# Patient Record
Sex: MALE | Race: WHITE | NOT HISPANIC OR LATINO | Employment: OTHER | URBAN - METROPOLITAN AREA
[De-identification: names, ages, dates, MRNs, and addresses within clinical notes are randomized per-mention and may not be internally consistent; named-entity substitution may affect disease eponyms.]

---

## 2017-01-24 ENCOUNTER — COMMUNICATION - HEALTHEAST (OUTPATIENT)
Dept: FAMILY MEDICINE | Facility: CLINIC | Age: 77
End: 2017-01-24

## 2017-01-24 DIAGNOSIS — E03.9 HYPOTHYROIDISM: ICD-10-CM

## 2017-01-24 DIAGNOSIS — I10 HTN (HYPERTENSION): ICD-10-CM

## 2017-01-24 DIAGNOSIS — E11.9 DIABETES (H): ICD-10-CM

## 2017-01-24 DIAGNOSIS — E78.5 HYPERLIPIDEMIA: ICD-10-CM

## 2017-01-30 ENCOUNTER — OFFICE VISIT - HEALTHEAST (OUTPATIENT)
Dept: FAMILY MEDICINE | Facility: CLINIC | Age: 77
End: 2017-01-30

## 2017-01-30 DIAGNOSIS — C61 MALIGNANT NEOPLASM OF PROSTATE (H): ICD-10-CM

## 2017-01-30 DIAGNOSIS — E11.9 DIABETES (H): ICD-10-CM

## 2017-01-30 DIAGNOSIS — E03.9 HYPOTHYROIDISM: ICD-10-CM

## 2017-01-30 DIAGNOSIS — I10 HTN (HYPERTENSION): ICD-10-CM

## 2017-01-30 DIAGNOSIS — E78.5 HYPERLIPIDEMIA: ICD-10-CM

## 2017-01-30 DIAGNOSIS — E11.9 TYPE 2 DIABETES MELLITUS (H): ICD-10-CM

## 2017-01-30 LAB
CHOLEST SERPL-MCNC: 188 MG/DL
FASTING STATUS PATIENT QL REPORTED: YES
HBA1C MFR BLD: 6.4 % (ref 3.5–6)
HDLC SERPL-MCNC: 57 MG/DL
LDLC SERPL CALC-MCNC: 121 MG/DL
TRIGL SERPL-MCNC: 50 MG/DL

## 2017-01-30 ASSESSMENT — MIFFLIN-ST. JEOR: SCORE: 2218.12

## 2017-03-09 ENCOUNTER — APPOINTMENT (OUTPATIENT)
Dept: GENERAL RADIOLOGY | Facility: CLINIC | Age: 77
DRG: 504 | End: 2017-03-09
Attending: PHYSICIAN ASSISTANT
Payer: MEDICARE

## 2017-03-09 ENCOUNTER — APPOINTMENT (OUTPATIENT)
Dept: GENERAL RADIOLOGY | Facility: CLINIC | Age: 77
DRG: 504 | End: 2017-03-09
Attending: PODIATRIST
Payer: MEDICARE

## 2017-03-09 ENCOUNTER — APPOINTMENT (OUTPATIENT)
Dept: CARDIOLOGY | Facility: CLINIC | Age: 77
DRG: 504 | End: 2017-03-09
Attending: PHYSICIAN ASSISTANT
Payer: MEDICARE

## 2017-03-09 ENCOUNTER — HOSPITAL ENCOUNTER (INPATIENT)
Facility: CLINIC | Age: 77
LOS: 4 days | Discharge: NURSING, REHAB OR LONG-TERM CARE | DRG: 504 | End: 2017-03-13
Attending: FAMILY MEDICINE | Admitting: FAMILY MEDICINE
Payer: MEDICARE

## 2017-03-09 ENCOUNTER — RECORDS - HEALTHEAST (OUTPATIENT)
Dept: ADMINISTRATIVE | Facility: OTHER | Age: 77
End: 2017-03-09

## 2017-03-09 ENCOUNTER — APPOINTMENT (OUTPATIENT)
Dept: CT IMAGING | Facility: CLINIC | Age: 77
DRG: 504 | End: 2017-03-09
Attending: PHYSICIAN ASSISTANT
Payer: MEDICARE

## 2017-03-09 ENCOUNTER — APPOINTMENT (OUTPATIENT)
Dept: GENERAL RADIOLOGY | Facility: CLINIC | Age: 77
DRG: 504 | End: 2017-03-09
Attending: FAMILY MEDICINE
Payer: MEDICARE

## 2017-03-09 ENCOUNTER — ANESTHESIA (OUTPATIENT)
Dept: SURGERY | Facility: CLINIC | Age: 77
DRG: 504 | End: 2017-03-09
Payer: MEDICARE

## 2017-03-09 ENCOUNTER — ANESTHESIA EVENT (OUTPATIENT)
Dept: SURGERY | Facility: CLINIC | Age: 77
DRG: 504 | End: 2017-03-09
Payer: MEDICARE

## 2017-03-09 DIAGNOSIS — S92.101A: Primary | ICD-10-CM

## 2017-03-09 DIAGNOSIS — S92.131A CLOSED DISPLACED FRACTURE OF POSTERIOR PROCESS OF RIGHT TALUS, INITIAL ENCOUNTER: ICD-10-CM

## 2017-03-09 PROBLEM — S92.109A TALUS FRACTURE: Status: ACTIVE | Noted: 2017-03-09

## 2017-03-09 PROBLEM — N40.0 BPH (BENIGN PROSTATIC HYPERPLASIA): Chronic | Status: ACTIVE | Noted: 2017-03-09

## 2017-03-09 PROBLEM — E11.9 TYPE 2 DIABETES MELLITUS (H): Status: ACTIVE | Noted: 2017-03-09

## 2017-03-09 PROBLEM — C73 MALIGNANT NEOPLASM OF THYROID GLAND (H): Status: ACTIVE | Noted: 2017-03-09

## 2017-03-09 PROBLEM — E78.5 HYPERLIPIDEMIA: Status: ACTIVE | Noted: 2017-03-09

## 2017-03-09 PROBLEM — E03.9 HYPOTHYROIDISM: Status: ACTIVE | Noted: 2017-03-09

## 2017-03-09 PROBLEM — E11.9 TYPE 2 DIABETES MELLITUS (H): Chronic | Status: ACTIVE | Noted: 2017-03-09

## 2017-03-09 PROBLEM — E03.9 HYPOTHYROIDISM: Chronic | Status: ACTIVE | Noted: 2017-03-09

## 2017-03-09 PROBLEM — C61 MALIGNANT NEOPLASM OF PROSTATE (H): Status: ACTIVE | Noted: 2017-03-09

## 2017-03-09 PROBLEM — E78.5 HYPERLIPIDEMIA: Chronic | Status: ACTIVE | Noted: 2017-03-09

## 2017-03-09 LAB
ALBUMIN SERPL-MCNC: 3.7 G/DL (ref 3.4–5)
ALP SERPL-CCNC: 44 U/L (ref 40–150)
ALT SERPL W P-5'-P-CCNC: 20 U/L (ref 0–70)
ANION GAP SERPL CALCULATED.3IONS-SCNC: 6 MMOL/L (ref 3–14)
AST SERPL W P-5'-P-CCNC: 15 U/L (ref 0–45)
BASOPHILS # BLD AUTO: 0 10E9/L (ref 0–0.2)
BASOPHILS NFR BLD AUTO: 0.1 %
BILIRUB SERPL-MCNC: 0.6 MG/DL (ref 0.2–1.3)
BUN SERPL-MCNC: 22 MG/DL (ref 7–30)
CALCIUM SERPL-MCNC: 8.4 MG/DL (ref 8.5–10.1)
CHLORIDE SERPL-SCNC: 102 MMOL/L (ref 94–109)
CO2 SERPL-SCNC: 29 MMOL/L (ref 20–32)
CREAT SERPL-MCNC: 0.96 MG/DL (ref 0.66–1.25)
DIFFERENTIAL METHOD BLD: ABNORMAL
EOSINOPHIL # BLD AUTO: 0 10E9/L (ref 0–0.7)
EOSINOPHIL NFR BLD AUTO: 0.4 %
ERYTHROCYTE [DISTWIDTH] IN BLOOD BY AUTOMATED COUNT: 14 % (ref 10–15)
GFR SERPL CREATININE-BSD FRML MDRD: 76 ML/MIN/1.7M2
GLUCOSE BLDC GLUCOMTR-MCNC: 130 MG/DL (ref 70–99)
GLUCOSE BLDC GLUCOMTR-MCNC: 140 MG/DL (ref 70–99)
GLUCOSE BLDC GLUCOMTR-MCNC: 158 MG/DL (ref 70–99)
GLUCOSE BLDC GLUCOMTR-MCNC: 186 MG/DL (ref 70–99)
GLUCOSE SERPL-MCNC: 157 MG/DL (ref 70–99)
HBA1C MFR BLD: 6.8 % (ref 4.3–6)
HCT VFR BLD AUTO: 44.8 % (ref 40–53)
HGB BLD-MCNC: 14.2 G/DL (ref 13.3–17.7)
IMM GRANULOCYTES # BLD: 0 10E9/L (ref 0–0.4)
IMM GRANULOCYTES NFR BLD: 0.1 %
LYMPHOCYTES # BLD AUTO: 0.5 10E9/L (ref 0.8–5.3)
LYMPHOCYTES NFR BLD AUTO: 7.1 %
MCH RBC QN AUTO: 30.9 PG (ref 26.5–33)
MCHC RBC AUTO-ENTMCNC: 31.7 G/DL (ref 31.5–36.5)
MCV RBC AUTO: 98 FL (ref 78–100)
MONOCYTES # BLD AUTO: 0.7 10E9/L (ref 0–1.3)
MONOCYTES NFR BLD AUTO: 9.8 %
MRSA DNA SPEC QL NAA+PROBE: NORMAL
NEUTROPHILS # BLD AUTO: 6.2 10E9/L (ref 1.6–8.3)
NEUTROPHILS NFR BLD AUTO: 82.5 %
PLATELET # BLD AUTO: 171 10E9/L (ref 150–450)
POTASSIUM SERPL-SCNC: 4.1 MMOL/L (ref 3.4–5.3)
PROT SERPL-MCNC: 6.8 G/DL (ref 6.8–8.8)
RBC # BLD AUTO: 4.59 10E12/L (ref 4.4–5.9)
SODIUM SERPL-SCNC: 137 MMOL/L (ref 133–144)
SPECIMEN SOURCE: NORMAL
WBC # BLD AUTO: 7.6 10E9/L (ref 4–11)

## 2017-03-09 PROCEDURE — 25000128 H RX IP 250 OP 636: Performed by: FAMILY MEDICINE

## 2017-03-09 PROCEDURE — 99285 EMERGENCY DEPT VISIT HI MDM: CPT | Mod: 25

## 2017-03-09 PROCEDURE — 25000125 ZZHC RX 250: Performed by: NURSE ANESTHETIST, CERTIFIED REGISTERED

## 2017-03-09 PROCEDURE — 73700 CT LOWER EXTREMITY W/O DYE: CPT | Mod: RT

## 2017-03-09 PROCEDURE — 25000131 ZZH RX MED GY IP 250 OP 636 PS 637: Mod: GY | Performed by: PHYSICIAN ASSISTANT

## 2017-03-09 PROCEDURE — G0378 HOSPITAL OBSERVATION PER HR: HCPCS

## 2017-03-09 PROCEDURE — C1713 ANCHOR/SCREW BN/BN,TIS/BN: HCPCS | Performed by: PODIATRIST

## 2017-03-09 PROCEDURE — 27210794 ZZH OR GENERAL SUPPLY STERILE: Performed by: PODIATRIST

## 2017-03-09 PROCEDURE — 0QSL04Z REPOSITION RIGHT TARSAL WITH INTERNAL FIXATION DEVICE, OPEN APPROACH: ICD-10-PCS | Performed by: PODIATRIST

## 2017-03-09 PROCEDURE — 83036 HEMOGLOBIN GLYCOSYLATED A1C: CPT | Performed by: PHYSICIAN ASSISTANT

## 2017-03-09 PROCEDURE — 25500064 ZZH RX 255 OP 636: Performed by: FAMILY MEDICINE

## 2017-03-09 PROCEDURE — 25000132 ZZH RX MED GY IP 250 OP 250 PS 637: Mod: GY | Performed by: PHYSICIAN ASSISTANT

## 2017-03-09 PROCEDURE — A9270 NON-COVERED ITEM OR SERVICE: HCPCS | Mod: GY | Performed by: PHYSICIAN ASSISTANT

## 2017-03-09 PROCEDURE — 25000128 H RX IP 250 OP 636: Performed by: NURSE ANESTHETIST, CERTIFIED REGISTERED

## 2017-03-09 PROCEDURE — 29515 APPLICATION SHORT LEG SPLINT: CPT | Performed by: FAMILY MEDICINE

## 2017-03-09 PROCEDURE — 00000146 ZZHCL STATISTIC GLUCOSE BY METER IP

## 2017-03-09 PROCEDURE — 25800025 ZZH RX 258: Performed by: NURSE ANESTHETIST, CERTIFIED REGISTERED

## 2017-03-09 PROCEDURE — 25000125 ZZHC RX 250: Performed by: PODIATRIST

## 2017-03-09 PROCEDURE — 36000052 ZZH SURGERY LEVEL 2 EA 15 ADDTL MIN: Performed by: PODIATRIST

## 2017-03-09 PROCEDURE — C1769 GUIDE WIRE: HCPCS | Performed by: PODIATRIST

## 2017-03-09 PROCEDURE — 37000009 ZZH ANESTHESIA TECHNICAL FEE, EACH ADDTL 15 MIN: Performed by: PODIATRIST

## 2017-03-09 PROCEDURE — 12000007 ZZH R&B INTERMEDIATE

## 2017-03-09 PROCEDURE — 80053 COMPREHEN METABOLIC PANEL: CPT | Performed by: FAMILY MEDICINE

## 2017-03-09 PROCEDURE — 73630 X-RAY EXAM OF FOOT: CPT | Mod: RT

## 2017-03-09 PROCEDURE — 87641 MR-STAPH DNA AMP PROBE: CPT | Performed by: FAMILY MEDICINE

## 2017-03-09 PROCEDURE — 25000132 ZZH RX MED GY IP 250 OP 250 PS 637: Performed by: PHYSICIAN ASSISTANT

## 2017-03-09 PROCEDURE — 71000027 ZZH RECOVERY PHASE 2 EACH 15 MINS: Performed by: PODIATRIST

## 2017-03-09 PROCEDURE — 93306 TTE W/DOPPLER COMPLETE: CPT | Mod: 26 | Performed by: INTERNAL MEDICINE

## 2017-03-09 PROCEDURE — 25000128 H RX IP 250 OP 636: Performed by: PODIATRIST

## 2017-03-09 PROCEDURE — 40000264 ECHO COMPLETE WITH OPTISON

## 2017-03-09 PROCEDURE — 40000277 XR SURGERY CARM FLUORO LESS THAN 5 MIN W STILLS

## 2017-03-09 PROCEDURE — 96375 TX/PRO/DX INJ NEW DRUG ADDON: CPT

## 2017-03-09 PROCEDURE — 40000305 ZZH STATISTIC PRE PROC ASSESS I: Performed by: PODIATRIST

## 2017-03-09 PROCEDURE — 73590 X-RAY EXAM OF LOWER LEG: CPT | Mod: RT

## 2017-03-09 PROCEDURE — 87640 STAPH A DNA AMP PROBE: CPT | Performed by: FAMILY MEDICINE

## 2017-03-09 PROCEDURE — 36000054 ZZH SURGERY LEVEL 2 W FLUORO 1ST 30 MIN: Performed by: PODIATRIST

## 2017-03-09 PROCEDURE — 99223 1ST HOSP IP/OBS HIGH 75: CPT | Mod: AI | Performed by: PHYSICIAN ASSISTANT

## 2017-03-09 PROCEDURE — 73610 X-RAY EXAM OF ANKLE: CPT | Mod: RT

## 2017-03-09 PROCEDURE — 25000128 H RX IP 250 OP 636

## 2017-03-09 PROCEDURE — 99285 EMERGENCY DEPT VISIT HI MDM: CPT | Mod: 25 | Performed by: FAMILY MEDICINE

## 2017-03-09 PROCEDURE — 96376 TX/PRO/DX INJ SAME DRUG ADON: CPT

## 2017-03-09 PROCEDURE — 96374 THER/PROPH/DIAG INJ IV PUSH: CPT

## 2017-03-09 PROCEDURE — S0020 INJECTION, BUPIVICAINE HYDRO: HCPCS | Performed by: PODIATRIST

## 2017-03-09 PROCEDURE — 85025 COMPLETE CBC W/AUTO DIFF WBC: CPT | Performed by: FAMILY MEDICINE

## 2017-03-09 PROCEDURE — 37000008 ZZH ANESTHESIA TECHNICAL FEE, 1ST 30 MIN: Performed by: PODIATRIST

## 2017-03-09 PROCEDURE — 93005 ELECTROCARDIOGRAM TRACING: CPT

## 2017-03-09 PROCEDURE — 29515 APPLICATION SHORT LEG SPLINT: CPT

## 2017-03-09 PROCEDURE — 71010 XR CHEST PORT 1 VW: CPT

## 2017-03-09 DEVICE — IMPLANTABLE DEVICE: Type: IMPLANTABLE DEVICE | Site: ANKLE | Status: FUNCTIONAL

## 2017-03-09 RX ORDER — LIDOCAINE HYDROCHLORIDE 10 MG/ML
INJECTION, SOLUTION EPIDURAL; INFILTRATION; INTRACAUDAL; PERINEURAL PRN
Status: DISCONTINUED | OUTPATIENT
Start: 2017-03-09 | End: 2017-03-09

## 2017-03-09 RX ORDER — HYDRALAZINE HYDROCHLORIDE 20 MG/ML
2.5-5 INJECTION INTRAMUSCULAR; INTRAVENOUS EVERY 10 MIN PRN
Status: DISCONTINUED | OUTPATIENT
Start: 2017-03-09 | End: 2017-03-09 | Stop reason: HOSPADM

## 2017-03-09 RX ORDER — MORPHINE SULFATE 2 MG/ML
INJECTION, SOLUTION INTRAMUSCULAR; INTRAVENOUS
Status: DISCONTINUED
Start: 2017-03-09 | End: 2017-03-09 | Stop reason: HOSPADM

## 2017-03-09 RX ORDER — NALOXONE HYDROCHLORIDE 0.4 MG/ML
.1-.4 INJECTION, SOLUTION INTRAMUSCULAR; INTRAVENOUS; SUBCUTANEOUS
Status: DISCONTINUED | OUTPATIENT
Start: 2017-03-09 | End: 2017-03-09

## 2017-03-09 RX ORDER — ROPIVACAINE HYDROCHLORIDE 7.5 MG/ML
INJECTION, SOLUTION EPIDURAL; PERINEURAL PRN
Status: DISCONTINUED | OUTPATIENT
Start: 2017-03-09 | End: 2017-03-09

## 2017-03-09 RX ORDER — MEPERIDINE HYDROCHLORIDE 25 MG/ML
12.5 INJECTION INTRAMUSCULAR; INTRAVENOUS; SUBCUTANEOUS
Status: DISCONTINUED | OUTPATIENT
Start: 2017-03-09 | End: 2017-03-09 | Stop reason: HOSPADM

## 2017-03-09 RX ORDER — LOSARTAN POTASSIUM 25 MG/1
25 TABLET ORAL DAILY
Status: DISCONTINUED | OUTPATIENT
Start: 2017-03-09 | End: 2017-03-13 | Stop reason: HOSPADM

## 2017-03-09 RX ORDER — PROCHLORPERAZINE 25 MG
12.5 SUPPOSITORY, RECTAL RECTAL EVERY 12 HOURS PRN
Status: DISCONTINUED | OUTPATIENT
Start: 2017-03-09 | End: 2017-03-13 | Stop reason: HOSPADM

## 2017-03-09 RX ORDER — CEFAZOLIN SODIUM 1 G/3ML
1 INJECTION, POWDER, FOR SOLUTION INTRAMUSCULAR; INTRAVENOUS SEE ADMIN INSTRUCTIONS
Status: DISCONTINUED | OUTPATIENT
Start: 2017-03-09 | End: 2017-03-09 | Stop reason: CLARIF

## 2017-03-09 RX ORDER — ONDANSETRON 2 MG/ML
4 INJECTION INTRAMUSCULAR; INTRAVENOUS EVERY 6 HOURS PRN
Status: DISCONTINUED | OUTPATIENT
Start: 2017-03-09 | End: 2017-03-13 | Stop reason: HOSPADM

## 2017-03-09 RX ORDER — DEXAMETHASONE SODIUM PHOSPHATE 4 MG/ML
INJECTION, SOLUTION INTRA-ARTICULAR; INTRALESIONAL; INTRAMUSCULAR; INTRAVENOUS; SOFT TISSUE PRN
Status: DISCONTINUED | OUTPATIENT
Start: 2017-03-09 | End: 2017-03-09

## 2017-03-09 RX ORDER — ACETAMINOPHEN 325 MG/1
650 TABLET ORAL EVERY 4 HOURS PRN
Status: DISCONTINUED | OUTPATIENT
Start: 2017-03-09 | End: 2017-03-10

## 2017-03-09 RX ORDER — ONDANSETRON 4 MG/1
4 TABLET, ORALLY DISINTEGRATING ORAL EVERY 6 HOURS PRN
Status: DISCONTINUED | OUTPATIENT
Start: 2017-03-09 | End: 2017-03-13 | Stop reason: HOSPADM

## 2017-03-09 RX ORDER — SIMVASTATIN 40 MG
40 TABLET ORAL AT BEDTIME
Status: DISCONTINUED | OUTPATIENT
Start: 2017-03-09 | End: 2017-03-13 | Stop reason: HOSPADM

## 2017-03-09 RX ORDER — HYDROMORPHONE HYDROCHLORIDE 1 MG/ML
INJECTION, SOLUTION INTRAMUSCULAR; INTRAVENOUS; SUBCUTANEOUS
Status: COMPLETED
Start: 2017-03-09 | End: 2017-03-09

## 2017-03-09 RX ORDER — LEVOTHYROXINE SODIUM 75 UG/1
150 TABLET ORAL
Status: DISCONTINUED | OUTPATIENT
Start: 2017-03-09 | End: 2017-03-13 | Stop reason: HOSPADM

## 2017-03-09 RX ORDER — PROPOFOL 10 MG/ML
INJECTION, EMULSION INTRAVENOUS CONTINUOUS PRN
Status: DISCONTINUED | OUTPATIENT
Start: 2017-03-09 | End: 2017-03-09

## 2017-03-09 RX ORDER — SODIUM CHLORIDE 9 MG/ML
INJECTION, SOLUTION INTRAVENOUS CONTINUOUS
Status: DISCONTINUED | OUTPATIENT
Start: 2017-03-09 | End: 2017-03-11 | Stop reason: ALTCHOICE

## 2017-03-09 RX ORDER — LIDOCAINE HCL/EPINEPHRINE/PF 2%-1:200K
VIAL (ML) INJECTION PRN
Status: DISCONTINUED | OUTPATIENT
Start: 2017-03-09 | End: 2017-03-09

## 2017-03-09 RX ORDER — FENTANYL CITRATE 50 UG/ML
25-50 INJECTION, SOLUTION INTRAMUSCULAR; INTRAVENOUS
Status: DISCONTINUED | OUTPATIENT
Start: 2017-03-09 | End: 2017-03-09 | Stop reason: HOSPADM

## 2017-03-09 RX ORDER — TAMSULOSIN HYDROCHLORIDE 0.4 MG/1
0.4 CAPSULE ORAL DAILY
Status: DISCONTINUED | OUTPATIENT
Start: 2017-03-09 | End: 2017-03-13 | Stop reason: HOSPADM

## 2017-03-09 RX ORDER — AMOXICILLIN 250 MG
1-2 CAPSULE ORAL 2 TIMES DAILY
Status: DISCONTINUED | OUTPATIENT
Start: 2017-03-09 | End: 2017-03-13 | Stop reason: HOSPADM

## 2017-03-09 RX ORDER — ONDANSETRON 2 MG/ML
INJECTION INTRAMUSCULAR; INTRAVENOUS
Status: DISCONTINUED
Start: 2017-03-09 | End: 2017-03-09 | Stop reason: HOSPADM

## 2017-03-09 RX ORDER — MORPHINE SULFATE 4 MG/ML
4 INJECTION, SOLUTION INTRAMUSCULAR; INTRAVENOUS ONCE
Status: DISCONTINUED | OUTPATIENT
Start: 2017-03-09 | End: 2017-03-09

## 2017-03-09 RX ORDER — SODIUM CHLORIDE, SODIUM LACTATE, POTASSIUM CHLORIDE, CALCIUM CHLORIDE 600; 310; 30; 20 MG/100ML; MG/100ML; MG/100ML; MG/100ML
INJECTION, SOLUTION INTRAVENOUS CONTINUOUS
Status: DISCONTINUED | OUTPATIENT
Start: 2017-03-09 | End: 2017-03-10

## 2017-03-09 RX ORDER — CEFAZOLIN SODIUM 1 G/50ML
3 SOLUTION INTRAVENOUS
Status: COMPLETED | OUTPATIENT
Start: 2017-03-09 | End: 2017-03-09

## 2017-03-09 RX ORDER — LIDOCAINE 40 MG/G
CREAM TOPICAL
Status: DISCONTINUED | OUTPATIENT
Start: 2017-03-09 | End: 2017-03-09 | Stop reason: HOSPADM

## 2017-03-09 RX ORDER — NICOTINE POLACRILEX 4 MG
15-30 LOZENGE BUCCAL
Status: DISCONTINUED | OUTPATIENT
Start: 2017-03-09 | End: 2017-03-13 | Stop reason: HOSPADM

## 2017-03-09 RX ORDER — FENTANYL CITRATE 50 UG/ML
INJECTION, SOLUTION INTRAMUSCULAR; INTRAVENOUS PRN
Status: DISCONTINUED | OUTPATIENT
Start: 2017-03-09 | End: 2017-03-09

## 2017-03-09 RX ORDER — LORAZEPAM 2 MG/ML
.5-1 INJECTION INTRAMUSCULAR EVERY 4 HOURS PRN
Status: DISCONTINUED | OUTPATIENT
Start: 2017-03-09 | End: 2017-03-13 | Stop reason: HOSPADM

## 2017-03-09 RX ORDER — LORAZEPAM 0.5 MG/1
.5-1 TABLET ORAL EVERY 4 HOURS PRN
Status: DISCONTINUED | OUTPATIENT
Start: 2017-03-09 | End: 2017-03-13 | Stop reason: HOSPADM

## 2017-03-09 RX ORDER — HYDROMORPHONE HYDROCHLORIDE 1 MG/ML
INJECTION, SOLUTION INTRAMUSCULAR; INTRAVENOUS; SUBCUTANEOUS
Status: DISCONTINUED
Start: 2017-03-09 | End: 2017-03-09 | Stop reason: HOSPADM

## 2017-03-09 RX ORDER — MORPHINE SULFATE 2 MG/ML
2-4 INJECTION, SOLUTION INTRAMUSCULAR; INTRAVENOUS
Status: DISCONTINUED | OUTPATIENT
Start: 2017-03-09 | End: 2017-03-13 | Stop reason: HOSPADM

## 2017-03-09 RX ORDER — MORPHINE SULFATE 4 MG/ML
4 INJECTION, SOLUTION INTRAMUSCULAR; INTRAVENOUS ONCE
Status: COMPLETED | OUTPATIENT
Start: 2017-03-09 | End: 2017-03-09

## 2017-03-09 RX ORDER — PROCHLORPERAZINE MALEATE 5 MG
5 TABLET ORAL EVERY 6 HOURS PRN
Status: DISCONTINUED | OUTPATIENT
Start: 2017-03-09 | End: 2017-03-13 | Stop reason: HOSPADM

## 2017-03-09 RX ORDER — ONDANSETRON 2 MG/ML
4 INJECTION INTRAMUSCULAR; INTRAVENOUS EVERY 30 MIN PRN
Status: DISCONTINUED | OUTPATIENT
Start: 2017-03-09 | End: 2017-03-09

## 2017-03-09 RX ORDER — ONDANSETRON 2 MG/ML
4 INJECTION INTRAMUSCULAR; INTRAVENOUS ONCE
Status: COMPLETED | OUTPATIENT
Start: 2017-03-09 | End: 2017-03-09

## 2017-03-09 RX ORDER — TAMSULOSIN HYDROCHLORIDE 0.4 MG/1
0.4 CAPSULE ORAL DAILY
COMMUNITY
End: 2022-07-04

## 2017-03-09 RX ORDER — DEXTROSE MONOHYDRATE 25 G/50ML
25-50 INJECTION, SOLUTION INTRAVENOUS
Status: DISCONTINUED | OUTPATIENT
Start: 2017-03-09 | End: 2017-03-13 | Stop reason: HOSPADM

## 2017-03-09 RX ORDER — ONDANSETRON 4 MG/1
4 TABLET, ORALLY DISINTEGRATING ORAL EVERY 30 MIN PRN
Status: DISCONTINUED | OUTPATIENT
Start: 2017-03-09 | End: 2017-03-09

## 2017-03-09 RX ORDER — BUPIVACAINE HYDROCHLORIDE 5 MG/ML
INJECTION, SOLUTION PERINEURAL PRN
Status: DISCONTINUED | OUTPATIENT
Start: 2017-03-09 | End: 2017-03-09 | Stop reason: HOSPADM

## 2017-03-09 RX ORDER — HYDROMORPHONE HYDROCHLORIDE 1 MG/ML
.3-.5 INJECTION, SOLUTION INTRAMUSCULAR; INTRAVENOUS; SUBCUTANEOUS EVERY 10 MIN PRN
Status: DISCONTINUED | OUTPATIENT
Start: 2017-03-09 | End: 2017-03-09 | Stop reason: HOSPADM

## 2017-03-09 RX ORDER — HYDROMORPHONE HYDROCHLORIDE 1 MG/ML
0.5 INJECTION, SOLUTION INTRAMUSCULAR; INTRAVENOUS; SUBCUTANEOUS ONCE
Status: COMPLETED | OUTPATIENT
Start: 2017-03-09 | End: 2017-03-09

## 2017-03-09 RX ORDER — SODIUM CHLORIDE, SODIUM LACTATE, POTASSIUM CHLORIDE, CALCIUM CHLORIDE 600; 310; 30; 20 MG/100ML; MG/100ML; MG/100ML; MG/100ML
INJECTION, SOLUTION INTRAVENOUS CONTINUOUS
Status: DISCONTINUED | OUTPATIENT
Start: 2017-03-09 | End: 2017-03-09 | Stop reason: HOSPADM

## 2017-03-09 RX ORDER — NALOXONE HYDROCHLORIDE 0.4 MG/ML
.1-.4 INJECTION, SOLUTION INTRAMUSCULAR; INTRAVENOUS; SUBCUTANEOUS
Status: DISCONTINUED | OUTPATIENT
Start: 2017-03-09 | End: 2017-03-13 | Stop reason: HOSPADM

## 2017-03-09 RX ADMIN — FENTANYL CITRATE 100 MCG: 50 INJECTION, SOLUTION INTRAMUSCULAR; INTRAVENOUS at 19:09

## 2017-03-09 RX ADMIN — MORPHINE SULFATE 4 MG: 4 INJECTION, SOLUTION INTRAMUSCULAR; INTRAVENOUS at 02:33

## 2017-03-09 RX ADMIN — ONDANSETRON 4 MG: 2 INJECTION INTRAMUSCULAR; INTRAVENOUS at 00:45

## 2017-03-09 RX ADMIN — SENNOSIDES AND DOCUSATE SODIUM 1 TABLET: 8.6; 5 TABLET ORAL at 08:55

## 2017-03-09 RX ADMIN — MORPHINE SULFATE 4 MG: 2 INJECTION, SOLUTION INTRAMUSCULAR; INTRAVENOUS at 14:28

## 2017-03-09 RX ADMIN — HUMAN ALBUMIN MICROSPHERES AND PERFLUTREN 2 ML: 10; .22 INJECTION, SOLUTION INTRAVENOUS at 11:40

## 2017-03-09 RX ADMIN — INSULIN ASPART 2 UNITS: 100 INJECTION, SOLUTION INTRAVENOUS; SUBCUTANEOUS at 13:13

## 2017-03-09 RX ADMIN — MORPHINE SULFATE 4 MG: 2 INJECTION, SOLUTION INTRAMUSCULAR; INTRAVENOUS at 22:08

## 2017-03-09 RX ADMIN — PROPOFOL 50 MCG/KG/MIN: 10 INJECTION, EMULSION INTRAVENOUS at 18:58

## 2017-03-09 RX ADMIN — MORPHINE SULFATE 4 MG: 2 INJECTION, SOLUTION INTRAMUSCULAR; INTRAVENOUS at 10:06

## 2017-03-09 RX ADMIN — MORPHINE SULFATE 4 MG: 4 INJECTION, SOLUTION INTRAMUSCULAR; INTRAVENOUS at 01:57

## 2017-03-09 RX ADMIN — FENTANYL CITRATE 50 MCG: 50 INJECTION, SOLUTION INTRAMUSCULAR; INTRAVENOUS at 15:35

## 2017-03-09 RX ADMIN — LIDOCAINE HYDROCHLORIDE,EPINEPHRINE BITARTRATE 5 ML: 20; .005 INJECTION, SOLUTION EPIDURAL; INFILTRATION; INTRACAUDAL; PERINEURAL at 15:46

## 2017-03-09 RX ADMIN — DEXAMETHASONE SODIUM PHOSPHATE 8 MG: 4 INJECTION, SOLUTION INTRAMUSCULAR; INTRAVENOUS at 19:21

## 2017-03-09 RX ADMIN — Medication 2 G: at 19:03

## 2017-03-09 RX ADMIN — LIDOCAINE HYDROCHLORIDE 3 ML: 10 INJECTION, SOLUTION EPIDURAL; INFILTRATION; INTRACAUDAL; PERINEURAL at 15:42

## 2017-03-09 RX ADMIN — SODIUM CHLORIDE 250 ML: 9 INJECTION, SOLUTION INTRAVENOUS at 01:37

## 2017-03-09 RX ADMIN — ROPIVACAINE HYDROCHLORIDE 10 ML: 7.5 INJECTION, SOLUTION EPIDURAL; PERINEURAL at 15:54

## 2017-03-09 RX ADMIN — KETAMINE HYDROCHLORIDE 50 MG/HR: 100 INJECTION, SOLUTION, CONCENTRATE INTRAMUSCULAR; INTRAVENOUS at 19:00

## 2017-03-09 RX ADMIN — LEVOTHYROXINE SODIUM 150 MCG: 75 TABLET ORAL at 08:55

## 2017-03-09 RX ADMIN — HYDROMORPHONE HYDROCHLORIDE 0.5 MG: 10 INJECTION, SOLUTION INTRAMUSCULAR; INTRAVENOUS; SUBCUTANEOUS at 00:45

## 2017-03-09 RX ADMIN — LIDOCAINE HYDROCHLORIDE 1 ML: 10 INJECTION, SOLUTION EPIDURAL; INFILTRATION; INTRACAUDAL; PERINEURAL at 15:52

## 2017-03-09 RX ADMIN — MIDAZOLAM HYDROCHLORIDE 2 MG: 1 INJECTION, SOLUTION INTRAMUSCULAR; INTRAVENOUS at 19:00

## 2017-03-09 RX ADMIN — TAMSULOSIN HYDROCHLORIDE 0.4 MG: 0.4 CAPSULE ORAL at 08:55

## 2017-03-09 RX ADMIN — HYDROMORPHONE HYDROCHLORIDE 1 MG: 1 INJECTION, SOLUTION INTRAMUSCULAR; INTRAVENOUS; SUBCUTANEOUS at 01:38

## 2017-03-09 RX ADMIN — ROPIVACAINE HYDROCHLORIDE 30 ML: 7.5 INJECTION, SOLUTION EPIDURAL; PERINEURAL at 15:47

## 2017-03-09 RX ADMIN — HYDROMORPHONE HYDROCHLORIDE 0.5 MG: 10 INJECTION, SOLUTION INTRAMUSCULAR; INTRAVENOUS; SUBCUTANEOUS at 00:56

## 2017-03-09 RX ADMIN — SIMVASTATIN 40 MG: 40 TABLET, FILM COATED ORAL at 22:06

## 2017-03-09 RX ADMIN — FENTANYL CITRATE 50 MCG: 50 INJECTION, SOLUTION INTRAMUSCULAR; INTRAVENOUS at 15:41

## 2017-03-09 RX ADMIN — LORAZEPAM 1 MG: 2 INJECTION, SOLUTION INTRAMUSCULAR; INTRAVENOUS at 04:00

## 2017-03-09 RX ADMIN — MORPHINE SULFATE 2 MG: 2 INJECTION, SOLUTION INTRAMUSCULAR; INTRAVENOUS at 23:59

## 2017-03-09 RX ADMIN — SODIUM CHLORIDE: 9 INJECTION, SOLUTION INTRAVENOUS at 10:32

## 2017-03-09 RX ADMIN — SODIUM CHLORIDE: 9 INJECTION, SOLUTION INTRAVENOUS at 03:16

## 2017-03-09 RX ADMIN — SODIUM CHLORIDE, POTASSIUM CHLORIDE, SODIUM LACTATE AND CALCIUM CHLORIDE: 600; 310; 30; 20 INJECTION, SOLUTION INTRAVENOUS at 16:03

## 2017-03-09 RX ADMIN — MORPHINE SULFATE 4 MG: 2 INJECTION, SOLUTION INTRAMUSCULAR; INTRAVENOUS at 02:59

## 2017-03-09 RX ADMIN — Medication 3 G: at 19:12

## 2017-03-09 RX ADMIN — MIDAZOLAM HYDROCHLORIDE 1 MG: 1 INJECTION, SOLUTION INTRAMUSCULAR; INTRAVENOUS at 15:34

## 2017-03-09 RX ADMIN — MIDAZOLAM HYDROCHLORIDE 1 MG: 1 INJECTION, SOLUTION INTRAMUSCULAR; INTRAVENOUS at 15:41

## 2017-03-09 RX ADMIN — LOSARTAN POTASSIUM 25 MG: 25 TABLET, FILM COATED ORAL at 08:59

## 2017-03-09 ASSESSMENT — ACTIVITIES OF DAILY LIVING (ADL)
RETIRED_COMMUNICATION: 0-->UNDERSTANDS/COMMUNICATES WITHOUT DIFFICULTY
DRESS: 0-->INDEPENDENT
SWALLOWING: 0-->SWALLOWS FOODS/LIQUIDS WITHOUT DIFFICULTY
TRANSFERRING: 0-->INDEPENDENT
COGNITION: 0 - NO COGNITION ISSUES REPORTED
RETIRED_EATING: 0-->INDEPENDENT
BATHING: 0-->INDEPENDENT
TOILETING: 0-->INDEPENDENT
AMBULATION: 0-->INDEPENDENT
FALL_HISTORY_WITHIN_LAST_SIX_MONTHS: NO

## 2017-03-09 ASSESSMENT — PAIN DESCRIPTION - DESCRIPTORS
DESCRIPTORS: BURNING;CRUSHING;DISCOMFORT
DESCRIPTORS: ACHING;BURNING

## 2017-03-09 NOTE — H&P
[facility]    History and Physical  Hospital Medicine       Date of Admission:  3/9/2017  Date of Service: 3/9/2017     Assessment & Plan   Jayesh Ortiz is a 76 year old male with PMH significant for HTN, HLD, BPH, hypothyroidism, hx of prostate cancer, hx of thyroid cancer who now presents with pain to right ankle s/p fall.      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  He will indubitably require assistance s/p surgery given body habitus and living alone.  - PT/OT consulted     Surgery Clearance and RCRI Risk Assessment:  pending formal read of CXR, EKG, patient will be cleared for surgery. Although relatively unhealthy, no modifiable risk factors at present  Anesthesia issues: no  Baseline Activity: no change  Chest Pain: no  Shortness of breath: no  Cardiac Risk Factors/Assessment:                High Risk Surgery: no              History Ischemic Heart Disease: no              History of Congestive Heart Failure: no? (no history, but more than likely some dysfunction given risk factors and physical exam revealing trace pitting bilateral LE edema).              History of CVA: no              Preoperative Treatment with Insulin: no              Preoperative Creatinine greater than 2.0: no              Total Number of Points: 0-1 = 0.4% to 0.9% risk of major cardiac event  - STAT EKG ordered   - STAT portable CXR ordered  - STAT Echo ordered    Hypoxia, likely secondary to obesity hypoventilation syndrome and poor respiratory drive s/p pain medication administration  New onset.  No oxygen use at home.  Given a significant amount of pain medication after which he became hypoxia.  No history of sleep apnea or sleep study, but given body habitus and reports of heaving snoring  throughout the evening, likely this patient also has DRAKE.  O2 titrated down 2L NC pre-operatively  - continue supportive O2  - end tidal CO2 in place  - see above CXR    Systolic Heart Murmur  Reportedly long-standing per patient. No prior documentation per review of care-everywhere. Grade III, best heard at the left sternal boarder.  Some bilateral pitting LE edema, although this is difficult to assess given morbid obesity.  Patient reports this is also longstanding.  No recent weight gain.  No chest pain/palpitations. Patient has multiple risk factors for CHF - morbid obesity, T2DM, and HTN.  In addition, the patient reports both his biological parents passed from congestive heart failure (mother, age 100 and father, age 95).  - recommend outpatient follow up with PCP.  Review of care-everywhere and [facility] records fails to reveal previous ECHO.  - see above ECHO    HTN  - continue PTA losartan  - PRN metoprolol 5mg IV with parameters to maximize diastolic     Hypothyroidism  - continue PTA levothyroxine    Hyperlipidemia  - continue PTA simvastatin      Type 2 diabetes mellitus (H)  - hold home metformin  - start 10u lantus BID s/p surgery if he remains inpatient  - high intensity SSI      BPH (benign prostatic hyperplasia)  - continue PTA Flomax       F: 125mL/hr 0.9NS  E: BMP in AM  N: NPO pending procedure  DVT Prophylaxis: Mechanical    Code Status: Full Code    Disposition: Anticipate discharge in 1-2 days. Appropriate for observation status at this point in time; however, should surgery be preformed this afternoon, he will quality for inpatient care.    Case discussed with Dr. Kadeem Bates.  Assessment and plan as written above.    Dolores Lino PA-C  [facility] Hospitalist Program    History is obtained from the patient and review of the EMR.    Past Medical History    Past Medical History   Diagnosis Date     Diabetes (H)      Hypertension      Thyroid disease        Past Surgical History   Past  "Surgical History   Procedure Laterality Date     Thyroidectomy         Family History    Family History   Problem Relation Age of Onset     Heart Failure Mother      Heart Failure Father        History of Present Illness   Jayesh Ortiz is a 76 year old male who now presents with right ankle pain after fall.    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  HE was \"crawling\" on his bobcat; he reports he \"must have had\" something slippery on the bottom of his shoe.  Lost his balance while on bobcat and slipped; he reports that his right ankle became wedged between two points on the apparatus.  He specifically denies lightheadedness, dizziness, changes to vision, chest pain, palpitations, and SOB prior to fall.  HE reports that he attempted to \"twist\" his foot to get it dislodged.  He reports immediate pain to right ankle after it became lose.  Had significant pain to his right foot when he attempted to ambulate.  Reports the pain was a 10/10 and sharp.  He reports that the only thing that made this pain better was pain medication upon arrival to the ED.    In the last two weeks, the patient denies fever, chills, nausea, vomiting, myalgias, cold-like symptoms (congestion, pharyngitis, sinus pressure, rhinorrhea), swollen glands, headaches, lightheadedness, dizziness, chest pain, palpitations/flutters, SOB, cough, abdominal pain, diarrhea/constipation, dysuria, hematuria, joint swelling, joint pain, leg swelling, and rashes.  The patient has chronic LE swelling which he reports is unchanged from baseline.  He reports no change to his baseline activity.  He has had surgery to remove his thyroid within the last 10 years; he denies anesthesia difficulties at this point in time.      Prior to Admission Medications   Prior to Admission Medications   Prescriptions Last Dose Informant Patient Reported? Taking?   ASPIRIN ADULT LOW STRENGTH PO 3/8/2017 at Unknown time  Yes Yes   Sig: Take 81 mg by " "mouth daily    GLIPIZIDE PO 3/8/2017 at Unknown time  Yes Yes   Sig: Take 10 mg by mouth 2 times daily (before meals)    IBUPROFEN PO 3/9/2017 at Unknown time  Yes Yes   LOSARTAN POTASSIUM PO 3/8/2017 at Unknown time  Yes Yes   Sig: Take 25 mg by mouth daily    Levothyroxine Sodium (LEVOTHROID PO) 3/8/2017 at Unknown time  Yes Yes   Sig: Take 150 mcg by mouth daily    PIOGLITAZONE HCL PO 3/8/2017 at Unknown time  Yes Yes   Sig: Take 45 mg by mouth daily    SIMVASTATIN PO 3/8/2017 at Unknown time  Yes Yes   Sig: Take 40 mg by mouth daily    tamsulosin (FLOMAX) 0.4 MG capsule 3/8/2017 at Unknown time  Yes Yes   Sig: Take 0.4 mg by mouth daily       Facility-Administered Medications: None       Allergies   No Known Allergies    Social History   Social History     Social History     Marital status: Single     Spouse name: N/A     Number of children: N/A     Years of education: N/A     Occupational History     Not on file.     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    ROS: 10 point ROS neg other than the symptoms noted above in the HPI.    Physical Exam     /87 (BP Location: Right arm)  Pulse 75  Temp 98  F (36.7  C) (Oral)  Resp 18  Ht 1.727 m (5' 8\")  Wt (!) 153.4 kg (338 lb 3 oz)  SpO2 98%  BMI 51.42 kg/m2     Weight: 338 lbs 2.97 oz Body mass index is 51.42 kg/(m^2).     Constitutional: Alert and oriented x4.  Cooperative.  Appears stated age.  Appears in no acute distress. Someone somnolent.  Morbidly obese.  HEENT: Oropharynx is clear and moist. No evidence of cranial trauma.  Lymph/Hematologic: No occipital, submental, submandibular, anterior or posterior cervical, or supraclavicular lymphadenopathy is " appreciated.  Cardiovascular: Regular rate/ rhythm.  S1 and S2 grossly normal.  No appreciable murmur, rub, gallop.  JVP does not appear to be significantly elevated, although it is extremely difficult to assess given morbid obesity..  Trace bilateral pitting lower extremity edema to right and left knees (reportedly chronic).  Respiratory: Clear to auscultation bilaterally, although this is limited due to poor respiratory effort and morbid obesity). Equal chest expansion.  GI: Non-tender, normal bowel sounds. Morbidly obese abdomen; unable to assess any degree of organomegaly due to body habitus.    Genitourinary: Deferred  Musculoskeletal: Normal muscle bulk and tone. Capillary refill less than 3 second to bilateral toes.    Skin: Warm and dry, no rashes. Some chronic venous stasis changes to bilateral LE.    Neurologic: Neck supple. Cranial nerves 3-12 are grossly intact.  is symmetric.     Data   Data reviewed today:     Recent Labs  Lab 03/09/17  0048   WBC 7.6   HGB 14.2   MCV 98         POTASSIUM 4.1   CHLORIDE 102   CO2 29   BUN 22   CR 0.96   ANIONGAP 6   MATT 8.4*   *   ALBUMIN 3.7   PROTTOTAL 6.8   BILITOTAL 0.6   ALKPHOS 44   ALT 20   AST 15       Recent Results (from the past 24 hour(s))   XR Foot Right G/E 3 Views    Narrative    XR FOOT RT G/E 3 VW, XR ANKLE RT G/E 3 VW  3/9/2017 1:23 AM      HISTORY: Pain.     COMPARISON: None.      Impression    IMPRESSION: Probable fracture of the posterior talus seen only on  lateral views. No other fracture or dislocation is evident. There are  posterior and plantar calcaneal spurs.   XR Tibia & Fibula Right 2 Views    Narrative    XR TIBIA & FIBULA RT 2 VW  3/9/2017 1:23 AM      HISTORY: Pain.    COMPARISON: None.      Impression    IMPRESSION: No acute fracture or dislocation.   XR Ankle Right G/E 3 Views    Narrative    XR FOOT RT G/E 3 VW, XR ANKLE RT G/E 3 VW  3/9/2017 1:23 AM      HISTORY: Pain.     COMPARISON: None.      Impression     IMPRESSION: Probable fracture of the posterior talus seen only on  lateral views. No other fracture or dislocation is evident. There are  posterior and plantar calcaneal spurs.       I personally reviewed the chest x-ray image(s) showing LLL opacity (possible atalectasis versus infiltrate)..    Dolores Lino [facility]

## 2017-03-09 NOTE — PROGRESS NOTES
[facility] TRANSPORT NOTE  Data:   Reason for Transport:  surgery    Jayesh Ortiz was transported to surgery via cart at 1700.  Patient was accompanied by Nursing Assistant. Equipment used for transport: Oxygen  Nasal Cannula. Family was aware of reason for transport: yes    Action:  Report: given to Erica in surgery    Response:  Patient's condition when transferred off unit was stable.    Nichole C. Bushweiler

## 2017-03-09 NOTE — ED NOTES
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 Patient fell on right ankle with all of his weight.  Patient goes to  [facility]. Patient has swelling of ankle  Some bruising  Patient continues to have good skin color and movement of toes

## 2017-03-09 NOTE — IP AVS SNAPSHOT
` ` Patient Information     Patient Name Sex     Jayesh Ortiz ([id number]) Male 1940       Room Bed    2302 -      Patient Demographics     Address Phone    [address] [address] [phone] (Home)  [phone] (Work)      Patient Ethnicity & Race     Ethnic Group Patient Race    American White      Emergency Contact(s)     Name Relation Home Work Mobile    MARISABEL CHU Significant other [phone]      Documents on File        Status Date Received Description       Documents for the Patient    Affiliate Privacy placeholder   phase3    Consent for EHR Access Received 17     Insurance Card Received 17     External Medication Information Consent       Patient ID Received 17     [facility] Specified Other       Consent for Services/Privacy Notice - Hospital/Clinic Received 17     Privacy Notice - [place] Received 17     HIM JAROD Authorization  17        Documents for the Encounter    CMS IM for Patient Signature Received 03/10/17     Observation Notice Received 17     EMS/Ambulance Record  03/10/17 [facility] - PREHOSPITAL CARE REPORT    CE Point of Care Auth Received      ECG   ECG Report      Admission Information     Attending Provider Admitting Provider Admission Type Admission Date/Time    Kadeem Bates MD Kampfe, Kevin L, MD Emergency 17  0024    Discharge Date Hospital Service Auth/Cert Status Service Area     Orthopedics [facility]    Unit Room/Bed Admission Status       [facility]  Admission (Confirmed)       Admission     Complaint    Talus fracture, Talus fracture, Right Talus fracure, Fracture of right talus      Hospital Account     Name Acct ID Class Status Primary Coverage    Jayesh Ortiz [id number] Inpatient Open MEDICARE - MEDICARE            Guarantor Account (for Hospital Account #[id number])     Name Relation to Pt Service Area Active? Acct  Type    Jayesh Ortiz Self FCS Yes Personal/Family    Address Phone          [address] [place] [address] [phone](H)              Coverage Information (for Hospital Account #[id number])     1. MEDICARE/MEDICARE     F/O Payor/Plan Precert #    MEDICARE/MEDICARE     Subscriber Subscriber #    Angel Jayesh Bedoya [id number]    Address Phone    [employer] CLAIMS  [address]  [place] [address] [phone]          2. BCBS/BLUE CROSS QHP     F/O Payor/Plan Precert #    BCBS/BLUE CROSS QHP     Subscriber Subscriber #    Jayesh Ortiz Aureliano [id number]    Address Phone    [address] [address]  [place], [place] [id number] [phone]

## 2017-03-09 NOTE — ANESTHESIA PREPROCEDURE EVALUATION
Anesthesia Evaluation     . Pt has had prior anesthetic. Type: General    No history of anesthetic complications     ROS/MED HX    ENT/Pulmonary:     (+)DRAKE risk factors snores loudly, hypertension, obese, , . .    Neurologic:  - neg neurologic ROS     Cardiovascular:     (+) Dyslipidemia, hypertension----. : . . . :. valvular problems/murmurs type: AS 19 mm gradient per echo:. Previous cardiac testing Echodate:results:Results     ECHO COMPLETE WITH OPTISON (Order [id number])      External Result Report     External Result Report     PACS Images     Show images for ECHO COMPLETE WITH OPTISON     ECHO COMPLETE WITH OPTISON   Status: Final result   Visible to patient: No (Not Released) Next appt: None Order: [id number]      Details     Reading Physician Reading Date Result Priority    Shiva Mcdaniel MD 3/9/2017        Narrative          [id number]  [id number]  [id number]  [id number]^EMMANUEL^WANG^HUSSEIN           [facility]  Echocardiography Laboratory  [address] [place] [address]        Name: AUGUSTO ROBERTSON  MRN: [id number]  : 1940  Study Date: 2017 11:01 AM  Age: 76 yrs  Gender: Male  Patient Location: [facility]  Reason For Study: Heart failure symptoms  Ordering Physician: WANG BENITEZ  Referring Physician: [facility]  Performed By: Pippa Galindo RDCS     BSA: 2.6 m2  Height: 68 in  Weight: 338 lb  HR: 68  BP: 143/87 mmHg  _____________________________________________________________________________  __        Procedure  Complete Portable Echo Adult. Contrast Optison.  _____________________________________________________________________________  __        Interpretation Summary     Left ventricular systolic function is normal.  The visual ejection fraction is estimated at 55-60%.  Mild to moderate valvular aortic stenosis.  The mean AoV pressure gradient is 19mmHg.  The study was technically difficult. There is no comparison study  available.  _____________________________________________________________________________  __        Left Ventricle  The left ventricle is normal in size. There is mild concentric left  ventricular hypertrophy. Left ventricular systolic function is normal. The  visual ejection fraction is estimated at 55-60%. E by E prime ratio is between  8 and 15, which is indeterminate for assessment of left ventricular filling  pressures. Regional wall motion abnormalities cannot be excluded due to  limited visualization.     Right Ventricle  The right ventricular systolic function is normal. The right ventricle is not  well visualized.     Atria  The left atrium is moderately dilated. Right atrium not well visualized. There  is no color Doppler evidence of an atrial shunt.     Mitral Valve  The mitral valve is not well visualized. There is no mitral regurgitation  noted.        Tricuspid Valve  The tricuspid valve is not well visualized. No tricuspid regurgitation. Right  ventricular systolic pressure could not be approximated due to inadequate  tricuspid regurgitation.     Aortic Valve  The aortic valve is not well visualized. No aortic regurgitation is present.  Mild to moderate valvular aortic stenosis. The mean AoV pressure gradient is  19mmHg.     Pulmonic Valve  The pulmonic valve is not well visualized.     Vessels  The ascending aorta is Borderline dilated. Dilated inferior vena cava.     Pericardium  There is no pericardial effusion.        Rhythm  The rhythm was normal sinus.  _____________________________________________________________________________  __  MMode/2D Measurements & Calculations  IVSd: 1.3 cm     LVIDd: 5.5 cm  LVIDs: 3.9 cm  LVPWd: 1.2 cm  FS: 29.4 %  EDV(Teich): 146.6 ml  ESV(Teich): 64.8 ml  LV mass(C)d: 289.8 grams  Ao root diam: 4.0 cm  asc Aorta Diam: 3.9 cm  LVOT diam: 2.2 cm  LVOT area: 3.9 cm2  LA Volume (BP): 98.0 ml  LA Volume Index (BP): 38.4 ml/m2           Doppler Measurements &  Calculations  MV E max ruddy: 72.4 cm/sec  MV A max ruddy: 104.7 cm/sec  MV E/A: 0.69  MV dec time: 0.23 sec  Ao V2 max: 296.8 cm/sec  Ao max P.2 mmHg  Ao V2 mean: 204.6 cm/sec  Ao mean P.9 mmHg  Ao V2 VTI: 59.8 cm  LENNY(I,D): 1.4 cm2  LENNY(V,D): 1.4 cm2  LV V1 max P.8 mmHg  LV V1 max: 109.1 cm/sec  LV V1 VTI: 21.4 cm  SV(LVOT): 82.3 ml  LENNY Index (cm2/m2): 0.54  Lateral E/e': 8.4  Medial E/e': 9.2           _____________________________________________________________________________  __           Report approved by: Diony Olguin 2017 12:01 PM          Specimen Collected: 17 11:01 AM Last Resulted: 17 12:01 PM Order Details View Encounter Lab and Collection Details Routing Result History                  Encounter     View Encounter     Lab Information     RADIOLOGY RESULTS    date: results:ECG reviewed date:3/9/17 results:Sinus Rhythm - frequent ectopic ventricular beat s   # VECs = 2  -Poor R-wave progression -may be secondary to pulmonary disease  consider old anterior infarct.    Low voltage with rightward P-axis and rotation -possible pulmonary disease. date: results:          METS/Exercise Tolerance:  >4 METS   Hematologic:  - neg hematologic  ROS       Musculoskeletal:  - neg musculoskeletal ROS (+) , , other musculoskeletal- right talus fracture       GI/Hepatic:  - neg GI/hepatic ROS       Renal/Genitourinary:     (+) BPH,       Endo:     (+) type I DM, type II DM thyroid problem hypothyroidism, Obesity (BMI=51), .      Psychiatric:  - neg psychiatric ROS       Infectious Disease:  - neg infectious disease ROS       Malignancy:   (+) Malignancy History of Prostate and Other  Other CA thyroid status post Surgery         Other:    - neg other ROS           Physical Exam  Normal systems: pulmonary and dental    Airway   Mallampati: III  TM distance: >3 FB  Neck ROM: limited    Dental     Cardiovascular   (+) murmur       Pulmonary                     Anesthesia Plan      History  & Physical Review      ASA Status:  3 .    NPO Status:  > 8 hours    Plan for General and Periph. Nerve Block for postop pain with Intravenous and Propofol induction. Maintenance will be Balanced.    PONV prophylaxis:  Ondansetron (or other 5HT-3) and Dexamethasone or Solumedrol  Additional equipment: Videolaryngoscope and Difficult Airway Cart      Postoperative Care  Postoperative pain management:  IV analgesics, Peripheral nerve block (Continuous) and Oral pain medications.      Consents  Anesthetic plan, risks, benefits and alternatives discussed with:  Patient..                          .

## 2017-03-09 NOTE — PROGRESS NOTES
"[facility] ADMISSION NOTE    Patient admitted to room 1002 at approximately 0315 via cart from emergency room. Patient was accompanied by transport tech.     Verbal SBAR report received from ABDIAS Elizabeth prior to patient arrival.     Patient trasferred to bed via air case. Patient alert and oriented X 3. Pain is not well controlled.  Medication(s) being used: narcotic analgesics including Dilaudid and Morphine. 0-10 Pain Scale: 10. Admission vital signs: Blood pressure 164/86, pulse 85, temperature 98.2  F (36.8  C), temperature source Oral, resp. rate 18, height 1.727 m (5' 8\"), weight (!) 153.4 kg (338 lb 3 oz), SpO2 96 %. Patient was oriented to plan of care, call light, bed controls, tv, telephone, bathroom and visiting hours.     The following safety risks were identified during admission: fall. Yellow risk band applied: YES.     Cindy Johanson    "

## 2017-03-09 NOTE — PLAN OF CARE
Problem: Pain, Acute (Adult)  Goal: Identify Related Risk Factors and Signs and Symptoms  Related risk factors and signs and symptoms are identified upon initiation of Human Response Clinical Practice Guideline (CPG)   Outcome: Improving  Poor pain control in the ED and even here.  Was medicated with Dilaudid with no relief, changed to Morphine and received total of 12 mg.  Was barely settled in room when requesting more Morphine when just received 30 minutes ago prior to admit and transport to room.  Through admission process turning side to side unable to keep ice packs to ankle and foot as in constant motion.  Completed admission and medicated with Ativan 1 mg for rest and anxiety.  Finally receiving some relief resting quietly now.  Dozing and states pain is slightly better but dozes back to sleep.

## 2017-03-09 NOTE — PROGRESS NOTES
Pt went down for CT via cart this AM without difficulty. Pt given one dose of PRN morphine this afternoon but has been resting in bed otherwise, denies pain in-between doses. Ice applied to ankle continuously. +CMS noted in foot. Attempted to titrate 02 to RA but 02 sats dropped to 88%, remains on 2 LPM via nasal cannula to maintain sats >90%. LS diminished. Pt aware surgery tentatively planned for 1700, declines any questions or concerns at this time.

## 2017-03-09 NOTE — IP AVS SNAPSHOT
` `     [facility] SURGICAL: [phone]            Medication Administration Report for Jayesh Ortiz as of 03/13/17 1303   Legend:    Given Hold Not Given Due Canceled Entry Other Actions    Time Time (Time) Time  Time-Action       Inactive    Active    Linked        Medications 03/07/17 03/08/17 03/09/17 03/10/17 03/11/17 03/12/17 03/13/17    acetaminophen (TYLENOL) tablet 975 mg  Dose: 975 mg Freq: EVERY 8 HOURS Route: PO  Start: 03/10/17 1458   Admin Instructions: Alternate ibuprofen (if ordered) with acetaminophen.  Maximum acetaminophen dose from all sources = 75 mg/kg/day not to exceed 4 grams/day.        1440 (975 mg)-Given       2255 (975 mg)-Given        0619 (975 mg)-Given       1434 (975 mg)-Given       (2225)-Not Given        0419 (975 mg)-Given       (1116)-Not Given       1821 (975 mg)-Given        0156 (975 mg)-Given       1014 (975 mg)-Given       [ ] 1830           aspirin EC EC tablet 325 mg  Dose: 325 mg Freq: DAILY Route: PO  Start: 03/10/17 1630   End: 03/24/17 0759   Admin Instructions: DO NOT CRUSH.        1704 (325 mg)-Given        0804 (325 mg)-Given        0753 (325 mg)-Given        0830 (325 mg)-Given           diazepam (VALIUM) injection 2.5 mg  Dose: 2.5 mg Freq: EVERY 4 HOURS PRN Route: IV  PRN Reasons: anxiety,muscle spasms  Start: 03/10/17 0752   Admin Instructions: Vesicant.        0806 (2.5 mg)-Given              glipiZIDE (GLUCOTROL) tablet 10 mg  Dose: 10 mg Freq: 2 TIMES DAILY BEFORE MEALS Route: PO  Indications of Use: TYPE 2 DIABETES MELLITUS  Start: 03/10/17 1630       1615 (10 mg)-Given        0619 (10 mg)-Given       1718 (10 mg)-Given        0702 (10 mg)-Given       1702 (10 mg)-Given        0638 (10 mg)-Given       [ ] 1630           glucose 40 % gel 15-30 g  Dose: 15-30 g Freq: EVERY 15 MIN PRN Route: PO  PRN Reason: low blood sugar  Start: 03/09/17 0719   Admin Instructions: Give 15 g for BG 51 to 69 mg/dL IF patient is conscious and able to swallow.  Give 30 g for BG less than or equal to 50 mg/dL IF patient is conscious and able to swallow. Do NOT give glucose gel via enteral tube.  IF patient has enteral tube: give apple juice 120 mL (4 oz or 15 g of CHO) via enteral tube for BG 51 to 69 mg/dL.  Give apple juice 240 mL (8 oz or 30 g of CHO) via enteral tube for BG less than or equal to 50 mg/dL.       1728-Auto Hold       2132-Unhold              Or  dextrose 50 % injection 25-50 mL  Dose: 25-50 mL Freq: EVERY 15 MIN PRN Route: IV  PRN Reason: low blood sugar  Start: 03/09/17 0719   Admin Instructions: Use if have IV access, BG less than 70 mg/dL and meet dose criteria below:  Dose if conscious and alert (or disorientated) and NPO = 25 mL  Dose if unconscious / not alert = 50 mL  Vesicant.       1728-Auto Hold       2132-Unhold              Or  glucagon injection 1 mg  Dose: 1 mg Freq: EVERY 15 MIN PRN Route: SC  PRN Reason: low blood sugar  PRN Comment: May repeat x 1 only  Start: 03/09/17 0719   Admin Instructions: May give SQ or IM. IF BG less than or equal to 50 mg/dL and no IV access.  ONLY use glucagon IF patient has NO IV access AND is UNABLE to swallow.       1728-Auto Hold       2132-Unhold               hydrOXYzine (ATARAX) tablet 25-50 mg  Dose: 25-50 mg Freq: EVERY 4 HOURS PRN Route: PO  PRN Reason: other  PRN Comment: adjuvant pain  Start: 03/10/17 0755       0806 (50 mg)-Given       1609 (25 mg)-Given       1920 (25 mg)-Given       2255 (25 mg)-Given        0622 (25 mg)-Given       1327 (25 mg)-Given       1718 (25 mg)-Given             insulin aspart (NovoLOG) inj (RAPID ACTING)  Dose: 1-7 Units Freq: AT BEDTIME Route: SC  Start: 03/10/17 2200   Admin Instructions: HIGH INSULIN RESISTANCE DOSING    Do Not give Bedtime Correction Insulin if BG less than 200.   For  - 224 give 1 units.   For  - 249 give 2 units.   For  - 274 give 3 units.   For  - 299 give 4 units.   For  - 324 give 5 units.   For  - 349 give 6  units.   For BG greater than or equal to 350 give 7 units.   Notify provider if glucose greater than or equal to 350 mg/dL after administration of correction dose.  If given at mealtime, must be administered 5 min before meal or immediately after.        (2110)-Not Given        (2109)-Not Given        (2108)-Not Given        [ ] 2200           insulin aspart (NovoLOG) inj (RAPID ACTING)  Dose: 1-10 Units Freq: 3 TIMES DAILY BEFORE MEALS Route: SC  Start: 03/10/17 1215   Admin Instructions: Correction Scale - HIGH INSULIN RESISTANCE DOSING     Do Not give Correction Insulin if Pre-Meal BG less than 140.   For Pre-Meal  - 164 give 1 unit.   For Pre-Meal  - 189 give 2 units.   For Pre-Meal  - 214 give 3 units.   For Pre-Meal  - 239 give 4 units.   For Pre-Meal  - 264 give 5 units.   For Pre-Meal  - 289 give 6 units.   For Pre-Meal  - 314 give 7 units.   For Pre-Meal  - 339 give 8 units.   For Pre-Meal  - 364 give 9 units.   For Pre-Meal BG greater than or equal to 365 give 10 units  To be given with prandial insulin, and based on pre-meal blood glucose.   Notify provider if glucose greater than or equal to 350 mg/dL after administration of correction dose.  If given at mealtime, must be administered 5 min before meal or immediately after.        1222 (5 Units)-Given       1703 (1 Units)-Given        (0805)-Not Given       (1125)-Not Given [C]       (1705)-Not Given        (0753)-Not Given       (1117)-Not Given       (1706)-Not Given        (0830)-Not Given       [ ] 1130       [ ] 1630           ketorolac (TORADOL) injection 15 mg  Dose: 15 mg Freq: EVERY 6 HOURS PRN Route: IV  PRN Reason: moderate to severe pain  Start: 03/10/17 0754   End: 03/15/17 0853       0807 (15 mg)-Given       1610 (15 mg)-Given              levothyroxine (SYNTHROID/LEVOTHROID) tablet 150 mcg  Dose: 150 mcg Freq: EVERY MORNING BEFORE BREAKFAST Route: PO  Start: 03/09/17 0800      0855 (150  mcg)-Given       1728-Auto Hold       2132-Unhold        0628 (150 mcg)-Given        0619 (150 mcg)-Given        0702 (150 mcg)-Given        0638 (150 mcg)-Given           LORazepam (ATIVAN) injection 0.5-1 mg  Dose: 0.5-1 mg Freq: EVERY 4 HOURS PRN Route: IV  PRN Reason: anxiety  Start: 03/09/17 0305   Admin Instructions: For IV PUSH: Dilute with equal volume of NS.       0400 (1 mg)-Given       1728-Auto Hold       2132-Unhold              Or  LORazepam (ATIVAN) tablet 0.5-1 mg  Dose: 0.5-1 mg Freq: EVERY 4 HOURS PRN Route: PO  PRN Reason: anxiety  Start: 03/09/17 0305 1728-Auto Hold       2132-Unhold               losartan (COZAAR) tablet 25 mg  Dose: 25 mg Freq: DAILY Route: PO  Indications of Use: HYPERTENSION  Start: 03/09/17 0800      0859 (25 mg)-Given       1728-Auto Hold       2132-Unhold        0807 (25 mg)-Given        0805 (25 mg)-Given        0753 (25 mg)-Given        0830 (25 mg)-Given           magnesium hydroxide (MILK OF MAGNESIA) suspension 30 mL  Dose: 30 mL Freq: DAILY PRN Route: PO  PRN Reason: constipation  Start: 03/12/17 0805   Admin Instructions: Shake well.               metoprolol (LOPRESSOR) injection 5 mg  Dose: 5 mg Freq: EVERY 6 HOURS PRN Route: IV  PRN Reason: high blood pressure  Start: 03/09/17 0723   Admin Instructions: For systolic greater than 185 or diastolic greater than 100       1728-Auto Hold       2132-Unhold               morphine (PF) injection 2-4 mg  Dose: 2-4 mg Freq: EVERY 2 HOURS PRN Route: IV  PRN Reason: severe pain  Start: 03/09/17 0305   Admin Instructions: Hold while on PCA.       1006 (4 mg)-Given       1428 (4 mg)-Given       1728-Auto Hold       2132-Unhold       2208 (4 mg)-Given       2359 (2 mg)-Given        0239 (2 mg)-Given       0315 (2 mg)-Given       0629 (4 mg)-Given       0831 (4 mg)-Given       1057 (4 mg)-Given              naloxone (NARCAN) injection 0.1-0.4 mg  Dose: 0.1-0.4 mg Freq: EVERY 2 MIN PRN Route: IV  PRN Reason: opioid  reversal  Start: 03/09/17 0744   Admin Instructions: For respiratory rate LESS than or EQUAL to 8.  Partial reversal dose:  0.1 mg titrated q 2 minutes for Analgesia Side Effects Monitoring Sedation Level of 3 (frequently drowsy, arousable, drifts to sleep during conversation).Full reversal dose:  0.4 mg bolus for Analgesia Side Effects Monitoring Sedation Level of 4 (somnolent, minimal or no response to stimulation).       1728-Auto Hold       2132-Unhold               ondansetron (ZOFRAN-ODT) ODT tab 4 mg  Dose: 4 mg Freq: EVERY 6 HOURS PRN Route: PO  PRN Reason: nausea  Start: 03/09/17 0305   Admin Instructions: This is Step 1 of nausea and vomiting management.  If nausea not resolved in 15 minutes, go to Step 2 prochlorperazine (COMPAZINE). Do not push through foil backing. Peel back foil and gently remove. Place on tongue immediately. Administration with liquid unnecessary       1728-Auto Hold       2132-Unhold              Or  ondansetron (ZOFRAN) injection 4 mg  Dose: 4 mg Freq: EVERY 6 HOURS PRN Route: IV  PRN Reasons: nausea,vomiting  Start: 03/09/17 0305   Admin Instructions: This is Step 1 of nausea and vomiting management.  If nausea not resolved in 15 minutes, go to Step 2 prochlorperazine (COMPAZINE).  Irritant.       1728-Auto Hold       2132-Unhold               oxyCODONE (ROXICODONE) IR tablet 5-10 mg  Dose: 5-10 mg Freq: EVERY 3 HOURS PRN Route: PO  PRN Reason: moderate to severe pain  Start: 03/10/17 0748       0806 (10 mg)-Given       1223 (10 mg)-Given       1609 (10 mg)-Given       1921 (10 mg)-Given       2255 (10 mg)-Given        0621 (10 mg)-Given       0951 (10 mg)-Given       1327 (10 mg)-Given       1718 (10 mg)-Given             pioglitazone (ACTOS) tablet 45 mg  Dose: 45 mg Freq: DAILY Route: PO  Indications of Use: TYPE 2 DIABETES MELLITUS  Start: 03/10/17 1215       (1226)-Not Given [C]        0805 (45 mg)-Given        0753 (45 mg)-Given        0830 (45 mg)-Given            pneumococcal vaccine (PNEUMOVAX 23-yessi) injection 0.5 mL  Dose: 0.5 mL Freq: PRIOR TO DISCHARGE Route: IM  Start: 03/10/17 1000   Admin Instructions: Administer when afebrile (less than 100.4 ) x 24 hr OR prior to discharge. *Give Fact Sheet to Patient*. If patient is febrile, reassess in 8 hrs or every shift. Minor illnesses with or without fever does NOT contraindicate the vaccination. If not administering when scheduled , change the due time by following the instructions in the reference link below. If patient refuses vaccine, chart as Vaccine Refused.           [ ] 1200           polyethylene glycol (MIRALAX/GLYCOLAX) Packet 17 g  Dose: 17 g Freq: DAILY Route: PO  Start: 03/12/17 0815   Admin Instructions: 1 Packet = 17 grams. Mixed prescribed dose in 8 ounces of water. Follow with 8 oz. of water.          0903 (17 g)-Given        0830 (17 g)-Given           prochlorperazine (COMPAZINE) injection 5 mg  Dose: 5 mg Freq: EVERY 6 HOURS PRN Route: IV  PRN Reasons: nausea,vomiting  Start: 03/09/17 0745   Admin Instructions: This is Step 2 of nausea and vomiting management.   If nausea not resolved in 15 minutes, give metoclopramide (REGLAN) if ordered (step 3 of nausea and vomiting management)       1728-Auto Hold       2132-Unhold              Or  prochlorperazine (COMPAZINE) tablet 5 mg  Dose: 5 mg Freq: EVERY 6 HOURS PRN Route: PO  PRN Reason: vomiting  Start: 03/09/17 0745   Admin Instructions: This is Step 2 of nausea and vomiting management.   If nausea not resolved in 15 minutes, give metoclopramide (REGLAN) if ordered (step 3 of nausea and vomiting management)       1728-Auto Hold       2132-Unhold              Or  prochlorperazine (COMPAZINE) Suppository 12.5 mg  Dose: 12.5 mg Freq: EVERY 12 HOURS PRN Route: RE  PRN Reasons: nausea,vomiting  Start: 03/09/17 0745   Admin Instructions: This is Step 2 of nausea and vomiting management.   If nausea not resolved in 15 minutes, give metoclopramide (REGLAN) if  ordered (step 3 of nausea and vomiting management)       1728-Auto Hold       2132-Unhold               senna-docusate (SENOKOT-S;PERICOLACE) 8.6-50 MG per tablet 1-2 tablet  Dose: 1-2 tablet Freq: 2 TIMES DAILY Route: PO  Start: 03/09/17 0800   Admin Instructions: Start with 1 tablet PO BID, If no bowel movement in 24 hours, increase to 2 tablets PO BID.  Hold for loose stools.       0855 (1 tablet)-Given       1728-Auto Hold       2000-Automatically Held       2132-Unhold        0807 (2 tablet)-Given       1920 (1 tablet)-Given        0804 (2 tablet)-Given       1941 (2 tablet)-Given        0753 (2 tablet)-Given       1820 (2 tablet)-Given        0830 (2 tablet)-Given       [ ] 2000           simvastatin (ZOCOR) tablet 40 mg  Dose: 40 mg Freq: AT BEDTIME Route: PO  Indications of Use: TYPE II A HYPERLIPIDEMIA  Start: 03/09/17 2200 1728-Auto Hold       2132-Unhold       2206 (40 mg)-Given        1921 (40 mg)-Given               2225 (40 mg)-Given        2107 (40 mg)-Given        [ ] 2200           tamsulosin (FLOMAX) capsule 0.4 mg  Dose: 0.4 mg Freq: DAILY Route: PO  Indications of Use: BENIGN PROSTATIC HYPERTROPHY  Start: 03/09/17 0800   Admin Instructions: Administer 30 minutes after the same meal each day.  Capsules should be swallowed whole; do not crush chew or open.       0855 (0.4 mg)-Given       1728-Auto Hold       2132-Unhold        0807 (0.4 mg)-Given        0804 (0.4 mg)-Given        0753 (0.4 mg)-Given        0830 (0.4 mg)-Given          Completed Medications  Medications 03/07/17 03/08/17 03/09/17 03/10/17 03/11/17 03/12/17 03/13/17         Dose: 500 mL Freq: ONCE Route: IV  Last Dose: 500 mL (03/10/17 1934)  Start: 03/10/17 1930   End: 03/10/17 2134   Admin Instructions: Repeat x 2 until urine output        1934 (500 mL)-New Bag                Dose: 0.5 mL Freq: PRIOR TO DISCHARGE Route: IM  Start: 03/10/17 1000   End: 03/12/17 1657   Admin Instructions: Administer when afebrile (less than  100.4F) x 24 hours, or Prior to Discharge.  If not administering when scheduled , change the due time by following the instructions in the reference link below.  If patient refuses vaccine, chart as Vaccine Refused.          1657 (0.5 mL)-Given        [ ] 1200          Discontinued Medications  Medications 03/07/17 03/08/17 03/09/17 03/10/17 03/11/17 03/12/17 03/13/17         Rate: 125 mL/hr Freq: CONTINUOUS Route: IV  Start: 03/09/17 0118   End: 03/11/17 1123      0316 ( )-New Bag       1032 ( )-New Bag       1100 ( )-Rate/Dose Verify       1300 ( )-Rate/Dose Verify       1500 ( )-Rate/Dose Verify       2026 ( )-Anesthesia Volume Adjustment        0600 ( )-Rate/Dose Verify       1311 ( )-New Bag       2132 ( )-New Bag        0421 ( )-New Bag       1123-Med Discontinued           Dose: 3 mL Freq: EVERY 8 HOURS Route: IK  Start: 03/09/17 1345   End: 03/12/17 1717   Admin Instructions: And Q1H PRN, to lock peripheral IV dormant line.       (1341)-Not Given       (2206)-Not Given        (0704)-Not Given       (1414)-Not Given       (2111)-Not Given        (0450)-Not Given       1434 (3 mL)-Given       1941 (3 mL)-Given        0703 (3 mL)-Given       1349 (3 mL)-Given       1717-Med Discontinued                Dose: 3 mL Freq: EVERY 1 HOUR PRN Route: IK  PRN Reasons: line flush,post meds or blood draw  Start: 03/09/17 1341   End: 03/12/17 1717   Admin Instructions: for peripheral IV flush post IV meds          1717-Med Discontinued          Dose: 3 mL Freq: EVERY 1 HOUR PRN Route: IK  PRN Reason: line flush  PRN Comment: for peripheral IV flush post IV meds  Start: 03/09/17 0115   End: 03/12/17 1717 1728-Auto Hold       2132-Unhold          1717-Med Discontinued

## 2017-03-09 NOTE — ED PROVIDER NOTES
History     Chief Complaint   Patient presents with     Trauma     fell 18 inches this AM and injured ankle  Patient   attempted to ambulate on ankle during day.  Tonight  pain increased   182/74  92 %   HRT 84     HPI  Jayesh Ortiz is a 76 year old male, past medical history is significant for hypertension, presents the emergency department with concerns of right ankle and foot pain and difficulty weightbearing since he fell this morning while [place] and slipping his foot between the frame of the bobcat in the bucket.  Crushtype injury possible twisting involved.  He has taken Tylenol and ibuprofen but has been unable to make a significant change in the pain.  There were no other injuries.  I was asked to see this patient is a trauma evaluation; I did not call a trauma activation.    Active Ambulatory Problems     Diagnosis Date Noted     No Active Ambulatory Problems     Resolved Ambulatory Problems     Diagnosis Date Noted     No Resolved Ambulatory Problems     Past Medical History   Diagnosis Date     Diabetes (H)      Hypertension      Thyroid disease      History reviewed. No pertinent past surgical history.  Social History     Social History     Marital status: Single     Spouse name: N/A     Number of children: N/A     Years of education: N/A     Occupational History     Not on file.     Social History Main Topics     Smoking status: Unknown If Ever Smoked     Smokeless tobacco: Not on file     Alcohol use No     Drug use: Not on file     Sexual activity: Not on file     Other Topics Concern     Not on file     Social History Narrative     No narrative on file     No family history on file.  Current Facility-Administered Medications   Medication     sodium chloride (PF) 0.9% PF flush 3 mL     sodium chloride (PF) 0.9% PF flush 3 mL     0.9% sodium chloride infusion     morphine (PF) injection 4 mg     Current Outpatient Prescriptions   Medication     LOSARTAN  "POTASSIUM PO     SIMVASTATIN PO     Levothyroxine Sodium (LEVOTHROID PO)     tamsulosin (FLOMAX) 0.4 MG capsule     PIOGLITAZONE HCL PO     GLIPIZIDE PO     ASPIRIN ADULT LOW STRENGTH PO     IBUPROFEN PO      No Known Allergies    I have reviewed the Medications, Allergies, Past Medical and Surgical History, and Social History in the Epic system.    Review of Systems   All other systems reviewed and are negative.      Physical Exam   BP: (!) 205/85  Pulse: 92  Temp: 98  F (36.7  C)  Resp: 20  Height: 172.7 cm (5' 8\")  SpO2: 96 %  Physical Exam   Constitutional: He appears well-developed and well-nourished.   Musculoskeletal:   Symmetrically edematous lower extremities with faint ecchymosis about the medial and lateral malleolus as well as the proximal dorsal foot.  Vascular is intact bilaterally.   Nursing note and vitals reviewed.      ED Course     ED Course     Procedures           Results for orders placed or performed during the hospital encounter of 03/09/17   XR Ankle Right G/E 3 Views    Narrative    XR FOOT RT G/E 3 VW, XR ANKLE RT G/E 3 VW  3/9/2017 1:23 AM      HISTORY: Pain.     COMPARISON: None.      Impression    IMPRESSION: Probable fracture of the posterior talus seen only on  lateral views. No other fracture or dislocation is evident. There are  posterior and plantar calcaneal spurs.   XR Foot Right G/E 3 Views    Narrative    XR FOOT RT G/E 3 VW, XR ANKLE RT G/E 3 VW  3/9/2017 1:23 AM      HISTORY: Pain.     COMPARISON: None.      Impression    IMPRESSION: Probable fracture of the posterior talus seen only on  lateral views. No other fracture or dislocation is evident. There are  posterior and plantar calcaneal spurs.   XR Tibia & Fibula Right 2 Views    Narrative    XR TIBIA & FIBULA RT 2 VW  3/9/2017 1:23 AM      HISTORY: Pain.    COMPARISON: None.      Impression    IMPRESSION: No acute fracture or dislocation.         Critical Care time:  none               Labs Ordered and Resulted from Time " of ED Arrival Up to the Time of Departure from the ED   GLUCOSE BY METER - Abnormal; Notable for the following:        Result Value    Glucose 158 (*)     All other components within normal limits   CBC WITH PLATELETS DIFFERENTIAL - Abnormal; Notable for the following:     Absolute Lymphocytes 0.5 (*)     All other components within normal limits   COMPREHENSIVE METABOLIC PANEL - Abnormal; Notable for the following:     Glucose 157 (*)     Calcium 8.4 (*)     All other components within normal limits   PERIPHERAL IV CATHETER   1:55 AM  X-rays reviewed with the patient.  He feels very suboptimal pain relief with multiple doses of Dilaudid.  We'll switch to morphine and see if that is better effect for him.  Repeat exam of the bilateral lower extremities shows no gross asymmetry, he has large obese calves and ankles.  Palpation throughout the medial lateral and posterior tib-fib area is soft and nontender.  He has pain primarily in the ankle only.  Neurovascular is intact in the foot.  I recommended to the patient that he be admitted for pain control and evaluation by foot and ankle surgery.  2:03 AM  Discussed with Dr.Nick Ronquillo for [facility] orthopedics.  He agrees with the plan to admit the patient to hospital to the hospitalist service and he will notify one of the foot and ankle physicians to see him in the morning.  He requested that the patient be splinted posteriorly for comfort.  2:16 AM  Discussed with Dr. Kadeem Bates for the hospitalist service who agrees to accept the patient to his care upon admission.  2:28 AM  With verbal consent from the patient and with the assistance of ERT I placed a 5 inch Ortho-Glass splint posterior style without difficulty.  Well-tolerated.  Neurovascular intact both before and after.      Assessments & Plan (with Medical Decision Making)   76-year-old male past medical history reviewed above, presents to the emergency department with injury to his right lower extremity in  isolation earlier the morning of the day prior to presentation.  Inability to walk on it without severe pain.  Inadequate pain control at home.  Evaluation finds intact neurovascular status with soft palpation throughout the anterior posterior calf on the right side.  Ecchymosis around the ankle level.  X-ray reveals a displaced posterior talar fracture.  Pain control with Dilaudid was suboptimal.  He was switched to morphine with better results.  He was discussed with both the hospitalist service for admission to their service as well as with orthopedics who will consult on the patient.  All questions were answered and transition orders are placed in the emergency department by myself.      Disclaimer: This note consists of symbols derived from keyboarding, dictation and/or voice recognition software. As a result, there may be errors in the script that have gone undetected. Please consider this when interpreting information found in this chart.      I have reviewed the nursing notes.    I have reviewed the findings, diagnosis, plan and need for follow up with the patient.    New Prescriptions    No medications on file       Final diagnoses:   Closed displaced fracture of posterior process of right talus, initial encounter       3/9/2017   [facility] EMERGENCY DEPARTMENT     Kadeem Soto MD  03/09/17 0231

## 2017-03-09 NOTE — PROGRESS NOTES
[facility]    Hospital Medicine   Care Update  Date of Service: 3/9/2017     CXR returned.  Some LLL opacity present.  Radiologist read as atelectasis versus infiltrate.  No complaints of cough, SOB prior to fall.  Suspect that the majority of hypoxia is secondary to pain medication administration.  Normal WBC.  Afebrile. Unlikely this is related to infectious etiology; patient remains candidate for surgery.      Dolores Lino PA-C

## 2017-03-09 NOTE — PROGRESS NOTES
No void today, bladder scanned for 446 mL. MD paged with update. Orders received for flores catheter placement.

## 2017-03-09 NOTE — CONSULTS
[facility] Consultation    Consultation - [facility]  Level of consult: Consult, follow and place orders    Jayesh Ortiz,  1940, MRN [id number]     Admitting Dx: Closed displaced fracture of posterior process of right talus, initial encounter [S92.131A]     PCP: Greg De La Cruz, [phone]     Code status:  Full Code     Extended Emergency Contact Information  Primary Emergency Contact: MARISABEL CHU   [facility]  Home Phone: [phone]  Mobile Phone: [phone]  Relation: Significant other     Assessment:  Right talus fracture, moderately displaced. Multiple medical comorbidities including diabetes, obesity, HTN, and acute hypoxia    Plan:  Patient to be cleared for surgery.  ORIF of the right talus planned for today.  Patient will need prolonged period of NWB of RLE.  Patient will need assistance with post hospital discharge - home with help vs. Rehab facility.  Post op care will be coordinated.    Principal Problem:    Talus fracture  Active Problems:    Hypothyroidism    Hyperlipidemia    Type 2 diabetes mellitus (H)    BPH (benign prostatic hyperplasia)       Chief Complaint  Right ankle pain     HPI  We have been requested by Dr. Soto to evaluate Jayesh Ortiz who is a 76 year old year old male for right talus fracture. He is a  and was working on his bobcat when his foot became lodged. He twisted his foot to attempt to free it and noted immediate pain after loosening his foot. Unable to bear weight immediately following injury.        Additional information via admission H & P and ED notes.    History is obtained from the patient     Past Medical History  Past Medical History   Diagnosis Date     Diabetes (H)      Hypertension      Thyroid disease        Surgical History  Past Surgical History   Procedure Laterality Date     Thyroidectomy          Social History  Social History     Social History     Marital status: Single     Spouse  name: N/A     Number of children: N/A     Years of education: N/A     Occupational History     Not on file.     Social History Main Topics     Smoking status: Never Smoker     Smokeless tobacco: Not on file     Alcohol use No     Drug use: No     Sexual activity: Not on file     Other Topics Concern     Not on file     Social History Narrative     No narrative on file       Family History  Family History   Problem Relation Age of Onset     Heart Failure Mother      Heart Failure Father         Allergies:  Review of patient's allergies indicates no known allergies.      Current Medications:  Current Facility-Administered Medications   Medication     sodium chloride (PF) 0.9% PF flush 3 mL     sodium chloride (PF) 0.9% PF flush 3 mL     0.9% sodium chloride infusion     morphine (PF) injection 2-4 mg     ondansetron (ZOFRAN-ODT) ODT tab 4 mg    Or     ondansetron (ZOFRAN) injection 4 mg     LORazepam (ATIVAN) injection 0.5-1 mg    Or     LORazepam (ATIVAN) tablet 0.5-1 mg     levothyroxine (SYNTHROID/LEVOTHROID) tablet 150 mcg     losartan (COZAAR) tablet 25 mg     simvastatin (ZOCOR) tablet 40 mg     tamsulosin (FLOMAX) capsule 0.4 mg     glucose 40 % gel 15-30 g    Or     dextrose 50 % injection 25-50 mL    Or     glucagon injection 1 mg     insulin aspart (NovoLOG) inj (RAPID ACTING)     metoprolol (LOPRESSOR) injection 5 mg     naloxone (NARCAN) injection 0.1-0.4 mg     acetaminophen (TYLENOL) tablet 650 mg     senna-docusate (SENOKOT-S;PERICOLACE) 8.6-50 MG per tablet 1-2 tablet     prochlorperazine (COMPAZINE) injection 5 mg    Or     prochlorperazine (COMPAZINE) tablet 5 mg    Or     prochlorperazine (COMPAZINE) Suppository 12.5 mg     insulin glargine (LANTUS) injection 10 Units       Review of Systems:  As per admission H & P.    Physical Exam:  Temp:  [98  F (36.7  C)-98.4  F (36.9  C)] 98  F (36.7  C)  Pulse:  [74-92] 75  Resp:  [18-20] 18  BP: (126-205)/() 143/87  SpO2:  [86 %-98 %] 98 %    Gen:  alert, oriented, in pain of right foot/ankle  Neurovascular: intact to RLE  Derm: + edema of LE, no open lesions nor fracture  MSK: + pain and edema about the right ankle.  Known underlying talus fracture.     Pertinent Labs  Lab Results: personally reviewed.  Lab Results   Component Value Date    WBC 7.6 03/09/2017    HGB 14.2 03/09/2017    HCT 44.8 03/09/2017    MCV 98 03/09/2017     03/09/2017     No results for input(s): INR in the last 168 hours.    Pertinent Radiology  Radiology Results: images and radiology report reviewed  Recent Results (from the past 24 hour(s))   XR Foot Right G/E 3 Views    Narrative    XR FOOT RT G/E 3 VW, XR ANKLE RT G/E 3 VW  3/9/2017 1:23 AM      HISTORY: Pain.     COMPARISON: None.      Impression    IMPRESSION: Probable fracture of the posterior talus seen only on  lateral views. No other fracture or dislocation is evident. There are  posterior and plantar calcaneal spurs.   XR Tibia & Fibula Right 2 Views    Narrative    XR TIBIA & FIBULA RT 2 VW  3/9/2017 1:23 AM      HISTORY: Pain.    COMPARISON: None.      Impression    IMPRESSION: No acute fracture or dislocation.   XR Ankle Right G/E 3 Views    Narrative    XR FOOT RT G/E 3 VW, XR ANKLE RT G/E 3 VW  3/9/2017 1:23 AM      HISTORY: Pain.     COMPARISON: None.      Impression    IMPRESSION: Probable fracture of the posterior talus seen only on  lateral views. No other fracture or dislocation is evident. There are  posterior and plantar calcaneal spurs.   XR Chest Port 1 View    Narrative    CHEST PORTABLE ONE VIEW March 9, 2017 7:57 AM     HISTORY: Hypoxia.    COMPARISON: None.      Impression    IMPRESSION: There is obscuration of the left hemidiaphragm that may  represent atelectasis, pneumonia, pleural effusion or both. The lungs  are otherwise clear. There is no pneumothorax. Heart size is normal  with no evidence for congestive failure.    KARRI SUAZO MD   CT Ankle Right w/o Contrast    Narrative    CT RIGHT ANKLE  WITHOUT CONTRAST  3/9/2017 8:57 AM    HISTORY: Right talar fracture.    COMPARISON: Radiographs earlier today.    TECHNIQUE: 0.2 cm helical imaging is performed through the right ankle  without contrast. Sagittal and coronal reformatting was performed.  Radiation dose for this scan was reduced using automated exposure  control, adjustment of the mA and/or kV according to patient size, or  iterative reconstruction technique.     FINDINGS: There is a moderately comminuted fracture of the posterior  third of the talus. The main fracture line is in a somewhat oblique  coronal orientation involving the central aspect of the talar dome.  The main posterior bone fragment is depressed up to 0.7 cm with up to  0.5 cm of separation of the main fracture line. There is additional  comminution with several much smaller fracture fragments along the  inferior medial aspect of the bone extending into the subtalar joint  where there is only a few millimeter of articular surface depression  and irregularity.    No other fracture or osseous lesion is seen. The remaining joint  spaces are well preserved. There are posterior and plantar calcaneal  spurs. There is edema in the soft tissues surrounding the ankle. No  other soft tissue abnormality is seen.      Impression    IMPRESSION: Moderately comminuted intra-articular fracture of the  posterior third of the talus. This involves both the mortise and  posterior subtalar joints. There is articular surface depression along  the talar dome as described above.     AYAN CERVANTES MD       Attestation:  Ankit Coy DPM, FACFAS  Foot & Ankle Surgeon/Specialist  [facility]       Amber Sanchez

## 2017-03-09 NOTE — IP AVS SNAPSHOT
"    [facility] SUR[facility]ICAL: [phone]                                              INTERAGENCY TRANSFER FORM - PHYSICIAN ORDERS   3/9/2017                    Hospital Admission Date: 3/9/2017  AUGUSTO ROBERTSON   : 1940  Sex: Male        Attending Provider: Kadeem Bates MD     Allergies:  Nka [No Known Allergies]    Infection:  None   Service:  ORTHOPEDICS    Ht:  1.727 m (5' 8\")   Wt:  153.4 kg (338 lb 3 oz)   Admission Wt:  153.4 kg (338 lb 3 oz)    BMI:  51.42 kg/m 2   BSA:  2.71 m 2            Patient PCP Information     Provider PCP Type    [facility]      ED Clinical Impression     Diagnosis Description Comment Added By Time Added    Closed displaced fracture of posterior process of right talus, initial encounter [S92.131A] Closed displaced fracture of posterior process of right talus, initial encounter [S92.131A]  Kadeem Soto MD 3/9/2017  1:33 AM      Hospital Problems as of 3/13/2017              Priority Class Noted POA    * (Principal)Talus fracture Medium  3/9/2017 Yes    Hypothyroidism Medium  3/9/2017 Yes    Hyperlipidemia Medium  3/9/2017 Yes    Type 2 diabetes mellitus (H) Medium  3/9/2017 Yes    BPH (benign prostatic hyperplasia) Medium  3/9/2017 Yes    Fracture of right talus Medium  3/9/2017 Yes      Non-Hospital Problems as of 3/13/2017              Priority Class Noted    Malignant neoplasm of prostate (H) Medium  3/9/2017    Malignant neoplasm of thyroid gland (H) Medium  3/9/2017      Code Status History     Date Active Date Inactive Code Status Order ID Comments User Context    This patient has a current code status but no historical code status.         Medication Review      START taking        Dose / Directions Comments    acetaminophen 325 MG tablet   Commonly known as:  TYLENOL        Dose:  975 mg   Take 3 tablets (975 mg) by mouth every 8 hours   Quantity:  100 tablet   Refills:  0        hydrOXYzine 25 MG tablet   Commonly known " as:  ATARAX        Dose:  25-50 mg   Take 1-2 tablets (25-50 mg) by mouth every 4 hours as needed for other (adjuvant pain)   Quantity:  30 tablet   Refills:  1        oxyCODONE 5 MG IR tablet   Commonly known as:  ROXICODONE        Dose:  5-10 mg   Take 1-2 tablets (5-10 mg) by mouth every 4 hours as needed for moderate to severe pain   Quantity:  40 tablet   Refills:  0        polyethylene glycol Packet   Commonly known as:  MIRALAX/GLYCOLAX        Dose:  17 g   Take 17 g by mouth daily   Quantity:  14 packet   Refills:  1        senna-docusate 8.6-50 MG per tablet   Commonly known as:  SENOKOT-S;PERICOLACE        Dose:  1-2 tablet   Take 1-2 tablets by mouth 2 times daily as needed for constipation   Quantity:  100 tablet   Refills:  0          CONTINUE these medications which may have CHANGED, or have new prescriptions. If we are uncertain of the size of tablets/capsules you have at home, strength may be listed as something that might have changed.        Dose / Directions Comments    aspirin 325 MG EC tablet   This may have changed:    - medication strength  - how much to take  - when to take this        Dose:  325 mg   Take 1 tablet (325 mg) by mouth daily with food   Quantity:  14 tablet   Refills:  0          CONTINUE these medications which have NOT CHANGED        Dose / Directions Comments    GLIPIZIDE PO   Indication:  Type 2 Diabetes        Dose:  10 mg   Take 10 mg by mouth 2 times daily (before meals)   Refills:  0        LEVOTHROID PO        Dose:  150 mcg   Take 150 mcg by mouth daily   Refills:  0        LOSARTAN POTASSIUM PO   Indication:  High Blood Pressure        Dose:  25 mg   Take 25 mg by mouth daily   Refills:  0        PIOGLITAZONE HCL PO   Indication:  Type 2 Diabetes        Dose:  45 mg   Take 45 mg by mouth daily   Refills:  0        SIMVASTATIN PO   Indication:  Type II A Hyperlipidemia        Dose:  40 mg   Take 40 mg by mouth daily   Refills:  0        tamsulosin 0.4 MG capsule    Commonly known as:  FLOMAX   Indication:  Enlarged Prostate        Dose:  0.4 mg   Take 0.4 mg by mouth daily   Refills:  0          STOP taking     IBUPROFEN PO                     Further instructions from your care team       Please follow up with your PCP within 7-10 days of discharge.  We would like for you to have an ECHO done given that you are high risk for development of congestive heart failure given your risk factors and family history.    Summary of Visit     Reason for your hospital stay       ORIF right talus fracture       Reason for your hospital stay       Surgery to repair fracture at right ankle.             After Care     Activity - Ambulate in hallway       Every shift       Activity - Up with assistive device           Activity - Up with assistive device       Non weight bearing to right lower extremity.       Activity - Up with nursing assistance           Advance Diet as Tolerated       Follow this diet upon discharge: Orders Placed This Encounter      Advance Diet as Tolerated: Fully Advanced to diet(s) per Provider order; 9238-1157 Calories: Moderate Consistent CHO (4-6 CHO units/meal)       Advance Diet as Tolerated       Follow this diet upon discharge: Orders Placed This Encounter      Advance Diet as Tolerated: Fully Advanced to diet(s) per Provider order; 3672-9281 Calories: Moderate Consistent CHO (4-6 CHO units/meal)      Advance Diet as Tolerated       Daily weights       Call Provider for weight gain of more than 2 pounds per day or 5 pounds per week.       Encourage PO fluids           Fall precautions           Fall precautions           General info for SNF       Length of Stay Estimate: Short Term Care: Estimated # of Days <30  Condition at Discharge: Stable  Level of care:skilled   Rehabilitation Potential: Excellent  Admission H&P remains valid and up-to-date: Yes  Recent Chemotherapy: N/A  Use Nursing Home Standing Orders: Yes       General info for SNF       Length of  Stay Estimate: Short Term Care: Estimated # of Days <30  Condition at Discharge: Improving  Level of care:skilled   Rehabilitation Potential: Good  Admission H&P remains valid and up-to-date: Yes  Recent Chemotherapy: N/A  Use Nursing Home Standing Orders: Yes       Glucose monitor nursing POCT       Before meals and at bedtime       Glucose monitor nursing POCT       Before meals and at bedtime       Mantoux instructions       Give two-step Mantoux (PPD) Per Facility Policy Yes       Mantoux instructions       Give two-step Mantoux (PPD) Per Facility Policy Yes       Weight bearing status       nonweight bearing right lower extremity       Wound care (specify)       Site:   Right leg  Instructions:  Keep splint clean and dry       Wound care (specify)       Site:   Right ankle  Instructions:  Leave posterior splint and dressing intact until follow up in 2 weeks with Dr Coy surgical team.             Referrals     Occupational Therapy Adult Consult       Evaluate and treat as clinically indicated.    Reason:  ORIF right talus fracture       Occupational Therapy Adult Consult       Evaluate and treat as clinically indicated.    Reason:  [place]. Obesity and non weight bearing status to right lower extremity cause concern for his return home in immediate post op time frame.       Physical Therapy Adult Consult       Evaluate and treat as clinically indicated.    Reason:  ORIF right talus fracture       Physical Therapy Adult Consult       Evaluate and treat as clinically indicated.    Reason:  Obese individual requires Non weight bearing status to right lower extremity.             Supplies     AntiEmbolism Stockings       Bilateral below knee length.On in the morning, off at night             Follow-Up Appointment Instructions     Future Labs/Procedures    Follow Up and recommended labs and tests     Comments:    Follow up with Dr. Lino Coy  in 2 weeks. [phone] for appointment.       Follow-Up Appointment Instructions     Follow Up and recommended labs and tests       Follow up with Dr. Lino Coy  in 2 weeks. [phone] for appointment.             Statement of Approval     Ordered          03/13/17 1032  I have reviewed and agree with all the recommendations and orders detailed in this document.  EFFECTIVE NOW     Approved and electronically signed by:  Kadeem Bates MD

## 2017-03-09 NOTE — IP AVS SNAPSHOT
` `     [facility] SURGICAL: [phone]                 INTERAGENCY TRANSFER FORM - NOTES (H&P, Discharge Summary, Consults, Procedures, Therapies)   3/9/2017                    Hospital Admission Date: 3/9/2017  AUGUSTO ORTIZ   : 1940  Sex: Male        Patient PCP Information     Provider PCP Type    [facility]         History & Physicals      H&P by Dolores Lino PA-C at 3/9/2017  7:43 AM     Author:  Dolores Lino PA-C Service:  Internal Medicine Author Type:  Physician Assistant Lydia WOMACK    Filed:  3/9/2017  9:07 AM Date of Service:  3/9/2017  7:43 AM Note Created:  3/9/2017  7:17 AM    Status:  Cosign Needed :  Dolores Lino PA-C (Physician Assistant - VU)    Cosign Required:  Yes             [facility]    History and Physical  Hospital Medicine       Date of Admission:  3/9/2017  Date of Service: 3/9/2017[CH1.1]     Assessment & Plan[CH1.2]   Augusto Ortiz is a 76 year old male with PMH significant for HTN, HLD, BPH, hypothyroidism, hx of prostate cancer, hx of thyroid cancer who now presents with pain to right ankle s/p fall.      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  He will indubitably require assistance s/p surgery given body habitus and living alone.  - PT/OT consulted[CH1.3]     Surgery Clearance and RCRI Risk Assessment:  [CH1.1]pending formal read of CXR, EKG, patient will be cleared for surgery. Although relatively unhealthy, no modifiable risk factors at present  Anesthesia issues: no[CH1.3]  Baseline Activity:[CH1.1] no change[CH1.3]  Chest Pain:[CH1.1] no[CH1.3]  Shortness of breath:[CH1.1] no[CH1.3]  Cardiac Risk Factors/Assessment:                High Risk  Surgery:[CH1.1] no[CH1.3]              History Ischemic Heart Disease:[CH1.1] no[CH1.3]              History of Congestive Heart Failure:[CH1.1] no? (no history, but more than likely some dysfunction given risk factors and physical exam revealing trace pitting bilateral LE edema).[CH1.3]              History of CVA:[CH1.1] no[CH1.3]              Preoperative Treatment with Insulin:[CH1.1] no[CH1.3]              Preoperative Creatinine greater than 2.0:[CH1.1] no[CH1.3]              Total Number of Points: 0[CH1.1]-1[CH1.3] = 0.4%[CH1.1] to 0.9%[CH1.3] risk of major cardiac event  - STAT EKG[CH1.1] ordered[CH1.4]   - STAT[CH1.1] portable CXR[CH1.3] ordered  - STAT Echo ordered[CH1.4]    Hypoxia, likely secondary to obesity hypoventilation syndrome and poor respiratory drive s/p pain medication administration  New onset.  No oxygen use at home.  Given a significant amount of pain medication after which he became hypoxia.  No history of sleep apnea or sleep study, but given body habitus and reports of heaving snoring throughout the evening, likely this patient also has DRAKE.  O2 titrated down 2L NC pre-operatively  - continue supportive O2  - end tidal CO2 in place[CH1.3]  - see above CXR[CH1.4]    Systolic Heart Murmur  Reportedly long-standing per patient. No prior documentation per review of care-everywhere. Grade III, best heard at the left sternal boarder.  Some bilateral pitting LE edema, although this is difficult to assess given morbid obesity.  Patient reports this is also longstanding.  No recent weight gain.  No chest pain/palpitations. Patient has multiple risk factors for CHF - morbid obesity, T2DM, and HTN.  In addition, the patient reports both his biological parents passed from congestive heart failure (mother, age 100 and father, age 95).  - recommend outpatient follow up with PCP.  Review of care-everywhere and [place] records fails to reveal previous ECHO.[CH1.3]  - see above ECHO[CH1.4]    HTN  -  "continue PTA losartan  - PRN metoprolol 5mg IV with parameters to maximize diastolic     Hypothyroidism  - continue PTA levothyroxine    Hyperlipidemia  - continue PTA simvastatin      Type 2 diabetes mellitus (H)  - hold home metformin  - start 10u lantus BID s/p surgery if he remains inpatient  - high intensity SSI      BPH (benign prostatic hyperplasia)  - continue PTA Flomax       F: 125mL/hr 0.9NS  E: BMP in AM  N: NPO pending procedure  DVT Prophylaxis: Mechanical    Code Status: Full Code    Disposition: Anticipate discharge in 1-2 days. Appropriate for observation status[CH1.1] at this point in time; however, should surgery be preformed this afternoon, he will quality for inpatient care[CH1.3].    Case discussed with Dr. Kadeem Bates.  Assessment and plan as written above.    Dolores Lino PA-C  [facility]    History is obtained from the patient and review of the EMR.[CH1.1]    Past Medical History    Past Medical History   Diagnosis Date     Diabetes (H)      Hypertension      Thyroid disease        Past Surgical History   Past Surgical History   Procedure Laterality Date     Thyroidectomy         Family History    Family History   Problem Relation Age of Onset     Heart Failure Mother      Heart Failure Father        History of Present Illness[CH1.2]   Jayesh Ortiz is a 76 year old male who now presents with[CH1.1] right ankle pain after fall.    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  HE was \"crawling\" on his bobcat; he reports he \"must have had\" something slippery on the bottom of his shoe.  Lost his balance while on bobcat and slipped; he reports that his right ankle became wedged between two points on the apparatus.  He specifically denies lightheadedness, dizziness, changes to vision, chest pain, palpitations, and SOB prior to fall.  HE reports that he attempted to \"twist\" his foot to get it dislodged.  He reports immediate pain to right ankle " after it became lose.  Had significant pain to his right foot when he attempted to ambulate.  Reports the pain was a 10/10 and sharp.  He reports that the only thing that made this pain better was pain medication upon arrival to the ED.    In the last two weeks, the patient denies fever, chills, nausea, vomiting, myalgias, cold-like symptoms (congestion, pharyngitis, sinus pressure, rhinorrhea), swollen glands, headaches, lightheadedness, dizziness, chest pain, palpitations/flutters, SOB, cough, abdominal pain, diarrhea/constipation, dysuria, hematuria, joint swelling, joint pain, leg swelling, and rashes.  The patient has chronic LE swelling which he reports is unchanged from baseline.  He reports no change to his baseline activity.  He has had surgery to remove his thyroid within the last 10 years; he denies anesthesia difficulties at this point in time.[CH1.3]      Prior to Admission Medications   Prior to Admission Medications   Prescriptions Last Dose Informant Patient Reported? Taking?   ASPIRIN ADULT LOW STRENGTH PO 3/8/2017 at Unknown time  Yes Yes   Sig: Take 81 mg by mouth daily    GLIPIZIDE PO 3/8/2017 at Unknown time  Yes Yes   Sig: Take 10 mg by mouth 2 times daily (before meals)    IBUPROFEN PO 3/9/2017 at Unknown time  Yes Yes   LOSARTAN POTASSIUM PO 3/8/2017 at Unknown time  Yes Yes   Sig: Take 25 mg by mouth daily    Levothyroxine Sodium (LEVOTHROID PO) 3/8/2017 at Unknown time  Yes Yes   Sig: Take 150 mcg by mouth daily    PIOGLITAZONE HCL PO 3/8/2017 at Unknown time  Yes Yes   Sig: Take 45 mg by mouth daily    SIMVASTATIN PO 3/8/2017 at Unknown time  Yes Yes   Sig: Take 40 mg by mouth daily    tamsulosin (FLOMAX) 0.4 MG capsule 3/8/2017 at Unknown time  Yes Yes   Sig: Take 0.4 mg by mouth daily       Facility-Administered Medications: None       Allergies   No Known Allergies    Social History   Social History     Social History     Marital status: Single     Spouse name: N/A     Number of  "children: N/A     Years of education: N/A     Occupational History     Not on file.     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  Cooperative.  Appears stated age.  Appears in[CH1.1] no acute[CH1.3] distress.[CH1.1] Someone somnolent.  Morbidly obese.[CH1.3]  HEENT: Oropharynx is clear and moist. No evidence of cranial trauma.  Lymph/Hematologic: No occipital, submental, submandibular, anterior or posterior cervical, or supraclavicular lymphadenopathy is appreciated.  Cardiovascular: Regular rate/ rhythm.  S1 and S2 grossly normal.  No appreciable murmur, rub, gallop.  JVP[CH1.1] does not appear to be significantly elevated, although it is extremely difficult to assess given morbid obesity.[CH1.3].[CH1.1]  Trace bilateral pitting[CH1.3] lower extremity edema[CH1.1] to right and left knees (reportedly chronic)[CH1.3].  Respiratory: Clear to auscultation bilaterally[CH1.1], although this is limited due to poor respiratory effort and morbid obesity).[CH1.3] Equal chest expansion.  GI:[CH1.1] N[CH1.3]on-tender, normal bowel sounds.[CH1.1] Morbidly obese abdomen; unable to assess any degree of organomegaly due to body habitus.[CH1.3]    Genitourinary: Deferred  Musculoskeletal: Normal muscle bulk and tone.[CH1.1] Capillary refill less than 3 second " to bilateral toes.[CH1.3]    Skin: Warm and dry, no rashes.[CH1.1] Some chronic venous stasis changes to bilateral LE.[CH1.3]    Neurologic: Neck supple. Cranial nerves 3-12 are grossly intact.  is symmetric.[CH1.1]     Data[CH1.2]   Data reviewed today:[CH1.1]     Recent Labs  Lab 03/09/17  0048   WBC 7.6   HGB 14.2   MCV 98         POTASSIUM 4.1   CHLORIDE 102   CO2 29   BUN 22   CR 0.96   ANIONGAP 6   MATT 8.4*   *   ALBUMIN 3.7   PROTTOTAL 6.8   BILITOTAL 0.6   ALKPHOS 44   ALT 20   AST 15       Recent Results (from the past 24 hour(s))   XR Foot Right G/E 3 Views    Narrative    XR FOOT RT G/E 3 VW, XR ANKLE RT G/E 3 VW  3/9/2017 1:23 AM      HISTORY: Pain.     COMPARISON: None.      Impression    IMPRESSION: Probable fracture of the posterior talus seen only on  lateral views. No other fracture or dislocation is evident. There are  posterior and plantar calcaneal spurs.   XR Tibia & Fibula Right 2 Views    Narrative    XR TIBIA & FIBULA RT 2 VW  3/9/2017 1:23 AM      HISTORY: Pain.    COMPARISON: None.      Impression    IMPRESSION: No acute fracture or dislocation.   XR Ankle Right G/E 3 Views    Narrative    XR FOOT RT G/E 3 VW, XR ANKLE RT G/E 3 VW  3/9/2017 1:23 AM      HISTORY: Pain.     COMPARISON: None.      Impression    IMPRESSION: Probable fracture of the posterior talus seen only on  lateral views. No other fracture or dislocation is evident. There are  posterior and plantar calcaneal spurs.[CH1.2]       I personally reviewed[CH1.1] the chest x-ray image(s) showing LLL opacity (possible atalectasis versus infiltrate).[CH1.3].    Dolores Lino PA-C  Hospital Medicine[CH1.1]             Revision History        User Key Date/Time User Provider Type Action    > CH1.4 3/9/2017  9:07 AM Dolores Lino PA-C Physician Assistant - C Sign     CH1.2 3/9/2017  8:53 AM Dolores Lino PA-C Physician Assistant - C Sign     CH1.3 3/9/2017  8:28 AM Dolores Lino PA-C  Physician Assistant - C      CH1.1 3/9/2017  7:17 AM Dolores Lino PA-C Physician Assistant - C                      Discharge Summaries      Discharge Summaries by Kadeem Bates MD at 3/13/2017  9:27 AM     Author:  Kadeem Bates MD Service:  Hospitalist Author Type:  Physician    Filed:  3/13/2017 10:44 AM Date of Service:  3/13/2017  9:27 AM Note Created:  3/13/2017  9:20 AM    Status:  Signed :  Kadeem Bates MD (Physician)         [facility] Discharge Summary    Jayesh Ortiz MRN# [id number]   Age: 76 year old YOB: 1940     Date of Admission:  3/9/2017  Date of Discharge::  3/13/2017  Admitting Physician:  Kadeem Bates MD  Discharge Physician:  Kadeem Bates MD, MD             Admission Diagnoses:[KK1.1]   Closed displaced fracture of posterior process of right talus, initial encounter [S92.131A]  Post-op pain [G89.18][KK1.2]          Principle Discharge Diagnosis:[KK1.1]   Closed displaced fracture of posterior process of right talus, initial encounter[KK1.2]     See hospital course for further active diagnoses addressed during this admission.            Procedures:[KK1.1]   See notes- surgical repair of talus fracture by ortho[KK1.2]             Medications Prior to Admission:[KK1.1]     Prescriptions Prior to Admission   Medication Sig Dispense Refill Last Dose     LOSARTAN POTASSIUM PO Take 25 mg by mouth daily    3/8/2017 at am     SIMVASTATIN PO Take 40 mg by mouth daily    3/8/2017 at pm     Levothyroxine Sodium (LEVOTHROID PO) Take 150 mcg by mouth daily    3/8/2017 at am     tamsulosin (FLOMAX) 0.4 MG capsule Take 0.4 mg by mouth daily    3/8/2017 at am     PIOGLITAZONE HCL PO Take 45 mg by mouth daily    3/8/2017 at am     GLIPIZIDE PO Take 10 mg by mouth 2 times daily (before meals)    3/8/2017 at pm     [DISCONTINUED] ASPIRIN ADULT LOW STRENGTH PO Take 81 mg by mouth daily    Past Week at am     [DISCONTINUED] IBUPROFEN PO Take 600 mg by mouth  every 6 hours as needed    3/9/2017 at Unknown time[KK1.2]             Discharge Medications:[KK1.1]     Current Discharge Medication List      START taking these medications    Details   acetaminophen (TYLENOL) 325 MG tablet Take 3 tablets (975 mg) by mouth every 8 hours  Qty: 100 tablet, Refills: 0    Associated Diagnoses: Closed fracture of right talus, unspecified portion of talus, initial encounter      hydrOXYzine (ATARAX) 25 MG tablet Take 1-2 tablets (25-50 mg) by mouth every 4 hours as needed for other (adjuvant pain)  Qty: 30 tablet, Refills: 1    Associated Diagnoses: Closed fracture of right talus, unspecified portion of talus, initial encounter      oxyCODONE (ROXICODONE) 5 MG IR tablet Take 1-2 tablets (5-10 mg) by mouth every 4 hours as needed for moderate to severe pain  Qty: 40 tablet, Refills: 0    Associated Diagnoses: Closed fracture of right talus, unspecified portion of talus, initial encounter      senna-docusate (SENOKOT-S;PERICOLACE) 8.6-50 MG per tablet Take 1-2 tablets by mouth 2 times daily as needed for constipation  Qty: 100 tablet, Refills: 0    Associated Diagnoses: Closed fracture of right talus, unspecified portion of talus, initial encounter      polyethylene glycol (MIRALAX/GLYCOLAX) Packet Take 17 g by mouth daily  Qty: 14 packet, Refills: 1    Associated Diagnoses: Closed fracture of right talus, unspecified portion of talus, initial encounter         CONTINUE these medications which have CHANGED    Details   aspirin  MG EC tablet Take 1 tablet (325 mg) by mouth daily with food  Qty: 14 tablet, Refills: 0    Associated Diagnoses: Closed fracture of right talus, unspecified portion of talus, initial encounter         CONTINUE these medications which have NOT CHANGED    Details   LOSARTAN POTASSIUM PO Take 25 mg by mouth daily       SIMVASTATIN PO Take 40 mg by mouth daily       Levothyroxine Sodium (LEVOTHROID PO) Take 150 mcg by mouth daily       tamsulosin (FLOMAX) 0.4 MG  "capsule Take 0.4 mg by mouth daily       PIOGLITAZONE HCL PO Take 45 mg by mouth daily       GLIPIZIDE PO Take 10 mg by mouth 2 times daily (before meals)          STOP taking these medications       IBUPROFEN PO Comments:   Reason for Stopping:[KK1.3]                     Consultations:[KK1.1]   Consultation during this admission received from orthopedics[KK1.2]          Brief History of Illness:   From Admission H+P:[KK1.1]   Jayesh Ortiz is a 76 year old male who now presents with right ankle pain after fall.     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 HE was \"crawling\" on his bobcat; he reports he \"must have had\" something slippery on the bottom of his shoe. Lost his balance while on bobcat and slipped; he reports that his right ankle became wedged between two points on the apparatus. He specifically denies lightheadedness, dizziness, changes to vision, chest pain, palpitations, and SOB prior to fall. HE reports that he attempted to \"twist\" his foot to get it dislodged. He reports immediate pain to right ankle after it became lose. Had significant pain to his right foot when he attempted to ambulate. Reports the pain was a 10/10 and sharp. He reports that the only thing that made this pain better was pain medication upon arrival to the ED.     In the last two weeks, the patient denies fever, chills, nausea, vomiting, myalgias, cold-like symptoms (congestion, pharyngitis, sinus pressure, rhinorrhea), swollen glands, headaches, lightheadedness, dizziness, chest pain, palpitations/flutters, SOB, cough, abdominal pain, diarrhea/constipation, dysuria, hematuria, joint swelling, joint pain, leg swelling, and rashes. The patient has chronic LE swelling which he reports is unchanged from baseline. He reports no change to his baseline activity. He has had surgery to remove his thyroid within the last 10 years; he denies anesthesia difficulties at this point in time.   [KK1.2]            " "TODAY:   Subjective:[KK1.1]  Doing well.  Pain reasonably controlled, patient wants to just do tylenol today.  No fever or chills.  No dyspnea, doing well on room air. Feels ready for discharge to TCU.[KK1.4]      ROS:[KK1.1]   ROS: 10 point ROS neg other than the symptoms noted above in the HPI.[KK1.4]   /77 (BP Location: Left arm)  Pulse 65  Temp 98.7  F (37.1  C) (Oral)  Resp 20  Ht 1.727 m (5' 8\")  Wt (!) 153.4 kg (338 lb 3 oz)  SpO2 94%  BMI 51.42 kg/m2[KK1.1]   EXAM:  General: awake and alert, NAD, oriented x 3  Head: normocephalic  Neck: unremarkable, no lymphadenopathy   HEENT: oropharynx pink and moist    Heart: Regular rate and rhythm, no murmurs, rubs, or gallops  Lungs: clear to auscultation bilaterally with good air movement throughout  Abdomen: soft, non-tender, no masses or organomegaly  Extremities:[KK1.4] 1+[KK1.2] edema in lower extremities[KK1.4] with chronic stasis changes - unchanged from admission and from baseline per patient.[KK1.2]    Skin unremarkable.[KK1.4]            Hospital Course:     Talus fracture  - POD #[KK1.1]4   3/10[KK1.4] - pain control, anticoagulation as per orthopedic service  - IP ortho consult placed; went to OR evening of 03/09  - IP care coordination consult placed; 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 He will indubitably require assistance s/p surgery given body habitus and living alone (somewhat amendable to TCU when discussing with patients)  - PT/OT consulted   3/11/2017- Patient about the same. No new events , ortho following, pain is fairly controlled.   3/12/2017 - Patient doing okay, pain well controlled. Plan is for d/c tomorrow to TCU .      Hypoxia, likely secondary to obesity hypoventilation syndrome and poor respiratory drive s/p pain medication administration  New onset. No oxygen use at home. Given a significant amount of pain medication after which he became hypoxic. No history of sleep apnea or sleep study, but given body habitus " "and reports of heaving snoring, likely this patient also has DRAKE. O2 titrated down 2L NC pre-operatively. Portable chest XR the morning of surgery showed LLL opacity, more likely atelectasis than infiltrate. Continues to be hypoxic post operatively, but admittedly rather heavily medicated given pain control issues.  - wean as tolerated  03/11/2017 - Not on oxygen presently and oxygen saturations are good    [KK1.1]3/13/2017 -- resolved without intervention as expected.[KK1.4]      Systolic Heart Murmur[KK1.1] Due to moderate aortic stenosis   3/10 -[KK1.2] Reportedly long-standing per patient. No prior documentation per review of care-everywhere. Grade III, best heard at the left sternal boarder. Some bilateral pitting LE edema, although this is difficult to assess given morbid obesity. Patient reports this is also longstanding. No recent weight gain. No chest pain/palpitations. Patient has multiple risk factors for CHF - morbid obesity, T2DM, and HTN. In addition, the patient reports both his biological parents passed from congestive heart failure (mother, age 100 and father, age 95). ECHO done 03/09 shows moderate aortic stenosis. EF of 50-65%. No mention of diastolic dysfunction.  3/11/2017 - Stable no new events.  3/1[KK1.1]3[KK1.2]/2017 - Stable[KK1.1],[KK1.2] recommend outpatient follow up with PCP.        Bilateral LE Edema, Venous Stasis changes, skin thickening  - IP lymphedema consult placed  3/11/2017 - BLE still present, recommend tubigrips pending when lymphedema sees patient.  3/1[KK1.1]3[KK1.2]/2017 - Stable ,[KK1.1] unchanged and at baseline per patient.[KK1.2]      [KK1.1]  Constipation   3/13/2017 -- no bowel movement in > 3 days, but feels \"like things are moving along\" today.  Continue miralax and senokot on discharge.[KK1.2]     HTN[KK1.1]  3/10 -[KK1.2] Pressures reviewed, they are stable.  - continue PTA losartan  - PRN metoprolol 5mg IV with parameters to maximize diastolic   3/11/2017 - " Stable.  3/12/2017 - Stable, a bit high today, will keep an eye .[KK1.1]  3/13/2017 -- has been running somewhat high past 24h - discharge on home regimen but consider adding to this if remains elevated as outpatient.     Hy[KK1.2]pothyroidism  - continue PTA levothyroxine    Hyperlipidemia  - continue PTA simvastatin      Type 2 diabetes mellitus (H)[KK1.1]  3/10[KK1.2] - restart home glipizide, proglitazone today  - high intensity SSI  3/11/2017 - Stable .[KK1.1]  3/13/2017 -- continue prior to admission medications on discharge.[KK1.2]       BPH (benign prostatic hyperplasia)  - continue PTA Flomax      Code Status: Full Code                Discharge Instructions and Follow-Up:   Discharge diet:[KK1.1] Diabetic (2000 ADA)[KK1.2]   Discharge activity:[KK1.1] Non-weight bearing right foot, otherwise Activity as tolerated[KK1.2]   Discharge follow-up:[KK1.1] Follow up with primary care provider at facility within 7 days  Follow-up with ortho per their recommendations.[KK1.2]             Discharge Disposition:[KK1.1]   Discharged to short-term care facility[KK1.2]      Attestation:[KK1.1]  I have reviewed today's vital signs, notes, medications, labs and imaging.  Amount of time performed on this discharge summary: 50 minutes.[KK1.2]    Kadeem Bates MD, MD[KK1.1]          Revision History        User Key Date/Time User Provider Type Action    > KK1.3 3/13/2017 10:44 AM Kadeem Bates MD Physician Sign     KK1.2 3/13/2017 10:34 AM Kadeem Bates MD Physician      KK1.4 3/13/2017  9:39 AM Kadeem Bates MD Physician      KK1.1 3/13/2017  9:20 AM Kadeem Bates MD Physician                      Consult Notes      Consults by Marleny Reilly LICSW at 3/10/2017  9:02 AM     Author:  Marleny Reilly LICSW Service:  (none) Author Type:      Filed:  3/10/2017  9:02 AM Date of Service:  3/10/2017  9:02 AM Note Created:  3/10/2017  9:00 AM    Status:  Signed :  Marleny Reilly LICSW (Social  Worker)     Consult Orders:    1. Care Transition RN/SW IP Consult [[id number]] ordered by Ankit Coy DPM at 03/09/17 2041     2. Care Transition RN/SW IP Consult [[id number]] ordered by Dolores Lino PA-C at 03/09/17 0841                    Reason for Referral: DC planning    Presenting Problem: Talus Fx    Cognitive Behavioral Status: awake, alert and oriented    Support system: family    Current Living Situation:   Home Setting: [place]   Living Situation: Alone   Function Status / Assistive Device: no assistive devices    Employment/ Financial/ Insurance Concerns: retired          No Insurance issues identified      Housing Concerns: No    Health Care Directives: not on file    Assessment: This writer met with pt introduced self and role. Pt reports that he lives at home independently. Prior to hospitalization he reports driving and able to do everything on his own. Briefly discussed home care services. Therapy to see pt, CTS will follow after therapy recommendations.       Plan: sherman Reilly MS, CHARISSE, [phone][AK1.1]                       Revision History        User Key Date/Time User Provider Type Action    > AK1.1 3/10/2017  9:02 AM Marleny Reilly LICSW  Sign            Consults by Ankit Coy DPM at 3/9/2017  9:29 AM     Author:  Ankit Coy DPM Service:  Orthopedics Author Type:  Podiatrist    Filed:  3/9/2017  8:53 PM Date of Service:  3/9/2017  9:29 AM Note Created:  3/9/2017  9:28 AM    Status:  Signed :  Ankit Coy DPM (Podiatrist)     Consult Orders:    1. Orthopedic Surgery IP Consult: Patient to be seen: Routine - within 24 hours; talus frac; Consultant may enter orders: Yes [[id number]] ordered by Kadeem Soto MD at 03/09/17 0233                [facility] Orthopaedics Consultation    Consultation - [facility] Orthopaedics  Level of consult: Consult, follow and place orders    Jayesh Bedoya  Angel,  1940, MRN [id number]     Admitting Dx: Closed displaced fracture of posterior process of right talus, initial encounter [S92.424A]     PCP: Greg De La Cruz, [phone]     Code status:[MH1.1]  Full Code[MH1.2]     Extended Emergency Contact Information  Primary Emergency Contact: MARISABEL CHU   [facility]  Home Phone: [phone]  Mobile Phone: [phone]  Relation: Significant other     Assessment:  Right talus fracture, moderately displaced. Multiple medical comorbidities including diabetes, obesity, HTN, and acute hypoxia    Plan:[MH1.1]  Patient to be cleared for surgery.  ORIF of the right talus planned for today.  Patient will need prolonged period of NWB of RLE.  Patient will need assistance with post hospital discharge - home with help vs. Rehab facility.  Post op care will be coordinated.[BC1.1]    Principal Problem:    Talus fracture  Active Problems:    Hypothyroidism    Hyperlipidemia    Type 2 diabetes mellitus (H)    BPH (benign prostatic hyperplasia)[MH1.2]       Chief Complaint  Right ankle pain     HPI  We have been requested by Dr. Soto to evaluate Jayesh Ortiz who is a 76 year old year old male for right talus fracture. He is a  and was working on his bobcat when his foot became lodged. He twisted his foot to attempt to free it and noted immediate pain after loosening his foot. Unable to bear weight immediately following injury.[MH1.1]        Additional information via admission H & P and ED notes.    History is obtained from the patient[BC1.1]     Past Medical History[MH1.1]  Past Medical History   Diagnosis Date     Diabetes (H)      Hypertension      Thyroid disease[MH1.2]        Surgical History[MH1.1]  Past Surgical History   Procedure Laterality Date     Thyroidectomy[MH1.2]          Social History[MH1.1]  Social History     Social History     Marital status: Single     Spouse name: N/A     Number of children: N/A     Years of education:  N/A     Occupational History     Not on file.     Social History Main Topics     Smoking status: Never Smoker     Smokeless tobacco: Not on file     Alcohol use No     Drug use: No     Sexual activity: Not on file     Other Topics Concern     Not on file     Social History Narrative     No narrative on file[MH1.2]       Family History[MH1.1]  Family History   Problem Relation Age of Onset     Heart Failure Mother      Heart Failure Father[MH1.2]         Allergies:[MH1.1]  Review of patient's allergies indicates no known allergies.[MH1.2]      Current Medications:[MH1.1]  Current Facility-Administered Medications   Medication     sodium chloride (PF) 0.9% PF flush 3 mL     sodium chloride (PF) 0.9% PF flush 3 mL     0.9% sodium chloride infusion     morphine (PF) injection 2-4 mg     ondansetron (ZOFRAN-ODT) ODT tab 4 mg    Or     ondansetron (ZOFRAN) injection 4 mg     LORazepam (ATIVAN) injection 0.5-1 mg    Or     LORazepam (ATIVAN) tablet 0.5-1 mg     levothyroxine (SYNTHROID/LEVOTHROID) tablet 150 mcg     losartan (COZAAR) tablet 25 mg     simvastatin (ZOCOR) tablet 40 mg     tamsulosin (FLOMAX) capsule 0.4 mg     glucose 40 % gel 15-30 g    Or     dextrose 50 % injection 25-50 mL    Or     glucagon injection 1 mg     insulin aspart (NovoLOG) inj (RAPID ACTING)     metoprolol (LOPRESSOR) injection 5 mg     naloxone (NARCAN) injection 0.1-0.4 mg     acetaminophen (TYLENOL) tablet 650 mg     senna-docusate (SENOKOT-S;PERICOLACE) 8.6-50 MG per tablet 1-2 tablet     prochlorperazine (COMPAZINE) injection 5 mg    Or     prochlorperazine (COMPAZINE) tablet 5 mg    Or     prochlorperazine (COMPAZINE) Suppository 12.5 mg     insulin glargine (LANTUS) injection 10 Units[MH1.2]       Review of Systems:[MH1.1]  As per admission H & P.[BC1.1]    Physical Exam:[MH1.1]  Temp:  [98  F (36.7  C)-98.4  F (36.9  C)] 98  F (36.7  C)  Pulse:  [74-92] 75  Resp:  [18-20] 18  BP: (126-205)/() 143/87  SpO2:  [86 %-98 %] 98  %[MH1.2]    Gen: alert, oriented, in pain of right foot/ankle  Neurovascular: intact to RLE  Derm: + edema of LE, no open lesions nor fracture  MSK: + pain and edema about the right ankle.  Known underlying talus fracture.[BC1.1]     Pertinent Labs  Lab Results: personally reviewed.[MH1.1]  Lab Results   Component Value Date    WBC 7.6 03/09/2017    HGB 14.2 03/09/2017    HCT 44.8 03/09/2017    MCV 98 03/09/2017     03/09/2017     No results for input(s): INR in the last 168 hours.[MH1.2]    Pertinent Radiology  Radiology Results: images and radiology report reviewed  Recent Results (from the past 24 hour(s))   XR Foot Right G/E 3 Views    Narrative    XR FOOT RT G/E 3 VW, XR ANKLE RT G/E 3 VW  3/9/2017 1:23 AM      HISTORY: Pain.     COMPARISON: None.      Impression    IMPRESSION: Probable fracture of the posterior talus seen only on  lateral views. No other fracture or dislocation is evident. There are  posterior and plantar calcaneal spurs.   XR Tibia & Fibula Right 2 Views    Narrative    XR TIBIA & FIBULA RT 2 VW  3/9/2017 1:23 AM      HISTORY: Pain.    COMPARISON: None.      Impression    IMPRESSION: No acute fracture or dislocation.   XR Ankle Right G/E 3 Views    Narrative    XR FOOT RT G/E 3 VW, XR ANKLE RT G/E 3 VW  3/9/2017 1:23 AM      HISTORY: Pain.     COMPARISON: None.      Impression    IMPRESSION: Probable fracture of the posterior talus seen only on  lateral views. No other fracture or dislocation is evident. There are  posterior and plantar calcaneal spurs.   XR Chest Port 1 View    Narrative    CHEST PORTABLE ONE VIEW March 9, 2017 7:57 AM     HISTORY: Hypoxia.    COMPARISON: None.      Impression    IMPRESSION: There is obscuration of the left hemidiaphragm that may  represent atelectasis, pneumonia, pleural effusion or both. The lungs  are otherwise clear. There is no pneumothorax. Heart size is normal  with no evidence for congestive failure.    KARRI SUAZO MD   CT Ankle Right w/o  Contrast    Narrative    CT RIGHT ANKLE WITHOUT CONTRAST  3/9/2017 8:57 AM    HISTORY: Right talar fracture.    COMPARISON: Radiographs earlier today.    TECHNIQUE: 0.2 cm helical imaging is performed through the right ankle  without contrast. Sagittal and coronal reformatting was performed.  Radiation dose for this scan was reduced using automated exposure  control, adjustment of the mA and/or kV according to patient size, or  iterative reconstruction technique.     FINDINGS: There is a moderately comminuted fracture of the posterior  third of the talus. The main fracture line is in a somewhat oblique  coronal orientation involving the central aspect of the talar dome.  The main posterior bone fragment is depressed up to 0.7 cm with up to  0.5 cm of separation of the main fracture line. There is additional  comminution with several much smaller fracture fragments along the  inferior medial aspect of the bone extending into the subtalar joint  where there is only a few millimeter of articular surface depression  and irregularity.    No other fracture or osseous lesion is seen. The remaining joint  spaces are well preserved. There are posterior and plantar calcaneal  spurs. There is edema in the soft tissues surrounding the ankle. No  other soft tissue abnormality is seen.      Impression    IMPRESSION: Moderately comminuted intra-articular fracture of the  posterior third of the talus. This involves both the mortise and  posterior subtalar joints. There is articular surface depression along  the talar dome as described above.     AYAN CERVANTES MD       Attestation:[MH1.1]  Ankit Coy DPM, FACFAS  Foot & Ankle Surgeon/Specialist  [facility][BC1.1]       Amber Sanchez[MH1.1]       Revision History        User Key Date/Time User Provider Type Action    > BC1.1 3/9/2017  8:53 PM Ankit Coy DPM Podiatrist Sign     MH1.2 3/9/2017  9:33 AM Amber Sanchez PA-C Physician  "Assistant Share     MH1.1 3/9/2017  9:28 AM Amber Sanchez PA-C Physician Assistant                      Progress Notes - Physician (Notes from 03/10/17 through 03/13/17)      Progress Notes by Pedro Pablo Quintero PA-C at 3/13/2017  9:25 AM     Author:  Pedro Pablo Quintero PA-C Service:  Orthopedics Author Type:  Physician Assistant - C    Filed:  3/13/2017  9:31 AM Date of Service:  3/13/2017  9:25 AM Note Created:  3/13/2017  9:25 AM    Status:  Signed :  Pedro Pablo Quintero PA-C (Physician Assistant - VU)         [facility] Orthopaedics Progress Note      Post-operative Day: 4 Days Post-Op    Procedure(s):  open reduction internal fixation talus right ankle - Wound Class: I-Clean      Subjective: Patient states that he is anxious and depressed about his current disability and injury. 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 States minimal pain while laying in bed. Fracture site hurts more when trying to ambulate, NWB.    Pain: minimal  Chest pain, SOB:  No      Objective: Patient comfortable laying in bed. Neurovascularly intact to bilateral lower extremities. Dressing intact and right lower leg/foot are well supported with posterior splint. Bilateral edema to lower extremities noted.  Blood pressure 163/77, pulse 65, temperature 98.7  F (37.1  C), temperature source Oral, resp. rate 20, height 1.727 m (5' 8\"), weight (!) 153.4 kg (338 lb 3 oz), SpO2 94 %.    Patient Vitals for the past 24 hrs:   BP Temp Temp src Pulse Resp SpO2   03/13/17 0732 163/77 98.7  F (37.1  C) Oral 65 20 94 %   03/13/17 0423 150/75 98.7  F (37.1  C) Oral 70 20 92 %   03/12/17 2308 151/72 98.6  F (37  C) Oral 65 18 91 %   03/12/17 1919 (!) 154/92 98.5  F (36.9  C) Oral 69 18 94 %   03/12/17 1515 153/77 98.2  F (36.8  C) Oral 66 18 92 %   03/12/17 1150 148/72 98.2  F (36.8  C) Oral 65 20 92 %       Wt Readings from Last 4 " Encounters:   03/09/17 (!) 153.4 kg (338 lb 3 oz)         Motor function, sensation, and circulation intact   Yes  Wound status: incisions are clean dry and intact. Yes  Calf tenderness: Bilateral  No    Pertinent Labs   Lab Results: personally reviewed.     Recent Labs   Lab Test  03/11/17   0635  03/10/17   0640  03/09/17   0048   HGB  11.1*  12.0*  14.2   HCT   --   38.6*  44.8   MCV   --   100  98   PLT   --   141*  171   NA   --   139  137       Plan: Anticoagulation protocol:  mg daily  x 14  days            Pain medications:  oxycodone, tylenol and vistaril            Weight bearing status:  NWB on right lower extremity.            Disposition:  Probable to TCU today.             Continue cares and rehabilitation     Report completed by:  Pedro Pablo Quintero PA-C  Date: 3/13/2017  Time: 9:25 AM[TP1.1]     Revision History        User Key Date/Time User Provider Type Action    > TP1.1 3/13/2017  9:31 AM Pedro Pablo Quintero PA-C Physician Assistant - C Sign            Progress Notes by Mohinder Russo MD at 3/12/2017  7:44 AM     Author:  Mohinder Russo MD Service:  (none) Author Type:  Physician    Filed:  3/12/2017  9:08 AM Date of Service:  3/12/2017  7:44 AM Note Created:  3/12/2017  7:44 AM    Status:  Signed :  Mohinder Russo MD (Physician)         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 He will indubitably require assistance s/p surgery given body habitus and living alone (somewhat amendable to TCU when discussing with patients)  - PT/OT consulted   3/11/2017- Patient about the same. No new events , ortho following, pain is fairly controlled.[OA1.1]   3/12/2017  - Patient doing okay, pain well controlled. Plan is for d/c tomorrow to TCU .[OA1.2]      Hypoxia, likely secondary to obesity hypoventilation syndrome and poor respiratory drive s/p pain medication administration  New onset. No oxygen use at home. Given a significant amount of pain medication after which he became hypoxic. No history of sleep apnea or sleep study, but given body habitus and reports of heaving snoring, likely this patient also has DRAKE. O2 titrated down 2L NC pre-operatively. Portable chest XR the morning of surgery showed LLL opacity, more likely atelectasis than infiltrate. Continues to be hypoxic post operatively, but admittedly rather heavily medicated given pain control issues.  - continue supportive O2  - wean as tolerated  - continue end tidal CO2 in place  03/11/2017 - Not on oxygen presently and oxygen saturations are good      Systolic Heart Murmur  Reportedly long-standing per patient. No prior documentation per review of care-everywhere. Grade III, best heard at the left sternal boarder. Some bilateral pitting LE edema, although this is difficult to assess given morbid obesity. Patient reports this is also longstanding. No recent weight gain. No chest pain/palpitations. Patient has multiple risk factors for CHF - morbid obesity, T2DM, and HTN. In addition, the patient reports both his biological parents passed from congestive heart failure (mother, age 100 and father, age 95). ECHO done 03/09 shows moderate aortic stenosis. EF of 50-65%. No mention of diastolic dysfunction.  - recommend outpatient follow up with PCP.   3/11/2017 - Stable no new events.[OA1.1]  3/12/2017 - Stable[OA1.2]       Bilateral LE Edema, Venous Stasis changes, skin thickening  - IP lymphedema consult placed  3/11/2017 - BLE still present, recommend tubigrips pending when lymphedema sees patient.[OA1.1]  3/12/2017 - Stable , about the same[OA1.2]      HTN  Pressures reviewed, they are stable.  - continue PTA  losartan  - PRN metoprolol 5mg IV with parameters to maximize diastolic   3/11/2017 - Stable.[OA1.1]  3/12/2017 - Stable, a bit high today, will keep an eye .[OA1.2]      Hypothyroidism  - continue PTA levothyroxine  3/11/2017 - Continue home medication[OA1.1]   3/12/2017 - Continue home medication[OA1.2]       Hyperlipidemia  - continue PTA simvastatin      Type 2 diabetes mellitus (H)  - restart home glipizide, proglitazone today  - high intensity SSI  3/11/2017 - Stable . Continue home medication.[OA1.1]  3/12/2017 - Blood sugars reviewed, no issues on this[OA1.2]      BPH (benign prostatic hyperplasia)  - continue PTA Flomax          F: Oral   E: Normal electrolytes   N: advance as tolerated      DVT Prophylaxis: defer to orthopedic service  Code Status: Full Code      Lines: PIV, without edema/erythema   Flores catheter:[OA1.1] discontinue flores[OA1.2]   Discussion: stable.       Disposition: Anticipate discharge[OA1.1] tomorrow to TCU[OA1.2]              Interval History:[OA1.1]   Continues to improve.  Vital signs generally better.  Eating and voiding well.  Tolerating medications without significant side effects.  No new concerns today.[OA1.2]              Significant Problems:[OA1.1]   Principal Problem:    Talus fracture  Active Problems:    Hypothyroidism    Hyperlipidemia    Type 2 diabetes mellitus (H)    BPH (benign prostatic hyperplasia)    Fracture of right talus[OA1.3]             Review of Systems:[OA1.1]   The Review of Systems is negative other than noted in the HPI[OA1.2]            Medications:[OA1.1]       polyethylene glycol  17 g Oral Daily     acetaminophen  975 mg Oral Q8H     insulin aspart  1-10 Units Subcutaneous TID AC     insulin aspart  1-7 Units Subcutaneous At Bedtime     glipiZIDE (GLUCOTROL) tablet 10 mg  10 mg Oral BID AC     pioglitazone  45 mg Oral Daily     aspirin EC  325 mg Oral Daily     levothyroxine (SYNTHROID/LEVOTHROID) tablet 150 mcg  150 mcg Oral QAM AC     losartan  (COZAAR) tablet 25 mg  25 mg Oral Daily     simvastatin (ZOCOR) tablet 40 mg  40 mg Oral At Bedtime     tamsulosin  0.4 mg Oral Daily     senna-docusate  1-2 tablet Oral BID     sodium chloride (PF)  3 mL Intracatheter Q8H     pneumococcal vaccine  0.5 mL Intramuscular Prior to discharge     influenza Vac Split High-Dose  0.5 mL Intramuscular Prior to discharge[OA1.3]             Physical Exam:     Vitals were reviewed[OA1.2]  Temp: 98.2  F (36.8  C) Temp src: Oral BP: 158/86 Pulse: 73   Resp: 20 SpO2: 93 % O2 Device: None (Room air)    Wt Readings from Last 4 Encounters:   03/09/17 (!) 153.4 kg (338 lb 3 oz)       Intake/Output Summary (Last 24 hours) at 03/12/17 0904  Last data filed at 03/12/17 0809   Gross per 24 hour   Intake              483 ml   Output             2125 ml   Net            -1642 ml[OA1.3]     Constitutional:   awake, alert, cooperative, no apparent distress, and appears stated age     Lungs:   No increased work of breathing, good air exchange, clear to auscultation bilaterally, no crackles or wheezing     Abdomen:   No scars, normal bowel sounds, soft, non-distended, non-tender, no masses palpated, no hepatosplenomegally     Musculoskeletal:   +1 lower extremity pitting edema present, wound is clean and dry .     Neurologic:   Awake, alert, oriented to name, place and time.    normal.     Skin:   no bruising or bleeding[OA1.2]             Data:[OA1.1]     Lab Results   Component Value Date    WBC 7.6 03/10/2017    WBC 7.6 03/09/2017    HGB 11.1 (L) 03/11/2017    HGB 12.0 (L) 03/10/2017    HGB 14.2 03/09/2017    HCT 38.6 (L) 03/10/2017    HCT 44.8 03/09/2017     03/10/2017    MCV 98 03/09/2017     (L) 03/10/2017     03/09/2017     Lab Results   Component Value Date    CR 0.95 03/10/2017    CR 0.96 03/09/2017     Lab Results   Component Value Date     03/10/2017     03/09/2017    POTASSIUM 4.9 03/10/2017    POTASSIUM 4.1 03/09/2017    CHLORIDE 105 03/10/2017     "CHLORIDE 102 03/09/2017    CO2 30 03/10/2017    CO2 29 03/09/2017     (H) 03/10/2017     (H) 03/09/2017[OA1.4]          Attestation:[OA1.1]  I have reviewed today's vital signs, notes, medications, labs and imaging.  Amount of time performed on this daily note: 20 minutes.[OA1.2]    Mohinder Russo MD[OA1.1]     Revision History        User Key Date/Time User Provider Type Action    > OA1.4 3/12/2017  9:08 AM Mohinder Russo MD Physician Sign     OA1.3 3/12/2017  9:04 AM Mohinder Russo MD Physician      OA1.2 3/12/2017  9:02 AM Mohinder Russo MD Physician      OA1.1 3/12/2017  7:44 AM Mohinder Rsuso MD Physician             Progress Notes by Dianne Lu PA-C at 3/12/2017  7:58 AM     Author:  Dianne Lu PA-C Service:  Orthopedics Author Type:  Physician Assistant    Filed:  3/12/2017  8:42 AM Date of Service:  3/12/2017  7:58 AM Note Created:  3/12/2017  7:58 AM    Status:  Signed :  Dianne Lu PA-C (Physician Assistant)         [facility] Orthopaedics Progress Note      Post-operative Day: 3 Days Post-Op    Procedure(s):  open reduction internal fixation talus right ankle - Wound Class: I-Clean      Subjective:    Pain:[AG1.1] minimal[AG1.2]  Chest pain, SOB:[AG1.1]  No  PT rec TCU still at this time. Pain in right AC elbow. No other new c/o. No BM, +flatus, voiding, mg diet.[AG1.2]     Objective:  Blood pressure 135/72, pulse 74, temperature 98.9  F (37.2  C), temperature source Oral, resp. rate 16, height 1.727 m (5' 8\"), weight (!) 153.4 kg (338 lb 3 oz), SpO2 93 %.    Patient Vitals for the past 24 hrs:   BP Temp Temp src Pulse Heart Rate Resp SpO2   03/12/17 0117 - - - - - 16 -   03/11/17 2327 135/72 98.9  F (37.2  C) Oral 74 - 20 93 %   03/11/17 2037 159/72 98.2  F (36.8  C) Oral 68 - 20 94 %   03/11/17 1511 152/72 98.1  F (36.7  C) Oral 73 - 20 96 %   03/11/17 1300 173/90 98.1  F (36.7  C) " Oral 85 - 18 92 %   03/11/17 0954 - - - - - - 96 %   03/11/17 0806 140/73 98.7  F (37.1  C) Oral 68 - 18 93 %       Wt Readings from Last 4 Encounters:   03/09/17 (!) 153.4 kg (338 lb 3 oz)[AG1.1]       RUE with two abrasions that are clean and dry.   RLE moderate edema present.[AG1.2]   Motor function, sensation, and circulation intact[AG1.1]   Yes[AG1.2]  Wound status:[AG1.1] splint[AG1.2] clean dry and intact.[AG1.1] Yes[AG1.2]  Calf tenderness:[AG1.1] Bilateral  No[AG1.2]    Pertinent Labs   Lab Results: personally reviewed.     Recent Labs   Lab Test  03/11/17   0635  03/10/17   0640  03/09/17   0048   HGB  11.1*  12.0*  14.2   HCT   --   38.6*  44.8   MCV   --   100  98   PLT   --   141*  171   NA   --   139  137       Plan: Anticoagulation protocol:  mg daily  x 14  days            Pain medications:  oxycodone, tylenol and vistaril            Weight bearing status:  NWB            Disposition:  TCU[AG1.1] Monday likely[AG1.3] and[AG1.1] when[AG1.3] approved by hospitalist             Continue cares and rehabilitation[AG1.1]. Tegaderm to right AC place likely secondary to tape.[AG1.2]      Report completed by:  Dianne Lu PA-C, PA-C  Date: 3/12/2017  Time: 7:58 AM[AG1.1]       Revision History        User Key Date/Time User Provider Type Action    > AG1.2 3/12/2017  8:42 AM Dianne Lu PA-C Physician Assistant Sign     AG1.3 3/12/2017  8:27 AM Dianne Lu PA-C Physician Assistant      AG1.1 3/12/2017  7:58 AM Dianne Lu PA-C Physician Assistant             Progress Notes by Lenora Reno OT at 3/11/2017 12:41 PM     Author:  Lenora Reno OT Service:  (none) Author Type:  Occupational Therapist    Filed:  3/11/2017 12:42 PM Date of Service:  3/11/2017 12:41 PM Note Created:  3/11/2017 12:41 PM    Status:  Signed :  Lenora Reno OT (Occupational Therapist)         Occupational Therapy Evaluation     03/11/17 1200   Quick Adds   Type of  "Visit Initial Occupational Therapy Evaluation   Living Environment   Lives With alone   Living Environment Comment 2 story farm house.  Stairs to enter and within, no rails.  Low toilets.  Bedroom and shower are upstairs.     Functional Level Prior   Prior Functional Level Comment Independent with ADLs and IADLs, runs the farm independently.    General Information   Onset of Illness/Injury or Date of Surgery - Date 03/10/17   Referring Physician Karan   Patient/Family Goals Statement Would like to return ASAP but seems to understand that this is not reailstic right now.  Hoping for home in \"a few days\"   Additional Occupational Profile Info/Pertinent History of Current Problem Status post right ankle ORIF   Weight-Bearing Status - RLE nonweight-bearing   General Observations Very SOB after short walk in room, wheezing.  Resolves after a minute or 2 deep breathing EOB.     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  Although he is not strong enough to rise from normal height bed and needs assist with supine to sit.  Continue to assess.  Did not complete UE MMT at time of eval.     Mobility   Bed Mobility Comments Head of bed elevated.  Scoots well but needs min-mod hand hold assist to sit up from supine to EOB.  Encouraged independence but he insists on \"just take my hand!\"   Transfer Skills   Transfer Comments CGA of 2 with walker in room to and from door.  Moves somewhat impulsvely.  Needed to raise bed all the way up to achieve sit to stand.     Toilet Transfer   Toilet Transfer Comments anticipate difficulty based on performance with sit to stand from edge of bed.  Will need taller toilet height.     Lower Body Dressing   Level of [place]: Dress Lower Body maximum assist (25% " "patients effort)   Grooming   Level of [place]: Grooming independent   Eating/Self Feeding   Level of [place]: Eating independent   General Therapy Interventions   Planned Therapy Interventions ADL retraining   Clinical Impression   Criteria for Skilled Therapeutic Interventions Met yes, treatment indicated   OT Diagnosis weakness   Influenced by the following impairments post ankle ORIF, NWB   Assessment of Occupational Performance 3-5 Performance Deficits   Identified Performance Deficits bathing, dressing, toileting, bed mobility   Clinical Decision Making (Complexity) Low complexity   Therapy Frequency daily   Predicted Duration of Therapy Intervention (days/wks) 2-3 days   Anticipated Discharge Disposition Transitional Care Facility   Risks and Benefits of Treatment have been explained. Yes   Patient, Family & other staff in agreement with plan of care Yes   Clinical Impression Comments Will benefit from OT to improve function and safety with self cares.  NWB is limiting his mobility and function as well as SOB with activity.     [facility] TM \"6 Clicks\"   2016, Trustees of [facility], under license to [employer].  All rights reserved.   6 Clicks Short Forms Basic Mobility Inpatient Short Form;Daily Activity Inpatient Short Form   [facility]  \"6 Clicks\" V.2 Basic Mobility Inpatient Short Form   1. Turning from your back to your side while in a flat bed without using bedrails? 3 - A Little   2. Moving from lying on your back to sitting on the side of a flat bed without using bedrails? 2 - A Lot   3. Moving to and from a bed to a chair (including a wheelchair)? 2 - A Lot   4. Standing up from a chair using your arms (e.g., wheelchair, or bedside chair)? 2 - A Lot   5. To walk in hospital room? 2 - A Lot   6. Climbing 3-5 steps with a railing? 1 - Total   Basic Mobility Raw Score (Score out of 24.Lower scores equate to lower levels of function) 12   [facility] " "AM-PAC  \"6 Clicks\" Daily Activity Inpatient Short Form   1. Putting on and taking off regular lower body clothing? 2 - A Lot   2. Bathing (including washing, rinsing, drying)? 2 - A Lot   3. Toileting, which includes using toilet, bedpan or urinal? 3 - A Little   4. Putting on and taking off regular upper body clothing? 3 - A Little   5. Taking care of personal grooming such as brushing teeth? 3 - A Little   6. Eating meals? 4 - None   Daily Activity Raw Score (Score out of 24.Lower scores equate to lower levels of function) 17   See Care Plan for Goals.    Lenora Reno OTR/L[JK1.1]       Revision History        User Key Date/Time User Provider Type Action    > JK1.1 3/11/2017 12:42 PM Lenora Reno OT Occupational Therapist Sign            Progress Notes by Mohinder Russo MD at 3/11/2017 12:16 PM     Author:  Mohinder Russo MD Service:  (none) Author Type:  Physician    Filed:  3/11/2017 12:27 PM Date of Service:  3/11/2017 12:16 PM Note Created:  3/11/2017 12:16 PM    Status:  Signed :  Mohinder Russo MD (Physician)         [facility] Progress Note           Assessment and Plan:    Talus fracture  - POD #1  - pain control, anticoagulation as per orthopedic service  - IP ortho consult placed; went to OR evening of 03/09  - IP care coordination consult placed; 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 He will indubitably require assistance s/p surgery given body habitus and living alone (somewhat amendable to TCU when discussing with patients)  - PT/OT consulted   3/11/2017- Patient about the same. No new events , ortho following, pain is fairly controlled.       Hypoxia, likely secondary to obesity hypoventilation syndrome and poor respiratory drive s/p pain medication administration  New onset. No oxygen use at home. Given a significant amount of pain medication after which he became hypoxic. No history of sleep apnea or sleep study, " but given body habitus and reports of heaving snoring, likely this patient also has DRAKE. O2 titrated down 2L NC pre-operatively. Portable chest XR the morning of surgery showed LLL opacity, more likely atelectasis than infiltrate. Continues to be hypoxic post operatively, but admittedly rather heavily medicated given pain control issues.  - continue supportive O2  - wean as tolerated  - continue end tidal CO2 in place  03/11/2017 - Not on oxygen presently and oxygen saturations are good      Systolic Heart Murmur  Reportedly long-standing per patient. No prior documentation per review of care-everywhere. Grade III, best heard at the left sternal boarder. Some bilateral pitting LE edema, although this is difficult to assess given morbid obesity. Patient reports this is also longstanding. No recent weight gain. No chest pain/palpitations. Patient has multiple risk factors for CHF - morbid obesity, T2DM, and HTN. In addition, the patient reports both his biological parents passed from congestive heart failure (mother, age 100 and father, age 95). ECHO done 03/09 shows moderate aortic stenosis. EF of 50-65%. No mention of diastolic dysfunction.  - recommend outpatient follow up with PCP.   3/11/2017 - Stable no new events.     Bilateral LE Edema, Venous Stasis changes, skin thickening  - IP lymphedema consult placed  3/11/2017 - BLE still present, recommend tubigrips pending when lymphedema sees patient.     HTN  Pressures reviewed, they are stable.  - continue PTA losartan  - PRN metoprolol 5mg IV with parameters to maximize diastolic   3/11/2017 - Stable.      Hypothyroidism  - continue PTA levothyroxine  3/11/2017 - Continue home medication       Hyperlipidemia  - continue PTA simvastatin      Type 2 diabetes mellitus (H)  - restart home glipizide, proglitazone today  - high intensity SSI  3/11/2017 - Stable . Continue home medication.      BPH (benign prostatic hyperplasia)  - continue PTA Flomax        F: Oral   E:  Normal electrolytes   N: advance as tolerated     DVT Prophylaxis: defer to orthopedic service  Code Status: Full Code     Lines: PIV, without edema/erythema   Welch catheter: remains in place. Patient very somnolent with pain medication regimen and is not moving adequately. Will need to be pulled hopefully sometime tomorrow.     Discussion: stable.      Disposition: Anticipate discharge 1-2 days.   Discussed TCU placement with patient again today and he is leaning more towards that plan. Ivette has put in a referral but this would not take place till Sunday / Monday.          Interval History:   About the same today.  Vitals signs stable.  Tolerating medications and treatment plan without significant side effects or problems.  Eating and voiding well.  No new concerns today.              Significant Problems:[OA1.1]   Principal Problem:    Talus fracture  Active Problems:    Hypothyroidism    Hyperlipidemia    Type 2 diabetes mellitus (H)    BPH (benign prostatic hyperplasia)    Fracture of right talus[OA1.2]             Review of Systems:   The Review of Systems is negative other than noted in the HPI            Medications:[OA1.1]       acetaminophen  975 mg Oral Q8H     insulin aspart  1-10 Units Subcutaneous TID AC     insulin aspart  1-7 Units Subcutaneous At Bedtime     glipiZIDE (GLUCOTROL) tablet 10 mg  10 mg Oral BID AC     pioglitazone  45 mg Oral Daily     aspirin EC  325 mg Oral Daily     levothyroxine (SYNTHROID/LEVOTHROID) tablet 150 mcg  150 mcg Oral QAM AC     losartan (COZAAR) tablet 25 mg  25 mg Oral Daily     simvastatin (ZOCOR) tablet 40 mg  40 mg Oral At Bedtime     tamsulosin  0.4 mg Oral Daily     senna-docusate  1-2 tablet Oral BID     sodium chloride (PF)  3 mL Intracatheter Q8H     pneumococcal vaccine  0.5 mL Intramuscular Prior to discharge     influenza Vac Split High-Dose  0.5 mL Intramuscular Prior to discharge[OA1.2]             Physical Exam:   Vitals were reviewed[OA1.1]  Temp: 98.7   F (37.1  C) Temp src: Oral BP: 140/73 Pulse: 68 Heart Rate: 74 Resp: 18 SpO2: 96 % O2 Device: None (Room air) Oxygen Delivery: 2 LPM    Intake/Output Summary (Last 24 hours) at 03/11/17 1222  Last data filed at 03/11/17 0900   Gross per 24 hour   Intake             4328 ml   Output             1150 ml   Net             3178 ml[OA1.2]     Constitutional:   awake, alert, cooperative, no apparent distress, and appears stated age     Back:   Symmetric, no curvature, spinous processes are non-tender on palpation, paraspinous muscles are non-tender on palpation, no costal vertebral tenderness     Lungs:   no increased work of breathing, good air exchange, no retractions and wheeze diffuse     Cardiovascular:   regular rate and rhythm, normal S1 and S2 and murmurs include systolic murmur III/VI located at right upper sternal border without radiation     Abdomen:   No scars, normal bowel sounds, soft, non-distended, non-tender, no masses palpated, no hepatosplenomegally     Musculoskeletal:   RIGHT ANKLE:  redness absent  warmth absent  swelling absent  tenderness absent  range of motion abnormal      Skin:   no bruising or bleeding             Data:[OA1.1]     Lab Results   Component Value Date    WBC 7.6 03/10/2017    WBC 7.6 03/09/2017    HGB 11.1 (L) 03/11/2017    HGB 12.0 (L) 03/10/2017    HGB 14.2 03/09/2017    HCT 38.6 (L) 03/10/2017    HCT 44.8 03/09/2017     03/10/2017    MCV 98 03/09/2017     (L) 03/10/2017     03/09/2017     Lab Results   Component Value Date    CR 0.95 03/10/2017    CR 0.96 03/09/2017     Lab Results   Component Value Date     03/10/2017     03/09/2017    POTASSIUM 4.9 03/10/2017    POTASSIUM 4.1 03/09/2017    CHLORIDE 105 03/10/2017    CHLORIDE 102 03/09/2017    CO2 30 03/10/2017    CO2 29 03/09/2017     (H) 03/10/2017     (H) 03/09/2017     Lab Results   Component Value Date    WBC 7.6 03/10/2017    WBC 7.6 03/09/2017[OA1.3]           Attestation:  I have reviewed today's vital signs, notes, medications, labs and imaging.  Amount of time performed on this daily note: 20  minutes.[OA1.1]    Mohinder Russo MD[OA1.4]     Revision History        User Key Date/Time User Provider Type Action    > OA1.3 3/11/2017 12:27 PM Mohinder Russo MD Physician Sign     OA1.2 3/11/2017 12:22 PM Mohinder Russo MD Physician      OA1.4 3/11/2017 12:17 PM Mohinder Russo MD Physician      OA1.1 3/11/2017 12:16 PM Mohinder Russo MD Physician             Progress Notes by Marleny Reilly LICSW at 3/11/2017  9:36 AM     Author:  Marleny Reilly LICSW Service:  (none) Author Type:      Filed:  3/11/2017  9:37 AM Date of Service:  3/11/2017  9:36 AM Note Created:  3/11/2017  9:36 AM    Status:  Signed :  Marleny Reilly LICSW ()         Reason for Follow up: DC planning    Anticipated discharge needs: This writer met with pt to further explore dc planning. Pts goal is to dc home with home care. However; pt is also agreeable to a referral being sent to [facility] (Phone: [phone]) Fax: [phone]) in the event that he were to need to go to a TCU. Referral pending.    Next steps: CTS to follow    Marleny Reilly [facility] [place], [facility] [phone][AK1.1]           Revision History        User Key Date/Time User Provider Type Action    > AK1.1 3/11/2017  9:37 AM Marleny Reilly LICSW  Sign            Progress Notes by Dianne Lu PA-C at 3/11/2017  7:49 AM     Author:  Dianne Lu PA-C Service:  Orthopedics Author Type:  Physician Assistant    Filed:  3/11/2017  9:21 AM Date of Service:  3/11/2017  7:49 AM Note Created:  3/11/2017  7:49 AM    Status:  Signed :  Dianne Lu PA-C (Physician Assistant)         [facility] Orthopaedics Progress Note      Post-operative Day: 2 Days Post-Op    Procedure(s):  open  "reduction internal fixation talus right ankle - Wound Class: I-Clean      Subjective:    Pain:[AG1.1] minimal[AG1.2]  Chest pain, SOB:[AG1.1]  No[AG1.2]      Objective:  Blood pressure 141/70, pulse 68, temperature 99  F (37.2  C), temperature source Oral, resp. rate 18, height 1.727 m (5' 8\"), weight (!) 153.4 kg (338 lb 3 oz), SpO2 94 %.    Patient Vitals for the past 24 hrs:   BP Temp Temp src Pulse Heart Rate Resp SpO2   03/11/17 0341 141/70 99  F (37.2  C) Oral 68 - 18 94 %   03/10/17 2306 146/68 97.9  F (36.6  C) Oral 67 - 18 98 %   03/10/17 2000 109/54 98.2  F (36.8  C) Oral 59 - 16 94 %   03/10/17 1607 149/54 98.4  F (36.9  C) Oral - 74 18 98 %   03/10/17 1140 124/69 98.1  F (36.7  C) Oral - 66 18 94 %       Wt Readings from Last 4 Encounters:   03/09/17 (!) 153.4 kg (338 lb 3 oz)         Motor function, sensation, and circulation intact[AG1.1]   Yes[AG1.2]  Wound status: incisions are clean dry and intact.[AG1.1] Yes[AG1.2]  Calf tenderness:[AG1.1] Bilateral  No[AG1.2]    Pertinent Labs   Lab Results: personally reviewed.     Recent Labs   Lab Test  03/11/17   0635  03/10/17   0640  03/09/17   0048   HGB  11.1*  12.0*  14.2   HCT   --   38.6*  44.8   MCV   --   100  98   PLT   --   141*  171   NA   --   139  137       Plan: Anticoagulation protocol:  mg daily  x 14  days            Pain medications:  oxycodone, tylenol and vistaril            Weight bearing status:  NWB RLE            Disposition:  TCU likely indicated Sunday, will await progress with PT as pain restricted much advancement yesterday            Continue cares and rehabilitation     Report completed by:  Dianne Lu PA-C, PA-C  Date: 3/11/2017  Time: 7:49 AM[AG1.1]       Revision History        User Key Date/Time User Provider Type Action    > AG1.2 3/11/2017  9:21 AM Dianne Lu PA-C Physician Assistant Sign     AG1.1 3/11/2017  7:49 AM Dianne Lu PA-C Physician Assistant             Progress " "Notes by Gayatri Le RN at 3/10/2017  7:25 PM     Author:  Gayatri Le RN Service:  (none) Author Type:  Registered Nurse    Filed:  3/10/2017  7:26 PM Date of Service:  3/10/2017  7:25 PM Note Created:  3/10/2017  7:25 PM    Status:  Signed :  Gayatri Le RN (Registered Nurse)         Pt has low output for urine and only 150 out days. Call to dr PEREZ.[DK1.1]      Revision History        User Key Date/Time User Provider Type Action    > DK1.1 3/10/2017  7:26 PM Gayatri Le RN Registered Nurse Sign            Progress Notes by Dianne Lu PA-C at 3/10/2017  7:57 AM     Author:  Dianne Lu PA-C Service:  Orthopedics Author Type:  Physician Assistant    Filed:  3/10/2017  4:21 PM Date of Service:  3/10/2017  7:57 AM Note Created:  3/10/2017  7:57 AM    Status:  Signed :  Dianne Lu PA-C (Physician Assistant)         [facility] Orthopaedics Progress Note      Post-operative Day: 1 Day Post-Op[AG1.1]    Procedure(s):  open reduction internal fixation talus right ankle - Wound Class: I-Clean[AG1.2]      Subjective:    Pain:[AG1.1] severe, with spasms[AG1.3]  Chest pain, SOB:[AG1.1]  No  + flores. No nausea.[AG1.3]     Objective:[AG1.1]  Blood pressure 147/68, pulse 76, temperature 98.1  F (36.7  C), temperature source Oral, resp. rate 18, height 1.727 m (5' 8\"), weight (!) 153.4 kg (338 lb 3 oz), SpO2 93 %.    Patient Vitals for the past 24 hrs:   BP Temp Temp src Pulse Heart Rate Resp SpO2   03/10/17 0737 147/68 98.1  F (36.7  C) Oral - 69 18 93 %   03/10/17 0623 141/65 98  F (36.7  C) Oral - 64 18 95 %   03/10/17 0215 129/69 - - - 70 18 94 %   03/10/17 0115 123/73 - - - 78 18 93 %   03/10/17 0015 136/69 - - - 72 18 90 %   03/09/17 2345 141/73 - - - 83 18 91 %   03/09/17 2315 145/81 - - 76 - - 93 %   03/09/17 2245 138/88 - - 77 - - 92 %   03/09/17 2215 (!) 182/93 98.9  F (37.2  C) Oral 86 - 18 96 %   03/09/17 2110 164/69 98  F (36.7  C) Oral 80 - 16 91 % "   03/09/17 2103 - - - - - - 95 %   03/09/17 2100 190/88 - - - 72 16 96 %   03/09/17 2047 (!) 164/95 - - - 66 20 -   03/09/17 2037 (!) 145/108 - - - 68 20 100 %   03/09/17 1600 141/72 - - - - - 96 %   03/09/17 1556 147/59 98.2  F (36.8  C) - - 84 16 96 %   03/09/17 1550 128/70 - - - - - 96 %   03/09/17 1545 145/66 - - - 75 16 96 %   03/09/17 1540 126/43 - - - 73 12 94 %   03/09/17 1535 130/60 - - - 75 12 93 %   03/09/17 1530 128/81 - - - 71 10 96 %   03/09/17 1525 130/73 - - - 76 15 94 %   03/09/17 1520 119/71 - - - 75 16 97 %   03/09/17 1516 148/66 - - - 73 18 94 %   03/09/17 1513 153/89 - - - - 14 -   03/09/17 1315 159/90 98.1  F (36.7  C) - 72 - 18 96 %   03/09/17 0800 - - - - - - 98 %       Wt Readings from Last 4 Encounters:   03/09/17 (!) 153.4 kg (338 lb 3 oz)[AG1.2]     Patient is moderate discomfort in bed. He is A&Ox3.     RLE   Skin is mottled, ecchymotic. Splint intact. ACE appears tight and was removed.[AG1.3]   Motor function, sensation, and circulation intact[AG1.1]   Yes. Wiggles toes easily. EHL intact. Sensation intact in SP/DP dermatomes. Cap refill remains brisk. DP pulse is palpable.[AG1.3]  Wound status:[AG1.1] Splint[AG1.3] clean dry and intact.[AG1.1] Yes[AG1.3]  Calf tenderness:[AG1.1] Bilateral  No. Compartments supple.[AG1.3]     Pertinent Labs   Lab Results: personally reviewed.     Recent Labs   Lab Test  03/10/17   0640  03/09/17   0048   HGB  12.0*  14.2   HCT  38.6*  44.8   MCV  100  98   PLT  141*  171   NA  139  137       Plan: Anticoagulation protocol:[AG1.1]  mg daily[AG1.3]  x[AG1.1] 14[AG1.3]  days            Pain medications:[AG1.1]  oxycodone, tylenol and vistaril ordered this am along with IV diazepam and toradol x 4 doses. Would back off IV morphine, diazepam once pain becomes tolerable.[AG1.3]             Weight bearing status:[AG1.1]  NWB[AG1.3]            Disposition:[AG1.1]  Home with home care v TCU pending mobility with PT.[AG1.3]             Continue cares and  rehabilitation[AG1.1]. Pt with severe pain after nerve block wearing off about 7am. Likely not enough pain medications on board prior. Compartments remain supple. Level of edema difficult to determine secondary to his habitus. Will add multimodal pain control and if not improvements in next 1-2 hours would discuss with anesthesia for possible need to repeat block. Replace ACE wrap over splint when pain better controlled.[AG1.3]     Report completed by:[AG1.1]  Dianne Lu PA-C, PA-C[AG1.2]  Date: 3/10/2017  Time: 7:58 AM[AG1.1]       Revision History        User Key Date/Time User Provider Type Action    > AG1.3 3/10/2017  4:21 PM Dianne Lu PA-C Physician Assistant Sign     AG1.2 3/10/2017  7:58 AM Dianne Lu PA-C Physician Assistant      AG1.1 3/10/2017  7:57 AM Dianne Lu PA-C Physician Assistant             Progress Notes by Lenora Chavez PT at 3/10/2017 12:33 PM     Author:  Lenora Chavez PT Service:  (none) Author Type:  Physical Therapist    Filed:  3/10/2017 12:33 PM Date of Service:  3/10/2017 12:33 PM Note Created:  3/10/2017 12:33 PM    Status:  Signed :  Lenora Chavez PT (Physical Therapist)         Physical Therapy Evaluation     03/10/17 1200   Quick Adds   Type of Visit Initial PT Evaluation   Living Environment   Lives With alone   Living Arrangements house   Home Accessibility stairs to enter home;stairs within home   Living Environment Comment 4 steps without rail, flight of stairs with rail to shower and bedrooms. Does have bathroom on main level.   Functional Level Prior   Prior Functional Level Comment Indep PLOF, working his farm, drove.   General Information   Onset of Illness/Injury or Date of Surgery - Date 03/09/17   Patient/Family Goals Statement DC to home   Pertinent History of Current Problem (include personal factors and/or comorbidities that impact the POC) Pt s/p R ankle ORIF secondary to fall. PHMx includes HTN, HLD,  "BPH, hypothyroidism, hx of prostate CA, obesity.   Precautions/Limitations fall precautions   Weight-Bearing Status - LLE full weight-bearing   Weight-Bearing Status - RLE nonweight-bearing   General Observations Sleeping in bed    General Info Comments IV, O2   Cognitive Status Examination   Orientation orientation to person, place and time   Level of Consciousness alert;lethargic/somnolent   Pain Assessment   Patient Currently in Pain Yes, see Vital Sign flowsheet   Strength   Strength Comments LLE 5/5, R LE not assess due to pain.   Bed Mobility   Bed Mobility Comments NT due to pt refusing to get out of bed.   Transfer Skills   Transfer Comments NT due to pt refusing to get out of bed.   Gait   Gait Comments NT due to pt refusing to get out of bed.   General Therapy Interventions   Planned Therapy Interventions bed mobility training;gait training;strengthening;transfer training;home program guidelines   Clinical Impression   Criteria for Skilled Therapeutic Intervention yes, treatment indicated   PT Diagnosis Impaired mobility   Influenced by the following impairments Weakness, pain, NWBing   Functional limitations due to impairments Transfers, amb   Clinical Presentation Stable/Uncomplicated   Clinical Decision Making (Complexity) Low complexity   Therapy Frequency` daily   Anticipated Equipment Needs at Discharge front wheeled walker   Anticipated Discharge Disposition Transitional Care Facility;Home with Home Therapy;Home with Assist   Clinical Impression Comments Pt would benefit from skilled PT to address mobility for safe DC. Pt does want to DC to home, states he has assistances. Will cont to assess as pt becomes willing to participate in PT to assess level of care.   [facility] AM-PAC TM \"6 Clicks\"   2016, Trustees of [facility], under license to [employer].  All rights reserved.   6 Clicks Short Forms Basic Mobility Inpatient Short Form   [facility] AM-PAC  \"6 Clicks\" V.2 Basic " Mobility Inpatient Short Form   1. Turning from your back to your side while in a flat bed without using bedrails? 2 - A Lot   2. Moving from lying on your back to sitting on the side of a flat bed without using bedrails? 2 - A Lot   3. Moving to and from a bed to a chair (including a wheelchair)? 2 - A Lot   4. Standing up from a chair using your arms (e.g., wheelchair, or bedside chair)? 2 - A Lot   5. To walk in hospital room? 2 - A Lot   6. Climbing 3-5 steps with a railing? 1 - Total   Basic Mobility Raw Score (Score out of 24.Lower scores equate to lower levels of function) 11       See Care Plan for goals.[JK1.1]       Revision History        User Key Date/Time User Provider Type Action    > JK1.1 3/10/2017 12:33 PM Lenora Chavez, PT Physical Therapist Sign            ED Notes signed by Laurie Sharp at 3/10/2017  9:26 AM      Author:  Scan, Non-Provider Service:  (none) Author Type:  (none)    Filed:  3/10/2017  9:26 AM Date of Service:  3/9/2017 11:12 PM Note Created:  3/10/2017  9:26 AM    Status:  Signed :  Scan, Non-Provider     Scan on 3/10/2017  9:26 AM by Pita SharpProvider : [facility]          Revision History        User Key Date/Time User Provider Type Action    > [N/A] 3/10/2017  9:26 AM Scan, Non-Provider (none) Sign                  Procedure Notes     No notes of this type exist for this encounter.         Progress Notes - Therapies (Notes from 03/10/17 through 03/13/17)      Progress Notes by Lenora Reno OT at 3/11/2017 12:41 PM     Author:  Lenora Reno OT Service:  (none) Author Type:  Occupational Therapist    Filed:  3/11/2017 12:42 PM Date of Service:  3/11/2017 12:41 PM Note Created:  3/11/2017 12:41 PM    Status:  Signed :  Lenora Reno OT (Occupational Therapist)         Occupational Therapy Evaluation     03/11/17 1200   Quick Adds   Type of Visit Initial Occupational Therapy Evaluation   Living Environment   Lives With  "alone   Living Environment Comment 2 story farm house.  Stairs to enter and within, no rails.  Low toilets.  Bedroom and shower are upstairs.     Functional Level Prior   Prior Functional Level Comment Independent with ADLs and IADLs, runs the farm independently.    General Information   Onset of Illness/Injury or Date of Surgery - Date 03/10/17   Referring Physician Karan   Patient/Family Goals Statement Would like to return ASAP but seems to understand that this is not reailstic right now.  Hoping for home in \"a few days\"   Additional Occupational Profile Info/Pertinent History of Current Problem Status post right ankle ORIF   Weight-Bearing Status - RLE nonweight-bearing   General Observations Very SOB after short walk in room, wheezing.  Resolves after a minute or 2 deep breathing EOB.     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  Although he is not strong enough to rise from normal height bed and needs assist with supine to sit.  Continue to assess.  Did not complete UE MMT at time of eval.     Mobility   Bed Mobility Comments Head of bed elevated.  Scoots well but needs min-mod hand hold assist to sit up from supine to EOB.  Encouraged independence but he insists on \"just take my hand!\"   Transfer Skills   Transfer Comments CGA of 2 with walker in room to and from door.  Moves somewhat impulsvely.  Needed to raise bed all the way up to achieve sit to stand.     Toilet Transfer   Toilet Transfer Comments anticipate difficulty based on performance with sit to stand from edge of bed.  Will need taller toilet height.     Lower Body Dressing   Level of [place]: Dress Lower Body maximum assist (25% patients effort)   Grooming   Level of [place]: Grooming independent " "  Eating/Self Feeding   Level of [place]: Eating independent   General Therapy Interventions   Planned Therapy Interventions ADL retraining   Clinical Impression   Criteria for Skilled Therapeutic Interventions Met yes, treatment indicated   OT Diagnosis weakness   Influenced by the following impairments post ankle ORIF, NWB   Assessment of Occupational Performance 3-5 Performance Deficits   Identified Performance Deficits bathing, dressing, toileting, bed mobility   Clinical Decision Making (Complexity) Low complexity   Therapy Frequency daily   Predicted Duration of Therapy Intervention (days/wks) 2-3 days   Anticipated Discharge Disposition Transitional Care Facility   Risks and Benefits of Treatment have been explained. Yes   Patient, Family & other staff in agreement with plan of care Yes   Clinical Impression Comments Will benefit from OT to improve function and safety with self cares.  NWB is limiting his mobility and function as well as SOB with activity.     [facility] TM \"6 Clicks\"   2016, Trustees of [facility], under license to [employer].  All rights reserved.   6 Clicks Short Forms Basic Mobility Inpatient Short Form;Daily Activity Inpatient Short Form   [facility]  \"6 Clicks\" V.2 Basic Mobility Inpatient Short Form   1. Turning from your back to your side while in a flat bed without using bedrails? 3 - A Little   2. Moving from lying on your back to sitting on the side of a flat bed without using bedrails? 2 - A Lot   3. Moving to and from a bed to a chair (including a wheelchair)? 2 - A Lot   4. Standing up from a chair using your arms (e.g., wheelchair, or bedside chair)? 2 - A Lot   5. To walk in hospital room? 2 - A Lot   6. Climbing 3-5 steps with a railing? 1 - Total   Basic Mobility Raw Score (Score out of 24.Lower scores equate to lower levels of function) 12   [facility]  \"6 Clicks\" Daily Activity Inpatient Short Form   1. Putting on and " taking off regular lower body clothing? 2 - A Lot   2. Bathing (including washing, rinsing, drying)? 2 - A Lot   3. Toileting, which includes using toilet, bedpan or urinal? 3 - A Little   4. Putting on and taking off regular upper body clothing? 3 - A Little   5. Taking care of personal grooming such as brushing teeth? 3 - A Little   6. Eating meals? 4 - None   Daily Activity Raw Score (Score out of 24.Lower scores equate to lower levels of function) 17   See Care Plan for Goals.    Lenora Reno OTR/L[JK1.1]       Revision History        User Key Date/Time User Provider Type Action    > JK1.1 3/11/2017 12:42 PM Lenora Reno, OT Occupational Therapist Sign            Progress Notes by Lenora Chavez PT at 3/10/2017 12:33 PM     Author:  Lenora Chavez PT Service:  (none) Author Type:  Physical Therapist    Filed:  3/10/2017 12:33 PM Date of Service:  3/10/2017 12:33 PM Note Created:  3/10/2017 12:33 PM    Status:  Signed :  Lenora Chavez PT (Physical Therapist)         Physical Therapy Evaluation     03/10/17 1200   Quick Adds   Type of Visit Initial PT Evaluation   Living Environment   Lives With alone   Living Arrangements house   Home Accessibility stairs to enter home;stairs within home   Living Environment Comment 4 steps without rail, flight of stairs with rail to shower and bedrooms. Does have bathroom on main level.   Functional Level Prior   Prior Functional Level Comment Indep PLOF, working his farm, drove.   General Information   Onset of Illness/Injury or Date of Surgery - Date 03/09/17   Patient/Family Goals Statement DC to home   Pertinent History of Current Problem (include personal factors and/or comorbidities that impact the POC) Pt s/p R ankle ORIF secondary to fall. PHMx includes HTN, HLD, BPH, hypothyroidism, hx of prostate CA, obesity.   Precautions/Limitations fall precautions   Weight-Bearing Status - LLE full weight-bearing   Weight-Bearing Status - RLE nonweight-bearing  "  General Observations Sleeping in bed    General Info Comments IV, O2   Cognitive Status Examination   Orientation orientation to person, place and time   Level of Consciousness alert;lethargic/somnolent   Pain Assessment   Patient Currently in Pain Yes, see Vital Sign flowsheet   Strength   Strength Comments LLE 5/5, R LE not assess due to pain.   Bed Mobility   Bed Mobility Comments NT due to pt refusing to get out of bed.   Transfer Skills   Transfer Comments NT due to pt refusing to get out of bed.   Gait   Gait Comments NT due to pt refusing to get out of bed.   General Therapy Interventions   Planned Therapy Interventions bed mobility training;gait training;strengthening;transfer training;home program guidelines   Clinical Impression   Criteria for Skilled Therapeutic Intervention yes, treatment indicated   PT Diagnosis Impaired mobility   Influenced by the following impairments Weakness, pain, NWBing   Functional limitations due to impairments Transfers, amb   Clinical Presentation Stable/Uncomplicated   Clinical Decision Making (Complexity) Low complexity   Therapy Frequency` daily   Anticipated Equipment Needs at Discharge front wheeled walker   Anticipated Discharge Disposition Transitional Care Facility;Home with Home Therapy;Home with Assist   Clinical Impression Comments Pt would benefit from skilled PT to address mobility for safe DC. Pt does want to DC to home, states he has assistances. Will cont to assess as pt becomes willing to participate in PT to assess level of care.   [facility] [employer] TM \"6 Clicks\"   2016, Trustees of [facility], under license to [employer].  All rights reserved.   6 Clicks Short Forms Basic Mobility Inpatient Short Form   [facility]  \"6 Clicks\" V.2 Basic Mobility Inpatient Short Form   1. Turning from your back to your side while in a flat bed without using bedrails? 2 - A Lot   2. Moving from lying on your back to sitting on the side of a flat " bed without using bedrails? 2 - A Lot   3. Moving to and from a bed to a chair (including a wheelchair)? 2 - A Lot   4. Standing up from a chair using your arms (e.g., wheelchair, or bedside chair)? 2 - A Lot   5. To walk in hospital room? 2 - A Lot   6. Climbing 3-5 steps with a railing? 1 - Total   Basic Mobility Raw Score (Score out of 24.Lower scores equate to lower levels of function) 11       See Care Plan for goals.[JK1.1]       Revision History        User Key Date/Time User Provider Type Action    > JK1.1 3/10/2017 12:33 PM Lenora Chavez PT Physical Therapist Sign

## 2017-03-09 NOTE — ANESTHESIA PROCEDURE NOTES
Peripheral nerve/Neuraxial procedure note : Popliteal, Sciatic and Saphenous  Pre-Procedure  Performed by  ELICEO CHAVEZ   Location: ICU    Procedure Times:3/9/2017 3:41 PM and 3/9/2017 3:54 PM  Pre-Anesthestic Checklist: patient identified, IV checked, site marked, risks and benefits discussed, informed consent, monitors and equipment checked, pre-op evaluation, at physician/surgeon's request and post-op pain management    Timeout  Correct Patient: Yes   Correct Procedure: Yes   Correct Site: Yes     Correct Position: Yes   Site Marked: Yes   .   Procedure Documentation    Diagnosis:RIGHT FRATURED TALUS.    Procedure:    Popliteal, Sciatic and Saphenous.  Local skin infiltrated with 3 mL of 1% lidocaine.     Ultrasound used to identify targeted nerve, plexus, or vascular marker and placed a needle adjacent to it. A permanent image is entered into the patient's record.  Patient Prep;mask, sterile gloves, chlorhexidine gluconate and isopropyl alcohol, patient draped.  Nerve Stim: Initial Level mA. Lowest motor response 0.48 mA..  Needle: insulated, short bevel (21 G. 4 in). .  Spinal Needle: . . Insertion Method: Single Shot.     Assessment/Narrative  Paresthesias: No.  Injection made incrementally with aspirations every 5 mL..  The placement was negative for: blood aspirated, painful injection and site bleeding.  Bolus given via needle..   Secured via.   Complications: none. Test dose of 5 mL lidocaine 2% w/ 1:200,000 epinephrine at 15:46.  Test dose negative for signs of intravascular, subdural or intrathecal injection.

## 2017-03-09 NOTE — DISCHARGE INSTRUCTIONS
Please follow up with your PCP within 7-10 days of discharge.  We would like for you to have an ECHO done given that you are high risk for development of congestive heart failure given your risk factors and family history.

## 2017-03-09 NOTE — ED NOTES
Patient has  [place] to Observation  order. Patient has been given Patient Bill of Rights, Observation brochure and  What does Observation mean to me  forms.  Patient has been given the opportunity to ask questions about observation status and their plan of care.  Patient will be oriented to the observation room, bathroom, and call light by floor nurse.    Clara Hutchins

## 2017-03-09 NOTE — ED NOTES
Patient having minimal relief with dilaudid  Pain medications changed to  Morphine  Patient did have a little more relief with morphine. Female  with patient

## 2017-03-09 NOTE — IP AVS SNAPSHOT
"` `           [facility] SURGICAL: [phone]                                              INTERAGENCY TRANSFER FORM - NURSING   3/9/2017                    Hospital Admission Date: 3/9/2017  AUGUSTO ROBERTSON   : 1940  Sex: Male        Attending Provider: Kadeem Bates MD     Allergies:  Nka [No Known Allergies]    Infection:  None   Service:  ORTHOPEDICS    Ht:  1.727 m (5' 8\")   Wt:  153.4 kg (338 lb 3 oz)   Admission Wt:  153.4 kg (338 lb 3 oz)    BMI:  51.42 kg/m 2   BSA:  2.71 m 2            Patient PCP Information     Provider PCP Type    [facility]      Current Code Status     Date Active Code Status Order ID Comments User Context       3/9/2017  7:45 AM Full Code [id number]  Dolores Lino PA-C Inpatient       Code Status History     Date Active Date Inactive Code Status Order ID Comments User Context    This patient has a current code status but no historical code status.      Advance Directives        Does patient have a scanned Advance Directive/ACP document in EPIC?           No        Hospital Problems as of 3/13/2017              Priority Class Noted POA    * (Principal)Talus fracture Medium  3/9/2017 Yes    Hypothyroidism Medium  3/9/2017 Yes    Hyperlipidemia Medium  3/9/2017 Yes    Type 2 diabetes mellitus (H) Medium  3/9/2017 Yes    BPH (benign prostatic hyperplasia) Medium  3/9/2017 Yes    Fracture of right talus Medium  3/9/2017 Yes      Non-Hospital Problems as of 3/13/2017              Priority Class Noted    Malignant neoplasm of prostate (H) Medium  3/9/2017    Malignant neoplasm of thyroid gland (H) Medium  3/9/2017      Immunizations     Name Date      Influenza (High Dose) 3 valent vaccine 17          END      ASSESSMENT     Discharge Profile Flowsheet     EXPECTED DISCHARGE     COMMUNICATION ASSESSMENT      Expected Discharge Date  03/13/17 03/10/17 0900   Patient's communication style  spoken language (English or Bilingual) " "03/09/17 0352    DISCHARGE NEEDS ASSESSMENT     SKIN      Patient/family verbalizes understanding of discharge plan recommendations?  Yes 03/10/17 0900   Inspection  Partial (identify areas NOT inspected) 03/13/17 0108    Medical Team notified of plan?  yes 03/10/17 0900   Skin WDL  ex 03/13/17 1039    Readmission Within The Last 30 Days  no previous admission in last 30 days 03/10/17 0900   Skin Color/Characteristics  bruised (ecchymotic) 03/13/17 0108    GASTROINTESTINAL (ADULT,PEDIATRIC,OB)     Skin Integrity  incision(s) 03/13/17 1039    GI WDL  WDL 03/13/17 0937   SAFETY      Abdominal Appearance  obese 03/12/17 1604   Safety WDL  WDL 03/13/17 0108    All Quadrants Bowel Sounds  audible and active in all quadrants 03/13/17 0108   SAFETY      Last Bowel Movement  03/08/17 03/13/17 0108   Safety WDL  WDL 03/09/17 2119    Passing flatus  yes 03/13/17 0108                      Assessment WDL (Within Defined Limits) Definitions           Safety WDL     Effective: 09/28/15    Row Information: <b>WDL Definition:</b> Bed in low position, wheels locked; call light in reach; upper side rails up x 2; ID band on<br> <font color=\"gray\"><i>Item=AS safety wdl>>List=AS safety wdl>>Version=F14</i></font>      Skin WDL     Effective: 09/28/15    Row Information: <b>WDL Definition:</b> Warm; dry; intact; elastic; without discoloration; pressure points without redness<br> <font color=\"gray\"><i>Item=AS skin wdl>>List=AS skin wdl>>Version=F14</i></font>      Vitals     Vital Signs Flowsheet     VITAL SIGNS     Functioning  can do most things, but pain gets in the way of some 03/13/17 0226    Temp  98.7  F (37.1  C) 03/13/17 0733   Sleep  normal sleep 03/13/17 0226    Temp src  Oral 03/13/17 0733   ANALGESIA SIDE EFFECTS MONITORING      Resp  20 03/13/17 0733   Side Effects Monitoring: Respiratory Quality  R 03/09/17 2116    Pulse  65 03/13/17 0733   Side Effects Monitoring: Respiratory Depth  N 03/09/17 2116    Heart Rate  -- " "03/12/17 0419   Side Effects Monitoring: Sedation Level  1 03/09/17 2116    Pulse/Heart Rate Source  Monitor 03/13/17 0733   HEIGHT AND WEIGHT      BP  163/77 03/13/17 0733   Height  1.727 m (5' 8\") 03/09/17 0331    BP Location  Left arm 03/13/17 0733   Height Method  Stated 03/09/17 0331    Patient Position  Sitting 03/09/17 2115   Weight  (!)  153.4 kg (338 lb 3 oz) 03/09/17 0331    OXYGEN THERAPY     Estimated Weight (From ED)  147.4 kg (325 lb) 03/09/17 0030    SpO2  94 % 03/13/17 0733   BSA (Calculated - sq m)  2.71 03/09/17 0331    O2 Device  None (Room air) 03/13/17 0733   BMI (Calculated)  51.53 03/09/17 0331    Oxygen Delivery  2 LPM 03/11/17 0807   HERSON COMA SCALE      PAIN/COMFORT     Best Eye Response  4-->(E4) spontaneous 03/13/17 0108    Patient Currently in Pain  yes 03/13/17 0226   Best Motor Response  6-->(M6) obeys commands 03/13/17 0108    Preferred Pain Scale  CAPA (Clinically Aligned Pain Assessment) ([facility], [facility] Adults Only) 03/11/17 1823   Best Verbal Response  5-->(V5) oriented 03/13/17 0108    Patient's Stated Pain Goal  No pain 03/09/17 0612   Herson Coma Scale Score  15 03/13/17 0108    0-10 Pain Scale  3 03/12/17 0419   POSITIONING      Pain Location  Foot 03/13/17 0226   Body Position  supine, head elevated 03/13/17 0708    Pain Orientation  Right 03/13/17 0226   Head of Bed (HOB)  HOB at 30 degrees 03/13/17 0708    Pain Descriptors  Sore 03/13/17 0226   Positioning/Transfer Devices  pillows;in use 03/11/17 1948    Pain Management Interventions  -- (declining ice to foot) 03/12/17 1804   DAILY CARE      Pain Intervention(s)  Medication (See eMAR) 03/13/17 0226   Activity Type  activity adjusted per tolerance 03/13/17 0122    Response to Interventions  Decrease in pain 03/11/17 1948   Activity Level of Assistance  assistance, 1 person 03/11/17 0861    CLINICALLY ALIGNED PAIN ASSESSMENT (CAPA) ([facility], [facility] ADULTS ONLY)     Activity Assistive Device  walker " 03/11/17 0923    Comfort  comfortably manageable 03/13/17 0226   Additional Documentation  Activity Device Assistance (Row) 03/10/17 1558    Change in Pain  getting better 03/13/17 0226   ECG      Pain Control  fully effective 03/13/17 0226   ECG Rhythm  -- (not ordered for mac pt ) 03/09/17 2116            Patient Lines/Drains/Airways Status    Active LINES/DRAINS/AIRWAYS     Name: Placement date: Placement time: Site: Days: Last dressing change:    Peripheral IV 03/08/17 03/08/17         5     Incision/Surgical Site 03/09/17 Right Ankle 03/09/17   1933    3             Patient Lines/Drains/Airways Status    Active PICC/CVC     None            Intake/Output Detail Report     Date Intake     Output   Net    Shift P.O. I.V. IV Piggyback Total Urine Blood Total       Noc 03/11/17 2300 - 03/12/17 0659 -- -- -- -- 750 -- 750 -750    Day 03/12/17 0700 - 03/12/17 1459 360 -- -- 360 1000 -- 1000 -640    Eileen 03/12/17 1500 - 03/12/17 2259 105 -- -- 105 1000 -- 1000 -895    Noc 03/12/17 2300 - 03/13/17 0659 -- -- -- -- 200 -- 200 -200    Day 03/13/17 0700 - 03/13/17 1459 300 -- -- 300 500 -- 500 -200      Case Management/Discharge Planning     Case Management/Discharge Planning Flowsheet     REFERRAL INFORMATION     Expected Discharge Date  03/13/17 03/10/17 0900    Did the Initial Social Work Assessment result in a Social Work Case?  Yes 03/10/17 0900   DISCHARGE PLANNING      Admission Type  inpatient 03/10/17 0900   Patient/family verbalizes understanding of discharge plan recommendations?  Yes 03/10/17 0900    Arrived From  home or self-care 03/10/17 0900   Medical Team notified of plan?  yes 03/10/17 0900    Referral Source  nursing 03/10/17 0900   Readmission Within The Last 30 Days  no previous admission in last 30 days 03/10/17 0900    Reason For Consult  discharge planning 03/10/17 0900   ABUSE RISK SCREEN      Primary Care Clinic Name  -- ([facility]) 03/10/17 0900   QUESTION TO PATIENT:  Has a member of your family  or a partner(now or in the past) intimidated, hurt, manipulated, or controlled you in any way?  no 03/09/17 0352    LIVING ENVIRONMENT     QUESTION TO PATIENT: Do you feel safe going back to the place where you are living?  yes 03/09/17 0352    Lives With  alone 03/11/17 1229   OBSERVATION: Is there reason to believe there has been maltreatment of a vulnerable adult (ie. Physical/Sexual/Emotional abuse, self neglect, lack of adequate food, shelter, medical care, or financial exploitation)?  no 03/09/17 0352    Living Arrangements  [place] 03/10/17 1233   (R) MENTAL HEALTH SUICIDE RISK      COPING/STRESS     Are you depressed or being treated for depression?  No 03/09/17 0352    Major Change/Loss/Stressor  none 03/09/17 0329   HOMICIDE RISK      EXPECTED DISCHARGE     Homicidal Ideation  no 03/09/17 0352

## 2017-03-09 NOTE — ED NOTES
Patient to be admitted for  Surgical consult and pain control  Resting a little beter with morphine

## 2017-03-09 NOTE — IP AVS SNAPSHOT
"[facility]: [phone]                                              INTERAGENCY TRANSFER FORM - LAB / IMAGING / EKG / EMG RESULTS   3/9/2017                    Hospital Admission Date: 3/9/2017  AUGUSTO ROBERTSON   : 1940  Sex: Male        Attending Provider: Kadeem Bates MD     Allergies:  Nka [No Known Allergies]    Infection:  None   Service:  ORTHOPEDICS    Ht:  1.727 m (5' 8\")   Wt:  153.4 kg (338 lb 3 oz)   Admission Wt:  153.4 kg (338 lb 3 oz)    BMI:  51.42 kg/m 2   BSA:  2.71 m 2            Patient PCP Information     Provider PCP Type    [facility]         Lab Results - 3 Days      Glucose by meter [[id number]] (Abnormal)  Resulted: 17 1056, Result status: Final result    Ordering provider: Kadeem Bates MD  17 1049 Resulting lab: POINT OF CARE TEST, GLUCOSE    Specimen Information    Type Source Collected On     17 1049          Components       Value Reference Range Flag Lab   Glucose 201 70 - 99 mg/dL H 170            Glucose by meter [[id number]] (Abnormal)  Resulted: 17 0631, Result status: Final result    Ordering provider: Kadeem Bates MD  17 0626 Resulting lab: POINT OF CARE TEST, GLUCOSE    Specimen Information    Type Source Collected On     17 0626          Components       Value Reference Range Flag Lab   Glucose 128 70 - 99 mg/dL H 170            Glucose by meter [[id number]] (Abnormal)  Resulted: 17 0205, Result status: Final result    Ordering provider: Kadeem Bates MD  17 0158 Resulting lab: POINT OF CARE TEST, GLUCOSE    Specimen Information    Type Source Collected On     17 0158          Components       Value Reference Range Flag Lab   Glucose 154 70 - 99 mg/dL H 170            Glucose by meter [[id number]] (Abnormal)  Resulted: 17, Result status: Final result    Ordering provider: Kadeem Bates MD  17 Resulting lab: POINT OF CARE TEST, " GLUCOSE    Specimen Information    Type Source Collected On     03/12/17 2106          Components       Value Reference Range Flag Lab   Glucose 140 70 - 99 mg/dL H 170            Glucose by meter [[id number]] (Abnormal)  Resulted: 03/12/17 1650, Result status: Final result    Ordering provider: Kadeem Bates MD  03/12/17 1644 Resulting lab: POINT OF CARE TEST, GLUCOSE    Specimen Information    Type Source Collected On     03/12/17 1644          Components       Value Reference Range Flag Lab   Glucose 149 70 - 99 mg/dL H 170            Glucose by meter [[id number]] (Abnormal)  Resulted: 03/12/17 1050, Result status: Final result    Ordering provider: Kadeem Bates MD  03/12/17 1046 Resulting lab: POINT OF CARE TEST, GLUCOSE    Specimen Information    Type Source Collected On     03/12/17 1046          Components       Value Reference Range Flag Lab   Glucose 185 70 - 99 mg/dL H 170            Glucose by meter [[id number]] (Abnormal)  Resulted: 03/12/17 0701, Result status: Final result    Ordering provider: Kadeem Bates MD  03/12/17 0657 Resulting lab: POINT OF CARE TEST, GLUCOSE    Specimen Information    Type Source Collected On     03/12/17 0657          Components       Value Reference Range Flag Lab   Glucose 127 70 - 99 mg/dL H 170            Glucose by meter [[id number]] (Abnormal)  Resulted: 03/12/17 0326, Result status: Final result    Ordering provider: Kadeem Bates MD  03/12/17 0215 Resulting lab: POINT OF CARE TEST, GLUCOSE    Specimen Information    Type Source Collected On     03/12/17 0215          Components       Value Reference Range Flag Lab   Glucose 139 70 - 99 mg/dL H 170            Glucose by meter [[id number]] (Abnormal)  Resulted: 03/11/17 2111, Result status: Final result    Ordering provider: Kadeem Bates MD  03/11/17 2108 Resulting lab: POINT OF CARE TEST, GLUCOSE    Specimen Information    Type Source Collected On     03/11/17 2108          Components       Value Reference  Range Flag Lab   Glucose 151 70 - 99 mg/dL H 170            Glucose by meter [[id number]] (Abnormal)  Resulted: 03/11/17 1656, Result status: Final result    Ordering provider: Kadeem Bates MD  03/11/17 1653 Resulting lab: POINT OF CARE TEST, GLUCOSE    Specimen Information    Type Source Collected On     03/11/17 1653          Components       Value Reference Range Flag Lab   Glucose 116 70 - 99 mg/dL H 170            Glucose by meter [[id number]] (Abnormal)  Resulted: 03/11/17 1120, Result status: Final result    Ordering provider: Kadeem Bates MD  03/11/17 1118 Resulting lab: POINT OF CARE TEST, GLUCOSE    Specimen Information    Type Source Collected On     03/11/17 1118          Components       Value Reference Range Flag Lab   Glucose 129 70 - 99 mg/dL H 170            Hemoglobin [[id number]] (Abnormal)  Resulted: 03/11/17 0736, Result status: Final result    Ordering provider: Dolores Lino PA-C  03/11/17 0000 Resulting lab: [facility]    Specimen Information    Type Source Collected On   Blood  03/11/17 0635          Components       Value Reference Range Flag Lab   Hemoglobin 11.1 13.3 - 17.7 g/dL L 59            Glucose by meter [[id number]] (Abnormal)  Resulted: 03/11/17 0656, Result status: Final result    Ordering provider: Kadeem Bates MD  03/11/17 0633 Resulting lab: POINT OF CARE TEST, GLUCOSE    Specimen Information    Type Source Collected On     03/11/17 0633          Components       Value Reference Range Flag Lab   Glucose 102 70 - 99 mg/dL H 170            Glucose by meter [[id number]] (Abnormal)  Resulted: 03/11/17 0216, Result status: Final result    Ordering provider: Kadeem Bates MD  03/11/17 0202 Resulting lab: POINT OF CARE TEST, GLUCOSE    Specimen Information    Type Source Collected On     03/11/17 0202          Components       Value Reference Range Flag Lab   Glucose 100 70 - 99 mg/dL H 170            Glucose by meter [[id number]] (Abnormal)   Resulted: 03/10/17 2116, Result status: Final result    Ordering provider: Kadeem Bates MD  03/10/17 2110 Resulting lab: POINT OF CARE TEST, GLUCOSE    Specimen Information    Type Source Collected On     03/10/17 2110          Components       Value Reference Range Flag Lab   Glucose 124 70 - 99 mg/dL H 170            Glucose by meter [[id number]] (Abnormal)  Resulted: 03/10/17 1635, Result status: Final result    Ordering provider: Kadeem Bates MD  03/10/17 1629 Resulting lab: POINT OF CARE TEST, GLUCOSE    Specimen Information    Type Source Collected On     03/10/17 1629          Components       Value Reference Range Flag Lab   Glucose 154 70 - 99 mg/dL H 170            Vitamin B12 [[id number]]  Resulted: 03/10/17 1635, Result status: Final result    Ordering provider: Dolores Lino PA-C  03/10/17 0706 Resulting lab: [facility]    Specimen Information    Type Source Collected On   Blood  03/10/17 0640          Components       Value Reference Range Flag Lab   Vitamin B12 295 193 - 986 pg/mL  51            Folate [[id number]]  Resulted: 03/10/17 1455, Result status: Final result    Ordering provider: Dolores Lino PA-C  03/10/17 0706 Resulting lab: [facility]    Specimen Information    Type Source Collected On   Blood  03/10/17 0640          Components       Value Reference Range Flag Lab   Folate 11.3 >5.4 ng/mL  51            Glucose by meter [[id number]] (Abnormal)  Resulted: 03/10/17 1121, Result status: Final result    Ordering provider: Kadeem Bates MD  03/10/17 1118 Resulting lab: POINT OF CARE TEST, GLUCOSE    Specimen Information    Type Source Collected On     03/10/17 1118          Components       Value Reference Range Flag Lab   Glucose 240 70 - 99 mg/dL H 170            Basic metabolic panel [[id number]] (Abnormal)  Resulted: 03/10/17 0720, Result status: Final result    Ordering provider: Dolores Lino  JAQUELINE  03/10/17 0000 Resulting lab: [facility]    Specimen Information    Type Source Collected On   Blood  03/10/17 0640          Components       Value Reference Range Flag Lab   Sodium 139 133 - 144 mmol/L  59   Potassium 4.9 3.4 - 5.3 mmol/L  59   Chloride 105 94 - 109 mmol/L  59   Carbon Dioxide 30 20 - 32 mmol/L  59   Anion Gap 4 3 - 14 mmol/L  59   Glucose 230 70 - 99 mg/dL H 59   Urea Nitrogen 30 7 - 30 mg/dL  59   Creatinine 0.95 0.66 - 1.25 mg/dL  59   GFR Estimate 77 >60 mL/min/1.7m2  59   Comment:  Non  GFR Calc   GFR Estimate If Black -- >60 mL/min/1.7m2  59   Result:         >90   GFR Calc     Calcium 8.2 8.5 - 10.1 mg/dL L 59   Result:              CBC with platelets differential [[id number]] (Abnormal)  Resulted: 03/10/17 0647, Result status: Final result    Ordering provider: Dolores Lino PA-C  03/10/17 0000 Resulting lab: [facility]    Specimen Information    Type Source Collected On   Blood  03/10/17 0640          Components       Value Reference Range Flag Lab   WBC 7.6 4.0 - 11.0 10e9/L  59   RBC Count 3.87 4.4 - 5.9 10e12/L L 59   Hemoglobin 12.0 13.3 - 17.7 g/dL L 59   Hematocrit 38.6 40.0 - 53.0 % L 59    78 - 100 fl  59   MCH 31.0 26.5 - 33.0 pg  59   MCHC 31.1 31.5 - 36.5 g/dL L 59   RDW 14.0 10.0 - 15.0 %  59   Platelet Count 141 150 - 450 10e9/L L 59   Diff Method Automated Method   59   % Neutrophils 87.0 %  59   % Lymphocytes 4.2 %  59   % Monocytes 8.8 %  59   % Eosinophils 0.0 %  59   % Basophils 0.0 %  59   % Immature Granulocytes 0.0 %  59   Absolute Neutrophil 6.6 1.6 - 8.3 10e9/L  59   Absolute Lymphocytes 0.3 0.8 - 5.3 10e9/L L 59   Absolute Monocytes 0.7 0.0 - 1.3 10e9/L  59   Absolute Eosinophils 0.0 0.0 - 0.7 10e9/L  59   Absolute Basophils 0.0 0.0 - 0.2 10e9/L  59   Abs Immature Granulocytes 0.0 0 - 0.4 10e9/L  59            Glucose by meter [[id number]] (Abnormal)  Resulted: 03/10/17 0206,  Result status: Final result    Ordering provider: Kadeem Bates MD  03/10/17 0201 Resulting lab: POINT OF CARE TEST, GLUCOSE    Specimen Information    Type Source Collected On     03/10/17 0201          Components       Value Reference Range Flag Lab   Glucose 239 70 - 99 mg/dL H 170            Testing Performed By     Lab - Abbreviation Name Director Address Valid Date Range    51 - Unknown [facility] [place] Unknown [address] [facility] [id number] 14 1010 - Present    59 - Unknown [facility] Unknown [address] [facility] [address] 14 1006 - Present    170 - Unknown POINT OF CARE TEST, GLUCOSE Unknown Unknown 10/31/11 1114 - Present            Unresulted Labs     None         ECG/EMG Results      Echocardiogram Complete [[id number]]  Resulted: 17 1056, Result status: In process    Ordering provider: Wang Lino PA-C  17 Performed: 17 1055 - 17 1055    Resulting lab: [facility]             ECHO COMPLETE WITH OPTISON [[id number]]  Resulted: 17 1101, Result status: Edited Result - FINAL    Ordering provider: Wang Lino PA-C  17 0906 Resulted by: Shiva Mcdaniel MD    Performed: 17 1055 - 17 1139 Resulting lab: RADIOLOGY RESULTS    Narrative:       [id number]  [id number]  [id number]  [id number]^EMMANUEL^WANG^HUSSEIN           [facility]  Echocardiography Laboratory  [address] [place] [address]        Name: AUGUSTO ROBERTSON  MRN: [id number]  : 1940  Study Date: 2017 11:01 AM  Age: 76 yrs  Gender: Male  Patient Location: [facility]  Reason For Study: Heart failure symptoms  Ordering Physician: WANG LINO  Referring Physician: [facility]  Performed By: Pippa Galindo RDCS     BSA: 2.6 m2  Height: 68 in  Weight: 338 lb  HR: 68  BP: 143/87 mmHg  _____________________________________________________________________________  __         Procedure  Complete Portable Echo Adult. Contrast Optison.  _____________________________________________________________________________  __        Interpretation Summary     Left ventricular systolic function is normal.  The visual ejection fraction is estimated at 55-60%.  Mild to moderate valvular aortic stenosis.  The mean AoV pressure gradient is 19mmHg.  The study was technically difficult. There is no comparison study available.  _____________________________________________________________________________  __        Left Ventricle  The left ventricle is normal in size. There is mild concentric left  ventricular hypertrophy. Left ventricular systolic function is normal. The  visual ejection fraction is estimated at 55-60%. E by E prime ratio is between  8 and 15, which is indeterminate for assessment of left ventricular filling  pressures. Regional wall motion abnormalities cannot be excluded due to  limited visualization.     Right Ventricle  The right ventricular systolic function is normal. The right ventricle is not  well visualized.     Atria  The left atrium is moderately dilated. Right atrium not well visualized. There  is no color Doppler evidence of an atrial shunt.     Mitral Valve  The mitral valve is not well visualized. There is no mitral regurgitation  noted.        Tricuspid Valve  The tricuspid valve is not well visualized. No tricuspid regurgitation. Right  ventricular systolic pressure could not be approximated due to inadequate  tricuspid regurgitation.     Aortic Valve  The aortic valve is not well visualized. No aortic regurgitation is present.  Mild to moderate valvular aortic stenosis. The mean AoV pressure gradient is  19mmHg.     Pulmonic Valve  The pulmonic valve is not well visualized.     Vessels  The ascending aorta is Borderline dilated. Dilated inferior vena cava.     Pericardium  There is no pericardial effusion.        Rhythm  The rhythm was normal  sinus.  _____________________________________________________________________________  __  MMode/2D Measurements & Calculations  IVSd: 1.3 cm     LVIDd: 5.5 cm  LVIDs: 3.9 cm  LVPWd: 1.2 cm  FS: 29.4 %  EDV(Teich): 146.6 ml  ESV(Teich): 64.8 ml  LV mass(C)d: 289.8 grams  Ao root diam: 4.0 cm  asc Aorta Diam: 3.9 cm  LVOT diam: 2.2 cm  LVOT area: 3.9 cm2  LA Volume (BP): 98.0 ml  LA Volume Index (BP): 38.4 ml/m2           Doppler Measurements & Calculations  MV E max ruddy: 72.4 cm/sec  MV A max ruddy: 104.7 cm/sec  MV E/A: 0.69  MV dec time: 0.23 sec  Ao V2 max: 296.8 cm/sec  Ao max P.2 mmHg  Ao V2 mean: 204.6 cm/sec  Ao mean P.9 mmHg  Ao V2 VTI: 59.8 cm  LENNY(I,D): 1.4 cm2  LENNY(V,D): 1.4 cm2  LV V1 max P.8 mmHg  LV V1 max: 109.1 cm/sec  LV V1 VTI: 21.4 cm  SV(LVOT): 82.3 ml  LENNY Index (cm2/m2): 0.54  Lateral E/e': 8.4  Medial E/e': 9.2           _____________________________________________________________________________  __           Report approved by: Diony Olguin 2017 12:01 PM       1    Type Source Collected On     17 1101          View Image (below)              Encounter-Level Documents:     There are no encounter-level documents.      Order-Level Documents:     There are no order-level documents.

## 2017-03-09 NOTE — PLAN OF CARE
Problem: Goal Outcome Summary  Goal: Goal Outcome Summary  OT: CANCEL- Pt having surgery today. Will complete IP OT evaluation tomorrow as appropriate.

## 2017-03-10 ENCOUNTER — APPOINTMENT (OUTPATIENT)
Dept: PHYSICAL THERAPY | Facility: CLINIC | Age: 77
DRG: 504 | End: 2017-03-10
Payer: MEDICARE

## 2017-03-10 LAB
ANION GAP SERPL CALCULATED.3IONS-SCNC: 4 MMOL/L (ref 3–14)
BASOPHILS # BLD AUTO: 0 10E9/L (ref 0–0.2)
BASOPHILS NFR BLD AUTO: 0 %
BUN SERPL-MCNC: 30 MG/DL (ref 7–30)
CALCIUM SERPL-MCNC: 8.2 MG/DL (ref 8.5–10.1)
CHLORIDE SERPL-SCNC: 105 MMOL/L (ref 94–109)
CO2 SERPL-SCNC: 30 MMOL/L (ref 20–32)
CREAT SERPL-MCNC: 0.95 MG/DL (ref 0.66–1.25)
DIFFERENTIAL METHOD BLD: ABNORMAL
EOSINOPHIL # BLD AUTO: 0 10E9/L (ref 0–0.7)
EOSINOPHIL NFR BLD AUTO: 0 %
ERYTHROCYTE [DISTWIDTH] IN BLOOD BY AUTOMATED COUNT: 14 % (ref 10–15)
FOLATE SERPL-MCNC: 11.3 NG/ML
GFR SERPL CREATININE-BSD FRML MDRD: 77 ML/MIN/1.7M2
GLUCOSE BLDC GLUCOMTR-MCNC: 124 MG/DL (ref 70–99)
GLUCOSE BLDC GLUCOMTR-MCNC: 154 MG/DL (ref 70–99)
GLUCOSE BLDC GLUCOMTR-MCNC: 239 MG/DL (ref 70–99)
GLUCOSE BLDC GLUCOMTR-MCNC: 240 MG/DL (ref 70–99)
GLUCOSE SERPL-MCNC: 230 MG/DL (ref 70–99)
HCT VFR BLD AUTO: 38.6 % (ref 40–53)
HGB BLD-MCNC: 12 G/DL (ref 13.3–17.7)
IMM GRANULOCYTES # BLD: 0 10E9/L (ref 0–0.4)
IMM GRANULOCYTES NFR BLD: 0 %
LYMPHOCYTES # BLD AUTO: 0.3 10E9/L (ref 0.8–5.3)
LYMPHOCYTES NFR BLD AUTO: 4.2 %
MCH RBC QN AUTO: 31 PG (ref 26.5–33)
MCHC RBC AUTO-ENTMCNC: 31.1 G/DL (ref 31.5–36.5)
MCV RBC AUTO: 100 FL (ref 78–100)
MONOCYTES # BLD AUTO: 0.7 10E9/L (ref 0–1.3)
MONOCYTES NFR BLD AUTO: 8.8 %
NEUTROPHILS # BLD AUTO: 6.6 10E9/L (ref 1.6–8.3)
NEUTROPHILS NFR BLD AUTO: 87 %
PLATELET # BLD AUTO: 141 10E9/L (ref 150–450)
POTASSIUM SERPL-SCNC: 4.9 MMOL/L (ref 3.4–5.3)
RBC # BLD AUTO: 3.87 10E12/L (ref 4.4–5.9)
SODIUM SERPL-SCNC: 139 MMOL/L (ref 133–144)
VIT B12 SERPL-MCNC: 295 PG/ML (ref 193–986)
WBC # BLD AUTO: 7.6 10E9/L (ref 4–11)

## 2017-03-10 PROCEDURE — 99232 SBSQ HOSP IP/OBS MODERATE 35: CPT | Performed by: PHYSICIAN ASSISTANT

## 2017-03-10 PROCEDURE — 25000131 ZZH RX MED GY IP 250 OP 636 PS 637: Mod: GY | Performed by: PHYSICIAN ASSISTANT

## 2017-03-10 PROCEDURE — 25000132 ZZH RX MED GY IP 250 OP 250 PS 637: Mod: GY | Performed by: PHYSICIAN ASSISTANT

## 2017-03-10 PROCEDURE — 00000146 ZZHCL STATISTIC GLUCOSE BY METER IP

## 2017-03-10 PROCEDURE — 25000128 H RX IP 250 OP 636: Performed by: FAMILY MEDICINE

## 2017-03-10 PROCEDURE — A9270 NON-COVERED ITEM OR SERVICE: HCPCS | Mod: GY | Performed by: PHYSICIAN ASSISTANT

## 2017-03-10 PROCEDURE — 40000193 ZZH STATISTIC PT WARD VISIT: Performed by: PHYSICAL THERAPIST

## 2017-03-10 PROCEDURE — 12000007 ZZH R&B INTERMEDIATE

## 2017-03-10 PROCEDURE — 97161 PT EVAL LOW COMPLEX 20 MIN: CPT | Mod: GP | Performed by: PHYSICAL THERAPIST

## 2017-03-10 PROCEDURE — 36415 COLL VENOUS BLD VENIPUNCTURE: CPT | Performed by: PODIATRIST

## 2017-03-10 PROCEDURE — 80048 BASIC METABOLIC PNL TOTAL CA: CPT | Performed by: PODIATRIST

## 2017-03-10 PROCEDURE — 85025 COMPLETE CBC W/AUTO DIFF WBC: CPT | Performed by: PODIATRIST

## 2017-03-10 PROCEDURE — 82607 VITAMIN B-12: CPT | Performed by: PHYSICIAN ASSISTANT

## 2017-03-10 PROCEDURE — 82746 ASSAY OF FOLIC ACID SERUM: CPT | Performed by: PHYSICIAN ASSISTANT

## 2017-03-10 PROCEDURE — 25000128 H RX IP 250 OP 636: Performed by: PHYSICIAN ASSISTANT

## 2017-03-10 RX ORDER — GLIPIZIDE 10 MG/1
10 TABLET ORAL
Status: DISCONTINUED | OUTPATIENT
Start: 2017-03-10 | End: 2017-03-13 | Stop reason: HOSPADM

## 2017-03-10 RX ORDER — OXYCODONE HYDROCHLORIDE 5 MG/1
5-10 TABLET ORAL
Status: DISCONTINUED | OUTPATIENT
Start: 2017-03-10 | End: 2017-03-13 | Stop reason: HOSPADM

## 2017-03-10 RX ORDER — HYDROXYZINE HYDROCHLORIDE 25 MG/1
25-50 TABLET, FILM COATED ORAL EVERY 4 HOURS PRN
Status: DISCONTINUED | OUTPATIENT
Start: 2017-03-10 | End: 2017-03-13 | Stop reason: HOSPADM

## 2017-03-10 RX ORDER — ACETAMINOPHEN 325 MG/1
975 TABLET ORAL EVERY 8 HOURS
Status: DISCONTINUED | OUTPATIENT
Start: 2017-03-10 | End: 2017-03-13 | Stop reason: HOSPADM

## 2017-03-10 RX ORDER — DIAZEPAM 10 MG/2ML
2.5 INJECTION, SOLUTION INTRAMUSCULAR; INTRAVENOUS EVERY 4 HOURS PRN
Status: DISCONTINUED | OUTPATIENT
Start: 2017-03-10 | End: 2017-03-13 | Stop reason: HOSPADM

## 2017-03-10 RX ORDER — KETOROLAC TROMETHAMINE 15 MG/ML
15 INJECTION, SOLUTION INTRAMUSCULAR; INTRAVENOUS EVERY 6 HOURS PRN
Status: DISCONTINUED | OUTPATIENT
Start: 2017-03-10 | End: 2017-03-13 | Stop reason: HOSPADM

## 2017-03-10 RX ORDER — PIOGLITAZONEHYDROCHLORIDE 15 MG/1
45 TABLET ORAL DAILY
Status: DISCONTINUED | OUTPATIENT
Start: 2017-03-10 | End: 2017-03-13 | Stop reason: HOSPADM

## 2017-03-10 RX ADMIN — MORPHINE SULFATE 4 MG: 2 INJECTION, SOLUTION INTRAMUSCULAR; INTRAVENOUS at 06:29

## 2017-03-10 RX ADMIN — MORPHINE SULFATE 2 MG: 2 INJECTION, SOLUTION INTRAMUSCULAR; INTRAVENOUS at 02:39

## 2017-03-10 RX ADMIN — ACETAMINOPHEN 975 MG: 325 TABLET, FILM COATED ORAL at 22:55

## 2017-03-10 RX ADMIN — MORPHINE SULFATE 2 MG: 2 INJECTION, SOLUTION INTRAMUSCULAR; INTRAVENOUS at 03:15

## 2017-03-10 RX ADMIN — KETOROLAC TROMETHAMINE 15 MG: 15 INJECTION, SOLUTION INTRAMUSCULAR; INTRAVENOUS at 08:07

## 2017-03-10 RX ADMIN — HYDROXYZINE HYDROCHLORIDE 50 MG: 25 TABLET ORAL at 08:06

## 2017-03-10 RX ADMIN — KETOROLAC TROMETHAMINE 15 MG: 15 INJECTION, SOLUTION INTRAMUSCULAR; INTRAVENOUS at 16:10

## 2017-03-10 RX ADMIN — SODIUM CHLORIDE 500 ML: 9 INJECTION, SOLUTION INTRAVENOUS at 19:34

## 2017-03-10 RX ADMIN — HYDROXYZINE HYDROCHLORIDE 25 MG: 25 TABLET ORAL at 19:20

## 2017-03-10 RX ADMIN — SENNOSIDES AND DOCUSATE SODIUM 1 TABLET: 8.6; 5 TABLET ORAL at 19:20

## 2017-03-10 RX ADMIN — HYDROXYZINE HYDROCHLORIDE 25 MG: 25 TABLET ORAL at 22:55

## 2017-03-10 RX ADMIN — OXYCODONE HYDROCHLORIDE 10 MG: 5 TABLET ORAL at 08:06

## 2017-03-10 RX ADMIN — OXYCODONE HYDROCHLORIDE 10 MG: 5 TABLET ORAL at 22:55

## 2017-03-10 RX ADMIN — SODIUM CHLORIDE: 9 INJECTION, SOLUTION INTRAVENOUS at 21:32

## 2017-03-10 RX ADMIN — OXYCODONE HYDROCHLORIDE 10 MG: 5 TABLET ORAL at 19:21

## 2017-03-10 RX ADMIN — TAMSULOSIN HYDROCHLORIDE 0.4 MG: 0.4 CAPSULE ORAL at 08:07

## 2017-03-10 RX ADMIN — SIMVASTATIN 40 MG: 40 TABLET, FILM COATED ORAL at 19:21

## 2017-03-10 RX ADMIN — SODIUM CHLORIDE: 9 INJECTION, SOLUTION INTRAVENOUS at 13:11

## 2017-03-10 RX ADMIN — ACETAMINOPHEN 975 MG: 325 TABLET, FILM COATED ORAL at 14:40

## 2017-03-10 RX ADMIN — HYDROXYZINE HYDROCHLORIDE 25 MG: 25 TABLET ORAL at 16:09

## 2017-03-10 RX ADMIN — GLIPIZIDE 10 MG: 10 TABLET ORAL at 16:15

## 2017-03-10 RX ADMIN — ASPIRIN 325 MG: 325 TABLET, COATED ORAL at 17:04

## 2017-03-10 RX ADMIN — OXYCODONE HYDROCHLORIDE 10 MG: 5 TABLET ORAL at 16:09

## 2017-03-10 RX ADMIN — MORPHINE SULFATE 4 MG: 2 INJECTION, SOLUTION INTRAMUSCULAR; INTRAVENOUS at 10:57

## 2017-03-10 RX ADMIN — INSULIN ASPART 4 UNITS: 100 INJECTION, SOLUTION INTRAVENOUS; SUBCUTANEOUS at 08:17

## 2017-03-10 RX ADMIN — SENNOSIDES AND DOCUSATE SODIUM 2 TABLET: 8.6; 5 TABLET ORAL at 08:07

## 2017-03-10 RX ADMIN — MORPHINE SULFATE 4 MG: 2 INJECTION, SOLUTION INTRAMUSCULAR; INTRAVENOUS at 08:31

## 2017-03-10 RX ADMIN — LOSARTAN POTASSIUM 25 MG: 25 TABLET, FILM COATED ORAL at 08:07

## 2017-03-10 RX ADMIN — ACETAMINOPHEN 650 MG: 325 TABLET, FILM COATED ORAL at 06:58

## 2017-03-10 RX ADMIN — LEVOTHYROXINE SODIUM 150 MCG: 75 TABLET ORAL at 06:28

## 2017-03-10 RX ADMIN — OXYCODONE HYDROCHLORIDE 10 MG: 5 TABLET ORAL at 12:23

## 2017-03-10 RX ADMIN — DIAZEPAM 2.5 MG: 5 INJECTION, SOLUTION INTRAMUSCULAR; INTRAVENOUS at 08:06

## 2017-03-10 RX ADMIN — INSULIN GLARGINE 10 UNITS: 100 INJECTION, SOLUTION SUBCUTANEOUS at 08:16

## 2017-03-10 NOTE — PROGRESS NOTES
Physical Therapy Evaluation     03/10/17 1200   Quick Adds   Type of Visit Initial PT Evaluation   Living Environment   Lives With alone   Living Arrangements house   Home Accessibility stairs to enter home;stairs within home   Living Environment Comment 4 steps without rail, flight of stairs with rail to shower and bedrooms. Does have bathroom on main level.   Functional Level Prior   Prior Functional Level Comment Indep PLOF, working his farm, drove.   General Information   Onset of Illness/Injury or Date of Surgery - Date 03/09/17   Patient/Family Goals Statement DC to home   Pertinent History of Current Problem (include personal factors and/or comorbidities that impact the POC) Pt s/p R ankle ORIF secondary to fall. PHMx includes HTN, HLD, BPH, hypothyroidism, hx of prostate CA, obesity.   Precautions/Limitations fall precautions   Weight-Bearing Status - LLE full weight-bearing   Weight-Bearing Status - RLE nonweight-bearing   General Observations Sleeping in bed    General Info Comments IV, O2   Cognitive Status Examination   Orientation orientation to person, place and time   Level of Consciousness alert;lethargic/somnolent   Pain Assessment   Patient Currently in Pain Yes, see Vital Sign flowsheet   Strength   Strength Comments LLE 5/5, R LE not assess due to pain.   Bed Mobility   Bed Mobility Comments NT due to pt refusing to get out of bed.   Transfer Skills   Transfer Comments NT due to pt refusing to get out of bed.   Gait   Gait Comments NT due to pt refusing to get out of bed.   General Therapy Interventions   Planned Therapy Interventions bed mobility training;gait training;strengthening;transfer training;home program guidelines   Clinical Impression   Criteria for Skilled Therapeutic Intervention yes, treatment indicated   PT Diagnosis Impaired mobility   Influenced by the following impairments Weakness, pain, NWBing   Functional limitations due to impairments Transfers, amb   Clinical Presentation  "Stable/Uncomplicated   Clinical Decision Making (Complexity) Low complexity   Therapy Frequency` daily   Anticipated Equipment Needs at Discharge front wheeled walker   Anticipated Discharge Disposition Transitional Care Facility;Home with Home Therapy;Home with Assist   Clinical Impression Comments Pt would benefit from skilled PT to address mobility for safe DC. Pt does want to DC to home, states he has assistances. Will cont to assess as pt becomes willing to participate in PT to assess level of care.   [facility] AM-PAC TM \"6 Clicks\"   2016, Trustees of [facility], under license to [employer].  All rights reserved.   6 Clicks Short Forms Basic Mobility Inpatient Short Form   [facility] AM-PAC  \"6 Clicks\" V.2 Basic Mobility Inpatient Short Form   1. Turning from your back to your side while in a flat bed without using bedrails? 2 - A Lot   2. Moving from lying on your back to sitting on the side of a flat bed without using bedrails? 2 - A Lot   3. Moving to and from a bed to a chair (including a wheelchair)? 2 - A Lot   4. Standing up from a chair using your arms (e.g., wheelchair, or bedside chair)? 2 - A Lot   5. To walk in hospital room? 2 - A Lot   6. Climbing 3-5 steps with a railing? 1 - Total   Basic Mobility Raw Score (Score out of 24.Lower scores equate to lower levels of function) 11       See Care Plan for goals.    "

## 2017-03-10 NOTE — PLAN OF CARE
Problem: Goal Outcome Summary  Goal: Goal Outcome Summary  OT: CANCEL- Per discussion with RN pt not appropriate for therapy d/t decreased alertness.

## 2017-03-10 NOTE — PROGRESS NOTES
[facility] Medicine Progress Note  Date of Service: 03/10/2017    Assessment & Plan   Jayesh Ortiz is a 76 year old male with PMH significant for T2DM, HTN, HLD, hypothyroidism, hx of thyroid cancer, hx of prostate cancer who presented on 3/9/2017 with right ankle/foot pain s/p mechanical fall.    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 He will indubitably require assistance s/p surgery given body habitus and living alone (somewhat amendable to TCU when discussing with patients)  - PT/OT consulted      Hypoxia, likely secondary to obesity hypoventilation syndrome and poor respiratory drive s/p pain medication administration  New onset. No oxygen use at home. Given a significant amount of pain medication after which he became hypoxic. No history of sleep apnea or sleep study, but given body habitus and reports of heaving snoring, likely this patient also has DRAKE. O2 titrated down 2L NC pre-operatively.  Portable chest XR the morning of surgery showed LLL opacity, more likely atelectasis than infiltrate.  Continues to be hypoxic post operatively, but admittedly rather heavily medicated given pain control issues.  - continue supportive O2  - wean as tolerated  - continue end tidal CO2 in place     Systolic Heart Murmur  Reportedly long-standing per patient. No prior documentation per review of care-everywhere. Grade III, best heard at the left sternal boarder. Some bilateral pitting LE edema, although this is difficult to assess given morbid obesity. Patient reports this is also longstanding. No recent weight gain. No chest pain/palpitations. Patient has multiple risk factors for CHF - morbid obesity, T2DM, and HTN. In addition, the patient reports both his biological parents passed  "from congestive heart failure (mother, age 100 and father, age 95). ECHO done 03/09 shows moderate aortic stenosis.  EF of 50-65%.  No mention of diastolic dysfunction.  - recommend outpatient follow up with PCP.     Bilateral LE Edema, Venous Stasis changes, skin thickening  - IP lymphedema consult placed    HTN  Pressures reviewed, they are stable.  - continue PTA losartan  - PRN metoprolol 5mg IV with parameters to maximize diastolic      Hypothyroidism  - continue PTA levothyroxine     Hyperlipidemia  - continue PTA simvastatin      Type 2 diabetes mellitus (H)  - restart home glipizide, proglitazone today  - high intensity SSI      BPH (benign prostatic hyperplasia)  - continue PTA Flomax      F: continue 125mL/hr 0.9NS given somnolence  E: BMP in AM  N: advance as tolerated    DVT Prophylaxis: defer to orthopedic service  Code Status: Full Code    Lines: PIV, without edema/erythema   Welch catheter: remains in place.  Patient very somnolent with pain medication regimen and is not moving adequately. Will need to be pulled hopefully sometime tomorrow.    Discussion: stable.      Disposition: Anticipate discharge 1-2 days. The patient would like to discharge to home, but I suspect this will be rather challenging given that he lives alone and has various comorbidities limiting his ability to care for himself. Discussed possible TCU; patient is hesitant, but seems somewhat willing to entertain this discharge plan.    Attestation:  I have reviewed today's vital signs, notes, medications, labs and imaging.    Patient discussed with Dr. Kadeem Bates.  Assessment and plan as written above.    Dolores Lino PA-C      Interval History   Pain was initially very difficult to control.  Orthopedic service considered re-blocking at the level of the knee given considerable pain.  However, with new medication management as determined by ortho, patient reports he \"doesn't feel anything, and that's how I like it.\"  Reports he " has little interest in food at present, be denies nausea.  Denies chest pain, SOB, abdominal pain, and pain to lower extremities.  Eating little.  Not passing flatus.  Welch remains in place given the patient's somnolence and difficulties ambulating.  Does not believe his legs are any larger than usual, but admittedly has spent much of the morning sleeping.    Physical Exam   Temp:  [98  F (36.7  C)-98.9  F (37.2  C)] 98  F (36.7  C)  Pulse:  [72-86] 76  Heart Rate:  [64-84] 64  Resp:  [10-20] 18  BP: (119-190)/() 141/65  SpO2:  [88 %-100 %] 95 %    Weights:   Vitals:    03/09/17 0314   Weight: (!) 153.4 kg (338 lb 3 oz)    Body mass index is 51.42 kg/(m^2).    Constitutional: alert and oriented x4. In no acute distress.  CV: Regular rate/rhythm.  No appreciable murmur, rub or gallop.  Bilateral lower extremity edema to both knees.  Respiratory: CTA bilaterally, equal chest expansion.  Poor respiratory effort.  GI: Soft, nontender, normoactive BS.  Rotund.  Skin: Warm and dry.  Chronic venous stasis changes to bilateral LE.  Some thickening of anterior shin skin, most consistent with chronic swelling/lymphedema.  Musculoskeletal: Non-tender to palpation of posterior calves.  Toes are warm, sensation intact, capillary refill less than 3 seconds.  Neuro: equal  strength    Data     Recent Labs  Lab 03/10/17  0640 03/09/17  0048   WBC 7.6 7.6   HGB 12.0* 14.2    98   * 171   NA  --  137   POTASSIUM  --  4.1   CHLORIDE  --  102   CO2  --  29   BUN  --  22   CR  --  0.96   ANIONGAP  --  6   MATT  --  8.4*   GLC  --  157*   ALBUMIN  --  3.7   PROTTOTAL  --  6.8   BILITOTAL  --  0.6   ALKPHOS  --  44   ALT  --  20   AST  --  15         Recent Labs  Lab 03/10/17  0201 03/09/17  2155 03/09/17  1559 03/09/17  1145 03/09/17  0048 03/09/17  0042   GLC  --   --   --   --  157*  --    * 140* 130* 186*  --  158*        Unresulted Labs Ordered in the Past 30 Days of this Admission     Date and Time  Order Name Status Description    3/10/2017 0000 Basic metabolic panel In process            Imaging  Recent Results (from the past 24 hour(s))   XR Chest Port 1 View    Narrative    CHEST PORTABLE ONE VIEW March 9, 2017 7:57 AM     HISTORY: Hypoxia.    COMPARISON: None.      Impression    IMPRESSION: There is obscuration of the left hemidiaphragm that may  represent atelectasis, pneumonia, pleural effusion or both. The lungs  are otherwise clear. There is no pneumothorax. Heart size is normal  with no evidence for congestive failure.    KARRI SUAZO MD   CT Ankle Right w/o Contrast    Narrative    CT RIGHT ANKLE WITHOUT CONTRAST  3/9/2017 8:57 AM    HISTORY: Right talar fracture.    COMPARISON: Radiographs earlier today.    TECHNIQUE: 0.2 cm helical imaging is performed through the right ankle  without contrast. Sagittal and coronal reformatting was performed.  Radiation dose for this scan was reduced using automated exposure  control, adjustment of the mA and/or kV according to patient size, or  iterative reconstruction technique.     FINDINGS: There is a moderately comminuted fracture of the posterior  third of the talus. The main fracture line is in a somewhat oblique  coronal orientation involving the central aspect of the talar dome.  The main posterior bone fragment is depressed up to 0.7 cm with up to  0.5 cm of separation of the main fracture line. There is additional  comminution with several much smaller fracture fragments along the  inferior medial aspect of the bone extending into the subtalar joint  where there is only a few millimeter of articular surface depression  and irregularity.    No other fracture or osseous lesion is seen. The remaining joint  spaces are well preserved. There are posterior and plantar calcaneal  spurs. There is edema in the soft tissues surrounding the ankle. No  other soft tissue abnormality is seen.      Impression    IMPRESSION: Moderately comminuted intra-articular  fracture of the  posterior third of the talus. This involves both the mortise and  posterior subtalar joints. There is articular surface depression along  the talar dome as described above.     AYAN CERVANTES MD   XR Surgery DANIEL L/T 5 Min Fluoro w Stills    Narrative    This exam was marked as non-reportable because it will not be read by a   radiologist or a [place] non-radiologist provider.                  I reviewed all new labs and imaging results over the last 24 hours. I personally reviewed no images or EKG's today.    Medications     NaCl 125 mL/hr at 03/10/17 0600     lactated ringers 10 mL/hr at 03/09/17 1603       levothyroxine (SYNTHROID/LEVOTHROID) tablet 150 mcg  150 mcg Oral QAM AC     losartan (COZAAR) tablet 25 mg  25 mg Oral Daily     simvastatin (ZOCOR) tablet 40 mg  40 mg Oral At Bedtime     tamsulosin  0.4 mg Oral Daily     insulin aspart  1-12 Units Subcutaneous Q4H     senna-docusate  1-2 tablet Oral BID     insulin glargine  10 Units Subcutaneous BID     sodium chloride (PF)  3 mL Intracatheter Q8H     pneumococcal vaccine  0.5 mL Intramuscular Prior to discharge     influenza Vac Split High-Dose  0.5 mL Intramuscular Prior to discharge       Dolores Lino PA-C

## 2017-03-10 NOTE — CONSULTS
Reason for Referral: DC planning    Presenting Problem: Talus Fx    Cognitive Behavioral Status: awake, alert and oriented    Support system: family    Current Living Situation:   Home [place]   Living Situation: Alone   Function Status / Assistive Device: no assistive devices    Employment/ Financial/ Insurance Concerns: retired          No Insurance issues identified      Housing Concerns: No    Health Care Directives: not on file    Assessment: This writer met with pt introduced self and role. Pt reports that he lives at home independently. Prior to hospitalization he reports driving and able to do everything on his own. Briefly discussed home care services. Therapy to see pt, CTS will follow after therapy recommendations.       Plan: sherman DE LA TORRE, [facility], [phone]

## 2017-03-10 NOTE — PLAN OF CARE
"[facility] [place]MISSION [place]    Patient admitted to room 2302 at approximately 2140 on 3-9-17 via cart from surgery. Patient was accompanied by nurse.     Verbal SBAR report received from Polina CALERO prior to patient arrival.     Patient trasferred to bed via air case. Patient alert and oriented X 3. The patient is not having any pain. 0-10 Pain Scale: 10. Admission vital signs: Blood pressure 129/69, pulse 76, temperature 98.9  F (37.2  C), temperature source Oral, resp. rate 18, height 1.727 m (5' 8\"), weight (!) 153.4 kg (338 lb 3 oz), SpO2 94 %. Patient was oriented to plan of care, call light, bed controls, tv, telephone, bathroom and visiting hours.     The following safety risks were identified during admission: fall. Yellow risk band applied: YES.     Mary Schirmers    "

## 2017-03-10 NOTE — BRIEF OP NOTE
[facility] Operative Note    Pre-operative diagnosis: Right Talus fracure   Post-operative diagnosis * No post-op diagnosis entered *   Procedure: Procedure(s):  open reduction internal fixation talus right ankle - Wound Class: I-Clean + medial malleolar osteotomy   Surgeon: Ankit Coy DPM   Anesthesia: Combined General with Popliteal Block    Estimated blood loss: Less than 10 ml   Blood transfusion: No transfusion was given during surgery   Drains: None   Specimens: None   Findings: Displaced talar body fracture with posterior dislocation and ankle + subtalar joint involvement.  + dorsal osteochondral lesion and partial delamination.  Access obtained via medial malleolar osteotomy.   Complications: None   Condition: Stable   Comments: See dictated operative report for full details.           Ankit Coy DPM, FACFAS  Foot & Ankle Surgeon/Specialist  [facility]

## 2017-03-10 NOTE — PLAN OF CARE
"Problem: Goal Outcome Summary  Goal: Goal Outcome Summary  Outcome: No Change  Pt has been bedrest all shift due to pain control. Right leg is elevated and ice applied. Pt has slept most of shift, flores is patent. Lungs are diminished. Morphine 4mg last given at 1057, Oxycodone 10 mg last given at 1230. Hypoactive bowels sounds, not passing flatus. Friend of pt called today and voiced concern about him returning back home- she would like TCU is available. Edema noted in lower extremities PA ordered lymphedema consult. Blood sugars were 239 and 240- pt is agreeable to insulin and home PO meds. /69 (BP Location: Right arm)  Pulse 76  Temp 98.1  F (36.7  C) (Oral)  Resp 18  Ht 1.727 m (5' 8\")  Wt (!) 153.4 kg (338 lb 3 oz)  SpO2 94%  BMI 51.42 kg/m2  Rehana Mon RN BSN          "

## 2017-03-10 NOTE — OP NOTE
Operative report will be dictated/completed.    Ankit Coy DPM, FACFAS  Foot & Ankle Surgeon/Specialist  [facility]

## 2017-03-10 NOTE — ANESTHESIA POSTPROCEDURE EVALUATION
Patient: Jayesh Ortiz    Procedure(s):  open reduction internal fixation talus right ankle - Wound Class: I-Clean    Diagnosis:Right Talus fracure  Diagnosis Additional Information: No value filed.    Anesthesia Type:  General, Periph. Nerve Block for postop pain    Note:  Anesthesia Post Evaluation    Patient location during evaluation: Phase 2  Patient participation: Able to fully participate in evaluation  Level of consciousness: awake  Pain management: adequate  Airway patency: patent  Cardiovascular status: acceptable  Respiratory status: acceptable  Hydration status: stable  PONV: none     Anesthetic complications: None          Last vitals:  Vitals:    03/09/17 1550 03/09/17 1556 03/09/17 1600   BP: 128/70 147/59 141/72   Pulse:      Resp:  16    Temp:  36.8  C (98.2  F)    SpO2: 96% 96% 96%         Electronically Signed By: SHANTA Dee CRNA  March 9, 2017  8:45 PM

## 2017-03-10 NOTE — PROGRESS NOTES
"[facility] Progress Note      Post-operative Day: 1 Day Post-Op    Procedure(s):  open reduction internal fixation talus right ankle - Wound Class: I-Clean      Subjective:    Pain: severe, with spasms  Chest pain, SOB:  No  + flores. No nausea.     Objective:  Blood pressure 147/68, pulse 76, temperature 98.1  F (36.7  C), temperature source Oral, resp. rate 18, height 1.727 m (5' 8\"), weight (!) 153.4 kg (338 lb 3 oz), SpO2 93 %.    Patient Vitals for the past 24 hrs:   BP Temp Temp src Pulse Heart Rate Resp SpO2   03/10/17 0737 147/68 98.1  F (36.7  C) Oral - 69 18 93 %   03/10/17 0623 141/65 98  F (36.7  C) Oral - 64 18 95 %   03/10/17 0215 129/69 - - - 70 18 94 %   03/10/17 0115 123/73 - - - 78 18 93 %   03/10/17 0015 136/69 - - - 72 18 90 %   03/09/17 2345 141/73 - - - 83 18 91 %   03/09/17 2315 145/81 - - 76 - - 93 %   03/09/17 2245 138/88 - - 77 - - 92 %   03/09/17 2215 (!) 182/93 98.9  F (37.2  C) Oral 86 - 18 96 %   03/09/17 2110 164/69 98  F (36.7  C) Oral 80 - 16 91 %   03/09/17 2103 - - - - - - 95 %   03/09/17 2100 190/88 - - - 72 16 96 %   03/09/17 2047 (!) 164/95 - - - 66 20 -   03/09/17 2037 (!) 145/108 - - - 68 20 100 %   03/09/17 1600 141/72 - - - - - 96 %   03/09/17 1556 147/59 98.2  F (36.8  C) - - 84 16 96 %   03/09/17 1550 128/70 - - - - - 96 %   03/09/17 1545 145/66 - - - 75 16 96 %   03/09/17 1540 126/43 - - - 73 12 94 %   03/09/17 1535 130/60 - - - 75 12 93 %   03/09/17 1530 128/81 - - - 71 10 96 %   03/09/17 1525 130/73 - - - 76 15 94 %   03/09/17 1520 119/71 - - - 75 16 97 %   03/09/17 1516 148/66 - - - 73 18 94 %   03/09/17 1513 153/89 - - - - 14 -   03/09/17 1315 159/90 98.1  F (36.7  C) - 72 - 18 96 %   03/09/17 0800 - - - - - - 98 %       Wt Readings from Last 4 Encounters:   03/09/17 (!) 153.4 kg (338 lb 3 oz)     Patient is moderate discomfort in bed. He is A&Ox3.     RLE   Skin is mottled, ecchymotic. Splint intact. ACE appears tight and was removed.   Motor function, " sensation, and circulation intact   Yes. Wiggles toes easily. EHL intact. Sensation intact in SP/DP dermatomes. Cap refill remains brisk. DP pulse is palpable.  Wound status: Splint clean dry and intact. Yes  Calf tenderness: Bilateral  No. Compartments supple.     Pertinent Labs   Lab Results: personally reviewed.     Recent Labs   Lab Test  03/10/17   0640  03/09/17   0048   HGB  12.0*  14.2   HCT  38.6*  44.8   MCV  100  98   PLT  141*  171   NA  139  137       Plan: Anticoagulation protocol:  mg daily  x 14  days            Pain medications:  oxycodone, tylenol and vistaril ordered this am along with IV diazepam and toradol x 4 doses. Would back off IV morphine, diazepam once pain becomes tolerable.             Weight bearing status:  NWB            Disposition:  Home with home care v TCU pending mobility with PT.             Continue cares and rehabilitation. Pt with severe pain after nerve block wearing off about 7am. Likely not enough pain medications on board prior. Compartments remain supple. Level of edema difficult to determine secondary to his habitus. Will add multimodal pain control and if not improvements in next 1-2 hours would discuss with anesthesia for possible need to repeat block. Replace ACE wrap over splint when pain better controlled.     Report completed by:  Dianne Lu PA-C, JAQUELINE  Date: 3/10/2017  Time: 7:58 AM

## 2017-03-10 NOTE — OP NOTE
DATE OF SURGERY:  03/09/2017.      SITE:  [facility] OR       SURGEON:  Dr. Ankit Coy, LOGAN, [facility]    ASSISTANT: Osmar Lockett, ALEKYIII       PREOPERATIVE DIAGNOSIS:  Right displaced talar posterior body fracture.      POSTOPERATIVE DIAGNOSIS:  Right displaced talar posterior body fracture.      PROCEDURES PERFORMED:   1.  Open reduction and internal fixation, right posterior talar body fracture.   2.  Right medial malleolar osteotomy.   3.  Intraoperative fluoroscopy/stress exam under anesthesia.   4.  Posterior splint application, below the knee, ankle in neutral fiberglass.      HEMOSTASIS:  Right thigh tourniquet 300 mmHg.      ANESTHESIA:  Monitored care with popliteal block and local augmentation.      FINDINGS:  Displaced post-traumatic right talar body fracture, posterior, involving the ankle joint and the subtalar joint.  It was found to be impeded in reduction secondary to the malposition of the posterior facet inferiorly.      IMPLANTS:  Two 4.0 screws to fixate the talar fracture from posterior to anterior, 2 screws utilized to fixate the medial malleolar fracture.      SPECIMENS:  None.      INDICATIONS FOR THE OPERATION:  Mr. Jayesh Ortiz is a 76-year-old who has been evaluated in the hospital status post right talar body fracture about the posterior elements of the ankle.  He fell from a height of 15 feet, sustaining a talar body fracture.  This was confirmed by x-rays and a CT scan.  He was admitted for pain management, nonweightbearing assistance and preoperative clearance.  This was obtained.  He was made aware of the procedural risks, the pros, the cons, the benefits, the alternatives, the postop course and details.  They did give verbal and written consents.      DESCRIPTION OF THE PROCEDURE:  The patient was identified in the preoperative holding area.  Surgical site was marked, extremity initialed, H&P reviewed, consent confirmed.  He was transported  to the OR, placed supine on the OR table.  IV antibiotics were administered, anesthesia care was administered.  Right thigh tourniquet applied, right foot and ankle prepped and draped sterilely.  Timeout performed to identify the proper patient, surgical site and the procedure to be performed.  An Esmarch was utilized to exsanguinate the right lower extremity and thigh tourniquet inflated for the duration of procedure.  Additional 10 mL 0.5% Marcaine plain infiltrated about the right medial ankle for additional regional anesthesia.  Intraoperative fluoroscopy revealed the displaced posterior body fracture of the talus.  Attempted reduction via closed methods was non-successful.  With distraction measures and pin fixation, it was secondarily unable to be reduced secondary to the malposition primarily inferior aspect of the posterior facet of the talar body fracture.  Secondary to this, access was necessary and access was granted through a medial malleolar osteotomy.  A medial malleolar incision was dissected down to the medial malleolar margin.  It was dissected down with neurovascular identification and retraction.  With intraoperative fluoroscopic assistance, the osteotomy was planned for.  A guidepin was placed from the malleolus.  This was then followed with a sagittal saw, completed with an osteotome and this allowed access into the ankle joint.  Distractor allowed access to the talus itself, but the talus was found to be posteriorly dislocated and displaced about the posterior body.  A threaded Schanz pin was placed into the posterior fracture fragment.  This was joysticked and mobilized under distraction to get this piece back into place.  It took a great force to get this piece back into place and significant distraction.  It allowed anatomic reduction.  There was some delamination at the ankle joint and osteochondral defect as well.  This was debrided, denuded, repaired and microfracture drilled.  Once  anatomic reduction was obtained, guide pins were placed across the fracture from posterior to anterior.  Two cannulated short-threaded 4.0 screws from the Arthrex system were utilized to fixate this fracture with intraoperative fluoroscopic assistance.  Additional lavage of the ankle joint was completed.  Neurovascular structures were protected.  The medial malleolus osteotomy was placed back into place anatomically and fixated with two 60 mm 4.0 compression screws.  Anatomic alignment internal fixation was appreciated.  After irrigation, closure was completed medially with 2-0 and 3-0 Vicryl and 3-0 nylon.  Compressive dressing was applied.  Vascular status was intact with deflation of tourniquet.  A posterior neutral ankle splint was applied below the knee ankle neutral.  No direct complications encountered throughout the case.      He did tolerate the procedure and anesthesia well.  Left the OR to the PACU with stable vital signs, neurovascular status intact to the operative extremity.      PLAN:  In PACU, patient given postoperative instructions to be nonweightbearing on this right lower extremity with ice, elevation and p.o. pain medication prescribed.  He will follow up in clinic in the next 10-14 days for recheck.  He will return at this point to the inpatient ICU for postoperative medical management, pain management, physical therapy and post-hospital discharge planning with social work.  Clinical followup once again in 10-14 days.  Splint to remain in place until then.  For deep venous thrombosis prophylaxis, will initiate pneumatic pumps for now and full-strength aspirin per day for the next 2 weeks.  If other concerns develop, he will contact our clinic.  Case care reviewed with the patient and care team postoperatively.  If any concerns develop, they will contact us.  Post-procedure care instructions were also provided.         TUTU SANTILLAN DPM             D: 03/09/2017 20:49   T: 03/10/2017 15:14    MT: TS      Name:     AUGUSTO ROBERTSON   MRN:      [id number]        Account:        [id number]   :      1940           Procedure Date: 2017      Document: [id number]       cc: Ankit Coy DPM

## 2017-03-10 NOTE — ANESTHESIA CARE TRANSFER NOTE
Patient: Jayesh Ortiz    Procedure(s):  open reduction internal fixation talus right ankle - Wound Class: I-Clean    Diagnosis: Right Talus fracure  Diagnosis Additional Information: No value filed.    Anesthesia Type:   General, Periph. Nerve Block for postop pain     Note:  Airway :Face Mask  Patient transferred to:Phase II        Vitals: (Last set prior to Anesthesia Care Transfer)    CRNA VITALS  3/9/2017 2002 - 3/9/2017 2033      3/9/2017             Pulse: 68    SpO2: 98 %                Electronically Signed By: SHANTA Dee CRNA  March 9, 2017  8:33 PM

## 2017-03-10 NOTE — PLAN OF CARE
Problem: Pain, Acute (Adult)  Goal: Identify Related Risk Factors and Signs and Symptoms  Related risk factors and signs and symptoms are identified upon initiation of [employer] Clinical Practice Guideline (CPG)   Outcome: Improving  Patient stated pain is so much better than previous nite.  Morphine IV administered frequently after surgery.  Note left for physician to order oral pain medications.  Welch patent, however urine foul smelling, sediment, cloudy, pink tinged.

## 2017-03-10 NOTE — PROGRESS NOTES
Pt denies pain with surgical right foot but complains of chronic back pain and states he always sleeps in a recliner chair at home. Pt to be transferred back to med/surg bed with flores catheter and iv in place.

## 2017-03-11 ENCOUNTER — APPOINTMENT (OUTPATIENT)
Dept: OCCUPATIONAL THERAPY | Facility: CLINIC | Age: 77
DRG: 504 | End: 2017-03-11
Payer: MEDICARE

## 2017-03-11 ENCOUNTER — APPOINTMENT (OUTPATIENT)
Dept: PHYSICAL THERAPY | Facility: CLINIC | Age: 77
DRG: 504 | End: 2017-03-11
Payer: MEDICARE

## 2017-03-11 LAB
GLUCOSE BLDC GLUCOMTR-MCNC: 100 MG/DL (ref 70–99)
GLUCOSE BLDC GLUCOMTR-MCNC: 102 MG/DL (ref 70–99)
GLUCOSE BLDC GLUCOMTR-MCNC: 116 MG/DL (ref 70–99)
GLUCOSE BLDC GLUCOMTR-MCNC: 129 MG/DL (ref 70–99)
GLUCOSE BLDC GLUCOMTR-MCNC: 151 MG/DL (ref 70–99)
HGB BLD-MCNC: 11.1 G/DL (ref 13.3–17.7)

## 2017-03-11 PROCEDURE — 25000132 ZZH RX MED GY IP 250 OP 250 PS 637: Mod: GY | Performed by: PHYSICIAN ASSISTANT

## 2017-03-11 PROCEDURE — 97165 OT EVAL LOW COMPLEX 30 MIN: CPT | Mod: GO | Performed by: OCCUPATIONAL THERAPIST

## 2017-03-11 PROCEDURE — A9270 NON-COVERED ITEM OR SERVICE: HCPCS | Mod: GY | Performed by: PHYSICIAN ASSISTANT

## 2017-03-11 PROCEDURE — 97116 GAIT TRAINING THERAPY: CPT | Mod: GP | Performed by: PHYSICAL THERAPIST

## 2017-03-11 PROCEDURE — 99232 SBSQ HOSP IP/OBS MODERATE 35: CPT | Performed by: FAMILY MEDICINE

## 2017-03-11 PROCEDURE — 00000146 ZZHCL STATISTIC GLUCOSE BY METER IP

## 2017-03-11 PROCEDURE — 25000128 H RX IP 250 OP 636: Performed by: FAMILY MEDICINE

## 2017-03-11 PROCEDURE — 36415 COLL VENOUS BLD VENIPUNCTURE: CPT | Performed by: PHYSICIAN ASSISTANT

## 2017-03-11 PROCEDURE — 85018 HEMOGLOBIN: CPT | Performed by: PHYSICIAN ASSISTANT

## 2017-03-11 PROCEDURE — 40000133 ZZH STATISTIC OT WARD VISIT: Performed by: OCCUPATIONAL THERAPIST

## 2017-03-11 PROCEDURE — 12000007 ZZH R&B INTERMEDIATE

## 2017-03-11 PROCEDURE — 99207 ZZC CDG-CHARGE/DX NOT SELECTED BY PROVIDER: CPT | Performed by: FAMILY MEDICINE

## 2017-03-11 PROCEDURE — 40000193 ZZH STATISTIC PT WARD VISIT: Performed by: PHYSICAL THERAPIST

## 2017-03-11 RX ADMIN — LEVOTHYROXINE SODIUM 150 MCG: 75 TABLET ORAL at 06:19

## 2017-03-11 RX ADMIN — HYDROXYZINE HYDROCHLORIDE 25 MG: 25 TABLET ORAL at 17:18

## 2017-03-11 RX ADMIN — SIMVASTATIN 40 MG: 40 TABLET, FILM COATED ORAL at 22:25

## 2017-03-11 RX ADMIN — GLIPIZIDE 10 MG: 10 TABLET ORAL at 06:19

## 2017-03-11 RX ADMIN — HYDROXYZINE HYDROCHLORIDE 25 MG: 25 TABLET ORAL at 06:22

## 2017-03-11 RX ADMIN — ASPIRIN 325 MG: 325 TABLET, COATED ORAL at 08:04

## 2017-03-11 RX ADMIN — SENNOSIDES AND DOCUSATE SODIUM 2 TABLET: 8.6; 5 TABLET ORAL at 08:04

## 2017-03-11 RX ADMIN — LOSARTAN POTASSIUM 25 MG: 25 TABLET, FILM COATED ORAL at 08:05

## 2017-03-11 RX ADMIN — PIOGLITAZONE 45 MG: 15 TABLET ORAL at 08:05

## 2017-03-11 RX ADMIN — ACETAMINOPHEN 975 MG: 325 TABLET, FILM COATED ORAL at 06:19

## 2017-03-11 RX ADMIN — OXYCODONE HYDROCHLORIDE 10 MG: 5 TABLET ORAL at 13:27

## 2017-03-11 RX ADMIN — SENNOSIDES AND DOCUSATE SODIUM 2 TABLET: 8.6; 5 TABLET ORAL at 19:41

## 2017-03-11 RX ADMIN — GLIPIZIDE 10 MG: 10 TABLET ORAL at 17:18

## 2017-03-11 RX ADMIN — OXYCODONE HYDROCHLORIDE 10 MG: 5 TABLET ORAL at 06:21

## 2017-03-11 RX ADMIN — SODIUM CHLORIDE: 9 INJECTION, SOLUTION INTRAVENOUS at 04:21

## 2017-03-11 RX ADMIN — OXYCODONE HYDROCHLORIDE 10 MG: 5 TABLET ORAL at 17:18

## 2017-03-11 RX ADMIN — TAMSULOSIN HYDROCHLORIDE 0.4 MG: 0.4 CAPSULE ORAL at 08:04

## 2017-03-11 RX ADMIN — ACETAMINOPHEN 975 MG: 325 TABLET, FILM COATED ORAL at 14:34

## 2017-03-11 RX ADMIN — HYDROXYZINE HYDROCHLORIDE 25 MG: 25 TABLET ORAL at 13:27

## 2017-03-11 RX ADMIN — OXYCODONE HYDROCHLORIDE 10 MG: 5 TABLET ORAL at 09:51

## 2017-03-11 NOTE — PROGRESS NOTES
"Occupational Therapy Evaluation     03/11/17 1200   Quick Adds   Type of Visit Initial Occupational Therapy Evaluation   Living Environment   Lives With alone   Living Environment Comment 2 story farm house.  Stairs to enter and within, no rails.  Low toilets.  Bedroom and shower are upstairs.     Functional Level Prior   Prior Functional Level Comment Independent with ADLs and IADLs, runs the farm independently.    General Information   Onset of Illness/Injury or Date of Surgery - Date 03/10/17   Referring Physician Karan   Patient/Family Goals Statement Would like to return ASAP but seems to understand that this is not reailstic right now.  Hoping for home in \"a few days\"   Additional Occupational Profile Info/Pertinent History of Current Problem Status post right ankle ORIF   Weight-Bearing Status - RLE nonweight-bearing   General Observations Very SOB after short walk in room, wheezing.  Resolves after a minute or 2 deep breathing EOB.     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  Although he is not strong enough to rise from normal height bed and needs assist with supine to sit.  Continue to assess.  Did not complete UE MMT at time of eval.     Mobility   Bed Mobility Comments Head of bed elevated.  Scoots well but needs min-mod hand hold assist to sit up from supine to EOB.  Encouraged independence but he insists on \"just take my hand!\"   Transfer Skills   Transfer Comments CGA of 2 with walker in room to and from door.  Moves somewhat impulsvely.  Needed to raise bed all the way up to achieve sit to stand.     Toilet Transfer   Toilet Transfer Comments anticipate difficulty based on performance with sit to stand from edge of bed.  Will need taller toilet height.     Lower Body " "Dressing   Level of [place]: Dress Lower Body maximum assist (25% patients effort)   Grooming   Level of [place]: Grooming independent   Eating/Self Feeding   Level of [place]: Eating independent   General Therapy Interventions   Planned Therapy Interventions ADL retraining   Clinical Impression   Criteria for Skilled Therapeutic Interventions Met yes, treatment indicated   OT Diagnosis weakness   Influenced by the following impairments post ankle ORIF, NWB   Assessment of Occupational Performance 3-5 Performance Deficits   Identified Performance Deficits bathing, dressing, toileting, bed mobility   Clinical Decision Making (Complexity) Low complexity   Therapy Frequency daily   Predicted Duration of Therapy Intervention (days/wks) 2-3 days   Anticipated Discharge Disposition Transitional Care Facility   Risks and Benefits of Treatment have been explained. Yes   Patient, Family & other staff in agreement with plan of care Yes   Clinical Impression Comments Will benefit from OT to improve function and safety with self cares.  NWB is limiting his mobility and function as well as SOB with activity.     [facility] TM \"6 Clicks\"   2016, Trustees of [facility], under license to [employer].  All rights reserved.   6 Clicks Short Forms Basic Mobility Inpatient Short Form;Daily Activity Inpatient Short Form   [facility]  \"6 Clicks\" V.2 Basic Mobility Inpatient Short Form   1. Turning from your back to your side while in a flat bed without using bedrails? 3 - A Little   2. Moving from lying on your back to sitting on the side of a flat bed without using bedrails? 2 - A Lot   3. Moving to and from a bed to a chair (including a wheelchair)? 2 - A Lot   4. Standing up from a chair using your arms (e.g., wheelchair, or bedside chair)? 2 - A Lot   5. To walk in hospital room? 2 - A Lot   6. Climbing 3-5 steps with a railing? 1 - Total   Basic Mobility Raw Score (Score out of " "24.Lower scores equate to lower levels of function) 12   [facility] AM-PAC  \"6 Clicks\" Daily Activity Inpatient Short Form   1. Putting on and taking off regular lower body clothing? 2 - A Lot   2. Bathing (including washing, rinsing, drying)? 2 - A Lot   3. Toileting, which includes using toilet, bedpan or urinal? 3 - A Little   4. Putting on and taking off regular upper body clothing? 3 - A Little   5. Taking care of personal grooming such as brushing teeth? 3 - A Little   6. Eating meals? 4 - None   Daily Activity Raw Score (Score out of 24.Lower scores equate to lower levels of function) 17   See Care Plan for Goals.    Lenora Reno OTR/L    "

## 2017-03-11 NOTE — PLAN OF CARE
"Problem: Goal Outcome Summary  Goal: Goal Outcome Summary  Outcome: Improving  Writer offered pt PRN pain medication multiple times this shift, pt denied each offer stated \"I'm not having pain, I'll take it when it starts\"; writer explain pain control management with pt, will continue to monitor. Dressing to right foot is c/d/i, CMS is intact. Bowel sounds are active x4. Welch is patent. Pt's vital signs are stable, O2 is 94-98% on 2 LPM. IV fluids are running at 125 ml/hr. Pt needs assist x2 with transfers. Bed alarm is on for safety.       "

## 2017-03-11 NOTE — PROGRESS NOTES
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 He will indubitably require assistance s/p surgery given body habitus and living alone (somewhat amendable to TCU when discussing with patients)  - PT/OT consulted   3/11/2017- Patient about the same. No new events , ortho following, pain is fairly controlled.       Hypoxia, likely secondary to obesity hypoventilation syndrome and poor respiratory drive s/p pain medication administration  New onset. No oxygen use at home. Given a significant amount of pain medication after which he became hypoxic. No history of sleep apnea or sleep study, but given body habitus and reports of heaving snoring, likely this patient also has DRAKE. O2 titrated down 2L NC pre-operatively. Portable chest XR the morning of surgery showed LLL opacity, more likely atelectasis than infiltrate. Continues to be hypoxic post operatively, but admittedly rather heavily medicated given pain control issues.  - continue supportive O2  - wean as tolerated  - continue end tidal CO2 in place  03/11/2017 - Not on oxygen presently and oxygen saturations are good      Systolic Heart Murmur  Reportedly long-standing per patient. No prior documentation per review of care-everywhere. Grade III, best heard at the left sternal boarder. Some bilateral pitting LE edema, although this is difficult to assess given morbid obesity. Patient reports this is also longstanding. No recent weight gain. No chest pain/palpitations. Patient has multiple risk factors for CHF - morbid obesity, T2DM, and HTN. In addition, the patient reports both his biological parents passed from congestive heart failure (mother, age 100 and father, age 95). ECHO done 03/09 shows  moderate aortic stenosis. EF of 50-65%. No mention of diastolic dysfunction.  - recommend outpatient follow up with PCP.   3/11/2017 - Stable no new events.     Bilateral LE Edema, Venous Stasis changes, skin thickening  - IP lymphedema consult placed  3/11/2017 - BLE still present, recommend tubigrips pending when lymphedema sees patient.     HTN  Pressures reviewed, they are stable.  - continue PTA losartan  - PRN metoprolol 5mg IV with parameters to maximize diastolic   3/11/2017 - Stable.      Hypothyroidism  - continue PTA levothyroxine  3/11/2017 - Continue home medication       Hyperlipidemia  - continue PTA simvastatin      Type 2 diabetes mellitus (H)  - restart home glipizide, proglitazone today  - high intensity SSI  3/11/2017 - Stable . Continue home medication.      BPH (benign prostatic hyperplasia)  - continue PTA Flomax        F: Oral   E: Normal electrolytes   N: advance as tolerated     DVT Prophylaxis: defer to orthopedic service  Code Status: Full Code     Lines: PIV, without edema/erythema   Welch catheter: remains in place. Patient very somnolent with pain medication regimen and is not moving adequately. Will need to be pulled hopefully sometime tomorrow.     Discussion: stable.      Disposition: Anticipate discharge 1-2 days.   Discussed TCU placement with patient again today and he is leaning more towards that plan. Ivette has put in a referral but this would not take place till Sunday / Monday.          Interval History:   About the same today.  Vitals signs stable.  Tolerating medications and treatment plan without significant side effects or problems.  Eating and voiding well.  No new concerns today.              Significant Problems:   Principal Problem:    Talus fracture  Active Problems:    Hypothyroidism    Hyperlipidemia    Type 2 diabetes mellitus (H)    BPH (benign prostatic hyperplasia)    Fracture of right talus             Review of Systems:   The Review of Systems is negative  other than noted in the HPI            Medications:       acetaminophen  975 mg Oral Q8H     insulin aspart  1-10 Units Subcutaneous TID AC     insulin aspart  1-7 Units Subcutaneous At Bedtime     glipiZIDE (GLUCOTROL) tablet 10 mg  10 mg Oral BID AC     pioglitazone  45 mg Oral Daily     aspirin EC  325 mg Oral Daily     levothyroxine (SYNTHROID/LEVOTHROID) tablet 150 mcg  150 mcg Oral QAM AC     losartan (COZAAR) tablet 25 mg  25 mg Oral Daily     simvastatin (ZOCOR) tablet 40 mg  40 mg Oral At Bedtime     tamsulosin  0.4 mg Oral Daily     senna-docusate  1-2 tablet Oral BID     sodium chloride (PF)  3 mL Intracatheter Q8H     pneumococcal vaccine  0.5 mL Intramuscular Prior to discharge     influenza Vac Split High-Dose  0.5 mL Intramuscular Prior to discharge             Physical Exam:   Vitals were reviewed  Temp: 98.7  F (37.1  C) Temp src: Oral BP: 140/73 Pulse: 68 Heart Rate: 74 Resp: 18 SpO2: 96 % O2 Device: None (Room air) Oxygen Delivery: 2 LPM    Intake/Output Summary (Last 24 hours) at 03/11/17 1222  Last data filed at 03/11/17 0900   Gross per 24 hour   Intake             4328 ml   Output             1150 ml   Net             3178 ml     Constitutional:   awake, alert, cooperative, no apparent distress, and appears stated age     Back:   Symmetric, no curvature, spinous processes are non-tender on palpation, paraspinous muscles are non-tender on palpation, no costal vertebral tenderness     Lungs:   no increased work of breathing, good air exchange, no retractions and wheeze diffuse     Cardiovascular:   regular rate and rhythm, normal S1 and S2 and murmurs include systolic murmur III/VI located at right upper sternal border without radiation     Abdomen:   No scars, normal bowel sounds, soft, non-distended, non-tender, no masses palpated, no hepatosplenomegally     Musculoskeletal:   RIGHT ANKLE:  redness absent  warmth absent  swelling absent  tenderness absent  range of motion abnormal      Skin:    no bruising or bleeding             Data:     Lab Results   Component Value Date    WBC 7.6 03/10/2017    WBC 7.6 03/09/2017    HGB 11.1 (L) 03/11/2017    HGB 12.0 (L) 03/10/2017    HGB 14.2 03/09/2017    HCT 38.6 (L) 03/10/2017    HCT 44.8 03/09/2017     03/10/2017    MCV 98 03/09/2017     (L) 03/10/2017     03/09/2017     Lab Results   Component Value Date    CR 0.95 03/10/2017    CR 0.96 03/09/2017     Lab Results   Component Value Date     03/10/2017     03/09/2017    POTASSIUM 4.9 03/10/2017    POTASSIUM 4.1 03/09/2017    CHLORIDE 105 03/10/2017    CHLORIDE 102 03/09/2017    CO2 30 03/10/2017    CO2 29 03/09/2017     (H) 03/10/2017     (H) 03/09/2017     Lab Results   Component Value Date    WBC 7.6 03/10/2017    WBC 7.6 03/09/2017          Attestation:  I have reviewed today's vital signs, notes, medications, labs and imaging.  Amount of time performed on this daily note: 20  minutes.    Mohinder Russo MD

## 2017-03-11 NOTE — PROGRESS NOTES
"[facility] Orthopaedics Progress Note      Post-operative Day: 2 Days Post-Op    Procedure(s):  open reduction internal fixation talus right ankle - Wound Class: I-Clean      Subjective:    Pain: minimal  Chest pain, SOB:  No      Objective:  Blood pressure 141/70, pulse 68, temperature 99  F (37.2  C), temperature source Oral, resp. rate 18, height 1.727 m (5' 8\"), weight (!) 153.4 kg (338 lb 3 oz), SpO2 94 %.    Patient Vitals for the past 24 hrs:   BP Temp Temp src Pulse Heart Rate Resp SpO2   03/11/17 0341 141/70 99  F (37.2  C) Oral 68 - 18 94 %   03/10/17 2306 146/68 97.9  F (36.6  C) Oral 67 - 18 98 %   03/10/17 2000 109/54 98.2  F (36.8  C) Oral 59 - 16 94 %   03/10/17 1607 149/54 98.4  F (36.9  C) Oral - 74 18 98 %   03/10/17 1140 124/69 98.1  F (36.7  C) Oral - 66 18 94 %       Wt Readings from Last 4 Encounters:   03/09/17 (!) 153.4 kg (338 lb 3 oz)         Motor function, sensation, and circulation intact   Yes  Wound status: incisions are clean dry and intact. Yes  Calf tenderness: Bilateral  No    Pertinent Labs   Lab Results: personally reviewed.     Recent Labs   Lab Test  03/11/17   0635  03/10/17   0640  03/09/17   0048   HGB  11.1*  12.0*  14.2   HCT   --   38.6*  44.8   MCV   --   100  98   PLT   --   141*  171   NA   --   139  137       Plan: Anticoagulation protocol:  mg daily  x 14  days            Pain medications:  oxycodone, tylenol and vistaril            Weight bearing status:  NWB RLE            Disposition:  TCU likely indicated Sunday, will await progress with PT as pain restricted much advancement yesterday            Continue cares and rehabilitation     Report completed by:  Dianne Lu PA-C, JAQUELINE  Date: 3/11/2017  Time: 7:49 AM    "

## 2017-03-11 NOTE — PLAN OF CARE
Problem: Goal Outcome Summary  Goal: Goal Outcome Summary  PT:  Pt agreed to amb with RW and sit at EOB today. Pt amb with RW and CGA-SBA of 2 10ft. Pt able to following NWB. Fatigued and SOB with amb.     REC:  TCU

## 2017-03-11 NOTE — PROGRESS NOTES
Reason for Follow up: DC planning    Anticipated discharge needs: This writer met with pt to further explore dc planning. Pts goal is to dc home with home care. However; pt is also agreeable to a referral being sent to [facility] (Phone: [phone]) Fax: ([phone]) in the event that he were to need to go to a TCU. Referral pending.    Next steps: CTS to follow    Marleny DE LA TORRE, MaineGeneral Medical CenterSW, [facility] [phone]

## 2017-03-11 NOTE — PLAN OF CARE
"Problem: Goal Outcome Summary  Goal: Goal Outcome Summary  Outcome: No Change  Pt has been up assist of 1-2 with walker. Welch was removed around 1000- is due to void. Pt is reluctant to get up and move around. Pt was educated on the importance on ambulating, using incentive spirometer and deep breathing. Auditory wheeze heard this afternoon. When asked if pt would use the incentive spirometer for me he said \"I just used it 20 times\". Is passing flatus, tried having a BM with no luck. Leg is elevated with ice applied. /90 (BP Location: Left arm)  Pulse 85  Temp 98.1  F (36.7  C) (Oral)  Resp 18  Ht 1.727 m (5' 8\")  Wt (!) 153.4 kg (338 lb 3 oz)  SpO2 92%  BMI 51.42 kg/m2  Rehana Mon RN BSN        "

## 2017-03-12 ENCOUNTER — APPOINTMENT (OUTPATIENT)
Dept: OCCUPATIONAL THERAPY | Facility: CLINIC | Age: 77
DRG: 504 | End: 2017-03-12
Payer: MEDICARE

## 2017-03-12 ENCOUNTER — APPOINTMENT (OUTPATIENT)
Dept: PHYSICAL THERAPY | Facility: CLINIC | Age: 77
DRG: 504 | End: 2017-03-12
Payer: MEDICARE

## 2017-03-12 LAB
GLUCOSE BLDC GLUCOMTR-MCNC: 127 MG/DL (ref 70–99)
GLUCOSE BLDC GLUCOMTR-MCNC: 139 MG/DL (ref 70–99)
GLUCOSE BLDC GLUCOMTR-MCNC: 140 MG/DL (ref 70–99)
GLUCOSE BLDC GLUCOMTR-MCNC: 149 MG/DL (ref 70–99)
GLUCOSE BLDC GLUCOMTR-MCNC: 185 MG/DL (ref 70–99)

## 2017-03-12 PROCEDURE — 99207 ZZC CDG-CHARGE/DX NOT SELECTED BY PROVIDER: CPT | Performed by: FAMILY MEDICINE

## 2017-03-12 PROCEDURE — 40000193 ZZH STATISTIC PT WARD VISIT: Performed by: PHYSICAL THERAPIST

## 2017-03-12 PROCEDURE — 12000007 ZZH R&B INTERMEDIATE

## 2017-03-12 PROCEDURE — 90732 PPSV23 VACC 2 YRS+ SUBQ/IM: CPT | Performed by: FAMILY MEDICINE

## 2017-03-12 PROCEDURE — 99232 SBSQ HOSP IP/OBS MODERATE 35: CPT | Performed by: FAMILY MEDICINE

## 2017-03-12 PROCEDURE — 25000132 ZZH RX MED GY IP 250 OP 250 PS 637: Mod: GY | Performed by: PHYSICIAN ASSISTANT

## 2017-03-12 PROCEDURE — 00000146 ZZHCL STATISTIC GLUCOSE BY METER IP

## 2017-03-12 PROCEDURE — 25000128 H RX IP 250 OP 636: Performed by: FAMILY MEDICINE

## 2017-03-12 PROCEDURE — 97535 SELF CARE MNGMENT TRAINING: CPT | Mod: GO | Performed by: OCCUPATIONAL THERAPIST

## 2017-03-12 PROCEDURE — A9270 NON-COVERED ITEM OR SERVICE: HCPCS | Mod: GY | Performed by: PHYSICIAN ASSISTANT

## 2017-03-12 PROCEDURE — 90662 IIV NO PRSV INCREASED AG IM: CPT | Performed by: FAMILY MEDICINE

## 2017-03-12 PROCEDURE — 40000133 ZZH STATISTIC OT WARD VISIT: Performed by: OCCUPATIONAL THERAPIST

## 2017-03-12 PROCEDURE — 97110 THERAPEUTIC EXERCISES: CPT | Mod: GP | Performed by: PHYSICAL THERAPIST

## 2017-03-12 RX ORDER — POLYETHYLENE GLYCOL 3350 17 G/17G
17 POWDER, FOR SOLUTION ORAL DAILY
Qty: 14 PACKET | Refills: 1 | DISCHARGE
Start: 2017-03-12 | End: 2022-02-23

## 2017-03-12 RX ORDER — ASPIRIN 325 MG
325 TABLET, DELAYED RELEASE (ENTERIC COATED) ORAL
Qty: 14 TABLET | Refills: 0 | DISCHARGE
Start: 2017-03-12 | End: 2022-03-21

## 2017-03-12 RX ORDER — OXYCODONE HYDROCHLORIDE 5 MG/1
5-10 TABLET ORAL EVERY 4 HOURS PRN
Qty: 40 TABLET | Refills: 0 | Status: SHIPPED | OUTPATIENT
Start: 2017-03-12 | End: 2017-03-16

## 2017-03-12 RX ORDER — HYDROXYZINE HYDROCHLORIDE 25 MG/1
25-50 TABLET, FILM COATED ORAL EVERY 4 HOURS PRN
Qty: 30 TABLET | Refills: 1 | DISCHARGE
Start: 2017-03-12 | End: 2017-03-16

## 2017-03-12 RX ORDER — POLYETHYLENE GLYCOL 3350 17 G/17G
17 POWDER, FOR SOLUTION ORAL DAILY
Status: DISCONTINUED | OUTPATIENT
Start: 2017-03-12 | End: 2017-03-13 | Stop reason: HOSPADM

## 2017-03-12 RX ORDER — AMOXICILLIN 250 MG
1-2 CAPSULE ORAL 2 TIMES DAILY PRN
Qty: 100 TABLET | Refills: 0 | DISCHARGE
Start: 2017-03-12 | End: 2017-03-16

## 2017-03-12 RX ORDER — ACETAMINOPHEN 325 MG/1
975 TABLET ORAL EVERY 8 HOURS
Qty: 100 TABLET | Refills: 0 | DISCHARGE
Start: 2017-03-12 | End: 2017-04-04

## 2017-03-12 RX ADMIN — PIOGLITAZONE 45 MG: 15 TABLET ORAL at 07:53

## 2017-03-12 RX ADMIN — INFLUENZA A VIRUS A/CALIFORNIA/7/2009 X-179A (H1N1) ANTIGEN (FORMALDEHYDE INACTIVATED), INFLUENZA A VIRUS A/HONG KONG/4801/2014 X-263B (H3N2) ANTIGEN (FORMALDEHYDE INACTIVATED), AND INFLUENZA B VIRUS B/BRISBANE/60/2008 ANTIGEN (FORMALDEHYDE INACTIVATED) 0.5 ML: 60; 60; 60 INJECTION, SUSPENSION INTRAMUSCULAR at 16:57

## 2017-03-12 RX ADMIN — GLIPIZIDE 10 MG: 10 TABLET ORAL at 17:02

## 2017-03-12 RX ADMIN — SENNOSIDES AND DOCUSATE SODIUM 2 TABLET: 8.6; 5 TABLET ORAL at 18:20

## 2017-03-12 RX ADMIN — ASPIRIN 325 MG: 325 TABLET, COATED ORAL at 07:53

## 2017-03-12 RX ADMIN — SENNOSIDES AND DOCUSATE SODIUM 2 TABLET: 8.6; 5 TABLET ORAL at 07:53

## 2017-03-12 RX ADMIN — LOSARTAN POTASSIUM 25 MG: 25 TABLET, FILM COATED ORAL at 07:53

## 2017-03-12 RX ADMIN — SIMVASTATIN 40 MG: 40 TABLET, FILM COATED ORAL at 21:07

## 2017-03-12 RX ADMIN — ACETAMINOPHEN 975 MG: 325 TABLET, FILM COATED ORAL at 18:21

## 2017-03-12 RX ADMIN — LEVOTHYROXINE SODIUM 150 MCG: 75 TABLET ORAL at 07:02

## 2017-03-12 RX ADMIN — GLIPIZIDE 10 MG: 10 TABLET ORAL at 07:02

## 2017-03-12 RX ADMIN — TAMSULOSIN HYDROCHLORIDE 0.4 MG: 0.4 CAPSULE ORAL at 07:53

## 2017-03-12 RX ADMIN — ACETAMINOPHEN 975 MG: 325 TABLET, FILM COATED ORAL at 04:19

## 2017-03-12 RX ADMIN — POLYETHYLENE GLYCOL 3350 17 G: 17 POWDER, FOR SOLUTION ORAL at 09:03

## 2017-03-12 NOTE — PLAN OF CARE
Problem: Goal Outcome Summary  Goal: Goal Outcome Summary  Outcome: Improving     Improving.  OOB with modified independence, head of bed elevated.  Improved ability to achieve sit to stand today, able to rise from moderate height bed rather that fully elevated.  More smooth with in room mobility.  CGA and walker up to sink.  Able to stand at sink for self cares with increased activity tolerance.  Upon returning to sitting EOB, mild SOB but much less than yesterday.  Encouraged continued sitting up and frequent activity.  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

## 2017-03-12 NOTE — PLAN OF CARE
Problem: Goal Outcome Summary  Goal: Goal Outcome Summary  PT:  Multiple attempts to have pt perform in PT. Pt finally agreed to bed exer, indep with sit<>sup and SBA with side stepping towards HOB while maintaining NWB.     REC:  TCU

## 2017-03-12 NOTE — PLAN OF CARE
Problem: Goal Outcome Summary  Goal: Goal Outcome Summary  Outcome: Improving  A&Ox4, VSS on RA. Right ankle dressing CDI, CMS intact. Pain well controlled on tylenol overnight. Tolerating regular diet, voiding. Non wt bearing to RLE, needs encouragement to move. 3+TRISHA lymphadema consult today. DC to [facility] when ready.

## 2017-03-12 NOTE — PLAN OF CARE
"Problem: Goal Outcome Summary  Goal: Goal Outcome Summary  Outcome: Improving  Patient NWB on lower right extremity. Dressing is c/d/i. No tingling, but some numbness in right foot. 3+ edema in bilateral ankles/feet. Diminished lung sounds with fine crackles in LLL. Writer educated upon and encouraged use of IS. PRN oxycodone and atarax administered for pain management with significant relief of pain. Elevated on pillows while in bed. /72  Pulse 73  Temp 98.1  F (36.7  C) (Oral)  Resp 20  Ht 1.727 m (5' 8\")  Wt (!) 153.4 kg (338 lb 3 oz)  SpO2 96%  BMI 51.42 kg/m2        "

## 2017-03-12 NOTE — PROGRESS NOTES
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 No new events , ortho following, pain is fairly controlled.   3/12/2017 - Patient doing okay, pain well controlled. Plan is for d/c tomorrow to TCU .      Hypoxia, likely secondary to obesity hypoventilation syndrome and poor respiratory drive s/p pain medication administration  New onset. No oxygen use at home. Given a significant amount of pain medication after which he became hypoxic. No history of sleep apnea or sleep study, but given body habitus and reports of heaving snoring, likely this patient also has DRAKE. O2 titrated down 2L NC pre-operatively. Portable chest XR the morning of surgery showed LLL opacity, more likely atelectasis than infiltrate. Continues to be hypoxic post operatively, but admittedly rather heavily medicated given pain control issues.  - continue supportive O2  - wean as tolerated  - continue end tidal CO2 in place  03/11/2017 - Not on oxygen presently and oxygen saturations are good      Systolic Heart Murmur  Reportedly long-standing per patient. No prior documentation per review of care-everywhere. Grade III, best heard at the left sternal boarder. Some bilateral pitting LE edema, although this is difficult to assess given morbid obesity. Patient reports this is also longstanding. No recent weight gain. No chest pain/palpitations. Patient has multiple risk factors for CHF - morbid obesity, T2DM, and HTN. In addition, the patient reports both his biological parents passed from  congestive heart failure (mother, age 100 and father, age 95). ECHO done 03/09 shows moderate aortic stenosis. EF of 50-65%. No mention of diastolic dysfunction.  - recommend outpatient follow up with PCP.   3/11/2017 - Stable no new events.  3/12/2017 - Stable       Bilateral LE Edema, Venous Stasis changes, skin thickening  - IP lymphedema consult placed  3/11/2017 - BLE still present, recommend tubigrips pending when lymphedema sees patient.  3/12/2017 - Stable , about the same      HTN  Pressures reviewed, they are stable.  - continue PTA losartan  - PRN metoprolol 5mg IV with parameters to maximize diastolic   3/11/2017 - Stable.  3/12/2017 - Stable, a bit high today, will keep an eye .      Hypothyroidism  - continue PTA levothyroxine  3/11/2017 - Continue home medication   3/12/2017 - Continue home medication       Hyperlipidemia  - continue PTA simvastatin      Type 2 diabetes mellitus (H)  - restart home glipizide, proglitazone today  - high intensity SSI  3/11/2017 - Stable . Continue home medication.  3/12/2017 - Blood sugars reviewed, no issues on this      BPH (benign prostatic hyperplasia)  - continue PTA Flomax          F: Oral   E: Normal electrolytes   N: advance as tolerated      DVT Prophylaxis: defer to orthopedic service  Code Status: Full Code      Lines: PIV, without edema/erythema   Flores catheter: discontinue flores   Discussion: stable.       Disposition: Anticipate discharge tomorrow to TCU              Interval History:   Continues to improve.  Vital signs generally better.  Eating and voiding well.  Tolerating medications without significant side effects.  No new concerns today.              Significant Problems:   Principal Problem:    Talus fracture  Active Problems:    Hypothyroidism    Hyperlipidemia    Type 2 diabetes mellitus (H)    BPH (benign prostatic hyperplasia)    Fracture of right talus             Review of Systems:   The Review of Systems is negative other than noted in the  HPI            Medications:       polyethylene glycol  17 g Oral Daily     acetaminophen  975 mg Oral Q8H     insulin aspart  1-10 Units Subcutaneous TID AC     insulin aspart  1-7 Units Subcutaneous At Bedtime     glipiZIDE (GLUCOTROL) tablet 10 mg  10 mg Oral BID AC     pioglitazone  45 mg Oral Daily     aspirin EC  325 mg Oral Daily     levothyroxine (SYNTHROID/LEVOTHROID) tablet 150 mcg  150 mcg Oral QAM AC     losartan (COZAAR) tablet 25 mg  25 mg Oral Daily     simvastatin (ZOCOR) tablet 40 mg  40 mg Oral At Bedtime     tamsulosin  0.4 mg Oral Daily     senna-docusate  1-2 tablet Oral BID     sodium chloride (PF)  3 mL Intracatheter Q8H     pneumococcal vaccine  0.5 mL Intramuscular Prior to discharge     influenza Vac Split High-Dose  0.5 mL Intramuscular Prior to discharge             Physical Exam:     Vitals were reviewed  Temp: 98.2  F (36.8  C) Temp src: Oral BP: 158/86 Pulse: 73   Resp: 20 SpO2: 93 % O2 Device: None (Room air)    Wt Readings from Last 4 Encounters:   03/09/17 (!) 153.4 kg (338 lb 3 oz)       Intake/Output Summary (Last 24 hours) at 03/12/17 0904  Last data filed at 03/12/17 0809   Gross per 24 hour   Intake              483 ml   Output             2125 ml   Net            -1642 ml     Constitutional:   awake, alert, cooperative, no apparent distress, and appears stated age     Lungs:   No increased work of breathing, good air exchange, clear to auscultation bilaterally, no crackles or wheezing     Abdomen:   No scars, normal bowel sounds, soft, non-distended, non-tender, no masses palpated, no hepatosplenomegally     Musculoskeletal:   +1 lower extremity pitting edema present, wound is clean and dry .     Neurologic:   Awake, alert, oriented to name, place and time.    normal.     Skin:   no bruising or bleeding             Data:     Lab Results   Component Value Date    WBC 7.6 03/10/2017    WBC 7.6 03/09/2017    HGB 11.1 (L) 03/11/2017    HGB 12.0 (L) 03/10/2017    HGB 14.2 03/09/2017     HCT 38.6 (L) 03/10/2017    HCT 44.8 03/09/2017     03/10/2017    MCV 98 03/09/2017     (L) 03/10/2017     03/09/2017     Lab Results   Component Value Date    CR 0.95 03/10/2017    CR 0.96 03/09/2017     Lab Results   Component Value Date     03/10/2017     03/09/2017    POTASSIUM 4.9 03/10/2017    POTASSIUM 4.1 03/09/2017    CHLORIDE 105 03/10/2017    CHLORIDE 102 03/09/2017    CO2 30 03/10/2017    CO2 29 03/09/2017     (H) 03/10/2017     (H) 03/09/2017          Attestation:  I have reviewed today's vital signs, notes, medications, labs and imaging.  Amount of time performed on this daily note: 20 minutes.    Mohinder Russo MD

## 2017-03-12 NOTE — PLAN OF CARE
"Problem: Goal Outcome Summary  Goal: Goal Outcome Summary  Outcome: Improving  Patient denying any significant pain, says \"I can feel that it is there\". Denying ice to RLE, refusing any pain medications. Refusing tubigrip compression to LLE. RLE elevated on pillows while in bed. Currently sitting edge of bed, requesting we not use bed alarm. Patient agreeable to not getting up without staff present. Non weight bearing on right lower extremity. /77  Pulse 66  Temp 98.2  F (36.8  C) (Oral)  Resp 18  Ht 1.727 m (5' 8\")  Wt (!) 153.4 kg (338 lb 3 oz)  SpO2 92%  BMI 51.42 kg/m2          "

## 2017-03-12 NOTE — PROGRESS NOTES
"[facility] Progress Note      Post-operative Day: 3 Days Post-Op    Procedure(s):  open reduction internal fixation talus right ankle - Wound Class: I-Clean      Subjective:    Pain: minimal  Chest pain, SOB:  No  PT rec TCU still at this time. Pain in right AC elbow. No other new c/o. No BM, +flatus, voiding, mg diet.     Objective:  Blood pressure 135/72, pulse 74, temperature 98.9  F (37.2  C), temperature source Oral, resp. rate 16, height 1.727 m (5' 8\"), weight (!) 153.4 kg (338 lb 3 oz), SpO2 93 %.    Patient Vitals for the past 24 hrs:   BP Temp Temp src Pulse Heart Rate Resp SpO2   03/12/17 0117 - - - - - 16 -   03/11/17 2327 135/72 98.9  F (37.2  C) Oral 74 - 20 93 %   03/11/17 2037 159/72 98.2  F (36.8  C) Oral 68 - 20 94 %   03/11/17 1511 152/72 98.1  F (36.7  C) Oral 73 - 20 96 %   03/11/17 1300 173/90 98.1  F (36.7  C) Oral 85 - 18 92 %   03/11/17 0954 - - - - - - 96 %   03/11/17 0806 140/73 98.7  F (37.1  C) Oral 68 - 18 93 %       Wt Readings from Last 4 Encounters:   03/09/17 (!) 153.4 kg (338 lb 3 oz)       RUE with two abrasions that are clean and dry.   RLE moderate edema present.   Motor function, sensation, and circulation intact   Yes  Wound status: splint clean dry and intact. Yes  Calf tenderness: Bilateral  No    Pertinent Labs   Lab Results: personally reviewed.     Recent Labs   Lab Test  03/11/17   0635  03/10/17   0640  03/09/17   0048   HGB  11.1*  12.0*  14.2   HCT   --   38.6*  44.8   MCV   --   100  98   PLT   --   141*  171   NA   --   139  137       Plan: Anticoagulation protocol:  mg daily  x 14  days            Pain medications:  oxycodone, tylenol and vistaril            Weight bearing status:  NWB            Disposition:  TCU Monday likely and when approved by hospitalist             Continue cares and rehabilitation. Tegaderm to right AC place likely secondary to tape.      Report completed by:  Dianne Lu PA-C, JAQUELINE  Date: 3/12/2017  Time: " 7:58 AM

## 2017-03-12 NOTE — PLAN OF CARE
Problem: Goal Outcome Summary  Goal: Goal Outcome Summary  Outcome: Improving  Writer took care of pt from 2300 to 0400 before giving handoff to ABDIAS Davis. Pt denied pain, SOB, or N/V. He sat up at edge of bed at 0200 to use urinal. Voiding without difficulty. CMS intact, dressing is c/d/i. Vital signs are stable. Pt is able to make his needs be known.

## 2017-03-12 NOTE — PLAN OF CARE
"Problem: Goal Outcome Summary  Goal: Goal Outcome Summary  Outcome: Improving  Pt has been up assist of 1 with walker. Is NWB on RLE. C/o minimal pain. Declines pain medication and scheduled tylenol. Refused insulin for blood sugar of 185. Is voiding without difficulty. Plan is for discharge tomorrow to TCU. Will continue to monitor. /72  Pulse 65  Temp 98.2  F (36.8  C) (Oral)  Resp 20  Ht 1.727 m (5' 8\")  Wt (!) 153.4 kg (338 lb 3 oz)  SpO2 92%  BMI 51.42 kg/m2  Rehana Mon RN BSN          "

## 2017-03-13 ENCOUNTER — RECORDS - HEALTHEAST (OUTPATIENT)
Dept: ADMINISTRATIVE | Facility: OTHER | Age: 77
End: 2017-03-13

## 2017-03-13 VITALS
DIASTOLIC BLOOD PRESSURE: 77 MMHG | RESPIRATION RATE: 20 BRPM | BODY MASS INDEX: 47.74 KG/M2 | HEART RATE: 65 BPM | HEIGHT: 68 IN | TEMPERATURE: 98.7 F | OXYGEN SATURATION: 94 % | SYSTOLIC BLOOD PRESSURE: 163 MMHG | WEIGHT: 315 LBS

## 2017-03-13 LAB
GLUCOSE BLDC GLUCOMTR-MCNC: 128 MG/DL (ref 70–99)
GLUCOSE BLDC GLUCOMTR-MCNC: 154 MG/DL (ref 70–99)
GLUCOSE BLDC GLUCOMTR-MCNC: 201 MG/DL (ref 70–99)

## 2017-03-13 PROCEDURE — 99239 HOSP IP/OBS DSCHRG MGMT >30: CPT | Performed by: FAMILY MEDICINE

## 2017-03-13 PROCEDURE — 25000132 ZZH RX MED GY IP 250 OP 250 PS 637: Mod: GY | Performed by: PHYSICIAN ASSISTANT

## 2017-03-13 PROCEDURE — 00000146 ZZHCL STATISTIC GLUCOSE BY METER IP

## 2017-03-13 PROCEDURE — A9270 NON-COVERED ITEM OR SERVICE: HCPCS | Mod: GY | Performed by: PHYSICIAN ASSISTANT

## 2017-03-13 RX ADMIN — GLIPIZIDE 10 MG: 10 TABLET ORAL at 06:38

## 2017-03-13 RX ADMIN — PIOGLITAZONE 45 MG: 15 TABLET ORAL at 08:30

## 2017-03-13 RX ADMIN — TAMSULOSIN HYDROCHLORIDE 0.4 MG: 0.4 CAPSULE ORAL at 08:30

## 2017-03-13 RX ADMIN — LEVOTHYROXINE SODIUM 150 MCG: 75 TABLET ORAL at 06:38

## 2017-03-13 RX ADMIN — ACETAMINOPHEN 975 MG: 325 TABLET, FILM COATED ORAL at 10:14

## 2017-03-13 RX ADMIN — POLYETHYLENE GLYCOL 3350 17 G: 17 POWDER, FOR SOLUTION ORAL at 08:30

## 2017-03-13 RX ADMIN — LOSARTAN POTASSIUM 25 MG: 25 TABLET, FILM COATED ORAL at 08:30

## 2017-03-13 RX ADMIN — ASPIRIN 325 MG: 325 TABLET, COATED ORAL at 08:30

## 2017-03-13 RX ADMIN — ACETAMINOPHEN 975 MG: 325 TABLET, FILM COATED ORAL at 01:56

## 2017-03-13 RX ADMIN — SENNOSIDES AND DOCUSATE SODIUM 2 TABLET: 8.6; 5 TABLET ORAL at 08:30

## 2017-03-13 NOTE — PLAN OF CARE
Problem: Goal Outcome Summary  Goal: Goal Outcome Summary  PT:  Patient declining participation in PT this afternoon despite encouragement due to leaving soon and wanting to rest prior to discharge.  RN notified

## 2017-03-13 NOTE — PROGRESS NOTES
"[facility]opa[facility] Progress Note      Post-operative Day: 4 Days Post-Op    Procedure(s):  open reduction internal fixation talus right ankle - Wound Class: I-Clean      Subjective: Patient states that he is anxious and depressed about his current disability and injury. 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 States minimal pain while laying in bed. Fracture site hurts more when trying to ambulate, NWB.    Pain: minimal  Chest pain, SOB:  No      Objective: Patient comfortable laying in bed. Neurovascularly intact to bilateral lower extremities. Dressing intact and right lower leg/foot are well supported with posterior splint. Bilateral edema to lower extremities noted.  Blood pressure 163/77, pulse 65, temperature 98.7  F (37.1  C), temperature source Oral, resp. rate 20, height 1.727 m (5' 8\"), weight (!) 153.4 kg (338 lb 3 oz), SpO2 94 %.    Patient Vitals for the past 24 hrs:   BP Temp Temp src Pulse Resp SpO2   03/13/17 0732 163/77 98.7  F (37.1  C) Oral 65 20 94 %   03/13/17 0423 150/75 98.7  F (37.1  C) Oral 70 20 92 %   03/12/17 2308 151/72 98.6  F (37  C) Oral 65 18 91 %   03/12/17 1919 (!) 154/92 98.5  F (36.9  C) Oral 69 18 94 %   03/12/17 1515 153/77 98.2  F (36.8  C) Oral 66 18 92 %   03/12/17 1150 148/72 98.2  F (36.8  C) Oral 65 20 92 %       Wt Readings from Last 4 Encounters:   03/09/17 (!) 153.4 kg (338 lb 3 oz)         Motor function, sensation, and circulation intact   Yes  Wound status: incisions are clean dry and intact. Yes  Calf tenderness: Bilateral  No    Pertinent Labs   Lab Results: personally reviewed.     Recent Labs   Lab Test  03/11/17   0635  03/10/17   0640  03/09/17   0048   HGB  11.1*  12.0*  14.2   HCT   --   38.6*  44.8   MCV   --   100  98   PLT   --   141*  171   NA   --   139  137       Plan: Anticoagulation protocol:  mg daily  x 14  days            Pain medications:  oxycodone, " tylenol and vistaril            Weight bearing status:  NWB on right lower extremity.            Disposition:  Probable to TCU today.             Continue cares and rehabilitation     Report completed by:  Pedro Pablo Quintero PA-C  Date: 3/13/2017  Time: 9:25 AM

## 2017-03-13 NOTE — PLAN OF CARE
Problem: Goal Outcome Summary  Goal: Goal Outcome Summary  Outcome: Improving  Pt stated minimal pain in RLE, declined to take narcotics; he did agree to take his schedule Tyenol. He is NWB on his RLE. CMS intact, dressing c/d/i. Vital signs stable. Pt voiding without difficult. He is passing gas, no bowel movement yet. Pt is able to make his needs be known.

## 2017-03-13 NOTE — PROGRESS NOTES
Name: Jayesh Ortiz    MRN#: [id number]    Reason for Hospitalization: Closed displaced fracture of posterior process of right talus, initial encounter [S92.131A]  Post-op pain [G89.18]    Discharge Date: 3/13/2017    Patient / Family response to discharge plan: in agreement    Follow-Up Appt: No future appointments.    Other Providers (Care Coordinator, County Services, PCA services etc): No    Discharge Disposition: transitional care unit [facility] (Phone: [phone]) Fax: ([phone]) via [employer].    PAS-RR    D: Per DHS regulation, SW completed and submitted PAS-RR to [place] [employer] Direct Connect via the Senior LinkAge Line.  PAS-RR confirmation # is : [id number]    P: Further questions may be directed to Senior LinkAge Line at #[phone], option #4 for PAS-RR staff.      LUDIN Martin  [facility] [phone]/ [facility] [phone]

## 2017-03-13 NOTE — PLAN OF CARE
Problem: Goal Outcome Summary  Goal: Goal Outcome Summary  Outcome: Adequate for Discharge Date Met:  03/13/17  STIVEN [facility]APATA DISCHARGE NOTE     Patient discharged to transitional care unit at 2:52 PM via wheel chair. Accompanied by other:Cory. Discharge instructions reviewed with caregiver, opportunity offered to ask questions. Prescriptions sent with patient to fill. All belongings sent with patient.     Gifty Benitez

## 2017-03-13 NOTE — PLAN OF CARE
Problem: Goal Outcome Summary  Goal: Goal Outcome Summary  Physical Therapy Discharge Summary     Reason for therapy discharge:    Discharged to transitional care facility.     Progress towards therapy goal(s). See goals on Care Plan in [facility] electronic health record for goal details.  Goals partially met.  Barriers to achieving goals:   discharge from facility.     Therapy recommendation(s):    TCU for continued strengthening, conditioning, balance training and to maximize safety prior to safe return home

## 2017-03-13 NOTE — PLAN OF CARE
"Problem: Goal Outcome Summary  Goal: Goal Outcome Summary  OT: CANCEL. Attempted ADL- dressing and OOB activity. Pt declines stating \"he already moved around and just started taking a break\"  And that he doesn't have any clean clothes to change into.      Occupational Therapy Discharge Summary     Reason for therapy discharge:    Discharged to transitional care facility.     Progress towards therapy goal(s). See goals on Care Plan in [facility] electronic health record for goal details.  Goals partially met.  Barriers to achieving goals:   discharge from facility.     Therapy recommendation(s):    Continued therapy is recommended.  Rationale/Recommendations:  to increase independence with ADLS/IADLS.             "

## 2017-03-13 NOTE — PHARMACY - DISCHARGE MEDICATION RECONCILIATION
Discharge medication review for this patient is complete. Pharmacist assisted with medication reconciliation of discharge medications with prior to admission medications.     The following changes were made to the discharge medication list based on pharmacist review:  Added:  None   Discontinued: none  Changed: none      Patient's Discharge Medication List  - medications as listed on After Visit Summary (AVS)     Review of your medicines      START taking       Dose / Directions    acetaminophen 325 MG tablet   Commonly known as:  TYLENOL        Dose:  975 mg   Take 3 tablets (975 mg) by mouth every 8 hours   Quantity:  100 tablet   Refills:  0       hydrOXYzine 25 MG tablet   Commonly known as:  ATARAX        Dose:  25-50 mg   Take 1-2 tablets (25-50 mg) by mouth every 4 hours as needed for other (adjuvant pain)   Quantity:  30 tablet   Refills:  1       oxyCODONE 5 MG IR tablet   Commonly known as:  ROXICODONE        Dose:  5-10 mg   Take 1-2 tablets (5-10 mg) by mouth every 4 hours as needed for moderate to severe pain   Quantity:  40 tablet   Refills:  0       polyethylene glycol Packet   Commonly known as:  MIRALAX/GLYCOLAX        Dose:  17 g   Take 17 g by mouth daily   Quantity:  14 packet   Refills:  1       senna-docusate 8.6-50 MG per tablet   Commonly known as:  SENOKOT-S;PERICOLACE        Dose:  1-2 tablet   Take 1-2 tablets by mouth 2 times daily as needed for constipation   Quantity:  100 tablet   Refills:  0         CONTINUE these medicines which may have CHANGED, or have new prescriptions. If we are uncertain of the size of tablets/capsules you have at home, strength may be listed as something that might have changed.       Dose / Directions    aspirin 325 MG EC tablet   This may have changed:    - medication strength  - how much to take  - when to take this        Dose:  325 mg   Take 1 tablet (325 mg) by mouth daily with food   Quantity:  14 tablet   Refills:  0         CONTINUE these medicines  which have NOT CHANGED       Dose / Directions    GLIPIZIDE PO   Indication:  Type 2 Diabetes        Dose:  10 mg   Take 10 mg by mouth 2 times daily (before meals)   Refills:  0       LEVOTHROID PO        Dose:  150 mcg   Take 150 mcg by mouth daily   Refills:  0       LOSARTAN POTASSIUM PO   Indication:  High Blood Pressure        Dose:  25 mg   Take 25 mg by mouth daily   Refills:  0       PIOGLITAZONE HCL PO   Indication:  Type 2 Diabetes        Dose:  45 mg   Take 45 mg by mouth daily   Refills:  0       SIMVASTATIN PO   Indication:  Type II A Hyperlipidemia        Dose:  40 mg   Take 40 mg by mouth daily   Refills:  0       tamsulosin 0.4 MG capsule   Commonly known as:  FLOMAX   Indication:  Enlarged Prostate        Dose:  0.4 mg   Take 0.4 mg by mouth daily   Refills:  0         STOP taking          IBUPROFEN PO                Where to get your medicines      Some of these will need a paper prescription and others can be bought over the counter. Ask your nurse if you have questions.     Bring a paper prescription for each of these medications      oxyCODONE 5 MG IR tablet       You don't need a prescription for these medications      acetaminophen 325 MG tablet     aspirin 325 MG EC tablet     hydrOXYzine 25 MG tablet     polyethylene glycol Packet     senna-docusate 8.6-50 MG per tablet

## 2017-03-13 NOTE — DISCHARGE SUMMARY
[facility] Discharge Summary    Jayesh Ortiz MRN# [id number]   Age: 76 year old YOB: 1940     Date of Admission:  3/9/2017  Date of Discharge::  3/13/2017  Admitting Physician:  Kadeem Bates MD  Discharge Physician:  Kadeem Bates MD, MD             Admission Diagnoses:   Closed displaced fracture of posterior process of right talus, initial encounter [S92.131A]  Post-op pain [G89.18]          Principle Discharge Diagnosis:   Closed displaced fracture of posterior process of right talus, initial encounter     See hospital course for further active diagnoses addressed during this admission.            Procedures:   See notes- surgical repair of talus fracture by ortho             Medications Prior to Admission:     Prescriptions Prior to Admission   Medication Sig Dispense Refill Last Dose     LOSARTAN POTASSIUM PO Take 25 mg by mouth daily    3/8/2017 at am     SIMVASTATIN PO Take 40 mg by mouth daily    3/8/2017 at pm     Levothyroxine Sodium (LEVOTHROID PO) Take 150 mcg by mouth daily    3/8/2017 at am     tamsulosin (FLOMAX) 0.4 MG capsule Take 0.4 mg by mouth daily    3/8/2017 at am     PIOGLITAZONE HCL PO Take 45 mg by mouth daily    3/8/2017 at am     GLIPIZIDE PO Take 10 mg by mouth 2 times daily (before meals)    3/8/2017 at pm     [DISCONTINUED] ASPIRIN ADULT LOW STRENGTH PO Take 81 mg by mouth daily    Past Week at am     [DISCONTINUED] IBUPROFEN PO Take 600 mg by mouth every 6 hours as needed    3/9/2017 at Unknown time             Discharge Medications:     Current Discharge Medication List      START taking these medications    Details   acetaminophen (TYLENOL) 325 MG tablet Take 3 tablets (975 mg) by mouth every 8 hours  Qty: 100 tablet, Refills: 0    Associated Diagnoses: Closed fracture of right talus, unspecified portion of talus, initial encounter      hydrOXYzine (ATARAX) 25 MG tablet Take 1-2 tablets (25-50 mg) by mouth every 4 hours as needed for other (adjuvant  pain)  Qty: 30 tablet, Refills: 1    Associated Diagnoses: Closed fracture of right talus, unspecified portion of talus, initial encounter      oxyCODONE (ROXICODONE) 5 MG IR tablet Take 1-2 tablets (5-10 mg) by mouth every 4 hours as needed for moderate to severe pain  Qty: 40 tablet, Refills: 0    Associated Diagnoses: Closed fracture of right talus, unspecified portion of talus, initial encounter      senna-docusate (SENOKOT-S;PERICOLACE) 8.6-50 MG per tablet Take 1-2 tablets by mouth 2 times daily as needed for constipation  Qty: 100 tablet, Refills: 0    Associated Diagnoses: Closed fracture of right talus, unspecified portion of talus, initial encounter      polyethylene glycol (MIRALAX/GLYCOLAX) Packet Take 17 g by mouth daily  Qty: 14 packet, Refills: 1    Associated Diagnoses: Closed fracture of right talus, unspecified portion of talus, initial encounter         CONTINUE these medications which have CHANGED    Details   aspirin  MG EC tablet Take 1 tablet (325 mg) by mouth daily with food  Qty: 14 tablet, Refills: 0    Associated Diagnoses: Closed fracture of right talus, unspecified portion of talus, initial encounter         CONTINUE these medications which have NOT CHANGED    Details   LOSARTAN POTASSIUM PO Take 25 mg by mouth daily       SIMVASTATIN PO Take 40 mg by mouth daily       Levothyroxine Sodium (LEVOTHROID PO) Take 150 mcg by mouth daily       tamsulosin (FLOMAX) 0.4 MG capsule Take 0.4 mg by mouth daily       PIOGLITAZONE HCL PO Take 45 mg by mouth daily       GLIPIZIDE PO Take 10 mg by mouth 2 times daily (before meals)          STOP taking these medications       IBUPROFEN PO Comments:   Reason for Stopping:                     Consultations:   Consultation during this admission received from orthopedics          Brief History of Illness:   From Admission H+P:   Jayesh Ortiz is a 76 year old male who now presents with right ankle pain after fall.     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 Lost his balance while on bobcat and slipped; he reports that his right ankle became wedged between two points on the apparatus. He specifically denies lightheadedness, dizziness, changes to vision, chest pain, palpitations, and SOB prior to fall. HE reports that he attempted to \"twist\" his foot to get it dislodged. He reports immediate pain to right ankle after it became lose. Had significant pain to his right foot when he attempted to ambulate. Reports the pain was a 10/10 and sharp. He reports that the only thing that made this pain better was pain medication upon arrival to the ED.     In the last two weeks, the patient denies fever, chills, nausea, vomiting, myalgias, cold-like symptoms (congestion, pharyngitis, sinus pressure, rhinorrhea), swollen glands, headaches, lightheadedness, dizziness, chest pain, palpitations/flutters, SOB, cough, abdominal pain, diarrhea/constipation, dysuria, hematuria, joint swelling, joint pain, leg swelling, and rashes. The patient has chronic LE swelling which he reports is unchanged from baseline. He reports no change to his baseline activity. He has had surgery to remove his thyroid within the last 10 years; he denies anesthesia difficulties at this point in time.               TODAY:   Subjective:  Doing well.  Pain reasonably controlled, patient wants to just do tylenol today.  No fever or chills.  No dyspnea, doing well on room air. Feels ready for discharge to TCU.      ROS:   ROS: 10 point ROS neg other than the symptoms noted above in the HPI.   /77 (BP Location: Left arm)  Pulse 65  Temp 98.7  F (37.1  C) (Oral)  Resp 20  Ht 1.727 m (5' 8\")  Wt (!) 153.4 kg (338 lb 3 oz)  SpO2 94%  BMI 51.42 kg/m2   EXAM:  General: awake and alert, NAD, oriented x 3  Head: normocephalic  Neck: unremarkable, no lymphadenopathy "   HEENT: oropharynx pink and moist    Heart: Regular rate and rhythm, no murmurs, rubs, or gallops  Lungs: clear to auscultation bilaterally with good air movement throughout  Abdomen: soft, non-tender, no masses or organomegaly  Extremities: 1+ edema in lower extremities with chronic stasis changes - unchanged from admission and from baseline per patient.    Skin unremarkable.            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 He will indubitably require assistance s/p surgery given body habitus and living alone (somewhat amendable to TCU when discussing with patients)  - PT/OT consulted   3/11/2017- Patient about the same. No new events , ortho following, pain is fairly controlled.   3/12/2017 - Patient doing okay, pain well controlled. Plan is for d/c tomorrow to TCU .      Hypoxia, likely secondary to obesity hypoventilation syndrome and poor respiratory drive s/p pain medication administration  New onset. No oxygen use at home. Given a significant amount of pain medication after which he became hypoxic. No history of sleep apnea or sleep study, but given body habitus and reports of heaving snoring, likely this patient also has DRAKE. O2 titrated down 2L NC pre-operatively. Portable chest XR the morning of surgery showed LLL opacity, more likely atelectasis than infiltrate. Continues to be hypoxic post operatively, but admittedly rather heavily medicated given pain control issues.  - wean as tolerated  03/11/2017 - Not on oxygen presently and oxygen saturations are good    3/13/2017 -- resolved without intervention as expected.      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 No prior documentation per review of care-everywhere. Grade III,  "best heard at the left sternal boarder. Some bilateral pitting LE edema, although this is difficult to assess given morbid obesity. Patient reports this is also longstanding. No recent weight gain. No chest pain/palpitations. Patient has multiple risk factors for CHF - morbid obesity, T2DM, and HTN. In addition, the patient reports both his biological parents passed from congestive heart failure (mother, age 100 and father, age 95). ECHO done 03/09 shows moderate aortic stenosis. EF of 50-65%. No mention of diastolic dysfunction.  3/11/2017 - Stable no new events.  3/13/2017 - Stable, recommend outpatient follow up with PCP.        Bilateral LE Edema, Venous Stasis changes, skin thickening  - IP lymphedema consult placed  3/11/2017 - BLE still present, recommend tubigrips pending when lymphedema sees patient.  3/13/2017 - Stable , unchanged and at baseline per patient.        Constipation   3/13/2017 -- no bowel movement in > 3 days, but feels \"like things are moving along\" today.  Continue miralax and senokot on discharge.     HTN  3/10 - Pressures reviewed, they are stable.  - continue PTA losartan  - PRN metoprolol 5mg IV with parameters to maximize diastolic   3/11/2017 - Stable.  3/12/2017 - Stable, a bit high today, will keep an eye .  3/13/2017 -- has been running somewhat high past 24h - discharge on home regimen but consider adding to this if remains elevated as outpatient.     Hypothyroidism  - continue PTA levothyroxine    Hyperlipidemia  - continue PTA simvastatin      Type 2 diabetes mellitus (H)  3/10 - restart home glipizide, proglitazone today  - high intensity SSI  3/11/2017 - Stable .  3/13/2017 -- continue prior to admission medications on discharge.       BPH (benign prostatic hyperplasia)  - continue PTA Flomax      Code Status: Full Code                Discharge Instructions and Follow-Up:   Discharge diet: Diabetic (2000 ADA)   Discharge activity: Non-weight bearing right foot, otherwise " Activity as tolerated   Discharge follow-up: Follow up with primary care provider at facility within 7 days  Follow-up with ortho per their recommendations.             Discharge Disposition:   Discharged to short-term care facility      Attestation:  I have reviewed today's vital signs, notes, medications, labs and imaging.  Amount of time performed on this discharge summary: 50 minutes.    Kadeem Bates MD, MD

## 2017-03-15 RX ADMIN — PNEUMOCOCCAL VACCINE POLYVALENT 0.5 ML
25; 25; 25; 25; 25; 25; 25; 25; 25; 25; 25; 25; 25; 25; 25; 25; 25; 25; 25; 25; 25; 25; 25 INJECTION, SOLUTION INTRAMUSCULAR; SUBCUTANEOUS at 11:03

## 2017-03-16 VITALS
HEART RATE: 64 BPM | TEMPERATURE: 97.7 F | BODY MASS INDEX: 54.59 KG/M2 | SYSTOLIC BLOOD PRESSURE: 130 MMHG | OXYGEN SATURATION: 99 % | DIASTOLIC BLOOD PRESSURE: 66 MMHG | RESPIRATION RATE: 18 BRPM | WEIGHT: 315 LBS

## 2017-03-16 RX ORDER — AMOXICILLIN 250 MG
1 CAPSULE ORAL 2 TIMES DAILY
Status: ON HOLD | COMMUNITY
End: 2023-04-04

## 2017-03-16 RX ORDER — OXYCODONE HYDROCHLORIDE 5 MG/1
5-10 TABLET ORAL EVERY 4 HOURS PRN
COMMUNITY
End: 2023-01-19

## 2017-03-16 RX ORDER — BISACODYL 10 MG
10 SUPPOSITORY, RECTAL RECTAL DAILY PRN
COMMUNITY
End: 2022-02-24

## 2017-03-16 RX ORDER — HYDROXYZINE HYDROCHLORIDE 25 MG/1
25-50 TABLET, FILM COATED ORAL EVERY 4 HOURS PRN
COMMUNITY
End: 2022-02-24

## 2017-03-17 ENCOUNTER — NURSING HOME VISIT (OUTPATIENT)
Dept: GERIATRICS | Facility: CLINIC | Age: 77
End: 2017-03-17
Payer: MEDICARE

## 2017-03-17 DIAGNOSIS — K59.01 SLOW TRANSIT CONSTIPATION: ICD-10-CM

## 2017-03-17 DIAGNOSIS — D62 ANEMIA DUE TO BLOOD LOSS, ACUTE: ICD-10-CM

## 2017-03-17 DIAGNOSIS — I10 BENIGN ESSENTIAL HYPERTENSION: ICD-10-CM

## 2017-03-17 DIAGNOSIS — R60.0 BILATERAL EDEMA OF LOWER EXTREMITY: ICD-10-CM

## 2017-03-17 DIAGNOSIS — I35.0 AORTIC VALVE STENOSIS, UNSPECIFIED ETIOLOGY: ICD-10-CM

## 2017-03-17 DIAGNOSIS — Z98.890 S/P ORIF (OPEN REDUCTION INTERNAL FIXATION) FRACTURE: ICD-10-CM

## 2017-03-17 DIAGNOSIS — E11.8 TYPE 2 DIABETES MELLITUS WITH COMPLICATION, WITHOUT LONG-TERM CURRENT USE OF INSULIN (H): Chronic | ICD-10-CM

## 2017-03-17 DIAGNOSIS — N40.0 BENIGN PROSTATIC HYPERPLASIA WITHOUT LOWER URINARY TRACT SYMPTOMS, UNSPECIFIED MORPHOLOGY: ICD-10-CM

## 2017-03-17 DIAGNOSIS — Z87.81 S/P ORIF (OPEN REDUCTION INTERNAL FIXATION) FRACTURE: ICD-10-CM

## 2017-03-17 DIAGNOSIS — E78.5 HYPERLIPIDEMIA, UNSPECIFIED HYPERLIPIDEMIA TYPE: ICD-10-CM

## 2017-03-17 DIAGNOSIS — E03.9 HYPOTHYROIDISM, UNSPECIFIED TYPE: ICD-10-CM

## 2017-03-17 DIAGNOSIS — S92.131D CLOSED DISPLACED FRACTURE OF POSTERIOR PROCESS OF RIGHT TALUS WITH ROUTINE HEALING, SUBSEQUENT ENCOUNTER: Primary | ICD-10-CM

## 2017-03-17 PROCEDURE — 99306 1ST NF CARE HIGH MDM 50: CPT | Performed by: INTERNAL MEDICINE

## 2017-03-17 PROCEDURE — 99207 ZZC CDG-CORRECTLY CODED, REVIEWED AND AGREE: CPT | Performed by: INTERNAL MEDICINE

## 2017-03-17 RX ORDER — BENZOCAINE/MENTHOL 6 MG-10 MG
LOZENGE MUCOUS MEMBRANE 3 TIMES DAILY PRN
Status: ON HOLD | COMMUNITY
End: 2023-04-04

## 2017-03-17 NOTE — PROGRESS NOTES
"[facility]  INITIAL VISIT NOTE  March 17, 2017    PRIMARY CARE PROVIDER AND CLINIC:  [facility], [address] [address]    Chief Complaint   Patient presents with     Hospital F/U     Nursing Home Acute       HPI:    Jayesh Ortiz is a 76 year old  (1940) male who was seen at [facility] on March 17, 2017 for an initial visit. Medical history is notable for HTN, HLD, BPH, hypothyroidism and DM II. He was hospitalized at [facility] from 3/9/17 to 3/14/17 where he presented with right ankle pain after a fall and was found to have a closed displaced fracture of the posterior process of the R talus and is s/p ORIF (3/9/17). Surgery without complication. Noted to have acute blood loss anemia (Hgb 14.2 --> 11.1). Post-op with hypoxia thought secondary to atelectasis as well as undiagnosed DRAKE. Also noted to have a III/VI JOVITA on exam and underwent TTE with new diagnosis of moderate aortic stenosis. He was admitted to this facility for medical management and rehab.     Today, Mr. Ortiz is seen in his room. Overall is doing OK. Still with pain in his RLE, but it is improving. The oxycodone helps, but doesn't always get it as promptly as he would like and feels he subsequently gets behind with the pain. Discussed scheduling a couple of doses, but he ultimately decided to leave PRN as he wants to \"get off of it\" all together soon. He's been mostly sleeping in a chair with his foot on a upside down garbage can. This morning noted penile edema. No pain. Able to void. He had a shower today and was noted to have a rash over his flanks and under his arms. No abdominal pain, diarrhea or constipation. Working with therapies. Sees orthopedic surgery on Thursday 3/23.     CODE STATUS:   CPR/Full code     ALLERGIES:     Allergies   Allergen Reactions     Nka [No Known Allergies]        PAST MEDICAL HISTORY:   Past Medical History   Diagnosis Date     Diabetes (H) "      Hypertension      Thyroid disease        PAST SURGICAL HISTORY:   Past Surgical History   Procedure Laterality Date     Thyroidectomy       Closed reduction, percutaneous pinning lower extremity, combined Right 3/9/2017     Procedure: COMBINED CLOSED REDUCTION, PERCUTANEOUS PINNING LOWER EXTREMITY;  Surgeon: Ankit Coy DPM;  Location: [facility] OR       FAMILY HISTORY:   Family History   Problem Relation Age of Onset     Heart Failure Mother      Heart Failure Father        SOCIAL HISTORY:   Lives alone in a house     MEDICATIONS:  Current Outpatient Prescriptions   Medication Sig Dispense Refill     hydrOXYzine (ATARAX) 25 MG tablet Take 25-50 mg by mouth every 4 hours as needed for itching       bisacodyl (DULCOLAX) 10 MG Suppository Place 10 mg rectally daily as needed for constipation       magnesium citrate solution Take 240 mLs by mouth once       magnesium hydroxide (MILK OF MAGNESIA) 400 MG/5ML suspension Take 30 mLs by mouth daily as needed for constipation or heartburn       oxyCODONE (ROXICODONE) 5 MG IR tablet Take 5-10 mg by mouth every 4 hours as needed for moderate to severe pain       senna-docusate (SENOKOT-S;PERICOLACE) 8.6-50 MG per tablet Take 1 tablet by mouth 2 times daily       acetaminophen (TYLENOL) 325 MG tablet Take 3 tablets (975 mg) by mouth every 8 hours 100 tablet 0     aspirin  MG EC tablet Take 1 tablet (325 mg) by mouth daily with food 14 tablet 0     polyethylene glycol (MIRALAX/GLYCOLAX) Packet Take 17 g by mouth daily 14 packet 1     LOSARTAN POTASSIUM PO Take 25 mg by mouth daily        SIMVASTATIN PO Take 40 mg by mouth daily        Levothyroxine Sodium (LEVOTHROID PO) Take 150 mcg by mouth daily        tamsulosin (FLOMAX) 0.4 MG capsule Take 0.4 mg by mouth daily        PIOGLITAZONE HCL PO Take 45 mg by mouth daily        GLIPIZIDE PO Take 10 mg by mouth 2 times daily (before meals)          Post Discharge Medication Reconciliation Status: discharge medications  reconciled, continue medications without change.    ROS:  10 point ROS neg other than the symptoms noted above in the HPI.    PHYSICAL EXAM:  /66  Pulse 64  Temp 97.7  F (36.5  C)  Resp 18  Wt (!) 359 lb (162.8 kg)  SpO2 99%  BMI 54.59 kg/m2  Gen: sitting in chair, alert, cooperative and in no acute distress  HEENT: normocephalic; oropharynx clear  Card: RRR, S1, S2, no murmurs  Resp: lungs clear to auscultation bilaterally, no crackles or wheezes  GI: abdomen obese, soft, not-tender  : dependent edema on dorsal aspect of penis - looks like a large blister filled with clear fluid  MSK: normal muscle tone, 1+ LLE edema; RLE in short leg splint/soft cast  Neuro: CX II-XII grossly in tact; ROM in all four extremities grossly in tact  Psych: alert and oriented x3; normal affect  Skin: erythematous rash under both arms and patchy on bilateral flanks and lateral back    LABORATORY/IMAGING DATA:  Reviewed as per Epic    ASSESSMENT/PLAN:    Displaced R Talus Fracture s/p ORIF (3/9/17)  Secondary to a fall. Surgery without complication.   -- NWB to RLE  -- DVT prophylaxis with  mg x14 days  -- analgesia with acetaminophen 975 mg q8 scheduled, hydroxyzine PRN and oxycodone 5-10 mg q4h PRN  -- ongoing PT/OT  -- follow up with ortho as scheduled on 3/23    Penile Edema  Large collection of clear fluid over dorsal aspect of penis. Non-tender. No difficulty urinating. Likely dependent edema from how he is sitting in the chair in setting of morbid obesity.   -- elevate penis with washcloth when supine  -- try to minimize pressure with how he is sitting  -- follow clinically     Contact Dermatitis  Involving bilateral flanks, lateral back and under arm.   -- hydrocortisone 1% cream TID PRN  -- keep dry, clean - nursing looking for a more loose fitting gown for him     Acute Blood Loss Anemia  Secondary to above. No evidence of ongoing bleeding. Hgb 14.2 --> 11.1  -- repeat Hgb 3/21 to ensure stability    Moderate  Aortic Stenosis  TTE ordered for grade III systolic murmur on exam. EF 55-60%, mild LVH, normal RV function. AoV pressure gradient of 19 mmHg.   -- follow up with PCP after TCU discharge    Bilateral LE Edema, Chronic  PABLO stockings do not fit.   -- ACE wrap to LLE on qam and off qhs  -- elevate leg    DM, Type II  Hgb A1c 6.8. Sugars 130s-190s overall at TCU.   -- continues on glipizide 10 mg BID and pioglitazone 45 mg daily  -- decrease glucose checks from QID to BID  -- follow sugars and adjust regimen as needed    HTN  SBPs labile in 130s-170s, most <160s. Likely some elevation due to pain  -- continues on losartan 25 mg daily,     Hypothyroidisim  TSH not available   -- continues on levothyroxine 150 mcg daily    HLD  -- continues on simvastatin 40 mg daily    BPH  -- continues on tamsulosin 0.4 mg daily    Slow Transit Constipation  -- continues on Miralax 17g daily and Senna-S 1 tab BID   -- adjust bowel regimen as needed    Electronically signed by:  Viktoria Lew MD

## 2017-03-21 ENCOUNTER — HOSPITAL LABORATORY (OUTPATIENT)
Facility: OTHER | Age: 77
End: 2017-03-21

## 2017-03-21 ENCOUNTER — NURSING HOME VISIT (OUTPATIENT)
Dept: GERIATRICS | Facility: CLINIC | Age: 77
End: 2017-03-21
Payer: MEDICARE

## 2017-03-21 VITALS
WEIGHT: 315 LBS | DIASTOLIC BLOOD PRESSURE: 72 MMHG | HEART RATE: 59 BPM | BODY MASS INDEX: 49.87 KG/M2 | OXYGEN SATURATION: 99 % | SYSTOLIC BLOOD PRESSURE: 155 MMHG | TEMPERATURE: 98.2 F | RESPIRATION RATE: 18 BRPM

## 2017-03-21 DIAGNOSIS — N48.89 PENILE SWELLING: ICD-10-CM

## 2017-03-21 DIAGNOSIS — T78.40XA RASH DUE TO ALLERGY: ICD-10-CM

## 2017-03-21 DIAGNOSIS — N50.89 SCROTAL SWELLING: ICD-10-CM

## 2017-03-21 DIAGNOSIS — Z98.890 S/P ORIF (OPEN REDUCTION INTERNAL FIXATION) FRACTURE: ICD-10-CM

## 2017-03-21 DIAGNOSIS — S92.131D CLOSED DISPLACED FRACTURE OF POSTERIOR PROCESS OF RIGHT TALUS WITH ROUTINE HEALING, SUBSEQUENT ENCOUNTER: Primary | ICD-10-CM

## 2017-03-21 DIAGNOSIS — R21 RASH DUE TO ALLERGY: ICD-10-CM

## 2017-03-21 DIAGNOSIS — Z87.81 S/P ORIF (OPEN REDUCTION INTERNAL FIXATION) FRACTURE: ICD-10-CM

## 2017-03-21 LAB — HGB BLD-MCNC: 12.3 G/DL (ref 13.3–17.7)

## 2017-03-21 PROCEDURE — 99207 ZZC CDG-CORRECTLY CODED, REVIEWED AND AGREE: CPT | Performed by: FAMILY MEDICINE

## 2017-03-21 PROCEDURE — 99309 SBSQ NF CARE MODERATE MDM 30: CPT | Performed by: FAMILY MEDICINE

## 2017-03-21 NOTE — PROGRESS NOTES
[facility]    Chief Complaint   Patient presents with     Nursing Home Acute       HPI:    Jayesh Ortiz is a 76 year old  (1940), who is being seen today for an episodic care visit at [facility] . Today's concern is:  1. Closed displaced fracture of posterior process of right talus with routine healing, subsequent encounter    2. S/P ORIF (open reduction internal fixation) fracture - pain well controlled. NWB   3. Rash due to allergy - due to laundry detergent . Has sensitive skin,. Usually washes clothes in dreft    4. Scrotal swelling - new    5. Penile swelling - frenulum swelling x a few days. Off and on. Elevation difficult       ALLERGIES: Nka [no known allergies]  Past Medical, Surgical, Family and Social History reviewed and updated in [facility].    Current Outpatient Prescriptions   Medication Sig Dispense Refill     hydrocortisone (CORTAID) 1 % cream Apply topically 3 times daily as needed for itching       hydrOXYzine (ATARAX) 25 MG tablet Take 25-50 mg by mouth every 4 hours as needed for itching       bisacodyl (DULCOLAX) 10 MG Suppository Place 10 mg rectally daily as needed for constipation       magnesium citrate solution Take 240 mLs by mouth once       magnesium hydroxide (MILK OF MAGNESIA) 400 MG/5ML suspension Take 30 mLs by mouth daily as needed for constipation or heartburn       oxyCODONE (ROXICODONE) 5 MG IR tablet Take 5-10 mg by mouth every 4 hours as needed for moderate to severe pain       senna-docusate (SENOKOT-S;PERICOLACE) 8.6-50 MG per tablet Take 1 tablet by mouth 2 times daily       acetaminophen (TYLENOL) 325 MG tablet Take 3 tablets (975 mg) by mouth every 8 hours 100 tablet 0     aspirin  MG EC tablet Take 1 tablet (325 mg) by mouth daily with food 14 tablet 0     polyethylene glycol (MIRALAX/GLYCOLAX) Packet Take 17 g by mouth daily 14 packet 1     LOSARTAN POTASSIUM PO Take 25 mg by mouth daily        SIMVASTATIN PO Take 40 mg by  mouth daily        Levothyroxine Sodium (LEVOTHROID PO) Take 150 mcg by mouth daily        tamsulosin (FLOMAX) 0.4 MG capsule Take 0.4 mg by mouth daily        PIOGLITAZONE HCL PO Take 45 mg by mouth daily        GLIPIZIDE PO Take 10 mg by mouth 2 times daily (before meals)        Medications reviewed:  Medications reconciled to facility chart and changes were made to reflect current medications as identified as above med list. Below are the changes that were made:   Medications stopped since last EPIC medication reconciliation:   There are no discontinued medications.    Medications started since last [facility] medication reconciliation:  No orders of the defined types were placed in this encounter.        REVIEW OF SYSTEMS:  10 point ROS of systems including Constitutional, Eyes, Respiratory, Cardiovascular, Gastroenterology, Genitourinary, Integumentary, Muscularskeletal, Psychiatric were all negative except for pertinent positives noted in my HPI.    Physical Exam:  /72  Pulse 59  Temp 98.2  F (36.8  C)  Resp 18  Wt (!) 328 lb (148.8 kg)  SpO2 99%  BMI 49.87 kg/m2     Gen: sitting in chair, alert, cooperative and in no acute distress  HEENT: normocephalic; oropharynx clear  Card: RRR, S1, S2, no murmurs  Resp: lungs clear to auscultation bilaterally, no wheezes or crackles  GI: abdomen soft, not-tender  - frenulum selling ans scrotal swelling with some maceration under scrotum  MSK: normal muscle tone, no LE edema  Neuro: CX II-XII grossly in tact; ROM in all four extremities grossly in tact  Psych: alert and oriented x3; normal affect  Skin: blanching macular rash on back       Recent Labs:    Results for orders placed or performed in visit on 03/21/17   Hemoglobin   Result Value Ref Range    Hemoglobin 12.3 (L) 13.3 - 17.7 g/dL         Assessment/Plan:     Closed displaced fracture of posterior process of right talus with routine healing, subsequent encounter  S/P ORIF (open reduction internal fixation)  fracture  Rash due to allergy  Scrotal swelling  Penile swelling    Orders:  1. Make appt with Urology for this week (3/23/17) for increased scrotal and penile swelling x 1 week.  2. Interdry cut to size inbetween scrotum and inner groin bilateral, change daily and prn, monitor for increased blood and maceration Dx maceration.  3. Prednisone 40 mg po qd x 5 days Dx Allergic rash        Electronically signed by  Anh Levi MD, MD

## 2017-03-21 NOTE — MR AVS SNAPSHOT
"              After Visit Summary   3/21/2017    Jayesh Ortiz    MRN: [id number]           Patient Information     Date Of Birth          1940        Visit Information        Provider Department      3/21/2017 2:30 PM Anh Levi MD [facility]        Today's Diagnoses     Closed displaced fracture of posterior process of right talus with routine healing, subsequent encounter    -  1    S/P ORIF (open reduction internal fixation) fracture        Rash due to allergy        Scrotal swelling        Penile swelling           Follow-ups after your visit        Who to contact     If you have questions or need follow up information about today's clinic visit or your schedule please contact [facility] directly at [phone].  Normal or non-critical lab and imaging results will be communicated to you by MyChart, letter or phone within 4 business days after the clinic has received the results. If you do not hear from us within 7 days, please contact the clinic through [employer] or phone. If you have a critical or abnormal lab result, we will notify you by phone as soon as possible.  Submit refill requests through [employer] or call your pharmacy and they will forward the refill request to us. Please allow 3 business days for your refill to be completed.          Additional Information About Your Visit        MyChart Information     [employer] lets you send messages to your doctor, view your test results, renew your prescriptions, schedule appointments and more. To sign up, go to [email or web address] . Click on \"Log in\" on the left side of the screen, which will take you to the Welcome page. Then click on \"Sign up Now\" on the right side of the page.     You will be asked to enter the access code listed below, as well as some personal information. Please follow the directions to create your username and password.     Your access code is: [email or web address]  Expires: 6/7/2017  3:03 " AM     Your access code will  in 90 days. If you need help or a new code, please call your [facility] or [phone].        Care EveryWhere ID     This is your Care EveryWhere ID. This could be used by other organizations to access your [place] medical records  [id number]        Your Vitals Were     Pulse Temperature Respirations Pulse Oximetry BMI (Body Mass Index)       59 98.2  F (36.8  C) 18 99% 49.87 kg/m2        Blood Pressure from Last 3 Encounters:   17 137/69   17 155/72   17 130/66    Weight from Last 3 Encounters:   17 (!) 326 lb (147.9 kg)   17 (!) 328 lb (148.8 kg)   17 (!) 359 lb (162.8 kg)              Today, you had the following     No orders found for display       Primary Care Provider Office Phone # Fax #    [facility] [phone] [address] [facility] [address]        Thank you!     Thank you for choosing [facility]  for your care. Our goal is always to provide you with excellent care. Hearing back from our patients is one way we can continue to improve our services. Please take a few minutes to complete the written survey that you may receive in the mail after your visit with us. Thank you!             Your Updated Medication List - Protect others around you: Learn how to safely use, store and throw away your medicines at www.disposemymeds.org.          This list is accurate as of: 3/21/17 11:59 PM.  Always use your most recent med list.                   Brand Name Dispense Instructions for use    acetaminophen 325 MG tablet    TYLENOL    100 tablet    Take 3 tablets (975 mg) by mouth every 8 hours       aspirin 325 MG EC tablet     14 tablet    Take 1 tablet (325 mg) by mouth daily with food       bisacodyl 10 MG Suppository    DULCOLAX     Place 10 mg rectally daily as needed for constipation       GLIPIZIDE PO      Take 10 mg by mouth 2 times daily (before meals)       hydrocortisone 1  % cream    CORTAID     Apply topically 3 times daily as needed for itching       hydrOXYzine 25 MG tablet    ATARAX     Take 25-50 mg by mouth every 4 hours as needed for itching       LEVOTHROID PO      Take 150 mcg by mouth daily       LOSARTAN POTASSIUM PO      Take 25 mg by mouth daily       magnesium citrate solution      Take 240 mLs by mouth once       magnesium hydroxide 400 MG/5ML suspension    MILK OF MAGNESIA     Take 30 mLs by mouth daily as needed for constipation or heartburn       PIOGLITAZONE HCL PO      Take 45 mg by mouth daily       polyethylene glycol Packet    MIRALAX/GLYCOLAX    14 packet    Take 17 g by mouth daily       PREDNISONE PO      Take 40 mg by mouth daily       ROXICODONE 5 MG IR tablet   Generic drug:  oxyCODONE      Take 5-10 mg by mouth every 4 hours as needed for moderate to severe pain       senna-docusate 8.6-50 MG per tablet    SENOKOT-S;PERICOLACE     Take 1 tablet by mouth 2 times daily       SIMVASTATIN PO      Take 40 mg by mouth daily       tamsulosin 0.4 MG capsule    FLOMAX     Take 0.4 mg by mouth daily

## 2017-03-23 ENCOUNTER — TELEPHONE (OUTPATIENT)
Dept: GERIATRICS | Facility: CLINIC | Age: 77
End: 2017-03-23

## 2017-03-23 VITALS
HEART RATE: 64 BPM | BODY MASS INDEX: 49.57 KG/M2 | SYSTOLIC BLOOD PRESSURE: 137 MMHG | OXYGEN SATURATION: 99 % | WEIGHT: 315 LBS | DIASTOLIC BLOOD PRESSURE: 69 MMHG | TEMPERATURE: 98 F | RESPIRATION RATE: 18 BRPM

## 2017-03-23 NOTE — PROGRESS NOTES
[facility]    Chief Complaint   Patient presents with     Nursing Home Acute     .  HPI:    Jayesh Ortiz is a 76 year old  (1940), who is being seen today for an episodic care visit at [facility] . Today's concern is:     Closed displaced fracture of posterior process of right talus with routine healing, subsequent encounter  S/P ORIF (open reduction internal fixation) fracture  Scrotal swelling  Penile swelling  Rash due to allergy     Patient has developed a rash on his back and flank for a few days.  He believes it is the laundry dtergent at the facility. He usually washes his clothes in dreft.   2nd issue- frenulum of penis is swollen and skin under testicle is macerated.     ALLERGIES: Nka [no known allergies]  Past Medical, Surgical, Family and Social History reviewed and updated in [facility].    Current Outpatient Prescriptions   Medication Sig Dispense Refill     PREDNISONE PO Take 40 mg by mouth daily       hydrocortisone (CORTAID) 1 % cream Apply topically 3 times daily as needed for itching       hydrOXYzine (ATARAX) 25 MG tablet Take 25-50 mg by mouth every 4 hours as needed for itching       bisacodyl (DULCOLAX) 10 MG Suppository Place 10 mg rectally daily as needed for constipation       magnesium citrate solution Take 240 mLs by mouth once       magnesium hydroxide (MILK OF MAGNESIA) 400 MG/5ML suspension Take 30 mLs by mouth daily as needed for constipation or heartburn       oxyCODONE (ROXICODONE) 5 MG IR tablet Take 5-10 mg by mouth every 4 hours as needed for moderate to severe pain       senna-docusate (SENOKOT-S;PERICOLACE) 8.6-50 MG per tablet Take 1 tablet by mouth 2 times daily       acetaminophen (TYLENOL) 325 MG tablet Take 3 tablets (975 mg) by mouth every 8 hours 100 tablet 0     aspirin  MG EC tablet Take 1 tablet (325 mg) by mouth daily with food 14 tablet 0     polyethylene glycol (MIRALAX/GLYCOLAX) Packet Take 17 g by mouth daily 14 packet 1      LOSARTAN POTASSIUM PO Take 25 mg by mouth daily        SIMVASTATIN PO Take 40 mg by mouth daily        Levothyroxine Sodium (LEVOTHROID PO) Take 150 mcg by mouth daily        tamsulosin (FLOMAX) 0.4 MG capsule Take 0.4 mg by mouth daily        PIOGLITAZONE HCL PO Take 45 mg by mouth daily        GLIPIZIDE PO Take 10 mg by mouth 2 times daily (before meals)        Medications reviewed:  Medications reconciled to facility chart and changes were made to reflect current medications as identified as above med list. Below are the changes that were made:   Medications stopped since last EPIC medication reconciliation:   There are no discontinued medications.    Medications started since last [facility] medication reconciliation:  No orders of the defined types were placed in this encounter.        REVIEW OF SYSTEMS:  10 point ROS of systems including Constitutional, Eyes, Respiratory, Cardiovascular, Gastroenterology, Genitourinary, Integumentary, Muscularskeletal, Psychiatric were all negative except for pertinent positives noted in my HPI.    Physical Exam:  /69  Pulse 64  Temp 98  F (36.7  C)  Resp 18  Wt (!) 326 lb (147.9 kg)  SpO2 99%  BMI 49.57 kg/m2  Gen: sitting in chair, alert, cooperative and in no acute distress  HEENT: normocephalic; oropharynx clear  Card: RRR, S1, S2, no murmurs  Resp: lungs clear to auscultation bilaterally, no wheezes or crackles  GI: abdomen soft, not-tender  - frenulum of penis is swollen.  maceration some mild bleeding under each testicle due to swelling and moisture  MSK: normal muscle tone. Right foot in a soft cast. 3 plus edema in lower extremity  Neuro: CX II-XII grossly in tact; ROM in all four extremities grossly in tact  Psych: alert and oriented x3; normal affect  Skin: warm, no suspicious rashes or lesions noted      Recent Labs:  Pending     Assessment/Plan:     Closed displaced fracture of posterior process of right talus with routine healing, subsequent  encounter  S/P ORIF (open reduction internal fixation) fracture  Scrotal swelling  Penile swelling  Rash due to allergy      1. prednisone burst 40 mg po qd x 5 days   2. F/u with Dr. Bernal this week. Urology for scrotal swelling  4. Lasix 40 mg po qam x 3 days Dx Scrotal swelling  5. Anticipate DC home on 3/28/17      Electronically signed by  Anh Levi MD, MD

## 2017-03-23 NOTE — TELEPHONE ENCOUNTER
Called re: insomnia. Prednisone is likely contributing. Will start trazodone 25 mg qhs tonight with additional 25 mg PRN x1 for persistent insomnia (max 50 mg/night). Per nursing, he is scheduled to see Dr. Levi tomorrow - ongoing dose adjustments per her discretion.     Viktoria Lew MD

## 2017-03-24 ENCOUNTER — NURSING HOME VISIT (OUTPATIENT)
Dept: GERIATRICS | Facility: CLINIC | Age: 77
End: 2017-03-24
Payer: MEDICARE

## 2017-03-24 DIAGNOSIS — T78.40XA RASH DUE TO ALLERGY: ICD-10-CM

## 2017-03-24 DIAGNOSIS — N50.89 SCROTAL SWELLING: ICD-10-CM

## 2017-03-24 DIAGNOSIS — N48.89 PENILE SWELLING: ICD-10-CM

## 2017-03-24 DIAGNOSIS — Z98.890 S/P ORIF (OPEN REDUCTION INTERNAL FIXATION) FRACTURE: ICD-10-CM

## 2017-03-24 DIAGNOSIS — Z87.81 S/P ORIF (OPEN REDUCTION INTERNAL FIXATION) FRACTURE: ICD-10-CM

## 2017-03-24 DIAGNOSIS — S92.131D CLOSED DISPLACED FRACTURE OF POSTERIOR PROCESS OF RIGHT TALUS WITH ROUTINE HEALING, SUBSEQUENT ENCOUNTER: Primary | ICD-10-CM

## 2017-03-24 DIAGNOSIS — R21 RASH DUE TO ALLERGY: ICD-10-CM

## 2017-03-24 PROBLEM — E66.01 MORBID OBESITY (H): Status: ACTIVE | Noted: 2017-03-24

## 2017-03-24 PROCEDURE — 99207 ZZC CDG-CORRECTLY CODED, REVIEWED AND AGREE: CPT | Performed by: FAMILY MEDICINE

## 2017-03-24 PROCEDURE — 99309 SBSQ NF CARE MODERATE MDM 30: CPT | Performed by: FAMILY MEDICINE

## 2017-03-24 NOTE — MR AVS SNAPSHOT
"              After Visit Summary   3/24/2017    Jayesh Ortiz    MRN: [id number]           Patient Information     Date Of Birth          1940        Visit Information        Provider Department      3/24/2017 8:30 AM Anh Levi MD [facility]        Today's Diagnoses     Closed displaced fracture of posterior process of right talus with routine healing, subsequent encounter    -  1    S/P ORIF (open reduction internal fixation) fracture        Scrotal swelling        Penile swelling        Rash due to allergy           Follow-ups after your visit        Who to contact     If you have questions or need follow up information about today's clinic visit or your schedule please contact [facility] directly at [phone].  Normal or non-critical lab and imaging results will be communicated to you by MyChart, letter or phone within 4 business days after the clinic has received the results. If you do not hear from us within 7 days, please contact the clinic through [employer] or phone. If you have a critical or abnormal lab result, we will notify you by phone as soon as possible.  Submit refill requests through [employer] or call your pharmacy and they will forward the refill request to us. Please allow 3 business days for your refill to be completed.          Additional Information About Your Visit        MyChart Information     [employer] lets you send messages to your doctor, view your test results, renew your prescriptions, schedule appointments and more. To sign up, go to [email or web address] . Click on \"Log in\" on the left side of the screen, which will take you to the Welcome page. Then click on \"Sign up Now\" on the right side of the page.     You will be asked to enter the access code listed below, as well as some personal information. Please follow the directions to create your username and password.     Your access code is: [email or web address]  Expires: 6/7/2017  3:03 " AM     Your access code will  in 90 days. If you need help or a new code, please call your [facility] or [phone].        Care EveryWhere ID     This is your Care EveryWhere ID. This could be used by other organizations to access your [place] medical records  [id number]        Your Vitals Were     Pulse Temperature Respirations Pulse Oximetry BMI (Body Mass Index)       64 98  F (36.7  C) 18 99% 49.57 kg/m2        Blood Pressure from Last 3 Encounters:   17 137/69   17 155/72   17 130/66    Weight from Last 3 Encounters:   17 (!) 326 lb (147.9 kg)   17 (!) 328 lb (148.8 kg)   17 (!) 359 lb (162.8 kg)              Today, you had the following     No orders found for display       Primary Care Provider Office Phone # Fax #    [facility] [phone] [address] [address] [place] [address]        Thank you!     Thank you for choosing [facility]  for your care. Our goal is always to provide you with excellent care. Hearing back from our patients is one way we can continue to improve our services. Please take a few minutes to complete the written survey that you may receive in the mail after your visit with us. Thank you!             Your Updated Medication List - Protect others around you: Learn how to safely use, store and throw away your medicines at www.disposemymeds.org.          This list is accurate as of: 3/24/17  3:51 PM.  Always use your most recent med list.                   Brand Name Dispense Instructions for use    acetaminophen 325 MG tablet    TYLENOL    100 tablet    Take 3 tablets (975 mg) by mouth every 8 hours       aspirin 325 MG EC tablet     14 tablet    Take 1 tablet (325 mg) by mouth daily with food       bisacodyl 10 MG Suppository    DULCOLAX     Place 10 mg rectally daily as needed for constipation       GLIPIZIDE PO      Take 10 mg by mouth 2 times daily (before meals)       hydrocortisone 1 %  cream    CORTAID     Apply topically 3 times daily as needed for itching       hydrOXYzine 25 MG tablet    ATARAX     Take 25-50 mg by mouth every 4 hours as needed for itching       LEVOTHROID PO      Take 150 mcg by mouth daily       LOSARTAN POTASSIUM PO      Take 25 mg by mouth daily       magnesium citrate solution      Take 240 mLs by mouth once       magnesium hydroxide 400 MG/5ML suspension    MILK OF MAGNESIA     Take 30 mLs by mouth daily as needed for constipation or heartburn       PIOGLITAZONE HCL PO      Take 45 mg by mouth daily       polyethylene glycol Packet    MIRALAX/GLYCOLAX    14 packet    Take 17 g by mouth daily       PREDNISONE PO      Take 40 mg by mouth daily       ROXICODONE 5 MG IR tablet   Generic drug:  oxyCODONE      Take 5-10 mg by mouth every 4 hours as needed for moderate to severe pain       senna-docusate 8.6-50 MG per tablet    SENOKOT-S;PERICOLACE     Take 1 tablet by mouth 2 times daily       SIMVASTATIN PO      Take 40 mg by mouth daily       tamsulosin 0.4 MG capsule    FLOMAX     Take 0.4 mg by mouth daily

## 2017-03-31 ENCOUNTER — NURSING HOME VISIT (OUTPATIENT)
Dept: GERIATRICS | Facility: CLINIC | Age: 77
End: 2017-03-31
Payer: MEDICARE

## 2017-03-31 VITALS
OXYGEN SATURATION: 94 % | DIASTOLIC BLOOD PRESSURE: 64 MMHG | BODY MASS INDEX: 49.11 KG/M2 | HEART RATE: 63 BPM | SYSTOLIC BLOOD PRESSURE: 129 MMHG | WEIGHT: 315 LBS | TEMPERATURE: 96.5 F | RESPIRATION RATE: 16 BRPM

## 2017-03-31 DIAGNOSIS — E11.8 TYPE 2 DIABETES MELLITUS WITH COMPLICATION, WITHOUT LONG-TERM CURRENT USE OF INSULIN (H): ICD-10-CM

## 2017-03-31 DIAGNOSIS — R60.0 BILATERAL EDEMA OF LOWER EXTREMITY: ICD-10-CM

## 2017-03-31 DIAGNOSIS — S92.131D CLOSED DISPLACED FRACTURE OF POSTERIOR PROCESS OF RIGHT TALUS WITH ROUTINE HEALING, SUBSEQUENT ENCOUNTER: Primary | ICD-10-CM

## 2017-03-31 DIAGNOSIS — Z87.81 S/P ORIF (OPEN REDUCTION INTERNAL FIXATION) FRACTURE: ICD-10-CM

## 2017-03-31 DIAGNOSIS — Z98.890 S/P ORIF (OPEN REDUCTION INTERNAL FIXATION) FRACTURE: ICD-10-CM

## 2017-03-31 DIAGNOSIS — N50.89 SCROTAL SWELLING: ICD-10-CM

## 2017-03-31 PROCEDURE — 99309 SBSQ NF CARE MODERATE MDM 30: CPT | Performed by: FAMILY MEDICINE

## 2017-03-31 PROCEDURE — 99207 ZZC CDG-CORRECTLY CODED, REVIEWED AND AGREE: CPT | Performed by: FAMILY MEDICINE

## 2017-03-31 NOTE — PROGRESS NOTES
[place] [facility]    Chief Complaint   Patient presents with     Nursing Home Acute       HPI:    Jayesh Ortiz is a 76 year old  (1940), who is being seen today for an episodic care visit at [facility] . Today's concern is:  1. Closed displaced fracture of posterior process of right talus with routine healing, subsequent encounter -- saw Dr. Coy - will need to be NWB x 3 more weeks. Patient is off therapy until he can be made partial weight bearing -he is paying privately .   2. S/P ORIF (open reduction internal fixation) fracture    3. Scrotal swelling - improved with lasix. Off lasix and now sees his swelling his coming back in scrotum and legs.    4. Type 2 diabetes mellitus with complication, without long-term current use of insulin (H) - well controlled        ALLERGIES: Nka [no known allergies]  Past Medical, Surgical, Family and Social History reviewed and updated in [facility].    Current Outpatient Prescriptions   Medication Sig Dispense Refill     hydrocortisone (CORTAID) 1 % cream Apply topically 3 times daily as needed for itching       hydrOXYzine (ATARAX) 25 MG tablet Take 25-50 mg by mouth every 4 hours as needed for itching       bisacodyl (DULCOLAX) 10 MG Suppository Place 10 mg rectally daily as needed for constipation       magnesium citrate solution Take 240 mLs by mouth once       magnesium hydroxide (MILK OF MAGNESIA) 400 MG/5ML suspension Take 30 mLs by mouth daily as needed for constipation or heartburn       oxyCODONE (ROXICODONE) 5 MG IR tablet Take 5-10 mg by mouth every 4 hours as needed for moderate to severe pain       senna-docusate (SENOKOT-S;PERICOLACE) 8.6-50 MG per tablet Take 1 tablet by mouth 2 times daily       acetaminophen (TYLENOL) 325 MG tablet Take 3 tablets (975 mg) by mouth every 8 hours 100 tablet 0     aspirin  MG EC tablet Take 1 tablet (325 mg) by mouth daily with food 14 tablet 0     polyethylene glycol (MIRALAX/GLYCOLAX) Packet  Take 17 g by mouth daily 14 packet 1     LOSARTAN POTASSIUM PO Take 25 mg by mouth daily        SIMVASTATIN PO Take 40 mg by mouth daily        Levothyroxine Sodium (LEVOTHROID PO) Take 150 mcg by mouth daily        tamsulosin (FLOMAX) 0.4 MG capsule Take 0.4 mg by mouth daily        PIOGLITAZONE HCL PO Take 45 mg by mouth daily        GLIPIZIDE PO Take 10 mg by mouth 2 times daily (before meals)        Furosemide (LASIX PO) Take 40 mg by mouth daily       Medications reviewed:  Medications reconciled to facility chart and changes were made to reflect current medications as identified as above med list. Below are the changes that were made:   Medications stopped since last EPIC medication reconciliation:   There are no discontinued medications.    Medications started since last [facility] medication reconciliation:  No orders of the defined types were placed in this encounter.        REVIEW OF SYSTEMS:  10 point ROS of systems including Constitutional, Eyes, Respiratory, Cardiovascular, Gastroenterology, Genitourinary, Integumentary, Muscularskeletal, Psychiatric were all negative except for pertinent positives noted in my HPI.    Physical Exam:  /64  Pulse 63  Temp 96.5  F (35.8  C)  Resp 16  Wt (!) 323 lb (146.5 kg)  SpO2 94%  BMI 49.11 kg/m2  Gen: obese male, sitting in chair, alert, cooperative and in no acute distress  HEENT: normocephalic; oropharynx clear  Card: RRR, S1, S2, no murmurs  Resp: lungs clear to auscultation bilaterally, no wheezes or crackles  GI: abdomen soft, not-tender  MSK: right leg in splint . 2 plus edema in both legs   Neuro: CX II-XII grossly in tact; ROM in all four extremities grossly in tact  Psych: alert and oriented x3; normal affect  Skin: warm, no suspicious rashes or lesions noted      Recent Labs:    Results for orders placed or performed in visit on 03/21/17   Hemoglobin   Result Value Ref Range    Hemoglobin 12.3 (L) 13.3 - 17.7 g/dL     Blood sugars-  3/28  164 &  216  3/29  157 & 192  3/30  192 & 190    Assessment/Plan:     Closed displaced fracture of posterior process of right talus with routine healing, subsequent encounter  S/P ORIF (open reduction internal fixation) fracture  Scrotal swelling  Type 2 diabetes mellitus with complication, without long-term current use of insulin (H)  Bilateral edema of lower extremity    Orders:  1.  F/U with Dr Coy in 3 weeks  2. Continue NWB on right leg per Dr Coy  3. Lasix 40 mg o qd  4. kcl 20 meq po qd  5. Check bmp in 1 week      Electronically signed by  Anh Levi MD, MD

## 2017-03-31 NOTE — MR AVS SNAPSHOT
"              After Visit Summary   3/31/2017    Jayesh Ortiz    MRN: [id number]           Patient Information     Date Of Birth          1940        Visit Information        Provider Department      3/31/2017 1:30 PM Anh Levi MD [facility]        Today's Diagnoses     Closed displaced fracture of posterior process of right talus with routine healing, subsequent encounter    -  1    S/P ORIF (open reduction internal fixation) fracture        Scrotal swelling        Type 2 diabetes mellitus with complication, without long-term current use of insulin (H)        Bilateral edema of lower extremity           Follow-ups after your visit        Who to contact     If you have questions or need follow up information about today's clinic visit or your schedule please contact [facility] directly at [phone].  Normal or non-critical lab and imaging results will be communicated to you by [email or web address], letter or phone within 4 business days after the clinic has received the results. If you do not hear from us within 7 days, please contact the clinic through [email or web address] or phone. If you have a critical or abnormal lab result, we will notify you by phone as soon as possible.  Submit refill requests through [email or web address] or call your pharmacy and they will forward the refill request to us. Please allow 3 business days for your refill to be completed.          Additional Information About Your Visit        [email or web address] Information     [email or web address] lets you send messages to your doctor, view your test results, renew your prescriptions, schedule appointments and more. To sign up, go to [email or web address] . Click on \"Log in\" on the left side of the screen, which will take you to the Welcome page. Then click on \"Sign up Now\" on the right side of the page.     You will be asked to enter the access code listed below, as well as some personal information. Please follow the directions to create " your username and password.     Your access code is: [email or web address]  Expires: 2017  3:03 AM     Your access code will  in 90 days. If you need help or a new code, please call your [facility] or [phone].        Care EveryWhere ID     This is your Care EveryWhere ID. This could be used by other organizations to access your [place] medical records  [id number]        Your Vitals Were     Pulse Temperature Respirations Pulse Oximetry BMI (Body Mass Index)       63 96.5  F (35.8  C) 16 94% 49.11 kg/m2        Blood Pressure from Last 3 Encounters:   17 129/64   17 137/69   17 155/72    Weight from Last 3 Encounters:   17 (!) 323 lb (146.5 kg)   17 (!) 326 lb (147.9 kg)   17 (!) 328 lb (148.8 kg)              Today, you had the following     No orders found for display       Primary Care Provider Office Phone # Fax #    [facility] [phone] [address] [address]        Thank you!     Thank you for choosing [facility]  for your care. Our goal is always to provide you with excellent care. Hearing back from our patients is one way we can continue to improve our services. Please take a few minutes to complete the written survey that you may receive in the mail after your visit with us. Thank you!             Your Updated Medication List - Protect others around you: Learn how to safely use, store and throw away your medicines at www.disposemymeds.org.          This list is accurate as of: 3/31/17  4:36 PM.  Always use your most recent med list.                   Brand Name Dispense Instructions for use    acetaminophen 325 MG tablet    TYLENOL    100 tablet    Take 3 tablets (975 mg) by mouth every 8 hours       aspirin 325 MG EC tablet     14 tablet    Take 1 tablet (325 mg) by mouth daily with food       bisacodyl 10 MG Suppository    DULCOLAX     Place 10 mg rectally daily as needed for constipation        GLIPIZIDE PO      Take 10 mg by mouth 2 times daily (before meals)       hydrocortisone 1 % cream    CORTAID     Apply topically 3 times daily as needed for itching       hydrOXYzine 25 MG tablet    ATARAX     Take 25-50 mg by mouth every 4 hours as needed for itching       LASIX PO      Take 40 mg by mouth daily       LEVOTHROID PO      Take 150 mcg by mouth daily       LOSARTAN POTASSIUM PO      Take 25 mg by mouth daily       magnesium citrate solution      Take 240 mLs by mouth once       magnesium hydroxide 400 MG/5ML suspension    MILK OF MAGNESIA     Take 30 mLs by mouth daily as needed for constipation or heartburn       PIOGLITAZONE HCL PO      Take 45 mg by mouth daily       polyethylene glycol Packet    MIRALAX/GLYCOLAX    14 packet    Take 17 g by mouth daily       ROXICODONE 5 MG IR tablet   Generic drug:  oxyCODONE      Take 5-10 mg by mouth every 4 hours as needed for moderate to severe pain       senna-docusate 8.6-50 MG per tablet    SENOKOT-S;PERICOLACE     Take 1 tablet by mouth 2 times daily       SIMVASTATIN PO      Take 40 mg by mouth daily       tamsulosin 0.4 MG capsule    FLOMAX     Take 0.4 mg by mouth daily

## 2017-04-04 ENCOUNTER — NURSING HOME VISIT (OUTPATIENT)
Dept: GERIATRICS | Facility: CLINIC | Age: 77
End: 2017-04-04
Payer: MEDICARE

## 2017-04-04 VITALS
RESPIRATION RATE: 16 BRPM | SYSTOLIC BLOOD PRESSURE: 130 MMHG | WEIGHT: 315 LBS | OXYGEN SATURATION: 96 % | DIASTOLIC BLOOD PRESSURE: 78 MMHG | TEMPERATURE: 98 F | BODY MASS INDEX: 48.81 KG/M2 | HEART RATE: 70 BPM

## 2017-04-04 DIAGNOSIS — Z87.81 S/P ORIF (OPEN REDUCTION INTERNAL FIXATION) FRACTURE: ICD-10-CM

## 2017-04-04 DIAGNOSIS — Z98.890 S/P ORIF (OPEN REDUCTION INTERNAL FIXATION) FRACTURE: ICD-10-CM

## 2017-04-04 DIAGNOSIS — S92.131D CLOSED DISPLACED FRACTURE OF POSTERIOR PROCESS OF RIGHT TALUS WITH ROUTINE HEALING, SUBSEQUENT ENCOUNTER: ICD-10-CM

## 2017-04-04 DIAGNOSIS — E11.8 TYPE 2 DIABETES MELLITUS WITH COMPLICATION, WITHOUT LONG-TERM CURRENT USE OF INSULIN (H): ICD-10-CM

## 2017-04-04 DIAGNOSIS — I83.029 STASIS ULCER, LEFT: Primary | ICD-10-CM

## 2017-04-04 PROCEDURE — 99207 ZZC CDG-CORRECTLY CODED, REVIEWED AND AGREE: CPT | Performed by: FAMILY MEDICINE

## 2017-04-04 PROCEDURE — 99309 SBSQ NF CARE MODERATE MDM 30: CPT | Performed by: FAMILY MEDICINE

## 2017-04-04 NOTE — PROGRESS NOTES
[place] [facility]    Chief Complaint   Patient presents with     Nursing Home Acute       HPI:    Jayesh Ortiz is a 76 year old  (1940), who is being seen today for an episodic care visit at [facility] . Today's concern is:  1. Stasis ulcer (H) - Post lt calf, describes pressure and shearing when he was sleeping in chair with leg rest. Attributes this as cause of ulcer. Denies claudication hx aor prev le ulcers   2. Closed displaced fracture of posterior process of right talus with routine healing, subsequent encounter- still NWB on Rt, impairs rehab,     3. S/P ORIF (open reduction internal fixation) fracture - f/u ortho   4. Type 2 diabetes mellitus with complication, without long-term current use of insulin (H) - on orals, no lows, adequate control       ALLERGIES: Nka [no known allergies]  Past Medical, Surgical, Family and Social History reviewed and updated in EPIC.    Current Outpatient Prescriptions   Medication Sig Dispense Refill     GABAPENTIN PO Take 900 mg by mouth daily as needed       TRAZODONE HCL PO Take 25 mg by mouth At Bedtime And 25 prn       Oseltamivir Phosphate (TAMIFLU PO) Take 75 mg by mouth daily       Acetaminophen (TYLENOL PO) Take 650 mg by mouth every 4 hours as needed for mild pain or fever       hydrocortisone (CORTAID) 1 % cream Apply topically 3 times daily as needed for itching       hydrOXYzine (ATARAX) 25 MG tablet Take 25-50 mg by mouth every 4 hours as needed for itching       bisacodyl (DULCOLAX) 10 MG Suppository Place 10 mg rectally daily as needed for constipation       magnesium citrate solution Take 240 mLs by mouth once       magnesium hydroxide (MILK OF MAGNESIA) 400 MG/5ML suspension Take 30 mLs by mouth daily as needed for constipation or heartburn       oxyCODONE (ROXICODONE) 5 MG IR tablet Take 5-10 mg by mouth every 4 hours as needed for moderate to severe pain       senna-docusate (SENOKOT-S;PERICOLACE) 8.6-50 MG per tablet  Take 1 tablet by mouth 2 times daily       aspirin  MG EC tablet Take 1 tablet (325 mg) by mouth daily with food 14 tablet 0     polyethylene glycol (MIRALAX/GLYCOLAX) Packet Take 17 g by mouth daily 14 packet 1     LOSARTAN POTASSIUM PO Take 25 mg by mouth daily        SIMVASTATIN PO Take 40 mg by mouth daily        Levothyroxine Sodium (LEVOTHROID PO) Take 150 mcg by mouth daily        tamsulosin (FLOMAX) 0.4 MG capsule Take 0.4 mg by mouth daily        PIOGLITAZONE HCL PO Take 45 mg by mouth daily        GLIPIZIDE PO Take 10 mg by mouth 2 times daily (before meals)        Furosemide (LASIX PO) Take 40 mg by mouth daily       Medications reviewed:  Medications reconciled to facility chart and changes were made to reflect current medications as identified as above med list. Below are the changes that were made:   Medications stopped since last EPIC medication reconciliation:   There are no discontinued medications.    Medications started since last [facility] medication reconciliation:  No orders of the defined types were placed in this encounter.        REVIEW OF SYSTEMS:  4 point ROS including Respiratory, CV, GI and , other than that noted in the HPI,  is negative    Physical Exam:  /78  Pulse 70  Temp 98  F (36.7  C)  Resp 16  Wt (!) 321 lb (145.6 kg)  SpO2 96%  BMI 48.81 kg/m2  GENERAL APPEARANCE:  Alert, in no distress, morbidly obese, cooperative  RESP:  respiratory effort and palpation of chest normal, lungs clear to auscultation , no respiratory distress  CV:  Palpation and auscultation of heart done , regular rate and rhythm, no murmur, rub, or gallop, peripheral edema 4++ in le  SKIN:  post Lt calf has 2 skin ulcers, mild drainage, no cellulitis    Recent Labs:    Results for orders placed or performed in visit on 03/21/17   Hemoglobin   Result Value Ref Range    Hemoglobin 12.3 (L) 13.3 - 17.7 g/dL         Assessment/Plan:  1. Stasis ulcer (H) - Post lt calf, describes pressure and  shearing when he was sleeping in chair with leg rest. Attributes this as cause of ulcer. Denies claudication hx aor prev le ulcers- sig edema. tx both edema and wounds, stasis ulcers with pressure cause   2. Closed displaced fracture of posterior process of right talus with routine healing, subsequent encounter- still NWB on Rt, impairs rehab, cont as tolerated    3. S/P ORIF (open reduction internal fixation) fracture - f/u ortho   4. Type 2 diabetes mellitus with complication, without long-term current use of insulin (H) - on orals, cont and monitor, no lows, adequate control       Orders:  1. Okay to discontinue penis care  2. Wound post- LLE  Examined, okay orders for adaptic abd pad- kerlix dressing under pressure wraps, change daily and prn Dx Stasis ulcers and measure from leg rest he was using.  3. Okay gradient compression dressing wraps per PT.    Time: 25 minutes/> half discussing care plan    Electronically signed by  Alessandro Bragg MD

## 2017-04-07 ENCOUNTER — HOSPITAL LABORATORY (OUTPATIENT)
Facility: OTHER | Age: 77
End: 2017-04-07

## 2017-04-07 LAB
ANION GAP SERPL CALCULATED.3IONS-SCNC: 7 MMOL/L (ref 3–14)
BUN SERPL-MCNC: 29 MG/DL (ref 7–30)
CALCIUM SERPL-MCNC: 8.6 MG/DL (ref 8.5–10.1)
CHLORIDE SERPL-SCNC: 104 MMOL/L (ref 94–109)
CO2 SERPL-SCNC: 29 MMOL/L (ref 20–32)
CREAT SERPL-MCNC: 0.88 MG/DL (ref 0.66–1.25)
GFR SERPL CREATININE-BSD FRML MDRD: 84 ML/MIN/1.7M2
GLUCOSE SERPL-MCNC: 129 MG/DL (ref 70–99)
POTASSIUM SERPL-SCNC: 4.1 MMOL/L (ref 3.4–5.3)
SODIUM SERPL-SCNC: 140 MMOL/L (ref 133–144)

## 2017-04-11 ENCOUNTER — NURSING HOME VISIT (OUTPATIENT)
Dept: GERIATRICS | Facility: CLINIC | Age: 77
End: 2017-04-11
Payer: MEDICARE

## 2017-04-11 VITALS
TEMPERATURE: 97.6 F | SYSTOLIC BLOOD PRESSURE: 106 MMHG | OXYGEN SATURATION: 94 % | RESPIRATION RATE: 20 BRPM | HEART RATE: 60 BPM | BODY MASS INDEX: 47.9 KG/M2 | DIASTOLIC BLOOD PRESSURE: 61 MMHG | WEIGHT: 315 LBS

## 2017-04-11 DIAGNOSIS — Z98.890 S/P ORIF (OPEN REDUCTION INTERNAL FIXATION) FRACTURE: ICD-10-CM

## 2017-04-11 DIAGNOSIS — S92.131D CLOSED DISPLACED FRACTURE OF POSTERIOR PROCESS OF RIGHT TALUS WITH ROUTINE HEALING, SUBSEQUENT ENCOUNTER: Primary | ICD-10-CM

## 2017-04-11 DIAGNOSIS — Z87.81 S/P ORIF (OPEN REDUCTION INTERNAL FIXATION) FRACTURE: ICD-10-CM

## 2017-04-11 DIAGNOSIS — I83.029 STASIS ULCER, LEFT: ICD-10-CM

## 2017-04-11 DIAGNOSIS — E11.8 TYPE 2 DIABETES MELLITUS WITH COMPLICATION, WITHOUT LONG-TERM CURRENT USE OF INSULIN (H): ICD-10-CM

## 2017-04-11 PROCEDURE — 99309 SBSQ NF CARE MODERATE MDM 30: CPT | Performed by: FAMILY MEDICINE

## 2017-04-11 PROCEDURE — 99207 ZZC CDG-CORRECTLY CODED, REVIEWED AND AGREE: CPT | Performed by: FAMILY MEDICINE

## 2017-04-11 NOTE — PROGRESS NOTES
"[place] [facility]    Chief Complaint   Patient presents with     Nursing Home Acute       HPI:    Jayesh Ortiz is a 76 year old  (1940), who is being seen today for an episodic care visit at [facility] . Today's concern is:     Closed displaced fracture of posterior process of right talus with routine healing, subsequent encounter- patient continues to have a pain - \" like fork jabbing in my skin at site of stasis ulcer\"-- patient does not want opoid due to constipation. Given his DIABETES he may be developing some neuropathy also.  This was explained.  Patient is willing to try some Neurontin for discomfort.  S/P ORIF (open reduction internal fixation) fracture--being followed by Dr. Bernal.   Type 2 diabetes mellitus with complication, without long-term current use of insulin (H)-- bs have been well controlled 110-150s.     ALLERGIES: Nka [no known allergies]  Past Medical, Surgical, Family and Social History reviewed and updated in [facility].    Current Outpatient Prescriptions   Medication Sig Dispense Refill     [START ON 4/14/2017] Gabapentin (NEURONTIN PO) Take 300 mg by mouth 2 times daily       GABAPENTIN PO Take 300 mg by mouth At Bedtime        TRAZODONE HCL PO Take 25 mg by mouth At Bedtime And 25 prn       Oseltamivir Phosphate (TAMIFLU PO) Take 75 mg by mouth daily       Acetaminophen (TYLENOL PO) Take 650 mg by mouth every 4 hours as needed for mild pain or fever       hydrocortisone (CORTAID) 1 % cream Apply topically 3 times daily as needed for itching       hydrOXYzine (ATARAX) 25 MG tablet Take 25-50 mg by mouth every 4 hours as needed for itching       bisacodyl (DULCOLAX) 10 MG Suppository Place 10 mg rectally daily as needed for constipation       magnesium citrate solution Take 240 mLs by mouth once       magnesium hydroxide (MILK OF MAGNESIA) 400 MG/5ML suspension Take 30 mLs by mouth daily as needed for constipation or heartburn       oxyCODONE (ROXICODONE) 5 " MG IR tablet Take 5-10 mg by mouth every 4 hours as needed for moderate to severe pain       senna-docusate (SENOKOT-S;PERICOLACE) 8.6-50 MG per tablet Take 1 tablet by mouth 2 times daily       aspirin  MG EC tablet Take 1 tablet (325 mg) by mouth daily with food 14 tablet 0     polyethylene glycol (MIRALAX/GLYCOLAX) Packet Take 17 g by mouth daily 14 packet 1     LOSARTAN POTASSIUM PO Take 25 mg by mouth daily        SIMVASTATIN PO Take 40 mg by mouth daily        Levothyroxine Sodium (LEVOTHROID PO) Take 150 mcg by mouth daily        tamsulosin (FLOMAX) 0.4 MG capsule Take 0.4 mg by mouth daily        PIOGLITAZONE HCL PO Take 45 mg by mouth daily        GLIPIZIDE PO Take 10 mg by mouth 2 times daily (before meals)        Furosemide (LASIX PO) Take 40 mg by mouth daily       Medications reviewed:  Medications reconciled to facility chart and changes were made to reflect current medications as identified as above med list. Below are the changes that were made:   Medications stopped since last EPIC medication reconciliation:   There are no discontinued medications.    Medications started since last [facility] medication reconciliation:  Orders Placed This Encounter   Medications     Gabapentin (NEURONTIN PO)     Sig: Take 300 mg by mouth 2 times daily         REVIEW OF SYSTEMS:  10 point ROS of systems including Constitutional, Eyes, Respiratory, Cardiovascular, Gastroenterology, Genitourinary, Integumentary, Muscularskeletal, Psychiatric were all negative except for pertinent positives noted in my HPI.    Physical Exam:  /61  Pulse 60  Temp 97.6  F (36.4  C)  Resp 20  Wt (!) 315 lb (142.9 kg)  SpO2 94%  BMI 47.9 kg/m2    Gen: sitting in chair, alert, cooperative and in no acute distress  HEENT: normocephalic; oropharynx clear  Card: RRR, S1, S2, no murmurs  Resp: lungs clear to auscultation bilaterally, no wheezes or crackles  GI: abdomen soft, not-tender  MSK:right leg in splint.  lymphedema wraps    Neuro: CX II-XII grossly in tact; ROM in all four extremities grossly in tact  Psych: alert and oriented x3; normal affect  Skin: warm, no suspicious rashes or lesions noted      Recent Labs:    Results for orders placed or performed in visit on 04/07/17   Basic metabolic panel   Result Value Ref Range    Sodium 140 133 - 144 mmol/L    Potassium 4.1 3.4 - 5.3 mmol/L    Chloride 104 94 - 109 mmol/L    Carbon Dioxide 29 20 - 32 mmol/L    Anion Gap 7 3 - 14 mmol/L    Glucose 129 (H) 70 - 99 mg/dL    Urea Nitrogen 29 7 - 30 mg/dL    Creatinine 0.88 0.66 - 1.25 mg/dL    GFR Estimate 84 >60 mL/min/1.7m2    GFR Estimate If Black >90   GFR Calc   >60 mL/min/1.7m2    Calcium 8.6 8.5 - 10.1 mg/dL     BS-  110 - 140's    Assessment/Plan:     Closed displaced fracture of posterior process of right talus with routine healing, subsequent encounter  S/P ORIF (open reduction internal fixation) fracture  Stasis ulcer, left (H)  Type 2 diabetes mellitus with complication, without long-term current use of insulin (H)    Orders:  1. Defer stitch removal to his surgeons discretion  2. Neurontin 300 mg po qhs x 3 days then 300 mg po BID Dx neuropathic pain right foot  3. F/U with Dr Levi on 4/14/17 to check pain  4. F/U with Dr Coy on 4/20/17 post op check  5. Do not give any pills before 7am patients request  6. checking b12, folate, thyroid functions given neuropathy symptoms       Electronically signed by  Anh Levi MD, MD

## 2017-04-11 NOTE — MR AVS SNAPSHOT
"              After Visit Summary   4/11/2017    Jayesh Ortiz    MRN: [id number]           Patient Information     Date Of Birth          1940        Visit Information        Provider Department      4/11/2017 8:30 AM Anh Levi MD [facility]        Today's Diagnoses     Closed displaced fracture of posterior process of right talus with routine healing, subsequent encounter    -  1    S/P ORIF (open reduction internal fixation) fracture        Stasis ulcer, left (H)        Type 2 diabetes mellitus with complication, without long-term current use of insulin (H)           Follow-ups after your visit        Who to contact     If you have questions or need follow up information about today's clinic visit or your schedule please contact [facility] directly at [phone].  Normal or non-critical lab and imaging results will be communicated to you by G-clusterhart, letter or phone within 4 business days after the clinic has received the results. If you do not hear from us within 7 days, please contact the clinic through G-clusterhart or phone. If you have a critical or abnormal lab result, we will notify you by phone as soon as possible.  Submit refill requests through Second Wind or call your pharmacy and they will forward the refill request to us. Please allow 3 business days for your refill to be completed.          Additional Information About Your Visit        G-clusterhart Information     Second Wind lets you send messages to your doctor, view your test results, renew your prescriptions, schedule appointments and more. To sign up, go to [email or web address] . Click on \"Log in\" on the left side of the screen, which will take you to the Welcome page. Then click on \"Sign up Now\" on the right side of the page.     You will be asked to enter the access code listed below, as well as some personal information. Please follow the directions to create your username and password.     Your " access code is: [email or web address]  Expires: 2017  3:03 AM     Your access code will  in 90 days. If you need help or a new code, please call your [facility] or [phone].        Care EveryWhere ID     This is your Care EveryWhere ID. This could be used by other organizations to access your [place] medical records  [id number]        Your Vitals Were     Pulse Temperature Respirations Pulse Oximetry BMI (Body Mass Index)       60 97.6  F (36.4  C) 20 94% 47.9 kg/m2        Blood Pressure from Last 3 Encounters:   17 106/61   17 130/78   17 129/64    Weight from Last 3 Encounters:   17 (!) 315 lb (142.9 kg)   17 (!) 321 lb (145.6 kg)   17 (!) 323 lb (146.5 kg)              Today, you had the following     No orders found for display       Primary Care Provider Office Phone # Fax #    [facility] [phone] [address] [facility] [address]        Thank you!     Thank you for choosing [facility]  for your care. Our goal is always to provide you with excellent care. Hearing back from our patients is one way we can continue to improve our services. Please take a few minutes to complete the written survey that you may receive in the mail after your visit with us. Thank you!             Your Updated Medication List - Protect others around you: Learn how to safely use, store and throw away your medicines at www.disposemymeds.org.          This list is accurate as of: 17  4:12 PM.  Always use your most recent med list.                   Brand Name Dispense Instructions for use    aspirin 325 MG EC tablet     14 tablet    Take 1 tablet (325 mg) by mouth daily with food       bisacodyl 10 MG Suppository    DULCOLAX     Place 10 mg rectally daily as needed for constipation       * GABAPENTIN PO      Take 300 mg by mouth At Bedtime       * NEURONTIN PO   Start taking on:  2017      Take 300 mg by mouth 2 times daily        GLIPIZIDE PO      Take 10 mg by mouth 2 times daily (before meals)       hydrocortisone 1 % cream    CORTAID     Apply topically 3 times daily as needed for itching       hydrOXYzine 25 MG tablet    ATARAX     Take 25-50 mg by mouth every 4 hours as needed for itching       LASIX PO      Take 40 mg by mouth daily       LEVOTHROID PO      Take 150 mcg by mouth daily       LOSARTAN POTASSIUM PO      Take 25 mg by mouth daily       magnesium citrate solution      Take 240 mLs by mouth once       magnesium hydroxide 400 MG/5ML suspension    MILK OF MAGNESIA     Take 30 mLs by mouth daily as needed for constipation or heartburn       PIOGLITAZONE HCL PO      Take 45 mg by mouth daily       polyethylene glycol Packet    MIRALAX/GLYCOLAX    14 packet    Take 17 g by mouth daily       ROXICODONE 5 MG IR tablet   Generic drug:  oxyCODONE      Take 5-10 mg by mouth every 4 hours as needed for moderate to severe pain       senna-docusate 8.6-50 MG per tablet    SENOKOT-S;PERICOLACE     Take 1 tablet by mouth 2 times daily       SIMVASTATIN PO      Take 40 mg by mouth daily       TAMIFLU PO      Take 75 mg by mouth daily       tamsulosin 0.4 MG capsule    FLOMAX     Take 0.4 mg by mouth daily       TRAZODONE HCL PO      Take 25 mg by mouth At Bedtime And 25 prn       TYLENOL PO      Take 650 mg by mouth every 4 hours as needed for mild pain or fever       * Notice:  This list has 2 medication(s) that are the same as other medications prescribed for you. Read the directions carefully, and ask your doctor or other care provider to review them with you.

## 2017-04-12 ENCOUNTER — HOSPITAL LABORATORY (OUTPATIENT)
Facility: OTHER | Age: 77
End: 2017-04-12

## 2017-04-12 LAB
FOLATE SERPL-MCNC: NORMAL NG/ML
T4 FREE SERPL-MCNC: 1.38 NG/DL (ref 0.76–1.46)
TSH SERPL DL<=0.05 MIU/L-ACNC: 15.63 MU/L (ref 0.4–4)
VIT B12 SERPL-MCNC: 453 PG/ML (ref 193–986)

## 2017-04-14 ENCOUNTER — HOSPITAL LABORATORY (OUTPATIENT)
Facility: OTHER | Age: 77
End: 2017-04-14

## 2017-04-14 ENCOUNTER — NURSING HOME VISIT (OUTPATIENT)
Dept: GERIATRICS | Facility: CLINIC | Age: 77
End: 2017-04-14
Payer: MEDICARE

## 2017-04-14 VITALS
TEMPERATURE: 98.2 F | HEART RATE: 60 BPM | BODY MASS INDEX: 48.5 KG/M2 | OXYGEN SATURATION: 95 % | WEIGHT: 315 LBS | RESPIRATION RATE: 20 BRPM | SYSTOLIC BLOOD PRESSURE: 131 MMHG | DIASTOLIC BLOOD PRESSURE: 61 MMHG

## 2017-04-14 DIAGNOSIS — Z87.81 S/P ORIF (OPEN REDUCTION INTERNAL FIXATION) FRACTURE: ICD-10-CM

## 2017-04-14 DIAGNOSIS — E11.8 TYPE 2 DIABETES MELLITUS WITH COMPLICATION, WITHOUT LONG-TERM CURRENT USE OF INSULIN (H): ICD-10-CM

## 2017-04-14 DIAGNOSIS — Z98.890 S/P ORIF (OPEN REDUCTION INTERNAL FIXATION) FRACTURE: ICD-10-CM

## 2017-04-14 DIAGNOSIS — S92.131D CLOSED DISPLACED FRACTURE OF POSTERIOR PROCESS OF RIGHT TALUS WITH ROUTINE HEALING, SUBSEQUENT ENCOUNTER: Primary | ICD-10-CM

## 2017-04-14 DIAGNOSIS — I83.029 STASIS ULCER, LEFT: ICD-10-CM

## 2017-04-14 LAB — FOLATE SERPL-MCNC: 8.6 NG/ML

## 2017-04-14 PROCEDURE — 99309 SBSQ NF CARE MODERATE MDM 30: CPT | Performed by: FAMILY MEDICINE

## 2017-04-14 PROCEDURE — 99207 ZZC CDG-CORRECTLY CODED, REVIEWED AND AGREE: CPT | Performed by: FAMILY MEDICINE

## 2017-04-14 NOTE — MR AVS SNAPSHOT
"              After Visit Summary   4/14/2017    Jayesh Ortiz    MRN: [id number]           Patient Information     Date Of Birth          1940        Visit Information        Provider Department      4/14/2017 8:00 AM Anh Levi MD [facility]        Today's Diagnoses     Closed displaced fracture of posterior process of right talus with routine healing, subsequent encounter    -  1    S/P ORIF (open reduction internal fixation) fracture        Stasis ulcer, left (H)        Type 2 diabetes mellitus with complication, without long-term current use of insulin (H)           Follow-ups after your visit        Who to contact     If you have questions or need follow up information about today's clinic visit or your schedule please contact [facility] directly at [phone].  Normal or non-critical lab and imaging results will be communicated to you by [employer], letter or phone within 4 business days after the clinic has received the results. If you do not hear from us within 7 days, please contact the clinic through [employer] or phone. If you have a critical or abnormal lab result, we will notify you by phone as soon as possible.  Submit refill requests through [employer] or call your pharmacy and they will forward the refill request to us. Please allow 3 business days for your refill to be completed.          Additional Information About Your Visit        [employer] Information     [employer] lets you send messages to your doctor, view your test results, renew your prescriptions, schedule appointments and more. To sign up, go to [email or web address] . Click on \"Log in\" on the left side of the screen, which will take you to the Welcome page. Then click on \"Sign up Now\" on the right side of the page.     You will be asked to enter the access code listed below, as well as some personal information. Please follow the directions to create your username and password.     Your " access code is: [email or web address]  Expires: 2017  3:03 AM     Your access code will  in 90 days. If you need help or a new code, please call your [facility] or [phone].        Care EveryWhere ID     This is your Care EveryWhere ID. This could be used by other organizations to access your [facility] medical records  [id number]        Your Vitals Were     Pulse Temperature Respirations Pulse Oximetry BMI (Body Mass Index)       60 98.2  F (36.8  C) 20 95% 48.5 kg/m2        Blood Pressure from Last 3 Encounters:   17 131/61   17 106/61   17 130/78    Weight from Last 3 Encounters:   17 (!) 319 lb (144.7 kg)   17 (!) 315 lb (142.9 kg)   17 (!) 321 lb (145.6 kg)              Today, you had the following     No orders found for display         Today's Medication Changes          These changes are accurate as of: 17 11:59 PM.  If you have any questions, ask your nurse or doctor.               These medicines have changed or have updated prescriptions.        Dose/Directions    NEURONTIN PO   This may have changed:  Another medication with the same name was removed. Continue taking this medication, and follow the directions you see here.   Changed by:  Anh Levi MD        Dose:  300 mg   Take 300 mg by mouth daily   Refills:  0                Primary Care Provider Office Phone # Fax #    [facility] [phone] [address] [facility] [address]        Thank you!     Thank you for choosing [facility]  for your care. Our goal is always to provide you with excellent care. Hearing back from our patients is one way we can continue to improve our services. Please take a few minutes to complete the written survey that you may receive in the mail after your visit with us. Thank you!             Your Updated Medication List - Protect others around you: Learn how to safely use, store and throw away your medicines at  www.disposemymeds.org.          This list is accurate as of: 4/14/17 11:59 PM.  Always use your most recent med list.                   Brand Name Dispense Instructions for use    aspirin 325 MG EC tablet     14 tablet    Take 1 tablet (325 mg) by mouth daily with food       bisacodyl 10 MG Suppository    DULCOLAX     Place 10 mg rectally daily as needed for constipation       GLIPIZIDE PO      Take 10 mg by mouth 2 times daily (before meals)       hydrocortisone 1 % cream    CORTAID     Apply topically 3 times daily as needed for itching       hydrOXYzine 25 MG tablet    ATARAX     Take 25-50 mg by mouth every 4 hours as needed for itching       LASIX PO      Take 40 mg by mouth daily       LEVOTHROID PO      Take 200 mcg by mouth daily       LOSARTAN POTASSIUM PO      Take 25 mg by mouth daily       magnesium citrate solution      Take 240 mLs by mouth once       magnesium hydroxide 400 MG/5ML suspension    MILK OF MAGNESIA     Take 30 mLs by mouth daily as needed for constipation or heartburn       NEURONTIN PO      Take 300 mg by mouth daily       PIOGLITAZONE HCL PO      Take 45 mg by mouth daily       polyethylene glycol Packet    MIRALAX/GLYCOLAX    14 packet    Take 17 g by mouth daily       ROXICODONE 5 MG IR tablet   Generic drug:  oxyCODONE      Take 5-10 mg by mouth every 4 hours as needed for moderate to severe pain       senna-docusate 8.6-50 MG per tablet    SENOKOT-S;PERICOLACE     Take 1 tablet by mouth 2 times daily       SIMVASTATIN PO      Take 40 mg by mouth daily       TAMIFLU PO      Take 75 mg by mouth daily       tamsulosin 0.4 MG capsule    FLOMAX     Take 0.4 mg by mouth daily       TRAZODONE HCL PO      Take 25 mg by mouth At Bedtime And 25 prn       TYLENOL PO      Take 650 mg by mouth every 4 hours as needed for mild pain or fever

## 2017-04-14 NOTE — PROGRESS NOTES
[facility]    Chief Complaint   Patient presents with     Nursing Home Acute       HPI:    Jayesh Ortiz is a 76 year old  (1940), who is being seen today for an episodic care visit at [facility] . Today's concern is:     Closed displaced fracture of posterior process of right talus with routine healing, subsequent encounter-waiting forfollw up to see if ortho will cast in next week. If so his palns are to go home with home care   S/P ORIF (open reduction internal fixation) fracture- pain much better- stitches were taken out   Stasis ulcer, left (H)- improving   Type 2 diabetes mellitus with complication, without long-term current use of insulin (H)- bs well contolled     ALLERGIES: Nka [no known allergies]  Past Medical, Surgical, Family and Social History reviewed and updated in [facility].    Current Outpatient Prescriptions   Medication Sig Dispense Refill     Gabapentin (NEURONTIN PO) Take 300 mg by mouth 2 times daily       TRAZODONE HCL PO Take 25 mg by mouth At Bedtime And 25 prn       Oseltamivir Phosphate (TAMIFLU PO) Take 75 mg by mouth daily       Acetaminophen (TYLENOL PO) Take 650 mg by mouth every 4 hours as needed for mild pain or fever       hydrocortisone (CORTAID) 1 % cream Apply topically 3 times daily as needed for itching       hydrOXYzine (ATARAX) 25 MG tablet Take 25-50 mg by mouth every 4 hours as needed for itching       bisacodyl (DULCOLAX) 10 MG Suppository Place 10 mg rectally daily as needed for constipation       magnesium citrate solution Take 240 mLs by mouth once       magnesium hydroxide (MILK OF MAGNESIA) 400 MG/5ML suspension Take 30 mLs by mouth daily as needed for constipation or heartburn       oxyCODONE (ROXICODONE) 5 MG IR tablet Take 5-10 mg by mouth every 4 hours as needed for moderate to severe pain       senna-docusate (SENOKOT-S;PERICOLACE) 8.6-50 MG per tablet Take 1 tablet by mouth 2 times daily       aspirin  MG EC tablet  Take 1 tablet (325 mg) by mouth daily with food 14 tablet 0     polyethylene glycol (MIRALAX/GLYCOLAX) Packet Take 17 g by mouth daily 14 packet 1     LOSARTAN POTASSIUM PO Take 25 mg by mouth daily        SIMVASTATIN PO Take 40 mg by mouth daily        Levothyroxine Sodium (LEVOTHROID PO) Take 150 mcg by mouth daily        tamsulosin (FLOMAX) 0.4 MG capsule Take 0.4 mg by mouth daily        PIOGLITAZONE HCL PO Take 45 mg by mouth daily        GLIPIZIDE PO Take 10 mg by mouth 2 times daily (before meals)        GABAPENTIN PO Take 300 mg by mouth At Bedtime        Furosemide (LASIX PO) Take 40 mg by mouth daily       Medications reviewed:  Medications reconciled to facility chart and changes were made to reflect current medications as identified as above med list. Below are the changes that were made:   Medications stopped since last EPIC medication reconciliation:   There are no discontinued medications.    Medications started since last [facility] medication reconciliation:  No orders of the defined types were placed in this encounter.        REVIEW OF SYSTEMS:  10 point ROS of systems including Constitutional, Eyes, Respiratory, Cardiovascular, Gastroenterology, Genitourinary, Integumentary, Muscularskeletal, Psychiatric were all negative except for pertinent positives noted in my HPI.    Physical Exam:  /61  Pulse 60  Temp 98.2  F (36.8  C)  Resp 20  Wt (!) 319 lb (144.7 kg)  SpO2 95%  BMI 48.5 kg/m2  Gen: sitting in chair, alert, cooperative and in no acute distress  HEENT: normocephalic; oropharynx clear  Card: RRR, S1, S2, no murmurs  Resp: lungs clear to auscultation bilaterally, no wheezes or crackles  GI: abdomen soft, not-tender  MSK:3 plus lymphedema ; left foot in splint   Neuro: CX II-XII grossly in tact; ROM in all four extremities grossly in tact  Psych: alert and oriented x3; normal affect  Skin: warm, no suspicious rashes or lesions noted      Recent Labs:    Results for orders placed or  performed in visit on 04/12/17   T4 free   Result Value Ref Range    T4 Free 1.38 0.76 - 1.46 ng/dL   TSH   Result Value Ref Range    TSH 15.63 (H) 0.40 - 4.00 mU/L   Vitamin B12   Result Value Ref Range    Vitamin B12 453 193 - 986 pg/mL   Folate   Result Value Ref Range    Folate  >5.4 ng/mL     Unsatisfactory specimen - hemolyzed   Canceled, Test credited  NOTIFIED CATHY BANKS RN AT [facility] 2120 4/12/2017 BY AA           Assessment/Plan:     Closed displaced fracture of posterior process of right talus with routine healing, subsequent encounter  S/P ORIF (open reduction internal fixation) fracture  Stasis ulcer, left (H)  Type 2 diabetes mellitus with complication, without long-term current use of insulin (H)    Orders:  1. Do not increase neurontin to BID, Neurontin 300 mg po qhs Dx DM neuropathy.  2. Increase synthroid to 200 mg po qd and check TSH and FT4 in 4 weeks with PMD.  3. At discharge please make referral to lymphedema.Dx LE lymphedema  4. Toenails need trimming        Electronically signed by  Anh Levi MD, MD

## 2017-04-20 VITALS
HEART RATE: 64 BPM | WEIGHT: 313 LBS | BODY MASS INDEX: 47.59 KG/M2 | RESPIRATION RATE: 20 BRPM | DIASTOLIC BLOOD PRESSURE: 57 MMHG | OXYGEN SATURATION: 94 % | SYSTOLIC BLOOD PRESSURE: 113 MMHG | TEMPERATURE: 98.2 F

## 2017-04-20 NOTE — PROGRESS NOTES
[facility]    Chief Complaint   Patient presents with     Discharge Summary Nursing Home     Nursing Home Acute       HPI:    Jayesh Ortiz is a 76 year old  (1940), who is being seen today for an episodic care visit at [facility] . Today's concern is:     Closed displaced fracture of posterior process of right talus with routine healing, subsequent encounter--saw his podiatrist- placed a hard cast- needs another 2 weeks of therapy prior to going home...  S/P ORIF (open reduction internal fixation) fracture  Stasis ulcer, left (H)-- has healed   Type 2 diabetes mellitus with complication, without long-term current use of insulin (H) -- well controlled on current regimen          ALLERGIES: Nka [no known allergies]  Past Medical, Surgical, Family and Social History reviewed and updated in [facility].    Current Outpatient Prescriptions   Medication Sig Dispense Refill     Gabapentin (NEURONTIN PO) Take 300 mg by mouth daily        TRAZODONE HCL PO Take 25 mg by mouth At Bedtime And 25 prn       Acetaminophen (TYLENOL PO) Take 650 mg by mouth every 4 hours as needed for mild pain or fever       hydrocortisone (CORTAID) 1 % cream Apply topically 3 times daily as needed for itching       hydrOXYzine (ATARAX) 25 MG tablet Take 25-50 mg by mouth every 4 hours as needed for itching       bisacodyl (DULCOLAX) 10 MG Suppository Place 10 mg rectally daily as needed for constipation       magnesium citrate solution Take 240 mLs by mouth once       magnesium hydroxide (MILK OF MAGNESIA) 400 MG/5ML suspension Take 30 mLs by mouth daily as needed for constipation or heartburn       oxyCODONE (ROXICODONE) 5 MG IR tablet Take 5-10 mg by mouth every 4 hours as needed for moderate to severe pain       senna-docusate (SENOKOT-S;PERICOLACE) 8.6-50 MG per tablet Take 1 tablet by mouth 2 times daily       aspirin  MG EC tablet Take 1 tablet (325 mg) by mouth daily with food 14 tablet 0      polyethylene glycol (MIRALAX/GLYCOLAX) Packet Take 17 g by mouth daily 14 packet 1     LOSARTAN POTASSIUM PO Take 25 mg by mouth daily        SIMVASTATIN PO Take 40 mg by mouth daily        Levothyroxine Sodium (LEVOTHROID PO) Take 200 mcg by mouth daily        tamsulosin (FLOMAX) 0.4 MG capsule Take 0.4 mg by mouth daily        PIOGLITAZONE HCL PO Take 45 mg by mouth daily        GLIPIZIDE PO Take 10 mg by mouth 2 times daily (before meals)        Furosemide (LASIX PO) Take 40 mg by mouth daily       Medications reviewed:  Medications reconciled to facility chart and changes were made to reflect current medications as identified as above med list. Below are the changes that were made:   Medications stopped since last EPIC medication reconciliation:   There are no discontinued medications.    Medications started since last [facility] medication reconciliation:  No orders of the defined types were placed in this encounter.        REVIEW OF SYSTEMS:  10 point ROS of systems including Constitutional, Eyes, Respiratory, Cardiovascular, Gastroenterology, Genitourinary, Integumentary, Muscularskeletal, Psychiatric were all negative except for pertinent positives noted in my HPI.    Physical Exam:  /57  Pulse 64  Temp 98.2  F (36.8  C)  Resp 20  Wt (!) 313 lb (142 kg)  SpO2 94%  BMI 47.59 kg/m2    Gen: sitting in chair, alert, cooperative and in no acute distress  HEENT: normocephalic; oropharynx clear  Card: RRR, S1, S2, no murmurs  Resp: lungs clear to auscultation bilaterally, no wheezes or crackles  GI: abdomen soft, not-tender  MSK: right foot in  CAST  Neuro: CX II-XII grossly in tact; ROM in all four extremities grossly in tact  Psych: alert and oriented x3; normal affect  Skin: warm, no suspicious rashes or lesions noted      Recent Labs:    Results for orders placed or performed in visit on 04/14/17   Folate   Result Value Ref Range    Folate 8.6 >5.4 ng/mL         Assessment/Plan:     Closed displaced  fracture of posterior process of right talus with routine healing, subsequent encounter  S/P ORIF (open reduction internal fixation) fracture  Stasis ulcer, left (H)  Type 2 diabetes mellitus with complication, without long-term current use of insulin (H)    Orders:  1. Orders from Dr Bernal podiatrist appt on 4/20- now in walking cast with slow progressive WBAT starting at 20% increments up to 100%  2. Increase trazodone po qhs, if not asleep in 2 hrs give another 50 mg po qd Dx Insomnia        Electronically signed by  Anh Levi MD, MD

## 2017-04-21 ENCOUNTER — NURSING HOME VISIT (OUTPATIENT)
Dept: GERIATRICS | Facility: CLINIC | Age: 77
End: 2017-04-21
Payer: MEDICARE

## 2017-04-21 DIAGNOSIS — I83.029 STASIS ULCER, LEFT: ICD-10-CM

## 2017-04-21 DIAGNOSIS — E11.8 TYPE 2 DIABETES MELLITUS WITH COMPLICATION, WITHOUT LONG-TERM CURRENT USE OF INSULIN (H): ICD-10-CM

## 2017-04-21 DIAGNOSIS — S92.131D CLOSED DISPLACED FRACTURE OF POSTERIOR PROCESS OF RIGHT TALUS WITH ROUTINE HEALING, SUBSEQUENT ENCOUNTER: Primary | ICD-10-CM

## 2017-04-21 DIAGNOSIS — Z87.81 S/P ORIF (OPEN REDUCTION INTERNAL FIXATION) FRACTURE: ICD-10-CM

## 2017-04-21 DIAGNOSIS — Z98.890 S/P ORIF (OPEN REDUCTION INTERNAL FIXATION) FRACTURE: ICD-10-CM

## 2017-04-21 PROCEDURE — 99309 SBSQ NF CARE MODERATE MDM 30: CPT | Performed by: FAMILY MEDICINE

## 2017-04-21 PROCEDURE — 99207 ZZC CDG-CORRECTLY CODED, REVIEWED AND AGREE: CPT | Performed by: FAMILY MEDICINE

## 2017-04-21 NOTE — MR AVS SNAPSHOT
"              After Visit Summary   4/21/2017    Jayesh Ortiz    MRN: [id number]           Patient Information     Date Of Birth          1940        Visit Information        Provider Department      4/21/2017 8:00 AM Anh Levi MD [facility] Transitional Care        Today's Diagnoses     Closed displaced fracture of posterior process of right talus with routine healing, subsequent encounter    -  1    S/P ORIF (open reduction internal fixation) fracture        Stasis ulcer, left (H)        Type 2 diabetes mellitus with complication, without long-term current use of insulin (H)           Follow-ups after your visit        Your next 10 appointments already scheduled     Apr 25, 2017  9:30 AM [facility]   Nursing [place] with Anh Levi MD   Geriatrics Transitional Care ([facility])    [address] [place] [id number] [phone]              Who to contact     If you have questions or need follow up information about today's clinic visit or your schedule please contact G[facility] directly at [phone].  Normal or non-critical lab and imaging results will be communicated to you by MyChart, letter or phone within 4 business days after the clinic has received the results. If you do not hear from us within 7 days, please contact the clinic through [employer] or phone. If you have a critical or abnormal lab result, we will notify you by phone as soon as possible.  Submit refill requests through [employer] or call your pharmacy and they will forward the refill request to us. Please allow 3 business days for your refill to be completed.          Additional Information About Your Visit        MyChart Information     [employer] lets you send messages to your doctor, view your test results, renew your prescriptions, schedule appointments and more. To sign up, go to [email or web address] . Click on \"Log in\" on the left side of the screen, which will take you to the " "Welcome page. Then click on \"Sign up Now\" on the right side of the page.     You will be asked to enter the access code listed below, as well as some personal information. Please follow the directions to create your username and password.     Your access code is: [email or web address]  Expires: 2017  3:03 AM     Your access code will  in 90 days. If you need help or a new code, please call your [facility] or [phone].        Care EveryWhere ID     This is your Care EveryWhere ID. This could be used by other organizations to access your [facility] medical records  [id number]        Your Vitals Were     Pulse Temperature Respirations Pulse Oximetry BMI (Body Mass Index)       64 98.2  F (36.8  C) 20 94% 47.59 kg/m2        Blood Pressure from Last 3 Encounters:   17 113/57   17 131/61   17 106/61    Weight from Last 3 Encounters:   17 (!) 313 lb (142 kg)   17 (!) 319 lb (144.7 kg)   17 (!) 315 lb (142.9 kg)              Today, you had the following     No orders found for display       Primary Care Provider Office Phone # Fax #    [facility] [phone] [address] [facility] [address]        Thank you!     Thank you for choosing [facility]  for your care. Our goal is always to provide you with excellent care. Hearing back from our patients is one way we can continue to improve our services. Please take a few minutes to complete the written survey that you may receive in the mail after your visit with us. Thank you!             Your Updated Medication List - Protect others around you: Learn how to safely use, store and throw away your medicines at www.disposemymeds.org.          This list is accurate as of: 17 11:59 PM.  Always use your most recent med list.                   Brand Name Dispense Instructions for use    aspirin 325 MG EC tablet     14 tablet    Take 1 tablet (325 mg) by mouth daily with food       " bisacodyl 10 MG Suppository    DULCOLAX     Place 10 mg rectally daily as needed for constipation       GLIPIZIDE PO      Take 10 mg by mouth 2 times daily (before meals)       hydrocortisone 1 % cream    CORTAID     Apply topically 3 times daily as needed for itching       hydrOXYzine 25 MG tablet    ATARAX     Take 25-50 mg by mouth every 4 hours as needed for itching       LASIX PO      Take 40 mg by mouth daily       LEVOTHROID PO      Take 200 mcg by mouth daily       LOSARTAN POTASSIUM PO      Take 25 mg by mouth daily       magnesium citrate solution      Take 240 mLs by mouth once       magnesium hydroxide 400 MG/5ML suspension    MILK OF MAGNESIA     Take 30 mLs by mouth daily as needed for constipation or heartburn       NEURONTIN PO      Take 300 mg by mouth daily       PIOGLITAZONE HCL PO      Take 45 mg by mouth daily       polyethylene glycol Packet    MIRALAX/GLYCOLAX    14 packet    Take 17 g by mouth daily       ROXICODONE 5 MG IR tablet   Generic drug:  oxyCODONE      Take 5-10 mg by mouth every 4 hours as needed for moderate to severe pain       senna-docusate 8.6-50 MG per tablet    SENOKOT-S;PERICOLACE     Take 1 tablet by mouth 2 times daily       SIMVASTATIN PO      Take 40 mg by mouth daily       tamsulosin 0.4 MG capsule    FLOMAX     Take 0.4 mg by mouth daily       TRAZODONE HCL PO      Take 50 mg by mouth At Bedtime And 50 prn if not asleep in 2 hrs       TYLENOL PO      Take 650 mg by mouth every 4 hours as needed for mild pain or fever

## 2017-04-24 VITALS
TEMPERATURE: 98 F | RESPIRATION RATE: 16 BRPM | OXYGEN SATURATION: 96 % | DIASTOLIC BLOOD PRESSURE: 64 MMHG | SYSTOLIC BLOOD PRESSURE: 101 MMHG | BODY MASS INDEX: 47.9 KG/M2 | WEIGHT: 315 LBS | HEART RATE: 62 BPM

## 2017-04-24 NOTE — PROGRESS NOTES
[facility]    Chief Complaint   Patient presents with     Nursing Home Acute       HPI:    Jayesh Ortiz is a 76 year old  (1940), who is being seen today for an episodic care visit at [facility] . Today's concern is:     Closed displaced fracture of posterior process of right talus with routine healing, subsequent encounter- has partial weight bearing now  S/P ORIF (open reduction internal fixation) fracture- aoin welll contolled except at night - he will take and oxycodone at night   Type 2 diabetes mellitus with complication, without long-term current use of insulin (H)- well contolled .    ALLERGIES: Nka [no known allergies]  Past Medical, Surgical, Family and Social History reviewed and updated in [facility].    Current Outpatient Prescriptions   Medication Sig Dispense Refill     Gabapentin (NEURONTIN PO) Take 300 mg by mouth daily        TRAZODONE HCL PO Take 50 mg by mouth At Bedtime And 50 prn if not asleep in 2 hrs       Acetaminophen (TYLENOL PO) Take 650 mg by mouth every 4 hours as needed for mild pain or fever       hydrocortisone (CORTAID) 1 % cream Apply topically 3 times daily as needed for itching       hydrOXYzine (ATARAX) 25 MG tablet Take 25-50 mg by mouth every 4 hours as needed for itching       bisacodyl (DULCOLAX) 10 MG Suppository Place 10 mg rectally daily as needed for constipation       magnesium citrate solution Take 240 mLs by mouth once       magnesium hydroxide (MILK OF MAGNESIA) 400 MG/5ML suspension Take 30 mLs by mouth daily as needed for constipation or heartburn       oxyCODONE (ROXICODONE) 5 MG IR tablet Take 5-10 mg by mouth every 4 hours as needed for moderate to severe pain       senna-docusate (SENOKOT-S;PERICOLACE) 8.6-50 MG per tablet Take 1 tablet by mouth 2 times daily       aspirin  MG EC tablet Take 1 tablet (325 mg) by mouth daily with food 14 tablet 0     polyethylene glycol (MIRALAX/GLYCOLAX) Packet Take 17 g by mouth daily  14 packet 1     LOSARTAN POTASSIUM PO Take 25 mg by mouth daily        SIMVASTATIN PO Take 40 mg by mouth daily        Levothyroxine Sodium (LEVOTHROID PO) Take 200 mcg by mouth daily        tamsulosin (FLOMAX) 0.4 MG capsule Take 0.4 mg by mouth daily        PIOGLITAZONE HCL PO Take 45 mg by mouth daily        GLIPIZIDE PO Take 10 mg by mouth 2 times daily (before meals)        Furosemide (LASIX PO) Take 40 mg by mouth daily       Medications reviewed:  Medications reconciled to facility chart and changes were made to reflect current medications as identified as above med list. Below are the changes that were made:   Medications stopped since last EPIC medication reconciliation:   There are no discontinued medications.    Medications started since last [facility] medication reconciliation:  No orders of the defined types were placed in this encounter.        REVIEW OF SYSTEMS:  10 point ROS of systems including Constitutional, Eyes, Respiratory, Cardiovascular, Gastroenterology, Genitourinary, Integumentary, Muscularskeletal, Psychiatric were all negative except for pertinent positives noted in my HPI.    Physical Exam:  /64  Pulse 62  Temp 98  F (36.7  C)  Resp 16  Wt (!) 315 lb (142.9 kg)  SpO2 96%  BMI 47.9 kg/m2  Gen: sitting in chair, alert, cooperative and in no acute distress  HEENT: normocephalic; oropharynx clear  Card: RRR, S1, S2, no murmurs  Resp: lungs clear to auscultation bilaterally, no wheezes or crackles  GI: abdomen soft, not-tender  MSK: normal muscle tone, rightleg hard cast. 2 plus lymphedema  Neuro: CX II-XII grossly in tact; ROM in all four extremities grossly in tact  Psych: alert and oriented x3; normal affect  Skin: warm, no suspicious rashes or lesions noted      Recent Labs:    Results for orders placed or performed in visit on 04/14/17   Folate   Result Value Ref Range    Folate 8.6 >5.4 ng/mL         Assessment/Plan:     Closed displaced fracture of posterior process of right  talus with routine healing, subsequent encounter  S/P ORIF (open reduction internal fixation) fracture  Type 2 diabetes mellitus with complication, without long-term current use of insulin (H)    Orders:  1. Please order from pharmacy GUM tooth floss.  2. Patient agreed to home safety eval  3. Anticipate DC in 1 week  4. F/U with Dr Coy on 5/2/17      Electronically signed by  Anh Levi MD, MD

## 2017-04-25 ENCOUNTER — NURSING HOME VISIT (OUTPATIENT)
Dept: GERIATRICS | Facility: CLINIC | Age: 77
End: 2017-04-25
Payer: MEDICARE

## 2017-04-25 DIAGNOSIS — Z87.81 S/P ORIF (OPEN REDUCTION INTERNAL FIXATION) FRACTURE: ICD-10-CM

## 2017-04-25 DIAGNOSIS — E11.8 TYPE 2 DIABETES MELLITUS WITH COMPLICATION, WITHOUT LONG-TERM CURRENT USE OF INSULIN (H): ICD-10-CM

## 2017-04-25 DIAGNOSIS — S92.131D CLOSED DISPLACED FRACTURE OF POSTERIOR PROCESS OF RIGHT TALUS WITH ROUTINE HEALING, SUBSEQUENT ENCOUNTER: Primary | ICD-10-CM

## 2017-04-25 DIAGNOSIS — Z98.890 S/P ORIF (OPEN REDUCTION INTERNAL FIXATION) FRACTURE: ICD-10-CM

## 2017-04-25 PROCEDURE — 99207 ZZC CDG-CORRECTLY CODED, REVIEWED AND AGREE: CPT | Performed by: FAMILY MEDICINE

## 2017-04-25 PROCEDURE — 99309 SBSQ NF CARE MODERATE MDM 30: CPT | Performed by: FAMILY MEDICINE

## 2017-04-25 NOTE — MR AVS SNAPSHOT
"              After Visit Summary   4/25/2017    Jayesh Ortiz    MRN: [id number]           Patient Information     Date Of Birth          1940        Visit Information        Provider Department      4/25/2017 9:30 AM Anh Levi MD [facility]        Today's Diagnoses     Closed displaced fracture of posterior process of right talus with routine healing, subsequent encounter    -  1    S/P ORIF (open reduction internal fixation) fracture        Type 2 diabetes mellitus with complication, without long-term current use of insulin (H)           Follow-ups after your visit        Who to contact     If you have questions or need follow up information about today's clinic visit or your schedule please contact [facility] directly at [phone].  Normal or non-critical lab and imaging results will be communicated to you by [employer], letter or phone within 4 business days after the clinic has received the results. If you do not hear from us within 7 days, please contact the clinic through [employer] or phone. If you have a critical or abnormal lab result, we will notify you by phone as soon as possible.  Submit refill requests through [employer] or call your pharmacy and they will forward the refill request to us. Please allow 3 business days for your refill to be completed.          Additional Information About Your Visit        [employer] Information     [employer] lets you send messages to your doctor, view your test results, renew your prescriptions, schedule appointments and more. To sign up, go to [email or web address] . Click on \"Log in\" on the left side of the screen, which will take you to the Welcome page. Then click on \"Sign up Now\" on the right side of the page.     You will be asked to enter the access code listed below, as well as some personal information. Please follow the directions to create your username and password.     Your access code is: " [email or web address]  Expires: 2017  3:03 AM     Your access code will  in 90 days. If you need help or a new code, please call your [facility] or [phone].        Care EveryWhere ID     This is your Care EveryWhere ID. This could be used by other organizations to access your [facility] medical records  [id number]        Your Vitals Were     Pulse Temperature Respirations Pulse Oximetry BMI (Body Mass Index)       62 98  F (36.7  C) 16 96% 47.9 kg/m2        Blood Pressure from Last 3 Encounters:   17 101/64   17 113/57   17 131/61    Weight from Last 3 Encounters:   17 (!) 315 lb (142.9 kg)   17 (!) 313 lb (142 kg)   17 (!) 319 lb (144.7 kg)              Today, you had the following     No orders found for display       Primary Care Provider Office Phone # Fax #    [email or web address] [phone] [address] [id number]        Thank you!     Thank you for choosing [facility]  for your care. Our goal is always to provide you with excellent care. Hearing back from our patients is one way we can continue to improve our services. Please take a few minutes to complete the written survey that you may receive in the mail after your visit with us. Thank you!             Your Updated Medication List - Protect others around you: Learn how to safely use, store and throw away your medicines at www.disposemymeds.org.          This list is accurate as of: 17 11:59 PM.  Always use your most recent med list.                   Brand Name Dispense Instructions for use    aspirin 325 MG EC tablet     14 tablet    Take 1 tablet (325 mg) by mouth daily with food       bisacodyl 10 MG Suppository    DULCOLAX     Place 10 mg rectally daily as needed for constipation       GLIPIZIDE PO      Take 10 mg by mouth 2 times daily (before meals)       hydrocortisone 1 % cream    CORTAID     Apply topically 3 times daily as needed for itching        hydrOXYzine 25 MG tablet    ATARAX     Take 25-50 mg by mouth every 4 hours as needed for itching       LASIX PO      Take 40 mg by mouth daily       LEVOTHROID PO      Take 200 mcg by mouth daily       LOSARTAN POTASSIUM PO      Take 25 mg by mouth daily       magnesium citrate solution      Take 240 mLs by mouth once       magnesium hydroxide 400 MG/5ML suspension    MILK OF MAGNESIA     Take 30 mLs by mouth daily as needed for constipation or heartburn       NEURONTIN PO      Take 300 mg by mouth daily       PIOGLITAZONE HCL PO      Take 45 mg by mouth daily       polyethylene glycol Packet    MIRALAX/GLYCOLAX    14 packet    Take 17 g by mouth daily       ROXICODONE 5 MG IR tablet   Generic drug:  oxyCODONE      Take 5-10 mg by mouth every 4 hours as needed for moderate to severe pain       senna-docusate 8.6-50 MG per tablet    SENOKOT-S;PERICOLACE     Take 1 tablet by mouth 2 times daily       SIMVASTATIN PO      Take 40 mg by mouth daily       tamsulosin 0.4 MG capsule    FLOMAX     Take 0.4 mg by mouth daily       TRAZODONE HCL PO      Take 50 mg by mouth At Bedtime And 50 prn if not asleep in 2 hrs       TYLENOL PO      Take 650 mg by mouth every 4 hours as needed for mild pain or fever

## 2017-04-28 ENCOUNTER — NURSING HOME VISIT (OUTPATIENT)
Dept: GERIATRICS | Facility: CLINIC | Age: 77
End: 2017-04-28
Payer: MEDICARE

## 2017-04-28 VITALS
RESPIRATION RATE: 20 BRPM | WEIGHT: 315 LBS | BODY MASS INDEX: 48.05 KG/M2 | HEART RATE: 64 BPM | TEMPERATURE: 98 F | DIASTOLIC BLOOD PRESSURE: 61 MMHG | SYSTOLIC BLOOD PRESSURE: 121 MMHG | OXYGEN SATURATION: 98 %

## 2017-04-28 DIAGNOSIS — Z98.890 S/P ORIF (OPEN REDUCTION INTERNAL FIXATION) FRACTURE: ICD-10-CM

## 2017-04-28 DIAGNOSIS — Z87.81 S/P ORIF (OPEN REDUCTION INTERNAL FIXATION) FRACTURE: ICD-10-CM

## 2017-04-28 DIAGNOSIS — S92.131D CLOSED DISPLACED FRACTURE OF POSTERIOR PROCESS OF RIGHT TALUS WITH ROUTINE HEALING, SUBSEQUENT ENCOUNTER: Primary | ICD-10-CM

## 2017-04-28 PROCEDURE — 99308 SBSQ NF CARE LOW MDM 20: CPT | Performed by: FAMILY MEDICINE

## 2017-04-28 PROCEDURE — 99207 ZZC CDG-CORRECTLY CODED, REVIEWED AND AGREE: CPT | Performed by: FAMILY MEDICINE

## 2017-04-28 NOTE — PROGRESS NOTES
[facility]    Chief Complaint   Patient presents with     Nursing Home Acute       HPI:    Jayesh Ortiz is a 76 year old  (1940), who is being seen today for an episodic care visit at [facility] . Today's concern is:     Closed displaced fracture of posterior process of right talus with routine healing, subsequent encounter  S/P ORIF (open reduction internal fixation) fracture- followed by .  Patient is appox 20% weightbearing on right foot.     ALLERGIES: Nka [no known allergies]  Past Medical, Surgical, Family and Social History reviewed and updated in [facility].    Current Outpatient Prescriptions   Medication Sig Dispense Refill     Gabapentin (NEURONTIN PO) Take 300 mg by mouth daily        TRAZODONE HCL PO Take 50 mg by mouth At Bedtime And 50 prn if not asleep in 2 hrs       Acetaminophen (TYLENOL PO) Take 650 mg by mouth every 4 hours as needed for mild pain or fever       hydrocortisone (CORTAID) 1 % cream Apply topically 3 times daily as needed for itching       hydrOXYzine (ATARAX) 25 MG tablet Take 25-50 mg by mouth every 4 hours as needed for itching       bisacodyl (DULCOLAX) 10 MG Suppository Place 10 mg rectally daily as needed for constipation       magnesium citrate solution Take 240 mLs by mouth once       magnesium hydroxide (MILK OF MAGNESIA) 400 MG/5ML suspension Take 30 mLs by mouth daily as needed for constipation or heartburn       oxyCODONE (ROXICODONE) 5 MG IR tablet Take 5-10 mg by mouth every 4 hours as needed for moderate to severe pain       senna-docusate (SENOKOT-S;PERICOLACE) 8.6-50 MG per tablet Take 1 tablet by mouth 2 times daily       aspirin  MG EC tablet Take 1 tablet (325 mg) by mouth daily with food 14 tablet 0     polyethylene glycol (MIRALAX/GLYCOLAX) Packet Take 17 g by mouth daily 14 packet 1     LOSARTAN POTASSIUM PO Take 25 mg by mouth daily        SIMVASTATIN PO Take 40 mg by mouth daily        Levothyroxine Sodium  (LEVOTHROID PO) Take 200 mcg by mouth daily        tamsulosin (FLOMAX) 0.4 MG capsule Take 0.4 mg by mouth daily        PIOGLITAZONE HCL PO Take 45 mg by mouth daily        GLIPIZIDE PO Take 10 mg by mouth 2 times daily (before meals)        Furosemide (LASIX PO) Take 40 mg by mouth daily       Medications reviewed:  Medications reconciled to facility chart and changes were made to reflect current medications as identified as above med list. Below are the changes that were made:   Medications stopped since last EPIC medication reconciliation:   There are no discontinued medications.    Medications started since last [facility] medication reconciliation:  No orders of the defined types were placed in this encounter.        REVIEW OF SYSTEMS:  10 point ROS of systems including Constitutional, Eyes, Respiratory, Cardiovascular, Gastroenterology, Genitourinary, Integumentary, Muscularskeletal, Psychiatric were all negative except for pertinent positives noted in my HPI.    Physical Exam:  /61  Pulse 64  Temp 98  F (36.7  C)  Resp 20  Wt (!) 316 lb (143.3 kg)  SpO2 98%  BMI 48.05 kg/m2  Gen: sitting in chair, alert, cooperative and in no acute distress  HEENT: normocephalic; oropharynx clear  Card: RRR, S1, S2, no murmurs  Resp: lungs clear to auscultation bilaterally, no wheezes or crackles  GI: abdomen soft, not-tender  MSK: normal muscle tone, 3 plus  Lymphedema 3 plus - leg wrapped . Right foot in cast     Neuro: CX II-XII grossly in tact; ROM in all four extremities grossly in tact  Psych: alert and oriented x3; normal affect  Skin: warm, no suspicious rashes or lesions noted      Recent Labs:  All labs reviewed, none recent    Assessment/Plan:     Closed displaced fracture of posterior process of right talus with routine healing, subsequent encounter  S/P ORIF (open reduction internal fixation) fracture    Orders:  1. F2F for 2 wheeled walker    Electronically signed by  Anh Lvei MD,  MD      MEDICAL NECESSITY STATEMENT FOR DME    Demographic Information on Jayesh Ortiz:    Jayesh Ortiz  Gender: male  : 1940  [address] [place] [address]  [phone] (home) [phone] (work)    Medical Record: [id number]  Social Security Number: [id number]  Primary Care Provider: [facility], [facility]  Insurance: Payor: MEDICARE / Plan: MEDICARE / Product Type: Medicare /       Closed displaced fracture of posterior process of right talus with routine healing, subsequent encounter [W13.316Y]    DME:  WHEELCHAIR: no (TYPE, CUSHION)   Standard foot rest no   Elevating leg rest no   Dose patient use oxygen   Able to propel w/c  (if no why? And is there someone who can?)   Mobility related ADL that are affected in the home    The wheelchair is suitable and necessary for use in the patient's home and the  Home/rooms can accommodate the wheelchair.     Reason why a cane or walker will not meet the patient's needs. (ie: balance,   Tolerance, level of assistance)    The patient has expressed willingness to use the wheelchair in the home and    Does have the physical and cognitive ability to maneuver the equipment or has a    Caregiver who is available, willing, and able to provide assistance in the home    With the wheelchair.   HOSPITAL BED:  no   With or without railings  WALKER: yes, front 2 wheeled walker for community distances dur to fractured right foot. (TYPE)  OXYGEN: no  NEBULIZER: no (MEDICATION TO BE ADMINISTERED)    VITAL SIGNS:  Vitals: /61  Pulse 64  Temp 98  F (36.7  C)  Resp 20  Wt (!) 316 lb (143.3 kg)  SpO2 98%  BMI 48.05 kg/m2  BMI= Body mass index is 48.05 kg/(m^2).       (for Oxygen need Liter flow, Portable tank, O2 sats at rest on room air)     MEDICAL NECESSITY STATEMENT (describe reason to support DME here including length of need)  Closed displaced fracture of posterior process of right talus with routine healing, subsequent encounter  [S92.131D]    ELECTRONICALLY SIGNED BY PECOS CERTIFIED PROVIDER:  Anh Levi MD, MD   NPI:NPI:[id number]    [facility]  [address] [place] [address], [address] [address]

## 2017-05-02 ENCOUNTER — NURSING HOME VISIT (OUTPATIENT)
Dept: GERIATRICS | Facility: CLINIC | Age: 77
End: 2017-05-02
Payer: MEDICARE

## 2017-05-02 VITALS
OXYGEN SATURATION: 92 % | WEIGHT: 315 LBS | DIASTOLIC BLOOD PRESSURE: 62 MMHG | RESPIRATION RATE: 20 BRPM | BODY MASS INDEX: 47.9 KG/M2 | TEMPERATURE: 97.6 F | SYSTOLIC BLOOD PRESSURE: 99 MMHG | HEART RATE: 61 BPM

## 2017-05-02 DIAGNOSIS — Z98.890 S/P ORIF (OPEN REDUCTION INTERNAL FIXATION) FRACTURE: ICD-10-CM

## 2017-05-02 DIAGNOSIS — S92.131D CLOSED DISPLACED FRACTURE OF POSTERIOR PROCESS OF RIGHT TALUS WITH ROUTINE HEALING, SUBSEQUENT ENCOUNTER: Primary | ICD-10-CM

## 2017-05-02 DIAGNOSIS — Z87.81 S/P ORIF (OPEN REDUCTION INTERNAL FIXATION) FRACTURE: ICD-10-CM

## 2017-05-02 DIAGNOSIS — E11.8 TYPE 2 DIABETES MELLITUS WITH COMPLICATION, WITHOUT LONG-TERM CURRENT USE OF INSULIN (H): ICD-10-CM

## 2017-05-02 PROCEDURE — 99316 NF DSCHRG MGMT 30 MIN+: CPT | Performed by: FAMILY MEDICINE

## 2017-05-02 PROCEDURE — 99207 ZZC CDG-CORRECTLY CODED, REVIEWED AND AGREE: CPT | Performed by: FAMILY MEDICINE

## 2017-05-02 NOTE — PROGRESS NOTES
[facility] DISCHARGE SUMMARY    PATIENT'S NAME: Jayesh Ortiz  YOB: 1940  MEDICAL RECORD NUMBER:  [id number]    PRIMARY CARE PROVIDER AND CLINIC RESPONSIBLE AFTER TRANSFER: Jono [address] [address]     CODE STATUS/ADVANCE DIRECTIVES DISCUSSION:   CPR/Full code        Allergies   Allergen Reactions     Nka [No Known Allergies]        TRANSFERRING PROVIDERS: Anh Levi MD  DATE OF SNF ADMISSION:  March / 12 / 2017  DATE OF SNF (anticipated) DISCHARGE: May / 02 / 2017  DISCHARGE DISPOSITION: Non-[facility] Provider   Nursing Facility: [facility] stay 3/9/17 to 3/13/17.     Condition on Discharge:  Improving.  Function:  indp  Cognitive Scores: CPT 5.0    Equipment: walker    DISCHARGE DIAGNOSIS:   1. Closed displaced fracture of posterior process of right talus with routine healing, subsequent encounter    2. S/P ORIF (open reduction internal fixation) fracture    3. Type 2 diabetes mellitus with complication, without long-term current use of insulin (H)        [facility] Course:  Patient was admitted to facility after hospitalization for Fall, Right ankle fracture and S/P ORIF.  Patient participated in PT/OT.  Patient will discharge home with outpatient PT/OT and lymphedema treatment.  Weight bearing orders per Dr. Coy. Patient has f/u appointment with him today.      Closed displaced fracture of posterior process of right talus with routine healing, subsequent encounter  S/P ORIF (open reduction internal fixation) fracture  Type 2 diabetes mellitus with complication, without long-term current use of insulin (H)    PAST MEDICAL HISTORY:  has a past medical history of Diabetes (H); Hypertension; and Thyroid disease.    DISCHARGE MEDICATIONS:  Current Outpatient Prescriptions   Medication Sig Dispense Refill     Gabapentin (NEURONTIN PO) Take 300 mg by mouth daily         TRAZODONE HCL PO Take 50 mg by mouth At Bedtime And 50 prn if not asleep in 2 hrs       Acetaminophen (TYLENOL PO) Take 650 mg by mouth every 4 hours as needed for mild pain or fever       hydrocortisone (CORTAID) 1 % cream Apply topically 3 times daily as needed for itching       hydrOXYzine (ATARAX) 25 MG tablet Take 25-50 mg by mouth every 4 hours as needed for itching       bisacodyl (DULCOLAX) 10 MG Suppository Place 10 mg rectally daily as needed for constipation       magnesium citrate solution Take 240 mLs by mouth once       magnesium hydroxide (MILK OF MAGNESIA) 400 MG/5ML suspension Take 30 mLs by mouth daily as needed for constipation or heartburn       oxyCODONE (ROXICODONE) 5 MG IR tablet Take 5-10 mg by mouth every 4 hours as needed for moderate to severe pain       senna-docusate (SENOKOT-S;PERICOLACE) 8.6-50 MG per tablet Take 1 tablet by mouth 2 times daily       aspirin  MG EC tablet Take 1 tablet (325 mg) by mouth daily with food 14 tablet 0     polyethylene glycol (MIRALAX/GLYCOLAX) Packet Take 17 g by mouth daily 14 packet 1     LOSARTAN POTASSIUM PO Take 25 mg by mouth daily        SIMVASTATIN PO Take 40 mg by mouth daily        Levothyroxine Sodium (LEVOTHROID PO) Take 200 mcg by mouth daily        tamsulosin (FLOMAX) 0.4 MG capsule Take 0.4 mg by mouth daily        PIOGLITAZONE HCL PO Take 45 mg by mouth daily        GLIPIZIDE PO Take 10 mg by mouth 2 times daily (before meals)        Furosemide (LASIX PO) Take 40 mg by mouth daily         MEDICATION CHANGES/RATIONALE:   none  Controlled medications sent with patient: not applicable/none     ROS:    10 point ROS of systems including Constitutional, Eyes, Respiratory, Cardiovascular, Gastroenterology, Genitourinary, Integumentary, Muscularskeletal, Psychiatric were all negative except for pertinent positives noted in my HPI.    Physical Exam:   Vitals: BP 99/62  Pulse 61  Temp 97.6  F (36.4  C)  Resp 20  Wt (!) 315 lb (142.9 kg)   SpO2 92%  BMI 47.9 kg/m2  BMI= Body mass index is 47.9 kg/(m^2).    Gen: obese, friendly male sitting in chair, alert, cooperative and in no acute distress  HEENT: normocephalic; oropharynx clear  Card: RRR, S1, S2, no murmurs  Resp: lungs clear to auscultation bilaterally, no wheezes or crackles  GI: abdomen soft, not-tender  MSK: normal muscle tone, right foot in a cast . 3 plus  Lymphedema- legs are wrapped.     Neuro: CX II-XII grossly in tact; ROM in all four extremities grossly in tact  Psych: alert and oriented x3; normal affect  Skin: warm, no suspicious rashes or lesions noted      DISCHARGE PLAN:  Outpatient PT and lymphedema treatment  Patient instructed to follow-up with:  PCP in 7 days      [facility] scheduled appointments:  No future appointments.    MTM referral needed and placed by this provider: No    Pending labs: none  SNF labs   Results for orders placed or performed in visit on 04/14/17   Folate   Result Value Ref Range    Folate 8.6 >5.4 ng/mL       Discharge Treatments:  1. F/U with Dr Bernal today  TOTAL DISCHARGE TIME:   Greater than 30 minutes  Electronically signed by:  Anh Levi MD, MD

## 2017-05-19 ENCOUNTER — COMMUNICATION - HEALTHEAST (OUTPATIENT)
Dept: FAMILY MEDICINE | Facility: CLINIC | Age: 77
End: 2017-05-19

## 2017-05-24 ENCOUNTER — COMMUNICATION - HEALTHEAST (OUTPATIENT)
Dept: FAMILY MEDICINE | Facility: CLINIC | Age: 77
End: 2017-05-24

## 2017-05-25 ENCOUNTER — COMMUNICATION - HEALTHEAST (OUTPATIENT)
Dept: FAMILY MEDICINE | Facility: CLINIC | Age: 77
End: 2017-05-25

## 2017-05-31 ENCOUNTER — COMMUNICATION - HEALTHEAST (OUTPATIENT)
Dept: FAMILY MEDICINE | Facility: CLINIC | Age: 77
End: 2017-05-31

## 2017-06-01 ENCOUNTER — OFFICE VISIT - HEALTHEAST (OUTPATIENT)
Dept: FAMILY MEDICINE | Facility: CLINIC | Age: 77
End: 2017-06-01

## 2017-06-01 DIAGNOSIS — S81.801A LEG WOUND, RIGHT, INITIAL ENCOUNTER: ICD-10-CM

## 2017-06-01 DIAGNOSIS — L03.90 CELLULITIS: ICD-10-CM

## 2017-06-01 DIAGNOSIS — E11.9 TYPE 2 DIABETES MELLITUS (H): ICD-10-CM

## 2017-06-01 ASSESSMENT — MIFFLIN-ST. JEOR: SCORE: 2159.15

## 2017-06-02 ENCOUNTER — COMMUNICATION - HEALTHEAST (OUTPATIENT)
Dept: FAMILY MEDICINE | Facility: CLINIC | Age: 77
End: 2017-06-02

## 2017-06-02 ENCOUNTER — RECORDS - HEALTHEAST (OUTPATIENT)
Dept: ADMINISTRATIVE | Facility: OTHER | Age: 77
End: 2017-06-02

## 2017-06-06 ENCOUNTER — COMMUNICATION - HEALTHEAST (OUTPATIENT)
Dept: FAMILY MEDICINE | Facility: CLINIC | Age: 77
End: 2017-06-06

## 2017-06-09 ENCOUNTER — COMMUNICATION - HEALTHEAST (OUTPATIENT)
Dept: FAMILY MEDICINE | Facility: CLINIC | Age: 77
End: 2017-06-09

## 2017-09-29 ENCOUNTER — OFFICE VISIT - HEALTHEAST (OUTPATIENT)
Dept: FAMILY MEDICINE | Facility: CLINIC | Age: 77
End: 2017-09-29

## 2017-09-29 DIAGNOSIS — E03.9 HYPOTHYROIDISM: ICD-10-CM

## 2017-09-29 DIAGNOSIS — E11.9 TYPE 2 DIABETES MELLITUS (H): ICD-10-CM

## 2017-09-29 DIAGNOSIS — E78.5 HYPERLIPIDEMIA: ICD-10-CM

## 2017-09-29 LAB
HBA1C MFR BLD: 7.4 % (ref 3.5–6)
LDLC SERPL CALC-MCNC: 130 MG/DL

## 2017-09-29 ASSESSMENT — MIFFLIN-ST. JEOR: SCORE: 2159.15

## 2017-12-05 ENCOUNTER — RECORDS - HEALTHEAST (OUTPATIENT)
Dept: ADMINISTRATIVE | Facility: OTHER | Age: 77
End: 2017-12-05

## 2018-04-01 ENCOUNTER — COMMUNICATION - HEALTHEAST (OUTPATIENT)
Dept: FAMILY MEDICINE | Facility: CLINIC | Age: 78
End: 2018-04-01

## 2018-04-01 DIAGNOSIS — E78.5 HYPERLIPIDEMIA: ICD-10-CM

## 2018-04-01 DIAGNOSIS — E11.9 DIABETES (H): ICD-10-CM

## 2018-04-01 DIAGNOSIS — E03.9 HYPOTHYROIDISM: ICD-10-CM

## 2018-04-04 ENCOUNTER — COMMUNICATION - HEALTHEAST (OUTPATIENT)
Dept: FAMILY MEDICINE | Facility: CLINIC | Age: 78
End: 2018-04-04

## 2018-04-04 DIAGNOSIS — I10 HTN (HYPERTENSION): ICD-10-CM

## 2018-04-04 DIAGNOSIS — C61 MALIGNANT NEOPLASM OF PROSTATE (H): ICD-10-CM

## 2018-07-02 ENCOUNTER — COMMUNICATION - HEALTHEAST (OUTPATIENT)
Dept: FAMILY MEDICINE | Facility: CLINIC | Age: 78
End: 2018-07-02

## 2018-07-02 DIAGNOSIS — I10 HTN (HYPERTENSION): ICD-10-CM

## 2018-07-02 DIAGNOSIS — C61 MALIGNANT NEOPLASM OF PROSTATE (H): ICD-10-CM

## 2018-09-28 ENCOUNTER — COMMUNICATION - HEALTHEAST (OUTPATIENT)
Dept: FAMILY MEDICINE | Facility: CLINIC | Age: 78
End: 2018-09-28

## 2018-09-28 DIAGNOSIS — I10 HTN (HYPERTENSION): ICD-10-CM

## 2018-10-01 ENCOUNTER — COMMUNICATION - HEALTHEAST (OUTPATIENT)
Dept: FAMILY MEDICINE | Facility: CLINIC | Age: 78
End: 2018-10-01

## 2018-10-01 DIAGNOSIS — E78.5 HYPERLIPIDEMIA: ICD-10-CM

## 2018-10-02 ENCOUNTER — COMMUNICATION - HEALTHEAST (OUTPATIENT)
Dept: FAMILY MEDICINE | Facility: CLINIC | Age: 78
End: 2018-10-02

## 2018-10-02 DIAGNOSIS — E11.9 DIABETES (H): ICD-10-CM

## 2018-10-02 DIAGNOSIS — E03.9 HYPOTHYROIDISM: ICD-10-CM

## 2018-10-02 DIAGNOSIS — C61 MALIGNANT NEOPLASM OF PROSTATE (H): ICD-10-CM

## 2018-12-01 ENCOUNTER — RECORDS - HEALTHEAST (OUTPATIENT)
Dept: ADMINISTRATIVE | Facility: OTHER | Age: 78
End: 2018-12-01

## 2018-12-01 ENCOUNTER — HOSPITAL ENCOUNTER (EMERGENCY)
Facility: CLINIC | Age: 78
Discharge: HOME | End: 2018-12-01
Attending: FAMILY MEDICINE | Admitting: FAMILY MEDICINE
Payer: MEDICARE

## 2018-12-01 VITALS — HEART RATE: 85 BPM | OXYGEN SATURATION: 95 % | TEMPERATURE: 97.4 F | RESPIRATION RATE: 18 BRPM

## 2018-12-01 DIAGNOSIS — S61.412A LACERATION OF LEFT HAND WITHOUT FOREIGN BODY, INITIAL ENCOUNTER: ICD-10-CM

## 2018-12-01 PROCEDURE — 99283 EMERGENCY DEPT VISIT LOW MDM: CPT | Performed by: FAMILY MEDICINE

## 2018-12-01 PROCEDURE — 25000132 ZZH RX MED GY IP 250 OP 250 PS 637: Mod: GY | Performed by: FAMILY MEDICINE

## 2018-12-01 PROCEDURE — A9270 NON-COVERED ITEM OR SERVICE: HCPCS | Mod: GY | Performed by: FAMILY MEDICINE

## 2018-12-01 PROCEDURE — 12001 RPR S/N/AX/GEN/TRNK 2.5CM/<: CPT | Performed by: FAMILY MEDICINE

## 2018-12-01 PROCEDURE — 12001 RPR S/N/AX/GEN/TRNK 2.5CM/<: CPT | Mod: Z6 | Performed by: FAMILY MEDICINE

## 2018-12-01 PROCEDURE — 99284 EMERGENCY DEPT VISIT MOD MDM: CPT | Mod: 25 | Performed by: FAMILY MEDICINE

## 2018-12-01 RX ORDER — LIDOCAINE HYDROCHLORIDE 10 MG/ML
INJECTION, SOLUTION INFILTRATION; PERINEURAL
Status: DISCONTINUED
Start: 2018-12-01 | End: 2018-12-01 | Stop reason: HOSPADM

## 2018-12-01 RX ADMIN — AMOXICILLIN AND CLAVULANATE POTASSIUM 1 TABLET: 875; 125 TABLET, FILM COATED ORAL at 11:09

## 2018-12-01 NOTE — ED AVS SNAPSHOT
[facility] Emergency Department    [address] [address]    Phone:  [phone]    Fax:  [phone]                                       Jayesh Ortiz   MRN: [id number]    Department:  [facility] Emergency Department   Date of Visit:  12/1/2018           After Visit Summary Signature Page     I have received my discharge instructions, and my questions have been answered. I have discussed any challenges I see with this plan with the nurse or doctor.    ..........................................................................................................................................  Patient/Patient Representative Signature      ..........................................................................................................................................  Patient Representative Print Name and Relationship to Patient    ..................................................               ................................................  Date                                   Time    ..........................................................................................................................................  Reviewed by Signature/Title    ...................................................              ..............................................  Date                                               Time          22EPIC Rev 08/18

## 2018-12-01 NOTE — ED PROVIDER NOTES
HPI  Patient is a 78-year-old male presenting with an injury to his right and left hands.  The patient has a past medical history of diabetes.  Other past medical history medications are reviewed.  Social history reviewed.  He comes in by private car with his son.    The patient was outside this morning feeding the cows when he slipped and fell down, into the bucket of his bobcat.  The metal edge of his bailer is relatively sharp.  His hands landed on to this edge.  He reports an obvious laceration at the base of his left fifth finger.  He also reports an abrasion at the base of his right thumb.  He reports having a contusion on his left chest.  He denies any shortness of breath.  He denies anything but superficial chest discomfort.  There is some local tenderness over this area.  No head trauma.  No loss of consciousness.  No neck pain.  No back pain.  No lower extremity pain.  No abdominal pain.  No pelvis pain.    ROS: All other review of systems are negative other than that noted above.     Past Medical History:   Diagnosis Date     Diabetes (H)      Hypertension      Thyroid disease      Past Surgical History:   Procedure Laterality Date     CLOSED REDUCTION, PERCUTANEOUS PINNING LOWER EXTREMITY, COMBINED Right 3/9/2017    Procedure: COMBINED CLOSED REDUCTION, PERCUTANEOUS PINNING LOWER EXTREMITY;  Surgeon: Ankit Coy DPM;  Location: [facility]     THYROIDECTOMY       Family History   Problem Relation Age of Onset     Heart Failure Mother      Heart Failure Father      Social History   Substance Use Topics     Smoking status: Never Smoker     Smokeless tobacco: Not on file     Alcohol use No         PHYSICAL  Pulse 85  Temp 97.4  F (36.3  C) (Oral)  Resp 18  SpO2 95%  General: Patient is alert and in moderate distress.  Neurological: Alert.  Moving upper and lower extremities equally, bilaterally.  Head / Neck: Atraumatic.  Ears: Not done.  Eyes: Pupils are equal, round, and reactive.  Normal  conjunctiva.  Nose: Midline.  No epistaxis.  Mouth / Throat: No ulcerations or lesions.  Upper pharynx is not erythematous.  Moist.  Respiratory: No respiratory distress. CTA B.  Cardiovascular: Regular rhythm.  Peripheral extremities are warm.  No edema.  No calf tenderness.  Abdomen / Pelvis: Not tender.  No distention.  Soft throughout.  Genitalia: Not done.  Musculoskeletal: No tenderness over major muscles and joints.  Skin: No evidence of rash or trauma.                        ED COURSE  1134.  The patient has a large laceration involving his left hand, as pictured above.  The wound was anesthetized and then explored.  No foreign body identified.  The wound was irrigated with 1000 mL of normal saline.  Augmentin was given here in the ED.  A prescription for more Augmentin will be provided.  The wound was closed with suture, as documented below.  No complications.    PROCEDURE  Performed by me.  Laceration closure.  The wound was anesthetized with 1% lidocaine, no epinephrine.  The wound was then explored with appropriate lighting.  No foreign body identified.  I then placed #15 simple interrupted stitches using 3-0 and 4-0 Ethilon suture.  No complications.    Medications   lidocaine 1 % injection (not administered)   amoxicillin-clavulanate (AUGMENTIN) 875-125 MG per tablet 1 tablet (1 tablet Oral Given 12/1/18 1109)         IMPRESSION    ICD-10-CM    1. Laceration of left hand without foreign body, initial encounter S61.412A            Critical Care time:  none                    Norman Reyes MD  12/01/18 8939

## 2018-12-01 NOTE — ED NOTES
"Pt presented accompanied by his son, c/o laceration on his L hand and skin tear on his R.     Pt stated he slipped while feeding cows this morning and \"gashed it pretty good.\"    Pt has a skin tear at the base of his R palm, bleeding controlled and skin flap intact.     Pt has an approx 5cm laceration at the base of his L 5th finger, bleeding controlled. Laceration appears deep. Movement, sensation of affected finger intact.   "

## 2018-12-01 NOTE — ED AVS SNAPSHOT
[facility] Emergency Department    [address] [address]    Phone:  [phone]    Fax:  [phone]                                       Jayesh Ortiz   MRN: [id number]    Department:  [facility] Emergency Department   Date of Visit:  12/1/2018           Patient Information     Date Of Birth          1940        Your diagnoses for this visit were:     Laceration of left hand without foreign body, initial encounter        You were seen by Norman Reyes MD.      24 Hour Appointment Hotline       To make an appointment at any [facility] clinic, call [phone] ([phone]). If you don't have a family doctor or clinic, we will help you find one. [facility] clinics are conveniently located to serve the needs of you and your family.             Review of your medicines      START taking        Dose / Directions Last dose taken    amoxicillin-clavulanate 875-125 MG tablet   Commonly known as:  AUGMENTIN   Dose:  1 tablet   Quantity:  10 tablet        Take 1 tablet by mouth 2 times daily for 5 days   Refills:  0          Our records show that you are taking the medicines listed below. If these are incorrect, please call your family doctor or clinic.        Dose / Directions Last dose taken    aspirin 325 MG EC tablet   Commonly known as:  ASA   Dose:  325 mg   Quantity:  14 tablet        Take 1 tablet (325 mg) by mouth daily with food   Refills:  0        bisacodyl 10 MG suppository   Commonly known as:  DULCOLAX   Dose:  10 mg        Place 10 mg rectally daily as needed for constipation   Refills:  0        GLIPIZIDE PO   Dose:  10 mg   Indication:  Type 2 Diabetes        Take 10 mg by mouth 2 times daily (before meals)   Refills:  0        hydrocortisone 1 % external cream   Commonly known as:  CORTAID        Apply topically 3 times daily as needed for itching   Refills:  0        hydrOXYzine 25 MG tablet   Commonly known as:  ATARAX   Dose:  25-50 mg        Take 25-50  mg by mouth every 4 hours as needed for itching   Refills:  0        LASIX PO   Dose:  40 mg        Take 40 mg by mouth daily   Refills:  0        LEVOTHROID PO   Dose:  200 mcg        Take 200 mcg by mouth daily   Refills:  0        LOSARTAN POTASSIUM PO   Dose:  25 mg   Indication:  High Blood Pressure Disorder        Take 25 mg by mouth daily   Refills:  0        magnesium citrate solution   Dose:  240 mL        Take 240 mLs by mouth once   Refills:  0        magnesium hydroxide 400 MG/5ML suspension   Commonly known as:  MILK OF MAGNESIA   Dose:  30 mL        Take 30 mLs by mouth daily as needed for constipation or heartburn   Refills:  0        NEURONTIN PO   Dose:  300 mg        Take 300 mg by mouth daily   Refills:  0        PIOGLITAZONE HCL PO   Dose:  45 mg   Indication:  Type 2 Diabetes        Take 45 mg by mouth daily   Refills:  0        polyethylene glycol packet   Commonly known as:  MIRALAX/GLYCOLAX   Dose:  17 g   Quantity:  14 packet        Take 17 g by mouth daily   Refills:  1        ROXICODONE 5 MG tablet   Dose:  5-10 mg   Generic drug:  oxyCODONE        Take 5-10 mg by mouth every 4 hours as needed for moderate to severe pain   Refills:  0        senna-docusate 8.6-50 MG tablet   Commonly known as:  SENOKOT-S/PERICOLACE   Dose:  1 tablet        Take 1 tablet by mouth 2 times daily   Refills:  0        SIMVASTATIN PO   Dose:  40 mg   Indication:  Type II A Hyperlipidemia        Take 40 mg by mouth daily   Refills:  0        tamsulosin 0.4 MG capsule   Commonly known as:  FLOMAX   Dose:  0.4 mg   Indication:  Benign Enlargement of Prostate        Take 0.4 mg by mouth daily   Refills:  0        TRAZODONE HCL PO   Dose:  50 mg        Take 50 mg by mouth At Bedtime And 50 prn if not asleep in 2 hrs   Refills:  0        TYLENOL PO   Dose:  650 mg        Take 650 mg by mouth every 4 hours as needed for mild pain or fever   Refills:  0                Prescriptions were sent or printed at these  "locations (1 Prescription)                   [facility] [place] - [address]   [address], [place] [address]    Telephone:  [phone]   Fax:  [phone]   Hours:                  E-Prescribed (1 of 1)         amoxicillin-clavulanate (AUGMENTIN) 875-125 MG tablet                Orders Needing Specimen Collection     None      Pending Results     No orders found from 2018 to 2018.            Pending Culture Results     No orders found from 2018 to 2018.            Pending Results Instructions     If you had any lab results that were not finalized at the time of your Discharge, you can call the ED Lab Result RN at [phone]. You will be contacted by this team for any positive Lab results or changes in treatment. The nurses are available 7 days a week from 10A to 6:30P.  You can leave a message 24 hours per day and they will return your call.        Test Results From Your Hospital Stay               Thank you for choosing [facility]       Thank you for choosing [facility] for your care. Our goal is always to provide you with excellent care. Hearing back from our patients is one way we can continue to improve our services. Please take a few minutes to complete the written survey that you may receive in the mail after you visit with us. Thank you!        MyEve[facility] Information     [facility] lets you send messages to your doctor, view your test results, renew your prescriptions, schedule appointments and more. To sign up, go to [email or web address] . Click on \"Log in\" on the left side of the screen, which will take you to the Welcome page. Then click on \"Sign up Now\" on the right side of the page.     You will be asked to enter the access code listed below, as well as some personal information. Please follow the directions to create your username and password.     Your access code is: [email or web address]  Expires: 3/1/2019 11:40 AM     Your access code will  in 90 " days. If you need help or a new code, please call your [facility] clinic or [phone].        Care EveryWhere ID     This is your Care EveryWhere ID. This could be used by other organizations to access your [facility] medical records  [id number]        Equal Access to Services     YASMANY ORTIZ : Robbin Shah, wadulce mariada luqadaha, qaybta kaalmada javier, paddy angel. So [facility] [phone].    ATENCIÓN: Si habla español, tiene a zimmerman disposición servicios gratuitos de asistencia lingüística. Llame al [phone].    We comply with applicable federal civil rights laws and [place] laws. We do not discriminate on the basis of race, color, national origin, age, disability, sex, sexual orientation, or gender identity.            After Visit Summary       This is your record. Keep this with you and show to your community pharmacist(s) and doctor(s) at your next visit.

## 2018-12-01 NOTE — ED NOTES
Discharge instructions, including new Rx, were reviewed with pt and pt's son. Pt was ambulatory from the ER.

## 2018-12-29 ENCOUNTER — HOSPITAL ENCOUNTER (EMERGENCY)
Facility: CLINIC | Age: 78
Discharge: HOME | End: 2018-12-29
Admitting: NURSE PRACTITIONER
Payer: MEDICARE

## 2018-12-29 VITALS
OXYGEN SATURATION: 93 % | RESPIRATION RATE: 16 BRPM | HEART RATE: 84 BPM | WEIGHT: 315 LBS | BODY MASS INDEX: 49.42 KG/M2 | TEMPERATURE: 97.4 F

## 2018-12-29 PROCEDURE — G0463 HOSPITAL OUTPT CLINIC VISIT: HCPCS

## 2018-12-29 PROCEDURE — 40000263 ZZH STATISTIC EXAM NO CHARGES

## 2018-12-29 NOTE — ED NOTES
Pt roomed in triage room and remaining 4 sutures removed (patient had removed other sutures pta) - T Nikki looked at incision and placed some dermabond on incision between 4,5 finger. Patient tolerated well. Edges are c,d,i - some small open area between fingers - no s/sx of infection at this time.

## 2018-12-31 ENCOUNTER — COMMUNICATION - HEALTHEAST (OUTPATIENT)
Dept: FAMILY MEDICINE | Facility: CLINIC | Age: 78
End: 2018-12-31

## 2018-12-31 DIAGNOSIS — C61 MALIGNANT NEOPLASM OF PROSTATE (H): ICD-10-CM

## 2019-02-06 ENCOUNTER — COMMUNICATION - HEALTHEAST (OUTPATIENT)
Dept: FAMILY MEDICINE | Facility: CLINIC | Age: 79
End: 2019-02-06

## 2019-03-27 ENCOUNTER — COMMUNICATION - HEALTHEAST (OUTPATIENT)
Dept: FAMILY MEDICINE | Facility: CLINIC | Age: 79
End: 2019-03-27

## 2019-03-27 DIAGNOSIS — E11.9 DIABETES (H): ICD-10-CM

## 2019-03-27 DIAGNOSIS — E03.9 HYPOTHYROIDISM: ICD-10-CM

## 2019-03-27 DIAGNOSIS — E78.5 HYPERLIPIDEMIA: ICD-10-CM

## 2019-03-30 ENCOUNTER — COMMUNICATION - HEALTHEAST (OUTPATIENT)
Dept: FAMILY MEDICINE | Facility: CLINIC | Age: 79
End: 2019-03-30

## 2019-03-30 DIAGNOSIS — I10 HTN (HYPERTENSION): ICD-10-CM

## 2019-04-15 ENCOUNTER — OFFICE VISIT - HEALTHEAST (OUTPATIENT)
Dept: FAMILY MEDICINE | Facility: CLINIC | Age: 79
End: 2019-04-15

## 2019-04-15 DIAGNOSIS — E03.9 HYPOTHYROIDISM, UNSPECIFIED TYPE: ICD-10-CM

## 2019-04-15 DIAGNOSIS — E11.65 UNCONTROLLED TYPE 2 DIABETES MELLITUS WITH HYPERGLYCEMIA (H): ICD-10-CM

## 2019-04-15 DIAGNOSIS — E66.01 MORBID OBESITY (H): ICD-10-CM

## 2019-04-15 DIAGNOSIS — C61 MALIGNANT NEOPLASM OF PROSTATE (H): ICD-10-CM

## 2019-04-15 LAB
ALBUMIN SERPL-MCNC: 3.7 G/DL (ref 3.5–5)
ALP SERPL-CCNC: 42 U/L (ref 45–120)
ALT SERPL W P-5'-P-CCNC: 13 U/L (ref 0–45)
ANION GAP SERPL CALCULATED.3IONS-SCNC: 7 MMOL/L (ref 5–18)
AST SERPL W P-5'-P-CCNC: 16 U/L (ref 0–40)
BILIRUB DIRECT SERPL-MCNC: 0.2 MG/DL
BILIRUB SERPL-MCNC: 0.5 MG/DL (ref 0–1)
BUN SERPL-MCNC: 25 MG/DL (ref 8–28)
CALCIUM SERPL-MCNC: 9 MG/DL (ref 8.5–10.5)
CHLORIDE BLD-SCNC: 102 MMOL/L (ref 98–107)
CHOLEST SERPL-MCNC: 181 MG/DL
CO2 SERPL-SCNC: 30 MMOL/L (ref 22–31)
CREAT SERPL-MCNC: 0.99 MG/DL (ref 0.7–1.3)
CREAT UR-MCNC: 83.3 MG/DL
ERYTHROCYTE [DISTWIDTH] IN BLOOD BY AUTOMATED COUNT: 12.1 % (ref 11–14.5)
FASTING STATUS PATIENT QL REPORTED: YES
GFR SERPL CREATININE-BSD FRML MDRD: >60 ML/MIN/1.73M2
GLUCOSE BLD-MCNC: 186 MG/DL (ref 70–125)
HCT VFR BLD AUTO: 41.3 % (ref 40–54)
HDLC SERPL-MCNC: 63 MG/DL
HGB BLD-MCNC: 13.4 G/DL (ref 14–18)
LDLC SERPL CALC-MCNC: 106 MG/DL
MCH RBC QN AUTO: 30.9 PG (ref 27–34)
MCHC RBC AUTO-ENTMCNC: 32.5 G/DL (ref 32–36)
MCV RBC AUTO: 95 FL (ref 80–100)
MICROALBUMIN UR-MCNC: <0.5 MG/DL (ref 0–1.99)
MICROALBUMIN/CREAT UR: NORMAL MG/G
PLATELET # BLD AUTO: 171 THOU/UL (ref 140–440)
PMV BLD AUTO: 7.3 FL (ref 7–10)
POTASSIUM BLD-SCNC: 4.7 MMOL/L (ref 3.5–5)
PROT SERPL-MCNC: 6.3 G/DL (ref 6–8)
RBC # BLD AUTO: 4.34 MILL/UL (ref 4.4–6.2)
SODIUM SERPL-SCNC: 139 MMOL/L (ref 136–145)
TRIGL SERPL-MCNC: 58 MG/DL
TSH SERPL DL<=0.005 MIU/L-ACNC: 4 UIU/ML (ref 0.3–5)
WBC: 3.8 THOU/UL (ref 4–11)

## 2019-04-16 ENCOUNTER — COMMUNICATION - HEALTHEAST (OUTPATIENT)
Dept: FAMILY MEDICINE | Facility: CLINIC | Age: 79
End: 2019-04-16

## 2019-07-01 ENCOUNTER — COMMUNICATION - HEALTHEAST (OUTPATIENT)
Dept: FAMILY MEDICINE | Facility: CLINIC | Age: 79
End: 2019-07-01

## 2019-08-12 ENCOUNTER — COMMUNICATION - HEALTHEAST (OUTPATIENT)
Dept: FAMILY MEDICINE | Facility: CLINIC | Age: 79
End: 2019-08-12

## 2019-09-19 ENCOUNTER — COMMUNICATION - HEALTHEAST (OUTPATIENT)
Dept: FAMILY MEDICINE | Facility: CLINIC | Age: 79
End: 2019-09-19

## 2019-09-19 DIAGNOSIS — E11.9 DIABETES (H): ICD-10-CM

## 2019-09-22 ENCOUNTER — COMMUNICATION - HEALTHEAST (OUTPATIENT)
Dept: FAMILY MEDICINE | Facility: CLINIC | Age: 79
End: 2019-09-22

## 2019-09-22 DIAGNOSIS — E11.9 DIABETES (H): ICD-10-CM

## 2019-09-22 DIAGNOSIS — E78.5 HYPERLIPIDEMIA: ICD-10-CM

## 2019-09-26 ENCOUNTER — COMMUNICATION - HEALTHEAST (OUTPATIENT)
Dept: FAMILY MEDICINE | Facility: CLINIC | Age: 79
End: 2019-09-26

## 2019-09-26 DIAGNOSIS — E03.9 HYPOTHYROIDISM: ICD-10-CM

## 2019-09-27 ENCOUNTER — COMMUNICATION - HEALTHEAST (OUTPATIENT)
Dept: FAMILY MEDICINE | Facility: CLINIC | Age: 79
End: 2019-09-27

## 2019-09-27 DIAGNOSIS — E03.9 HYPOTHYROIDISM: ICD-10-CM

## 2019-10-03 ENCOUNTER — COMMUNICATION - HEALTHEAST (OUTPATIENT)
Dept: FAMILY MEDICINE | Facility: CLINIC | Age: 79
End: 2019-10-03

## 2019-10-03 DIAGNOSIS — E03.9 HYPOTHYROIDISM, UNSPECIFIED TYPE: ICD-10-CM

## 2019-12-21 ENCOUNTER — COMMUNICATION - HEALTHEAST (OUTPATIENT)
Dept: FAMILY MEDICINE | Facility: CLINIC | Age: 79
End: 2019-12-21

## 2019-12-21 DIAGNOSIS — C61 MALIGNANT NEOPLASM OF PROSTATE (H): ICD-10-CM

## 2020-01-09 ENCOUNTER — COMMUNICATION - HEALTHEAST (OUTPATIENT)
Dept: SCHEDULING | Facility: CLINIC | Age: 80
End: 2020-01-09

## 2020-02-02 ENCOUNTER — COMMUNICATION - HEALTHEAST (OUTPATIENT)
Dept: FAMILY MEDICINE | Facility: CLINIC | Age: 80
End: 2020-02-02

## 2020-02-15 ENCOUNTER — COMMUNICATION - HEALTHEAST (OUTPATIENT)
Dept: FAMILY MEDICINE | Facility: CLINIC | Age: 80
End: 2020-02-15

## 2020-02-15 DIAGNOSIS — E78.5 HYPERLIPIDEMIA: ICD-10-CM

## 2020-02-15 DIAGNOSIS — I10 HTN (HYPERTENSION): ICD-10-CM

## 2020-03-02 ENCOUNTER — OFFICE VISIT - HEALTHEAST (OUTPATIENT)
Dept: FAMILY MEDICINE | Facility: CLINIC | Age: 80
End: 2020-03-02

## 2020-03-02 ENCOUNTER — COMMUNICATION - HEALTHEAST (OUTPATIENT)
Dept: FAMILY MEDICINE | Facility: CLINIC | Age: 80
End: 2020-03-02

## 2020-03-02 DIAGNOSIS — E03.9 HYPOTHYROIDISM, UNSPECIFIED TYPE: ICD-10-CM

## 2020-03-02 DIAGNOSIS — C61 MALIGNANT NEOPLASM OF PROSTATE (H): ICD-10-CM

## 2020-03-02 DIAGNOSIS — E11.65 UNCONTROLLED TYPE 2 DIABETES MELLITUS WITH HYPERGLYCEMIA (H): ICD-10-CM

## 2020-03-02 DIAGNOSIS — E78.5 HYPERLIPIDEMIA, UNSPECIFIED HYPERLIPIDEMIA TYPE: ICD-10-CM

## 2020-03-02 DIAGNOSIS — E66.01 MORBID OBESITY (H): ICD-10-CM

## 2020-03-02 LAB
ALBUMIN SERPL-MCNC: 3.8 G/DL (ref 3.5–5)
ALP SERPL-CCNC: 62 U/L (ref 45–120)
ALT SERPL W P-5'-P-CCNC: 14 U/L (ref 0–45)
ANION GAP SERPL CALCULATED.3IONS-SCNC: 9 MMOL/L (ref 5–18)
AST SERPL W P-5'-P-CCNC: 12 U/L (ref 0–40)
BILIRUB DIRECT SERPL-MCNC: 0.3 MG/DL
BILIRUB SERPL-MCNC: 0.8 MG/DL (ref 0–1)
BUN SERPL-MCNC: 23 MG/DL (ref 8–28)
CALCIUM SERPL-MCNC: 9 MG/DL (ref 8.5–10.5)
CHLORIDE BLD-SCNC: 98 MMOL/L (ref 98–107)
CHOLEST SERPL-MCNC: 183 MG/DL
CO2 SERPL-SCNC: 28 MMOL/L (ref 22–31)
CREAT SERPL-MCNC: 1.15 MG/DL (ref 0.7–1.3)
ERYTHROCYTE [DISTWIDTH] IN BLOOD BY AUTOMATED COUNT: 11.4 % (ref 11–14.5)
FASTING STATUS PATIENT QL REPORTED: YES
GFR SERPL CREATININE-BSD FRML MDRD: >60 ML/MIN/1.73M2
GLUCOSE BLD-MCNC: 369 MG/DL (ref 70–125)
HBA1C MFR BLD: 13.2 % (ref 3.5–6)
HCT VFR BLD AUTO: 45.8 % (ref 40–54)
HDLC SERPL-MCNC: 56 MG/DL
HGB BLD-MCNC: 15.4 G/DL (ref 14–18)
LDLC SERPL CALC-MCNC: 114 MG/DL
MCH RBC QN AUTO: 31.9 PG (ref 27–34)
MCHC RBC AUTO-ENTMCNC: 33.6 G/DL (ref 32–36)
MCV RBC AUTO: 95 FL (ref 80–100)
PLATELET # BLD AUTO: 200 THOU/UL (ref 140–440)
PMV BLD AUTO: 8.7 FL (ref 7–10)
POTASSIUM BLD-SCNC: 5.1 MMOL/L (ref 3.5–5)
PROT SERPL-MCNC: 6.2 G/DL (ref 6–8)
RBC # BLD AUTO: 4.83 MILL/UL (ref 4.4–6.2)
SODIUM SERPL-SCNC: 135 MMOL/L (ref 136–145)
TRIGL SERPL-MCNC: 67 MG/DL
TSH SERPL DL<=0.005 MIU/L-ACNC: 3.69 UIU/ML (ref 0.3–5)
WBC: 4.3 THOU/UL (ref 4–11)

## 2020-03-04 ENCOUNTER — COMMUNICATION - HEALTHEAST (OUTPATIENT)
Dept: FAMILY MEDICINE | Facility: CLINIC | Age: 80
End: 2020-03-04

## 2020-03-04 DIAGNOSIS — E11.65 UNCONTROLLED TYPE 2 DIABETES MELLITUS WITH HYPERGLYCEMIA (H): ICD-10-CM

## 2020-03-05 ENCOUNTER — COMMUNICATION - HEALTHEAST (OUTPATIENT)
Dept: FAMILY MEDICINE | Facility: CLINIC | Age: 80
End: 2020-03-05

## 2020-03-05 DIAGNOSIS — E11.65 UNCONTROLLED TYPE 2 DIABETES MELLITUS WITH HYPERGLYCEMIA (H): ICD-10-CM

## 2020-03-06 ENCOUNTER — COMMUNICATION - HEALTHEAST (OUTPATIENT)
Dept: FAMILY MEDICINE | Facility: CLINIC | Age: 80
End: 2020-03-06

## 2020-03-06 ENCOUNTER — COMMUNICATION - HEALTHEAST (OUTPATIENT)
Dept: ADMINISTRATIVE | Facility: CLINIC | Age: 80
End: 2020-03-06

## 2020-03-09 ENCOUNTER — COMMUNICATION - HEALTHEAST (OUTPATIENT)
Dept: FAMILY MEDICINE | Facility: CLINIC | Age: 80
End: 2020-03-09

## 2020-04-01 ENCOUNTER — COMMUNICATION - HEALTHEAST (OUTPATIENT)
Dept: FAMILY MEDICINE | Facility: CLINIC | Age: 80
End: 2020-04-01

## 2020-04-04 ENCOUNTER — COMMUNICATION - HEALTHEAST (OUTPATIENT)
Dept: FAMILY MEDICINE | Facility: CLINIC | Age: 80
End: 2020-04-04

## 2020-04-04 DIAGNOSIS — E11.9 DIABETES (H): ICD-10-CM

## 2020-04-08 ENCOUNTER — COMMUNICATION - HEALTHEAST (OUTPATIENT)
Dept: FAMILY MEDICINE | Facility: CLINIC | Age: 80
End: 2020-04-08

## 2020-04-08 DIAGNOSIS — I10 HTN (HYPERTENSION): ICD-10-CM

## 2020-04-08 DIAGNOSIS — E78.5 HYPERLIPIDEMIA: ICD-10-CM

## 2020-04-14 ENCOUNTER — COMMUNICATION - HEALTHEAST (OUTPATIENT)
Dept: FAMILY MEDICINE | Facility: CLINIC | Age: 80
End: 2020-04-14

## 2020-04-14 DIAGNOSIS — E11.65 UNCONTROLLED TYPE 2 DIABETES MELLITUS WITH HYPERGLYCEMIA (H): ICD-10-CM

## 2020-08-09 ENCOUNTER — COMMUNICATION - HEALTHEAST (OUTPATIENT)
Dept: FAMILY MEDICINE | Facility: CLINIC | Age: 80
End: 2020-08-09

## 2020-08-09 DIAGNOSIS — E11.65 UNCONTROLLED TYPE 2 DIABETES MELLITUS WITH HYPERGLYCEMIA (H): ICD-10-CM

## 2020-09-22 ENCOUNTER — COMMUNICATION - HEALTHEAST (OUTPATIENT)
Dept: FAMILY MEDICINE | Facility: CLINIC | Age: 80
End: 2020-09-22

## 2020-10-02 ENCOUNTER — COMMUNICATION - HEALTHEAST (OUTPATIENT)
Dept: FAMILY MEDICINE | Facility: CLINIC | Age: 80
End: 2020-10-02

## 2020-10-02 DIAGNOSIS — C61 MALIGNANT NEOPLASM OF PROSTATE (H): ICD-10-CM

## 2020-12-17 ENCOUNTER — COMMUNICATION - HEALTHEAST (OUTPATIENT)
Dept: FAMILY MEDICINE | Facility: CLINIC | Age: 80
End: 2020-12-17

## 2020-12-17 DIAGNOSIS — E03.9 HYPOTHYROIDISM, UNSPECIFIED TYPE: ICD-10-CM

## 2020-12-28 ENCOUNTER — COMMUNICATION - HEALTHEAST (OUTPATIENT)
Dept: FAMILY MEDICINE | Facility: CLINIC | Age: 80
End: 2020-12-28

## 2020-12-28 DIAGNOSIS — C61 MALIGNANT NEOPLASM OF PROSTATE (H): ICD-10-CM

## 2021-01-02 ENCOUNTER — COMMUNICATION - HEALTHEAST (OUTPATIENT)
Dept: FAMILY MEDICINE | Facility: CLINIC | Age: 81
End: 2021-01-02

## 2021-01-02 DIAGNOSIS — E03.9 HYPOTHYROIDISM, UNSPECIFIED TYPE: ICD-10-CM

## 2021-02-14 ENCOUNTER — COMMUNICATION - HEALTHEAST (OUTPATIENT)
Dept: FAMILY MEDICINE | Facility: CLINIC | Age: 81
End: 2021-02-14

## 2021-02-14 DIAGNOSIS — E11.65 UNCONTROLLED TYPE 2 DIABETES MELLITUS WITH HYPERGLYCEMIA (H): ICD-10-CM

## 2021-03-27 ENCOUNTER — COMMUNICATION - HEALTHEAST (OUTPATIENT)
Dept: FAMILY MEDICINE | Facility: CLINIC | Age: 81
End: 2021-03-27

## 2021-03-27 DIAGNOSIS — E11.9 DIABETES (H): ICD-10-CM

## 2021-04-05 ENCOUNTER — COMMUNICATION - HEALTHEAST (OUTPATIENT)
Dept: FAMILY MEDICINE | Facility: CLINIC | Age: 81
End: 2021-04-05

## 2021-04-05 DIAGNOSIS — C61 MALIGNANT NEOPLASM OF PROSTATE (H): ICD-10-CM

## 2021-04-09 ENCOUNTER — COMMUNICATION - HEALTHEAST (OUTPATIENT)
Dept: FAMILY MEDICINE | Facility: CLINIC | Age: 81
End: 2021-04-09

## 2021-04-09 DIAGNOSIS — E03.9 HYPOTHYROIDISM, UNSPECIFIED TYPE: ICD-10-CM

## 2021-04-10 ENCOUNTER — COMMUNICATION - HEALTHEAST (OUTPATIENT)
Dept: SCHEDULING | Facility: CLINIC | Age: 81
End: 2021-04-10

## 2021-04-13 ENCOUNTER — OFFICE VISIT - HEALTHEAST (OUTPATIENT)
Dept: FAMILY MEDICINE | Facility: CLINIC | Age: 81
End: 2021-04-13

## 2021-04-13 DIAGNOSIS — E03.9 HYPOTHYROIDISM, UNSPECIFIED TYPE: ICD-10-CM

## 2021-04-13 DIAGNOSIS — E11.65 UNCONTROLLED TYPE 2 DIABETES MELLITUS WITH HYPERGLYCEMIA (H): ICD-10-CM

## 2021-04-13 DIAGNOSIS — E78.5 HYPERLIPIDEMIA: ICD-10-CM

## 2021-04-13 DIAGNOSIS — C61 MALIGNANT NEOPLASM OF PROSTATE (H): ICD-10-CM

## 2021-04-13 DIAGNOSIS — E11.9 DIABETES (H): ICD-10-CM

## 2021-04-13 DIAGNOSIS — E66.01 MORBID OBESITY (H): ICD-10-CM

## 2021-04-13 DIAGNOSIS — I10 HTN (HYPERTENSION): ICD-10-CM

## 2021-04-13 LAB
ALBUMIN SERPL-MCNC: 4 G/DL (ref 3.5–5)
ALP SERPL-CCNC: 54 U/L (ref 45–120)
ALT SERPL W P-5'-P-CCNC: 11 U/L (ref 0–45)
ANION GAP SERPL CALCULATED.3IONS-SCNC: 11 MMOL/L (ref 5–18)
AST SERPL W P-5'-P-CCNC: 13 U/L (ref 0–40)
BILIRUB SERPL-MCNC: 0.7 MG/DL (ref 0–1)
BUN SERPL-MCNC: 19 MG/DL (ref 8–28)
CALCIUM SERPL-MCNC: 8.7 MG/DL (ref 8.5–10.5)
CHLORIDE BLD-SCNC: 101 MMOL/L (ref 98–107)
CHOLEST SERPL-MCNC: 245 MG/DL
CO2 SERPL-SCNC: 26 MMOL/L (ref 22–31)
CREAT SERPL-MCNC: 0.94 MG/DL (ref 0.7–1.3)
CREAT UR-MCNC: 37.2 MG/DL
ERYTHROCYTE [DISTWIDTH] IN BLOOD BY AUTOMATED COUNT: 12.7 % (ref 11–14.5)
FASTING STATUS PATIENT QL REPORTED: YES
GFR SERPL CREATININE-BSD FRML MDRD: >60 ML/MIN/1.73M2
GLUCOSE BLD-MCNC: 202 MG/DL (ref 70–125)
HBA1C MFR BLD: 7.9 %
HCT VFR BLD AUTO: 47.3 % (ref 40–54)
HDLC SERPL-MCNC: 48 MG/DL
HGB BLD-MCNC: 15.1 G/DL (ref 14–18)
LDLC SERPL CALC-MCNC: 177 MG/DL
MCH RBC QN AUTO: 30.4 PG (ref 27–34)
MCHC RBC AUTO-ENTMCNC: 31.9 G/DL (ref 32–36)
MCV RBC AUTO: 95 FL (ref 80–100)
MICROALBUMIN UR-MCNC: 1.02 MG/DL (ref 0–1.99)
MICROALBUMIN/CREAT UR: 27.4 MG/G
PLATELET # BLD AUTO: 208 THOU/UL (ref 140–440)
PMV BLD AUTO: 10.5 FL (ref 7–10)
POTASSIUM BLD-SCNC: 5.2 MMOL/L (ref 3.5–5)
PROT SERPL-MCNC: 6.6 G/DL (ref 6–8)
PSA SERPL-MCNC: 0.3 NG/ML (ref 0–6.5)
RBC # BLD AUTO: 4.97 MILL/UL (ref 4.4–6.2)
SODIUM SERPL-SCNC: 138 MMOL/L (ref 136–145)
T4 FREE SERPL-MCNC: 1.2 NG/DL (ref 0.7–1.8)
TRIGL SERPL-MCNC: 99 MG/DL
TSH SERPL DL<=0.005 MIU/L-ACNC: 7.21 UIU/ML (ref 0.3–5)
WBC: 5.2 THOU/UL (ref 4–11)

## 2021-05-06 ENCOUNTER — COMMUNICATION - HEALTHEAST (OUTPATIENT)
Dept: FAMILY MEDICINE | Facility: CLINIC | Age: 81
End: 2021-05-06

## 2021-05-06 DIAGNOSIS — I10 HTN (HYPERTENSION): ICD-10-CM

## 2021-05-27 NOTE — TELEPHONE ENCOUNTER
Who is calling:  Patient   Reason for Call:   Patient is requesting a refill, has appointment on 4/15/2019  Date of last appointment with primary care:  9/29/2017  Okay to leave a detailed message: Yes

## 2021-05-27 NOTE — TELEPHONE ENCOUNTER
Medication Request  Medication name: Losartan  Pharmacy Name and Location: [facility] [phone]   Reason for request: refill   When did you use medication last?: daily  Patient offered appointment:  patient declined and no  Okay to leave a detailed message: yes

## 2021-05-27 NOTE — TELEPHONE ENCOUNTER
FYI - Status Update  Who is Calling: Spouse and Patient  Update: Still waiting on the medication to go to the pharmacy. She said they have called multiple times on this and it still haven't received this medication.     Spouse would like us to call her at [phone] when this has been approvd and sent to the pharmacy so that she doesn't have to keep calling back.     Also, she is asking for a Consent to Communicate to be faxed to her so that her spouse can complete and send to us so that she is authorized to speak on his behalf. Please fax to [phone]    Okay to leave a detailed message?:  Yes

## 2021-05-27 NOTE — TELEPHONE ENCOUNTER
RN cannot approve Refill Request    RN can NOT refill this medication PCP messaged that patient is overdue for Labs and Office Visit.      Charity Sheikh, [facility] Triage/Med Refill 3/27/2019    Requested Prescriptions   Pending Prescriptions Disp Refills     levothyroxine (SYNTHROID, LEVOTHROID) 150 MCG tablet [Pharmacy Med Name: LEVOTHYROXINE 150 MCG TABLET] 90 tablet 1     Sig: TAKE 1 TABLET BY MOUTH ONCE A DAY    Thyroid Hormones Protocol Failed - 3/27/2019  1:33 AM       Failed - Provider visit in past 12 months or next 3 months    Last office visit with prescriber/PCP: 6/1/2017 Geoffrey Live MD OR same dept: Visit date not found OR same specialty: 6/1/2017 Geoffrey Live MD  Last physical: 9/29/2017 Last MTM visit: Visit date not found   Next visit within 3 mo: Visit date not found  Next physical within 3 mo: Visit date not found  Prescriber OR PCP: Geoffrey Live MD  Last diagnosis associated with med order: 1. Hypothyroidism  - levothyroxine (SYNTHROID, LEVOTHROID) 150 MCG tablet [Pharmacy Med Name: LEVOTHYROXINE 150 MCG TABLET]; TAKE 1 TABLET BY MOUTH ONCE A DAY  Dispense: 90 tablet; Refill: 1    2. Diabetes (H)  - pioglitazone (ACTOS) 45 MG tablet [Pharmacy Med Name: PIOGLITAZONE HCL 45 MG TABLET]; TAKE 1 TABLET BY MOUTH ONCE A DAY  Dispense: 90 tablet; Refill: 1  - glipiZIDE (GLUCOTROL) 10 MG tablet [Pharmacy Med Name: GLIPIZIDE 10 MG TABLET]; TAKE 1 TABLET BY MOUTH TWICE A DAY  Dispense: 180 tablet; Refill: 1    3. Hyperlipidemia  - simvastatin (ZOCOR) 40 MG tablet [Pharmacy Med Name: SIMVASTATIN 40 MG TABLET]; TAKE 1 TABLET BY MOUTH EVERY DAY  Dispense: 90 tablet; Refill: 1    If protocol passes may refill for 12 months if within 3 months of last provider visit (or a total of 15 months).            Failed - TSH on file in past 12 months for patient age 12 & older    TSH   Date Value Ref Range Status   09/29/2017 7.22 (H) 0.30 - 5.00 uIU/mL Final                    pioglitazone (ACTOS) 45 MG tablet [Pharmacy Med Name: PIOGLITAZONE HCL 45 MG TABLET] 90 tablet 1     Sig: TAKE 1 TABLET BY MOUTH ONCE A DAY    Pioglitazone Protocol Failed - 3/27/2019  1:33 AM       Failed - Blood pressure in last 12 months    BP Readings from Last 1 Encounters:   09/29/17 136/64            Failed - LFT or AST or ALT in last 12 months    Albumin   Date Value Ref Range Status   09/29/2017 3.5 3.5 - 5.0 g/dL Final     Bilirubin, Total   Date Value Ref Range Status   09/29/2017 0.5 0.0 - 1.0 mg/dL Final     Bilirubin, Direct   Date Value Ref Range Status   09/29/2017 0.2 <=0.5 mg/dL Final     Alkaline Phosphatase   Date Value Ref Range Status   09/29/2017 55 45 - 120 U/L Final     AST   Date Value Ref Range Status   09/29/2017 16 0 - 40 U/L Final     ALT   Date Value Ref Range Status   09/29/2017 17 0 - 45 U/L Final     Protein, Total   Date Value Ref Range Status   09/29/2017 6.2 6.0 - 8.0 g/dL Final               Failed - Visit with PCP or prescribing provider visit in last 6 months     Last office visit with prescriber/PCP: Visit date not found OR same dept: Visit date not found OR same specialty: 6/1/2017 Geoffrey Live MD Last physical: Visit date not found Last MT visit: Visit date not found         Next appt within 3 mo: Visit date not found  Next physical within 3 mo: Visit date not found  Prescriber OR PCP: Geoffrey Live MD  Last diagnosis associated with med order: 1. Hypothyroidism  - levothyroxine (SYNTHROID, LEVOTHROID) 150 MCG tablet [Pharmacy Med Name: LEVOTHYROXINE 150 MCG TABLET]; TAKE 1 TABLET BY MOUTH ONCE A DAY  Dispense: 90 tablet; Refill: 1    2. Diabetes (H)  - pioglitazone (ACTOS) 45 MG tablet [Pharmacy Med Name: PIOGLITAZONE HCL 45 MG TABLET]; TAKE 1 TABLET BY MOUTH ONCE A DAY  Dispense: 90 tablet; Refill: 1  - glipiZIDE (GLUCOTROL) 10 MG tablet [Pharmacy Med Name: GLIPIZIDE 10 MG TABLET]; TAKE 1 TABLET BY MOUTH TWICE A DAY  Dispense: 180 tablet; Refill:  1    3. Hyperlipidemia  - simvastatin (ZOCOR) 40 MG tablet [Pharmacy Med Name: SIMVASTATIN 40 MG TABLET]; TAKE 1 TABLET BY MOUTH EVERY DAY  Dispense: 90 tablet; Refill: 1     If protocol passes may refill for 12 months if within 3 months of last provider visit (or a total of 15 months).          Failed - A1C in last 6 months    Hemoglobin A1c   Date Value Ref Range Status   09/29/2017 7.4 (H) 3.5 - 6.0 % Final              Failed - Microalbumin in last year    Microalbumin, Random Urine   Date Value Ref Range Status   09/29/2017 <0.50 0.00 - 1.99 mg/dL Final                 Failed - Serum creatinine in last year    Creatinine   Date Value Ref Range Status   09/29/2017 1.05 0.70 - 1.30 mg/dL Final             glipiZIDE (GLUCOTROL) 10 MG tablet [Pharmacy Med Name: GLIPIZIDE 10 MG TABLET] 180 tablet 1     Sig: TAKE 1 TABLET BY MOUTH TWICE A DAY    Oral Hypoglycemics Refill Protocol Failed - 3/27/2019  1:33 AM       Failed - Visit with PCP or prescribing provider visit in last 6 months      Last office visit with prescriber/PCP: Visit date not found OR same dept: Visit date not found OR same specialty: 6/1/2017 Geoffrey Live MD Last physical: Visit date not found Last MT visit: Visit date not found         Next appt within 3 mo: Visit date not found  Next physical within 3 mo: Visit date not found  Prescriber OR PCP: Geoffrey Live MD  Last diagnosis associated with med order: 1. Hypothyroidism  - levothyroxine (SYNTHROID, LEVOTHROID) 150 MCG tablet [Pharmacy Med Name: LEVOTHYROXINE 150 MCG TABLET]; TAKE 1 TABLET BY MOUTH ONCE A DAY  Dispense: 90 tablet; Refill: 1    2. Diabetes (H)  - pioglitazone (ACTOS) 45 MG tablet [Pharmacy Med Name: PIOGLITAZONE HCL 45 MG TABLET]; TAKE 1 TABLET BY MOUTH ONCE A DAY  Dispense: 90 tablet; Refill: 1  - glipiZIDE (GLUCOTROL) 10 MG tablet [Pharmacy Med Name: GLIPIZIDE 10 MG TABLET]; TAKE 1 TABLET BY MOUTH TWICE A DAY  Dispense: 180 tablet; Refill: 1    3.  Hyperlipidemia  - simvastatin (ZOCOR) 40 MG tablet [Pharmacy Med Name: SIMVASTATIN 40 MG TABLET]; TAKE 1 TABLET BY MOUTH EVERY DAY  Dispense: 90 tablet; Refill: 1     If protocol passes may refill for 12 months if within 3 months of last provider visit (or a total of 15 months).          Failed - A1C in last 6 months    Hemoglobin A1c   Date Value Ref Range Status   09/29/2017 7.4 (H) 3.5 - 6.0 % Final              Failed - Microalbumin in last year     Microalbumin, Random Urine   Date Value Ref Range Status   09/29/2017 <0.50 0.00 - 1.99 mg/dL Final                 Failed - Blood pressure in last year    BP Readings from Last 1 Encounters:   09/29/17 136/64            Failed - Serum creatinine in last year    Creatinine   Date Value Ref Range Status   09/29/2017 1.05 0.70 - 1.30 mg/dL Final             simvastatin (ZOCOR) 40 MG tablet [Pharmacy Med Name: SIMVASTATIN 40 MG TABLET] 90 tablet 1     Sig: TAKE 1 TABLET BY MOUTH EVERY DAY    Statins Refill Protocol (Hmg CoA Reductase Inhibitors) Failed - 3/27/2019  1:33 AM       Failed - PCP or prescribing provider visit in past 12 months     Last office visit with prescriber/PCP: 6/1/2017 Geoffrey Live MD OR same dept: Visit date not found OR same specialty: 6/1/2017 Geoffrey Live MD  Last physical: 9/29/2017 Last MTM visit: Visit date not found   Next visit within 3 mo: Visit date not found  Next physical within 3 mo: Visit date not found  Prescriber OR PCP: Geoffrey Live MD  Last diagnosis associated with med order: 1. Hypothyroidism  - levothyroxine (SYNTHROID, LEVOTHROID) 150 MCG tablet [Pharmacy Med Name: LEVOTHYROXINE 150 MCG TABLET]; TAKE 1 TABLET BY MOUTH ONCE A DAY  Dispense: 90 tablet; Refill: 1    2. Diabetes (H)  - pioglitazone (ACTOS) 45 MG tablet [Pharmacy Med Name: PIOGLITAZONE HCL 45 MG TABLET]; TAKE 1 TABLET BY MOUTH ONCE A DAY  Dispense: 90 tablet; Refill: 1  - glipiZIDE (GLUCOTROL) 10 MG tablet [Pharmacy Med Name:  GLIPIZIDE 10 MG TABLET]; TAKE 1 TABLET BY MOUTH TWICE A DAY  Dispense: 180 tablet; Refill: 1    3. Hyperlipidemia  - simvastatin (ZOCOR) 40 MG tablet [Pharmacy Med Name: SIMVASTATIN 40 MG TABLET]; TAKE 1 TABLET BY MOUTH EVERY DAY  Dispense: 90 tablet; Refill: 1    If protocol passes may refill for 12 months if within 3 months of last provider visit (or a total of 15 months).

## 2021-05-27 NOTE — TELEPHONE ENCOUNTER
RN cannot approve Refill Request    RN can NOT refill this medication PCP messaged that patient is overdue for Labs and Office Visit.       Charity Sheikh, [facility] Triage/Med Refill 4/1/2019    Requested Prescriptions   Pending Prescriptions Disp Refills     losartan (COZAAR) 25 MG tablet [Pharmacy Med Name: LOSARTAN POTASSIUM 25 MG TAB] 90 tablet 3     Sig: TAKE 1 TABLET (25 MG TOTAL) BY MOUTH DAILY.    Angiotensin Receptor Blocker Protocol Failed - 3/30/2019 12:53 PM       Failed - PCP or prescribing provider visit in past 12 months      Last office visit with prescriber/PCP: 6/1/2017 Geoffrey Live MD OR same dept: Visit date not found OR same specialty: 6/1/2017 Geoffrey Live MD  Last physical: 9/29/2017 Last MTM visit: Visit date not found   Next visit within 3 mo: Visit date not found  Next physical within 3 mo: Visit date not found  Prescriber OR PCP: Geoffrey Live MD  Last diagnosis associated with med order: 1. HTN (hypertension)  - losartan (COZAAR) 25 MG tablet [Pharmacy Med Name: LOSARTAN POTASSIUM 25 MG TAB]; TAKE 1 TABLET (25 MG TOTAL) BY MOUTH DAILY.  Dispense: 90 tablet; Refill: 1    If protocol passes may refill for 12 months if within 3 months of last provider visit (or a total of 15 months).            Failed - Serum potassium within the past 12 months    No results found for: LN-POTASSIUM         Failed - Blood pressure filed in past 12 months    BP Readings from Last 1 Encounters:   09/29/17 136/64            Failed - Serum creatinine within the past 12 months    Creatinine   Date Value Ref Range Status   09/29/2017 1.05 0.70 - 1.30 mg/dL Final

## 2021-05-27 NOTE — TELEPHONE ENCOUNTER
Please call.  I received a refill request for his medications but have not seen him since September 2017.  He needs an appointment with me and will need fasting laboratory testing then.  He can be given a limited supply of medication but he needs to have a appointment set up first.

## 2021-05-27 NOTE — TELEPHONE ENCOUNTER
Yes, I do want him to stop his pioglitazone.  However, he should not throw this out yet as I may want him to go back on it if he does not tolerate the metformin.  He can start the metformin and continue the glipizide.  He can let me know if he has problems tolerating metformin.  Side effects may include diarrhea or loose stools.

## 2021-05-27 NOTE — PATIENT INSTRUCTIONS - HE
Increase your thyroid medication dose  Stop pioglitazone  Start metformin and take two a day in the morning  Continue glipizide  Please let me know if you have a problem

## 2021-05-27 NOTE — TELEPHONE ENCOUNTER
Question following Office Visit  When did you see your provider: Yesterday   What is your question: After Visit Summary notes to stop pioglitazone but it is still listed on current medication list to take.  Please call patient with clarification if he is to stop or continue.  Okay to leave a detailed message: Yes

## 2021-05-28 NOTE — PROGRESS NOTES
Assessment/ Plan     1. Uncontrolled type 2 diabetes mellitus with hyperglycemia (H)    Recommend checking hemoglobin A1c  Unfortunately, this was not checked today and will be checked at the next visit    His last hemoglobin A1c was 7.4%  Recommend he check his blood sugars regularly  Reviewed warning signs and recommend monitoring for hypoglycemia  He has had significant weight concerns as well,   Pioglitazone will be discontinued  He will start metformin  He does not believe that he has ever been treated with metformin  Provided his kidney function tests are stable he will start metformin  Reviewed side effects  Continue glipizide  Continue remain active  Offered referral to diabetic educator if desired though he is quite busy    - Basic Metabolic Panel  - Hepatic Profile  - HM2(CBC w/o Differential)  - Lipid Cascade  - Microalbumin, Random Urine  - metFORMIN (GLUCOPHAGE XR) 500 MG 24 hr tablet; Take 1 tablet (500 mg total) by mouth daily with supper.  Dispense: 180 tablet; Refill: 3    Recommend an annual examination  Have reviewed foot care    2. Prostate Cancer  Follow-up with urology      3. Hypothyroidism, unspecified type  Check thyroid Cascade  Continue levothyroxine    - Thyroid Cascade  - levothyroxine (SYNTHROID, LEVOTHROID) 175 MCG tablet; Take 1 tablet (175 mcg total) by mouth daily.  Dispense: 90 tablet; Refill: 3    4. Morbid obesity (H)    Reviewed dietary changes including limiting carbohydrates  Offered referral to weight loss management program if desired  He also follow-up with diabetic educators     As noted, metformin has been started          Subjective:       Jayesh Ortiz is a 78 y.o. male who presents to the clinic for diabetes check.  He is extremely busy and working as a farmer with beef cattle it is very challenging for him to make it to the clinic for an appointment.    As noted, he has a history of type 2 diabetes mellitus, hypertension, hyperlipidemia, and hypothyroidism.  He also  has a history of prostate cancer has followed up with urology.  Additionally, his remote history of thyroid cancer.    He follows up today and states he has been compliant with his medication.  His last hemoglobin A1c was 7.4%.  He does not have a list of his blood sugars and does not check it very often.  A primary concern is that he has had a significant time losing weight.  He states that he does not eat very much and often will eat salads and low carbohydrate foods but still cannot lose weight.    As a farmer remains physically active.  He has restricted his diet at times.    Overall, he has felt well.  He has not had chest pain, shortness breath, palpitations, bowel changes, or recent urinary concerns.  His mood is been stable.    The following portions of the patient's history were reviewed and updated as appropriate: allergies, current medications, past family history, past medical history, past social history, past surgical history and problem list. Medications have been reconciled    Review of Systems   A 12 point comprehensive review of systems was negative except as noted.      Current Outpatient Medications   Medication Sig Dispense Refill     glipiZIDE (GLUCOTROL) 10 MG tablet TAKE 1 TABLET BY MOUTH TWICE A  tablet 1     losartan (COZAAR) 25 MG tablet TAKE 1 TABLET (25 MG TOTAL) BY MOUTH DAILY. 90 tablet 3     simvastatin (ZOCOR) 40 MG tablet TAKE 1 TABLET BY MOUTH EVERY DAY 90 tablet 1     tamsulosin (FLOMAX) 0.4 mg cap TAKE 1 CAPSULE (0.4 MG TOTAL) BY MOUTH DAILY. 90 capsule 3     aspirin 81 MG EC tablet Take 1 tablet (81 mg total) by mouth daily.  0     levothyroxine (SYNTHROID, LEVOTHROID) 175 MCG tablet Take 1 tablet (175 mcg total) by mouth daily. 90 tablet 3     metFORMIN (GLUCOPHAGE XR) 500 MG 24 hr tablet Take 1 tablet (500 mg total) by mouth daily with supper. 180 tablet 3     No current facility-administered medications for this visit.        Objective:      /72   Pulse 72   Wt  (!) 347 lb 3.2 oz (157.5 kg)   BMI 52.79 kg/m        General appearance: alert, appears stated age and cooperative  Head: Normocephalic, without obvious abnormality, atraumatic  Eyes: conjunctivae/corneas clear. PERRL, EOM's intact.   Ears: normal TM's and external ear canals both ears  Nose: Nares normal. Septum midline. Mucosa normal. No drainage or sinus tenderness.  Throat: lips, mucosa, and tongue normal; teeth and gums normal  Neck: no adenopathy, supple, symmetrical, trachea midline and thyroid not enlarged  Back: symmetric, no curvature. ROM normal. No CVA tenderness.  Lungs: clear to auscultation bilaterally  Heart: regular rate and rhythm, S1, S2 normal, no murmur, click, rub or gallop  Extremities: extremities normal, atraumatic, no cyanosis or edema   Monofilament testing reveals intact sensation  No obvious wounds or sores are evident  Skin: Skin color, texture, turgor normal. No rashes or lesions  Lymph nodes: Cervical nodes normal.  Neurologic: Alert and oriented X 3         Recent Results (from the past 168 hour(s))   Basic Metabolic Panel   Result Value Ref Range    Sodium 139 136 - 145 mmol/L    Potassium 4.7 3.5 - 5.0 mmol/L    Chloride 102 98 - 107 mmol/L    CO2 30 22 - 31 mmol/L    Anion Gap, Calculation 7 5 - 18 mmol/L    Glucose 186 (H) 70 - 125 mg/dL    Calcium 9.0 8.5 - 10.5 mg/dL    BUN 25 8 - 28 mg/dL    Creatinine 0.99 0.70 - 1.30 mg/dL    GFR MDRD Af Amer >60 >60 mL/min/1.73m2    GFR MDRD Non Af Amer >60 >60 mL/min/1.73m2   Hepatic Profile   Result Value Ref Range    Bilirubin, Total 0.5 0.0 - 1.0 mg/dL    Bilirubin, Direct 0.2 <=0.5 mg/dL    Protein, Total 6.3 6.0 - 8.0 g/dL    Albumin 3.7 3.5 - 5.0 g/dL    Alkaline Phosphatase 42 (L) 45 - 120 U/L    AST 16 0 - 40 U/L    ALT 13 0 - 45 U/L   HM2(CBC w/o Differential)   Result Value Ref Range    WBC 3.8 (L) 4.0 - 11.0 thou/uL    RBC 4.34 (L) 4.40 - 6.20 mill/uL    Hemoglobin 13.4 (L) 14.0 - 18.0 g/dL    Hematocrit 41.3 40.0 - 54.0 %     MCV 95 80 - 100 fL    MCH 30.9 27.0 - 34.0 pg    MCHC 32.5 32.0 - 36.0 g/dL    RDW 12.1 11.0 - 14.5 %    Platelets 171 140 - 440 thou/uL    MPV 7.3 7.0 - 10.0 fL   Lipid Cascade   Result Value Ref Range    Cholesterol 181 <=199 mg/dL    Triglycerides 58 <=149 mg/dL    HDL Cholesterol 63 >=40 mg/dL    LDL Calculated 106 <=129 mg/dL    Patient Fasting > 8hrs? Yes    Microalbumin, Random Urine   Result Value Ref Range    Microalbumin, Random Urine <0.50 0.00 - 1.99 mg/dL    Creatinine, Urine 83.3 mg/dL    Microalbumin/Creatinine Ratio Random Urine  <=19.9 mg/g   Thyroid Cascade   Result Value Ref Range    TSH 4.00 0.30 - 5.00 uIU/mL          This note has been dictated using voice recognition software. Any grammatical or context distortions are unintentional and inherent to the software

## 2021-05-30 VITALS — WEIGHT: 315 LBS | HEIGHT: 68 IN | BODY MASS INDEX: 47.74 KG/M2

## 2021-05-30 NOTE — TELEPHONE ENCOUNTER
Left a detailed message on Dorita's voicemail (note below stated ok to leave a detailed message)   Closing encounter

## 2021-05-30 NOTE — TELEPHONE ENCOUNTER
Please call and clarify. His last thyroid result was within normal limits so no change is necessary with his thyroid medication. At his last visit his blood sugar test was elevated and I recommended that he start metformin. I want to make sure he is doing that. Also, I would like him to follow-up for re-testing of his lab tests within the next 2 months. That can be a lab only visit or he can see me if he has questions.

## 2021-05-30 NOTE — TELEPHONE ENCOUNTER
Medication Question or Clarification  Who is calling: Other: Patient significant other, Dorita (caller stated a consent had been faxed earlier today).  What medication are you calling about? (include dose and sig)    Disp Refills Start End    levothyroxine (SYNTHROID, LEVOTHROID) 175 MCG tablet 90 tablet 3 4/15/2019     Sig - Route: Take 1 tablet (175 mcg total) by mouth daily. - Oral    Sent to pharmacy as: levothyroxine (SYNTHROID, LEVOTHROID) 175 MCG tablet    E-Prescribing Status: Receipt confirmed by pharmacy (4/15/2019 10:07 AM CDT)      Who prescribed the medication?: Geoffrey Live MD   What is your question/concern?: Caller is questioning if the patient should be taking 175 mcg or 150 mcg.  Caller reported the patient thought he was to be taking 150 mcg.  Pharmacy: N/a  Okay to leave a detailed message?: No  [phone] (Available until 2:00 pm)  Site CMT - Please call the pharmacy to obtain any additional needed information.

## 2021-05-30 NOTE — TELEPHONE ENCOUNTER
Patient Returning Call  Reason for call:  Dorita   Information relayed to patient:  n/a  Patient has additional questions:  Yes  If YES, what are your questions/concerns:  Patient gave verbal ok for Dorita to speak for him.  Dorita is calling back on the status of this message.  Okay to leave a detailed message?: Yes

## 2021-05-30 NOTE — TELEPHONE ENCOUNTER
Dr. Live-  See question below.  Last thyroid cascade done 4/15/19, nothing noted on results to decrease to 150 mcg. Med list is listed as 175mcg.  Please advise on levothyroxine dosage.

## 2021-05-31 VITALS — BODY MASS INDEX: 47.74 KG/M2 | HEIGHT: 68 IN | WEIGHT: 315 LBS

## 2021-05-31 VITALS — WEIGHT: 315 LBS | BODY MASS INDEX: 47.74 KG/M2 | HEIGHT: 68 IN

## 2021-05-31 NOTE — TELEPHONE ENCOUNTER
Who is calling:  Dorita, patient's girlfriend, spoke with patient and given verbal permission to speak with Dorita this one time, the patient and Dorita were both advised needs to put Dorita's name on chart if wishes her to discuss his medical needs.  Reason for Call:  Wanted current medication list..  Read the current list as found on patient snapshot  Date of last appointment with primary care: 4/15/19  Okay to leave a detailed message: Yes  No call back is needed.

## 2021-06-01 NOTE — TELEPHONE ENCOUNTER
RN cannot approve Refill Request    RN can NOT refill this medication Protocol failed and NO refill given. Last office visit: 4/15/2019 Geoffrey Live MD Last Physical: 9/29/2017 Last MTM visit: Visit date not found Last visit same specialty: 4/15/2019 Geoffrey Live MD.  Next visit within 3 mo: Visit date not found  Next physical within 3 mo: Visit date not found      Kayleigh Bocanegra, [facility] Triage/Med Refill 9/22/2019    Requested Prescriptions   Pending Prescriptions Disp Refills     simvastatin (ZOCOR) 40 MG tablet [Pharmacy Med Name: SIMVASTATIN 40 MG TABLET] 90 tablet 1     Sig: TAKE 1 TABLET BY MOUTH EVERY DAY       Statins Refill Protocol (Hmg CoA Reductase Inhibitors) Passed - 9/22/2019  9:00 AM        Passed - PCP or prescribing provider visit in past 12 months      Last office visit with prescriber/PCP: 4/15/2019 Geoffrey Live MD OR same dept: 4/15/2019 Geoffrey Live MD OR same specialty: 4/15/2019 Geoffrey Live MD  Last physical: 9/29/2017 Last MTM visit: Visit date not found   Next visit within 3 mo: Visit date not found  Next physical within 3 mo: Visit date not found  Prescriber OR PCP: Geoffrey Live MD  Last diagnosis associated with med order: 1. Hyperlipidemia  - simvastatin (ZOCOR) 40 MG tablet [Pharmacy Med Name: SIMVASTATIN 40 MG TABLET]; TAKE 1 TABLET BY MOUTH EVERY DAY  Dispense: 90 tablet; Refill: 1    2. Diabetes (H)  - glipiZIDE (GLUCOTROL) 10 MG tablet [Pharmacy Med Name: GLIPIZIDE 10 MG TABLET]; TAKE 1 TABLET BY MOUTH TWICE A DAY  Dispense: 180 tablet; Refill: 1    If protocol passes may refill for 12 months if within 3 months of last provider visit (or a total of 15 months).             glipiZIDE (GLUCOTROL) 10 MG tablet [Pharmacy Med Name: GLIPIZIDE 10 MG TABLET] 180 tablet 1     Sig: TAKE 1 TABLET BY MOUTH TWICE A DAY       Oral Hypoglycemics Refill Protocol Failed - 9/22/2019  9:00 AM        Failed - A1C in last 6  months     Hemoglobin A1c   Date Value Ref Range Status   09/29/2017 7.4 (H) 3.5 - 6.0 % Final               Passed - Visit with PCP or prescribing provider visit in last 6 months       Last office visit with prescriber/PCP: 4/15/2019 OR same dept: 4/15/2019 Geoffrey Live MD OR same specialty: 4/15/2019 Geoffrey Live MD Last physical: Visit date not found Last MTM visit: Visit date not found         Next appt within 3 mo: Visit date not found  Next physical within 3 mo: Visit date not found  Prescriber OR PCP: Geoffrey Live MD  Last diagnosis associated with med order: 1. Hyperlipidemia  - simvastatin (ZOCOR) 40 MG tablet [Pharmacy Med Name: SIMVASTATIN 40 MG TABLET]; TAKE 1 TABLET BY MOUTH EVERY DAY  Dispense: 90 tablet; Refill: 1    2. Diabetes (H)  - glipiZIDE (GLUCOTROL) 10 MG tablet [Pharmacy Med Name: GLIPIZIDE 10 MG TABLET]; TAKE 1 TABLET BY MOUTH TWICE A DAY  Dispense: 180 tablet; Refill: 1     If protocol passes may refill for 12 months if within 3 months of last provider visit (or a total of 15 months).           Passed - Microalbumin in last year      Microalbumin, Random Urine   Date Value Ref Range Status   04/15/2019 <0.50 0.00 - 1.99 mg/dL Final                  Passed - Blood pressure in last year     BP Readings from Last 1 Encounters:   04/15/19 146/72             Passed - Serum creatinine in last year     Creatinine   Date Value Ref Range Status   04/15/2019 0.99 0.70 - 1.30 mg/dL Final

## 2021-06-01 NOTE — TELEPHONE ENCOUNTER
levothyroxine (SYNTHROID, LEVOTHROID) 150 MCG tablet [[id number]]     Electronically signed by: Geoffrey Live MD on 03/27/19 1545 Status: Discontinued   Ordering user: Geoffrey Live MD 03/27/19 1545 Authorized by: Geoffrey Live MD   Frequency:  03/27/19 - 04/15/19  Discontinued by: Geoffrey Live MD 04/15/19 1006 [Dose adjustment]

## 2021-06-01 NOTE — TELEPHONE ENCOUNTER
RN declined refill request for levothyroxine since Rx just filled on 4/15/19 for one year.  Pharmacy informed.   Crystal Peguero RN BSN 9/28/2019 2:44 PM

## 2021-06-02 VITALS — WEIGHT: 315 LBS | BODY MASS INDEX: 52.79 KG/M2

## 2021-06-02 NOTE — TELEPHONE ENCOUNTER
There's a question regarding medication dosage on the Levothyroxin.  Should he be on 150mcg or 175mcg?     Also, do you want him to be on the ASA 81mg daily?    On 9/27 - RN refilled the 150mcg.  Med list states 175mcg.   Note from 4/15/19 states continue Levothyroxine 175mg - TSH was within normal range. If this is what you'd like him to continue - please send new script. Cued up    Dorita - can be reached at [phone] - ok to leave detailed message.     They are aware that he's due for an appointment to see you and will schedule soon.

## 2021-06-02 NOTE — TELEPHONE ENCOUNTER
Left a detailed message per Dorita's request. I asked that she call back if she has any questions or concerns. Closing enc

## 2021-06-04 VITALS
DIASTOLIC BLOOD PRESSURE: 82 MMHG | BODY MASS INDEX: 47.93 KG/M2 | HEART RATE: 68 BPM | SYSTOLIC BLOOD PRESSURE: 136 MMHG | RESPIRATION RATE: 20 BRPM | WEIGHT: 315 LBS

## 2021-06-04 NOTE — TELEPHONE ENCOUNTER
Refill Approved    Rx renewed per Medication Renewal Policy. Medication was last renewed on 12/31/18.    Karen Carlson, [facility] Triage/Med Refill 12/24/2019     Requested Prescriptions   Pending Prescriptions Disp Refills     tamsulosin (FLOMAX) 0.4 mg cap [Pharmacy Med Name: TAMSULOSIN HCL 0.4 MG CAPSULE] 90 capsule 3     Sig: TAKE 1 CAPSULE (0.4 MG TOTAL) BY MOUTH DAILY.       Alfuzosin/Tamsulosin/Silodosin Refill Protocol  Passed - 12/21/2019 11:10 AM        Passed - PCP or prescribing provider visit in past 12 months       Last office visit with prescriber/PCP: 4/15/2019 Geoffrey Live MD OR same dept: 4/15/2019 Geoffrey Live MD OR same specialty: 4/15/2019 Geoffrey Live MD  Last physical: 9/29/2017 Last MTM visit: Visit date not found   Next visit within 3 mo: Visit date not found  Next physical within 3 mo: Visit date not found  Prescriber OR PCP: Geoffrey Live MD  Last diagnosis associated with med order: 1. Prostate Cancer  - tamsulosin (FLOMAX) 0.4 mg cap [Pharmacy Med Name: TAMSULOSIN HCL 0.4 MG CAPSULE]; TAKE 1 CAPSULE (0.4 MG TOTAL) BY MOUTH DAILY.  Dispense: 90 capsule; Refill: 3    If protocol passes may refill for 12 months if within 3 months of last provider visit (or a total of 15 months).

## 2021-06-05 VITALS
HEART RATE: 65 BPM | DIASTOLIC BLOOD PRESSURE: 84 MMHG | WEIGHT: 311 LBS | TEMPERATURE: 95.4 F | BODY MASS INDEX: 47.29 KG/M2 | RESPIRATION RATE: 16 BRPM | SYSTOLIC BLOOD PRESSURE: 171 MMHG

## 2021-06-05 NOTE — TELEPHONE ENCOUNTER
Medication Question or Clarification  Who is calling: Dorita, friend.  Dorita is not on the consent list, Jayesh brought on line and gave permission this one time to discuss with Dorita.  The patient was encouraged to add Dorita to his chart at his next visit  What medication are you calling about (include dose and sig)?:   tamsulosin (FLOMAX) 0.4 mg cap 90 capsule 2 12/24/2019     Sig - Route: Take 1 capsule (0.4 mg total) by mouth daily. - Oral    Sent to pharmacy as: tamsulosin 0.4 mg capsule (FLOMAX)    E-Prescribing Status: Transmission to pharmacy failed (12/24/2019  3:13 PM CST)    Message completed by Caron Suh (1/8/2020  5:07 PM).        Who prescribed the medication?: Geoffrey Live MD    What is your question/concern?: This refill had failed to transmit. Patient was requesting the refill.  This writer spoke with the pharmacist and gave the medication order.  Requested Pharmacy: [id number]  Okay to leave a detailed message?: Yes

## 2021-06-05 NOTE — TELEPHONE ENCOUNTER
Please call encourage an office visit for diabetes check when he is able.  I know it is challenging for him make it to the clinic but I would like to see him and do some laboratory testing and review his care.

## 2021-06-05 NOTE — TELEPHONE ENCOUNTER
Dorita IS listed on consent to communicate form, see scan in media.  Nothing further needed per message below rx was called in verbally.

## 2021-06-06 NOTE — TELEPHONE ENCOUNTER
Refill Approved    Rx renewed per Medication Renewal Policy. Medication was last renewed on 4/15/19.    Charity Sheikh, [facility] Triage/Med Refill 3/5/2020     Requested Prescriptions   Pending Prescriptions Disp Refills     metFORMIN (GLUCOPHAGE XR) 500 MG 24 hr tablet 180 tablet 3     Sig: Take 1 tablet (500 mg total) by mouth daily with supper.       Metformin Refill Protocol Passed - 3/4/2020  3:49 PM        Passed - Blood pressure in last 12 months     BP Readings from Last 1 Encounters:   03/02/20 136/82             Passed - LFT or AST or ALT in last 12 months     Albumin   Date Value Ref Range Status   03/02/2020 3.8 3.5 - 5.0 g/dL Final     Bilirubin, Total   Date Value Ref Range Status   03/02/2020 0.8 0.0 - 1.0 mg/dL Final     Bilirubin, Direct   Date Value Ref Range Status   03/02/2020 0.3 <=0.5 mg/dL Final     Alkaline Phosphatase   Date Value Ref Range Status   03/02/2020 62 45 - 120 U/L Final     AST   Date Value Ref Range Status   03/02/2020 12 0 - 40 U/L Final     ALT   Date Value Ref Range Status   03/02/2020 14 0 - 45 U/L Final     Protein, Total   Date Value Ref Range Status   03/02/2020 6.2 6.0 - 8.0 g/dL Final                Passed - GFR or Serum Creatinine in last 6 months     GFR MDRD Non Af Amer   Date Value Ref Range Status   03/02/2020 >60 >60 mL/min/1.73m2 Final     GFR MDRD Af Amer   Date Value Ref Range Status   03/02/2020 >60 >60 mL/min/1.73m2 Final             Passed - Visit with PCP or prescribing provider visit in last 6 months or next 3 months     Last office visit with prescriber/PCP: 3/2/2020 OR same dept: 3/2/2020 Geoffrey Live MD OR same specialty: 3/2/2020 Geoffrey Live MD Last physical: Visit date not found Last MTM visit: Visit date not found         Next appt within 3 mo: Visit date not found  Next physical within 3 mo: Visit date not found  Prescriber OR PCP: Geoffrey Live MD  Last diagnosis associated with med order: 1. Uncontrolled type  2 diabetes mellitus with hyperglycemia (H)  - metFORMIN (GLUCOPHAGE XR) 500 MG 24 hr tablet; Take 1 tablet (500 mg total) by mouth daily with supper.  Dispense: 180 tablet; Refill: 3     If protocol passes may refill for 12 months if within 3 months of last provider visit (or a total of 15 months).           Passed - A1C in last 6 months     Hemoglobin A1c   Date Value Ref Range Status   03/02/2020 13.2 (H) 3.5 - 6.0 % Final               Passed - Microalbumin in last year      Microalbumin, Random Urine   Date Value Ref Range Status   04/15/2019 <0.50 0.00 - 1.99 mg/dL Final

## 2021-06-06 NOTE — TELEPHONE ENCOUNTER
"This patient is new to me ( I have not seen him as of yet)  - I see there is a recent referral order that Dr Live had put in and I bet when he saw patient he told him to \"follow up with Elise\" as a matter of fact , I know that's what he said because it's written in his AVS note when Jayesh saw him in clinic on 3/2 So I am thinking he needs to be called to get scheduled :)  "

## 2021-06-06 NOTE — TELEPHONE ENCOUNTER
Refill Request  Did you contact pharmacy: No  Medication name:   Requested Prescriptions     Pending Prescriptions Disp Refills     metFORMIN (GLUCOPHAGE XR) 500 MG 24 hr tablet 180 tablet 3     Sig: Take 1 tablet (500 mg total) by mouth daily with supper.     Who prescribed the medication: Geoffrey Live MD   Requested Pharmacy: [facility]  Is patient out of medication: almost  Patient notified refills processed in 3 business days:  no  Okay to leave a detailed message: yes

## 2021-06-06 NOTE — TELEPHONE ENCOUNTER
Patient Returning Call  Reason for call:  Patient called back.  Information relayed to patient:  n/a  Patient has additional questions:  Yes  If YES, what are your questions/concerns:  Calling again on the status of this medication.  Is very upset that the correct dosage has not been sent to the pharmacy as of yet.  Please address today and call patient when sent.    Okay to leave a detailed message?: Yes

## 2021-06-06 NOTE — TELEPHONE ENCOUNTER
Medication Request  Medication name:    Disp Refills Start End    metFORMIN (GLUCOPHAGE XR) 500 MG 24 hr tablet 180 tablet 0 3/5/2020     Sig - Route: Take 1 tablet (500 mg total) by mouth daily with supper. - Oral    Sent to pharmacy as: metFORMIN  mg tablet,extended release 24 hr (Glucophage XR)    E-Prescribing Status: Receipt confirmed by pharmacy (3/5/2020  3:04 PM CST)        Requested Pharmacy: [facility]  Reason for request: caller stated that the refill that the pharmacy has is only for taking 1 tablets instead of it stating take 2 twice a day. Caller stated that the doctor did put it in the notes stating that it needs to be take 2 twice a day   (Geoffrey Live MD notes, Increase metformin 500 mg (take two twice a day)  )  Caller stated that he would really like the right one to be sent over to the pharmacy.   When did you use medication last?:    Patient offered appointment:  yes  Okay to leave a detailed message: yes

## 2021-06-06 NOTE — PROGRESS NOTES
Assessment/ Plan     1. Uncontrolled type 2 diabetes mellitus with hyperglycemia (H)    Unfortunately, despite dropping his weight from 347 pounds to 315 pounds his diabetes is more poorly controlled  His hemoglobin A1c has increased to 13.2% from 7.4%  Recommend that he increase metformin to 1000 mg twice daily.  We will need to monitor his kidney function and have reviewed potential risks including lactic acidosis  Continue glipizide 10 mg twice daily  Refer for diabetes education discussed other options.  He may be a candidate for a GLP-1  Have recommended annual eye examination and have reviewed foot care  Continue aspirin and losartan as well as simvastatin  - Glycosylated Hemoglobin A1c  - Ambulatory referral to Diabetes Education Program (CDE)    2. Hyperlipidemia, unspecified hyperlipidemia type    Continue simvastatin  Check a lipid cascade and hepatic profile  - Basic Metabolic Panel  - Hepatic Profile  - HM2(CBC w/o Differential)  - Lipid Cascade    3. Hypothyroidism, unspecified type    Continue level thyroxine  Check a thyroid cascade  - Thyroid Cascade    4. Morbid obesity (H)    Commended patient for his weight loss  Recommend he continue to work on weight loss and have reviewed comorbidities    5. Malignant neoplasm of prostate (H)    Continue to follow-up with urology  He has been treated with tamsulosin    Subjective:       Jayesh Ortiz is a 79 y.o. male who presents to the clinic for a diabetes check.  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  It is very challenging for him to make it to the clinic.  He was last seen in April of 2019.    His medical history is notable for type 2 diabetes mellitus, hypertension, hyperlipidemia, and hypothyroidism.  He also has a history of prostate cancer which is followed by urology.  He has a remote history of thyroid cancer.    He follows up today stating that he has been compliant with his medications.  He has been  able to lose a significant amount of weight and has been doing this by eating well.  His weight has dropped from 347 pounds down to 315 pounds.  Unfortunately, his hemoglobin A1c increased from 7.4% to 13.2%.  Overall, he states that he feels wonderful.  He does not experience excessive thirst, frequent urination, or significant fatigue.  He states that after sustaining a foot injury he is completely healed and has been able to be active.  He works very hard on his farm.    He had improved his directly with glipizide and metformin.  He also takes simvastatin given a history of hyperlipidemia.  He takes tamsulosin and does note that he can get up at night to urinate on multiple occasions.    Review of systems is negative for headache, dizziness, lightheadedness, chest pain, palpitations, or breathing concerns.       The following portions of the patient's history were reviewed and updated as appropriate: allergies, current medications, past family history, past medical history, past social history, past surgical history and problem list. Medications have been reconciled    Review of Systems   A 12 point comprehensive review of systems was negative except as noted.      Current Outpatient Medications   Medication Sig Dispense Refill     aspirin 81 MG EC tablet Take 1 tablet (81 mg total) by mouth daily.  0     glipiZIDE (GLUCOTROL) 10 MG tablet TAKE 1 TABLET BY MOUTH TWICE A  tablet 1     levothyroxine (SYNTHROID, LEVOTHROID) 175 MCG tablet Take 1 tablet (175 mcg total) by mouth daily. 90 tablet 3     losartan (COZAAR) 25 MG tablet TAKE 1 TABLET (25 MG TOTAL) BY MOUTH DAILY. 90 tablet 0     simvastatin (ZOCOR) 40 MG tablet TAKE 1 TABLET BY MOUTH EVERY DAY 90 tablet 0     tamsulosin (FLOMAX) 0.4 mg cap Take 1 capsule (0.4 mg total) by mouth daily. 90 capsule 2     metFORMIN (GLUCOPHAGE XR) 500 MG 24 hr tablet Take 1 tablet (500 mg total) by mouth daily with supper. 180 tablet 0     metFORMIN (GLUCOPHAGE) 500 MG  tablet Take 2 tablets (1,000 mg total) by mouth 2 (two) times a day with meals. 360 tablet 3     No current facility-administered medications for this visit.        Objective:      /82   Pulse 68   Resp 20   Wt (!) 315 lb 3.2 oz (143 kg)   BMI 47.93 kg/m        General appearance: alert, appears stated age and cooperative  Head: Normocephalic, without obvious abnormality, atraumatic  Eyes: conjunctivae/corneas clear. PERRL, EOM's intact.   Lungs: clear to auscultation bilaterally  Heart: regular rate and rhythm, S1, S2 normal, no murmur, click, rub or gallop  Extremities: extremities normal, atraumatic, no cyanosis or edema  Skin: Skin color, texture, turgor normal. No rashes or lesions  Lymph nodes: Cervical nodes normal.  Neurologic: Alert and oriented X 3         Recent Results (from the past 168 hour(s))   Glycosylated Hemoglobin A1c   Result Value Ref Range    Hemoglobin A1c 13.2 (H) 3.5 - 6.0 %   Basic Metabolic Panel   Result Value Ref Range    Sodium 135 (L) 136 - 145 mmol/L    Potassium 5.1 (H) 3.5 - 5.0 mmol/L    Chloride 98 98 - 107 mmol/L    CO2 28 22 - 31 mmol/L    Anion Gap, Calculation 9 5 - 18 mmol/L    Glucose 369 (H) 70 - 125 mg/dL    Calcium 9.0 8.5 - 10.5 mg/dL    BUN 23 8 - 28 mg/dL    Creatinine 1.15 0.70 - 1.30 mg/dL    GFR MDRD Af Amer >60 >60 mL/min/1.73m2    GFR MDRD Non Af Amer >60 >60 mL/min/1.73m2   Hepatic Profile   Result Value Ref Range    Bilirubin, Total 0.8 0.0 - 1.0 mg/dL    Bilirubin, Direct 0.3 <=0.5 mg/dL    Protein, Total 6.2 6.0 - 8.0 g/dL    Albumin 3.8 3.5 - 5.0 g/dL    Alkaline Phosphatase 62 45 - 120 U/L    AST 12 0 - 40 U/L    ALT 14 0 - 45 U/L   HM2(CBC w/o Differential)   Result Value Ref Range    WBC 4.3 4.0 - 11.0 thou/uL    RBC 4.83 4.40 - 6.20 mill/uL    Hemoglobin 15.4 14.0 - 18.0 g/dL    Hematocrit 45.8 40.0 - 54.0 %    MCV 95 80 - 100 fL    MCH 31.9 27.0 - 34.0 pg    MCHC 33.6 32.0 - 36.0 g/dL    RDW 11.4 11.0 - 14.5 %    Platelets 200 140 - 440  thou/uL    MPV 8.7 7.0 - 10.0 fL   Lipid Cascade   Result Value Ref Range    Cholesterol 183 <=199 mg/dL    Triglycerides 67 <=149 mg/dL    HDL Cholesterol 56 >=40 mg/dL    LDL Calculated 114 <=129 mg/dL    Patient Fasting > 8hrs? Yes    Thyroid Cascade   Result Value Ref Range    TSH 3.69 0.30 - 5.00 uIU/mL          This note has been dictated using voice recognition software. Any grammatical or context distortions are unintentional and inherent to the software

## 2021-06-06 NOTE — TELEPHONE ENCOUNTER
See refill requested below.  Last OV on 3/2/20 states:    Increase metformin 500 mg (take two twice a day)      Please advise.

## 2021-06-06 NOTE — TELEPHONE ENCOUNTER
Elise,     Yes, I see the message and I also see the order in the chart. We'll call the patient to arrange the appointment.

## 2021-06-06 NOTE — TELEPHONE ENCOUNTER
Medication Question or Clarification  Who is calling: patient   What medication are you calling about (include dose and sig)?:   metFORMIN (GLUCOPHAGE XR) 500 MG 24 hr tablet 180 tablet 3 4/15/2019     Sig - Route: Take 1 tablet (500 mg total) by mouth daily with supper. - Oral    Who prescribed the medication?: Geoffrey Live MD  What is your question/concern?: Patient states he saw provider on 03/02/20 provider increased dose of metformin 500 mg take 2 tablets(1000 total) in the morning and 1000 mg at supper time pharmacy is not not filling Rx the do not have new Prescription patient is going to be out of medication soon he is taking as provider suggested . Please advise .  Requested Pharmacy: [id number]  Okay to leave a detailed message?: No

## 2021-06-06 NOTE — TELEPHONE ENCOUNTER
Please notify that I sent this and let him know I apoligize for the delay.  I sent the new prescription.  Please have him let me know if there is a problem.

## 2021-06-06 NOTE — TELEPHONE ENCOUNTER
Patient Returning Call  Reason for call:  Return call.  Information relayed to patient:  Patient was informed of Geoffrey Live MD's response below.  Patient has additional questions:  No  If YES, what are your questions/concerns:  n/a  Okay to leave a detailed message?: No call back needed

## 2021-06-06 NOTE — TELEPHONE ENCOUNTER
Called pt and LMTCB. On pt's return call please inform him of the message below per JM. Please document call was returned along with any questions or concerns the pt might have. Thank you.

## 2021-06-06 NOTE — PATIENT INSTRUCTIONS - HE
Great job with your weight loss  Your diabetes test is high  Increase metformin 500 mg (take two twice a day)  Continue glipizide  Continue to work on your diet and limit carbohydrates  Consider checking your blood sugars  Follow-up with Elise for diabetes education  It was good to see you.

## 2021-06-06 NOTE — TELEPHONE ENCOUNTER
Medication Question or Clarification  Who is calling: Dorita   What medication are you calling about (include dose and sig)?:    Disp  Refills  Start  End     metFORMIN (GLUCOPHAGE XR) 500 MG 24 hr tablet  180 tablet  0  3/5/2020      Sig - Route: Take 1 tablet (500 mg total) by mouth daily with supper. - Oral     Sent to pharmacy as: metFORMIN  mg tablet,extended release 24 hr (Glucophage XR)     E-Prescribing Status: Receipt confirmed by pharmacy (3/5/2020  3:04 PM CST)        Disp  Refills  Start  End     metFORMIN (GLUCOPHAGE) 500 MG tablet  360 tablet  3  3/6/2020      Sig - Route: Take 2 tablets (1,000 mg total) by mouth 2 (two) times a day with meals. - Oral     Sent to pharmacy as: metFORMIN 500 mg tablet (Glucophage)     E-Prescribing Status: Receipt confirmed by pharmacy (3/6/2020  1:48 PM CST)         Who prescribed the medication?: Geoffrey Live MD   What is your question/concern?: caller is wondering why they had gotten immediate release instead of extended and if there's any different to the two. Caller is checking to make sure that he's suppose to take 1,000 mg   Requested Pharmacy: n/a  Okay to leave a detailed message?: Yes  [phone] or [phone]

## 2021-06-06 NOTE — TELEPHONE ENCOUNTER
Please call. He does indeed have the immediate release which is OK for him to take. I do want him to take 1000 mg twice a day. The extended release can sometimes be given only once a day. However, I want him to take the immediate release to make sure he tolerates it. He can follow up with the diabetes educator as the next step. We may changed to extended release in the future depending on how he does.

## 2021-06-06 NOTE — TELEPHONE ENCOUNTER
Refill Approved    Rx renewed per Medication Renewal Policy. Medication was last renewed on 4/1/19.9/25/19.    Charity Sheikh, [facility] Triage/Med Refill 2/19/2020     Requested Prescriptions   Pending Prescriptions Disp Refills     losartan (COZAAR) 25 MG tablet [Pharmacy Med Name: LOSARTAN POTASSIUM 25 MG TAB] 90 tablet 3     Sig: TAKE 1 TABLET (25 MG TOTAL) BY MOUTH DAILY.       Angiotensin Receptor Blocker Protocol Passed - 2/15/2020 12:33 PM        Passed - PCP or prescribing provider visit in past 12 months       Last office visit with prescriber/PCP: 4/15/2019 Geoffrey Live MD OR same dept: 4/15/2019 Geoffrey Live MD OR same specialty: 4/15/2019 Geoffrey Live MD  Last physical: 9/29/2017 Last MTM visit: Visit date not found   Next visit within 3 mo: Visit date not found  Next physical within 3 mo: Visit date not found  Prescriber OR PCP: Geoffrey Live MD  Last diagnosis associated with med order: 1. HTN (hypertension)  - losartan (COZAAR) 25 MG tablet [Pharmacy Med Name: LOSARTAN POTASSIUM 25 MG TAB]; TAKE 1 TABLET (25 MG TOTAL) BY MOUTH DAILY.  Dispense: 90 tablet; Refill: 3    2. Hyperlipidemia  - simvastatin (ZOCOR) 40 MG tablet [Pharmacy Med Name: SIMVASTATIN 40 MG TABLET]; TAKE 1 TABLET BY MOUTH EVERY DAY  Dispense: 90 tablet; Refill: 1    If protocol passes may refill for 12 months if within 3 months of last provider visit (or a total of 15 months).             Passed - Serum potassium within the past 12 months     Lab Results   Component Value Date    Potassium 4.7 04/15/2019             Passed - Blood pressure filed in past 12 months     BP Readings from Last 1 Encounters:   04/15/19 146/72             Passed - Serum creatinine within the past 12 months     Creatinine   Date Value Ref Range Status   04/15/2019 0.99 0.70 - 1.30 mg/dL Final             simvastatin (ZOCOR) 40 MG tablet [Pharmacy Med Name: SIMVASTATIN 40 MG TABLET] 90 tablet 1     Sig: TAKE  1 TABLET BY MOUTH EVERY DAY       Statins Refill Protocol (Hmg CoA Reductase Inhibitors) Passed - 2/15/2020 12:33 PM        Passed - PCP or prescribing provider visit in past 12 months      Last office visit with prescriber/PCP: 4/15/2019 Geoffrey Live MD OR same dept: 4/15/2019 Geoffrey Live MD OR same specialty: 4/15/2019 Geoffrey Live MD  Last physical: 9/29/2017 Last MTM visit: Visit date not found   Next visit within 3 mo: Visit date not found  Next physical within 3 mo: Visit date not found  Prescriber OR PCP: Geoffrey Live MD  Last diagnosis associated with med order: 1. HTN (hypertension)  - losartan (COZAAR) 25 MG tablet [Pharmacy Med Name: LOSARTAN POTASSIUM 25 MG TAB]; TAKE 1 TABLET (25 MG TOTAL) BY MOUTH DAILY.  Dispense: 90 tablet; Refill: 3    2. Hyperlipidemia  - simvastatin (ZOCOR) 40 MG tablet [Pharmacy Med Name: SIMVASTATIN 40 MG TABLET]; TAKE 1 TABLET BY MOUTH EVERY DAY  Dispense: 90 tablet; Refill: 1    If protocol passes may refill for 12 months if within 3 months of last provider visit (or a total of 15 months).

## 2021-06-07 NOTE — TELEPHONE ENCOUNTER
Please call.  The hemoglobin A1c can only be checked every 90 days.  Otherwise, Medicare will not pay for it.  Given that he had his done in early March then the earliest he can do it again is in early June.  However, he can monitor his blood sugars periodically and provide an update and we can consider adjustments to medications or other options if he is still running quite high.  Please let me know if he has questions or concerns and I can speak to him directly if needed.

## 2021-06-07 NOTE — TELEPHONE ENCOUNTER
Upcoming Appointment Question  When is the appointment: 4/6/20   What is your appointment for?:  Diabetes check up   Who is your appointment scheduled with?: Geoffrey Live MD   What is your question/concern?: Patient's last A1C level was at 13.2.  Patient has increased his metformin as directed by provider.  He is asking if he should check his A1C yet to determine if the medication increase is lowering his A1C.  (He planned to do this on 4/6/20.)  Okay to leave a detailed message?: Yes

## 2021-06-07 NOTE — TELEPHONE ENCOUNTER
[facility] on [address] sent a fax stating that Losartan is currently backordered/unavailable.      Irbesartan is all that is available at this time.  I did not cue up because I wasn't sure if the dosage is the same.     Please send new script to [facility]. Thanks!

## 2021-06-07 NOTE — TELEPHONE ENCOUNTER
Refill Approved    Rx renewed per Medication Renewal Policy. Medication was last renewed on 2/19/20.    Charity Sheikh, [facility] Triage/Med Refill 4/9/2020     Requested Prescriptions   Pending Prescriptions Disp Refills     simvastatin (ZOCOR) 40 MG tablet [Pharmacy Med Name: SIMVASTATIN 40 MG TABLET] 90 tablet 0     Sig: TAKE 1 TABLET BY MOUTH EVERY DAY       Statins Refill Protocol (Hmg CoA Reductase Inhibitors) Passed - 4/8/2020  2:57 PM        Passed - PCP or prescribing provider visit in past 12 months      Last office visit with prescriber/PCP: 3/2/2020 Geoffrey Live MD OR same dept: 3/2/2020 Geoffrey Live MD OR same specialty: 3/2/2020 Geoffrey Live MD  Last physical: 9/29/2017 Last MTM visit: Visit date not found   Next visit within 3 mo: Visit date not found  Next physical within 3 mo: Visit date not found  Prescriber OR PCP: Geoffrey Live MD  Last diagnosis associated with med order: 1. Hyperlipidemia  - simvastatin (ZOCOR) 40 MG tablet [Pharmacy Med Name: SIMVASTATIN 40 MG TABLET]; TAKE 1 TABLET BY MOUTH EVERY DAY  Dispense: 90 tablet; Refill: 0    2. HTN (hypertension)  - losartan (COZAAR) 25 MG tablet [Pharmacy Med Name: LOSARTAN POTASSIUM 25 MG TAB]; TAKE 1 TABLET (25 MG TOTAL) BY MOUTH DAILY.  Dispense: 90 tablet; Refill: 0    If protocol passes may refill for 12 months if within 3 months of last provider visit (or a total of 15 months).                losartan (COZAAR) 25 MG tablet [Pharmacy Med Name: LOSARTAN POTASSIUM 25 MG TAB] 90 tablet 0     Sig: TAKE 1 TABLET (25 MG TOTAL) BY MOUTH DAILY.       Angiotensin Receptor Blocker Protocol Passed - 4/8/2020  2:57 PM        Passed - PCP or prescribing provider visit in past 12 months       Last office visit with prescriber/PCP: 3/2/2020 Geoffrey Live MD OR same dept: 3/2/2020 Geoffrey Live MD OR same specialty: 3/2/2020 Geoffrey Live MD  Last physical: 9/29/2017 Last  MTM visit: Visit date not found   Next visit within 3 mo: Visit date not found  Next physical within 3 mo: Visit date not found  Prescriber OR PCP: Geoffrey Live MD  Last diagnosis associated with med order: 1. Hyperlipidemia  - simvastatin (ZOCOR) 40 MG tablet [Pharmacy Med Name: SIMVASTATIN 40 MG TABLET]; TAKE 1 TABLET BY MOUTH EVERY DAY  Dispense: 90 tablet; Refill: 0    2. HTN (hypertension)  - losartan (COZAAR) 25 MG tablet [Pharmacy Med Name: LOSARTAN POTASSIUM 25 MG TAB]; TAKE 1 TABLET (25 MG TOTAL) BY MOUTH DAILY.  Dispense: 90 tablet; Refill: 0    If protocol passes may refill for 12 months if within 3 months of last provider visit (or a total of 15 months).             Passed - Serum potassium within the past 12 months     Lab Results   Component Value Date    Potassium 5.1 (H) 03/02/2020             Passed - Blood pressure filed in past 12 months     BP Readings from Last 1 Encounters:   03/02/20 136/82             Passed - Serum creatinine within the past 12 months     Creatinine   Date Value Ref Range Status   03/02/2020 1.15 0.70 - 1.30 mg/dL Final

## 2021-06-07 NOTE — TELEPHONE ENCOUNTER
Message relayed to Jayesh. He said he will make an appointment in June to have his A1c checked and give us updates about his blood sugar.

## 2021-06-07 NOTE — TELEPHONE ENCOUNTER
Please call patient.  Losartan is on back order and I therefore switched him to Irbesartan which is in the same class.  He can try this and monitor his blood pressure.  I only sent a 30-day supply as I want to make sure that he tolerated this but this can be changed to a 90-day supply in the future.  Please have him let me know if there are concerns.

## 2021-06-08 NOTE — PROGRESS NOTES
SUBJECTIVE:    This is a 76-year-old male with a history of type II diabetes mellitus, hypothyroidism and hyperlipidemia who presents to the clinic in follow-up. He has a known history of prostate cancer as well as thyroid cancer. 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 He has been compliant with his medications. His last hemoglobin A1c was 6.7%. His total cholesterol was 183 and his LDL was 115. He has been taking glipizide 10 mg twice a day and Actos 45 mg a day. He has not been seen since August 2015. He reports he has been feeling well. He denies visual concerns. He has not had recent chest pain, shortness of breath, palpitations, or bowel concerns. His mood has been stable. He has been able to remain independent and perform strenuous activity on the farm. He denies hypoglycemic episodes.      Patient Active Problem List   Diagnosis     Prostate Cancer     Thyroid Cancer     Hypothyroidism     Type 2 diabetes mellitus     Hyperlipidemia       No current outpatient prescriptions on file prior to visit.     No current facility-administered medications on file prior to visit.        No Known Allergies         A 12 point comprehensive review of systems was negative except as noted.      OBJECTIVE:     Vitals:    01/30/17 1149   BP: 134/74   Pulse: 80   Resp: 20   Temp: 98.1  F (36.7  C)       General: Alert, not acutely distressed   Head:  Atraumatic, normocephalic  Eyes:  PERRL, extraocular movements are intact  Nose:  Septum midline  Throat:  Oral mucosa moist, oropharynx clear  Neck:  Supple without adenopathy or thyromegaly   Cardiovascular: S1-S2 with regular rate and without murmur, rub, or gallop   Lungs:  Clear to auscultation bilaterally   Extremities: Without cyanosis or edema   Neuro:  CN II-XII appear intact        Laboratory Results:    Results for orders placed or performed in visit on 01/30/17   Glycosylated Hemoglobin A1c   Result Value  Ref Range    Hemoglobin A1c 6.4 (H) 3.5 - 6.0 %   Lipid Cascade   Result Value Ref Range    Cholesterol 188 <=199 mg/dL    Triglycerides 50 <=149 mg/dL    HDL Cholesterol 57 >=40 mg/dL    LDL Calculated 121 <=129 mg/dL    Patient Fasting > 8hrs? Yes    Basic Metabolic Panel   Result Value Ref Range    Sodium 140 136 - 145 mmol/L    Potassium 4.7 3.5 - 5.0 mmol/L    Chloride 105 98 - 107 mmol/L    CO2 25 22 - 31 mmol/L    Anion Gap, Calculation 10 5 - 18 mmol/L    Glucose 178 (H) 70 - 125 mg/dL    Calcium 8.8 8.5 - 10.5 mg/dL    BUN 24 8 - 28 mg/dL    Creatinine 0.92 0.70 - 1.30 mg/dL    GFR MDRD Af Amer >60 >60 mL/min/1.73m2    GFR MDRD Non Af Amer >60 >60 mL/min/1.73m2   Hepatic Profile   Result Value Ref Range    Bilirubin, Total 0.5 0.0 - 1.0 mg/dL    Bilirubin, Direct 0.2 <=0.5 mg/dL    Protein, Total 6.0 6.0 - 8.0 g/dL    Albumin 3.7 3.5 - 5.0 g/dL    Alkaline Phosphatase 43 (L) 45 - 120 U/L    AST 18 0 - 40 U/L    ALT 18 0 - 45 U/L   Thyroid Cascade   Result Value Ref Range    TSH 6.26 (H) 0.30 - 5.00 uIU/mL   Microalbumin, Random Urine   Result Value Ref Range    Microalbumin, Random Urine <0.50 0.00 - 1.99 mg/dL    Creatinine, Urine 100.0 mg/dL    Microalbumin/Creatinine Ratio Random Urine  <=19.9 mg/g   T4, Free   Result Value Ref Range    Free T4 1.3 0.7 - 1.8 ng/dL         ASSESSMENT AND PLAN:    1. Type 2 diabetes mellitus  2. Diabetes    Hemoglobin A1c is 6.4%  Consider stopping actos to avoid hypoglycemia  - Lipid Cascade  - Basic Metabolic Panel  - Hepatic Profile  - Microalbumin, Random Urine  Recommend annual eye examination  Recommend losartan  Discussed foot care  Continue aspirin and statin      - glipiZIDE (GLUCOTROL) 10 MG tablet; TAKE ONE TABLET BY MOUTH TWICE DAILY  Dispense: 180 tablet; Refill: 3  - pioglitazone (ACTOS) 45 MG tablet; TAKE ONE TABLET BY MOUTH ONE TIME DAILY  Dispense: 90 tablet; Refill: 3  - Glycosylated Hemoglobin A1c    3. Hypothyroidism    Continue levothyroxine  -  levothyroxine (SYNTHROID, LEVOTHROID) 150 MCG tablet; TAKE ONE TABLET BY MOUTH ONE TIME DAILY  Dispense: 90 tablet; Refill: 3  - Thyroid Cascade  - T4, Free    4. HTN (hypertension)    Continue losartan  - losartan (COZAAR) 25 MG tablet; TAKE ONE TABLET BY MOUTH ONE TIME DAILY  Dispense: 90 tablet; Refill: 3    5. Hyperlipidemia    Continue simvastatin  - simvastatin (ZOCOR) 40 MG tablet; TAKE ONE TABLET BY MOUTH ONE TIME DAILY  Dispense: 90 tablet; Refill: 3    6. Prostate Cancer    Continue flomax  - tamsulosin (FLOMAX) 0.4 mg Cp24; TAKE 1 CAPSULE (0.4 MG) BY MOUTH ONCE DAILY  Dispense: 90 capsule; Refill: 3    Encourage a complete physical examination  25 minutes were spent with the patient and greater than 50% of the time was spent in face to face counseling and coordination of care      Geoffrey Live MD      This note has been dictated and transcribed using voice recognition software.  Any grammatical or contextual distortions are unintentional and inherent to the software.

## 2021-06-10 NOTE — TELEPHONE ENCOUNTER
Refill Approved    Rx renewed per Medication Renewal Policy. Medication was last renewed on 4/14/20.    Charity Sheikh, [facility] Triage/Med Refill 8/10/2020     Requested Prescriptions   Pending Prescriptions Disp Refills     irbesartan (AVAPRO) 150 MG tablet [Pharmacy Med Name: IRBESARTAN 150 MG TABLET] 90 tablet 1     Sig: TAKE 1 TABLET (150 MG TOTAL) BY MOUTH AT BEDTIME.       Angiotensin Receptor Blocker Protocol Passed - 8/9/2020  9:33 AM        Passed - PCP or prescribing provider visit in past 12 months       Last office visit with prescriber/PCP: 3/2/2020 Geoffrey Live MD OR same dept: 3/2/2020 Geoffrey Live MD OR same specialty: 3/2/2020 Geoffrey Live MD  Last physical: 9/29/2017 Last MTM visit: Visit date not found   Next visit within 3 mo: Visit date not found  Next physical within 3 mo: Visit date not found  Prescriber OR PCP: Geoffrey Live MD  Last diagnosis associated with med order: 1. Uncontrolled type 2 diabetes mellitus with hyperglycemia (H)  - irbesartan (AVAPRO) 150 MG tablet [Pharmacy Med Name: IRBESARTAN 150 MG TABLET]; Take 1 tablet (150 mg total) by mouth at bedtime.  Dispense: 90 tablet; Refill: 1    If protocol passes may refill for 12 months if within 3 months of last provider visit (or a total of 15 months).             Passed - Serum potassium within the past 12 months     Lab Results   Component Value Date    Potassium 5.1 (H) 03/02/2020             Passed - Blood pressure filed in past 12 months     BP Readings from Last 1 Encounters:   03/02/20 136/82             Passed - Serum creatinine within the past 12 months     Creatinine   Date Value Ref Range Status   03/02/2020 1.15 0.70 - 1.30 mg/dL Final

## 2021-06-11 NOTE — TELEPHONE ENCOUNTER
FYI-  Spoke to pharmacy, relayed Dr. Live's clarification below.  Pharmacist wanted you to know that there was a 4-5 month period he was taking both the irbesartan AND losartan.  Pharmacist has now removed the irbesartan from his med list.  Also pt has not refilled simvastatin since February when he received a 90 day supply. Pt was thinking the irbesartan was his cholesterol medication. Pharmacist states she will reach out to pt to clarify to take losartan and simvastatin, not irbesartan.  She just wanted you to be aware.

## 2021-06-11 NOTE — TELEPHONE ENCOUNTER
Pharmacy requesting clarification:  Should patient be taking IRBESARTAN 150mg and LOSARTAN???    Pharmacy states patient is confused. Also patient has not been taking Cholesterol Med SIMVASTATIN, patient is not sure if should continue or not?????

## 2021-06-11 NOTE — PROGRESS NOTES
SUBJECTIVE:    This is a pleasant 76-year-old male who presents to clinic in follow-up for evaluation of a right leg wound and for possible infection.  Essentially, he sustained an injury to his right lower extremity.  He was attempting to get into a [employer] and slipped.  In the process he suffered a fracture to his talus and required surgical intervention.  He was admitted for hospitalization on March 9, 2017 through March 13, 2017.  He was discharged to [facility] and was just discharged about 2 weeks ago.  He did wear a cast for long period of time.  When the cast was removed he had significant swelling in his right and left lower extremities.  He developed a sore along the lateral aspect of his right leg.  Since then, he has received home nursing care.  Per their request a prescription was sent for him to start cephalexin prior to his appointment but he admits he has not been able to start that yet.  He reports that since he has received wound care there has been less redness and swelling.  He denies obvious calf pain currently.  He has not had a fever or chills.  He feels there has been significant improvement.  It should be noted that he was given a prescription for a diuretic in the short-term as well.  He does have history of type 2 diabetes mellitus but is not currently check his blood sugars.  He feels that his blood sugars have been stable.  His last NATHAN globin A1c was 6.4%.  He does not believe he has had hypoglycemic episodes.  He has been compliant with his medication.  His wife of is present today.      Patient Active Problem List   Diagnosis     Prostate Cancer     Thyroid Cancer     Hypothyroidism     Type 2 diabetes mellitus     Hyperlipidemia       Current Outpatient Prescriptions on File Prior to Visit   Medication Sig     cephalexin 500 mg tablet Take 500 mg by mouth 3 (three) times a day for 10 days.     glipiZIDE (GLUCOTROL) 10 MG tablet TAKE ONE TABLET BY MOUTH TWICE DAILY      levothyroxine (SYNTHROID, LEVOTHROID) 150 MCG tablet TAKE ONE TABLET BY MOUTH ONE TIME DAILY     losartan (COZAAR) 25 MG tablet TAKE ONE TABLET BY MOUTH ONE TIME DAILY     pioglitazone (ACTOS) 45 MG tablet TAKE ONE TABLET BY MOUTH ONE TIME DAILY     simvastatin (ZOCOR) 40 MG tablet TAKE ONE TABLET BY MOUTH ONE TIME DAILY     tamsulosin (FLOMAX) 0.4 mg Cp24 TAKE 1 CAPSULE (0.4 MG) BY MOUTH ONCE DAILY     aspirin 81 MG EC tablet Take 1 tablet (81 mg total) by mouth daily.     furosemide (LASIX) 20 MG tablet Take 1-2 PO qday prn     No current facility-administered medications on file prior to visit.        No Known Allergies         A 12 point comprehensive review of systems was negative except as noted.      OBJECTIVE:     Vitals:    06/01/17 1634   BP: 128/78   Pulse: (!) 58   Resp: 20   Temp: 97.9  F (36.6  C)       General: Alert, not acutely distressed   Head:  Atraumatic, normocephalic  Eyes:  PERRL, extraocular movements are intact  Nose:  Septum midline  Neck:  Supple without adenopathy or thyromegaly   Cardiovascular: S1-S2 with regular rate and without murmur, rub, or gallop   Lungs:  Clear to auscultation bilaterally   Extremities: There is 2+ lower extremity edema bilaterally  Examination of the right lower extremity reveals a moderate amount of swelling.  There is a linear wound along the lateral aspect with some surrounding erythema  There is a posterior wound as well  There is some clear drainage from his wounds  Neuro:  CN II-XII appear intact        Laboratory Results:    Results for orders placed or performed in visit on 01/30/17   Glycosylated Hemoglobin A1c   Result Value Ref Range    Hemoglobin A1c 6.4 (H) 3.5 - 6.0 %   Lipid Cascade   Result Value Ref Range    Cholesterol 188 <=199 mg/dL    Triglycerides 50 <=149 mg/dL    HDL Cholesterol 57 >=40 mg/dL    LDL Calculated 121 <=129 mg/dL    Patient Fasting > 8hrs? Yes    Basic Metabolic Panel   Result Value Ref Range    Sodium 140 136 - 145 mmol/L     Potassium 4.7 3.5 - 5.0 mmol/L    Chloride 105 98 - 107 mmol/L    CO2 25 22 - 31 mmol/L    Anion Gap, Calculation 10 5 - 18 mmol/L    Glucose 178 (H) 70 - 125 mg/dL    Calcium 8.8 8.5 - 10.5 mg/dL    BUN 24 8 - 28 mg/dL    Creatinine 0.92 0.70 - 1.30 mg/dL    GFR MDRD Af Amer >60 >60 mL/min/1.73m2    GFR MDRD Non Af Amer >60 >60 mL/min/1.73m2   Hepatic Profile   Result Value Ref Range    Bilirubin, Total 0.5 0.0 - 1.0 mg/dL    Bilirubin, Direct 0.2 <=0.5 mg/dL    Protein, Total 6.0 6.0 - 8.0 g/dL    Albumin 3.7 3.5 - 5.0 g/dL    Alkaline Phosphatase 43 (L) 45 - 120 U/L    AST 18 0 - 40 U/L    ALT 18 0 - 45 U/L   Thyroid Cascade   Result Value Ref Range    TSH 6.26 (H) 0.30 - 5.00 uIU/mL   Microalbumin, Random Urine   Result Value Ref Range    Microalbumin, Random Urine <0.50 0.00 - 1.99 mg/dL    Creatinine, Urine 100.0 mg/dL    Microalbumin/Creatinine Ratio Random Urine  <=19.9 mg/g   T4, Free   Result Value Ref Range    Free T4 1.3 0.7 - 1.8 ng/dL         ASSESSMENT AND PLAN:    1. Leg wound, right, initial encounter  2. Cellulitis    Reviewed wound care recommendations which are greatly appreciated  Recommend that he continue to follow the recommendations and he will have regular wound care visits at home  His wound was dressed  Recommend elevation of his legs  He may take furosemide as needed  He will start cephalexin which was prescribed previously.  However, given his history of type 2 diabetes mellitus will have him take clindamycin which would also cover for resistant infections  Recommend immediate follow-up if developing an increase in redness or swelling or if developing calf pain.  Would consider ultrasound of the right leg if he develops unilateral swelling    3. Type 2 diabetes mellitus    Continue pioglitazone and metformin  Monitor blood sugars    Patient agrees to plan  Appreciate wound care nursing recommendations    40 minutes were spent with the patient and greater than 50% of the time was  spent in face to face counseling and coordination of care      Geoffrey Live MD      This note has been dictated and transcribed using voice recognition software.  Any grammatical or contextual distortions are unintentional and inherent to the software.

## 2021-06-11 NOTE — TELEPHONE ENCOUNTER
Please call his pharmacy. There is a phone message in April that losartan was on backorder so he was switched to irbesartan. He should NOT be on both. My preference is losartan if it is available. Please see if it is.  Thanks!

## 2021-06-12 NOTE — TELEPHONE ENCOUNTER
Refill Approved    Rx renewed per Medication Renewal Policy. Medication was last renewed on 12/24/19.    Shellie Nielson, [facility] Triage/Med Refill 10/2/2020     Requested Prescriptions   Pending Prescriptions Disp Refills     tamsulosin (FLOMAX) 0.4 mg cap [Pharmacy Med Name: TAMSULOSIN HCL 0.4 MG CAPSULE] 90 capsule 2     Sig: TAKE 1 CAPSULE (0.4 MG TOTAL) BY MOUTH DAILY.       Alfuzosin/Tamsulosin/Silodosin Refill Protocol  Passed - 10/2/2020 12:56 PM        Passed - PCP or prescribing provider visit in past 12 months       Last office visit with prescriber/PCP: 3/2/2020 Geoffrey Live MD OR same dept: 3/2/2020 Geoffrey Live MD OR same specialty: 3/2/2020 Geoffrey Live MD  Last physical: 9/29/2017 Last MTM visit: Visit date not found   Next visit within 3 mo: Visit date not found  Next physical within 3 mo: Visit date not found  Prescriber OR PCP: Geoffrey Live MD  Last diagnosis associated with med order: 1. Prostate Cancer  - tamsulosin (FLOMAX) 0.4 mg cap [Pharmacy Med Name: TAMSULOSIN HCL 0.4 MG CAPSULE]; Take 1 capsule (0.4 mg total) by mouth daily.  Dispense: 90 capsule; Refill: 2    If protocol passes may refill for 12 months if within 3 months of last provider visit (or a total of 15 months).

## 2021-06-13 NOTE — PROGRESS NOTES
Assessment/ Plan     1. Type 2 diabetes mellitus    His hemoglobin A1c has increased to 7.4%  Continue glipizide and pioglitazone  Recommend that he work on his diet as he is able  Monitor for hypoglycemia  Recommend an annual eye examination reviewed foot care  - Glycosylated Hemoglobin A1c  - Basic Metabolic Panel  - Microalbumin, Random Urine    2. Hypothyroidism    Continue levothyroxine  - Thyroid Cascade  - T4, Free    3. Hyperlipidemia    Continue simvastatin  - LDL Cholesterol, Direct  - Hepatic Profile    4.  Systolic heart murmur    Favor an echocardiogram in the future    25 minutes were spent with the patient and greater than 50% of the time was spent in face to face counseling and coordination of care    Subjective:       Jayesh Ortiz is a 76 y.o. male who presents to the clinic for a medication check.  He has a medical history notable for type 2 diabetes mellitus, hypertension, hyperlipidemia, and hypothyroidism.  He has history of prostate cancer that is followed by urology.  Also, he has previous thyroid cancer.  He admits he does not check his blood sugars typically.  He is quite busy and working in farming.  He continues to recover from a leg injury. He sustained an injury to his right lower extremity.  He was attempting to get into a [employer] and slipped.  In the process he suffered a fracture to his talus and required surgical intervention.  He was admitted for hospitalization on March 9, 2017 through March 13, 2017.  He was discharged to transitional care.  He did wear a cast for long period of time.  When the cast was removed he had significant swelling in his right and left lower extremities.  He developed a sore along the lateral aspect of his right leg.  He continues to wear a special boot at this time and is having less pain and less swelling.  He denies any obvious hypoglycemic episodes.  His energy level has been stable.  He denies headache, dizziness, chest pain, or palpitations.    The  "following portions of the patient's history were reviewed and updated as appropriate: allergies, current medications, past family history, past medical history, past social history, past surgical history and problem list.    Review of Systems   A 12 point comprehensive review of systems was negative except as noted.      Current Outpatient Prescriptions   Medication Sig Dispense Refill     aspirin 81 MG EC tablet Take 1 tablet (81 mg total) by mouth daily.  0     furosemide (LASIX) 20 MG tablet Take 1-2 PO qday prn 60 tablet 0     gabapentin (NEURONTIN) 300 MG capsule 1 - 2 capsules at bedtime as needed for nerve pain 180 capsule 2     glipiZIDE (GLUCOTROL) 10 MG tablet TAKE ONE TABLET BY MOUTH TWICE DAILY 180 tablet 3     levothyroxine (SYNTHROID, LEVOTHROID) 150 MCG tablet TAKE ONE TABLET BY MOUTH ONE TIME DAILY 90 tablet 3     losartan (COZAAR) 25 MG tablet TAKE ONE TABLET BY MOUTH ONE TIME DAILY 90 tablet 3     pioglitazone (ACTOS) 45 MG tablet TAKE ONE TABLET BY MOUTH ONE TIME DAILY 90 tablet 3     simvastatin (ZOCOR) 40 MG tablet TAKE ONE TABLET BY MOUTH ONE TIME DAILY 90 tablet 3     tamsulosin (FLOMAX) 0.4 mg Cp24 TAKE 1 CAPSULE (0.4 MG) BY MOUTH ONCE DAILY 90 capsule 3     No current facility-administered medications for this visit.        Objective:      /64  Pulse 72  Temp 98.2  F (36.8  C) (Oral)   Resp 20  Ht 5' 8\" (1.727 m)  Wt (!) 324 lb (147 kg)  BMI 49.26 kg/m2      General appearance: alert, appears stated age and cooperative  Head: Normocephalic, without obvious abnormality, atraumatic  Eyes: conjunctivae/corneas clear. PERRL, EOM's intact.  Nose: Nares normal. Septum midline. Mucosa normal. No drainage or sinus tenderness.  Throat: lips, mucosa, and tongue normal; teeth and gums normal  Neck: no adenopathy, supple, symmetrical, trachea midline and thyroid not enlarged, symmetric, no tenderness/mass/nodules  Lungs: clear to auscultation bilaterally  Heart: regular rate and rhythm, S1, " S2 with grade 2/6 systolic murmur  Abdomen: soft, non-tender  Extremities: extremities normal, atraumatic, no cyanosis or edema  Neurologic: Alert and oriented X 3. Normal coordination and gait         Recent Results (from the past 168 hour(s))   Glycosylated Hemoglobin A1c   Result Value Ref Range    Hemoglobin A1c 7.4 (H) 3.5 - 6.0 %   Basic Metabolic Panel   Result Value Ref Range    Sodium 138 136 - 145 mmol/L    Potassium 4.7 3.5 - 5.0 mmol/L    Chloride 102 98 - 107 mmol/L    CO2 30 22 - 31 mmol/L    Anion Gap, Calculation 6 5 - 18 mmol/L    Glucose 214 (H) 70 - 125 mg/dL    Calcium 9.1 8.5 - 10.5 mg/dL    BUN 25 8 - 28 mg/dL    Creatinine 1.05 0.70 - 1.30 mg/dL    GFR MDRD Af Amer >60 >60 mL/min/1.73m2    GFR MDRD Non Af Amer >60 >60 mL/min/1.73m2   LDL Cholesterol, Direct   Result Value Ref Range    Direct  (H) <=129 mg/dl   Hepatic Profile   Result Value Ref Range    Bilirubin, Total 0.5 0.0 - 1.0 mg/dL    Bilirubin, Direct 0.2 <=0.5 mg/dL    Protein, Total 6.2 6.0 - 8.0 g/dL    Albumin 3.5 3.5 - 5.0 g/dL    Alkaline Phosphatase 55 45 - 120 U/L    AST 16 0 - 40 U/L    ALT 17 0 - 45 U/L   Microalbumin, Random Urine   Result Value Ref Range    Microalbumin, Random Urine <0.50 0.00 - 1.99 mg/dL    Creatinine, Urine 161.3 mg/dL    Microalbumin/Creatinine Ratio Random Urine  <=19.9 mg/g   Thyroid Cascade   Result Value Ref Range    TSH 7.22 (H) 0.30 - 5.00 uIU/mL   T4, Free   Result Value Ref Range    Free T4 1.2 0.7 - 1.8 ng/dL          This note has been dictated using voice recognition software. Any grammatical or context distortions are unintentional and inherent to the software

## 2021-06-13 NOTE — TELEPHONE ENCOUNTER
Refill Approved    Rx renewed per Medication Renewal Policy. Medication was last renewed on 10/3/19.    Charity Sheikh, [facility] Triage/Med Refill 12/18/2020     Requested Prescriptions   Pending Prescriptions Disp Refills     levothyroxine (SYNTHROID, LEVOTHROID) 175 MCG tablet [Pharmacy Med Name: LEVOTHYROXINE 175 MCG TABLET] 90 tablet 3     Sig: TAKE 1 TABLET BY MOUTH EVERY DAY       Thyroid Hormones Protocol Passed - 12/17/2020  3:54 AM        Passed - Provider visit in past 12 months or next 3 months     Last office visit with prescriber/PCP: 3/2/2020 Geoffrey Live MD OR same dept: 3/2/2020 Geoffrey Live MD OR same specialty: 3/2/2020 Geoffrey Live MD  Last physical: 9/29/2017 Last MTM visit: Visit date not found   Next visit within 3 mo: Visit date not found  Next physical within 3 mo: Visit date not found  Prescriber OR PCP: Geoffrey Live MD  Last diagnosis associated with med order: 1. Hypothyroidism, unspecified type  - levothyroxine (SYNTHROID, LEVOTHROID) 175 MCG tablet [Pharmacy Med Name: LEVOTHYROXINE 175 MCG TABLET]; TAKE 1 TABLET BY MOUTH EVERY DAY  Dispense: 90 tablet; Refill: 3    If protocol passes may refill for 12 months if within 3 months of last provider visit (or a total of 15 months).             Passed - TSH on file in past 12 months for patient age 12 & older     TSH   Date Value Ref Range Status   03/02/2020 3.69 0.30 - 5.00 uIU/mL Final

## 2021-06-14 NOTE — TELEPHONE ENCOUNTER
Refill Approved    Rx renewed per Medication Renewal Policy. Medication was last renewed on 10/2/20.    Charity Sheikh, [facility] Triage/Med Refill 12/28/2020     Requested Prescriptions   Pending Prescriptions Disp Refills     tamsulosin (FLOMAX) 0.4 mg cap [Pharmacy Med Name: TAMSULOSIN HCL 0.4 MG CAPSULE] 90 capsule 0     Sig: TAKE 1 CAPSULE (0.4 MG TOTAL) BY MOUTH DAILY.       Alfuzosin/Tamsulosin/Silodosin Refill Protocol  Passed - 12/28/2020  1:05 PM        Passed - PCP or prescribing provider visit in past 12 months       Last office visit with prescriber/PCP: 3/2/2020 Geoffrey Live MD OR same dept: 3/2/2020 Geoffrey Live MD OR same specialty: 3/2/2020 Geoffrey Live MD  Last physical: 9/29/2017 Last MTM visit: Visit date not found   Next visit within 3 mo: Visit date not found  Next physical within 3 mo: Visit date not found  Prescriber OR PCP: Geoffrey Live MD  Last diagnosis associated with med order: 1. Prostate Cancer  - tamsulosin (FLOMAX) 0.4 mg cap [Pharmacy Med Name: TAMSULOSIN HCL 0.4 MG CAPSULE]; TAKE 1 CAPSULE (0.4 MG TOTAL) BY MOUTH DAILY.  Dispense: 90 capsule; Refill: 0    If protocol passes may refill for 12 months if within 3 months of last provider visit (or a total of 15 months).

## 2021-06-14 NOTE — TELEPHONE ENCOUNTER
Pt called in stating that he only has a few weeks left of glipizide but pharmacy states can't refill until March 2021- please send in refill to [facility] on file in [place]

## 2021-06-14 NOTE — TELEPHONE ENCOUNTER
RN cannot approve Refill Request    RN can NOT refill this medication Medication was refilled on 12/18/2020 for 3 month supply. Due for appt in March. Last office visit: 3/2/2020 Geoffrey Live MD Last Physical: 9/29/2017 Last MTM visit: Visit date not found Last visit same specialty: 3/2/2020 Geoffrey Live MD.  Next visit within 3 mo: Visit date not found  Next physical within 3 mo: Visit date not found      America Bansal, [facility] Triage/Med Refill 1/4/2021    Requested Prescriptions   Pending Prescriptions Disp Refills     levothyroxine (SYNTHROID, LEVOTHROID) 175 MCG tablet [Pharmacy Med Name: LEVOTHYROXINE 175 MCG TABLET] 90 tablet 0     Sig: TAKE 1 TABLET BY MOUTH EVERY DAY       Thyroid Hormones Protocol Passed - 1/2/2021  1:38 PM        Passed - Provider visit in past 12 months or next 3 months     Last office visit with prescriber/PCP: 3/2/2020 Geoffrey Live MD OR same dept: 3/2/2020 Geoffrey Live MD OR same specialty: 3/2/2020 Geoffrey Live MD  Last physical: 9/29/2017 Last MTM visit: Visit date not found   Next visit within 3 mo: Visit date not found  Next physical within 3 mo: Visit date not found  Prescriber OR PCP: Geoffrey Live MD  Last diagnosis associated with med order: 1. Hypothyroidism, unspecified type  - levothyroxine (SYNTHROID, LEVOTHROID) 175 MCG tablet [Pharmacy Med Name: LEVOTHYROXINE 175 MCG TABLET]; TAKE 1 TABLET BY MOUTH EVERY DAY  Dispense: 90 tablet; Refill: 0    If protocol passes may refill for 12 months if within 3 months of last provider visit (or a total of 15 months).             Passed - TSH on file in past 12 months for patient age 12 & older     TSH   Date Value Ref Range Status   03/02/2020 3.69 0.30 - 5.00 uIU/mL Final

## 2021-06-15 NOTE — TELEPHONE ENCOUNTER
RN cannot approve Refill Request    RN can NOT refill this medication PCP messaged that patient is overdue for Office Visit. Last office visit: 3/2/2020 Geoffrey Live MD Last Physical: 9/29/2017 Last MTM visit: Visit date not found Last visit same specialty: 3/2/2020 Geoffrey Live MD.  Next visit within 3 mo: Visit date not found  Next physical within 3 mo: Visit date not found      Portia Rene, [facility] Triage/Med Refill 2/14/2021    Requested Prescriptions   Pending Prescriptions Disp Refills     metFORMIN (GLUCOPHAGE) 500 MG tablet [Pharmacy Med Name: METFORMIN  MG TABLET] 360 tablet 3     Sig: TAKE 2 TABLETS BY MOUTH TWICE A DAY WITH FOOD       Metformin Refill Protocol Failed - 2/14/2021  9:38 AM        Failed - Visit with PCP or prescribing provider visit in last 6 months or next 3 months     Last office visit with prescriber/PCP: Visit date not found OR same dept: 3/2/2020 Geoffrey Live MD OR same specialty: 3/2/2020 Geoffrey Live MD Last physical: Visit date not found Last MTM visit: Visit date not found         Next appt within 3 mo: Visit date not found  Next physical within 3 mo: Visit date not found  Prescriber OR PCP: Geoffrey Live MD  Last diagnosis associated with med order: 1. Uncontrolled type 2 diabetes mellitus with hyperglycemia (H)  - metFORMIN (GLUCOPHAGE) 500 MG tablet [Pharmacy Med Name: METFORMIN  MG TABLET]; TAKE 2 TABLETS BY MOUTH TWICE A DAY WITH FOOD  Dispense: 360 tablet; Refill: 3     If protocol passes may refill for 12 months if within 3 months of last provider visit (or a total of 15 months).           Failed - A1C in last 6 months     Hemoglobin A1c   Date Value Ref Range Status   03/02/2020 13.2 (H) 3.5 - 6.0 % Final               Failed - Microalbumin in last year      Microalbumin, Random Urine   Date Value Ref Range Status   04/15/2019 <0.50 0.00 - 1.99 mg/dL Final                  Passed - Blood  pressure in last 12 months     BP Readings from Last 1 Encounters:   03/02/20 136/82             Passed - LFT or AST or ALT in last 12 months     Albumin   Date Value Ref Range Status   03/02/2020 3.8 3.5 - 5.0 g/dL Final     Bilirubin, Total   Date Value Ref Range Status   03/02/2020 0.8 0.0 - 1.0 mg/dL Final     Bilirubin, Direct   Date Value Ref Range Status   03/02/2020 0.3 <=0.5 mg/dL Final     Alkaline Phosphatase   Date Value Ref Range Status   03/02/2020 62 45 - 120 U/L Final     AST   Date Value Ref Range Status   03/02/2020 12 0 - 40 U/L Final     ALT   Date Value Ref Range Status   03/02/2020 14 0 - 45 U/L Final     Protein, Total   Date Value Ref Range Status   03/02/2020 6.2 6.0 - 8.0 g/dL Final                Passed - GFR or Serum Creatinine in last 6 months     GFR MDRD Non Af Amer   Date Value Ref Range Status   03/02/2020 >60 >60 mL/min/1.73m2 Final     GFR MDRD Af Amer   Date Value Ref Range Status   03/02/2020 >60 >60 mL/min/1.73m2 Final

## 2021-06-16 NOTE — TELEPHONE ENCOUNTER
RN cannot approve Refill Request    RN can NOT refill this medication PCP messaged that patient is overdue for Office Visit. Last office visit: 3/2/2020 Geoffrey Live MD Last Physical: 9/29/2017 Last MTM visit: Visit date not found Last visit same specialty: 3/2/2020 Geoffrey Live MD.  Next visit within 3 mo: Visit date not found  Next physical within 3 mo: Visit date not found      Portia Rene, [facility] Triage/Med Refill 3/27/2021    Requested Prescriptions   Pending Prescriptions Disp Refills     glipiZIDE (GLUCOTROL) 10 MG tablet [Pharmacy Med Name: GLIPIZIDE 10 MG TABLET] 180 tablet 3     Sig: TAKE 1 TABLET BY MOUTH TWICE A DAY       Oral Hypoglycemics Refill Protocol Failed - 3/27/2021  9:33 AM        Failed - Visit with PCP or prescribing provider visit in last 6 months       Last office visit with prescriber/PCP: Visit date not found OR same dept: Visit date not found OR same specialty: 3/2/2020 Geoffrey Live MD Last physical: Visit date not found Last MTM visit: Visit date not found         Next appt within 3 mo: Visit date not found  Next physical within 3 mo: Visit date not found  Prescriber OR PCP: Geoffrey Live MD  Last diagnosis associated with med order: 1. Diabetes (H)  - glipiZIDE (GLUCOTROL) 10 MG tablet [Pharmacy Med Name: GLIPIZIDE 10 MG TABLET]; TAKE 1 TABLET BY MOUTH TWICE A DAY  Dispense: 180 tablet; Refill: 3     If protocol passes may refill for 12 months if within 3 months of last provider visit (or a total of 15 months).           Failed - A1C in last 6 months     Hemoglobin A1c   Date Value Ref Range Status   03/02/2020 13.2 (H) 3.5 - 6.0 % Final               Failed - Microalbumin in last year      Microalbumin, Random Urine   Date Value Ref Range Status   04/15/2019 <0.50 0.00 - 1.99 mg/dL Final                  Failed - Blood pressure in last year     BP Readings from Last 1 Encounters:   03/02/20 136/82             Failed - Serum  creatinine in last year     Creatinine   Date Value Ref Range Status   03/02/2020 1.15 0.70 - 1.30 mg/dL Final

## 2021-06-16 NOTE — TELEPHONE ENCOUNTER
RN cannot approve Refill Request    RN can NOT refill this medication PCP messaged that patient is overdue for Labs and Office Visit. Last office visit: 3/2/2020 Geoffrey Live MD Last Physical: 9/29/2017 Last MTM visit: Visit date not found Last visit same specialty: 3/2/2020 Geoffrey Live MD.  Next visit within 3 mo: Visit date not found  Next physical within 3 mo: Visit date not found      Jaclyn Loaiza, [facility] Triage/Med Refill 4/10/2021    Requested Prescriptions   Pending Prescriptions Disp Refills     levothyroxine (SYNTHROID, LEVOTHROID) 175 MCG tablet [Pharmacy Med Name: LEVOTHYROXINE 175 MCG TABLET] 90 tablet 0     Sig: TAKE 1 TABLET BY MOUTH EVERY DAY       Thyroid Hormones Protocol Failed - 4/9/2021  2:43 PM        Failed - Provider visit in past 12 months or next 3 months     Last office visit with prescriber/PCP: 3/2/2020 Geoffrey Live MD OR same dept: Visit date not found OR same specialty: 3/2/2020 Geoffrey Live MD  Last physical: 9/29/2017 Last MTM visit: Visit date not found   Next visit within 3 mo: Visit date not found  Next physical within 3 mo: Visit date not found  Prescriber OR PCP: Geoffrey Live MD  Last diagnosis associated with med order: 1. Hypothyroidism, unspecified type  - levothyroxine (SYNTHROID, LEVOTHROID) 175 MCG tablet [Pharmacy Med Name: LEVOTHYROXINE 175 MCG TABLET]; TAKE 1 TABLET BY MOUTH EVERY DAY  Dispense: 90 tablet; Refill: 0    If protocol passes may refill for 12 months if within 3 months of last provider visit (or a total of 15 months).             Failed - TSH on file in past 12 months for patient age 12 & older     TSH   Date Value Ref Range Status   03/02/2020 3.69 0.30 - 5.00 uIU/mL Final

## 2021-06-16 NOTE — TELEPHONE ENCOUNTER
RN cannot approve Refill Request    RN can NOT refill this medication Protocol failed and NO refill given. Last office visit: 3/2/2020 Geoffrey Live MD Last Physical: 9/29/2017 Last MTM visit: Visit date not found Last visit same specialty: 3/2/2020 Geoffrey Live MD.  Next visit within 3 mo: Visit date not found  Next physical within 3 mo: Visit date not found      Tioc Doty, [facility] Connection Triage/Med Refill 4/6/2021    Requested Prescriptions   Pending Prescriptions Disp Refills     tamsulosin (FLOMAX) 0.4 mg cap [Pharmacy Med Name: TAMSULOSIN HCL 0.4 MG CAPSULE] 90 capsule 0     Sig: TAKE 1 CAPSULE (0.4 MG TOTAL) BY MOUTH DAILY.       Alfuzosin/Tamsulosin/Silodosin Refill Protocol  Failed - 4/5/2021 12:26 AM        Failed - PCP or prescribing provider visit in past 12 months       Last office visit with prescriber/PCP: 3/2/2020 Geoffrey Live MD OR same dept: Visit date not found OR same specialty: 3/2/2020 Geoffrey Live MD  Last physical: 9/29/2017 Last MTM visit: Visit date not found   Next visit within 3 mo: Visit date not found  Next physical within 3 mo: Visit date not found  Prescriber OR PCP: Geoffrey Live MD  Last diagnosis associated with med order: 1. Prostate Cancer  - tamsulosin (FLOMAX) 0.4 mg cap [Pharmacy Med Name: TAMSULOSIN HCL 0.4 MG CAPSULE]; TAKE 1 CAPSULE (0.4 MG TOTAL) BY MOUTH DAILY.  Dispense: 90 capsule; Refill: 0    If protocol passes may refill for 12 months if within 3 months of last provider visit (or a total of 15 months).

## 2021-06-16 NOTE — PATIENT INSTRUCTIONS - HE
Make sure you are not take irbesartan (Avapro)  TAKE losartan. I increased the dose to 50 mg twice a day as your blood pressure is too high  STOP simvastatin.  Atorvastatin/Lipitor is better so you may start that for cholesterol  We will check laboratory testing as discussed  I sent refills to your pharmacy

## 2021-06-16 NOTE — TELEPHONE ENCOUNTER
Friend calling; Consent to Communicate on file.  States patient needs refill of levothyroxine.  FNA informed caller that medication cannot be refilled at this time as patient is overdue for lab work and office visit.  Friend attempted to conference patient into conversation and disconnected.  FNA called back to friend and patient was successfully conferenced into call.  Patient reluctantly agreed to be connected to scheduling for office visit.  Friend states she was able to obtain 3 doses of levothyroxine from pharmacy this date.    Sydni Mcqueen RN  [facility] Triage Nurse Advisor    Please close encounter when completed.

## 2021-06-16 NOTE — PROGRESS NOTES
Assessment/ Plan     1. Uncontrolled type 2 diabetes mellitus with hyperglycemia (H)  2. Diabetes (H)    His hemoglobin A1c has improved to 7.9% from 13.2%  Continue Metformin and glipizide  Reviewed other options including a GLP-1-agonist  He will continue work on diet and exercise  We will increase losartan as noted  Continue aspirin and atorvastatin  Recommend annual eye examination  Have reviewed foot care    Recommend follow-up to recheck laboratory testing in 3 months    - Glycosylated Hemoglobin A1c  - Microalbumin, Random Urine  - Comprehensive Metabolic Panel  - HM2(CBC w/o Differential)  - Lipid Cascade  - metFORMIN (GLUCOPHAGE) 500 MG tablet; TAKE 2 TABLETS BY MOUTH TWICE A DAY WITH FOOD  Dispense: 360 tablet; Refill: 3    - glipiZIDE (GLUCOTROL) 10 MG tablet; Take 1 tablet (10 mg total) by mouth 2 (two) times a day.  Dispense: 180 tablet; Refill: 3    3. HTN (hypertension)    Inadequate control  Recommend increasing losartan to 50 mg twice daily  Recommend that he make sure that he is not taking Avapro/irbesartan concurrently.  The pharmacist noted that he was doing this previously    - losartan (COZAAR) 50 MG tablet; Take 1 tablet (50 mg total) by mouth daily.  Dispense: 180 tablet; Refill: 3    4. Hypothyroidism, unspecified type    Continue levothyroxine  Check a thyroid cascade  - levothyroxine (SYNTHROID, LEVOTHROID) 175 MCG tablet; Take 1 tablet (175 mcg total) by mouth daily.  Dispense: 90 tablet; Refill: 3  - Thyroid Cascade  - T4, Free    5. Hyperlipidemia    Switch from simvastatin to atorvastatin as this is a more effective medication  - atorvastatin (LIPITOR) 40 MG tablet; Take 1 tablet (40 mg total) by mouth daily.  Dispense: 90 tablet; Refill: 3    6. Prostate Cancer    Check a PSA.  Reviewed the caveats of prostate cancer screening  He will continue Flomax given history of benign prostatic hypertrophy with obstructive symptoms  - PSA (Prostatic-Specific Antigen), Diagnostic  - tamsulosin  (FLOMAX) 0.4 mg cap; Take 1 capsule (0.4 mg total) by mouth daily.  Dispense: 90 capsule; Refill: 3    7. Morbid obesity (H)    Have reviewed comorbidities of excessive weight loss  Recommend continuing to work on weight loss  He has done a great job since recovering from his leg injury      Subjective:       Jayesh Ortiz is a 80 y.o. male who presents for a diabetes check.  His medical history is notable for type 2 diabetes mellitus which was inadequately controlled at the last visit, hypertension, hyperlipidemia, and hypothyroidism.  He also has a history of prostate cancer as well as remote history of thyroid cancer.      He comes to the clinic infrequently.  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      His last hemoglobin A1c was 13.2%.  He has been treated with Metformin and glipizide.  It was challenging for him to get regular aerobic exercise when he suffered a leg injury when he fell out of a [employer].  However, he is feeling much better and has been able to stay active.  He has been working on his diet and has lost weight as well.  His blood sugars have improved and his hemoglobin A1c is 7.9% today.    There is some confusion about his hypertensive medications in the past per day pharmacist notified the clinic that he was taking both irbesartan as well as losartan.  Irbesartan was discontinued.  His blood pressure is elevated today so losartan will be modified.    He continues to take tamsulosin which has been helpful.  He has been taking his thyroid medication as well.    He denies headache, dizziness, chest pain, or bowel changes.    He continues to take simvastatin for hyperlipidemia but this will be switched to atorvastatin.    The following portions of the patient's history were reviewed and updated as appropriate: allergies, current medications, past family history, past medical history, past social history, past surgical history  and problem list. Medications have been reconciled    Review of Systems   A 12 point comprehensive review of systems was negative except as noted.      Current Outpatient Medications   Medication Sig Dispense Refill     aspirin 81 MG EC tablet Take 1 tablet (81 mg total) by mouth daily.  0     glipiZIDE (GLUCOTROL) 10 MG tablet Take 1 tablet (10 mg total) by mouth 2 (two) times a day. 180 tablet 3     levothyroxine (SYNTHROID, LEVOTHROID) 175 MCG tablet Take 1 tablet (175 mcg total) by mouth daily. 90 tablet 3     metFORMIN (GLUCOPHAGE) 500 MG tablet TAKE 2 TABLETS BY MOUTH TWICE A DAY WITH FOOD 360 tablet 3     tamsulosin (FLOMAX) 0.4 mg cap Take 1 capsule (0.4 mg total) by mouth daily. 90 capsule 3     atorvastatin (LIPITOR) 40 MG tablet Take 1 tablet (40 mg total) by mouth daily. 90 tablet 3     losartan (COZAAR) 50 MG tablet Take 1 tablet (50 mg total) by mouth daily. 180 tablet 3     No current facility-administered medications for this visit.        Objective:      /84 (Patient Site: Left Arm, Patient Position: Sitting, Cuff Size: Adult Large)   Pulse 65   Temp (!) 95.4  F (35.2  C) (Oral)   Resp 16   Wt (!) 311 lb (141.1 kg)   BMI 47.29 kg/m      General appearance: alert, appears stated age   Head: normocephalic, without obvious abnormality, atraumatic  Eyes: conjunctivae/corneas clear. PERRL, EOM's intact.   Lungs: clear to auscultation bilaterally  Heart: regular rate and rhythm, S1, S2 normal, no murmur, click, rub or gallop  Extremities: extremities normal, atraumatic, no cyanosis or edema  Skin: skin color, texture, turgor normal  Lymph nodes: Cervical nodes normal.  Neurologic: Alert and oriented X 3           Recent Results (from the past 168 hour(s))   Glycosylated Hemoglobin A1c   Result Value Ref Range    Hemoglobin A1c 7.9 (H) <=5.6 %   Comprehensive Metabolic Panel   Result Value Ref Range    Sodium 138 136 - 145 mmol/L    Potassium 5.2 (H) 3.5 - 5.0 mmol/L    Chloride 101 98 - 107  mmol/L    CO2 26 22 - 31 mmol/L    Anion Gap, Calculation 11 5 - 18 mmol/L    Glucose 202 (H) 70 - 125 mg/dL    BUN 19 8 - 28 mg/dL    Creatinine 0.94 0.70 - 1.30 mg/dL    GFR MDRD Af Amer >60 >60 mL/min/1.73m2    GFR MDRD Non Af Amer >60 >60 mL/min/1.73m2    Bilirubin, Total 0.7 0.0 - 1.0 mg/dL    Calcium 8.7 8.5 - 10.5 mg/dL    Protein, Total 6.6 6.0 - 8.0 g/dL    Albumin 4.0 3.5 - 5.0 g/dL    Alkaline Phosphatase 54 45 - 120 U/L    AST 13 0 - 40 U/L    ALT 11 0 - 45 U/L   HM2(CBC w/o Differential)   Result Value Ref Range    WBC 5.2 4.0 - 11.0 thou/uL    RBC 4.97 4.40 - 6.20 mill/uL    Hemoglobin 15.1 14.0 - 18.0 g/dL    Hematocrit 47.3 40.0 - 54.0 %    MCV 95 80 - 100 fL    MCH 30.4 27.0 - 34.0 pg    MCHC 31.9 (L) 32.0 - 36.0 g/dL    RDW 12.7 11.0 - 14.5 %    Platelets 208 140 - 440 thou/uL    MPV 10.5 (H) 7.0 - 10.0 fL   Lipid Cascade   Result Value Ref Range    Cholesterol 245 (H) <=199 mg/dL    Triglycerides 99 <=149 mg/dL    HDL Cholesterol 48 >=40 mg/dL    LDL Calculated 177 (H) <=129 mg/dL    Patient Fasting > 8hrs? Yes    PSA (Prostatic-Specific Antigen), Diagnostic   Result Value Ref Range    PSA 0.3 0.0 - 6.5 ng/mL   Thyroid Cascade   Result Value Ref Range    TSH 7.21 (H) 0.30 - 5.00 uIU/mL   T4, Free   Result Value Ref Range    Free T4 1.2 0.7 - 1.8 ng/dL   Microalbumin, Random Urine   Result Value Ref Range    Microalbumin, Random Urine 1.02 0.00 - 1.99 mg/dL    Creatinine, Urine 37.2 mg/dL    Microalbumin/Creatinine Ratio Random Urine 27.4 (H) <=19.9 mg/g          This note has been dictated using voice recognition software. Any grammatical or context distortions are unintentional and inherent to the software    Geoffrey Live MD

## 2021-06-16 NOTE — TELEPHONE ENCOUNTER
Telephone Encounter by Charity Sheikh RN at 9/19/2019  6:04 AM     Author: Charity Sheikh RN Service: -- Author Type: Registered Nurse    Filed: 9/19/2019  6:05 AM Encounter Date: 9/19/2019 Status: Signed    : Charity Sheikh RN (Registered Nurse)       Geoffrey Live MD routed this conversation to Geoffrey Live Care Team [facility]   Geoffrey Live MD           4/16/19 1:16 PM   Note      Yes, I do want him to stop his pioglitazone.  However, he should not throw this out yet as I may want him to go back on it if he does not tolerate the metformin.  He can start the metformin and continue the glipizide.  He can let me know if he has problems tolerating metformin.  Side effects may include diarrhea or loose stools        Charity Sheikh RN [facility] Triage/Medication Refill

## 2021-06-17 NOTE — TELEPHONE ENCOUNTER
losartan (COZAAR) 25 MG tablet [[id number]]    Electronically signed by: Charity Sheikh RN on 04/09/20 1136 Status: Discontinued   Ordering user: Charity Sheikh RN 04/09/20 1136 Ordering provider: Geoffrey Live MD   Authorized by: Geoffrey Live MD   Frequency:  04/09/20 - 04/13/21  Released by: Charity Sheikh RN 04/09/20 1136   Discontinued by: Geoffrey Live MD 04/13/21 1040   Diagnoses   HTN (hypertension) [I10]

## 2021-06-20 NOTE — LETTER
Letter by Geoffrey Live MD at      Author: Geoffrey Live MD Service: -- Author Type: --    Filed:  Encounter Date: 3/2/2020 Status: (Other)         Jayesh Ortiz  [id number]  [place] [address]             March 2, 2020         Dear Mr. Ortiz,    Below are the results from your recent visit:    Resulted Orders   Glycosylated Hemoglobin A1c   Result Value Ref Range    Hemoglobin A1c 13.2 (H) 3.5 - 6.0 %   Basic Metabolic Panel   Result Value Ref Range    Sodium 135 (L) 136 - 145 mmol/L    Potassium 5.1 (H) 3.5 - 5.0 mmol/L    Chloride 98 98 - 107 mmol/L    CO2 28 22 - 31 mmol/L    Anion Gap, Calculation 9 5 - 18 mmol/L    Glucose 369 (H) 70 - 125 mg/dL    Calcium 9.0 8.5 - 10.5 mg/dL    BUN 23 8 - 28 mg/dL    Creatinine 1.15 0.70 - 1.30 mg/dL    GFR MDRD Af Amer >60 >60 mL/min/1.73m2    GFR MDRD Non Af Amer >60 >60 mL/min/1.73m2    Narrative    Fasting Glucose reference range is 70-99 mg/dL per  American Diabetes Association (ADA) guidelines.   Hepatic Profile   Result Value Ref Range    Bilirubin, Total 0.8 0.0 - 1.0 mg/dL    Bilirubin, Direct 0.3 <=0.5 mg/dL    Protein, Total 6.2 6.0 - 8.0 g/dL    Albumin 3.8 3.5 - 5.0 g/dL    Alkaline Phosphatase 62 45 - 120 U/L    AST 12 0 - 40 U/L    ALT 14 0 - 45 U/L   HM2(CBC w/o Differential)   Result Value Ref Range    WBC 4.3 4.0 - 11.0 thou/uL    RBC 4.83 4.40 - 6.20 mill/uL    Hemoglobin 15.4 14.0 - 18.0 g/dL    Hematocrit 45.8 40.0 - 54.0 %    MCV 95 80 - 100 fL    MCH 31.9 27.0 - 34.0 pg    MCHC 33.6 32.0 - 36.0 g/dL    RDW 11.4 11.0 - 14.5 %    Platelets 200 140 - 440 thou/uL    MPV 8.7 7.0 - 10.0 fL   Lipid Cascade   Result Value Ref Range    Cholesterol 183 <=199 mg/dL    Triglycerides 67 <=149 mg/dL    HDL Cholesterol 56 >=40 mg/dL    LDL Calculated 114 <=129 mg/dL    Patient Fasting > 8hrs? Yes    Thyroid Cascade   Result Value Ref Range    TSH 3.69 0.30 - 5.00 uIU/mL       Jayesh,    Here is a copy of your test results.  In general,  they look very good except for your blood sugar tests as we described.  Your kidney profile is generally stable and liver tests are normal.  Your cholesterol numbers are stable and your thyroid test is normal.    As we discussed, your blood sugar test is significantly higher.  Your fasting glucose was 369 and your hemoglobin A1c was elevated to 13.2%.  As we reviewed I would like you to increase your metformin and continue glipizide as we discussed.  You can follow-up with Elise for diabetes education and she will review other options for you.    It was good to see you today.  I am very pleased that you were able to lose so much weight.  We just need to get your blood sugars under better control.    Please call with questions or contact us using [employer].    Sincerely,        Electronically signed by Geoffrey Live MD

## 2021-06-23 NOTE — TELEPHONE ENCOUNTER
Please call.  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  However, he has not been seen since September 2017 and is due for a diabetes check.  Please have him make an appointment soon.  Thank you

## 2021-06-23 NOTE — TELEPHONE ENCOUNTER
Left message for pt to call back to schedule an appt.  Please assist pt with scheduling an appt upon return call.

## 2021-06-23 NOTE — TELEPHONE ENCOUNTER
Left message for pt to call back to schedule an appt.  Please assist pt with scheduling upon return call.

## 2021-07-09 ENCOUNTER — COMMUNICATION - HEALTHEAST (OUTPATIENT)
Dept: FAMILY MEDICINE | Facility: CLINIC | Age: 81
End: 2021-07-09

## 2021-07-09 DIAGNOSIS — I10 HTN (HYPERTENSION): ICD-10-CM

## 2021-07-09 NOTE — TELEPHONE ENCOUNTER
Telephone Encounter by Tico Doty, RN at 7/9/2021  7:50 AM     Author: Tico Doty, RN Service: -- Author Type: Registered Nurse    Filed: 7/9/2021  7:51 AM Encounter Date: 7/8/2021 Status: Signed    : Tico Doty, RN (Registered Nurse)       losartan (COZAAR) 25 MG tablet [[id number]]    Electronically signed by: Charity Sheikh RN on 04/09/20 1136 Status: Discontinued   Ordering user: Charity Sheikh RN 04/09/20 1136 Ordering provider: Geoffrey Live MD   Authorized by: Geoffrey Live MD   Frequency:  04/09/20 - 04/13/21  Released by: Charity Sheikh RN 04/09/20 1136   Discontinued by: Geoffrey Live MD 04/13/21 1040   Diagnoses   HTN (hypertension) [I10]

## 2022-02-15 ENCOUNTER — TELEPHONE (OUTPATIENT)
Dept: FAMILY MEDICINE | Facility: CLINIC | Age: 82
End: 2022-02-15

## 2022-02-15 NOTE — TELEPHONE ENCOUNTER
Reason for call:  Patient reporting a symptom    Symptom or request: Insomnia    Duration (how long have symptoms been present): patient calling in having sleep issues in the last month. Has tried 5 different OTC medications.   Denies any new issues/stressors just can't sleep. Has never had issues with sleep in the past.  Patient requesting Rx.     Have you been treated for this before? No    Additional comments:     Phone Number patient can be reached at:  Other phone number:  Cell # [phone]    Best Time:      Can we leave a detailed message on this number:  NO    Call taken on 2/15/2022 at 11:53 AM by Viktoria Melo

## 2022-02-16 ENCOUNTER — VIRTUAL VISIT (OUTPATIENT)
Dept: FAMILY MEDICINE | Facility: CLINIC | Age: 82
End: 2022-02-16
Payer: MEDICARE

## 2022-02-16 DIAGNOSIS — E11.65 UNCONTROLLED TYPE 2 DIABETES MELLITUS WITH HYPERGLYCEMIA (H): ICD-10-CM

## 2022-02-16 DIAGNOSIS — G47.00 INSOMNIA, UNSPECIFIED TYPE: Primary | ICD-10-CM

## 2022-02-16 DIAGNOSIS — E66.01 MORBID OBESITY (H): ICD-10-CM

## 2022-02-16 PROCEDURE — 99443 PR PHYSICIAN TELEPHONE EVALUATION 21-30 MIN: CPT | Mod: 95 | Performed by: FAMILY MEDICINE

## 2022-02-16 RX ORDER — MIRTAZAPINE 30 MG/1
30 TABLET, FILM COATED ORAL AT BEDTIME
Qty: 30 TABLET | Refills: 1 | Status: SHIPPED | OUTPATIENT
Start: 2022-02-16 | End: 2022-02-23

## 2022-02-16 RX ORDER — LEVOTHYROXINE SODIUM 175 UG/1
175 TABLET ORAL DAILY
COMMUNITY
Start: 2022-01-04 | End: 2022-04-07

## 2022-02-16 RX ORDER — ATORVASTATIN CALCIUM 40 MG/1
40 TABLET, FILM COATED ORAL DAILY
COMMUNITY
Start: 2022-01-04 | End: 2022-04-04

## 2022-02-16 RX ORDER — GLIPIZIDE 10 MG/1
10 TABLET ORAL
COMMUNITY
Start: 2021-04-13 | End: 2022-04-08

## 2022-02-16 RX ORDER — LOSARTAN POTASSIUM 50 MG/1
TABLET ORAL
COMMUNITY
Start: 2022-01-04 | End: 2022-02-23

## 2022-02-16 RX ORDER — LOSARTAN POTASSIUM 25 MG/1
TABLET ORAL
COMMUNITY
Start: 2021-04-09 | End: 2022-04-07

## 2022-02-16 NOTE — TELEPHONE ENCOUNTER
I would like an appointment.  He is due for his laboratory testing for diabetes and hypothyroidism anyway.  I can work him in if needed, possibly Friday or early next week for sure.  He sometimes has a hard time making it to the clinic.  At the very least we would need to do a virtual visit.   Rec'd from cath lab, s/p DARRICK to LAD. Rt Fem access site w/drsg, CDI, soft, asymptomatic. Pt c/o intermittent rt sided chest pain throughout the day. Nitroglecrin and morphine given.  By end of shift pt states \"my chest feels like it always does, I've had c

## 2022-02-16 NOTE — PROGRESS NOTES
Jayesh is a 81 year old who is being evaluated via a billable telephone visit.      What phone number would you like to be contacted at? [phone]  How would you like to obtain your AVS? Mail a copy    Assessment & Plan     Insomnia, unspecified type    The cause of his poor sleep is not clear  He has been sleeping in a recliner which is less than ideal  Multiple over-the-counter medication options including melatonin and Restoril have not been helpful  He previously has tried low-dose trazodone without significant improvement  Reviewed sleep hygiene  Discussed the importance of follow-up in the clinic for evaluation to make sure there is not a physical problem contributing to his poor sleep  Have reviewed options.  The challenges that in reviewing the safety concerns there are many challenges of prescribing medications  Recommended a trial of mirtazapine initially  If mirtazapine is ineffective have discussed increasing trazodone as well   I recommend not prescribing zolpidem or benzodiazepines currently given potential risks  Patient will follow up as needed    Uncontrolled type 2 diabetes mellitus with hyperglycemia (H)    He is overdue for a diabetic visit  His last hemoglobin A1c was 7.9%  Recommend that he continue to monitor blood sugars and follow-up for a diabetes check    Morbid obesity (H)    Have reviewed comorbidities  Recommend working on weight loss.  He has lost significant weight  Follow-up to recheck blood pressure      No follow-ups on file.    Geoffrey Live MD  [facility]    Otoniel Mcconnell is a 81 year old who presents for a phone visit.  His wife Dorita is present of the phone call.  The primary concern has been poor sleep.  He states that he has had a hard time falling asleep has had difficulty maintaining sleep.  He has tried multiple over-the-counter medications including melatonin and Restoril.  None of these treatment options has been effective.  He is  desperate to get some sleep.  He cannot think of any specific stressors.  He does note that he typically sleeps in a chair which she has done for long period of time.  He does have a history of sleep apnea.      Medical history is otherwise notable for uncontrolled type 2 diabetes mellitus, hypertension, hypothyroidism, and hypercholesterolemia.  He is significantly overweight.  He is quite busy as a farmer.  He comes to the clinic infrequently.    He reports he did have some mild respiratory symptoms of concern but those seem to have resolved.  He denies orthopnea or paroxysmal nocturnal dyspnea.    He has a history of poorly controlled diabetes mellitus.  His hemoglobin A1c did improve from 13.2% to 7.9%.  Is now been into clinic recently.    He continues to take tamsulosin which has been helpful.  He has been taking his thyroid medication as well.    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          Objective           Vitals:  No vitals were obtained today due to virtual visit.    Physical Exam   healthy, alert and no distress  PSYCH: Alert and oriented times 3; coherent speech, normal   rate and volume, able to articulate logical thoughts, able   to abstract reason, no tangential thoughts, no hallucinations   or delusions  His affect is normal  RESP: No cough, no audible wheezing, able to talk in full sentences  Remainder of exam unable to be completed due to telephone visits        Phone call duration: 30 minutes  Answers for HPI/ROS submitted by the patient on 2/16/2022  How many servings of fruits and vegetables do you eat daily?: 2-3  On average, how many sweetened beverages do you drink each day (Examples: soda, juice, sweet tea, etc.  Do NOT count diet or artificially sweetened beverages)?: 0  How many minutes a day do you exercise enough to make your heart beat faster?: 9 or less  How many days a week do you exercise enough to make your heart beat faster?: 3 or  less  How many days per week do you miss taking your medication?: 0  What is the reason for your visit today?: insomnia  When did your symptoms begin?: 3-4 weeks ago  What are your symptoms?: unable to sleep or get good sleep  How would you describe these symptoms?: Severe  Are your symptoms:: Worsening  Have you had these symptoms before?: No  Is there anything that makes you feel worse?: no  Is there anything that makes you feel better?: no

## 2022-02-16 NOTE — TELEPHONE ENCOUNTER
Patient calling again.  Says hes 81 yrs old and cant sleep.  This has never happened before.  Please call to advise  Ok to leave a detailed message

## 2022-02-23 ENCOUNTER — TELEPHONE (OUTPATIENT)
Dept: FAMILY MEDICINE | Facility: CLINIC | Age: 82
End: 2022-02-23
Payer: MEDICARE

## 2022-02-23 DIAGNOSIS — G47.00 INSOMNIA, UNSPECIFIED TYPE: Primary | ICD-10-CM

## 2022-02-23 RX ORDER — TRAZODONE HYDROCHLORIDE 150 MG/1
150 TABLET ORAL AT BEDTIME
Qty: 30 TABLET | Refills: 0 | Status: SHIPPED | OUTPATIENT
Start: 2022-02-23 | End: 2022-03-17

## 2022-02-23 RX ORDER — DOXEPIN 6 MG/1
6 TABLET, FILM COATED ORAL AT BEDTIME
Qty: 30 TABLET | Refills: 0 | Status: SHIPPED | OUTPATIENT
Start: 2022-02-23 | End: 2022-04-11

## 2022-02-23 NOTE — TELEPHONE ENCOUNTER
Patients significant other calling.  She claims patient is acting erratic.  He has not been sleeping well for over a month.  Please call patient or   Dorita [phone]

## 2022-02-23 NOTE — TELEPHONE ENCOUNTER
I called and reviewed.  This has been a challenging situation as mirtazapine was not effective.  He will discontinue that.  He is desperate for getting some sleep.  We discussed potential risks of many medications.  He will try trazodone 150 mg at night.  If that is ineffective then can try low-dose of doxepin.  Reviewed risks.  Discussed that if this is ineffective will recommend a sleep evaluation or possibly [facility] pharmacy referral for patient is to try the above medications.  He is aware of potential risks.

## 2022-02-23 NOTE — TELEPHONE ENCOUNTER
"Reason for Call:  Other prescription    Detailed comments: Pt called stating he is still having trouble sleeping. The rx Dr. Live prescribed is not working for him. He tried other over the counter medications and it's not working either. Pt is wondering if provider can prescribe something stronger - \"to know me out so I can get a good sleep\". Pt would like a call back to let him know.    Phone Number Patient can be reached at: Cell number on file:    Telephone Information:   Mobile [phone]       Best Time: Any time    Can we leave a detailed message on this number? YES    Call taken on 2/23/2022 at 9:38 AM by Rachel Geiger      "

## 2022-02-24 RX ORDER — ZOLPIDEM TARTRATE 5 MG/1
5 TABLET ORAL
Qty: 30 TABLET | Refills: 0 | Status: SHIPPED | OUTPATIENT
Start: 2022-02-24 | End: 2022-03-21

## 2022-02-24 NOTE — TELEPHONE ENCOUNTER
Pharmacy is calling for a different dosage or medication because insurance will not pay for this.  Or doctor can do a prior auth.   Please call [facility]  pharmacy  [phone]

## 2022-02-24 NOTE — TELEPHONE ENCOUNTER
"Patient sig other calling again.  Would like this medication addressed.  She has already spent too much time on this.  She is dealing with a \"ticking timebomb\"  Please call pharmacy and/or patient   "

## 2022-02-24 NOTE — TELEPHONE ENCOUNTER
CHRIS only, no need to call    I called and resolved the issue.  I spoke to Dorita, the pharmacy, and with patient Jayesh.    He has failed all other options thus far and doxepin was too expensive.  I reviewed with the pharmacist.  He will be treated with zolpidem 5 mg in the short-term.  I reviewed tensional adverse effects including risk of falling and confusion.    Patient stated understanding but is desperate to get some sleep. He will follow-up.

## 2022-02-24 NOTE — TELEPHONE ENCOUNTER
Incoming call from patient's girlfriend, Dorita. She states that the trazodone is not working. The doxepin was $200+. Pharmacy recommends for patient or Dorita to reach out to provider and see if there's an alternative or something we can do on our end to have price drop. She dont remember if $200 is after insurance or if it's not covered by insurance. She wants us to reach out to pharmacy and check and what is an alternative. Dorita would like a call back to let her know; OK to leave detailed message. She is hoping provider can help figure this out because patient is having a hard time trying to get some sleep.

## 2022-02-27 PROBLEM — E11.65 UNCONTROLLED TYPE 2 DIABETES MELLITUS WITH HYPERGLYCEMIA (H): Status: ACTIVE | Noted: 2022-02-27

## 2022-03-11 ENCOUNTER — TELEPHONE (OUTPATIENT)
Dept: FAMILY MEDICINE | Facility: CLINIC | Age: 82
End: 2022-03-11
Payer: MEDICARE

## 2022-03-11 DIAGNOSIS — G47.00 INSOMNIA, UNSPECIFIED TYPE: Primary | ICD-10-CM

## 2022-03-11 NOTE — TELEPHONE ENCOUNTER
Dorita called to see if  has seen this message.   I informed her that  is out of the office til Monday.    Patient states that she is concerned with Patient because he will take him Ambien than 3 hours later he takes melatonin and other meds to attempt to sleep. Patient is only getting about 3 hours of sleep and he works with heavy machinery. Dorita is concerned that patient might take to much of something and overdose or hurt himself when he is at work. Patient isnt getting any relief for sleep.     Dr. Live if you can reach out to Dorita she would appreciate it, She is very concerned.    She states that she is going to go  calm with magnesium and tylenol pm to see if either of these will help.    Please call [phone]

## 2022-03-11 NOTE — TELEPHONE ENCOUNTER
Reason for Call:  Other call back    Detailed comments: pt significant other Dorita calling- pt has not been sleeping even with the Ambien, melatonin & other meds.    Does not know what else to do, please advise      See virtual visit from 2/16/22 & telephone encounter from 2/23/22    Phone Number Patient can be reached at: Cell number on file:    Telephone Information:   Mobile [phone] until 3pm   [phone] after 3pm       Best Time: na    Can we leave a detailed message on this number? YES    Call taken on 3/11/2022 at 12:42 PM by Joellen Bretrand

## 2022-03-14 NOTE — TELEPHONE ENCOUNTER
Patient still is not able to sleep.  He has been taking OTC medications and prescription medications.  Caller is concerned and wants to know what the next step is    Last night he took:    3 10mg melatonin  6 mg ativan  Woke up at 1 am took 2 Restorz and 2 Relaxiums and possibly another ativan  Nothing is working    He would like to try a sleep study but wants to be able to do it at home. Does not want to go into a clinic    Please call Dorita to advise before 3pm  [phone]

## 2022-03-14 NOTE — TELEPHONE ENCOUNTER
Dorita is calling again.  I explained to Dorita that we have sent two, now three,  messages today and I did see two from Friday.  I also explained that the doctor is currently seeing patients. She requested that I find the doctor and explain the patient really needs help. I explained that I cannot pull the doctor out of a room but that he will read his messages and get back to her as soon as he is available.

## 2022-03-14 NOTE — TELEPHONE ENCOUNTER
I called and reviewed with Dorita.    Please call Dorita with the number for the sleep evaluation.    I agree with a sleep evaluation as soon as possible.  I would like this to be done as urgently as possible.  Please see if he can be seen more quickly if possible.     In the meantime he has been prescribed many medication options.  He most recently was prescribed zolpidem 5 mg after reviewing with the pharmacist.  In the short-term he can take 10 mg though have reviewed potential side effects.  He will continue melatonin.    I discussed that if symptoms become more severe he should have an in person evaluation and can consider emergency department visit if there urgent health concerns.    Thanks!

## 2022-03-15 ENCOUNTER — TELEPHONE (OUTPATIENT)
Dept: FAMILY MEDICINE | Facility: CLINIC | Age: 82
End: 2022-03-15
Payer: MEDICARE

## 2022-03-15 NOTE — TELEPHONE ENCOUNTER
Reason for Call:  Other appointment    Detailed comments: insomnia, pt has been working with DR if issues continue Dr said to call back and we will get you in right away    Phone Number Patient can be reached at: Cell number on file:    Telephone Information:   Mobile [phone]       Best Time: anytime    Can we leave a detailed message on this number? YES    Call taken on 3/15/2022 at 10:46 AM by Ping Hernández

## 2022-03-15 NOTE — TELEPHONE ENCOUNTER
Spoke to Dorita as I had just given her the number for sleep clinic earlier this morning. She said pt wants to see Dr Live first. Is there somewhere you want to see him?

## 2022-03-16 NOTE — TELEPHONE ENCOUNTER
Patient would prefer to see you first. He says mornings work best. Please let me know when you would like to see patient.

## 2022-03-16 NOTE — TELEPHONE ENCOUNTER
Please have them call to make a sleep appointment right away as it may take a while to get in with them.  I can like my schedule to see if I can work him in but am not able to see him today.

## 2022-03-21 ENCOUNTER — VIRTUAL VISIT (OUTPATIENT)
Dept: FAMILY MEDICINE | Facility: CLINIC | Age: 82
End: 2022-03-21
Payer: MEDICARE

## 2022-03-21 DIAGNOSIS — E11.65 UNCONTROLLED TYPE 2 DIABETES MELLITUS WITH HYPERGLYCEMIA (H): ICD-10-CM

## 2022-03-21 DIAGNOSIS — F51.01 PRIMARY INSOMNIA: Primary | ICD-10-CM

## 2022-03-21 PROCEDURE — 99442 PR PHYSICIAN TELEPHONE EVALUATION 11-20 MIN: CPT | Mod: 95 | Performed by: FAMILY MEDICINE

## 2022-03-21 RX ORDER — ZOLPIDEM TARTRATE 5 MG/1
5 TABLET ORAL
Qty: 45 TABLET | Refills: 0 | Status: ON HOLD | OUTPATIENT
Start: 2022-03-21 | End: 2023-04-04
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
## 2022-04-05 ENCOUNTER — TELEPHONE (OUTPATIENT)
Dept: FAMILY MEDICINE | Facility: CLINIC | Age: 82
End: 2022-04-05
Payer: MEDICARE

## 2022-04-05 NOTE — TELEPHONE ENCOUNTER
Incoming call from [facility] requesting to talk to Dr. Live. She states that pt took 4 tablets of Ambien last night and he slept for 8 hours. A few days before pt took 1 like how it's instructed but it did not work and then pt tried to wean off and then he was able to sleep for a bit, but then the day before yesterday, pt was up all night and didn't sleep. That's why patient took 4 tablets last night. [facility] is requesting to talk to Dr. Live and OK to call her when he's done seeing patients. I let her know I will send the message to the provider's team.

## 2022-04-05 NOTE — TELEPHONE ENCOUNTER
Please follow-up as I have had multiple conversations with her previously regarding his sleep habits.  He should only take zolpidem 5 mg at night.  I had declined weeks ago that he should follow-up with the sleep medicine clinic.  He needs to do that.      Please see if she has additional information or insight regarding his health status.    I cannot safely give him stronger sleep medications as they are not deemed safe.  I have also not seen the patient in the clinic as he has only had phone visits.  He needs to have an evaluation.    Thank you.

## 2022-04-06 NOTE — TELEPHONE ENCOUNTER
Spoke to wife and relayed MD message. She is very concerned about his meds and general health. I told her we should see him for an appointment to discuss all their concerns so I scheduled him with you on Monday

## 2022-04-11 ENCOUNTER — OFFICE VISIT (OUTPATIENT)
Dept: FAMILY MEDICINE | Facility: CLINIC | Age: 82
End: 2022-04-11
Payer: MEDICARE

## 2022-04-11 ENCOUNTER — TELEPHONE (OUTPATIENT)
Dept: FAMILY MEDICINE | Facility: CLINIC | Age: 82
End: 2022-04-11

## 2022-04-11 VITALS
OXYGEN SATURATION: 92 % | BODY MASS INDEX: 48.93 KG/M2 | RESPIRATION RATE: 16 BRPM | WEIGHT: 315 LBS | HEART RATE: 66 BPM | SYSTOLIC BLOOD PRESSURE: 195 MMHG | DIASTOLIC BLOOD PRESSURE: 108 MMHG

## 2022-04-11 DIAGNOSIS — R93.89 ABNORMAL CHEST X-RAY: ICD-10-CM

## 2022-04-11 DIAGNOSIS — C61 MALIGNANT NEOPLASM OF PROSTATE (H): ICD-10-CM

## 2022-04-11 DIAGNOSIS — I35.0 AORTIC VALVE STENOSIS, ETIOLOGY OF CARDIAC VALVE DISEASE UNSPECIFIED: ICD-10-CM

## 2022-04-11 DIAGNOSIS — E03.9 HYPOTHYROIDISM, UNSPECIFIED TYPE: ICD-10-CM

## 2022-04-11 DIAGNOSIS — G47.00 INSOMNIA, UNSPECIFIED TYPE: ICD-10-CM

## 2022-04-11 DIAGNOSIS — I10 ESSENTIAL HYPERTENSION: ICD-10-CM

## 2022-04-11 DIAGNOSIS — R06.02 SHORTNESS OF BREATH: ICD-10-CM

## 2022-04-11 DIAGNOSIS — E11.65 UNCONTROLLED TYPE 2 DIABETES MELLITUS WITH HYPERGLYCEMIA (H): Primary | ICD-10-CM

## 2022-04-11 PROBLEM — N40.0 BPH (BENIGN PROSTATIC HYPERPLASIA): Chronic | Status: RESOLVED | Noted: 2017-03-09 | Resolved: 2022-04-11

## 2022-04-11 LAB
ALBUMIN SERPL-MCNC: 4 G/DL (ref 3.5–5)
ALP SERPL-CCNC: 63 U/L (ref 45–120)
ALT SERPL W P-5'-P-CCNC: 36 U/L (ref 0–45)
ANION GAP SERPL CALCULATED.3IONS-SCNC: 13 MMOL/L (ref 5–18)
AST SERPL W P-5'-P-CCNC: 26 U/L (ref 0–40)
ATRIAL RATE - MUSE: 89 BPM
BILIRUB DIRECT SERPL-MCNC: 0.3 MG/DL
BILIRUB SERPL-MCNC: 1 MG/DL (ref 0–1)
BNP SERPL-MCNC: 386 PG/ML (ref 0–86)
BUN SERPL-MCNC: 22 MG/DL (ref 8–28)
CALCIUM SERPL-MCNC: 9.5 MG/DL (ref 8.5–10.5)
CHLORIDE BLD-SCNC: 102 MMOL/L (ref 98–107)
CHOLEST SERPL-MCNC: 170 MG/DL
CO2 SERPL-SCNC: 25 MMOL/L (ref 22–31)
CREAT SERPL-MCNC: 1 MG/DL (ref 0.7–1.3)
CREAT UR-MCNC: 95 MG/DL
DIASTOLIC BLOOD PRESSURE - MUSE: NORMAL MMHG
ERYTHROCYTE [DISTWIDTH] IN BLOOD BY AUTOMATED COUNT: 13.3 % (ref 10–15)
FASTING STATUS PATIENT QL REPORTED: YES
GFR SERPL CREATININE-BSD FRML MDRD: 76 ML/MIN/1.73M2
GLUCOSE BLD-MCNC: 268 MG/DL (ref 70–125)
HBA1C MFR BLD: 10.2 % (ref 0–5.6)
HCT VFR BLD AUTO: 46.5 % (ref 40–53)
HDLC SERPL-MCNC: 51 MG/DL
HGB BLD-MCNC: 14.7 G/DL (ref 13.3–17.7)
INTERPRETATION ECG - MUSE: NORMAL
LDLC SERPL CALC-MCNC: 106 MG/DL
MCH RBC QN AUTO: 29.8 PG (ref 26.5–33)
MCHC RBC AUTO-ENTMCNC: 31.6 G/DL (ref 31.5–36.5)
MCV RBC AUTO: 94 FL (ref 78–100)
MICROALBUMIN UR-MCNC: 13.72 MG/DL (ref 0–1.99)
MICROALBUMIN/CREAT UR: 144.4 MG/G CR
P AXIS - MUSE: 54 DEGREES
PLATELET # BLD AUTO: 168 10E3/UL (ref 150–450)
POTASSIUM BLD-SCNC: 5.1 MMOL/L (ref 3.5–5)
PR INTERVAL - MUSE: 180 MS
PROT SERPL-MCNC: 6.8 G/DL (ref 6–8)
PSA SERPL-MCNC: 0.39 UG/L (ref 0–6.5)
QRS DURATION - MUSE: 100 MS
QT - MUSE: 362 MS
QTC - MUSE: 440 MS
R AXIS - MUSE: 4 DEGREES
RBC # BLD AUTO: 4.93 10E6/UL (ref 4.4–5.9)
SODIUM SERPL-SCNC: 140 MMOL/L (ref 136–145)
SYSTOLIC BLOOD PRESSURE - MUSE: NORMAL MMHG
T AXIS - MUSE: 72 DEGREES
T4 FREE SERPL-MCNC: 1.19 NG/DL (ref 0.7–1.8)
TRIGL SERPL-MCNC: 66 MG/DL
TSH SERPL DL<=0.005 MIU/L-ACNC: 11.19 UIU/ML (ref 0.3–5)
VENTRICULAR RATE- MUSE: 89 BPM
WBC # BLD AUTO: 6.7 10E3/UL (ref 4–11)

## 2022-04-11 PROCEDURE — 36415 COLL VENOUS BLD VENIPUNCTURE: CPT | Performed by: FAMILY MEDICINE

## 2022-04-11 PROCEDURE — 85027 COMPLETE CBC AUTOMATED: CPT | Performed by: FAMILY MEDICINE

## 2022-04-11 PROCEDURE — 83036 HEMOGLOBIN GLYCOSYLATED A1C: CPT | Performed by: FAMILY MEDICINE

## 2022-04-11 PROCEDURE — 84153 ASSAY OF PSA TOTAL: CPT | Performed by: FAMILY MEDICINE

## 2022-04-11 PROCEDURE — 84443 ASSAY THYROID STIM HORMONE: CPT | Performed by: FAMILY MEDICINE

## 2022-04-11 PROCEDURE — 80061 LIPID PANEL: CPT | Performed by: FAMILY MEDICINE

## 2022-04-11 PROCEDURE — 93005 ELECTROCARDIOGRAM TRACING: CPT | Performed by: FAMILY MEDICINE

## 2022-04-11 PROCEDURE — 80053 COMPREHEN METABOLIC PANEL: CPT | Performed by: FAMILY MEDICINE

## 2022-04-11 PROCEDURE — 83880 ASSAY OF NATRIURETIC PEPTIDE: CPT | Performed by: FAMILY MEDICINE

## 2022-04-11 PROCEDURE — 82043 UR ALBUMIN QUANTITATIVE: CPT | Performed by: FAMILY MEDICINE

## 2022-04-11 PROCEDURE — 84439 ASSAY OF FREE THYROXINE: CPT | Performed by: FAMILY MEDICINE

## 2022-04-11 PROCEDURE — 93010 ELECTROCARDIOGRAM REPORT: CPT | Mod: OFF | Performed by: INTERNAL MEDICINE

## 2022-04-11 PROCEDURE — 99215 OFFICE O/P EST HI 40 MIN: CPT | Performed by: FAMILY MEDICINE

## 2022-04-11 PROCEDURE — 82248 BILIRUBIN DIRECT: CPT | Performed by: FAMILY MEDICINE

## 2022-04-11 RX ORDER — LOSARTAN POTASSIUM 25 MG/1
TABLET ORAL
Qty: 90 TABLET | Refills: 3
Start: 2022-04-11 | End: 2022-07-14

## 2022-04-11 NOTE — PATIENT INSTRUCTIONS
Jayesh,    You had a chest x-ray and EKG today  I recommend an echocardiogram to evaluate your aortic valve as you have aortic stenosis  Also, set up the CT scan of the chest  Depending on results, you may need follow-up with cardiology  I recommend increasing losartan to 50 mg a day.  It is very important to get your blood pressure under good control  Please verify your diabetes medications which include glipizide 10 mg twice daily and Metformin 1000 mg twice daily  In the past you are taking pioglitazone  We will have you start Jardiance instead of pioglitazone  Make sure to limit carbohydrates in your diet.  This includes starches, breads, pastas, and refined sugars  For sleep you may take trazodone 100 mg at night  Additionally, do not exceed 5 mg of zolpidem at night    Please provide an update if you are having any ongoing symptoms of concern    Geoffrey Live MD

## 2022-04-11 NOTE — TELEPHONE ENCOUNTER
Dorita called back to let us know to hold on. Pt has [employer] and it is not supplemental so she is going to check with [facility] to see if they can verify prescriptions benefits to see if they can at least cover some and will call us back to let us know.

## 2022-04-11 NOTE — TELEPHONE ENCOUNTER
Incoming call from patient's girlfriend Dorita stating that they went to  the Rx jardiance and for a 90 days supply it is about $1500. [facility] told them to let provider know and see if provider can work with insurance to see if price can come down. Per Dorita, the pharmacy stated that there is no generic or alternative. Dorita would like to see what provider recommends. Please reach back out to Dorita; OK to leave detailed message on Dorita's phone.
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
## 2022-04-12 ENCOUNTER — TELEPHONE (OUTPATIENT)
Dept: FAMILY MEDICINE | Facility: CLINIC | Age: 82
End: 2022-04-12
Payer: MEDICARE

## 2022-04-12 DIAGNOSIS — E11.65 UNCONTROLLED TYPE 2 DIABETES MELLITUS WITH HYPERGLYCEMIA (H): Primary | ICD-10-CM

## 2022-04-12 RX ORDER — PIOGLITAZONEHYDROCHLORIDE 45 MG/1
45 TABLET ORAL DAILY
Qty: 90 TABLET | Refills: 1 | Status: SHIPPED | OUTPATIENT
Start: 2022-04-12 | End: 2022-10-03

## 2022-04-12 NOTE — TELEPHONE ENCOUNTER
Patient was seen yesterday and was told to call Dr. Live back today.    Please call Jayesh at [phone]

## 2022-04-12 NOTE — TELEPHONE ENCOUNTER
I called and spoke with patient Jayesh directly.  He will not take the Jardiance.  We will switch him back to pioglitazone.  He will let me know if there is a problem getting that covered.

## 2022-04-12 NOTE — TELEPHONE ENCOUNTER
Called and reviewed with Jayesh.  Jardiance was too expensive.  As a result, we are going to have him try pioglitazone which he has taken previously.  He will be following up with diabetes education as planned.

## 2022-04-25 ENCOUNTER — HOSPITAL ENCOUNTER (OUTPATIENT)
Dept: CARDIOLOGY | Facility: CLINIC | Age: 82
Discharge: HOME | End: 2022-04-25
Attending: FAMILY MEDICINE | Admitting: FAMILY MEDICINE
Payer: MEDICARE

## 2022-04-25 DIAGNOSIS — I35.0 AORTIC VALVE STENOSIS, ETIOLOGY OF CARDIAC VALVE DISEASE UNSPECIFIED: ICD-10-CM

## 2022-04-25 LAB — LVEF ECHO: NORMAL

## 2022-04-25 PROCEDURE — 255N000002 HC RX 255 OP 636: Performed by: FAMILY MEDICINE

## 2022-04-25 PROCEDURE — 999N000208 ECHOCARDIOGRAM COMPLETE

## 2022-04-25 PROCEDURE — 93306 TTE W/DOPPLER COMPLETE: CPT | Mod: 26 | Performed by: INTERNAL MEDICINE

## 2022-04-25 RX ADMIN — HUMAN ALBUMIN MICROSPHERES AND PERFLUTREN 9 ML: 10; .22 INJECTION, SOLUTION INTRAVENOUS at 11:12

## 2022-04-29 ENCOUNTER — TELEPHONE (OUTPATIENT)
Dept: FAMILY MEDICINE | Facility: CLINIC | Age: 82
End: 2022-04-29
Payer: MEDICARE

## 2022-04-29 DIAGNOSIS — I35.0 AORTIC VALVE STENOSIS, ETIOLOGY OF CARDIAC VALVE DISEASE UNSPECIFIED: Primary | ICD-10-CM

## 2022-04-29 DIAGNOSIS — I42.9 CARDIOMYOPATHY, UNSPECIFIED TYPE (H): ICD-10-CM

## 2022-04-29 DIAGNOSIS — G47.00 INSOMNIA, UNSPECIFIED TYPE: ICD-10-CM

## 2022-04-29 NOTE — TELEPHONE ENCOUNTER
I called and reviewed his recent echocardiogram which was abnormal.  This reveals moderately severe aortic stenosis as well as a decreased ejection fraction consistent with a cardiomyopathy.  I recommend follow-up with cardiology.  He would like to see [facility]ology up in [place] if possible.  Please assist.  Thank you.

## 2022-05-02 ENCOUNTER — TELEPHONE (OUTPATIENT)
Dept: CARDIOLOGY | Facility: CLINIC | Age: 82
End: 2022-05-02
Payer: MEDICARE

## 2022-05-02 ENCOUNTER — HOSPITAL ENCOUNTER (OUTPATIENT)
Dept: CT IMAGING | Facility: CLINIC | Age: 82
Discharge: HOME | End: 2022-05-02
Attending: FAMILY MEDICINE | Admitting: FAMILY MEDICINE
Payer: MEDICARE

## 2022-05-02 DIAGNOSIS — R93.89 ABNORMAL CHEST X-RAY: ICD-10-CM

## 2022-05-02 DIAGNOSIS — R06.02 SHORTNESS OF BREATH: ICD-10-CM

## 2022-05-02 PROCEDURE — 250N000009 HC RX 250: Performed by: FAMILY MEDICINE

## 2022-05-02 PROCEDURE — 250N000011 HC RX IP 250 OP 636: Performed by: FAMILY MEDICINE

## 2022-05-02 PROCEDURE — G1004 CDSM NDSC: HCPCS

## 2022-05-02 RX ORDER — IOPAMIDOL 755 MG/ML
100 INJECTION, SOLUTION INTRAVASCULAR ONCE
Status: COMPLETED | OUTPATIENT
Start: 2022-05-02 | End: 2022-05-02

## 2022-05-02 RX ADMIN — SODIUM CHLORIDE 74 ML: 9 INJECTION, SOLUTION INTRAVENOUS at 08:22

## 2022-05-02 RX ADMIN — IOPAMIDOL 100 ML: 755 INJECTION, SOLUTION INTRAVENOUS at 08:21

## 2022-05-02 NOTE — TELEPHONE ENCOUNTER
[facility] Call Center    Phone Message    May a detailed message be left on voicemail: yes     Reason for Call: Appointment Intake    Referring Provider Name: Geoffrey Live MD    Diagnosis and/or Symptoms: Abnormal echocaradiogram with aortic stenosis and decreased ejection fraction

## 2022-05-03 RX ORDER — TRAZODONE HYDROCHLORIDE 150 MG/1
TABLET ORAL
Qty: 90 TABLET | Refills: 1 | Status: SHIPPED | OUTPATIENT
Start: 2022-05-03 | End: 2023-04-03

## 2022-05-03 NOTE — TELEPHONE ENCOUNTER
Thank you for the note. I called patient directly and clarified a number of things. His recent CT scan of the chest did not show any concerning findings. There was possible atelectasis. He reports he is feeling great. No symptoms of pneumonia.    He will monitor his blood sugars as his diabetes has been poorly controlled.    I also refilled his trazodone.    Back to his cardiology concern. Patient is adamant that he wants to go to [facility]. Can you please call cardiology to see if that is an option? Please communicate with patient about if he can go there and let me know.     Thank you!

## 2022-05-03 NOTE — TELEPHONE ENCOUNTER
"Incoming call from patient frustrated that he just received a call from cardiology to schedule and appt but was told he is to schedule only at the [place] location. Pt states that he would prefer to go to the [place] location since it is closer to him. Pt stated \"it was almost like she was arguing with me and that the [place] location is bad.\" Patient would like to let PCP know what is going on and if someone from the [facility] can reach out to pt to schedule or if someone can reach out to him to let him know if this is possible.     I let him know I will send the message to the provider's team.   "

## 2022-05-03 NOTE — TELEPHONE ENCOUNTER
Called and spoke to Cardiology. They cant see patient at [place] because they dont have a specialist that specializes in Aortic valve stenosis this is why they were telling patient that he needs to schedule in [place].    I called patient and informed patient of message below. He states understanding and will call cardiology to schedule appt.

## 2022-05-04 NOTE — TELEPHONE ENCOUNTER
----- Message ----------------------------------------------  From: Haily Hayward  Sent: 5/3/2022  12:21 PM CDT  To: Haily Hayward    Pt will call back. Wants to find something closer to home.      ----- Message --------------------------------------------------  From: Jacey Garcia RN  Sent: 5/2/2022   2:22 PM CDT  To: Haily Rushingay to sched into valve clinic.   Thanks!  Jacey

## 2022-05-18 ENCOUNTER — LAB (OUTPATIENT)
Dept: CARDIOLOGY | Facility: CLINIC | Age: 82
End: 2022-05-18

## 2022-05-18 ENCOUNTER — OFFICE VISIT (OUTPATIENT)
Dept: CARDIOLOGY | Facility: CLINIC | Age: 82
End: 2022-05-18
Payer: MEDICARE

## 2022-05-18 ENCOUNTER — ALLIED HEALTH/NURSE VISIT (OUTPATIENT)
Dept: CARDIOLOGY | Facility: CLINIC | Age: 82
End: 2022-05-18

## 2022-05-18 ENCOUNTER — TELEPHONE (OUTPATIENT)
Dept: FAMILY MEDICINE | Facility: CLINIC | Age: 82
End: 2022-05-18

## 2022-05-18 VITALS
WEIGHT: 313 LBS | DIASTOLIC BLOOD PRESSURE: 80 MMHG | HEART RATE: 74 BPM | BODY MASS INDEX: 47.59 KG/M2 | RESPIRATION RATE: 18 BRPM | SYSTOLIC BLOOD PRESSURE: 162 MMHG

## 2022-05-18 DIAGNOSIS — E11.65 UNCONTROLLED TYPE 2 DIABETES MELLITUS WITH HYPERGLYCEMIA (H): Primary | ICD-10-CM

## 2022-05-18 DIAGNOSIS — I35.0 AORTIC VALVE STENOSIS, ETIOLOGY OF CARDIAC VALVE DISEASE UNSPECIFIED: Primary | ICD-10-CM

## 2022-05-18 DIAGNOSIS — I35.0 NONRHEUMATIC AORTIC VALVE STENOSIS: Primary | ICD-10-CM

## 2022-05-18 DIAGNOSIS — E03.9 HYPOTHYROIDISM, UNSPECIFIED TYPE: ICD-10-CM

## 2022-05-18 DIAGNOSIS — I35.0 NONRHEUMATIC AORTIC VALVE STENOSIS: ICD-10-CM

## 2022-05-18 LAB
ALBUMIN SERPL-MCNC: 4 G/DL (ref 3.5–5)
ALP SERPL-CCNC: 46 U/L (ref 45–120)
ALT SERPL W P-5'-P-CCNC: 18 U/L (ref 0–45)
ANION GAP SERPL CALCULATED.3IONS-SCNC: 8 MMOL/L (ref 5–18)
AST SERPL W P-5'-P-CCNC: 17 U/L (ref 0–40)
BILIRUB SERPL-MCNC: 0.8 MG/DL (ref 0–1)
BUN SERPL-MCNC: 23 MG/DL (ref 8–28)
CALCIUM SERPL-MCNC: 9.3 MG/DL (ref 8.5–10.5)
CHLORIDE BLD-SCNC: 101 MMOL/L (ref 98–107)
CO2 SERPL-SCNC: 29 MMOL/L (ref 22–31)
CREAT SERPL-MCNC: 1.02 MG/DL (ref 0.7–1.3)
ERYTHROCYTE [DISTWIDTH] IN BLOOD BY AUTOMATED COUNT: 13.9 % (ref 10–15)
GFR SERPL CREATININE-BSD FRML MDRD: 74 ML/MIN/1.73M2
GLUCOSE BLD-MCNC: 216 MG/DL (ref 70–125)
HCT VFR BLD AUTO: 47 % (ref 40–53)
HGB BLD-MCNC: 14.8 G/DL (ref 13.3–17.7)
MCH RBC QN AUTO: 30 PG (ref 26.5–33)
MCHC RBC AUTO-ENTMCNC: 31.5 G/DL (ref 31.5–36.5)
MCV RBC AUTO: 95 FL (ref 78–100)
PLATELET # BLD AUTO: 173 10E3/UL (ref 150–450)
POTASSIUM BLD-SCNC: 4.9 MMOL/L (ref 3.5–5)
PROT SERPL-MCNC: 7 G/DL (ref 6–8)
RBC # BLD AUTO: 4.93 10E6/UL (ref 4.4–5.9)
SODIUM SERPL-SCNC: 138 MMOL/L (ref 136–145)
WBC # BLD AUTO: 5.1 10E3/UL (ref 4–11)

## 2022-05-18 PROCEDURE — 99204 OFFICE O/P NEW MOD 45 MIN: CPT | Performed by: INTERNAL MEDICINE

## 2022-05-18 PROCEDURE — 99207 PR NO CHARGE NURSE ONLY: CPT

## 2022-05-18 PROCEDURE — 85027 COMPLETE CBC AUTOMATED: CPT

## 2022-05-18 PROCEDURE — 36415 COLL VENOUS BLD VENIPUNCTURE: CPT

## 2022-05-18 PROCEDURE — 80053 COMPREHEN METABOLIC PANEL: CPT

## 2022-05-18 NOTE — PROGRESS NOTES
HEART CARE ENCOUNTER CONSULTATON NOTE      [facility]  [phone]      Assessment/Recommendations   Assessment/Plan:    Severe aortic valve stenosis: Mr. Ortiz has had progression of his aortic valve disease to severe stenosis, and is also developed a mild cardiomyopathy.  Given these changes, it would be reasonable to consider aortic valve replacement.  He states that he would like to defer this for now given the absence of symptoms.  The natural history of aortic valve stenosis as well as symptoms to expect were reviewed, and the plan at this time is to contact them in the next 3 months to see how he is doing, and he knows to call us before then if there is any change in his condition or worsening symptoms.    In terms of aortic valve replacement options, given his age he would be a good candidate for TAVR, which is his preference as well.  When he decides to move forward with the procedure, he will be scheduled for a coronary angiogram and a CTA for procedural planning.    Thank you for the opportunity to participate in the care of Mr. Ortiz.  Please do not hesitate to call with any questions or concerns regarding his cardiovascular status       History of Present Illness/Subjective    HPI: Jayesh Ortiz is a 81 year old male with a history of hypertension, poorly controlled diabetes, dyslipidemia and known aortic valve disease who had a recent echocardiogram that showed progression to severe aortic valve stenosis.    Mr. 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  He specifically denies shortness of breath, chest pain, dizziness, lightheadedness or syncope.    His echocardiogram from April 25, 2022 was reviewed and shows mildly reduced left ventricular systolic function with an ejection fraction of 40 to 45%.  There was severe aortic valve stenosis with a mean gradient of 38 mmHg, peak  velocity of 3.9 m/s, valve area of 0.9 cm  and a dimensionless index of 0.26.             Physical Examination  Review of Systems   Vitals: BP (!) 162/80 (BP Location: Right arm, Patient Position: Sitting, Cuff Size: Adult Large)   Pulse 74   Resp 18   Wt 142 kg (313 lb)   BMI 47.59 kg/m    BMI= Body mass index is 47.59 kg/m .  Wt Readings from Last 3 Encounters:   05/18/22 142 kg (313 lb)   04/11/22 146 kg (321 lb 12.8 oz)   04/13/21 141.1 kg (311 lb)       General Appearance:   no distress, normal body habitus, upright.   ENT/Mouth: membranes moist, no nasal discharge or bleeding gums.  Normal head shape, no evidence of injury or laceration.     EYES:  no scleral icterus, normal conjunctivae   Neck: no evidence of thyromegaly.  Supple   Chest/Lungs:   No audible wheezing equal chest wall expansion. Non labored breathing.  No cough.   Cardiovascular:   No evidence of elevated jugular venous pressure.  No evidence of pitting edema bilaterally    Abdomen:  no evidence of abdominal distention. No observe juandice.     Extremities: no cyanosis or clubbing noted.    Skin: no xanthelasma, normal skin color. No evidence of facial lacerations.      Neurologic: Normal arm motion bilateral, no tremors.  No evidence of focal defect.       Psychiatric: alert and oriented x3, calm        Please refer above for cardiac ROS details.        Medical History  Surgical History Family History Social History   Past Medical History:   Diagnosis Date     Diabetes (H)      Hypertension      Thyroid disease      Past Surgical History:   Procedure Laterality Date     CLOSED REDUCTION, PERCUTANEOUS PINNING LOWER EXTREMITY, COMBINED Right 3/9/2017    Procedure: COMBINED CLOSED REDUCTION, PERCUTANEOUS PINNING LOWER EXTREMITY;  Surgeon: Ankit Coy DPM;  Location: [facility] OR     THYROIDECTOMY       Family History   Problem Relation Age of Onset     Heart Failure Mother      Heart Failure Father         Social History     Socioeconomic  History     Marital status: Single     Spouse name: Not on file     Number of children: Not on file     Years of education: Not on file     Highest education level: Not on file   Occupational History     Not on file   Tobacco Use     Smoking status: Former Smoker     Smokeless tobacco: Never Used   Substance and Sexual Activity     Alcohol use: Not on file     Drug use: Not on file     Sexual activity: Not on file   Other Topics Concern     Not on file   Social History Narrative     Not on file     Social Determinants of Health     Financial Resource Strain: Not on file   Food Insecurity: Not on file   Transportation Needs: Not on file   Physical Activity: Not on file   Stress: Not on file   Social Connections: Not on file   Intimate Partner Violence: Not on file   Housing Stability: Not on file           Medications  Allergies   Current Outpatient Medications   Medication Sig Dispense Refill     aspirin (ASA) 81 MG EC tablet Take 1 tablet (81 mg) by mouth daily 90 tablet 3     atorvastatin (LIPITOR) 40 MG tablet TAKE 1 TABLET BY MOUTH EVERY DAY 90 tablet 3     Gabapentin (NEURONTIN PO) Take 300 mg by mouth daily        glipiZIDE (GLUCOTROL) 10 MG tablet TAKE 1 TABLET BY MOUTH TWICE A  tablet 3     hydrocortisone (CORTAID) 1 % cream Apply topically 3 times daily as needed for itching       levothyroxine (SYNTHROID/LEVOTHROID) 175 MCG tablet Take 1 tablet (175 mcg) by mouth daily 90 tablet 3     losartan (COZAAR) 25 MG tablet TAKE 2 TABLET (50 MG TOTAL) BY MOUTH DAILY. 90 tablet 3     magnesium citrate solution Take 240 mLs by mouth once       metFORMIN (GLUCOPHAGE) 500 MG tablet TAKE 2 TABLETS BY MOUTH TWICE A DAY WITH FOOD 360 tablet 0     oxyCODONE (ROXICODONE) 5 MG tablet Take 5-10 mg by mouth every 4 hours as needed for moderate to severe pain       pioglitazone (ACTOS) 45 MG tablet Take 1 tablet (45 mg) by mouth in the morning. 90 tablet 1     senna-docusate (SENOKOT-S;PERICOLACE) 8.6-50 MG per tablet  Take 1 tablet by mouth 2 times daily       tamsulosin (FLOMAX) 0.4 MG capsule Take 0.4 mg by mouth daily        traZODone (DESYREL) 150 MG tablet TAKE 1 TABLET BY MOUTH EVERYDAY AT BEDTIME 90 tablet 1     TRAZODONE HCL PO Take 50 mg by mouth At Bedtime And 50 prn if not asleep in 2 hrs       zolpidem (AMBIEN) 5 MG tablet Take 1 tablet (5 mg) by mouth nightly as needed for sleep 45 tablet 0     Furosemide (LASIX PO) Take 40 mg by mouth daily         Allergies   Allergen Reactions     Nka [No Known Allergies]           Lab Results    Chemistry/lipid CBC Cardiac Enzymes/BNP/TSH/INR   Recent Labs   Lab Test 04/11/22  1008   CHOL 170   HDL 51      TRIG 66     Recent Labs   Lab Test 04/11/22  1008 04/13/21  1008 03/02/20  1032    177* 114     Recent Labs   Lab Test 04/11/22  1008      POTASSIUM 5.1*   CHLORIDE 102   CO2 25   *   BUN 22   CR 1.00   GFRESTIMATED 76   MATT 9.5     Recent Labs   Lab Test 04/11/22  1008 04/13/21  1008 03/02/20  1032   CR 1.00 0.94 1.15     Recent Labs   Lab Test 04/11/22  0845 04/13/21  1008 03/02/20  0941   A1C 10.2* 7.9* 13.2*          Recent Labs   Lab Test 04/11/22  1008   WBC 6.7   HGB 14.7   HCT 46.5   MCV 94        Recent Labs   Lab Test 04/11/22  1008 04/13/21  1008 03/02/20  1032   HGB 14.7 15.1 15.4    No results for input(s): TROPONINI in the last 34166 hours.  Recent Labs   Lab Test 04/11/22  1008   *     Recent Labs   Lab Test 04/11/22  1008   TSH 11.19*     No results for input(s): INR in the last 75396 hours.     Lakhwinder Mason MD

## 2022-05-18 NOTE — LETTER
5/18/2022    Geoffrey Live MD  [address]  [facility] [address]    RE: Jayesh Ortiz       Dear Colleague,     I had the pleasure of seeing Jayesh Ortiz in the [facility].    HEART CARE ENCOUNTER CONSULTATON NOTE      [facility]  [phone]      Assessment/Recommendations   Assessment/Plan:    Severe aortic valve stenosis: Mr. Ortiz has had progression of his aortic valve disease to severe stenosis, and is also developed a mild cardiomyopathy.  Given these changes, it would be reasonable to consider aortic valve replacement.  He states that he would like to defer this for now given the absence of symptoms.  The natural history of aortic valve stenosis as well as symptoms to expect were reviewed, and the plan at this time is to contact them in the next 3 months to see how he is doing, and he knows to call us before then if there is any change in his condition or worsening symptoms.    In terms of aortic valve replacement options, given his age he would be a good candidate for TAVR, which is his preference as well.  When he decides to move forward with the procedure, he will be scheduled for a coronary angiogram and a CTA for procedural planning.    Thank you for the opportunity to participate in the care of Mr. Ortiz.  Please do not hesitate to call with any questions or concerns regarding his cardiovascular status       History of Present Illness/Subjective    HPI: Jayesh Ortiz is a 81 year old male with a history of hypertension, poorly controlled diabetes, dyslipidemia and known aortic valve disease who had a recent echocardiogram that showed progression to severe aortic valve stenosis.    Mr. 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  He specifically denies shortness of breath, chest pain, dizziness, lightheadedness or syncope.    His echocardiogram from  April 25, 2022 was reviewed and shows mildly reduced left ventricular systolic function with an ejection fraction of 40 to 45%.  There was severe aortic valve stenosis with a mean gradient of 38 mmHg, peak velocity of 3.9 m/s, valve area of 0.9 cm  and a dimensionless index of 0.26.             Physical Examination  Review of Systems   Vitals: BP (!) 162/80 (BP Location: Right arm, Patient Position: Sitting, Cuff Size: Adult Large)   Pulse 74   Resp 18   Wt 142 kg (313 lb)   BMI 47.59 kg/m    BMI= Body mass index is 47.59 kg/m .  Wt Readings from Last 3 Encounters:   05/18/22 142 kg (313 lb)   04/11/22 146 kg (321 lb 12.8 oz)   04/13/21 141.1 kg (311 lb)       General Appearance:   no distress, normal body habitus, upright.   ENT/Mouth: membranes moist, no nasal discharge or bleeding gums.  Normal head shape, no evidence of injury or laceration.     EYES:  no scleral icterus, normal conjunctivae   Neck: no evidence of thyromegaly.  Supple   Chest/Lungs:   No audible wheezing equal chest wall expansion. Non labored breathing.  No cough.   Cardiovascular:   No evidence of elevated jugular venous pressure.  No evidence of pitting edema bilaterally    Abdomen:  no evidence of abdominal distention. No observe juandice.     Extremities: no cyanosis or clubbing noted.    Skin: no xanthelasma, normal skin color. No evidence of facial lacerations.      Neurologic: Normal arm motion bilateral, no tremors.  No evidence of focal defect.       Psychiatric: alert and oriented x3, calm        Please refer above for cardiac ROS details.        Medical History  Surgical History Family History Social History   Past Medical History:   Diagnosis Date     Diabetes (H)      Hypertension      Thyroid disease      Past Surgical History:   Procedure Laterality Date     CLOSED REDUCTION, PERCUTANEOUS PINNING LOWER EXTREMITY, COMBINED Right 3/9/2017    Procedure: COMBINED CLOSED REDUCTION, PERCUTANEOUS PINNING LOWER EXTREMITY;  Surgeon:  Ankit Coy DPM;  Location: [place] OR     [facility]       Family History   Problem Relation Age of Onset     Heart Failure Mother      Heart Failure Father         Social History     Socioeconomic History     Marital status: Single     Spouse name: Not on file     Number of children: Not on file     Years of education: Not on file     Highest education level: Not on file   Occupational History     Not on file   Tobacco Use     Smoking status: Former Smoker     Smokeless tobacco: Never Used   Substance and Sexual Activity     Alcohol use: Not on file     Drug use: Not on file     Sexual activity: Not on file   Other Topics Concern     Not on file   Social History Narrative     Not on file     Social Determinants of Health     Financial Resource Strain: Not on file   Food Insecurity: Not on file   Transportation Needs: Not on file   Physical Activity: Not on file   Stress: Not on file   Social Connections: Not on file   Intimate Partner Violence: Not on file   Housing Stability: Not on file           Medications  Allergies   Current Outpatient Medications   Medication Sig Dispense Refill     aspirin (ASA) 81 MG EC tablet Take 1 tablet (81 mg) by mouth daily 90 tablet 3     atorvastatin (LIPITOR) 40 MG tablet TAKE 1 TABLET BY MOUTH EVERY DAY 90 tablet 3     Gabapentin (NEURONTIN PO) Take 300 mg by mouth daily        glipiZIDE (GLUCOTROL) 10 MG tablet TAKE 1 TABLET BY MOUTH TWICE A  tablet 3     hydrocortisone (CORTAID) 1 % cream Apply topically 3 times daily as needed for itching       levothyroxine (SYNTHROID/LEVOTHROID) 175 MCG tablet Take 1 tablet (175 mcg) by mouth daily 90 tablet 3     losartan (COZAAR) 25 MG tablet TAKE 2 TABLET (50 MG TOTAL) BY MOUTH DAILY. 90 tablet 3     magnesium citrate solution Take 240 mLs by mouth once       metFORMIN (GLUCOPHAGE) 500 MG tablet TAKE 2 TABLETS BY MOUTH TWICE A DAY WITH FOOD 360 tablet 0     oxyCODONE (ROXICODONE) 5 MG tablet Take 5-10 mg by mouth every 4  hours as needed for moderate to severe pain       pioglitazone (ACTOS) 45 MG tablet Take 1 tablet (45 mg) by mouth in the morning. 90 tablet 1     senna-docusate (SENOKOT-S;PERICOLACE) 8.6-50 MG per tablet Take 1 tablet by mouth 2 times daily       tamsulosin (FLOMAX) 0.4 MG capsule Take 0.4 mg by mouth daily        traZODone (DESYREL) 150 MG tablet TAKE 1 TABLET BY MOUTH EVERYDAY AT BEDTIME 90 tablet 1     TRAZODONE HCL PO Take 50 mg by mouth At Bedtime And 50 prn if not asleep in 2 hrs       zolpidem (AMBIEN) 5 MG tablet Take 1 tablet (5 mg) by mouth nightly as needed for sleep 45 tablet 0     Furosemide (LASIX PO) Take 40 mg by mouth daily         Allergies   Allergen Reactions     Nka [No Known Allergies]           Lab Results    Chemistry/lipid CBC Cardiac Enzymes/BNP/TSH/INR   Recent Labs   Lab Test 04/11/22  1008   CHOL 170   HDL 51      TRIG 66     Recent Labs   Lab Test 04/11/22  1008 04/13/21  1008 03/02/20  1032    177* 114     Recent Labs   Lab Test 04/11/22  1008      POTASSIUM 5.1*   CHLORIDE 102   CO2 25   *   BUN 22   CR 1.00   GFRESTIMATED 76   MATT 9.5     Recent Labs   Lab Test 04/11/22  1008 04/13/21  1008 03/02/20  1032   CR 1.00 0.94 1.15     Recent Labs   Lab Test 04/11/22  0845 04/13/21  1008 03/02/20  0941   A1C 10.2* 7.9* 13.2*          Recent Labs   Lab Test 04/11/22  1008   WBC 6.7   HGB 14.7   HCT 46.5   MCV 94        Recent Labs   Lab Test 04/11/22  1008 04/13/21  1008 03/02/20  1032   HGB 14.7 15.1 15.4    No results for input(s): TROPONINI in the last 71948 hours.  Recent Labs   Lab Test 04/11/22  1008   *     Recent Labs   Lab Test 04/11/22  1008   TSH 11.19*     No results for input(s): INR in the last 25087 hours.     Lakhwinder Mason MD    Thank you for allowing me to participate in the care of your patient.    Sincerely,   Lakhwinder Mason MD   [facility]  cc: No referring provider  defined for this encounter.

## 2022-05-18 NOTE — PROGRESS NOTES
Valve Clinic Nursing Note: Aortic Stenosis    Referring provider: Dr. Yung    Patient history is significant for DM II, HTN, prostate CA and Thyroid CA, aortic stenosis     Symptoms include TEAGUE- but denies any other symptoms. He states that he feels good and can do the same things that he did 50 years ago.    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 He lives alone. His wife  30 years ago and he has a girlfriend.   He said he only gets winded if he is throwing around hay bails.    Echo information: ()    EF: 45-50% M P. Artem: 3.9 LENNY: 1.0 Di: 0.35 Svi: 32    Tentative Plan: call patient in 3 months to check in on symptoms. He is feeling great and would like to wait.  Dr. Mason recommends not putting this off too long, but is okay waiting for now.       Research update: did not discuss research           KCQ12 (date completed 2022)    Preliminary STS score: 2%      Jacey Oliveira, RN, BSN  Valve Clinic Coordinator  [facility]  [phone]  22 9:04 AM

## 2022-05-18 NOTE — TELEPHONE ENCOUNTER
Incoming call from patient wanting to talk to Dr. Live or if a message can go to him. Patient would like to let Dr. Live know that he saw cardiology today and is going do defer the aortic valve replacement to maybe this winter. Also, patient would like provider to know that his BP has come down from the last time when he saw provider in clinic.     If provider would like to discuss with pt about what cardiology recommends, OK to call patient back.

## 2022-05-19 NOTE — TELEPHONE ENCOUNTER
Called and reviewed.  He will plan on having his aortic valve surgery in the fall.  However, I recommend that he follow-up with me in mid July as he will be due for diabetes check and thyroid check.  If his tests are still normal he will need optimization prior to surgery.  Patient agrees.

## 2022-06-30 ENCOUNTER — TELEPHONE (OUTPATIENT)
Dept: FAMILY MEDICINE | Facility: CLINIC | Age: 82
End: 2022-06-30

## 2022-06-30 NOTE — TELEPHONE ENCOUNTER
"Patient wife calling to say that patient received a prescription for losartan a \"couple days ago\" and the dosage says 1-25mg tablet a day.  Patient is saying that he historically took 2-25mg a day.     Please call patient to advise at  [phone]    "

## 2022-06-30 NOTE — TELEPHONE ENCOUNTER
Richie,    Please touch base with me on Friday about this one.     I called and reviewed with patient.  Something does not make sense.  His losartan dose was 25 mg and his dose was increased to 50 mg (two of the 25 mg dose) at the April visit. I entered it as a no-print as I wanted to make sure he tolerated this before sending a prescription.     If it is true the pharmacist has not filled a dose of 4 25 mg pill a day then I believe Jayesh may be confused.     If he has the bottle he can take it to the pharmacist or bring it here to show us.    Please connect with me Friday. The correct dose should be 50 mg and I can send once we talk.    Thanks!        The only prescription where he is taking four tablets is metformin.

## 2022-06-30 NOTE — TELEPHONE ENCOUNTER
Called Jayesh to assess situation, patient states has been taking 2 tablets twice a day (100 mg), he says that's what the label says on the bottle. I called [facility] and spoke with the pharmacist, IF the patient was taking that much losartan daily he would have run out in 22.5 days assuming 90 tabs were dispensed. The pharmacist says the original script was filled in April   (I see that it was discontinued)        I assume the pharmacy never received the discontinue transmission so they filled his most recent refill with old instructions. I have no clue how he has been taking 100 mg dose because none of the scripts on file historically have ever stated a dose of 2 tabs BID    I'm so sorry for the confusion here....Long story short, can you please send a new prescription with the correct dosage of losartan to [facility]?    Richie Oneal RN     [facility]

## 2022-07-04 DIAGNOSIS — C61 MALIGNANT NEOPLASM OF PROSTATE (H): ICD-10-CM

## 2022-07-04 RX ORDER — TAMSULOSIN HYDROCHLORIDE 0.4 MG/1
CAPSULE ORAL
Qty: 90 CAPSULE | Refills: 3 | Status: SHIPPED | OUTPATIENT
Start: 2022-07-04 | End: 2023-06-09

## 2022-07-04 NOTE — TELEPHONE ENCOUNTER
"Routing refill request to provider for review/approval because:  Blood pressure out of range    Last Written Prescription Date:  4/13/2021  Last Fill Quantity: 90,  # refills: 3   Last office visit provider:  4/11/22         Requested Prescriptions   Pending Prescriptions Disp Refills     tamsulosin (FLOMAX) 0.4 MG capsule [Pharmacy Med Name: TAMSULOSIN HCL 0.4 MG CAPSULE] 90 capsule 3     Sig: TAKE 1 CAPSULE BY MOUTH EVERY DAY       Alpha Blockers Failed - 7/4/2022 12:25 AM        Failed - Blood pressure under 140/90 in past 12 months     BP Readings from Last 3 Encounters:   05/18/22 (!) 162/80   04/11/22 (!) 195/108   04/13/21 (!) 171/84                 Passed - Recent (12 mo) or future (30 days) visit within the authorizing provider's specialty     Patient has had an office visit with the authorizing provider or a provider within the authorizing providers department within the previous 12 mos or has a future within next 30 days. See \"Patient Info\" tab in inbasket, or \"Choose Columns\" in Meds & Orders section of the refill encounter.              Passed - Patient does not have Tadalafil, Vardenafil, or Sildenafil on their medication list        Passed - Medication is active on med list        Passed - Patient is 18 years of age or older             Mahi Wheeler RN 07/04/22 1:25 PM  "

## 2022-07-13 ENCOUNTER — TELEPHONE (OUTPATIENT)
Dept: FAMILY MEDICINE | Facility: CLINIC | Age: 82
End: 2022-07-13

## 2022-07-13 DIAGNOSIS — I10 ESSENTIAL HYPERTENSION: ICD-10-CM

## 2022-07-13 RX ORDER — LOSARTAN POTASSIUM 25 MG/1
TABLET ORAL
Qty: 90 TABLET | Refills: 3 | Status: CANCELLED | OUTPATIENT
Start: 2022-07-13

## 2022-07-13 NOTE — TELEPHONE ENCOUNTER
Patient's SO Dorita called regarding clarification of Losartan prescription    SO states that patient has been taking his Losartan 4 tabs a day prior to his last refill in April. Patient has ran out of med before the refill time. Dorita mentioned that this was an ongoing issue with his Losartan prescription previously & that patient's PCP was aware of this stating that it was a pharmacy error. Dorita faxed over patient's Losartan label from 22 stating Losartan Potassium 50 mg tab - take 2 tabs by mouth every day. Patient's current Losartan sig on file says Losartan 25 mg tab - take 2 tabs by mouth daily, total of 50 mg.    Called [facility] to clarify Losartan prescription. Talked with Pharmacist Mary Ann. She states that patient's  prescription from 2021 was Losartan 50 mg tab - take 2 tabs by mouth daily. Patient's refill prescription that was just picked up on  was Losartan 25 mg - take once daily.    Patient also wants to know if he should stop taking Flomax because he doesn't think he need it anymore.    Called patient's SO Dorita to inform her that Dr. Gerardo is out of the clinic this week and once back next week he will address this issue. RN educated SO that current med dosage on file for Losartan should be 50 mg total daily. SO verbalizes understanding & will follow current prescription order.    Routing to PCP or covering provider to please advise as this is a very complex case. Please see previous telephone encounter from , as this was an ongoing issue. Patient's current Losartan prescription is for 25 mg - take twice daily for a total of 50 mg, should patient continue this same dose?    Aden Angel, RN, BSN  [facility]

## 2022-07-14 RX ORDER — LOSARTAN POTASSIUM 50 MG/1
50 TABLET ORAL DAILY
Qty: 90 TABLET | Refills: 3
Start: 2022-07-14 | End: 2022-07-14

## 2022-07-14 RX ORDER — LOSARTAN POTASSIUM 50 MG/1
50 TABLET ORAL DAILY
Qty: 90 TABLET | Refills: 3 | Status: SHIPPED | OUTPATIENT
Start: 2022-07-14 | End: 2023-04-11

## 2022-07-14 NOTE — TELEPHONE ENCOUNTER
Called and spoke to Dorita. Patient has about a weeks worth of losartan left. Please send new rx to pharmacy.     Rx pended, please advise

## 2022-07-14 NOTE — TELEPHONE ENCOUNTER
Patient history is known to me. Please convey that he can continue 50 mg for losartan for now.     When the next refill is sent I will prescribe a 50 mg pill so he can just take one pill. Please let me know if a refill is needed now. In the future we will check his blood pressure and if it is high we can then increase losartan to 100 mg.    Regarding tamsulosin, that is for prostate issues and can help urinary flow. He can consider stopping that but should monitor your a reduced urinary stream. He can always resume it in the future if needed.

## 2022-09-21 ENCOUNTER — HOSPITAL ENCOUNTER (EMERGENCY)
Facility: CLINIC | Age: 82
Discharge: HOME | End: 2022-09-22
Attending: FAMILY MEDICINE | Admitting: FAMILY MEDICINE
Payer: MEDICARE

## 2022-09-21 VITALS
SYSTOLIC BLOOD PRESSURE: 141 MMHG | RESPIRATION RATE: 16 BRPM | WEIGHT: 313 LBS | DIASTOLIC BLOOD PRESSURE: 81 MMHG | HEART RATE: 94 BPM | TEMPERATURE: 96.8 F | OXYGEN SATURATION: 96 % | BODY MASS INDEX: 47.44 KG/M2 | HEIGHT: 68 IN

## 2022-09-21 DIAGNOSIS — H00.014 HORDEOLUM EXTERNUM OF LEFT UPPER EYELID: ICD-10-CM

## 2022-09-21 DIAGNOSIS — H10.32 ACUTE CONJUNCTIVITIS OF LEFT EYE, UNSPECIFIED ACUTE CONJUNCTIVITIS TYPE: ICD-10-CM

## 2022-09-21 PROCEDURE — 99284 EMERGENCY DEPT VISIT MOD MDM: CPT | Performed by: FAMILY MEDICINE

## 2022-09-21 PROCEDURE — 99284 EMERGENCY DEPT VISIT MOD MDM: CPT

## 2022-09-21 RX ORDER — TETRACAINE HYDROCHLORIDE 5 MG/ML
1-2 SOLUTION OPHTHALMIC ONCE
Status: COMPLETED | OUTPATIENT
Start: 2022-09-21 | End: 2022-09-22

## 2022-09-22 PROCEDURE — 250N000009 HC RX 250: Performed by: FAMILY MEDICINE

## 2022-09-22 PROCEDURE — 250N000013 HC RX MED GY IP 250 OP 250 PS 637: Performed by: FAMILY MEDICINE

## 2022-09-22 RX ORDER — POLYMYXIN B SULFATE AND TRIMETHOPRIM 1; 10000 MG/ML; [USP'U]/ML
2 SOLUTION OPHTHALMIC 4 TIMES DAILY
Status: DISCONTINUED | OUTPATIENT
Start: 2022-09-22 | End: 2022-09-22 | Stop reason: HOSPADM

## 2022-09-22 RX ADMIN — POLYMYXIN B SULFATE AND TRIMETHOPRIM 2 DROP: 10000; 1 SOLUTION OPHTHALMIC at 00:42

## 2022-09-22 RX ADMIN — TETRACAINE HYDROCHLORIDE 2 DROP: 5 SOLUTION OPHTHALMIC at 00:44

## 2022-09-22 NOTE — ED PROVIDER NOTES
"  HPI   The patient is an 81-year-old male presenting with concern for foreign body in his left eye.  Since this afternoon he felt like something got into his eye.  He has been rubbing on it and it has been tearing throughout the day.  He denies obvious injury or foreign body.  He says, \"I feel like it is rolling around under there when I open and close my eye.\"  He is referring to a foreign body sensation beneath his left upper lid.  He denies changes in vision.  No severe pain.  No pruritus.  No headache.  No fever.  No recent URI.        Allergies:  Allergies   Allergen Reactions     Nka [No Known Allergies]      Problem List:    Patient Active Problem List    Diagnosis Date Noted     Morbid obesity (H) 03/24/2017     Priority: High     Aortic stenosis 04/29/2022     Priority: Medium     Cardiomyopathy (H) 04/29/2022     Priority: Medium     Uncontrolled type 2 diabetes mellitus with hyperglycemia (H) 02/27/2022     Priority: Medium     Talus fracture 03/09/2017     Priority: Medium     Hypothyroidism 03/09/2017     Priority: Medium     Malignant neoplasm of prostate (H) 03/09/2017     Priority: Medium     Overview:   Created by Conversion       Malignant neoplasm of thyroid gland (H) 03/09/2017     Priority: Medium     Overview:   Created by Conversion       Hyperlipidemia 03/09/2017     Priority: Medium     Type 2 diabetes mellitus (H) 03/09/2017     Priority: Medium     Fracture of right talus 03/09/2017     Priority: Medium      Past Medical History:    Past Medical History:   Diagnosis Date     Diabetes (H)      Hypertension      Thyroid disease      Past Surgical History:    Past Surgical History:   Procedure Laterality Date     CLOSED REDUCTION, PERCUTANEOUS PINNING LOWER EXTREMITY, COMBINED Right 3/9/2017    Procedure: COMBINED CLOSED REDUCTION, PERCUTANEOUS PINNING LOWER EXTREMITY;  Surgeon: Ankit Coy DPM;  Location: [place] OR     THYROIDECTOMY       Family History:    Family History   Problem " "Relation Age of Onset     Heart Failure Mother      Heart Failure Father      Social History:  Marital Status:  Single [1]  Social History     Tobacco Use     Smoking status: Former Smoker     Smokeless tobacco: Never Used      Medications:    aspirin (ASA) 81 MG EC tablet  atorvastatin (LIPITOR) 40 MG tablet  Furosemide (LASIX PO)  Gabapentin (NEURONTIN PO)  glipiZIDE (GLUCOTROL) 10 MG tablet  hydrocortisone (CORTAID) 1 % cream  levothyroxine (SYNTHROID/LEVOTHROID) 175 MCG tablet  losartan (COZAAR) 50 MG tablet  magnesium citrate solution  metFORMIN (GLUCOPHAGE) 500 MG tablet  oxyCODONE (ROXICODONE) 5 MG tablet  pioglitazone (ACTOS) 45 MG tablet  senna-docusate (SENOKOT-S;PERICOLACE) 8.6-50 MG per tablet  tamsulosin (FLOMAX) 0.4 MG capsule  traZODone (DESYREL) 150 MG tablet  TRAZODONE HCL PO  zolpidem (AMBIEN) 5 MG tablet      Review of Systems   All other systems reviewed and are negative.      PE   BP: (!) 141/81  Pulse: 94  Temp: 96.8  F (36  C)  Resp: 16  Height: 172.7 cm (5' 8\")  Weight: 142 kg (313 lb)  SpO2: 96 %  Physical Exam  Vitals and nursing note reviewed.   Constitutional:       General: He is not in acute distress.  HENT:      Head: Atraumatic.      Right Ear: External ear normal.      Left Ear: External ear normal.      Nose: Nose normal.      Mouth/Throat:      Mouth: Mucous membranes are moist.      Pharynx: Oropharynx is clear.   Eyes:      General: No scleral icterus.     Extraocular Movements: Extraocular movements intact.      Pupils: Pupils are equal, round, and reactive to light.   Cardiovascular:      Rate and Rhythm: Normal rate.   Pulmonary:      Effort: Pulmonary effort is normal. No respiratory distress.   Musculoskeletal:         General: Normal range of motion.      Cervical back: Normal range of motion.   Skin:     General: Skin is warm and dry.   Neurological:      Mental Status: He is alert and oriented to person, place, and time.   Psychiatric:         Behavior: Behavior normal. "         ED COURSE and [facility]   0037.  Small stye seen on the upper medial or nasal side of the lid.  This is an internal hordeolum.  However, the patient feels symptoms mostly on the lateral aspect of the upper lid.  This time may or may not be playing a role.  No foreign body on the surface of the eye or beneath the lid that I could appreciate.  The lead/eye was irrigated with 500 mL of saline.  Topical antibiotic drops will be given for possible conjunctivitis.  Ophthalmology follow-up recommended, referral order placed.    LABS  Labs Ordered and Resulted from Time of ED Arrival to Time of ED Departure - No data to display    IMAGING  Images reviewed by me.  Radiology report also reviewed.  No orders to display       Procedures    Medications   tetracaine (PONTOCAINE) 0.5 % ophthalmic solution 1-2 drop (has no administration in time range)   trimethoprim-polymyxin b (POLYTRIM) ophthalmic solution 2 drop (has no administration in time range)         IMPRESSION       ICD-10-CM    1. Acute conjunctivitis of left eye, unspecified acute conjunctivitis type  H10.32 Adult Eye  Referral   2. Hordeolum externum of left upper eyelid  H00.014 Adult Eye  Referral            Medication List      There are no discharge medications for this visit.                     Norman Reyes MD  09/22/22 0038

## 2022-09-22 NOTE — DISCHARGE INSTRUCTIONS
RETURN TO THE EMERGENCY ROOM FOR THE FOLLOWING:    Fever greater than 101, severely worsened pain, concerning changes in vision, or at anytime for any concern.    FOLLOW UP:    With ophthalmology for persistent symptoms beyond the next 5 days.  Referral order placed at the time of your discharge.  Expect a phone call within the next 2-3 business days to help with scheduling.  You do not have to follow-up with ophthalmology if improving.    TREATMENT RECOMMENDATIONS:    Polytrim 4 times a day over the next 5 days.    NURSE ADVICE LINE:  [phone] or [phone]

## 2022-09-22 NOTE — ED TRIAGE NOTES
Possible FB in left eye with tearing     Triage Assessment     Row Name 09/21/22 [id number]       Triage Assessment (Adult)    Airway WDL WDL       Respiratory WDL    Respiratory WDL WDL       Skin Circulation/Temperature WDL    Skin Circulation/Temperature WDL WDL       Cardiac WDL    Cardiac WDL WDL       Peripheral/Neurovascular WDL    Peripheral Neurovascular WDL WDL       Cognitive/Neuro/Behavioral WDL    Cognitive/Neuro/Behavioral WDL WDL

## 2022-10-05 ENCOUNTER — LAB (OUTPATIENT)
Dept: LAB | Facility: CLINIC | Age: 82
End: 2022-10-05
Payer: MEDICARE

## 2022-10-05 DIAGNOSIS — E03.9 HYPOTHYROIDISM, UNSPECIFIED TYPE: ICD-10-CM

## 2022-10-05 DIAGNOSIS — E11.65 UNCONTROLLED TYPE 2 DIABETES MELLITUS WITH HYPERGLYCEMIA (H): ICD-10-CM

## 2022-10-05 LAB
ANION GAP SERPL CALCULATED.3IONS-SCNC: 10 MMOL/L (ref 7–15)
BUN SERPL-MCNC: 25 MG/DL (ref 8–23)
CALCIUM SERPL-MCNC: 9.3 MG/DL (ref 8.8–10.2)
CHLORIDE SERPL-SCNC: 101 MMOL/L (ref 98–107)
CREAT SERPL-MCNC: 0.97 MG/DL (ref 0.67–1.17)
DEPRECATED HCO3 PLAS-SCNC: 28 MMOL/L (ref 22–29)
GFR SERPL CREATININE-BSD FRML MDRD: 78 ML/MIN/1.73M2
GLUCOSE SERPL-MCNC: 153 MG/DL (ref 70–99)
HBA1C MFR BLD: 7.1 % (ref 0–5.6)
POTASSIUM SERPL-SCNC: 5.1 MMOL/L (ref 3.4–5.3)
SODIUM SERPL-SCNC: 139 MMOL/L (ref 136–145)
T4 FREE SERPL-MCNC: 1.62 NG/DL (ref 0.9–1.7)
TSH SERPL DL<=0.005 MIU/L-ACNC: 6.23 UIU/ML (ref 0.3–4.2)

## 2022-10-05 PROCEDURE — 36415 COLL VENOUS BLD VENIPUNCTURE: CPT

## 2022-10-05 PROCEDURE — 84439 ASSAY OF FREE THYROXINE: CPT

## 2022-10-05 PROCEDURE — 83036 HEMOGLOBIN GLYCOSYLATED A1C: CPT

## 2022-10-05 PROCEDURE — 80048 BASIC METABOLIC PNL TOTAL CA: CPT

## 2022-10-05 PROCEDURE — 84443 ASSAY THYROID STIM HORMONE: CPT

## 2022-10-07 ENCOUNTER — TELEPHONE (OUTPATIENT)
Dept: FAMILY MEDICINE | Facility: CLINIC | Age: 82
End: 2022-10-07

## 2022-10-07 NOTE — TELEPHONE ENCOUNTER
Incoming call from Pt's significant other, Dorita  Wanting to verify the medications pt needs to be taking as well as the correct dosage of the meds  Call back [phone]

## 2022-10-07 NOTE — TELEPHONE ENCOUNTER
Called Dorita back and went over medications and dosages with her as requested.    Richie Oneal RN     [facility]

## 2022-12-01 ENCOUNTER — TELEPHONE (OUTPATIENT)
Dept: CARDIOLOGY | Facility: CLINIC | Age: 82
End: 2022-12-01

## 2022-12-01 DIAGNOSIS — I35.0 AORTIC VALVE STENOSIS, ETIOLOGY OF CARDIAC VALVE DISEASE UNSPECIFIED: Primary | ICD-10-CM

## 2022-12-01 NOTE — TELEPHONE ENCOUNTER
Valve Clinic RN Phone Call:  Call made on 12/1/2022 at 1:11 PM by Ashley Guevara RN    Reason for call: returning VM left on valve clinic line. Patient calling and requesting to speak to Jacey.     Discussion/instructions to patient: Discussed with pt Jacey no longer works in the valve clinic. Pt reported he met with her and Dr. Mason in the spring to discuss his severe aortic stenosis and potential treatment options. Documentation at that time indicated patient wanted to hold off on any further work up and would contact the team if he decided he wanted to move forward. Patient reported that would like to come back in for another consult to discuss options again. Feels he is ready to discuss at this point. Discussed with patient that he may be due for a repeat echocardiogram since it has been 8 months since his last echo. Patient indicated he prefers to just meet with Dr. Mason. Discussed this with Dr. Mason who indicated to give appropriate opinion of treatment options he would need an updated echocardiogram to do so. Discussed this with patient who verbalized understanding and agreed to plan.     Echo ordered, patient transferred to  to have valve clinic appt made. Patient will be seen 1/19/23. Patient would like to have his echo done in [facility]. Pt notified he will need to call and schedule this with their hospital team.       Ashley Guevara RN on 12/1/2022 at 1:52 PM

## 2022-12-28 DIAGNOSIS — E11.65 UNCONTROLLED TYPE 2 DIABETES MELLITUS WITH HYPERGLYCEMIA (H): ICD-10-CM

## 2022-12-28 DIAGNOSIS — E11.65 TYPE 2 DIABETES MELLITUS WITH HYPERGLYCEMIA (H): ICD-10-CM

## 2022-12-29 RX ORDER — PIOGLITAZONEHYDROCHLORIDE 45 MG/1
45 TABLET ORAL DAILY
Qty: 90 TABLET | Refills: 0 | Status: SHIPPED | OUTPATIENT
Start: 2022-12-29 | End: 2023-03-30

## 2022-12-29 NOTE — TELEPHONE ENCOUNTER
Pt's spouse Dorita (Consent to communicate on file) calling to check connie of refill request. Please review.

## 2022-12-30 ENCOUNTER — HOSPITAL ENCOUNTER (OUTPATIENT)
Dept: CARDIOLOGY | Facility: CLINIC | Age: 82
Discharge: HOME | End: 2022-12-30
Attending: INTERNAL MEDICINE | Admitting: INTERNAL MEDICINE
Payer: MEDICARE

## 2022-12-30 DIAGNOSIS — I35.0 AORTIC VALVE STENOSIS, ETIOLOGY OF CARDIAC VALVE DISEASE UNSPECIFIED: ICD-10-CM

## 2022-12-30 LAB — LVEF ECHO: NORMAL

## 2022-12-30 PROCEDURE — 999N000208 ECHOCARDIOGRAM COMPLETE

## 2022-12-30 PROCEDURE — 255N000002 HC RX 255 OP 636: Performed by: INTERNAL MEDICINE

## 2022-12-30 PROCEDURE — 93306 TTE W/DOPPLER COMPLETE: CPT | Mod: 26 | Performed by: INTERNAL MEDICINE

## 2022-12-30 RX ADMIN — HUMAN ALBUMIN MICROSPHERES AND PERFLUTREN 2 ML: 10; .22 INJECTION, SOLUTION INTRAVENOUS at 07:55

## 2023-01-01 ENCOUNTER — TELEPHONE (OUTPATIENT)
Dept: CARDIOLOGY | Facility: CLINIC | Age: 83
End: 2023-01-01
Payer: MEDICARE

## 2023-01-01 ENCOUNTER — TRANSFERRED RECORDS (OUTPATIENT)
Dept: MULTI SPECIALTY CLINIC | Facility: CLINIC | Age: 83
End: 2023-01-01

## 2023-01-01 DIAGNOSIS — I10 ESSENTIAL HYPERTENSION: ICD-10-CM

## 2023-01-01 DIAGNOSIS — I49.3 PVC (PREMATURE VENTRICULAR CONTRACTION): ICD-10-CM

## 2023-01-01 LAB — RETINOPATHY: NORMAL

## 2023-01-01 RX ORDER — CARVEDILOL 6.25 MG/1
3.12 TABLET ORAL 2 TIMES DAILY WITH MEALS
Start: 2023-01-01 | End: 2024-01-01

## 2023-01-12 DIAGNOSIS — I35.0 AORTIC VALVE STENOSIS, ETIOLOGY OF CARDIAC VALVE DISEASE UNSPECIFIED: Primary | ICD-10-CM

## 2023-01-19 ENCOUNTER — OFFICE VISIT (OUTPATIENT)
Dept: CARDIOLOGY | Facility: CLINIC | Age: 83
End: 2023-01-19
Payer: MEDICARE

## 2023-01-19 ENCOUNTER — LAB (OUTPATIENT)
Dept: CARDIOLOGY | Facility: CLINIC | Age: 83
End: 2023-01-19
Payer: MEDICARE

## 2023-01-19 ENCOUNTER — ALLIED HEALTH/NURSE VISIT (OUTPATIENT)
Dept: CARDIOLOGY | Facility: CLINIC | Age: 83
End: 2023-01-19
Payer: MEDICARE

## 2023-01-19 VITALS
DIASTOLIC BLOOD PRESSURE: 75 MMHG | BODY MASS INDEX: 32.74 KG/M2 | HEART RATE: 55 BPM | OXYGEN SATURATION: 95 % | SYSTOLIC BLOOD PRESSURE: 145 MMHG | WEIGHT: 216 LBS | HEIGHT: 68 IN

## 2023-01-19 DIAGNOSIS — I51.89 OTHER ILL-DEFINED HEART DISEASES: ICD-10-CM

## 2023-01-19 DIAGNOSIS — I35.0 AORTIC VALVE STENOSIS, ETIOLOGY OF CARDIAC VALVE DISEASE UNSPECIFIED: ICD-10-CM

## 2023-01-19 DIAGNOSIS — I35.0 NONRHEUMATIC AORTIC VALVE STENOSIS: Primary | ICD-10-CM

## 2023-01-19 LAB
ALBUMIN SERPL BCG-MCNC: 4.5 G/DL (ref 3.5–5.2)
ALP SERPL-CCNC: 57 U/L (ref 40–129)
ALT SERPL W P-5'-P-CCNC: 19 U/L (ref 10–50)
ANION GAP SERPL CALCULATED.3IONS-SCNC: 11 MMOL/L (ref 7–15)
AST SERPL W P-5'-P-CCNC: 19 U/L (ref 10–50)
ATRIAL RATE - MUSE: 77 BPM
BILIRUB SERPL-MCNC: 0.6 MG/DL
BUN SERPL-MCNC: 26.6 MG/DL (ref 8–23)
CALCIUM SERPL-MCNC: 9.4 MG/DL (ref 8.8–10.2)
CHLORIDE SERPL-SCNC: 101 MMOL/L (ref 98–107)
CREAT SERPL-MCNC: 0.97 MG/DL (ref 0.67–1.17)
DEPRECATED HCO3 PLAS-SCNC: 28 MMOL/L (ref 22–29)
DIASTOLIC BLOOD PRESSURE - MUSE: NORMAL MMHG
ERYTHROCYTE [DISTWIDTH] IN BLOOD BY AUTOMATED COUNT: 13.2 % (ref 10–15)
GFR SERPL CREATININE-BSD FRML MDRD: 78 ML/MIN/1.73M2
GLUCOSE SERPL-MCNC: 129 MG/DL (ref 70–99)
HCT VFR BLD AUTO: 44.9 % (ref 40–53)
HGB BLD-MCNC: 14.6 G/DL (ref 13.3–17.7)
INTERPRETATION ECG - MUSE: NORMAL
MCH RBC QN AUTO: 31.7 PG (ref 26.5–33)
MCHC RBC AUTO-ENTMCNC: 32.5 G/DL (ref 31.5–36.5)
MCV RBC AUTO: 97 FL (ref 78–100)
P AXIS - MUSE: 58 DEGREES
PLATELET # BLD AUTO: 187 10E3/UL (ref 150–450)
POTASSIUM SERPL-SCNC: 4.3 MMOL/L (ref 3.4–5.3)
PR INTERVAL - MUSE: 188 MS
PROT SERPL-MCNC: 7 G/DL (ref 6.4–8.3)
QRS DURATION - MUSE: 108 MS
QT - MUSE: 372 MS
QTC - MUSE: 420 MS
R AXIS - MUSE: -8 DEGREES
RBC # BLD AUTO: 4.61 10E6/UL (ref 4.4–5.9)
SODIUM SERPL-SCNC: 140 MMOL/L (ref 136–145)
SYSTOLIC BLOOD PRESSURE - MUSE: NORMAL MMHG
T AXIS - MUSE: 70 DEGREES
VENTRICULAR RATE- MUSE: 77 BPM
WBC # BLD AUTO: 4.9 10E3/UL (ref 4–11)

## 2023-01-19 PROCEDURE — 93000 ELECTROCARDIOGRAM COMPLETE: CPT | Performed by: GENERAL ACUTE CARE HOSPITAL

## 2023-01-19 PROCEDURE — 99215 OFFICE O/P EST HI 40 MIN: CPT | Mod: 25 | Performed by: INTERNAL MEDICINE

## 2023-01-19 PROCEDURE — 80053 COMPREHEN METABOLIC PANEL: CPT

## 2023-01-19 PROCEDURE — 36415 COLL VENOUS BLD VENIPUNCTURE: CPT

## 2023-01-19 PROCEDURE — 99207 PR NO CHARGE LOS: CPT

## 2023-01-19 PROCEDURE — 85027 COMPLETE CBC AUTOMATED: CPT

## 2023-01-19 NOTE — LETTER
1/19/2023    Geoffrey Live MD  [address]  [facility] [address]    RE: Jayesh Ortiz       Dear Colleague,     [facility] the [facility] [place].    HEART CARE ENCOUNTER CONSULTATON NOTE      [facility]  [phone]      Assessment/Recommendations   Assessment/Plan:    Severe symptomatic aortic valve stenosis: Given the onset of shortness of breath, the presence of left ventricular systolic dysfunction as well as his occupation as a  which involves long days and heavy exertion, it would be reasonable to move forward with aortic valve replacement, which is Mr. Ortiz's preference as well.    The options of surgical as well as transcatheter aortic valve replacement were reviewed, and he would be a good candidate for TAVR given his age and comorbidities.    The risks, benefits and alternatives of transcatheter aortic valve replacement were reviewed, and he would like to proceed.    He will be scheduled for a coronary angiogram and a CTA to help plan the procedure, and in the interim has been asked to avoid heavy exertion, and knows to call if there is a change in his condition or worsening symptoms.    Thank you for the opportunity to participate in the care of Mr. Ortiz.  Please do not history to call with any questions or concerns regarding his cardiovascular status.       History of Present Illness/Subjective    HPI: Jayesh Ortiz is a pleasant 82 year old male with a history of hypertension, diabetes mellitus, dyslipidemia and known aortic valve stenosis who was seen in May 2022 when he was found to have progression to severe aortic valve stenosis.    At that visit, he reported feeling well with no symptoms and wanted to wait before having any interventions for his aortic valve stenosis.    He presents now for a follow-up visit, and states that while continue to feel generally well, he does get short of breath with the  "brisk or long walks.  He had an echocardiogram done recently which did not show significant progression from April 2022, but otherwise does show severe aortic valve stenosis in the presence of mild left ventricular systolic dysfunction.  The mean gradient across the aortic valve was 36 mmHg the peak velocity of 3.9 m/s and a valve area 0.91 cm  with a dimensionless index of 0.26, an ejection fraction of 40 to 45%.           Physical Examination  Review of Systems   Vitals: BP (!) 145/75   Pulse 55   Ht 1.727 m (5' 8\")   Wt 98 kg (216 lb)   SpO2 95%   BMI 32.84 kg/m    BMI= Body mass index is 32.84 kg/m .  Wt Readings from Last 3 Encounters:   01/19/23 98 kg (216 lb)   09/21/22 142 kg (313 lb)   05/18/22 142 kg (313 lb)       General Appearance:   no distress, normal body habitus, upright.   ENT/Mouth: membranes moist, no nasal discharge or bleeding gums.  Normal head shape, no evidence of injury or laceration.     EYES:  no scleral icterus, normal conjunctivae   Neck: no evidence of thyromegaly.  Supple   Chest/Lungs:   No audible wheezing equal chest wall expansion. Non labored breathing.  No cough.   Cardiovascular:   No evidence of elevated jugular venous pressure.  No evidence of pitting edema bilaterally    Abdomen:  no evidence of abdominal distention. No observe juandice.     Extremities: no cyanosis or clubbing noted.    Skin: no xanthelasma, normal skin color. No evidence of facial lacerations.      Neurologic: Normal arm motion bilateral, no tremors.  No evidence of focal defect.       Psychiatric: alert and oriented x3, calm        Please refer above for cardiac ROS details.        Medical History  Surgical History Family History Social History   Past Medical History:   Diagnosis Date     Diabetes (H)      Hypertension      Thyroid disease      Past Surgical History:   Procedure Laterality Date     CLOSED REDUCTION, PERCUTANEOUS PINNING LOWER EXTREMITY, COMBINED Right 3/9/2017    Procedure: COMBINED " CLOSED REDUCTION, PERCUTANEOUS PINNING LOWER EXTREMITY;  Surgeon: Ankit Coy DPM;  Location: [facility] OR     THYROIDECTOMY       Family History   Problem Relation Age of Onset     Heart Failure Mother      Heart Failure Father         Social History     Socioeconomic History     Marital status: Single     Spouse name: Not on file     Number of children: Not on file     Years of education: Not on file     Highest education level: Not on file   Occupational History     Not on file   Tobacco Use     Smoking status: Former     Smokeless tobacco: Never   Substance and Sexual Activity     Alcohol use: Not on file     Drug use: Not on file     Sexual activity: Not on file   Other Topics Concern     Not on file   Social History Narrative     Not on file     Social Determinants of Health     Financial Resource Strain: Not on file   Food Insecurity: Not on file   Transportation Needs: Not on file   Physical Activity: Not on file   Stress: Not on file   Social Connections: Not on file   Intimate Partner Violence: Not on file   Housing Stability: Not on file           Medications  Allergies   Current Outpatient Medications   Medication Sig Dispense Refill     atorvastatin (LIPITOR) 40 MG tablet TAKE 1 TABLET BY MOUTH EVERY DAY 90 tablet 3     glipiZIDE (GLUCOTROL) 10 MG tablet TAKE 1 TABLET BY MOUTH TWICE A  tablet 3     hydrocortisone (CORTAID) 1 % cream Apply topically 3 times daily as needed for itching       levothyroxine (SYNTHROID/LEVOTHROID) 175 MCG tablet Take 1 tablet (175 mcg) by mouth daily 90 tablet 3     losartan (COZAAR) 50 MG tablet Take 1 tablet (50 mg) by mouth daily 90 tablet 3     metFORMIN (GLUCOPHAGE) 500 MG tablet TAKE 2 TABLETS BY MOUTH TWICE A DAY WITH FOOD 360 tablet 0     pioglitazone (ACTOS) 45 MG tablet TAKE 1 TABLET (45 MG) BY MOUTH IN THE MORNING. 90 tablet 0     tamsulosin (FLOMAX) 0.4 MG capsule TAKE 1 CAPSULE BY MOUTH EVERY DAY 90 capsule 3     aspirin (ASA) 81 MG EC tablet Take 1  tablet (81 mg) by mouth daily (Patient not taking: Reported on 1/19/2023) 90 tablet 3     Furosemide (LASIX PO) Take 40 mg by mouth daily       Gabapentin (NEURONTIN PO) Take 300 mg by mouth daily  (Patient not taking: Reported on 1/19/2023)       senna-docusate (SENOKOT-S;PERICOLACE) 8.6-50 MG per tablet Take 1 tablet by mouth 2 times daily (Patient not taking: Reported on 1/19/2023)       traZODone (DESYREL) 150 MG tablet TAKE 1 TABLET BY MOUTH EVERYDAY AT BEDTIME (Patient not taking: Reported on 1/19/2023) 90 tablet 1     zolpidem (AMBIEN) 5 MG tablet Take 1 tablet (5 mg) by mouth nightly as needed for sleep (Patient not taking: Reported on 1/19/2023) 45 tablet 0       Allergies   Allergen Reactions     Nka [No Known Allergies]           Lab Results    Chemistry/lipid CBC Cardiac Enzymes/BNP/TSH/INR   Recent Labs   Lab Test 04/11/22  1008   CHOL 170   HDL 51      TRIG 66     Recent Labs   Lab Test 04/11/22  1008 04/13/21  1008 03/02/20  1032    177* 114     Recent Labs   Lab Test 10/05/22  0810      POTASSIUM 5.1   CHLORIDE 101   CO2 28   *   BUN 25.0*   CR 0.97   GFRESTIMATED 78   MATT 9.3     Recent Labs   Lab Test 10/05/22  0810 05/18/22  0839 04/11/22  1008   CR 0.97 1.02 1.00     Recent Labs   Lab Test 10/05/22  0810 04/11/22  0845 04/13/21  1008   A1C 7.1* 10.2* 7.9*          Recent Labs   Lab Test 05/18/22  0839   WBC 5.1   HGB 14.8   HCT 47.0   MCV 95        Recent Labs   Lab Test 05/18/22  0839 04/11/22  1008 04/13/21  1008   HGB 14.8 14.7 15.1    No results for input(s): TROPONINI in the last 07967 hours.  Recent Labs   Lab Test 04/11/22  1008   *     Recent Labs   Lab Test 10/05/22  0810   TSH 6.23*     No results for input(s): INR in the last 23433 hours.     Lakhwinder Mason MD                Thank you for allowing me to participate in the care of your patient.      Sincerely,     Lakhwinder Mason MD     [facility]  cc:   No referring provider defined for this encounter.

## 2023-01-19 NOTE — PROGRESS NOTES
HEART CARE ENCOUNTER CONSULTATON NOTE      [facility]  [phone]      Assessment/Recommendations   Assessment/Plan:    Severe symptomatic aortic valve stenosis: Given the onset of shortness of breath, the presence of left ventricular systolic dysfunction as well as his occupation as a  which involves long days and heavy exertion, it would be reasonable to move forward with aortic valve replacement, which is Mr. Ortiz's preference as well.    The options of surgical as well as transcatheter aortic valve replacement were reviewed, and he would be a good candidate for TAVR given his age and comorbidities.    The risks, benefits and alternatives of transcatheter aortic valve replacement were reviewed, and he would like to proceed.    He will be scheduled for a coronary angiogram and a CTA to help plan the procedure, and in the interim has been asked to avoid heavy exertion, and knows to call if there is a change in his condition or worsening symptoms.    Thank you for the opportunity to participate in the care of Mr. Ortiz.  Please do not history to call with any questions or concerns regarding his cardiovascular status.       History of Present Illness/Subjective    HPI: Jayesh Ortiz is a pleasant 82 year old male with a history of hypertension, diabetes mellitus, dyslipidemia and known aortic valve stenosis who was seen in May 2022 when he was found to have progression to severe aortic valve stenosis.    At that visit, he reported feeling well with no symptoms and wanted to wait before having any interventions for his aortic valve stenosis.    He presents now for a follow-up visit, and states that while continue to feel generally well, he does get short of breath with the brisk or long walks.  He had an echocardiogram done recently which did not show significant progression from April 2022, but otherwise does show severe aortic valve stenosis in the presence of mild left  "ventricular systolic dysfunction.  The mean gradient across the aortic valve was 36 mmHg the peak velocity of 3.9 m/s and a valve area 0.91 cm  with a dimensionless index of 0.26, an ejection fraction of 40 to 45%.           Physical Examination  Review of Systems   Vitals: BP (!) 145/75   Pulse 55   Ht 1.727 m (5' 8\")   Wt 98 kg (216 lb)   SpO2 95%   BMI 32.84 kg/m    BMI= Body mass index is 32.84 kg/m .  Wt Readings from Last 3 Encounters:   01/19/23 98 kg (216 lb)   09/21/22 142 kg (313 lb)   05/18/22 142 kg (313 lb)       General Appearance:   no distress, normal body habitus, upright.   ENT/Mouth: membranes moist, no nasal discharge or bleeding gums.  Normal head shape, no evidence of injury or laceration.     EYES:  no scleral icterus, normal conjunctivae   Neck: no evidence of thyromegaly.  Supple   Chest/Lungs:   No audible wheezing equal chest wall expansion. Non labored breathing.  No cough.   Cardiovascular:   No evidence of elevated jugular venous pressure.  No evidence of pitting edema bilaterally    Abdomen:  no evidence of abdominal distention. No observe juandice.     Extremities: no cyanosis or clubbing noted.    Skin: no xanthelasma, normal skin color. No evidence of facial lacerations.      Neurologic: Normal arm motion bilateral, no tremors.  No evidence of focal defect.       Psychiatric: alert and oriented x3, calm        Please refer above for cardiac ROS details.        Medical History  Surgical History Family History Social History   Past Medical History:   Diagnosis Date     Diabetes (H)      Hypertension      Thyroid disease      Past Surgical History:   Procedure Laterality Date     CLOSED REDUCTION, PERCUTANEOUS PINNING LOWER EXTREMITY, COMBINED Right 3/9/2017    Procedure: COMBINED CLOSED REDUCTION, PERCUTANEOUS PINNING LOWER EXTREMITY;  Surgeon: Ankit Coy DPM;  Location: [facility] OR     THYROIDECTOMY       Family History   Problem Relation Age of Onset     Heart Failure " Mother      Heart Failure Father         Social History     Socioeconomic History     Marital status: Single     Spouse name: Not on file     Number of children: Not on file     Years of education: Not on file     Highest education level: Not on file   Occupational History     Not on file   Tobacco Use     Smoking status: Former     Smokeless tobacco: Never   Substance and Sexual Activity     Alcohol use: Not on file     Drug use: Not on file     Sexual activity: Not on file   Other Topics Concern     Not on file   Social History Narrative     Not on file     Social Determinants of Health     Financial Resource Strain: Not on file   Food Insecurity: Not on file   Transportation Needs: Not on file   Physical Activity: Not on file   Stress: Not on file   Social Connections: Not on file   Intimate Partner Violence: Not on file   Housing Stability: Not on file           Medications  Allergies   Current Outpatient Medications   Medication Sig Dispense Refill     atorvastatin (LIPITOR) 40 MG tablet TAKE 1 TABLET BY MOUTH EVERY DAY 90 tablet 3     glipiZIDE (GLUCOTROL) 10 MG tablet TAKE 1 TABLET BY MOUTH TWICE A  tablet 3     hydrocortisone (CORTAID) 1 % cream Apply topically 3 times daily as needed for itching       levothyroxine (SYNTHROID/LEVOTHROID) 175 MCG tablet Take 1 tablet (175 mcg) by mouth daily 90 tablet 3     losartan (COZAAR) 50 MG tablet Take 1 tablet (50 mg) by mouth daily 90 tablet 3     metFORMIN (GLUCOPHAGE) 500 MG tablet TAKE 2 TABLETS BY MOUTH TWICE A DAY WITH FOOD 360 tablet 0     pioglitazone (ACTOS) 45 MG tablet TAKE 1 TABLET (45 MG) BY MOUTH IN THE MORNING. 90 tablet 0     tamsulosin (FLOMAX) 0.4 MG capsule TAKE 1 CAPSULE BY MOUTH EVERY DAY 90 capsule 3     aspirin (ASA) 81 MG EC tablet Take 1 tablet (81 mg) by mouth daily (Patient not taking: Reported on 1/19/2023) 90 tablet 3     Furosemide (LASIX PO) Take 40 mg by mouth daily       Gabapentin (NEURONTIN PO) Take 300 mg by mouth daily   (Patient not taking: Reported on 1/19/2023)       senna-docusate (SENOKOT-S;PERICOLACE) 8.6-50 MG per tablet Take 1 tablet by mouth 2 times daily (Patient not taking: Reported on 1/19/2023)       traZODone (DESYREL) 150 MG tablet TAKE 1 TABLET BY MOUTH EVERYDAY AT BEDTIME (Patient not taking: Reported on 1/19/2023) 90 tablet 1     zolpidem (AMBIEN) 5 MG tablet Take 1 tablet (5 mg) by mouth nightly as needed for sleep (Patient not taking: Reported on 1/19/2023) 45 tablet 0       Allergies   Allergen Reactions     Nka [No Known Allergies]           Lab Results    Chemistry/lipid CBC Cardiac Enzymes/BNP/TSH/INR   Recent Labs   Lab Test 04/11/22  1008   CHOL 170   HDL 51      TRIG 66     Recent Labs   Lab Test 04/11/22  1008 04/13/21  1008 03/02/20  1032    177* 114     Recent Labs   Lab Test 10/05/22  0810      POTASSIUM 5.1   CHLORIDE 101   CO2 28   *   BUN 25.0*   CR 0.97   GFRESTIMATED 78   MATT 9.3     Recent Labs   Lab Test 10/05/22  0810 05/18/22  0839 04/11/22  1008   CR 0.97 1.02 1.00     Recent Labs   Lab Test 10/05/22  0810 04/11/22  0845 04/13/21  1008   A1C 7.1* 10.2* 7.9*          Recent Labs   Lab Test 05/18/22  0839   WBC 5.1   HGB 14.8   HCT 47.0   MCV 95        Recent Labs   Lab Test 05/18/22  0839 04/11/22  1008 04/13/21  1008   HGB 14.8 14.7 15.1    No results for input(s): TROPONINI in the last 39210 hours.  Recent Labs   Lab Test 04/11/22  1008   *     Recent Labs   Lab Test 10/05/22  0810   TSH 6.23*     No results for input(s): INR in the last 78493 hours.     Lakhwinder Mason MD

## 2023-01-19 NOTE — H&P (VIEW-ONLY)
HEART CARE ENCOUNTER CONSULTATON NOTE      [facility]  [phone]      Assessment/Recommendations   Assessment/Plan:    Severe symptomatic aortic valve stenosis: Given the onset of shortness of breath, the presence of left ventricular systolic dysfunction as well as his occupation as a  which involves long days and heavy exertion, it would be reasonable to move forward with aortic valve replacement, which is Mr. Oritz's preference as well.    The options of surgical as well as transcatheter aortic valve replacement were reviewed, and he would be a good candidate for TAVR given his age and comorbidities.    The risks, benefits and alternatives of transcatheter aortic valve replacement were reviewed, and he would like to proceed.    He will be scheduled for a coronary angiogram and a CTA to help plan the procedure, and in the interim has been asked to avoid heavy exertion, and knows to call if there is a change in his condition or worsening symptoms.    Thank you for the opportunity to participate in the care of Mr. Ortiz.  Please do not history to call with any questions or concerns regarding his cardiovascular status.       History of Present Illness/Subjective    HPI: Jayesh Ortiz is a pleasant 82 year old male with a history of hypertension, diabetes mellitus, dyslipidemia and known aortic valve stenosis who was seen in May 2022 when he was found to have progression to severe aortic valve stenosis.    At that visit, he reported feeling well with no symptoms and wanted to wait before having any interventions for his aortic valve stenosis.    He presents now for a follow-up visit, and states that while continue to feel generally well, he does get short of breath with the brisk or long walks.  He had an echocardiogram done recently which did not show significant progression from April 2022, but otherwise does show severe aortic valve stenosis in the presence of mild left  "ventricular systolic dysfunction.  The mean gradient across the aortic valve was 36 mmHg the peak velocity of 3.9 m/s and a valve area 0.91 cm  with a dimensionless index of 0.26, an ejection fraction of 40 to 45%.           Physical Examination  Review of Systems   Vitals: BP (!) 145/75   Pulse 55   Ht 1.727 m (5' 8\")   Wt 98 kg (216 lb)   SpO2 95%   BMI 32.84 kg/m    BMI= Body mass index is 32.84 kg/m .  Wt Readings from Last 3 Encounters:   01/19/23 98 kg (216 lb)   09/21/22 142 kg (313 lb)   05/18/22 142 kg (313 lb)       General Appearance:   no distress, normal body habitus, upright.   ENT/Mouth: membranes moist, no nasal discharge or bleeding gums.  Normal head shape, no evidence of injury or laceration.     EYES:  no scleral icterus, normal conjunctivae   Neck: no evidence of thyromegaly.  Supple   Chest/Lungs:   No audible wheezing equal chest wall expansion. Non labored breathing.  No cough.   Cardiovascular:   No evidence of elevated jugular venous pressure.  No evidence of pitting edema bilaterally    Abdomen:  no evidence of abdominal distention. No observe juandice.     Extremities: no cyanosis or clubbing noted.    Skin: no xanthelasma, normal skin color. No evidence of facial lacerations.      Neurologic: Normal arm motion bilateral, no tremors.  No evidence of focal defect.       Psychiatric: alert and oriented x3, calm        Please refer above for cardiac ROS details.        Medical History  Surgical History Family History Social History   Past Medical History:   Diagnosis Date     Diabetes (H)      Hypertension      Thyroid disease      Past Surgical History:   Procedure Laterality Date     CLOSED REDUCTION, PERCUTANEOUS PINNING LOWER EXTREMITY, COMBINED Right 3/9/2017    Procedure: COMBINED CLOSED REDUCTION, PERCUTANEOUS PINNING LOWER EXTREMITY;  Surgeon: Ankit Coy DPM;  Location: [facility] OR     THYROIDECTOMY       Family History   Problem Relation Age of Onset     Heart Failure " Mother      Heart Failure Father         Social History     Socioeconomic History     Marital status: Single     Spouse name: Not on file     Number of children: Not on file     Years of education: Not on file     Highest education level: Not on file   Occupational History     Not on file   Tobacco Use     Smoking status: Former     Smokeless tobacco: Never   Substance and Sexual Activity     Alcohol use: Not on file     Drug use: Not on file     Sexual activity: Not on file   Other Topics Concern     Not on file   Social History Narrative     Not on file     Social Determinants of Health     Financial Resource Strain: Not on file   Food Insecurity: Not on file   Transportation Needs: Not on file   Physical Activity: Not on file   Stress: Not on file   Social Connections: Not on file   Intimate Partner Violence: Not on file   Housing Stability: Not on file           Medications  Allergies   Current Outpatient Medications   Medication Sig Dispense Refill     atorvastatin (LIPITOR) 40 MG tablet TAKE 1 TABLET BY MOUTH EVERY DAY 90 tablet 3     glipiZIDE (GLUCOTROL) 10 MG tablet TAKE 1 TABLET BY MOUTH TWICE A  tablet 3     hydrocortisone (CORTAID) 1 % cream Apply topically 3 times daily as needed for itching       levothyroxine (SYNTHROID/LEVOTHROID) 175 MCG tablet Take 1 tablet (175 mcg) by mouth daily 90 tablet 3     losartan (COZAAR) 50 MG tablet Take 1 tablet (50 mg) by mouth daily 90 tablet 3     metFORMIN (GLUCOPHAGE) 500 MG tablet TAKE 2 TABLETS BY MOUTH TWICE A DAY WITH FOOD 360 tablet 0     pioglitazone (ACTOS) 45 MG tablet TAKE 1 TABLET (45 MG) BY MOUTH IN THE MORNING. 90 tablet 0     tamsulosin (FLOMAX) 0.4 MG capsule TAKE 1 CAPSULE BY MOUTH EVERY DAY 90 capsule 3     aspirin (ASA) 81 MG EC tablet Take 1 tablet (81 mg) by mouth daily (Patient not taking: Reported on 1/19/2023) 90 tablet 3     Furosemide (LASIX PO) Take 40 mg by mouth daily       Gabapentin (NEURONTIN PO) Take 300 mg by mouth daily   (Patient not taking: Reported on 1/19/2023)       senna-docusate (SENOKOT-S;PERICOLACE) 8.6-50 MG per tablet Take 1 tablet by mouth 2 times daily (Patient not taking: Reported on 1/19/2023)       traZODone (DESYREL) 150 MG tablet TAKE 1 TABLET BY MOUTH EVERYDAY AT BEDTIME (Patient not taking: Reported on 1/19/2023) 90 tablet 1     zolpidem (AMBIEN) 5 MG tablet Take 1 tablet (5 mg) by mouth nightly as needed for sleep (Patient not taking: Reported on 1/19/2023) 45 tablet 0       Allergies   Allergen Reactions     Nka [No Known Allergies]           Lab Results    Chemistry/lipid CBC Cardiac Enzymes/BNP/TSH/INR   Recent Labs   Lab Test 04/11/22  1008   CHOL 170   HDL 51      TRIG 66     Recent Labs   Lab Test 04/11/22  1008 04/13/21  1008 03/02/20  1032    177* 114     Recent Labs   Lab Test 10/05/22  0810      POTASSIUM 5.1   CHLORIDE 101   CO2 28   *   BUN 25.0*   CR 0.97   GFRESTIMATED 78   MATT 9.3     Recent Labs   Lab Test 10/05/22  0810 05/18/22  0839 04/11/22  1008   CR 0.97 1.02 1.00     Recent Labs   Lab Test 10/05/22  0810 04/11/22  0845 04/13/21  1008   A1C 7.1* 10.2* 7.9*          Recent Labs   Lab Test 05/18/22  0839   WBC 5.1   HGB 14.8   HCT 47.0   MCV 95        Recent Labs   Lab Test 05/18/22  0839 04/11/22  1008 04/13/21  1008   HGB 14.8 14.7 15.1    No results for input(s): TROPONINI in the last 27001 hours.  Recent Labs   Lab Test 04/11/22  1008   *     Recent Labs   Lab Test 10/05/22  0810   TSH 6.23*     No results for input(s): INR in the last 77902 hours.     Lakhwinder Mason MD

## 2023-01-19 NOTE — PROGRESS NOTES
Valve Clinic TAVR - 1/19/23  (See consult note from Dr. Mason)    Referring provider: Dr. Live (PCP)    Labs completed at visit today: Yes; pending at time of visit. Will review when results are available.      Labs reviewed prior to clinic: Yes; lab work-10/5/22. Albumin 4.0, K 5.1, Na 139, Crt 0.97, GFR 78, WBC 5.1, Hgb 14.8, hematocrit 47, Plt 173.       Preliminary STS Score: 2.4%      KCQ12 (date completed 1/19/23), scanned into chart      PMH: DMII, HTN, aortic stenosis, morbid obesity, HLF, cardiomyopathy, prostate CA (year unknown), thyroid cancer, hypothyroidism.     Patient was seen in valve clinic on 5/18/22 and wanted to hold off at that time with work up because patient was feeling well.     Symptoms: Patient reports overall he feels pretty well. However he has noticed when he walks longer distances that he has TEAGUE and fatigue.     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 Patient presented to clinic independently.       TTE date 12/30/22  EF 40-45 %  AV mean gradient: 36 mmHg  AV Peak Artem: 388 cm/sec  AV area: 0.92 cm2  AV DIM IND VTI: 0.26  LV SVi: 35.9 ml/m2    Comments on valves: mitral valve has trace regurgitation. Tricuspid valve has mild regurgitation. Aortic valve has mild regurgitation, severe stenosis.       Plan: Pt would like to move forward with workup for TAVR. Orders placed for full work-up-- CTA TAVR, CV surgery consult and coronary angiogram. Patient will be contacted by  to set up all appts.     Pt will contact dentist for dental clearance. Pt given clearance form and instructed to have dentist fill out and fax back to clinic.     Reviewed pre-procedure work up and procedural related education information with patient. All questions answered, no further questions at this time. Patient has my direct contact information as well as the valve clinic line and was encouraged to call with questions or concerns.        Juan Pablo Guevara RN BSN- Valve Clinic Coordinator   [facility]  [facility]  Phone: [phone]  Fax: [phone]

## 2023-02-03 ENCOUNTER — TELEPHONE (OUTPATIENT)
Dept: CARDIOLOGY | Facility: CLINIC | Age: 83
End: 2023-02-03
Payer: MEDICARE

## 2023-02-03 DIAGNOSIS — I35.0 AORTIC VALVE STENOSIS, ETIOLOGY OF CARDIAC VALVE DISEASE UNSPECIFIED: Primary | ICD-10-CM

## 2023-02-03 NOTE — TELEPHONE ENCOUNTER
Called patient to check in and see if he received letter with angiogram instructions. Patient has not received yet. Patient scheduled for lab work on Monday 2/6 for pre angiogram prep. Writer indicated I would make sure pt has a printed letter when he comes in for lab work or will check to see if he received it over the weekend. Writer indicated I would check in with patient after reading instructions to answer any questions he has.     Pt wondering why he will be at the hospital all day for angiogram. Length of time/flow of procedure day reviewed in detail.     Will check in with pt Monday to go through full instructions. Pt verbalized understanding and agreed to plan.     Ashley Guevara RN on 2/3/2023 at 12:17 PM

## 2023-02-05 LAB
ABO/RH(D): NORMAL
ANTIBODY SCREEN: NEGATIVE
SPECIMEN EXPIRATION DATE: NORMAL

## 2023-02-06 ENCOUNTER — PREP FOR PROCEDURE (OUTPATIENT)
Dept: CARDIOLOGY | Facility: CLINIC | Age: 83
End: 2023-02-06

## 2023-02-06 ENCOUNTER — LAB (OUTPATIENT)
Dept: CARDIOLOGY | Facility: CLINIC | Age: 83
End: 2023-02-06
Payer: MEDICARE

## 2023-02-06 DIAGNOSIS — I35.0 AORTIC VALVE STENOSIS, ETIOLOGY OF CARDIAC VALVE DISEASE UNSPECIFIED: Primary | ICD-10-CM

## 2023-02-06 DIAGNOSIS — I35.0 AORTIC VALVE STENOSIS, ETIOLOGY OF CARDIAC VALVE DISEASE UNSPECIFIED: ICD-10-CM

## 2023-02-06 LAB
ANION GAP SERPL CALCULATED.3IONS-SCNC: 9 MMOL/L (ref 7–15)
BUN SERPL-MCNC: 27 MG/DL (ref 8–23)
CALCIUM SERPL-MCNC: 9.2 MG/DL (ref 8.8–10.2)
CHLORIDE SERPL-SCNC: 101 MMOL/L (ref 98–107)
CREAT SERPL-MCNC: 0.92 MG/DL (ref 0.67–1.17)
DEPRECATED HCO3 PLAS-SCNC: 28 MMOL/L (ref 22–29)
ERYTHROCYTE [DISTWIDTH] IN BLOOD BY AUTOMATED COUNT: 13.3 % (ref 10–15)
GFR SERPL CREATININE-BSD FRML MDRD: 84 ML/MIN/1.73M2
GLUCOSE SERPL-MCNC: 123 MG/DL (ref 70–99)
HCT VFR BLD AUTO: 44.3 % (ref 40–53)
HGB BLD-MCNC: 14.2 G/DL (ref 13.3–17.7)
MCH RBC QN AUTO: 31.2 PG (ref 26.5–33)
MCHC RBC AUTO-ENTMCNC: 32.1 G/DL (ref 31.5–36.5)
MCV RBC AUTO: 97 FL (ref 78–100)
PLATELET # BLD AUTO: 189 10E3/UL (ref 150–450)
POTASSIUM SERPL-SCNC: 4.7 MMOL/L (ref 3.4–5.3)
RBC # BLD AUTO: 4.55 10E6/UL (ref 4.4–5.9)
SODIUM SERPL-SCNC: 138 MMOL/L (ref 136–145)
WBC # BLD AUTO: 4.9 10E3/UL (ref 4–11)

## 2023-02-06 PROCEDURE — 36415 COLL VENOUS BLD VENIPUNCTURE: CPT

## 2023-02-06 PROCEDURE — 86900 BLOOD TYPING SEROLOGIC ABO: CPT

## 2023-02-06 PROCEDURE — 85027 COMPLETE CBC AUTOMATED: CPT

## 2023-02-06 PROCEDURE — 86850 RBC ANTIBODY SCREEN: CPT

## 2023-02-06 PROCEDURE — 86901 BLOOD TYPING SEROLOGIC RH(D): CPT

## 2023-02-06 PROCEDURE — 80048 BASIC METABOLIC PNL TOTAL CA: CPT

## 2023-02-06 RX ORDER — LIDOCAINE 40 MG/G
CREAM TOPICAL
Status: CANCELLED | OUTPATIENT
Start: 2023-02-06

## 2023-02-06 RX ORDER — ASPIRIN 325 MG
325 TABLET ORAL ONCE
Status: CANCELLED | OUTPATIENT
Start: 2023-02-09 | End: 2023-02-06

## 2023-02-06 RX ORDER — ASPIRIN 81 MG/1
243 TABLET, CHEWABLE ORAL ONCE
Status: CANCELLED | OUTPATIENT
Start: 2023-02-09

## 2023-02-06 RX ORDER — FENTANYL CITRATE 50 UG/ML
25 INJECTION, SOLUTION INTRAMUSCULAR; INTRAVENOUS
Status: CANCELLED | OUTPATIENT
Start: 2023-02-09

## 2023-02-06 RX ORDER — SODIUM CHLORIDE 9 MG/ML
INJECTION, SOLUTION INTRAVENOUS CONTINUOUS
Status: CANCELLED | OUTPATIENT
Start: 2023-02-09

## 2023-02-06 RX ORDER — NICOTINE POLACRILEX 4 MG
15-30 LOZENGE BUCCAL
Status: CANCELLED | OUTPATIENT
Start: 2023-02-09

## 2023-02-06 RX ORDER — DEXTROSE MONOHYDRATE 25 G/50ML
25-50 INJECTION, SOLUTION INTRAVENOUS
Status: CANCELLED | OUTPATIENT
Start: 2023-02-09

## 2023-02-06 NOTE — TELEPHONE ENCOUNTER
Pre-Procedure Angiogram Education     Jayesh Ortiz  [address] [place] [address]  [phone] (home) [phone] (work)    Procedure cardiologist:  Dr. Davies   PCP:  Geoffrey Live  H&P completed by:  1/19/23 Dr. Mason   Admit date 2/9/23  Arrival time:  0630 AM   Anticoagulation: No   CPAP: No  Previous PCI: No   Bypass Grafts: No  Renal Issues: No  Diabetic?: Yes- oral meds   Device?: No  Type:  N/A  Covid-19 Test -N/A patient asymptomatic; pt instructed if he develops cold or flu symptoms to complete a home test to ensure it is negative. Instructed to call valve clinic immediately if he is positive.     Angiogram Teaching    Reason for Visit:  Telephone call to discuss pre-procedure education in preparation for: coronary angiogram with possible percutaneous coronary intervention.     Angio education letter mailed to patients home before phone teach. Pt confirmed he received letter.     Procedure Prep:  Primary Cardiologist note dated: N/A patient has not established care with cardiology yet; referred by his PCP.   EKG results obtained, dated: to be obtained date of procedure   CBC, BMP and T&S completed today 2/6 at [facility]- labs reviewed, no concerns.     Patient Education  Patient states understanding of procedure and risks and agrees to proceed.    Pre-procedure instructions  Patient instructed to be NPO after midnight.  Patient instructed to shower the evening before or the morning of the procedure.  Leave all valuables at home (jewlery, rings, watches, large amounts of money).  Patient understands there are two visitors allowed during patients stay. Visitor will need to wear a mask their entire stay and remain in the restricted area per guidelines.   Patient instructed to arrange for transportation home following procedure from a responsible family member of friend. No driving for at least 24 hours post-procedure.  Patient instructed to have a responsible adult with them for 24 hours  post-procedure.  Post-procedure follow up process.  Conscious sedation discussed.    Pre-procedure medication instructions  Patient instructed on antiplatelet medication.  Continue medications as scheduled up until the date of procedure unless indicated below.   Patient instructed to take 325 mg of Aspirin am of procedure: Yes  Other medication: instructed to only take synthroid and lostartan a.m. of the procedure. Also instructed pt if he absolutely needs gabapentin in AM ok to take this as well.   Instructed patient to hold all other prescription medications, OTC meds, supplements and vitamins the day of procedure.     *PATIENTS RECORDS AVAILABLE IN [facility] UNLESS OTHERWISE INDICATED*      Patient Active Problem List   Diagnosis     Talus fracture     Hypothyroidism     Malignant neoplasm of prostate (H)     Malignant neoplasm of thyroid gland (H)     Hyperlipidemia     Type 2 diabetes mellitus (H)     Fracture of right talus     Morbid obesity (H)     Uncontrolled type 2 diabetes mellitus with hyperglycemia (H)     Aortic stenosis     Cardiomyopathy (H)       Current Outpatient Medications   Medication Sig Dispense Refill     aspirin (ASA) 81 MG EC tablet Take 1 tablet (81 mg) by mouth daily (Patient not taking: Reported on 1/19/2023) 90 tablet 3     atorvastatin (LIPITOR) 40 MG tablet TAKE 1 TABLET BY MOUTH EVERY DAY 90 tablet 3     Furosemide (LASIX PO) Take 40 mg by mouth daily       Gabapentin (NEURONTIN PO) Take 300 mg by mouth daily  (Patient not taking: Reported on 1/19/2023)       glipiZIDE (GLUCOTROL) 10 MG tablet TAKE 1 TABLET BY MOUTH TWICE A  tablet 3     hydrocortisone (CORTAID) 1 % cream Apply topically 3 times daily as needed for itching       levothyroxine (SYNTHROID/LEVOTHROID) 175 MCG tablet Take 1 tablet (175 mcg) by mouth daily 90 tablet 3     losartan (COZAAR) 50 MG tablet Take 1 tablet (50 mg) by mouth daily 90 tablet 3     metFORMIN (GLUCOPHAGE) 500 MG tablet TAKE 2 TABLETS BY MOUTH  TWICE A DAY WITH FOOD 360 tablet 0     pioglitazone (ACTOS) 45 MG tablet TAKE 1 TABLET (45 MG) BY MOUTH IN THE MORNING. 90 tablet 0     senna-docusate (SENOKOT-S;PERICOLACE) 8.6-50 MG per tablet Take 1 tablet by mouth 2 times daily (Patient not taking: Reported on 1/19/2023)       tamsulosin (FLOMAX) 0.4 MG capsule TAKE 1 CAPSULE BY MOUTH EVERY DAY 90 capsule 3     traZODone (DESYREL) 150 MG tablet TAKE 1 TABLET BY MOUTH EVERYDAY AT BEDTIME (Patient not taking: Reported on 1/19/2023) 90 tablet 1     zolpidem (AMBIEN) 5 MG tablet Take 1 tablet (5 mg) by mouth nightly as needed for sleep (Patient not taking: Reported on 1/19/2023) 45 tablet 0       Allergies   Allergen Reactions     Nka [No Known Allergies]        Orders placed for procedure. No valium ordered per new policy/direction from [facility] RN Supervisor.     Juan Pablo Guevara RN BSN  Structural Heart Coordinator   [facility]  [phone]

## 2023-02-07 ENCOUNTER — TRANSFERRED RECORDS (OUTPATIENT)
Dept: HEALTH INFORMATION MANAGEMENT | Facility: CLINIC | Age: 83
End: 2023-02-07
Payer: MEDICARE

## 2023-02-09 ENCOUNTER — NURSE TRIAGE (OUTPATIENT)
Dept: FAMILY MEDICINE | Facility: CLINIC | Age: 83
End: 2023-02-09

## 2023-02-09 ENCOUNTER — HOSPITAL ENCOUNTER (OUTPATIENT)
Facility: HOSPITAL | Age: 83
Discharge: HOME | End: 2023-02-09
Attending: INTERNAL MEDICINE | Admitting: INTERNAL MEDICINE
Payer: MEDICARE

## 2023-02-09 VITALS
TEMPERATURE: 97.8 F | RESPIRATION RATE: 19 BRPM | SYSTOLIC BLOOD PRESSURE: 107 MMHG | HEART RATE: 63 BPM | OXYGEN SATURATION: 94 % | DIASTOLIC BLOOD PRESSURE: 56 MMHG

## 2023-02-09 DIAGNOSIS — E78.5 HYPERLIPIDEMIA, UNSPECIFIED: ICD-10-CM

## 2023-02-09 DIAGNOSIS — I35.0 AORTIC VALVE STENOSIS, ETIOLOGY OF CARDIAC VALVE DISEASE UNSPECIFIED: ICD-10-CM

## 2023-02-09 DIAGNOSIS — I25.810 CORONARY ARTERY DISEASE INVOLVING CORONARY BYPASS GRAFT OF NATIVE HEART WITHOUT ANGINA PECTORIS: Primary | ICD-10-CM

## 2023-02-09 DIAGNOSIS — I51.89 OTHER ILL-DEFINED HEART DISEASES: ICD-10-CM

## 2023-02-09 LAB
ATRIAL RATE - MUSE: 74 BPM
DIASTOLIC BLOOD PRESSURE - MUSE: NORMAL MMHG
GLUCOSE BLDC GLUCOMTR-MCNC: 174 MG/DL (ref 70–99)
INTERPRETATION ECG - MUSE: NORMAL
P AXIS - MUSE: 59 DEGREES
PR INTERVAL - MUSE: 202 MS
QRS DURATION - MUSE: 108 MS
QT - MUSE: 400 MS
QTC - MUSE: 444 MS
R AXIS - MUSE: -8 DEGREES
SYSTOLIC BLOOD PRESSURE - MUSE: NORMAL MMHG
T AXIS - MUSE: 56 DEGREES
VENTRICULAR RATE- MUSE: 74 BPM

## 2023-02-09 PROCEDURE — 999N000099 HC STATISTIC MODERATE SEDATION < 10 MIN: Performed by: INTERNAL MEDICINE

## 2023-02-09 PROCEDURE — 82962 GLUCOSE BLOOD TEST: CPT

## 2023-02-09 PROCEDURE — 250N000011 HC RX IP 250 OP 636: Performed by: INTERNAL MEDICINE

## 2023-02-09 PROCEDURE — 93454 CORONARY ARTERY ANGIO S&I: CPT | Performed by: INTERNAL MEDICINE

## 2023-02-09 PROCEDURE — 93454 CORONARY ARTERY ANGIO S&I: CPT | Mod: 26 | Performed by: INTERNAL MEDICINE

## 2023-02-09 PROCEDURE — 999N000248 HC STATISTIC IV INSERT WITH US BY RN

## 2023-02-09 PROCEDURE — 258N000003 HC RX IP 258 OP 636: Performed by: INTERNAL MEDICINE

## 2023-02-09 PROCEDURE — 272N000001 HC OR GENERAL SUPPLY STERILE: Performed by: INTERNAL MEDICINE

## 2023-02-09 PROCEDURE — 255N000002 HC RX 255 OP 636: Performed by: INTERNAL MEDICINE

## 2023-02-09 PROCEDURE — C1887 CATHETER, GUIDING: HCPCS | Performed by: INTERNAL MEDICINE

## 2023-02-09 PROCEDURE — C1894 INTRO/SHEATH, NON-LASER: HCPCS | Performed by: INTERNAL MEDICINE

## 2023-02-09 PROCEDURE — 93005 ELECTROCARDIOGRAM TRACING: CPT

## 2023-02-09 PROCEDURE — 93010 ELECTROCARDIOGRAM REPORT: CPT | Mod: HOP | Performed by: INTERNAL MEDICINE

## 2023-02-09 PROCEDURE — 250N000009 HC RX 250: Performed by: INTERNAL MEDICINE

## 2023-02-09 PROCEDURE — 999N000054 HC STATISTIC EKG NON-CHARGEABLE

## 2023-02-09 RX ORDER — NALOXONE HYDROCHLORIDE 0.4 MG/ML
0.2 INJECTION, SOLUTION INTRAMUSCULAR; INTRAVENOUS; SUBCUTANEOUS
Status: DISCONTINUED | OUTPATIENT
Start: 2023-02-09 | End: 2023-02-09 | Stop reason: HOSPADM

## 2023-02-09 RX ORDER — LIDOCAINE 40 MG/G
CREAM TOPICAL
Status: DISCONTINUED | OUTPATIENT
Start: 2023-02-09 | End: 2023-02-09 | Stop reason: HOSPADM

## 2023-02-09 RX ORDER — NALOXONE HYDROCHLORIDE 0.4 MG/ML
0.4 INJECTION, SOLUTION INTRAMUSCULAR; INTRAVENOUS; SUBCUTANEOUS
Status: DISCONTINUED | OUTPATIENT
Start: 2023-02-09 | End: 2023-02-09 | Stop reason: HOSPADM

## 2023-02-09 RX ORDER — IODIXANOL 320 MG/ML
INJECTION, SOLUTION INTRAVASCULAR
Status: DISCONTINUED | OUTPATIENT
Start: 2023-02-09 | End: 2023-02-09 | Stop reason: HOSPADM

## 2023-02-09 RX ORDER — ATORVASTATIN CALCIUM 80 MG/1
80 TABLET, FILM COATED ORAL DAILY
Qty: 90 TABLET | Refills: 3 | Status: SHIPPED | OUTPATIENT
Start: 2023-02-09 | End: 2023-04-03

## 2023-02-09 RX ORDER — DEXTROSE MONOHYDRATE 25 G/50ML
25-50 INJECTION, SOLUTION INTRAVENOUS
Status: DISCONTINUED | OUTPATIENT
Start: 2023-02-09 | End: 2023-02-09 | Stop reason: HOSPADM

## 2023-02-09 RX ORDER — FENTANYL CITRATE 50 UG/ML
INJECTION, SOLUTION INTRAMUSCULAR; INTRAVENOUS
Status: DISCONTINUED | OUTPATIENT
Start: 2023-02-09 | End: 2023-02-09 | Stop reason: HOSPADM

## 2023-02-09 RX ORDER — NICOTINE POLACRILEX 4 MG
15-30 LOZENGE BUCCAL
Status: DISCONTINUED | OUTPATIENT
Start: 2023-02-09 | End: 2023-02-09 | Stop reason: HOSPADM

## 2023-02-09 RX ORDER — FENTANYL CITRATE 50 UG/ML
25 INJECTION, SOLUTION INTRAMUSCULAR; INTRAVENOUS
Status: DISCONTINUED | OUTPATIENT
Start: 2023-02-09 | End: 2023-02-09 | Stop reason: HOSPADM

## 2023-02-09 RX ORDER — OXYCODONE HYDROCHLORIDE 5 MG/1
5 TABLET ORAL EVERY 4 HOURS PRN
Status: DISCONTINUED | OUTPATIENT
Start: 2023-02-09 | End: 2023-02-09 | Stop reason: HOSPADM

## 2023-02-09 RX ORDER — ATROPINE SULFATE 0.1 MG/ML
0.5 INJECTION INTRAVENOUS
Status: DISCONTINUED | OUTPATIENT
Start: 2023-02-09 | End: 2023-02-09 | Stop reason: HOSPADM

## 2023-02-09 RX ORDER — SODIUM CHLORIDE 9 MG/ML
INJECTION, SOLUTION INTRAVENOUS CONTINUOUS
Status: DISCONTINUED | OUTPATIENT
Start: 2023-02-09 | End: 2023-02-09 | Stop reason: HOSPADM

## 2023-02-09 RX ORDER — ACETAMINOPHEN 325 MG/1
650 TABLET ORAL EVERY 4 HOURS PRN
Status: DISCONTINUED | OUTPATIENT
Start: 2023-02-09 | End: 2023-02-09 | Stop reason: HOSPADM

## 2023-02-09 RX ORDER — FLUMAZENIL 0.1 MG/ML
0.2 INJECTION, SOLUTION INTRAVENOUS
Status: DISCONTINUED | OUTPATIENT
Start: 2023-02-09 | End: 2023-02-09 | Stop reason: HOSPADM

## 2023-02-09 RX ORDER — ASPIRIN 81 MG/1
243 TABLET, CHEWABLE ORAL ONCE
Status: COMPLETED | OUTPATIENT
Start: 2023-02-09 | End: 2023-02-09

## 2023-02-09 RX ORDER — OXYCODONE HYDROCHLORIDE 5 MG/1
10 TABLET ORAL EVERY 4 HOURS PRN
Status: DISCONTINUED | OUTPATIENT
Start: 2023-02-09 | End: 2023-02-09 | Stop reason: HOSPADM

## 2023-02-09 RX ORDER — ATORVASTATIN CALCIUM 80 MG/1
80 TABLET, FILM COATED ORAL DAILY
Qty: 30 TABLET | Refills: 12 | Status: SHIPPED | OUTPATIENT
Start: 2023-02-09 | End: 2023-08-07

## 2023-02-09 RX ORDER — ASPIRIN 325 MG
325 TABLET ORAL ONCE
Status: COMPLETED | OUTPATIENT
Start: 2023-02-09 | End: 2023-02-09

## 2023-02-09 RX ADMIN — SODIUM CHLORIDE: 9 INJECTION, SOLUTION INTRAVENOUS at 07:52

## 2023-02-09 ASSESSMENT — ACTIVITIES OF DAILY LIVING (ADL)
ADLS_ACUITY_SCORE: 35

## 2023-02-09 NOTE — INTERVAL H&P NOTE
"I have reviewed the surgical (or preoperative) H&P that is linked to this encounter, and examined the patient. There are no significant changes    Clinical Conditions Present on Arrival:  Clinically Significant Risk Factors Present on Admission                    # Obesity: Estimated body mass index is 32.84 kg/m  as calculated from the following:    Height as of 1/19/23: 1.727 m (5' 8\").    Weight as of 1/19/23: 98 kg (216 lb).       "

## 2023-02-09 NOTE — TELEPHONE ENCOUNTER
"Nurse Triage SBAR    Is this a 2nd Level Triage? NO    Situation:   Patient calling in to report concern over his blood pressure reading that was taken this morning prior to his angiogram. Reading was 107/51, no pulse reported    Background:   Patient had coronary angiogram this morning.    He is taking 50 mg of losartan daily    Assessment:   Patient is not overly worried but he though he should reach out given that his normal BP ranges a bit higher. He tells me his is feeling great. NO symptoms at this time  Protocol Recommended Disposition:   See in Office Today    Recommendation: Please advise. I did offer to get him in today or tomorrow for a nurse BP check however he said he is \"all-doctored out\" for now.    Routed to provider    Does the patient meet one of the following criteria for ADS visit consideration? 16+ years old, with an [facility] PCP     TIP  Providers, please consider if this condition is appropriate for management at one of our Acute and Diagnostic Services sites.     If patient is a good candidate, please use dotphrase <dot>triageresponse and select Refer to ADS to document.      Reason for Disposition    Systolic BP  while taking blood pressure medications and NOT dizzy, lightheaded or weak    Additional Information    Negative: Systolic BP < 90 and feeling dizzy, lightheaded, or weak    Negative: Started suddenly after an allergic medicine, an allergic food, or bee sting    Negative: Shock suspected (e.g., cold/pale/clammy skin, too weak to stand, low BP, rapid pulse)    Negative: Difficult to awaken or acting confused (e.g., disoriented, slurred speech)    Negative: Fainted    Negative: Chest pain    Negative: Bleeding (e.g., vomiting blood, rectal bleeding or tarry stools, severe vaginal bleeding)    Negative: Extra heart beats or heart is beating fast (i.e., 'palpitations')    Negative: Sounds like a life-threatening emergency to the triager    Negative: Systolic BP < 80 and NOT dizzy, " "lightheaded or weak (feels normal)    Negative: Abdominal pain    Negative: Major surgery in the past month    Negative: Fever > 100.4 F (38.0 C)    Negative: Drinking very little and has signs of dehydration (e.g., no urine > 12 hours, very dry mouth, very lightheaded)    Negative: Fall in systolic BP > 20 mm Hg from normal and feeling dizzy, lightheaded, or weak    Negative: Patient sounds very sick or weak to the triager    Negative: Systolic BP < 90 and NOT dizzy, lightheaded or weak    Answer Assessment - Initial Assessment Questions  1. BLOOD PRESSURE: \"What is the blood pressure?\" \"Did you take at least two measurements 5 minutes apart?\"      107/51  2. ONSET: \"When did you take your blood pressure?\"      Prior to procedure  3. HOW: \"How did you obtain the blood pressure?\" (e.g., visiting nurse, automatic home BP monitor)      Nurse, manual blood pressure  4. HISTORY: \"Do you have a history of low blood pressure?\" \"What is your blood pressure normally?\"      Hx or elevated BP, normally near 140/79  5. MEDICATIONS: \"Are you taking any medications for blood pressure?\" If Yes, ask: \"Have they been changed recently?\"      Taking anti hypertensives, no changes  6. PULSE RATE: \"Do you know what your pulse rate is?\"       Unknown  7. OTHER SYMPTOMS: \"Have you been sick recently?\" \"Have you had a recent injury?\"      Feel good  8. PREGNANCY: \"Is there any chance you are pregnant?\" \"When was your last menstrual period?\"      No    Protocols used: BLOOD PRESSURE - LOW-A-OH      "

## 2023-02-09 NOTE — DISCHARGE INSTRUCTIONS
Interventional Cardiology  Coronary Angiogram/Angioplasty/Stent/Atherectomy Discharge  Instructions - Radial (wrist) Approach   The instructions below are to help you understand how to take care of yourself. There is also information about when to call the doctor or emergency services.  **Do not stop your aspirin or platelet inhibitor unless directed by your Cardiologist.  These medications help to prevent platelets in your blood from sticking together and forming a clot.   Examples of these medications are: Ticagrelor (Brilinta), Clopidogrel (Plavix), Prasugrel (Effient)    For 24 hours after procedure:  Do not subject hand/arm to any forceful movements for 24 hours, such as supporting weight when rising from a chair or bed.  Do not drive a car for 24 hours.  The dressing on the puncture site may be removed after 24 hours and left open to air. If minor oozing, you may apply a Band-Aid and remove after 12 hours.   You may shower on the day after your procedure. Do not take a tub bath or wash dishes (no soaking wrist) with the puncture site in water for 3 days after the procedure.    For 48 hours following the procedure:  Do not operate a lawnmower, motorcycle, chainsaw or all-terrain vehicle.  Do not lift anything heavier than 5-10 pounds with affected arm for 5 days.  Avoid excessive bending (flexion/extension) wrist movement.  Do not engage in vigorous exercise (i.e. tennis, golf) using the affected arm for 5 days after discharge.  You may return to work in 72 hours if no complications and no heavy lifting.    If bleeding should occur following discharge:  Sit down and apply firm pressure with your thumb against the puncture site and fingers against back of wrist for 10 minutes.  If the bleeding stops, continue to rest, keeping your wrist still for 2 hours. Notify your doctor as soon as possible.  If bleeding does not stop after 10 minutes or if there is a large amount of bleeding or spurting, call 911  immediately. Do not drive yourself to the hospital.           Contact the Heart Clinic at [phone] if you develop:  Fever over 100.4, that lasts more than one day  Redness, heat, or pus at the puncture site  Change in color or temperature of the hand or arm    Expect mild tingling of hand and tenderness at the puncture site for up to 3 days. You may take Tylenol or a pain medicine recommended by your doctor.             Your Procedural Physician was: Dr. Nabeel Davies  the phone number is: [phone]  [facility]:  [phone]  If you are calling after hours, please listen to the entire voicemail, a live  will answer at the end of the message

## 2023-02-09 NOTE — PRE-PROCEDURE
GENERAL PRE-PROCEDURE:   Procedure:  Coronary angiogram, possible percutaneous coronary intervention  Date/Time:  2/9/2023 6:36 AM    Written consent obtained?: Yes    Risks and benefits: Risks, benefits and alternatives were discussed    Consent given by:  Patient  Patient states understanding of procedure being performed: Yes    Patient's understanding of procedure matches consent: Yes    Procedure consent matches procedure scheduled: Yes    Expected level of sedation:  Moderate  Appropriately NPO:  Yes  ASA Class:  4 (Severe aortic stenosis, HTN, dyslipidemia, DM type II, thyroid disorder, class I obesity; BMI 32.84 kg/m )  Mallampati  :  Grade 4- soft palate obscured by base of tongue  Lungs:  Lungs clear with good breath sounds bilaterally  Heart:  Systolic murmur  History & Physical reviewed:  History and physical reviewed and no updates needed  Statement of review:  I have reviewed the lab findings, diagnostic data, medications, and the plan for sedation

## 2023-02-09 NOTE — TELEPHONE ENCOUNTER
Thank you for the note. He has been following up with cardiology and may have been fasting for the angiogram. He should push fluids and continue to monitor his blood pressure. Monitor also for dizziness or weakness. If there are concerns he should be sure to follow-up and should connect with cardiology as they have been evaluating him recently.

## 2023-02-09 NOTE — TELEPHONE ENCOUNTER
Relayed message to patient.     Closing this encounter.    Richie Oneal RN     [facility]

## 2023-02-09 NOTE — PLAN OF CARE
Patient was kept comfortable during post-procedure stay.VSS. Denies pain. Right radial access site remains dry & free from signs of bleeding. Appointments already made & included in AVS.  Post-op instructions given to patient. Able to ask questions. Verbalized no concerns. Filled prescription for atorvastatin given to patient. Belongings returned. Discharged in stable condition.

## 2023-02-24 ENCOUNTER — TELEPHONE (OUTPATIENT)
Dept: CARDIOLOGY | Facility: CLINIC | Age: 83
End: 2023-02-24
Payer: MEDICARE

## 2023-02-24 NOTE — TELEPHONE ENCOUNTER
Valve Clinic RN Phone Call:  Call made on 2/24/2023 at 4:25 PM by Ashley Guevara RN    Reason for call: schedule TAVR procedure     Discussion/plan: Discussed upcoming CT and CV surgery visit with patient. Patient indicated his dental extractions were completed and the dental clinic indicated they faxed over an updated form to the clinic. Writer does not have updated form but will contact dental clinic Monday AM to obtain. Offered potential TAVR date of 3/14- pt has prior committment in March he would like to keep if possible. Alternate date of 4/4 offered, pt accepted. Pt aware that we will need to wait until his other appts are completed, specifically his CT scan to place orders for procedure. Once orders are placed [facility] our  will reach out to set up his pre op exam. Pt verbalized understanding and agreed to plan.     Ashley Guevara RN on 2/24/2023 at 4:42 PM

## 2023-02-27 NOTE — TELEPHONE ENCOUNTER
Request for dental clearance faxed over to dental clinic. Pt will be scheduled for procedure when remaining required appts are completed.     Ashley Guevara RN on 2/27/2023 at 8:37 AM

## 2023-03-02 ENCOUNTER — HOSPITAL ENCOUNTER (OUTPATIENT)
Dept: CT IMAGING | Facility: CLINIC | Age: 83
Discharge: HOME | End: 2023-03-02
Attending: INTERNAL MEDICINE | Admitting: INTERNAL MEDICINE
Payer: MEDICARE

## 2023-03-02 DIAGNOSIS — I35.0 NONRHEUMATIC AORTIC VALVE STENOSIS: ICD-10-CM

## 2023-03-02 PROCEDURE — G1010 CDSM STANSON: HCPCS

## 2023-03-02 PROCEDURE — 250N000011 HC RX IP 250 OP 636: Performed by: INTERNAL MEDICINE

## 2023-03-02 PROCEDURE — 99207 CT ANGIOGRAM TAVR: CPT | Mod: 26 | Performed by: INTERNAL MEDICINE

## 2023-03-02 RX ORDER — IOPAMIDOL 755 MG/ML
100 INJECTION, SOLUTION INTRAVASCULAR ONCE
Status: COMPLETED | OUTPATIENT
Start: 2023-03-02 | End: 2023-03-02

## 2023-03-02 RX ADMIN — IOPAMIDOL 100 ML: 755 INJECTION, SOLUTION INTRAVENOUS at 08:29

## 2023-03-08 ENCOUNTER — OFFICE VISIT (OUTPATIENT)
Dept: CARDIOLOGY | Facility: CLINIC | Age: 83
End: 2023-03-08
Payer: MEDICARE

## 2023-03-08 VITALS
HEART RATE: 82 BPM | DIASTOLIC BLOOD PRESSURE: 65 MMHG | RESPIRATION RATE: 18 BRPM | SYSTOLIC BLOOD PRESSURE: 173 MMHG | OXYGEN SATURATION: 97 % | BODY MASS INDEX: 48.2 KG/M2 | WEIGHT: 315 LBS

## 2023-03-08 DIAGNOSIS — E66.01 MORBID OBESITY (H): ICD-10-CM

## 2023-03-08 DIAGNOSIS — I35.0 NONRHEUMATIC AORTIC VALVE STENOSIS: Primary | ICD-10-CM

## 2023-03-08 DIAGNOSIS — I25.810 CORONARY ARTERY DISEASE INVOLVING CORONARY BYPASS GRAFT OF NATIVE HEART WITHOUT ANGINA PECTORIS: ICD-10-CM

## 2023-03-08 PROCEDURE — 99203 OFFICE O/P NEW LOW 30 MIN: CPT | Performed by: THORACIC SURGERY (CARDIOTHORACIC VASCULAR SURGERY)

## 2023-03-08 NOTE — LETTER
3/8/2023    Geoffrey Live MD  [address]  [facility] [address]    RE: Jayesh Ortiz       Dear Colleague,     I had the pleasure of seeing Jayesh Ortiz in the [facility].  ASKED BY REFERRING PHYSICIAN: Dr. Mason to evaluate this patient for open surgical versus transcatheter aortic valve replacement    CHIEF COMPLAINT: Shortness of breath.    HPI: Jayesh is a 82 year old male who presents with severe symptomatic aortic stenosis and a  of the LAD.  He denies any angina but does report shortness of breath.  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    PAST MEDICAL HISTORY:  Past Medical History:   Diagnosis Date     Diabetes (H)      Diabetes mellitus, type 2 (H)      Hypertension      Thyroid disease        PAST SURGICAL HISTORY:  Past Surgical History:   Procedure Laterality Date     CLOSED REDUCTION, PERCUTANEOUS PINNING LOWER EXTREMITY, COMBINED Right 3/9/2017    Procedure: COMBINED CLOSED REDUCTION, PERCUTANEOUS PINNING LOWER EXTREMITY;  Surgeon: Ankit Coy DPM;  Location: [facility] OR     CV CORONARY ANGIOGRAM N/A 2/9/2023    Procedure: Coronary Angiogram;  Surgeon: Nabeel Davies MD;  Location: [facility] LAB CV     THYROIDECTOMY         FAMILY HISTORY:   Family History   Problem Relation Age of Onset     Heart Failure Mother      Heart Failure Father        SOCIAL HISTORY:  Social History     Socioeconomic History     Marital status: Single     Spouse name: Not on file     Number of children: Not on file     Years of education: Not on file     Highest education level: Not on file   Occupational History     Not on file   Tobacco Use     Smoking status: Former     Smokeless tobacco: Never   Substance and Sexual Activity     Alcohol use: Not Currently     Drug use: Never     Sexual activity: Not on file   Other Topics Concern     Not on file   Social History Narrative     Not on file     Social Determinants of Health      Financial Resource Strain: Not on file   Food Insecurity: Not on file   Transportation Needs: Not on file   Physical Activity: Not on file   Stress: Not on file   Social Connections: Not on file   Intimate Partner Violence: Not on file   Housing Stability: Not on file        ALLERGIES:   Allergies   Allergen Reactions     Nka [No Known Allergies]        CURRENT MEDICATIONS:   Prescription Medications as of 3/8/2023       Rx Number Disp Refills Start End Last Dispensed Date Next Fill Date Owning Pharmacy    atorvastatin (LIPITOR) 80 MG tablet  30 tablet 12 2/9/2023    [facility] [address]    Sig: Take 1 tablet (80 mg) by mouth daily    Class: E-Prescribe    Route: Oral    Gabapentin (NEURONTIN PO)    4/14/2017        Sig: Take 300 mg by mouth daily    Class: Historical    Route: Oral    glipiZIDE (GLUCOTROL) 10 MG tablet  180 tablet 3 4/8/2022    [facility]/pharmacy #[id number] [address]    Sig: TAKE 1 TABLET BY MOUTH TWICE A DAY    Class: E-Prescribe    hydrocortisone (CORTAID) 1 % cream            Sig: Apply topically 3 times daily as needed for itching    Class: Historical    Route: Topical    levothyroxine (SYNTHROID/LEVOTHROID) 175 MCG tablet  90 tablet 3 4/8/2022    [id number] [address]    Sig: Take 1 tablet (175 mcg) by mouth daily    Class: E-Prescribe    Route: Oral    losartan (COZAAR) 50 MG tablet  90 tablet 3 7/14/2022    [facility]/pharmacy #[address] [place] [address] E    Sig: Take 1 tablet (50 mg) by mouth daily    Class: E-Prescribe    Route: Oral    metFORMIN (GLUCOPHAGE) 500 MG tablet  360 tablet 0 12/29/2022    [id number] [address]    Sig: TAKE 2 TABLETS BY MOUTH TWICE A DAY WITH FOOD    Class: E-Prescribe    Notes to Pharmacy: DX Code Needed  .    pioglitazone (ACTOS) 45 MG tablet  90 tablet 0 12/29/2022    [id number]  [address]    Sig: TAKE 1 TABLET (45 MG) BY MOUTH IN THE MORNING.    Class: E-Prescribe    Notes to Pharmacy: DX Code Needed  .    Route: Oral    tamsulosin (FLOMAX) 0.4 MG capsule  90 capsule 3 7/4/2022    [facility]/pharmacy #[address] [place] [address] E    Sig: TAKE 1 CAPSULE BY MOUTH EVERY DAY    Class: E-Prescribe    aspirin (ASA) 81 MG EC tablet  90 tablet 3 3/21/2022    [facility]/pharmacy #[address] [place] [address] E    Sig: Take 1 tablet (81 mg) by mouth daily    Class: No Print Out    Route: Oral    atorvastatin (LIPITOR) 80 MG tablet  90 tablet 3 2/9/2023    [facility] [address]    Sig: Take 1 tablet (80 mg) by mouth daily    Class: E-Prescribe    Route: Oral    Furosemide (LASIX PO)    3/24/2017 3/27/2017       Sig: Take 40 mg by mouth daily    Class: Historical    Route: Oral    senna-docusate (SENOKOT-S;PERICOLACE) 8.6-50 MG per tablet            Sig: Take 1 tablet by mouth 2 times daily    Class: Historical    Route: Oral    traZODone (DESYREL) 150 MG tablet  90 tablet 1 5/3/2022    [facility]/pharmacy #[address] [place] [address] E    Sig: TAKE 1 TABLET BY MOUTH EVERYDAY AT BEDTIME    Class: E-Prescribe    zolpidem (AMBIEN) 5 MG tablet  45 tablet 0 3/21/2022    [facility]/pharmacy #[address] [place] [address] E    Sig: Take 1 tablet (5 mg) by mouth nightly as needed for sleep    Class: E-Prescribe    Route: Oral          REVIEW OF SYSTEMS:   Gen: denies frequent headaches, double/blurry vision, insomnia, fatigue, unexplained weight loss/gain. No previous anesthesia reactions.  CV: Some dyspnea on exertion,denies chest pain, palpitations, peripheral edema.   Pulm: denies shortness of breath, asthma or wheezing  GI/: denies liver or kidney problems, blood in stool or BRBPR, difficulty urinating  Endo: thyroid problems and Diabetes  Heme/Onc: denies bleeding or clotting disorders, no  family problems with bleeding/clotting diorders  MS: no weakness, tremors or gait instability  Neuro: denies depression, memory problems, no dysesthesias, no previous strokes, no migraines, no dysphagia  Skin: No petechiae, purpura or rash.    PHYSICAL EXAMINATION:   BP (!) 173/65 (BP Location: Left arm, Patient Position: Sitting, Cuff Size: Adult Large)   Pulse 82   Resp 18   Wt 143.8 kg (317 lb)   SpO2 97%   BMI 48.20 kg/m    General: alert and oriented x 3, pleasant, no acute distress  CV: S1 S2, grade II/VI systolic ejection murmur heard best over the right upper sternal border. No rubs or gallops, regular rate and rhythm, no peripheral edema, no carotid or abdominal bruits, pulses in upper and lower extremities palpable  Pulm: bilateral breath sounds, clear to auscultation, easy work of breathing  GI: (+) bowel sounds, soft non-tender and non-distended  : voiding without problems  MS: moves all extremities x 4,  5+/5+ equal strength bilaterally  Neuro: pupils equal round and reactive to light, cranial nerves, II-XII grossly intact, no gross neurologic deficits noted    LABS: Pending    PROCEDURES/IMAGING:  Chest X-Ray: Heart upper limits of normal, some fibrotic lung disease.  Angio:  of the LAD  Echo: Severe aortic stenosis  CT: Mosaic appearance of the lungs  MRI: Not done  Carotid US: Not done     ASSESSMENT/PLAN:   Jayesh is an 82 year old gentleman with severe symptomatic aortic stenosis and single vessel coronary artery disease.  He is quite active and lives alone.  I believe that he would benefit from aortic valve replacement and given his age and size I think a transcatheter aortic valve replacement would be best for him.  Additionally his LAD can be treated after TAVR should he develop angina.  He understands that the risks for a TAVR procedure include: bleeding, infection, stroke, heart block requiring a pacemaker, cardiac perforation, aortic root rupture, aortic dissection, and an operative  mortality of 1 to 2 percent.  He accepts these risks and is agreeable with the plan of proceeding with TAVR on 4-4-2023.    1. Complete outpatient work-up  2. TAVR on 4-4-2023.     Approximately 30 minutes were spent with the patient in clinic at this visit.        Thank you for allowing me to participate in the care of your patient.    Sincerely,     Viktoria Solano MD     [facility]

## 2023-03-08 NOTE — PROGRESS NOTES
ASKED BY REFERRING PHYSICIAN: Dr. Mason to evaluate this patient for open surgical versus transcatheter aortic valve replacement    CHIEF COMPLAINT: Shortness of breath.    HPI: Jayesh is a 82 year old male who presents with severe symptomatic aortic stenosis and a  of the LAD.  He denies any angina but does report shortness of breath.  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    PAST MEDICAL HISTORY:  Past Medical History:   Diagnosis Date     Diabetes (H)      Diabetes mellitus, type 2 (H)      Hypertension      Thyroid disease        PAST SURGICAL HISTORY:  Past Surgical History:   Procedure Laterality Date     CLOSED REDUCTION, PERCUTANEOUS PINNING LOWER EXTREMITY, COMBINED Right 3/9/2017    Procedure: COMBINED CLOSED REDUCTION, PERCUTANEOUS PINNING LOWER EXTREMITY;  Surgeon: Ankit Coy DPM;  Location: [facility] OR     CV CORONARY ANGIOGRAM N/A 2/9/2023    Procedure: Coronary Angiogram;  Surgeon: Nabeel Davies MD;  Location: [facility] CATH LAB CV     THYROIDECTOMY         FAMILY HISTORY:   Family History   Problem Relation Age of Onset     Heart Failure Mother      Heart Failure Father        SOCIAL HISTORY:  Social History     Socioeconomic History     Marital status: Single     Spouse name: Not on file     Number of children: Not on file     Years of education: Not on file     Highest education level: Not on file   Occupational History     Not on file   Tobacco Use     Smoking status: Former     Smokeless tobacco: Never   Substance and Sexual Activity     Alcohol use: Not Currently     Drug use: Never     Sexual activity: Not on file   Other Topics Concern     Not on file   Social History Narrative     Not on file     Social Determinants of Health     Financial Resource Strain: Not on file   Food Insecurity: Not on file   Transportation Needs: Not on file   Physical Activity: Not on file   Stress: Not on file   Social Connections: Not on file   Intimate  Partner Violence: Not on file   Housing Stability: Not on file        ALLERGIES:   Allergies   Allergen Reactions     Nka [No Known Allergies]        CURRENT MEDICATIONS:   Prescription Medications as of 3/8/2023       Rx Number Disp Refills Start End Last Dispensed Date Next Fill Date Owning Pharmacy    atorvastatin (LIPITOR) 80 MG tablet  30 tablet 12 2/9/2023    [facility] [address]    Sig: Take 1 tablet (80 mg) by mouth daily    Class: E-Prescribe    Route: Oral    Gabapentin (NEURONTIN PO)    4/14/2017        Sig: Take 300 mg by mouth daily    Class: Historical    Route: Oral    glipiZIDE (GLUCOTROL) 10 MG tablet  180 tablet 3 4/8/2022    [facility]/pharmacy #[address] [place] [address] E    Sig: TAKE 1 TABLET BY MOUTH TWICE A DAY    Class: E-Prescribe    hydrocortisone (CORTAID) 1 % cream            Sig: Apply topically 3 times daily as needed for itching    Class: Historical    Route: Topical    levothyroxine (SYNTHROID/LEVOTHROID) 175 MCG tablet  90 tablet 3 4/8/2022    [facility] [place] [address] E    Sig: Take 1 tablet (175 mcg) by mouth daily    Class: E-Prescribe    Route: Oral    losartan (COZAAR) 50 MG tablet  90 tablet 3 7/14/2022    [facility]/pharmacy #[address] [place] [address] E    Sig: Take 1 tablet (50 mg) by mouth daily    Class: E-Prescribe    Route: Oral    metFORMIN (GLUCOPHAGE) 500 MG tablet  360 tablet 0 12/29/2022    [facility]/pharmacy #[address] [place] [address] E    Sig: TAKE 2 TABLETS BY MOUTH TWICE A DAY WITH FOOD    Class: E-Prescribe    Notes to Pharmacy: DX Code Needed  .    pioglitazone (ACTOS) 45 MG tablet  90 tablet 0 12/29/2022    [facility] [place] [address] E    Sig: TAKE 1 TABLET (45 MG) BY MOUTH IN THE MORNING.    Class: E-Prescribe    Notes to Pharmacy: DX Code Needed  .    Route: Oral    tamsulosin (FLOMAX) 0.4 MG capsule  90  capsule 3 7/4/2022    [facility]/pharmacy #[address] [place] [address] E    Sig: TAKE 1 CAPSULE BY MOUTH EVERY DAY    Class: E-Prescribe    aspirin (ASA) 81 MG EC tablet  90 tablet 3 3/21/2022    [facility]/pharmacy #[address] [place] [address] E    Sig: Take 1 tablet (81 mg) by mouth daily    Class: No Print Out    Route: Oral    atorvastatin (LIPITOR) 80 MG tablet  90 tablet 3 2/9/2023    [address]    Sig: Take 1 tablet (80 mg) by mouth daily    Class: E-Prescribe    Route: Oral    Furosemide (LASIX PO)    3/24/2017 3/27/2017       Sig: Take 40 mg by mouth daily    Class: Historical    Route: Oral    senna-docusate (SENOKOT-S;PERICOLACE) 8.6-50 MG per tablet            Sig: Take 1 tablet by mouth 2 times daily    Class: Historical    Route: Oral    traZODone (DESYREL) 150 MG tablet  90 tablet 1 5/3/2022    [facility]/pharmacy #[address] [place] [address] E    Sig: TAKE 1 TABLET BY MOUTH EVERYDAY AT BEDTIME    Class: E-Prescribe    zolpidem (AMBIEN) 5 MG tablet  45 tablet 0 3/21/2022    [facility]/pharmacy #[address] [place] [address] E    Sig: Take 1 tablet (5 mg) by mouth nightly as needed for sleep    Class: E-Prescribe    Route: Oral          REVIEW OF SYSTEMS:   Gen: denies frequent headaches, double/blurry vision, insomnia, fatigue, unexplained weight loss/gain. No previous anesthesia reactions.  CV: Some dyspnea on exertion,denies chest pain, palpitations, peripheral edema.   Pulm: denies shortness of breath, asthma or wheezing  GI/: denies liver or kidney problems, blood in stool or BRBPR, difficulty urinating  Endo: thyroid problems and Diabetes  Heme/Onc: denies bleeding or clotting disorders, no family problems with bleeding/clotting diorders  MS: no weakness, tremors or gait instability  Neuro: denies depression, memory problems, no dysesthesias, no previous strokes, no migraines, no dysphagia  Skin:  No petechiae, purpura or rash.    PHYSICAL EXAMINATION:   BP (!) 173/65 (BP Location: Left arm, Patient Position: Sitting, Cuff Size: Adult Large)   Pulse 82   Resp 18   Wt 143.8 kg (317 lb)   SpO2 97%   BMI 48.20 kg/m    General: alert and oriented x 3, pleasant, no acute distress  CV: S1 S2, grade II/VI systolic ejection murmur heard best over the right upper sternal border. No rubs or gallops, regular rate and rhythm, no peripheral edema, no carotid or abdominal bruits, pulses in upper and lower extremities palpable  Pulm: bilateral breath sounds, clear to auscultation, easy work of breathing  GI: (+) bowel sounds, soft non-tender and non-distended  : voiding without problems  MS: moves all extremities x 4,  5+/5+ equal strength bilaterally  Neuro: pupils equal round and reactive to light, cranial nerves, II-XII grossly intact, no gross neurologic deficits noted    LABS: Pending    PROCEDURES/IMAGING:  Chest X-Ray: Heart upper limits of normal, some fibrotic lung disease.  Angio:  of the LAD  Echo: Severe aortic stenosis  CT: Mosaic appearance of the lungs  MRI: Not done  Carotid US: Not done     ASSESSMENT/PLAN:   Jayesh is an 82 year old gentleman with severe symptomatic aortic stenosis and single vessel coronary artery disease.  He is quite active and lives alone.  I believe that he would benefit from aortic valve replacement and given his age and size I think a transcatheter aortic valve replacement would be best for him.  Additionally his LAD can be treated after TAVR should he develop angina.  He understands that the risks for a TAVR procedure include: bleeding, infection, stroke, heart block requiring a pacemaker, cardiac perforation, aortic root rupture, aortic dissection, and an operative mortality of 1 to 2 percent.  He accepts these risks and is agreeable with the plan of proceeding with TAVR on 4-4-2023.    1. Complete outpatient work-up  2. TAVR on 4-4-2023.     Approximately 30 minutes were  spent with the patient in clinic at this visit.    CC  Patient Care Team:  Geoffrey Live MD as PCP - General (Family Medicine)  Geoffrey Live MD as Assigned PCP  Lakhwinder Mason MD as Assigned Heart and Vascular Provider

## 2023-03-10 DIAGNOSIS — I35.0 NONRHEUMATIC AORTIC VALVE STENOSIS: Primary | ICD-10-CM

## 2023-03-31 DIAGNOSIS — I35.0 SEVERE AORTIC STENOSIS: Primary | ICD-10-CM

## 2023-03-31 DIAGNOSIS — R06.09 OTHER FORMS OF DYSPNEA: ICD-10-CM

## 2023-04-02 LAB
ABO/RH(D): NORMAL
ANTIBODY SCREEN: NEGATIVE
SPECIMEN EXPIRATION DATE: NORMAL

## 2023-04-03 ENCOUNTER — LAB (OUTPATIENT)
Dept: CARDIOLOGY | Facility: CLINIC | Age: 83
End: 2023-04-03
Payer: MEDICARE

## 2023-04-03 ENCOUNTER — OFFICE VISIT (OUTPATIENT)
Dept: CARDIOLOGY | Facility: CLINIC | Age: 83
End: 2023-04-03
Payer: MEDICARE

## 2023-04-03 ENCOUNTER — PREP FOR PROCEDURE (OUTPATIENT)
Dept: CARDIOLOGY | Facility: CLINIC | Age: 83
End: 2023-04-03

## 2023-04-03 ENCOUNTER — ALLIED HEALTH/NURSE VISIT (OUTPATIENT)
Dept: CARDIOLOGY | Facility: CLINIC | Age: 83
End: 2023-04-03
Payer: MEDICARE

## 2023-04-03 ENCOUNTER — ANESTHESIA EVENT (OUTPATIENT)
Dept: CARDIOLOGY | Facility: HOSPITAL | Age: 83
DRG: 267 | End: 2023-04-03
Payer: MEDICARE

## 2023-04-03 VITALS
WEIGHT: 315 LBS | SYSTOLIC BLOOD PRESSURE: 140 MMHG | RESPIRATION RATE: 16 BRPM | BODY MASS INDEX: 47.74 KG/M2 | DIASTOLIC BLOOD PRESSURE: 72 MMHG | HEIGHT: 68 IN | HEART RATE: 76 BPM

## 2023-04-03 DIAGNOSIS — R06.09 OTHER FORMS OF DYSPNEA: ICD-10-CM

## 2023-04-03 DIAGNOSIS — I35.0 SEVERE AORTIC STENOSIS: Primary | ICD-10-CM

## 2023-04-03 DIAGNOSIS — I35.0 SEVERE AORTIC STENOSIS: ICD-10-CM

## 2023-04-03 DIAGNOSIS — I35.0 NONRHEUMATIC AORTIC VALVE STENOSIS: Primary | ICD-10-CM

## 2023-04-03 DIAGNOSIS — E66.01 MORBID OBESITY (H): ICD-10-CM

## 2023-04-03 DIAGNOSIS — E11.65 UNCONTROLLED TYPE 2 DIABETES MELLITUS WITH HYPERGLYCEMIA (H): ICD-10-CM

## 2023-04-03 DIAGNOSIS — I25.810 CORONARY ARTERY DISEASE INVOLVING CORONARY BYPASS GRAFT OF NATIVE HEART WITHOUT ANGINA PECTORIS: ICD-10-CM

## 2023-04-03 LAB
ALBUMIN SERPL BCG-MCNC: 4.1 G/DL (ref 3.5–5.2)
ALP SERPL-CCNC: 53 U/L (ref 40–129)
ALT SERPL W P-5'-P-CCNC: 21 U/L (ref 10–50)
ANION GAP SERPL CALCULATED.3IONS-SCNC: 10 MMOL/L (ref 7–15)
AST SERPL W P-5'-P-CCNC: 24 U/L (ref 10–50)
ATRIAL RATE - MUSE: 76 BPM
BASOPHILS # BLD AUTO: 0 10E3/UL (ref 0–0.2)
BASOPHILS NFR BLD AUTO: 1 %
BILIRUB SERPL-MCNC: 0.7 MG/DL
BUN SERPL-MCNC: 27.5 MG/DL (ref 8–23)
CALCIUM SERPL-MCNC: 9.2 MG/DL (ref 8.8–10.2)
CHLORIDE SERPL-SCNC: 100 MMOL/L (ref 98–107)
CREAT SERPL-MCNC: 0.88 MG/DL (ref 0.67–1.17)
DEPRECATED HCO3 PLAS-SCNC: 29 MMOL/L (ref 22–29)
DIASTOLIC BLOOD PRESSURE - MUSE: NORMAL MMHG
EOSINOPHIL # BLD AUTO: 0.1 10E3/UL (ref 0–0.7)
EOSINOPHIL NFR BLD AUTO: 1 %
ERYTHROCYTE [DISTWIDTH] IN BLOOD BY AUTOMATED COUNT: 13.7 % (ref 10–15)
GFR SERPL CREATININE-BSD FRML MDRD: 86 ML/MIN/1.73M2
GLUCOSE SERPL-MCNC: 146 MG/DL (ref 70–99)
HCT VFR BLD AUTO: 45.2 % (ref 40–53)
HGB BLD-MCNC: 14.4 G/DL (ref 13.3–17.7)
IMM GRANULOCYTES # BLD: 0 10E3/UL
IMM GRANULOCYTES NFR BLD: 1 %
INR PPP: 1.08 (ref 0.85–1.15)
INTERPRETATION ECG - MUSE: NORMAL
LYMPHOCYTES # BLD AUTO: 0.7 10E3/UL (ref 0.8–5.3)
LYMPHOCYTES NFR BLD AUTO: 16 %
MAGNESIUM SERPL-MCNC: 1.6 MG/DL (ref 1.7–2.3)
MCH RBC QN AUTO: 31.2 PG (ref 26.5–33)
MCHC RBC AUTO-ENTMCNC: 31.9 G/DL (ref 31.5–36.5)
MCV RBC AUTO: 98 FL (ref 78–100)
MONOCYTES # BLD AUTO: 0.4 10E3/UL (ref 0–1.3)
MONOCYTES NFR BLD AUTO: 8 %
NEUTROPHILS # BLD AUTO: 3.3 10E3/UL (ref 1.6–8.3)
NEUTROPHILS NFR BLD AUTO: 73 %
NRBC # BLD AUTO: 0 10E3/UL
NRBC BLD AUTO-RTO: 0 /100
NT-PROBNP SERPL-MCNC: 765 PG/ML (ref 0–1800)
P AXIS - MUSE: 42 DEGREES
PLATELET # BLD AUTO: 164 10E3/UL (ref 150–450)
POTASSIUM SERPL-SCNC: 4.2 MMOL/L (ref 3.4–5.3)
PR INTERVAL - MUSE: 186 MS
PROT SERPL-MCNC: 6.8 G/DL (ref 6.4–8.3)
QRS DURATION - MUSE: 100 MS
QT - MUSE: 382 MS
QTC - MUSE: 429 MS
R AXIS - MUSE: -17 DEGREES
RBC # BLD AUTO: 4.62 10E6/UL (ref 4.4–5.9)
SODIUM SERPL-SCNC: 139 MMOL/L (ref 136–145)
SYSTOLIC BLOOD PRESSURE - MUSE: NORMAL MMHG
T AXIS - MUSE: 64 DEGREES
VENTRICULAR RATE- MUSE: 76 BPM
WBC # BLD AUTO: 4.4 10E3/UL (ref 4–11)

## 2023-04-03 PROCEDURE — 83880 ASSAY OF NATRIURETIC PEPTIDE: CPT

## 2023-04-03 PROCEDURE — 85610 PROTHROMBIN TIME: CPT

## 2023-04-03 PROCEDURE — 86901 BLOOD TYPING SEROLOGIC RH(D): CPT

## 2023-04-03 PROCEDURE — 99207 PR NO CHARGE LOS: CPT

## 2023-04-03 PROCEDURE — 99215 OFFICE O/P EST HI 40 MIN: CPT | Performed by: INTERNAL MEDICINE

## 2023-04-03 PROCEDURE — 86900 BLOOD TYPING SEROLOGIC ABO: CPT

## 2023-04-03 PROCEDURE — 93000 ELECTROCARDIOGRAM COMPLETE: CPT | Performed by: INTERNAL MEDICINE

## 2023-04-03 PROCEDURE — 80053 COMPREHEN METABOLIC PANEL: CPT

## 2023-04-03 PROCEDURE — 85025 COMPLETE CBC W/AUTO DIFF WBC: CPT

## 2023-04-03 PROCEDURE — 86850 RBC ANTIBODY SCREEN: CPT

## 2023-04-03 PROCEDURE — 36415 COLL VENOUS BLD VENIPUNCTURE: CPT

## 2023-04-03 PROCEDURE — 83735 ASSAY OF MAGNESIUM: CPT

## 2023-04-03 RX ORDER — DEXTROSE MONOHYDRATE 25 G/50ML
25-50 INJECTION, SOLUTION INTRAVENOUS
Status: CANCELLED | OUTPATIENT
Start: 2023-04-04

## 2023-04-03 RX ORDER — FENTANYL CITRATE 50 UG/ML
25 INJECTION, SOLUTION INTRAMUSCULAR; INTRAVENOUS
Status: CANCELLED | OUTPATIENT
Start: 2023-04-04

## 2023-04-03 RX ORDER — NICOTINE POLACRILEX 4 MG
15-30 LOZENGE BUCCAL
Status: CANCELLED | OUTPATIENT
Start: 2023-04-04

## 2023-04-03 RX ORDER — CEFAZOLIN SODIUM/WATER 3 G/30 ML
3 SYRINGE (ML) INTRAVENOUS
Status: CANCELLED | OUTPATIENT
Start: 2023-04-04

## 2023-04-03 RX ORDER — DIAZEPAM 5 MG
5 TABLET ORAL
Status: CANCELLED | OUTPATIENT
Start: 2023-04-04

## 2023-04-03 RX ORDER — SODIUM CHLORIDE 9 MG/ML
INJECTION, SOLUTION INTRAVENOUS CONTINUOUS
Status: CANCELLED | OUTPATIENT
Start: 2023-04-04

## 2023-04-03 RX ORDER — LIDOCAINE 40 MG/G
CREAM TOPICAL
Status: CANCELLED | OUTPATIENT
Start: 2023-04-03

## 2023-04-03 RX ORDER — ASPIRIN 325 MG
325 TABLET ORAL ONCE
Status: CANCELLED | OUTPATIENT
Start: 2023-04-04 | End: 2023-04-03

## 2023-04-03 NOTE — LETTER
4/3/2023    Geoffrey Live MD  [address]  [facility] [address]    RE: Jayesh Ortiz       Dear Colleague,     I had the pleasure of seeing [facility].  HEART CARE ENCOUNTER NOTE       [facility]  [phone]    Assessment/Recommendations   Problem List Items Addressed This Visit          Digestive    Morbid obesity (H)       Endocrine    Uncontrolled type 2 diabetes mellitus with hyperglycemia (H)       Circulatory    Aortic stenosis - Primary    Coronary artery disease involving coronary bypass graft of native heart without angina pectoris       1.  Aortic Stenosis: This has become severe and is now causing progressive SOB.  He has seen a decline in his functional status.  He has been evaluated by a multidisciplinary team and has been deemed a good candidate for transcatheter aortic valve replacement.  He has now undergone all the required workup for TAVR and wishes to proceed.   We discussed the procedure in detail,  including post-procedure care, restrictions and follow up.   He understands that there is a 4-5% risk of bleeding, infection, stroke, heart block requiring permanent pacemaker, cardiac perforation, aortic root rupture, dissection and death.  Patient also understands that if something unexpected happens, the procedure may need to be stopped or changed to help him.     All questions were answered   Consents were signed and witnessed by me.  Patient would want sternotomy and cardiopulmonary bypass in the rare event that aortic dissection or aortic rupture occurs  He has never had trouble with anesthesia in the past.    Labs today reveal Hgb 14.4, normal WBC, electrolytes reveal Mag low at 1.6 and creat 0.88    The plan will be for MAC with TTE.  We will use the Washburn HELENA valve.  Jayesh Ortiz will report tomorrow morning for the procedure and all instructions regarding times and medications were given by TAVR coordinator RN.      2. Ischemic cardiomyopathy, chronic systolic heart failure, NYHA class II -currently compensated.  Patient denies PND or orthopnea.  No LE edema    3. Hypertension -blood pressure today is not at goal, but did improve after repeat reading.  Patient should continue taking losartan 50 mg daily    4.  CAD -with LAD .  He is currently asymptomatic with no angina and no intervention was done.  Could consider PCI to the  after TAVR depending on symptoms.  Patient should continue taking statin.  He will start taking aspirin 81 mg daily after valve procedure    5.  Type 2 diabetes mellitus -hemoglobin A1c was 7.1 in October 2022.  Management per PCP.  He is currently taking Actos, metformin and glipizide       History of Present Illness/Subjective    Jayesh Ortiz is a 82 year old male who comes in today for history and physical prior to TAVR (transcatheter aortic valve replacement).       Jayesh was first referred to valve clinic in May 2022 and saw Dr. Mason.  At that time he had developed a slight cardiomyopathy with a EF 40 to 45%, but patient was endorsing no symptoms and he wanted to defer valve replacement at that time.  Repeat echocardiogram in December showed mild progression of valve disease, and patient decided to move forward with treatment, given risk of syncope and other associated M&M.  He met with Dr. Mason again and also one of the cardiothoracic surgeons, who both felt he would be a good candidate for transcatheter aortic valve replacement.    Jayesh Ortiz denies chest discomfort, palpitations, shortness of breath, paroxysmal nocturnal dyspnea, orthopnea, lightheadedness, dizziness, pre-syncope, or syncope.  He says that there is really nothing that he cannot do now that he could 1 year ago.  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  Jayesh Ortiz denies any weight loss, changes in appetite, nausea or vomiting.     Medical, surgical, family, social history, and medications were  "reviewed and updated as necessary.    ECG (personally reviewed): 4/3/2023 shows NSR    ECHOCARDIOGRAM done on 12/30/22:  Interpretation Summary     1. Mildly decreased left ventricular systolic function The visual ejection  fraction is 40-45%.  2. No regional wall motion abnormalities noted.  3. The right ventricle is normal in structure, function and size.  4. Moderately severe to severe aortic stenosis; mean gradient 36 mmHg, peak  velocity 3.9 m/sec, calculated valve area 1.0 cm2, DI 0.26.    CATH done on 2/9/23:  Findings:  LM:no obstruction  LAD:50% proximal, 100% occluded mid-vessel c/w . Fills distally via R-L collaterals  Lcx:mildly irregular  RCA:dominant, no obstruction     Access:  R Radial artery         Physical Examination Review of Systems   Vitals: BP (!) 140/72   Pulse 76   Resp 16   Ht 1.727 m (5' 8\")   Wt 143.8 kg (317 lb)   BMI 48.20 kg/m    BMI= Body mass index is 48.2 kg/m .  Wt Readings from Last 3 Encounters:   04/03/23 143.8 kg (317 lb)   03/08/23 143.8 kg (317 lb)   01/19/23 98 kg (216 lb)       General Appearance:   Alert, cooperative and in no acute distress   ENT/Mouth: membranes moist, no oral lesions or bleeding gums.      EYES:  no scleral icterus, normal conjunctivae   Neck: no carotid bruits.  Thyroid not visualized   Chest/Lungs:   lungs are clear to auscultation, no rales or wheezing   Cardiovascular:   Regular. Normal first and second heart sounds with 4/6 systolic murmur.  No rubs or gallops; the carotid, radial and posterior tibial pulses are intact, minimal edema bilaterally    Abdomen:  Soft and nontender. Bowel sounds are present in all quadrants   Extremities: no cyanosis or clubbing   Skin: no xanthelasma, warm.    Neurologic: normal gait, normal  bilateral, no tremors   Psychiatric: Normal mood and affect       Please refer above for cardiac ROS details.      Medical History  Surgical History Family History Social History   Past Medical History:   Diagnosis " Date    Diabetes (H)     Diabetes mellitus, type 2 (H)     Hypertension     Thyroid disease      Past Surgical History:   Procedure Laterality Date    CLOSED REDUCTION, PERCUTANEOUS PINNING LOWER EXTREMITY, COMBINED Right 3/9/2017    Procedure: COMBINED CLOSED REDUCTION, PERCUTANEOUS PINNING LOWER EXTREMITY;  Surgeon: Ankit Coy DPM;  Location: [facility] OR    CV CORONARY ANGIOGRAM N/A 2/9/2023    Procedure: Coronary Angiogram;  Surgeon: Nabeel Davies MD;  [facility]: [facility] LAB CV    THYROIDECTOMY       Family History   Problem Relation Age of Onset    Heart Failure Mother     Heart Failure Father     Social History     Socioeconomic History    Marital status: Single     Spouse name: Not on file    Number of children: Not on file    Years of education: Not on file    Highest education level: Not on file   Occupational History    Not on file   Tobacco Use    Smoking status: Former    Smokeless tobacco: Never   Vaping Use    Vaping status: Not on file   Substance and Sexual Activity    Alcohol use: Not Currently    Drug use: Never    Sexual activity: Not on file   Other Topics Concern    Not on file   Social History Narrative    Not on file     Social Determinants of Health     Financial Resource Strain: Not on file   Food Insecurity: Not on file   Transportation Needs: Not on file   Physical Activity: Not on file   Stress: Not on file   Social Connections: Not on file   Intimate Partner Violence: Not on file   Housing Stability: Not on file          Medications  Allergies   Current Outpatient Medications   Medication Sig Dispense Refill    atorvastatin (LIPITOR) 80 MG tablet Take 1 tablet (80 mg) by mouth daily 30 tablet 12    Gabapentin (NEURONTIN PO) Take 300 mg by mouth daily      glipiZIDE (GLUCOTROL) 10 MG tablet TAKE 1 TABLET BY MOUTH TWICE A DAY (Patient taking differently: Take 10 mg by mouth 2 times daily (before meals)) 180 tablet 1    hydrocortisone (CORTAID) 1 % cream Apply topically 3 times  daily as needed for itching      levothyroxine (SYNTHROID/LEVOTHROID) 175 MCG tablet Take 1 tablet (175 mcg) by mouth daily 90 tablet 3    losartan (COZAAR) 50 MG tablet Take 1 tablet (50 mg) by mouth daily 90 tablet 3    metFORMIN (GLUCOPHAGE) 500 MG tablet TAKE 2 TABLETS BY MOUTH TWICE A DAY WITH FOOD (Patient taking differently: Take 1,000 mg by mouth 2 times daily (with meals)) 360 tablet 0    pioglitazone (ACTOS) 45 MG tablet TAKE 1 TABLET (45 MG) BY MOUTH IN THE MORNING. 90 tablet 0    senna-docusate (SENOKOT-S;PERICOLACE) 8.6-50 MG per tablet Take 1 tablet by mouth 2 times daily      tamsulosin (FLOMAX) 0.4 MG capsule TAKE 1 CAPSULE BY MOUTH EVERY DAY (Patient taking differently: 0.4 mg daily) 90 capsule 3    zolpidem (AMBIEN) 5 MG tablet Take 1 tablet (5 mg) by mouth nightly as needed for sleep 45 tablet 0    aspirin (ASA) 81 MG EC tablet Take 1 tablet (81 mg) by mouth daily (Patient not taking: Reported on 4/3/2023) 90 tablet 3    Allergies   Allergen Reactions    Nka [No Known Allergies]          Lab Results    Chemistry/lipid CBC Cardiac Enzymes/BNP/TSH/INR   Recent Labs   Lab Test 04/11/22  1008   CHOL 170   HDL 51      TRIG 66     Recent Labs   Lab Test 04/11/22  1008 04/13/21  1008 03/02/20  1032    177* 114     Recent Labs   Lab Test 02/09/23  0714 02/06/23  0815   NA  --  138   POTASSIUM  --  4.7   CHLORIDE  --  101   CO2  --  28   * 123*   BUN  --  27.0*   CR  --  0.92   GFRESTIMATED  --  84   MATT  --  9.2     Recent Labs   Lab Test 02/06/23  0815 01/19/23  0812 10/05/22  0810   CR 0.92 0.97 0.97     Recent Labs   Lab Test 10/05/22  0810 04/11/22  0845 04/13/21  1008   A1C 7.1* 10.2* 7.9*    Recent Labs   Lab Test 02/06/23  0815   WBC 4.9   HGB 14.2   HCT 44.3   MCV 97        Recent Labs   Lab Test 02/06/23  0815 01/19/23  0812 05/18/22  0839   HGB 14.2 14.6 14.8    No results for input(s): TROPONINI in the last 77095 hours.  Recent Labs   Lab Test 04/11/22  1008   BNP  386*     Recent Labs   Lab Test 10/05/22  0810   TSH 6.23*     No results for input(s): INR in the last 23557 hours.     45 minutes spent on the date of encounter doing education, consent signing, chart prep/review, review of test results, interpretation with above tests, patient visit and documentation.      This note has been dictated using voice recognition software. Any grammatical or context distortions are unintentional and inherent to the software.           Thank you for allowing me to participate in the care of your patient.    Sincerely,     Anh Burgos PA-C     [facility]

## 2023-04-03 NOTE — PROGRESS NOTES
Patient in to see RN for Pre-TAVR visit on 4/3    All pre-procedure labs drawn: 4/3   EKG obtained: 4/3     STS score: 2.51%    Labs reviewed: peding    Patient instructed on medications:   Morning of procedure only take levothyroxine, gabapentin, losartan. Hold other medications    Loading dose of ASA: yes to be given in CSC    Patient has advanced directive: no    Education was given to patient regarding what to expect pre-procedure.     Patient was informed procedure will be done at [facility]: Main Entrance at [address] [place] [address]; [place] [address] and their arrival time is at 830am    TAVR consent signed at the time of the appt: yes, to be scanned in the pts chart    All questions were answered to family and patient by RN.    Patient was present at the time of appointment.      Neyda Morales RN BSN  Structural Heart Coordinator  [facility]   [phone]

## 2023-04-03 NOTE — PROGRESS NOTES
HEART CARE ENCOUNTER NOTE       [facility]  [phone]    Assessment/Recommendations   Problem List Items Addressed This Visit        Digestive    Morbid obesity (H)       Endocrine    Uncontrolled type 2 diabetes mellitus with hyperglycemia (H)       Circulatory    Aortic stenosis - Primary    Coronary artery disease involving coronary bypass graft of native heart without angina pectoris       1.  Aortic Stenosis: This has become severe and is now causing progressive SOB.  He has seen a decline in his functional status.  He has been evaluated by a multidisciplinary team and has been deemed a good candidate for transcatheter aortic valve replacement.  He has now undergone all the required workup for TAVR and wishes to proceed.   We discussed the procedure in detail,  including post-procedure care, restrictions and follow up.   He understands that there is a 4-5% risk of bleeding, infection, stroke, heart block requiring permanent pacemaker, cardiac perforation, aortic root rupture, dissection and death.  Patient also understands that if something unexpected happens, the procedure may need to be stopped or changed to help him.     All questions were answered   Consents were signed and witnessed by me.  Patient would want sternotomy and cardiopulmonary bypass in the rare event that aortic dissection or aortic rupture occurs  He has never had trouble with anesthesia in the past.    Labs today reveal Hgb 14.4, normal WBC, electrolytes reveal Mag low at 1.6 and creat 0.88    The plan will be for MAC with TTE.  We will use the [place] HELENA valve.  Jayesh Ortiz will report tomorrow morning for the procedure and all instructions regarding times and medications were given by TAVR coordinator RN.     2. Ischemic cardiomyopathy, chronic systolic heart failure, NYHA class II -currently compensated.  Patient denies PND or orthopnea.  No LE edema    3. Hypertension -blood pressure today is not at goal,  but did improve after repeat reading.  Patient should continue taking losartan 50 mg daily    4.  CAD -with LAD .  He is currently asymptomatic with no angina and no intervention was done.  Could consider PCI to the  after TAVR depending on symptoms.  Patient should continue taking statin.  He will start taking aspirin 81 mg daily after valve procedure    5.  Type 2 diabetes mellitus -hemoglobin A1c was 7.1 in October 2022.  Management per PCP.  He is currently taking Actos, metformin and glipizide       History of Present Illness/Subjective    Jayesh Ortiz is a 82 year old male who comes in today for history and physical prior to TAVR (transcatheter aortic valve replacement).       Jayesh was first referred to valve clinic in May 2022 and saw Dr. Mason.  At that time he had developed a slight cardiomyopathy with a EF 40 to 45%, but patient was endorsing no symptoms and he wanted to defer valve replacement at that time.  Repeat echocardiogram in December showed mild progression of valve disease, and patient decided to move forward with treatment, given risk of syncope and other associated M&M.  He met with Dr. Mason again and also one of the cardiothoracic surgeons, who both felt he would be a good candidate for transcatheter aortic valve replacement.    Jayesh Ortiz denies chest discomfort, palpitations, shortness of breath, paroxysmal nocturnal dyspnea, orthopnea, lightheadedness, dizziness, pre-syncope, or syncope.  He says that there is really nothing that he cannot do now that he could 1 year ago.  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  Jayesh Ortiz denies any weight loss, changes in appetite, nausea or vomiting.     Medical, surgical, family, social history, and medications were reviewed and updated as necessary.    ECG (personally reviewed): 4/3/2023 shows NSR    ECHOCARDIOGRAM done on 12/30/22:  Interpretation Summary     1. Mildly decreased left ventricular systolic function The  "visual ejection  fraction is 40-45%.  2. No regional wall motion abnormalities noted.  3. The right ventricle is normal in structure, function and size.  4. Moderately severe to severe aortic stenosis; mean gradient 36 mmHg, peak  velocity 3.9 m/sec, calculated valve area 1.0 cm2, DI 0.26.    CATH done on 2/9/23:  Findings:  LM:no obstruction  LAD:50% proximal, 100% occluded mid-vessel c/w . Fills distally via R-L collaterals  Lcx:mildly irregular  RCA:dominant, no obstruction     Access:  R Radial artery         Physical Examination Review of Systems   Vitals: BP (!) 140/72   Pulse 76   Resp 16   Ht 1.727 m (5' 8\")   Wt 143.8 kg (317 lb)   BMI 48.20 kg/m    BMI= Body mass index is 48.2 kg/m .  Wt Readings from Last 3 Encounters:   04/03/23 143.8 kg (317 lb)   03/08/23 143.8 kg (317 lb)   01/19/23 98 kg (216 lb)       General Appearance:   Alert, cooperative and in no acute distress   ENT/Mouth: membranes moist, no oral lesions or bleeding gums.      EYES:  no scleral icterus, normal conjunctivae   Neck: no carotid bruits.  Thyroid not visualized   Chest/Lungs:   lungs are clear to auscultation, no rales or wheezing   Cardiovascular:   Regular. Normal first and second heart sounds with 4/6 systolic murmur.  No rubs or gallops; the carotid, radial and posterior tibial pulses are intact, minimal edema bilaterally    Abdomen:  Soft and nontender. Bowel sounds are present in all quadrants   Extremities: no cyanosis or clubbing   Skin: no xanthelasma, warm.    Neurologic: normal gait, normal  bilateral, no tremors   Psychiatric: Normal mood and affect       Please refer above for cardiac ROS details.      Medical History  Surgical History Family History Social History   Past Medical History:   Diagnosis Date     Diabetes (H)      Diabetes mellitus, type 2 (H)      Hypertension      Thyroid disease      Past Surgical History:   Procedure Laterality Date     CLOSED REDUCTION, PERCUTANEOUS PINNING LOWER " EXTREMITY, COMBINED Right 3/9/2017    Procedure: COMBINED CLOSED REDUCTION, PERCUTANEOUS PINNING LOWER EXTREMITY;  Surgeon: Ankit Coy DPM;  Location: [facility] OR     CV CORONARY ANGIOGRAM N/A 2/9/2023    Procedure: Coronary Angiogram;  Surgeon: Nabeel Davies MD;  [facility]: [facility] CATH LAB CV     THYROIDECTOMY       Family History   Problem Relation Age of Onset     Heart Failure Mother      Heart Failure Father     Social History     Socioeconomic History     Marital status: Single     Spouse name: Not on file     Number of children: Not on file     Years of education: Not on file     Highest education level: Not on file   Occupational History     Not on file   Tobacco Use     Smoking status: Former     Smokeless tobacco: Never   Vaping Use     Vaping status: Not on file   Substance and Sexual Activity     Alcohol use: Not Currently     Drug use: Never     Sexual activity: Not on file   Other Topics Concern     Not on file   Social History Narrative     Not on file     Social Determinants of Health     Financial Resource Strain: Not on file   Food Insecurity: Not on file   Transportation Needs: Not on file   Physical Activity: Not on file   Stress: Not on file   Social Connections: Not on file   Intimate Partner Violence: Not on file   Housing Stability: Not on file          Medications  Allergies   Current Outpatient Medications   Medication Sig Dispense Refill     atorvastatin (LIPITOR) 80 MG tablet Take 1 tablet (80 mg) by mouth daily 30 tablet 12     Gabapentin (NEURONTIN PO) Take 300 mg by mouth daily       glipiZIDE (GLUCOTROL) 10 MG tablet TAKE 1 TABLET BY MOUTH TWICE A DAY (Patient taking differently: Take 10 mg by mouth 2 times daily (before meals)) 180 tablet 1     hydrocortisone (CORTAID) 1 % cream Apply topically 3 times daily as needed for itching       levothyroxine (SYNTHROID/LEVOTHROID) 175 MCG tablet Take 1 tablet (175 mcg) by mouth daily 90 tablet 3     losartan (COZAAR) 50 MG tablet  Take 1 tablet (50 mg) by mouth daily 90 tablet 3     metFORMIN (GLUCOPHAGE) 500 MG tablet TAKE 2 TABLETS BY MOUTH TWICE A DAY WITH FOOD (Patient taking differently: Take 1,000 mg by mouth 2 times daily (with meals)) 360 tablet 0     pioglitazone (ACTOS) 45 MG tablet TAKE 1 TABLET (45 MG) BY MOUTH IN THE MORNING. 90 tablet 0     senna-docusate (SENOKOT-S;PERICOLACE) 8.6-50 MG per tablet Take 1 tablet by mouth 2 times daily       tamsulosin (FLOMAX) 0.4 MG capsule TAKE 1 CAPSULE BY MOUTH EVERY DAY (Patient taking differently: 0.4 mg daily) 90 capsule 3     zolpidem (AMBIEN) 5 MG tablet Take 1 tablet (5 mg) by mouth nightly as needed for sleep 45 tablet 0     aspirin (ASA) 81 MG EC tablet Take 1 tablet (81 mg) by mouth daily (Patient not taking: Reported on 4/3/2023) 90 tablet 3    Allergies   Allergen Reactions     Nka [No Known Allergies]          Lab Results    Chemistry/lipid CBC Cardiac Enzymes/BNP/TSH/INR   Recent Labs   Lab Test 04/11/22  1008   CHOL 170   HDL 51      TRIG 66     Recent Labs   Lab Test 04/11/22  1008 04/13/21  1008 03/02/20  1032    177* 114     Recent Labs   Lab Test 02/09/23  0714 02/06/23  0815   NA  --  138   POTASSIUM  --  4.7   CHLORIDE  --  101   CO2  --  28   * 123*   BUN  --  27.0*   CR  --  0.92   GFRESTIMATED  --  84   MATT  --  9.2     Recent Labs   Lab Test 02/06/23  0815 01/19/23  0812 10/05/22  0810   CR 0.92 0.97 0.97     Recent Labs   Lab Test 10/05/22  0810 04/11/22  0845 04/13/21  1008   A1C 7.1* 10.2* 7.9*    Recent Labs   Lab Test 02/06/23  0815   WBC 4.9   HGB 14.2   HCT 44.3   MCV 97        Recent Labs   Lab Test 02/06/23  0815 01/19/23  0812 05/18/22  0839   HGB 14.2 14.6 14.8    No results for input(s): TROPONINI in the last 26330 hours.  Recent Labs   Lab Test 04/11/22  1008   *     Recent Labs   Lab Test 10/05/22  0810   TSH 6.23*     No results for input(s): INR in the last 06871 hours.       45 minutes spent on the date of encounter  doing education, consent signing, chart prep/review, review of test results, interpretation with above tests, patient visit and documentation.      This note has been dictated using voice recognition software. Any grammatical or context distortions are unintentional and inherent to the software.

## 2023-04-03 NOTE — PATIENT INSTRUCTIONS
Jayesh Ortiz,    It was a pleasure to see you today in the clinic regarding your upcoming TAVR.     My recommendations after this visit include:     - report to [facility] tomorrow morning at the instructed time      If you have questions or concerns, please call using the numbers below:    Valve Clinic Phone   [phone]    After Hours/Scheduling  [phone]    Otherwise you can dial the nurse directly at:    Neyda Morales RN  [phone]    Juan Pablo Guevara RN  [phone]

## 2023-04-03 NOTE — H&P (VIEW-ONLY)
HEART CARE ENCOUNTER NOTE       [facility]  [phone]    Assessment/Recommendations   Problem List Items Addressed This Visit        Digestive    Morbid obesity (H)       Endocrine    Uncontrolled type 2 diabetes mellitus with hyperglycemia (H)       Circulatory    Aortic stenosis - Primary    Coronary artery disease involving coronary bypass graft of native heart without angina pectoris       1.  Aortic Stenosis: This has become severe and is now causing progressive SOB.  He has seen a decline in his functional status.  He has been evaluated by a multidisciplinary team and has been deemed a good candidate for transcatheter aortic valve replacement.  He has now undergone all the required workup for TAVR and wishes to proceed.   We discussed the procedure in detail,  including post-procedure care, restrictions and follow up.   He understands that there is a 4-5% risk of bleeding, infection, stroke, heart block requiring permanent pacemaker, cardiac perforation, aortic root rupture, dissection and death.  Patient also understands that if something unexpected happens, the procedure may need to be stopped or changed to help him.     All questions were answered   Consents were signed and witnessed by me.  Patient would want sternotomy and cardiopulmonary bypass in the rare event that aortic dissection or aortic rupture occurs  He has never had trouble with anesthesia in the past.    Labs today reveal Hgb 14.4, normal WBC, electrolytes reveal Mag low at 1.6 and creat 0.88    The plan will be for MAC with TTE.  We will use the Washburn HELENA valve.  Jayesh Ortiz will report tomorrow morning for the procedure and all instructions regarding times and medications were given by TAVR coordinator RN.     2. Ischemic cardiomyopathy, chronic systolic heart failure, NYHA class II -currently compensated.  Patient denies PND or orthopnea.  No LE edema    3. Hypertension -blood pressure today is not at goal,  but did improve after repeat reading.  Patient should continue taking losartan 50 mg daily    4.  CAD -with LAD .  He is currently asymptomatic with no angina and no intervention was done.  Could consider PCI to the  after TAVR depending on symptoms.  Patient should continue taking statin.  He will start taking aspirin 81 mg daily after valve procedure    5.  Type 2 diabetes mellitus -hemoglobin A1c was 7.1 in October 2022.  Management per PCP.  He is currently taking Actos, metformin and glipizide       History of Present Illness/Subjective    Jayesh Ortiz is a 82 year old male who comes in today for history and physical prior to TAVR (transcatheter aortic valve replacement).       Jayesh was first referred to valve clinic in May 2022 and saw Dr. Mason.  At that time he had developed a slight cardiomyopathy with a EF 40 to 45%, but patient was endorsing no symptoms and he wanted to defer valve replacement at that time.  Repeat echocardiogram in December showed mild progression of valve disease, and patient decided to move forward with treatment, given risk of syncope and other associated M&M.  He met with Dr. Mason again and also one of the cardiothoracic surgeons, who both felt he would be a good candidate for transcatheter aortic valve replacement.    Jayesh Ortiz denies chest discomfort, palpitations, shortness of breath, paroxysmal nocturnal dyspnea, orthopnea, lightheadedness, dizziness, pre-syncope, or syncope.  He says that there is really nothing that he cannot do now that he could 1 year ago.  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  Jayesh Ortiz denies any weight loss, changes in appetite, nausea or vomiting.     Medical, surgical, family, social history, and medications were reviewed and updated as necessary.    ECG (personally reviewed): 4/3/2023 shows NSR    ECHOCARDIOGRAM done on 12/30/22:  Interpretation Summary     1. Mildly decreased left ventricular systolic function The  "visual ejection  fraction is 40-45%.  2. No regional wall motion abnormalities noted.  3. The right ventricle is normal in structure, function and size.  4. Moderately severe to severe aortic stenosis; mean gradient 36 mmHg, peak  velocity 3.9 m/sec, calculated valve area 1.0 cm2, DI 0.26.    CATH done on 2/9/23:  Findings:  LM:no obstruction  LAD:50% proximal, 100% occluded mid-vessel c/w . Fills distally via R-L collaterals  Lcx:mildly irregular  RCA:dominant, no obstruction     Access:  R Radial artery         Physical Examination Review of Systems   Vitals: BP (!) 140/72   Pulse 76   Resp 16   Ht 1.727 m (5' 8\")   Wt 143.8 kg (317 lb)   BMI 48.20 kg/m    BMI= Body mass index is 48.2 kg/m .  Wt Readings from Last 3 Encounters:   04/03/23 143.8 kg (317 lb)   03/08/23 143.8 kg (317 lb)   01/19/23 98 kg (216 lb)       General Appearance:   Alert, cooperative and in no acute distress   ENT/Mouth: membranes moist, no oral lesions or bleeding gums.      EYES:  no scleral icterus, normal conjunctivae   Neck: no carotid bruits.  Thyroid not visualized   Chest/Lungs:   lungs are clear to auscultation, no rales or wheezing   Cardiovascular:   Regular. Normal first and second heart sounds with 4/6 systolic murmur.  No rubs or gallops; the carotid, radial and posterior tibial pulses are intact, minimal edema bilaterally    Abdomen:  Soft and nontender. Bowel sounds are present in all quadrants   Extremities: no cyanosis or clubbing   Skin: no xanthelasma, warm.    Neurologic: normal gait, normal  bilateral, no tremors   Psychiatric: Normal mood and affect       Please refer above for cardiac ROS details.      Medical History  Surgical History Family History Social History   Past Medical History:   Diagnosis Date     Diabetes (H)      Diabetes mellitus, type 2 (H)      Hypertension      Thyroid disease      Past Surgical History:   Procedure Laterality Date     CLOSED REDUCTION, PERCUTANEOUS PINNING LOWER " EXTREMITY, COMBINED Right 3/9/2017    Procedure: COMBINED CLOSED REDUCTION, PERCUTANEOUS PINNING LOWER EXTREMITY;  Surgeon: Ankit Coy DPM;  Location: [facility] OR     CV CORONARY ANGIOGRAM N/A 2/9/2023    Procedure: Coronary Angiogram;  Surgeon: Nabeel Davies MD;  Location: [facility] CATH LAB CV     THYROIDECTOMY       Family History   Problem Relation Age of Onset     Heart Failure Mother      Heart Failure Father     Social History     Socioeconomic History     Marital status: Single     Spouse name: Not on file     Number of children: Not on file     Years of education: Not on file     Highest education level: Not on file   Occupational History     Not on file   Tobacco Use     Smoking status: Former     Smokeless tobacco: Never   Vaping Use     Vaping status: Not on file   Substance and Sexual Activity     Alcohol use: Not Currently     Drug use: Never     Sexual activity: Not on file   Other Topics Concern     Not on file   Social History Narrative     Not on file     Social Determinants of Health     Financial Resource Strain: Not on file   Food Insecurity: Not on file   Transportation Needs: Not on file   Physical Activity: Not on file   Stress: Not on file   Social Connections: Not on file   Intimate Partner Violence: Not on file   Housing Stability: Not on file          Medications  Allergies   Current Outpatient Medications   Medication Sig Dispense Refill     atorvastatin (LIPITOR) 80 MG tablet Take 1 tablet (80 mg) by mouth daily 30 tablet 12     Gabapentin (NEURONTIN PO) Take 300 mg by mouth daily       glipiZIDE (GLUCOTROL) 10 MG tablet TAKE 1 TABLET BY MOUTH TWICE A DAY (Patient taking differently: Take 10 mg by mouth 2 times daily (before meals)) 180 tablet 1     hydrocortisone (CORTAID) 1 % cream Apply topically 3 times daily as needed for itching       levothyroxine (SYNTHROID/LEVOTHROID) 175 MCG tablet Take 1 tablet (175 mcg) by mouth daily 90 tablet 3     losartan (COZAAR) 50 MG tablet  Take 1 tablet (50 mg) by mouth daily 90 tablet 3     metFORMIN (GLUCOPHAGE) 500 MG tablet TAKE 2 TABLETS BY MOUTH TWICE A DAY WITH FOOD (Patient taking differently: Take 1,000 mg by mouth 2 times daily (with meals)) 360 tablet 0     pioglitazone (ACTOS) 45 MG tablet TAKE 1 TABLET (45 MG) BY MOUTH IN THE MORNING. 90 tablet 0     senna-docusate (SENOKOT-S;PERICOLACE) 8.6-50 MG per tablet Take 1 tablet by mouth 2 times daily       tamsulosin (FLOMAX) 0.4 MG capsule TAKE 1 CAPSULE BY MOUTH EVERY DAY (Patient taking differently: 0.4 mg daily) 90 capsule 3     zolpidem (AMBIEN) 5 MG tablet Take 1 tablet (5 mg) by mouth nightly as needed for sleep 45 tablet 0     aspirin (ASA) 81 MG EC tablet Take 1 tablet (81 mg) by mouth daily (Patient not taking: Reported on 4/3/2023) 90 tablet 3    Allergies   Allergen Reactions     Nka [No Known Allergies]          Lab Results    Chemistry/lipid CBC Cardiac Enzymes/BNP/TSH/INR   Recent Labs   Lab Test 04/11/22  1008   CHOL 170   HDL 51      TRIG 66     Recent Labs   Lab Test 04/11/22  1008 04/13/21  1008 03/02/20  1032    177* 114     Recent Labs   Lab Test 02/09/23  0714 02/06/23  0815   NA  --  138   POTASSIUM  --  4.7   CHLORIDE  --  101   CO2  --  28   * 123*   BUN  --  27.0*   CR  --  0.92   GFRESTIMATED  --  84   MATT  --  9.2     Recent Labs   Lab Test 02/06/23  0815 01/19/23  0812 10/05/22  0810   CR 0.92 0.97 0.97     Recent Labs   Lab Test 10/05/22  0810 04/11/22  0845 04/13/21  1008   A1C 7.1* 10.2* 7.9*    Recent Labs   Lab Test 02/06/23  0815   WBC 4.9   HGB 14.2   HCT 44.3   MCV 97        Recent Labs   Lab Test 02/06/23  0815 01/19/23  0812 05/18/22  0839   HGB 14.2 14.6 14.8    No results for input(s): TROPONINI in the last 68946 hours.  Recent Labs   Lab Test 04/11/22  1008   *     Recent Labs   Lab Test 10/05/22  0810   TSH 6.23*     No results for input(s): INR in the last 45848 hours.       45 minutes spent on the date of encounter  doing education, consent signing, chart prep/review, review of test results, interpretation with above tests, patient visit and documentation.      This note has been dictated using voice recognition software. Any grammatical or context distortions are unintentional and inherent to the software.

## 2023-04-04 ENCOUNTER — HOSPITAL ENCOUNTER (OUTPATIENT)
Dept: CARDIOLOGY | Facility: HOSPITAL | Age: 83
Discharge: HOME | DRG: 267 | End: 2023-04-04
Attending: INTERNAL MEDICINE | Admitting: INTERNAL MEDICINE
Payer: MEDICARE

## 2023-04-04 ENCOUNTER — HOSPITAL ENCOUNTER (INPATIENT)
Facility: HOSPITAL | Age: 83
LOS: 1 days | Discharge: HOME | DRG: 267 | End: 2023-04-05
Attending: INTERNAL MEDICINE | Admitting: SURGERY
Payer: MEDICARE

## 2023-04-04 ENCOUNTER — ANESTHESIA (OUTPATIENT)
Dept: CARDIOLOGY | Facility: HOSPITAL | Age: 83
DRG: 267 | End: 2023-04-04
Payer: MEDICARE

## 2023-04-04 DIAGNOSIS — Z95.2 S/P TAVR (TRANSCATHETER AORTIC VALVE REPLACEMENT): Primary | ICD-10-CM

## 2023-04-04 DIAGNOSIS — I35.0 SEVERE AORTIC STENOSIS: ICD-10-CM

## 2023-04-04 DIAGNOSIS — I35.0 NONRHEUMATIC AORTIC VALVE STENOSIS: ICD-10-CM

## 2023-04-04 PROBLEM — I42.9 CARDIOMYOPATHY (H): Status: ACTIVE | Noted: 2022-04-29

## 2023-04-04 LAB
ACT BLD: 224 SECONDS (ref 74–150)
ACT BLD: 258 SECONDS (ref 74–150)
ALBUMIN SERPL BCG-MCNC: 3.7 G/DL (ref 3.5–5.2)
ALP SERPL-CCNC: 52 U/L (ref 40–129)
ALT SERPL W P-5'-P-CCNC: 19 U/L (ref 10–50)
ANION GAP SERPL CALCULATED.3IONS-SCNC: 8 MMOL/L (ref 7–15)
AST SERPL W P-5'-P-CCNC: 25 U/L (ref 10–50)
ATRIAL RATE - MUSE: 66 BPM
BILIRUB SERPL-MCNC: 0.5 MG/DL
BLD PROD TYP BPU: NORMAL
BLD PROD TYP BPU: NORMAL
BLOOD COMPONENT TYPE: NORMAL
BLOOD COMPONENT TYPE: NORMAL
BUN SERPL-MCNC: 19.8 MG/DL (ref 8–23)
CALCIUM SERPL-MCNC: 8.6 MG/DL (ref 8.8–10.2)
CHLORIDE SERPL-SCNC: 99 MMOL/L (ref 98–107)
CODING SYSTEM: NORMAL
CODING SYSTEM: NORMAL
CREAT SERPL-MCNC: 0.86 MG/DL (ref 0.67–1.17)
CROSSMATCH: NORMAL
CROSSMATCH: NORMAL
DEPRECATED HCO3 PLAS-SCNC: 28 MMOL/L (ref 22–29)
DIASTOLIC BLOOD PRESSURE - MUSE: NORMAL MMHG
ERYTHROCYTE [DISTWIDTH] IN BLOOD BY AUTOMATED COUNT: 13.7 % (ref 10–15)
GFR SERPL CREATININE-BSD FRML MDRD: 87 ML/MIN/1.73M2
GLUCOSE BLDC GLUCOMTR-MCNC: 173 MG/DL (ref 70–99)
GLUCOSE BLDC GLUCOMTR-MCNC: 179 MG/DL (ref 70–99)
GLUCOSE BLDC GLUCOMTR-MCNC: 229 MG/DL (ref 70–99)
GLUCOSE SERPL-MCNC: 177 MG/DL (ref 70–99)
HBA1C MFR BLD: 6.8 %
HCT VFR BLD AUTO: 42 % (ref 40–53)
HGB BLD-MCNC: 13.7 G/DL (ref 13.3–17.7)
INTERPRETATION ECG - MUSE: NORMAL
ISSUE DATE AND TIME: NORMAL
ISSUE DATE AND TIME: NORMAL
MAGNESIUM SERPL-MCNC: 1.6 MG/DL (ref 1.7–2.3)
MAGNESIUM SERPL-MCNC: 1.6 MG/DL (ref 1.7–2.3)
MCH RBC QN AUTO: 31.9 PG (ref 26.5–33)
MCHC RBC AUTO-ENTMCNC: 32.6 G/DL (ref 31.5–36.5)
MCV RBC AUTO: 98 FL (ref 78–100)
P AXIS - MUSE: 65 DEGREES
PLATELET # BLD AUTO: 162 10E3/UL (ref 150–450)
POTASSIUM SERPL-SCNC: 4.8 MMOL/L (ref 3.4–5.3)
POTASSIUM SERPL-SCNC: 5.5 MMOL/L (ref 3.4–5.3)
PR INTERVAL - MUSE: 194 MS
PROT SERPL-MCNC: 6.2 G/DL (ref 6.4–8.3)
QRS DURATION - MUSE: 106 MS
QT - MUSE: 422 MS
QTC - MUSE: 442 MS
R AXIS - MUSE: -20 DEGREES
RBC # BLD AUTO: 4.3 10E6/UL (ref 4.4–5.9)
SODIUM SERPL-SCNC: 135 MMOL/L (ref 136–145)
SYSTOLIC BLOOD PRESSURE - MUSE: NORMAL MMHG
T AXIS - MUSE: 108 DEGREES
UNIT ABO/RH: NORMAL
UNIT ABO/RH: NORMAL
UNIT NUMBER: NORMAL
UNIT NUMBER: NORMAL
UNIT STATUS: NORMAL
UNIT STATUS: NORMAL
UNIT TYPE ISBT: 6200
UNIT TYPE ISBT: 6200
VENTRICULAR RATE- MUSE: 66 BPM
WBC # BLD AUTO: 7.4 10E3/UL (ref 4–11)

## 2023-04-04 PROCEDURE — 33361 REPLACE AORTIC VALVE PERQ: CPT | Mod: 62 | Performed by: INTERNAL MEDICINE

## 2023-04-04 PROCEDURE — P9016 RBC LEUKOCYTES REDUCED: HCPCS | Performed by: INTERNAL MEDICINE

## 2023-04-04 PROCEDURE — 85014 HEMATOCRIT: CPT | Performed by: INTERNAL MEDICINE

## 2023-04-04 PROCEDURE — 250N000013 HC RX MED GY IP 250 OP 250 PS 637: Performed by: INTERNAL MEDICINE

## 2023-04-04 PROCEDURE — 33361 REPLACE AORTIC VALVE PERQ: CPT | Performed by: INTERNAL MEDICINE

## 2023-04-04 PROCEDURE — 83036 HEMOGLOBIN GLYCOSYLATED A1C: CPT | Performed by: INTERNAL MEDICINE

## 2023-04-04 PROCEDURE — 83735 ASSAY OF MAGNESIUM: CPT | Performed by: INTERNAL MEDICINE

## 2023-04-04 PROCEDURE — C1769 GUIDE WIRE: HCPCS | Performed by: INTERNAL MEDICINE

## 2023-04-04 PROCEDURE — 250N000012 HC RX MED GY IP 250 OP 636 PS 637: Performed by: INTERNAL MEDICINE

## 2023-04-04 PROCEDURE — 93321 DOPPLER ECHO F-UP/LMTD STD: CPT | Mod: 26 | Performed by: INTERNAL MEDICINE

## 2023-04-04 PROCEDURE — C1760 CLOSURE DEV, VASC: HCPCS | Performed by: INTERNAL MEDICINE

## 2023-04-04 PROCEDURE — C1887 CATHETER, GUIDING: HCPCS | Performed by: INTERNAL MEDICINE

## 2023-04-04 PROCEDURE — 93308 TTE F-UP OR LMTD: CPT | Mod: 26 | Performed by: INTERNAL MEDICINE

## 2023-04-04 PROCEDURE — 36415 COLL VENOUS BLD VENIPUNCTURE: CPT | Performed by: INTERNAL MEDICINE

## 2023-04-04 PROCEDURE — 93010 ELECTROCARDIOGRAM REPORT: CPT | Mod: HIP | Performed by: INTERNAL MEDICINE

## 2023-04-04 PROCEDURE — 93005 ELECTROCARDIOGRAM TRACING: CPT

## 2023-04-04 PROCEDURE — 93325 DOPPLER ECHO COLOR FLOW MAPG: CPT | Mod: 26 | Performed by: INTERNAL MEDICINE

## 2023-04-04 PROCEDURE — 210N000001 HC R&B IMCU HEART CARE

## 2023-04-04 PROCEDURE — 33361 REPLACE AORTIC VALVE PERQ: CPT | Mod: 62 | Performed by: SURGERY

## 2023-04-04 PROCEDURE — 93308 TTE F-UP OR LMTD: CPT

## 2023-04-04 PROCEDURE — 84132 ASSAY OF SERUM POTASSIUM: CPT | Performed by: INTERNAL MEDICINE

## 2023-04-04 PROCEDURE — 250N000011 HC RX IP 250 OP 636

## 2023-04-04 PROCEDURE — 370N000017 HC ANESTHESIA TECHNICAL FEE, PER MIN: Performed by: INTERNAL MEDICINE

## 2023-04-04 PROCEDURE — 93325 DOPPLER ECHO COLOR FLOW MAPG: CPT

## 2023-04-04 PROCEDURE — 99232 SBSQ HOSP IP/OBS MODERATE 35: CPT | Performed by: INTERNAL MEDICINE

## 2023-04-04 PROCEDURE — 272N000001 HC OR GENERAL SUPPLY STERILE: Performed by: INTERNAL MEDICINE

## 2023-04-04 PROCEDURE — 255N000002 HC RX 255 OP 636: Performed by: INTERNAL MEDICINE

## 2023-04-04 PROCEDURE — 02RF38Z REPLACEMENT OF AORTIC VALVE WITH ZOOPLASTIC TISSUE, PERCUTANEOUS APPROACH: ICD-10-PCS | Performed by: INTERNAL MEDICINE

## 2023-04-04 PROCEDURE — 250N000009 HC RX 250: Performed by: INTERNAL MEDICINE

## 2023-04-04 PROCEDURE — 86923 COMPATIBILITY TEST ELECTRIC: CPT | Performed by: INTERNAL MEDICINE

## 2023-04-04 PROCEDURE — 258N000003 HC RX IP 258 OP 636

## 2023-04-04 PROCEDURE — 85347 COAGULATION TIME ACTIVATED: CPT

## 2023-04-04 PROCEDURE — 80053 COMPREHEN METABOLIC PANEL: CPT | Performed by: INTERNAL MEDICINE

## 2023-04-04 PROCEDURE — 278N000051 HC OR IMPLANT GENERAL: Performed by: INTERNAL MEDICINE

## 2023-04-04 PROCEDURE — C1894 INTRO/SHEATH, NON-LASER: HCPCS | Performed by: INTERNAL MEDICINE

## 2023-04-04 PROCEDURE — 999N000127 HC STATISTIC PERIPHERAL IV START W US GUIDANCE

## 2023-04-04 PROCEDURE — C1733 CATH, EP, OTHR THAN COOL-TIP: HCPCS | Performed by: INTERNAL MEDICINE

## 2023-04-04 PROCEDURE — 258N000003 HC RX IP 258 OP 636: Performed by: INTERNAL MEDICINE

## 2023-04-04 DEVICE — CLOSURE ANGIOSEAL 6FR 610130: Type: IMPLANTABLE DEVICE | Status: FUNCTIONAL

## 2023-04-04 DEVICE — VALVE HEART SAPIEN 3 29MM 9600TFX29A: Type: IMPLANTABLE DEVICE | Site: HEART | Status: FUNCTIONAL

## 2023-04-04 DEVICE — DEVICE CLOSURE VASCULAR MANTA 18FR 2115: Type: IMPLANTABLE DEVICE | Site: ILIAC/FEMORALS | Status: FUNCTIONAL

## 2023-04-04 RX ORDER — ACETAMINOPHEN 325 MG/1
650 TABLET ORAL EVERY 4 HOURS PRN
Status: DISCONTINUED | OUTPATIENT
Start: 2023-04-04 | End: 2023-04-05 | Stop reason: HOSPADM

## 2023-04-04 RX ORDER — GLIPIZIDE 10 MG/1
10 TABLET ORAL
Status: DISCONTINUED | OUTPATIENT
Start: 2023-04-04 | End: 2023-04-05 | Stop reason: HOSPADM

## 2023-04-04 RX ORDER — FENTANYL CITRATE 50 UG/ML
25 INJECTION, SOLUTION INTRAMUSCULAR; INTRAVENOUS EVERY 5 MIN PRN
Status: DISCONTINUED | OUTPATIENT
Start: 2023-04-04 | End: 2023-04-04 | Stop reason: HOSPADM

## 2023-04-04 RX ORDER — LOSARTAN POTASSIUM 50 MG/1
50 TABLET ORAL DAILY
Status: DISCONTINUED | OUTPATIENT
Start: 2023-04-05 | End: 2023-04-05 | Stop reason: HOSPADM

## 2023-04-04 RX ORDER — ASPIRIN 81 MG/1
81 TABLET ORAL DAILY
Status: DISCONTINUED | OUTPATIENT
Start: 2023-04-05 | End: 2023-04-05 | Stop reason: HOSPADM

## 2023-04-04 RX ORDER — IODIXANOL 320 MG/ML
INJECTION, SOLUTION INTRAVASCULAR
Status: DISCONTINUED | OUTPATIENT
Start: 2023-04-04 | End: 2023-04-04 | Stop reason: HOSPADM

## 2023-04-04 RX ORDER — LIDOCAINE 40 MG/G
CREAM TOPICAL
Status: DISCONTINUED | OUTPATIENT
Start: 2023-04-04 | End: 2023-04-04 | Stop reason: HOSPADM

## 2023-04-04 RX ORDER — ONDANSETRON 2 MG/ML
4 INJECTION INTRAMUSCULAR; INTRAVENOUS EVERY 30 MIN PRN
Status: DISCONTINUED | OUTPATIENT
Start: 2023-04-04 | End: 2023-04-04 | Stop reason: HOSPADM

## 2023-04-04 RX ORDER — FENTANYL CITRATE 50 UG/ML
INJECTION, SOLUTION INTRAMUSCULAR; INTRAVENOUS PRN
Status: DISCONTINUED | OUTPATIENT
Start: 2023-04-04 | End: 2023-04-04

## 2023-04-04 RX ORDER — NICOTINE POLACRILEX 4 MG
15-30 LOZENGE BUCCAL
Status: DISCONTINUED | OUTPATIENT
Start: 2023-04-04 | End: 2023-04-05 | Stop reason: HOSPADM

## 2023-04-04 RX ORDER — SODIUM CHLORIDE 9 MG/ML
INJECTION, SOLUTION INTRAVENOUS CONTINUOUS PRN
Status: DISCONTINUED | OUTPATIENT
Start: 2023-04-04 | End: 2023-04-04

## 2023-04-04 RX ORDER — NITROGLYCERIN 0.4 MG/1
0.4 TABLET SUBLINGUAL EVERY 5 MIN PRN
Status: DISCONTINUED | OUTPATIENT
Start: 2023-04-04 | End: 2023-04-05 | Stop reason: HOSPADM

## 2023-04-04 RX ORDER — DEXTROSE MONOHYDRATE 25 G/50ML
25-50 INJECTION, SOLUTION INTRAVENOUS
Status: DISCONTINUED | OUTPATIENT
Start: 2023-04-04 | End: 2023-04-04

## 2023-04-04 RX ORDER — NICOTINE POLACRILEX 4 MG
15-30 LOZENGE BUCCAL
Status: DISCONTINUED | OUTPATIENT
Start: 2023-04-04 | End: 2023-04-04 | Stop reason: HOSPADM

## 2023-04-04 RX ORDER — FENTANYL CITRATE 50 UG/ML
50 INJECTION, SOLUTION INTRAMUSCULAR; INTRAVENOUS EVERY 5 MIN PRN
Status: DISCONTINUED | OUTPATIENT
Start: 2023-04-04 | End: 2023-04-04 | Stop reason: HOSPADM

## 2023-04-04 RX ORDER — NALOXONE HYDROCHLORIDE 0.4 MG/ML
0.2 INJECTION, SOLUTION INTRAMUSCULAR; INTRAVENOUS; SUBCUTANEOUS
Status: DISCONTINUED | OUTPATIENT
Start: 2023-04-04 | End: 2023-04-05 | Stop reason: HOSPADM

## 2023-04-04 RX ORDER — TAMSULOSIN HYDROCHLORIDE 0.4 MG/1
0.4 CAPSULE ORAL DAILY
Status: DISCONTINUED | OUTPATIENT
Start: 2023-04-05 | End: 2023-04-05 | Stop reason: HOSPADM

## 2023-04-04 RX ORDER — SODIUM CHLORIDE 9 MG/ML
INJECTION, SOLUTION INTRAVENOUS CONTINUOUS
Status: ACTIVE | OUTPATIENT
Start: 2023-04-04 | End: 2023-04-04

## 2023-04-04 RX ORDER — PIOGLITAZONEHYDROCHLORIDE 15 MG/1
45 TABLET ORAL DAILY
Status: DISCONTINUED | OUTPATIENT
Start: 2023-04-05 | End: 2023-04-05 | Stop reason: HOSPADM

## 2023-04-04 RX ORDER — DEXTROSE MONOHYDRATE 25 G/50ML
25-50 INJECTION, SOLUTION INTRAVENOUS
Status: DISCONTINUED | OUTPATIENT
Start: 2023-04-04 | End: 2023-04-05 | Stop reason: HOSPADM

## 2023-04-04 RX ORDER — DIAZEPAM 5 MG
5 TABLET ORAL
Status: COMPLETED | OUTPATIENT
Start: 2023-04-04 | End: 2023-04-04

## 2023-04-04 RX ORDER — ONDANSETRON 4 MG/1
4 TABLET, ORALLY DISINTEGRATING ORAL EVERY 30 MIN PRN
Status: DISCONTINUED | OUTPATIENT
Start: 2023-04-04 | End: 2023-04-04 | Stop reason: HOSPADM

## 2023-04-04 RX ORDER — ONDANSETRON 2 MG/ML
INJECTION INTRAMUSCULAR; INTRAVENOUS PRN
Status: DISCONTINUED | OUTPATIENT
Start: 2023-04-04 | End: 2023-04-04

## 2023-04-04 RX ORDER — HYDRALAZINE HYDROCHLORIDE 20 MG/ML
10 INJECTION INTRAMUSCULAR; INTRAVENOUS
Status: DISCONTINUED | OUTPATIENT
Start: 2023-04-04 | End: 2023-04-05 | Stop reason: HOSPADM

## 2023-04-04 RX ORDER — OXYCODONE HYDROCHLORIDE 5 MG/1
5 TABLET ORAL
Status: DISCONTINUED | OUTPATIENT
Start: 2023-04-04 | End: 2023-04-04 | Stop reason: HOSPADM

## 2023-04-04 RX ORDER — NALOXONE HYDROCHLORIDE 0.4 MG/ML
0.4 INJECTION, SOLUTION INTRAMUSCULAR; INTRAVENOUS; SUBCUTANEOUS
Status: DISCONTINUED | OUTPATIENT
Start: 2023-04-04 | End: 2023-04-05 | Stop reason: HOSPADM

## 2023-04-04 RX ORDER — ONDANSETRON 4 MG/1
4 TABLET, ORALLY DISINTEGRATING ORAL EVERY 6 HOURS PRN
Status: DISCONTINUED | OUTPATIENT
Start: 2023-04-04 | End: 2023-04-05 | Stop reason: HOSPADM

## 2023-04-04 RX ORDER — OXYCODONE HYDROCHLORIDE 5 MG/1
10 TABLET ORAL EVERY 4 HOURS PRN
Status: DISCONTINUED | OUTPATIENT
Start: 2023-04-04 | End: 2023-04-05 | Stop reason: HOSPADM

## 2023-04-04 RX ORDER — CEFAZOLIN SODIUM/WATER 3 G/30 ML
3 SYRINGE (ML) INTRAVENOUS
Status: DISCONTINUED | OUTPATIENT
Start: 2023-04-04 | End: 2023-04-04 | Stop reason: HOSPADM

## 2023-04-04 RX ORDER — ONDANSETRON 2 MG/ML
4 INJECTION INTRAMUSCULAR; INTRAVENOUS EVERY 6 HOURS PRN
Status: DISCONTINUED | OUTPATIENT
Start: 2023-04-04 | End: 2023-04-05 | Stop reason: HOSPADM

## 2023-04-04 RX ORDER — HYDROMORPHONE HYDROCHLORIDE 1 MG/ML
0.2 INJECTION, SOLUTION INTRAMUSCULAR; INTRAVENOUS; SUBCUTANEOUS EVERY 5 MIN PRN
Status: DISCONTINUED | OUTPATIENT
Start: 2023-04-04 | End: 2023-04-04 | Stop reason: HOSPADM

## 2023-04-04 RX ORDER — FENTANYL CITRATE 50 UG/ML
25 INJECTION, SOLUTION INTRAMUSCULAR; INTRAVENOUS
Status: DISCONTINUED | OUTPATIENT
Start: 2023-04-04 | End: 2023-04-04 | Stop reason: HOSPADM

## 2023-04-04 RX ORDER — OXYCODONE HYDROCHLORIDE 5 MG/1
10 TABLET ORAL
Status: DISCONTINUED | OUTPATIENT
Start: 2023-04-04 | End: 2023-04-04 | Stop reason: HOSPADM

## 2023-04-04 RX ORDER — SODIUM CHLORIDE 9 MG/ML
INJECTION, SOLUTION INTRAVENOUS CONTINUOUS
Status: DISCONTINUED | OUTPATIENT
Start: 2023-04-04 | End: 2023-04-04 | Stop reason: HOSPADM

## 2023-04-04 RX ORDER — HEPARIN SODIUM 1000 [USP'U]/ML
INJECTION, SOLUTION INTRAVENOUS; SUBCUTANEOUS PRN
Status: DISCONTINUED | OUTPATIENT
Start: 2023-04-04 | End: 2023-04-04

## 2023-04-04 RX ORDER — NICOTINE POLACRILEX 4 MG
15-30 LOZENGE BUCCAL
Status: DISCONTINUED | OUTPATIENT
Start: 2023-04-04 | End: 2023-04-04

## 2023-04-04 RX ORDER — OXYCODONE HYDROCHLORIDE 5 MG/1
5 TABLET ORAL EVERY 4 HOURS PRN
Status: DISCONTINUED | OUTPATIENT
Start: 2023-04-04 | End: 2023-04-05 | Stop reason: HOSPADM

## 2023-04-04 RX ORDER — SODIUM CHLORIDE, SODIUM LACTATE, POTASSIUM CHLORIDE, CALCIUM CHLORIDE 600; 310; 30; 20 MG/100ML; MG/100ML; MG/100ML; MG/100ML
INJECTION, SOLUTION INTRAVENOUS CONTINUOUS
Status: DISCONTINUED | OUTPATIENT
Start: 2023-04-04 | End: 2023-04-04 | Stop reason: HOSPADM

## 2023-04-04 RX ORDER — ATORVASTATIN CALCIUM 40 MG/1
80 TABLET, FILM COATED ORAL DAILY
Status: DISCONTINUED | OUTPATIENT
Start: 2023-04-05 | End: 2023-04-05 | Stop reason: HOSPADM

## 2023-04-04 RX ORDER — HYDROMORPHONE HYDROCHLORIDE 1 MG/ML
0.4 INJECTION, SOLUTION INTRAMUSCULAR; INTRAVENOUS; SUBCUTANEOUS EVERY 5 MIN PRN
Status: DISCONTINUED | OUTPATIENT
Start: 2023-04-04 | End: 2023-04-04 | Stop reason: HOSPADM

## 2023-04-04 RX ORDER — DEXTROSE MONOHYDRATE 25 G/50ML
25-50 INJECTION, SOLUTION INTRAVENOUS
Status: DISCONTINUED | OUTPATIENT
Start: 2023-04-04 | End: 2023-04-04 | Stop reason: HOSPADM

## 2023-04-04 RX ORDER — DEXAMETHASONE SODIUM PHOSPHATE 10 MG/ML
INJECTION, SOLUTION INTRAMUSCULAR; INTRAVENOUS PRN
Status: DISCONTINUED | OUTPATIENT
Start: 2023-04-04 | End: 2023-04-04

## 2023-04-04 RX ORDER — PROTAMINE SULFATE 10 MG/ML
INJECTION, SOLUTION INTRAVENOUS PRN
Status: DISCONTINUED | OUTPATIENT
Start: 2023-04-04 | End: 2023-04-04

## 2023-04-04 RX ORDER — ASPIRIN 325 MG
325 TABLET ORAL ONCE
Status: COMPLETED | OUTPATIENT
Start: 2023-04-04 | End: 2023-04-04

## 2023-04-04 RX ORDER — CEFAZOLIN SODIUM 1 G/3ML
INJECTION, POWDER, FOR SOLUTION INTRAMUSCULAR; INTRAVENOUS PRN
Status: DISCONTINUED | OUTPATIENT
Start: 2023-04-04 | End: 2023-04-04

## 2023-04-04 RX ADMIN — NITROGLYCERIN 20 MCG: 5 INJECTION, SOLUTION INTRAVENOUS at 14:10

## 2023-04-04 RX ADMIN — FENTANYL CITRATE 25 MCG: 50 INJECTION, SOLUTION INTRAMUSCULAR; INTRAVENOUS at 12:25

## 2023-04-04 RX ADMIN — HEPARIN SODIUM 3000 UNITS: 1000 INJECTION INTRAVENOUS; SUBCUTANEOUS at 13:24

## 2023-04-04 RX ADMIN — NITROGLYCERIN 10 MCG: 5 INJECTION, SOLUTION INTRAVENOUS at 13:58

## 2023-04-04 RX ADMIN — FENTANYL CITRATE 25 MCG: 50 INJECTION, SOLUTION INTRAMUSCULAR; INTRAVENOUS at 12:52

## 2023-04-04 RX ADMIN — ONDANSETRON 4 MG: 2 INJECTION INTRAMUSCULAR; INTRAVENOUS at 13:43

## 2023-04-04 RX ADMIN — SODIUM CHLORIDE: 9 INJECTION, SOLUTION INTRAVENOUS at 12:11

## 2023-04-04 RX ADMIN — FENTANYL CITRATE 25 MCG: 50 INJECTION, SOLUTION INTRAMUSCULAR; INTRAVENOUS at 13:05

## 2023-04-04 RX ADMIN — INSULIN GLARGINE 5 UNITS: 100 INJECTION, SOLUTION SUBCUTANEOUS at 22:22

## 2023-04-04 RX ADMIN — HEPARIN SODIUM 10000 UNITS: 1000 INJECTION INTRAVENOUS; SUBCUTANEOUS at 13:13

## 2023-04-04 RX ADMIN — DEXAMETHASONE SODIUM PHOSPHATE 4 MG: 10 INJECTION, SOLUTION INTRAMUSCULAR; INTRAVENOUS at 12:25

## 2023-04-04 RX ADMIN — ASPIRIN 325 MG ORAL TABLET 325 MG: 325 PILL ORAL at 11:21

## 2023-04-04 RX ADMIN — INSULIN ASPART 1 UNITS: 100 INJECTION, SOLUTION INTRAVENOUS; SUBCUTANEOUS at 18:07

## 2023-04-04 RX ADMIN — PROTAMINE SULFATE 50 MG: 10 INJECTION, SOLUTION INTRAVENOUS at 13:54

## 2023-04-04 RX ADMIN — OXYCODONE HYDROCHLORIDE 10 MG: 5 TABLET ORAL at 20:25

## 2023-04-04 RX ADMIN — FENTANYL CITRATE 25 MCG: 50 INJECTION, SOLUTION INTRAMUSCULAR; INTRAVENOUS at 13:06

## 2023-04-04 RX ADMIN — DIAZEPAM 5 MG: 5 TABLET ORAL at 11:21

## 2023-04-04 RX ADMIN — NITROGLYCERIN 10 MCG: 5 INJECTION, SOLUTION INTRAVENOUS at 14:07

## 2023-04-04 RX ADMIN — SODIUM CHLORIDE: 9 INJECTION, SOLUTION INTRAVENOUS at 11:31

## 2023-04-04 RX ADMIN — CEFAZOLIN 3 G: 1 INJECTION, POWDER, FOR SOLUTION INTRAMUSCULAR; INTRAVENOUS at 12:24

## 2023-04-04 ASSESSMENT — ACTIVITIES OF DAILY LIVING (ADL)
ADLS_ACUITY_SCORE: 37
ADLS_ACUITY_SCORE: 22
ADLS_ACUITY_SCORE: 22
ADLS_ACUITY_SCORE: 35
ADLS_ACUITY_SCORE: 22
TOILETING_ISSUES: NO
ADLS_ACUITY_SCORE: 35
CONCENTRATING,_REMEMBERING_OR_MAKING_DECISIONS_DIFFICULTY: NO
VISION_MANAGEMENT: GLASSES
WALKING_OR_CLIMBING_STAIRS_DIFFICULTY: NO
CHANGE_IN_FUNCTIONAL_STATUS_SINCE_ONSET_OF_CURRENT_ILLNESS/INJURY: NO
DRESSING/BATHING_DIFFICULTY: NO
DIFFICULTY_EATING/SWALLOWING: NO
ADLS_ACUITY_SCORE: 35
WEAR_GLASSES_OR_BLIND: YES
FALL_HISTORY_WITHIN_LAST_SIX_MONTHS: NO
DOING_ERRANDS_INDEPENDENTLY_DIFFICULTY: NO
ADLS_ACUITY_SCORE: 35

## 2023-04-04 ASSESSMENT — EJECTION FRACTION: EF_VALUE: .07

## 2023-04-04 NOTE — ANESTHESIA PREPROCEDURE EVALUATION
Anesthesia Pre-Procedure Evaluation    Patient: Jayesh Ortiz   MRN: [id number] : 1940        Procedure : Procedure(s):  Transcatheter Aortic Valve Replacement-Femoral Approach  OR TRANSCATHETER AORTIC VALVE REPLACEMENT, FEMORAL PERCUTANEOUS APPROACH (STANDBY)          Past Medical History:   Diagnosis Date     Diabetes (H)      Diabetes mellitus, type 2 (H)      Hypertension      Thyroid disease       Past Surgical History:   Procedure Laterality Date     CLOSED REDUCTION, PERCUTANEOUS PINNING LOWER EXTREMITY, COMBINED Right 3/9/2017    Procedure: COMBINED CLOSED REDUCTION, PERCUTANEOUS PINNING LOWER EXTREMITY;  Surgeon: Ankit Coy DPM;  Location: [facility] OR     CV CORONARY ANGIOGRAM N/A 2023    Procedure: Coronary Angiogram;  Surgeon: Nabeel Davies MD;  Location: [facility] CATH LAB CV     THYROIDECTOMY        Allergies   Allergen Reactions     Nka [No Known Allergies]       Social History     Tobacco Use     Smoking status: Former     Smokeless tobacco: Never   Vaping Use     Vaping status: Not on file   Substance Use Topics     Alcohol use: Not Currently      Wt Readings from Last 1 Encounters:   23 136.1 kg (300 lb)        Anesthesia Evaluation   Pt has had prior anesthetic.     No history of anesthetic complications       ROS/MED HX  ENT/Pulmonary:  - neg pulmonary ROS     Neurologic:  - neg neurologic ROS     Cardiovascular:     (+) Dyslipidemia hypertension--CAD ---CHF Last EF: 40-45 valvular problems/murmurs type: AS     METS/Exercise Tolerance: >4 METS    Hematologic:  - neg hematologic  ROS     Musculoskeletal:  - neg musculoskeletal ROS     GI/Hepatic:  - neg GI/hepatic ROS     Renal/Genitourinary:  - neg Renal ROS     Endo:     (+) type II DM, thyroid problem, hypothyroidism, Obesity (morbid),  (-) Type I DM   Psychiatric/Substance Use:  - neg psychiatric ROS     Infectious Disease:  - neg infectious disease ROS     Malignancy:  - neg malignancy ROS     Other:  - neg other ROS           Physical Exam    Airway  airway exam normal      Mallampati: II   TM distance: > 3 FB   Neck ROM: full   Mouth opening: > 3 cm    Respiratory Devices and Support         Dental           Cardiovascular           (+) murmur       Pulmonary   pulmonary exam normal                OUTSIDE LABS:  CBC:   Lab Results   Component Value Date    WBC 4.4 04/03/2023    WBC 4.9 02/06/2023    HGB 14.4 04/03/2023    HGB 14.2 02/06/2023    HCT 45.2 04/03/2023    HCT 44.3 02/06/2023     04/03/2023     02/06/2023     BMP:   Lab Results   Component Value Date     04/03/2023     02/06/2023    POTASSIUM 4.2 04/03/2023    POTASSIUM 4.7 02/06/2023    CHLORIDE 100 04/03/2023    CHLORIDE 101 02/06/2023    CO2 29 04/03/2023    CO2 28 02/06/2023    BUN 27.5 (H) 04/03/2023    BUN 27.0 (H) 02/06/2023    CR 0.88 04/03/2023    CR 0.92 02/06/2023     (H) 04/03/2023     (H) 02/09/2023     COAGS:   Lab Results   Component Value Date    INR 1.08 04/03/2023     POC:   Lab Results   Component Value Date     (H) 03/13/2017     HEPATIC:   Lab Results   Component Value Date    ALBUMIN 4.1 04/03/2023    PROTTOTAL 6.8 04/03/2023    ALT 21 04/03/2023    AST 24 04/03/2023    ALKPHOS 53 04/03/2023    BILITOTAL 0.7 04/03/2023     OTHER:   Lab Results   Component Value Date    A1C 7.1 (H) 10/05/2022    MATT 9.2 04/03/2023    MAG 1.6 (L) 04/03/2023    TSH 6.23 (H) 10/05/2022    T4 1.62 10/05/2022       Anesthesia Plan    ASA Status:  4   NPO Status:  NPO Appropriate    Anesthesia Type: MAC.     - Reason for MAC: straight local not clinically adequate              Consents    Anesthesia Plan(s) and associated risks, benefits, and realistic alternatives discussed. Questions answered and patient/representative(s) expressed understanding.    - Discussed:     - Discussed with:  Patient      - Extended Intubation/Ventilatory Support Discussed: No.      - Patient is DNR/DNI Status: No    Use of blood products  discussed: No .     Postoperative Care    Pain management: IV analgesics.   PONV prophylaxis: Ondansetron (or other 5HT-3)     Comments:                Alexandre Aguero MD

## 2023-04-04 NOTE — PLAN OF CARE
Goal Outcome Evaluation:    Pt is alert and oriented x4. Deniede chest pain and SOB. Vitals checked and WNL. SPO2 at 88%; hooked to O2 at 1 lpm via NC; sats maintaining >92%.   No bleeding noted and CMS intact on bilateral groin site. Left groin is soft. Right groin has a 1e1leda hematoma but soft, marked as baseline and will cont to monitor.   Weak pulses noted on bilateral dorsalis pedis.     Pt able to tolerate liquids; advanced diet per order. Bedrest until 2030H per order; pt verbalized understanding. Post TAVR monitoring done per order.

## 2023-04-04 NOTE — OP NOTE
DATE OF SERVICE: 4/4/2023.     PREOPERATIVE DIAGNOSES:  Severe aortic stenosis.     POSTOPERATIVE DIAGNOSES:  Severe aortic stenosis.     PROCEDURE PERFORMED:  Transfemoral transcatheter aortic valve replacement (29 mm S3 Washburn valve).     SURGEON:  Jeramie De Jesus MD, PhD.     ATTENDING CARDIOLOGISTS:  Nabeel Davies MD.     ANESTHESIA:  Managed anesthesia care.     ANESTHESIOLOGIST:  Jonh Aguero MD.     ESTIMATED BLOOD LOSS:  Minimal.     SPECIMEN:  None.     INDICATIONS FOR PROCEDURE: Mr. Ortiz is an 82 year-old man with severe aortic stenosis.  Transcatheter replacement is recommended. The patient understands the risks and benefits of the procedure and wishes to undergo the operation.     OPERATIVE FINDINGS:  There was no significant perivalvular leak after the procedure. The S3 valve mean gradient was normal.     OPERATIVE DESCRIPTION IN DETAIL:  After obtaining informed consent, the patient was brought to the operating room and placed in the supine position on the operating room table.  Appropriate lines and devices for monitoring were placed by the anesthesia team.  The was sedated and prepped and draped before a timeout was performed to confirm the correct patient identity, as well as the procedure to be performed.      Please see the cardiology procedure note for details. The 29 mm Maribel S3 valve was deployed successfully through a right transfemoral approach. The deployment was successful and there were no complications. The delivery system was removed and the femoral artery was closed with the Manta device.    Jeramie De Jesus MD PhD

## 2023-04-04 NOTE — PROGRESS NOTES
"[facility]    Medicine Progress Note - Hospitalist Service    Date of Admission:  4/4/2023    Assessment & Plan   82-year-old who had TAVR procedure done on 4/4.  Hospitalist consulted for medical management in the hospital.    Non-Insulin-dependent diabetes  -- Hold oral hypoglycemics in the hospital  -- Ordered Lantus 5 units at bedtime and NovoLog with meals ratio 1 is to 10.  Ordered medium sliding scale insulin  -- Continue current diabetic diet  Lab Results   Component Value Date    A1C 6.8 04/04/2023    A1C 7.1 10/05/2022    A1C 10.2 04/11/2022    A1C 7.9 04/13/2021    A1C 13.2 03/02/2020    A1C 6.8 03/09/2017         Morbid obesity BMI greater than 45  -- Patient denies any sleep apnea and does not wear a CPAP    Essential hypertension controlled    Hypomagnesemia  Magnesium   Date Value Ref Range Status   04/04/2023 1.6 (L) 1.7 - 2.3 mg/dL Final   Replace as per protocol    Aortic stenosis s/p TAVR  --Discharge as per cardiology  -- Currently on aspirin and statin       Diet: Combination Diet Moderate Consistent Carb (60 g CHO per Meal) Diet    DVT Prophylaxis: Low Risk/Ambulatory with no VTE prophylaxis indicated  Welch Catheter: Not present  Lines: None     Cardiac Monitoring: ACTIVE order. Indication: Transcatheter structural interventions (24 hours)  Code Status: Full Code      Clinically Significant Risk Factors Present on Admission            # Hypomagnesemia: Lowest Mg = 1.6 mg/dL in last 2 days, will replace as needed       # Hypertension: home medication list includes antihypertensive(s)     # DMII: A1C = 6.8 % (Ref range: <5.7 %) within past 6 months    # Severe Obesity: Estimated body mass index is 45.61 kg/m  as calculated from the following:    Height as of this encounter: 1.727 m (5' 8\").    Weight as of this encounter: 136.1 kg (300 lb).           Disposition Plan      Expected Discharge Date: 04/05/2023                  Jatinder Dickson MD  Hospitalist Service  [facility]" [facility]  Securely message with [employer] (more info)  Text page via [employer] Paging/Directory   ______________________________________________________________________    Interval History   HMS consulted for diabetes management in the hospital.  S/p TAVR using femoral approach.  Patient denies any chest pain shortness of breath or tingling numbness.  Has no bowel or bladder symptoms.  Denies any lightheadedness or dizziness    Physical Exam   Vital Signs: Temp: 97.9  F (36.6  C) Temp src: Oral BP: 137/58 Pulse: 64   Resp: 16 SpO2: 93 % O2 Device: Nasal cannula Oxygen Delivery: 1 LPM  Weight: 300 lbs 0 oz    Morbidly obese  Thick neck  Abdomen is soft  Moves all 4 extremities  Speech is coherent  No facial droop      Medical Decision Making       35 MINUTES SPENT BY ME on the date of service doing chart review, history, exam, documentation & further activities per the note.  MANAGEMENT DISCUSSED with the following over the past 24 hours: Patient   NOTE(S)/MEDICAL RECORDS REVIEWED over the past 24 hours: Office visit      Data     I have personally reviewed the following data over the past 24 hrs:    7.4  \   13.7   / 162     135 (L) 99 19.8 /  179 (H)   4.8 28 0.86 \       ALT: 19 AST: 25 AP: 52 TBILI: 0.5   ALB: 3.7 TOT PROTEIN: 6.2 (L) LIPASE: N/A       TSH: N/A T4: N/A A1C: 6.8 (H)       Imaging results reviewed over the past 24 hrs:   Recent Results (from the past 24 hour(s))   Cardiac Catheterization    Narrative    Procedure: Transcatheter aortic valve replacement    Indication: Severe symptomatic aortic stenosis with the patient felt to be   an appropriate candidate for transcatheter aortic valve replacement after   a thorough multidisciplinary approach, including a visit with a surgeon.    Operators:   Interventional Cardiology: Nabeel Davies MD  CT Surgery: Jeramie De Jesus MD  Echocardiography: Transthoracic    Approach: R transfemoral    Anesthesia: MAC    Procedure Details:  Informed  consent obtained before the patient was brought to the procedure   room.  Appropriate timeout was performed before the procedure was started.    Access was obtained in the left femoral artery and vein using the modified   Seldinger technique, and then in the right femoral artery under direct   fluoroscopic visualization. Access site was pre-measured for Manta closure   device.  A L femoral vein was used to place a temporary pacemaker in the   RV.    A pigtail catheter was placed at the aortic annulus via the left femoral   artery to determine the coplanar angle.     After appropriate anticoagulation, the Washburn E sheath was placed through   the right femoral arteriotomy with the help of a stiff guidewire.  The   aortic valve was then crossed and a Safari 2 wire was placed at the LV   apex.    A 29mm Maribel 3 valve was then successfully deployed in the aortic   position under rapid pacing.  Post deployment to the patient had good   hemodynamics, with trace aortic insufficiency and a mean gradient of ~3 by   invasive measurement (unable to obtain gradient on TTE  before or after   deployment due to body habitus). Relative aortic/venticular implant ratio   was 80/20.     The delivery system and sheath were then removed, with successful   hemostasis of the arteriotomy by deployment of the Manta closure device.    The left femoral artery and venous sheaths were removed as well with   successful hemostasis via the Angio-Seal device.    Conclusions:  1. Severe symptomatic aortic stenosis status post successful transcatheter   aortic valve replacement with a 29mm Maribel 3 valve, delivered via the   right transfemoral approach.    2.  Trace aortic insufficiency post deployment with a mean gradient of 3   mmHg.    Postprocedure patient status:  Patient planned to be transferred to the PACU/recovery for ongoing   management.  Temporary pacemaker removed and need to be re-assessed in the   next 24 hours based on clinical  need.      Nabeel Gilmore MD  Interventional Cardiology               Echocardiogram Complete    Narrative    [id number]  [id number]  [id number]  [id number]^DEIRDRE^[place] [address]     Name: AUGUSTO ROBERTSON  MRN: [id number]  : 1940  Study Date: 2023 12:07 PM  Age: 82 yrs  Gender: Male  Patient Location: [facility]  Reason For Study: Nonrheumatic aortic valve stenosis  Ordering Physician: NABEEL GILMORE  Referring Physician: NABEEL GILMORE  Performed By: EKTA     BSA: 2.4 m2  Height: 68 in  Weight: 300 lb  HR: 58  BP: 133/68 mmHg  ______________________________________________________________________________  Procedure  Complete RUSSELL Adult. Technically difficult study.Extremely poor acoustic  windows.  ______________________________________________________________________________  Interpretation Summary     INTRAOPERATIVE TAVR Echo     Pre-TAVR:  Left Ventricle:  Grossly normal left ventricular systolic function.  Right Ventricle:  Grossly normal right ventricular systolic function.  Aortic Valve:  Due to limited visualization, unable to obtain pre-valve implantation  gradients.  Mitral Valve:  Physiologic regurgitation.  Pericardium: No pericardial effusion.     POST-TAVR:  Left Ventricle:  No significant change in left ventricular function.  Aortic Valve:  There is well seated 29 mm Maribel 3 valve in aortic position with normal  gradient (peak and mean gradients are 12 and 7 mmHg, respectively). No  identifiable paravalvular leak.  Mitral Valve:  Physiologic regurgitation is present. No acute change in mitral valve function  Pericardium: No pericardial effusion.     Results communicated directly to Dr. Gilmore in operating room.  ______________________________________________________________________________  ______________________________________________________________________________  MMode/2D Measurements & Calculations  LVOT diam: 2.1 cm  LVOT  area: 3.5 cm2     Doppler Measurements & Calculations  Ao V2 max: 176.0 cm/sec  Ao max P.0 mmHg  Ao V2 mean: 123.0 cm/sec  Ao mean P.0 mmHg  Ao V2 VTI: 35.5 cm  LENNY(I,D): 2.4 cm2  LENNY(V,D): 1.5 cm2  LV V1 max P.3 mmHg  LV V1 max: 75.1 cm/sec  LV V1 VTI: 24.4 cm  SV(LVOT): 84.5 ml  SI(LVOT): 34.8 ml/m2  AV Artem Ratio (DI): 0.43  LENNY Index (cm2/m2): 0.98     ______________________________________________________________________________  Report approved by: Diony Andrews 2023 03:13 PM

## 2023-04-04 NOTE — ANESTHESIA POSTPROCEDURE EVALUATION
Patient: Jayesh Ortiz    Procedure: Procedure(s):  Transcatheter Aortic Valve Replacement-Femoral Approach  OR TRANSCATHETER AORTIC VALVE REPLACEMENT, FEMORAL PERCUTANEOUS APPROACH (STANDBY)       Anesthesia Type:  MAC    Note:  Disposition: Admission   Postop Pain Control: Uneventful            Sign Out: Well controlled pain   PONV: No   Neuro/Psych: Uneventful            Sign Out: Acceptable/Baseline neuro status   Airway/Respiratory: Uneventful            Sign Out: Acceptable/Baseline resp. status   CV/Hemodynamics: Uneventful            Sign Out: Acceptable CV status; No obvious hypovolemia; No obvious fluid overload   Other NRE: NONE   DID A NON-ROUTINE EVENT OCCUR?            Last vitals:  Vitals Value Taken Time   /62 04/04/23 1516   Temp     Pulse 55 04/04/23 1529   Resp 9 04/04/23 1529   SpO2 93 % 04/04/23 1529   Vitals shown include unvalidated device data.    Electronically Signed By: Alexandre Aguero MD  April 4, 2023  3:30 PM

## 2023-04-04 NOTE — Clinical Note
Temporary pacemaker Rate= 40bpmPaced mA= 20Pacer wire was captured appropriately, Pacer is set and Demand on Standby

## 2023-04-04 NOTE — ANESTHESIA CARE TRANSFER NOTE
Patient: Jayesh Ortiz    Procedure: Procedure(s):  Transcatheter Aortic Valve Replacement-Femoral Approach  OR TRANSCATHETER AORTIC VALVE REPLACEMENT, FEMORAL PERCUTANEOUS APPROACH (STANDBY)       Diagnosis: valvular disease  Diagnosis Additional Information: No value filed.    Anesthesia Type:   MAC     Note:    Oropharynx: oropharynx clear of all foreign objects and spontaneously breathing  Level of Consciousness: awake  Oxygen Supplementation: room air    Independent Airway: airway patency satisfactory and stable  Dentition: dentition unchanged  Vital Signs Stable: post-procedure vital signs reviewed and stable  Report to RN Given: handoff report given  Patient transferred to: Cardiac Special Care          Vitals:  Vitals Value Taken Time   /60 4/4/23  1425   Temp 98 4/4/23  1425   Pulse 70 4/4/23  1425   Resp 14 4/4/23  1425   SpO2 94 4/4/23  1425       Electronically Signed By: SHANTA Archuleta CRNA  April 4, 2023  2:28 PM

## 2023-04-04 NOTE — INTERVAL H&P NOTE
"I have reviewed the surgical (or preoperative) H&P that is linked to this encounter, and examined the patient. There are no significant changes    Clinical Conditions Present on Arrival:  Clinically Significant Risk Factors Present on Admission              # Hypomagnesemia: Lowest Mg = 1.6 mg/dL in last 2 days, will replace as needed       # Severe Obesity: Estimated body mass index is 45.61 kg/m  as calculated from the following:    Height as of this encounter: 1.727 m (5' 8\").    Weight as of this encounter: 136.1 kg (300 lb).         "

## 2023-04-04 NOTE — PHARMACY-ADMISSION MEDICATION HISTORY
Pharmacist Admission Medication History    Admission medication history is complete. The information provided in this note is only as accurate as the sources available at the time of the update.    Medication reconciliation/reorder completed by provider prior to medication history? No    Information Source(s): Patient and CareEverywhere/SureScripts via in-person    Allergies reviewed with patient and updates made in EHR: yes    Medication History Completed By: Kary Benitez RPH 4/4/2023 9:59 AM    PTA Med List   Medication Sig Last Dose     atorvastatin (LIPITOR) 80 MG tablet Take 1 tablet (80 mg) by mouth daily 4/3/2023 at am     glipiZIDE (GLUCOTROL) 10 MG tablet TAKE 1 TABLET BY MOUTH TWICE A DAY (Patient taking differently: Take 10 mg by mouth 2 times daily (before meals)) 4/3/2023 at pm     levothyroxine (SYNTHROID/LEVOTHROID) 175 MCG tablet Take 1 tablet (175 mcg) by mouth daily 4/4/2023 at am     losartan (COZAAR) 50 MG tablet Take 1 tablet (50 mg) by mouth daily 4/4/2023 at am     metFORMIN (GLUCOPHAGE) 500 MG tablet TAKE 2 TABLETS BY MOUTH TWICE A DAY WITH FOOD (Patient taking differently: Take 1,000 mg by mouth 2 times daily (with meals)) 4/3/2023 at pm     pioglitazone (ACTOS) 45 MG tablet TAKE 1 TABLET (45 MG) BY MOUTH IN THE MORNING. 4/3/2023 at am     tamsulosin (FLOMAX) 0.4 MG capsule TAKE 1 CAPSULE BY MOUTH EVERY DAY (Patient taking differently: 0.4 mg daily) 4/3/2023 at am     Popeye HarperD

## 2023-04-04 NOTE — DISCHARGE INSTRUCTIONS
Patient Instructions - Going Home after TAVR:    Going Home: It s normal to feel a little anxious after leaving the hospital and when fewer people are nearby. People do much better when they feel as though they have support.  If you live alone we suggest you arrange to have someone stay with you for a day or two to help you recover.     Medications:  Take all of your medications as directed in your discharge summary. Do not stop taking these medications without speaking with your cardiologist. If you have any questions about any of your medications, speak to your pharmacist or provider.     Follow up Appointments  You will follow up with valve clinic RN on April 11 at 9:10 am.  You will have a repeat echocardiogram on May 11 at 10 am.  You will have a follow up with Anh Burgos PA-C on May 15 at 1:15 pm, for labs and visit (you DON'T have to fast for these labs).      If you need to reschedule any of these appointments, please call:  [phone]    See your regular cardiologist in 6 months.  Your regular heart doctor will continue to be your heart specialist.   See your family doctor in 2 weeks.  Make an appointment once you get home.  You will receive a temporary  heart valve  wallet card. We recommend that you keep it in your wallet or purse at all times. You will be receiving a permanent wallet card from the  within 6 months.   Before an MRI (magnetic resonance imaging) procedure, always notify the doctor (or medical technician) that you have an implanted heart valve.     Site Care: Shower every day.  Let the soap and water run over your incision or access site, but do not rub the area. No tub baths, pools or hot-tubs for the 1st week you are at home. Do not put ointments, powders, or lotions on your access site and/or incision.  It is normal to feel a small lump the size of a marble in the groin access site.  That is the closure device used to close the vein/artery.  If you  are experiencing discomfort, bleeding, drainage, redness or increasing swelling, please call us.    Dental Work: Avoid major dental work for the next 6 months if possible. You will need antibiotics before any dental cleanings, work or surgery. This will decrease the risk of infection.     Driving:  You should not drive for the first 2 weeks after your procedure. The first time you drive, you must have someone with you. You may ride in a car immediately after your procedure.      Activity: People recover at different rates depending on their general health and the type of heart valve procedure. Most people take about 4-10 weeks to feel fully recovered. Daily activity and exercise are an important part of your recovery.  Do not lift, push or pull anything > 10 lb. for the first 2 weeks.        CALL THE VALVE CLINIC IF YOU HAVE ANY QUESTIONS OR CONCERNS:  [phone]  For the first 2 weeks after TAVR     Do Not:   Lift, push, or pull more than ten pounds (including pets, laundry, groceries, etc)  Garden, including lawn mowing and raking  Excessively bear down or strain when having a bowel movement   Ride a bike   Do:  Weigh yourself every day in the morning after using the bathroom.  If you notice weight gain of more than 3 pounds in 1 day or more than 5 pounds in 1 week, call the cardiology clinic.   Get up and get dressed every day. Do not stay in bed.  Set a daily routine.   Walk as much as you can. You may walk up and down stairs. When you are tired, rest.   Cough and deep breathe. Use your incentive spirometer 5-10 times each hour when you are awake.   We strongly recommend you participate in a cardiac rehabilitation program. This type of program will help you learn about your heart health, prevent more heart problems, participate in safe and heart-healthy activities, and learn how to return to your activities of daily living and hobbies.   Let the cardiology clinic know if you need to leave town before your  first follow-up visit.     When to call the Cardiology Clinic to speak with a nurse?   Swelling of the legs, ankles, or feet  Increasing shortness of breath  Change in the color or temperature of your lower legs and feet  Abdominal pain or unrelieved nausea and/or vomiting  Redness and warmth around your access site and/or incision that does not go away  Yellow or green drainage from your access site and/or incision   Weight gain of 3 pounds in one day or 5 pounds in one week  Develop any numbness, tingling, or limited movement of your arms or legs.   Chest pain that does not go away  New confusion or you cannot think clearly  Fever and chills         [facility] [place] [facility]:  [phone]  If you are calling after hours, please listen to the entire voicemail, a live  will answer at the end of the message

## 2023-04-05 ENCOUNTER — APPOINTMENT (OUTPATIENT)
Dept: RADIOLOGY | Facility: HOSPITAL | Age: 83
DRG: 267 | End: 2023-04-05
Attending: INTERNAL MEDICINE
Payer: MEDICARE

## 2023-04-05 ENCOUNTER — APPOINTMENT (OUTPATIENT)
Dept: CARDIOLOGY | Facility: HOSPITAL | Age: 83
DRG: 267 | End: 2023-04-05
Attending: INTERNAL MEDICINE
Payer: MEDICARE

## 2023-04-05 ENCOUNTER — APPOINTMENT (OUTPATIENT)
Dept: OCCUPATIONAL THERAPY | Facility: HOSPITAL | Age: 83
DRG: 267 | End: 2023-04-05
Attending: INTERNAL MEDICINE
Payer: MEDICARE

## 2023-04-05 VITALS
SYSTOLIC BLOOD PRESSURE: 94 MMHG | OXYGEN SATURATION: 91 % | DIASTOLIC BLOOD PRESSURE: 50 MMHG | RESPIRATION RATE: 20 BRPM | BODY MASS INDEX: 46.11 KG/M2 | TEMPERATURE: 98.4 F | WEIGHT: 304.24 LBS | HEART RATE: 67 BPM | HEIGHT: 68 IN

## 2023-04-05 DIAGNOSIS — Z95.2 S/P TAVR (TRANSCATHETER AORTIC VALVE REPLACEMENT): Primary | ICD-10-CM

## 2023-04-05 LAB
ANION GAP SERPL CALCULATED.3IONS-SCNC: 8 MMOL/L (ref 7–15)
ATRIAL RATE - MUSE: 70 BPM
BUN SERPL-MCNC: 34.5 MG/DL (ref 8–23)
CALCIUM SERPL-MCNC: 8.6 MG/DL (ref 8.8–10.2)
CHLORIDE SERPL-SCNC: 105 MMOL/L (ref 98–107)
CREAT SERPL-MCNC: 1.08 MG/DL (ref 0.67–1.17)
DEPRECATED HCO3 PLAS-SCNC: 25 MMOL/L (ref 22–29)
DIASTOLIC BLOOD PRESSURE - MUSE: NORMAL MMHG
ERYTHROCYTE [DISTWIDTH] IN BLOOD BY AUTOMATED COUNT: 13.7 % (ref 10–15)
GFR SERPL CREATININE-BSD FRML MDRD: 69 ML/MIN/1.73M2
GLUCOSE BLDC GLUCOMTR-MCNC: 294 MG/DL (ref 70–99)
GLUCOSE SERPL-MCNC: 284 MG/DL (ref 70–99)
HCT VFR BLD AUTO: 39.9 % (ref 40–53)
HGB BLD-MCNC: 13 G/DL (ref 13.3–17.7)
INTERPRETATION ECG - MUSE: NORMAL
MAGNESIUM SERPL-MCNC: 1.6 MG/DL (ref 1.7–2.3)
MCH RBC QN AUTO: 31.7 PG (ref 26.5–33)
MCHC RBC AUTO-ENTMCNC: 32.6 G/DL (ref 31.5–36.5)
MCV RBC AUTO: 97 FL (ref 78–100)
P AXIS - MUSE: 68 DEGREES
PLATELET # BLD AUTO: 150 10E3/UL (ref 150–450)
POTASSIUM SERPL-SCNC: 4.8 MMOL/L (ref 3.4–5.3)
POTASSIUM SERPL-SCNC: 5.5 MMOL/L (ref 3.4–5.3)
PR INTERVAL - MUSE: 196 MS
QRS DURATION - MUSE: 106 MS
QT - MUSE: 410 MS
QTC - MUSE: 442 MS
R AXIS - MUSE: -12 DEGREES
RBC # BLD AUTO: 4.1 10E6/UL (ref 4.4–5.9)
SODIUM SERPL-SCNC: 138 MMOL/L (ref 136–145)
SYSTOLIC BLOOD PRESSURE - MUSE: NORMAL MMHG
T AXIS - MUSE: 119 DEGREES
VENTRICULAR RATE- MUSE: 70 BPM
WBC # BLD AUTO: 9.2 10E3/UL (ref 4–11)

## 2023-04-05 PROCEDURE — 83735 ASSAY OF MAGNESIUM: CPT | Performed by: INTERNAL MEDICINE

## 2023-04-05 PROCEDURE — 93005 ELECTROCARDIOGRAM TRACING: CPT

## 2023-04-05 PROCEDURE — 71045 X-RAY EXAM CHEST 1 VIEW: CPT

## 2023-04-05 PROCEDURE — 250N000013 HC RX MED GY IP 250 OP 250 PS 637: Performed by: INTERNAL MEDICINE

## 2023-04-05 PROCEDURE — 250N000011 HC RX IP 250 OP 636: Performed by: INTERNAL MEDICINE

## 2023-04-05 PROCEDURE — 999N000096 CARDIAC MOBILE TELEMETRY MONITOR

## 2023-04-05 PROCEDURE — 36415 COLL VENOUS BLD VENIPUNCTURE: CPT | Performed by: INTERNAL MEDICINE

## 2023-04-05 PROCEDURE — 85027 COMPLETE CBC AUTOMATED: CPT | Performed by: INTERNAL MEDICINE

## 2023-04-05 PROCEDURE — 93306 TTE W/DOPPLER COMPLETE: CPT | Mod: 26 | Performed by: INTERNAL MEDICINE

## 2023-04-05 PROCEDURE — 84132 ASSAY OF SERUM POTASSIUM: CPT | Performed by: INTERNAL MEDICINE

## 2023-04-05 PROCEDURE — 99232 SBSQ HOSP IP/OBS MODERATE 35: CPT | Performed by: INTERNAL MEDICINE

## 2023-04-05 PROCEDURE — 93228 REMOTE 30 DAY ECG REV/REPORT: CPT | Performed by: INTERNAL MEDICINE

## 2023-04-05 PROCEDURE — 93010 ELECTROCARDIOGRAM REPORT: CPT | Mod: HIP | Performed by: INTERNAL MEDICINE

## 2023-04-05 PROCEDURE — 97110 THERAPEUTIC EXERCISES: CPT | Mod: GO

## 2023-04-05 PROCEDURE — 97535 SELF CARE MNGMENT TRAINING: CPT | Mod: GO

## 2023-04-05 PROCEDURE — 80048 BASIC METABOLIC PNL TOTAL CA: CPT | Performed by: INTERNAL MEDICINE

## 2023-04-05 PROCEDURE — 255N000002 HC RX 255 OP 636: Performed by: INTERNAL MEDICINE

## 2023-04-05 PROCEDURE — 97166 OT EVAL MOD COMPLEX 45 MIN: CPT | Mod: GO

## 2023-04-05 RX ORDER — MAGNESIUM SULFATE HEPTAHYDRATE 40 MG/ML
2 INJECTION, SOLUTION INTRAVENOUS ONCE
Status: COMPLETED | OUTPATIENT
Start: 2023-04-05 | End: 2023-04-05

## 2023-04-05 RX ORDER — FUROSEMIDE 20 MG
40 TABLET ORAL DAILY
Status: DISCONTINUED | OUTPATIENT
Start: 2023-04-05 | End: 2023-04-05 | Stop reason: HOSPADM

## 2023-04-05 RX ADMIN — TAMSULOSIN HYDROCHLORIDE 0.4 MG: 0.4 CAPSULE ORAL at 08:24

## 2023-04-05 RX ADMIN — OXYCODONE HYDROCHLORIDE 10 MG: 5 TABLET ORAL at 06:23

## 2023-04-05 RX ADMIN — FUROSEMIDE 40 MG: 20 TABLET ORAL at 09:16

## 2023-04-05 RX ADMIN — ASPIRIN 81 MG: 81 TABLET, COATED ORAL at 08:25

## 2023-04-05 RX ADMIN — LEVOTHYROXINE SODIUM 175 MCG: 100 TABLET ORAL at 08:26

## 2023-04-05 RX ADMIN — MAGNESIUM SULFATE HEPTAHYDRATE 2 G: 40 INJECTION, SOLUTION INTRAVENOUS at 09:16

## 2023-04-05 RX ADMIN — PERFLUTREN 3 ML: 6.52 INJECTION, SUSPENSION INTRAVENOUS at 09:25

## 2023-04-05 RX ADMIN — INSULIN ASPART 2 UNITS: 100 INJECTION, SOLUTION INTRAVENOUS; SUBCUTANEOUS at 09:24

## 2023-04-05 RX ADMIN — ATORVASTATIN CALCIUM 80 MG: 40 TABLET, FILM COATED ORAL at 08:24

## 2023-04-05 RX ADMIN — LOSARTAN POTASSIUM 50 MG: 50 TABLET, FILM COATED ORAL at 08:24

## 2023-04-05 ASSESSMENT — ACTIVITIES OF DAILY LIVING (ADL)
ADLS_ACUITY_SCORE: 22

## 2023-04-05 NOTE — PROGRESS NOTES
"[facility]    Medicine Progress Note - Hospitalist Service    Date of Admission:  4/4/2023    Assessment & Plan     82-year-old who had TAVR procedure done on 4/4.  Hospitalist consulted for medical management.    Non-Insulin-dependent diabetes, type II: well controlled with HbA1C 6.8.   -- Hold PTA glipizide  -- Continue Lantus 5 units at bedtime and NovoLog with meals ratio 1: 10, medium sliding scale insulin  -- Continue diabetic diet    Essential hypertension: BP controlled. Continue PTA losartan    Hypomagnesemia: Magnesium 1.6. Will replace and recheck.    Hypothyroidism: Continue PTA levothyroxine    Severe aortic stenosis: s/p TAVR. Echocardiogram shows prosthetic aortic valve functions well.   - Post procedure care per cardiology.                                                                                                                                                                                               Diet: Combination Diet Moderate Consistent Carb (60 g CHO per Meal) Diet  Diet    DVT Prophylaxis: Low Risk/Ambulatory with no VTE prophylaxis indicated  Welch Catheter: Not present  Lines: None     Cardiac Monitoring: ACTIVE order. Indication: Transcatheter structural interventions (24 hours)  Code Status: Full Code      Clinically Significant Risk Factors        # Hyperkalemia: Highest K = 5.5 mmol/L in last 2 days, will monitor as appropriate     # Hypomagnesemia: Lowest Mg = 1.6 mg/dL in last 2 days, will replace as needed            # DMII: A1C = 6.8 % (Ref range: <5.7 %) within past 6 months, PRESENT ON ADMISSION  # Severe Obesity: Estimated body mass index is 46.26 kg/m  as calculated from the following:    Height as of this encounter: 1.727 m (5' 8\").    Weight as of this encounter: 138 kg (304 lb 3.8 oz)., PRESENT ON ADMISSION         Disposition Plan      Expected Discharge Date: 04/05/2023                  Sanya Mccoy MD  Hospitalist Service  [facility]" [facility]  Securely message with Exent (more info)  Text page via [employer] Paging/Directory   ______________________________________________________________________    Interval History   Patient reports feeling well. No chest pain and no SOB when he ambulates. He is eager to go home today.     Physical Exam   Vital Signs: Temp: 98.4  F (36.9  C) Temp src: Oral BP: 94/50 Pulse: 67   Resp: 20 SpO2: 91 % O2 Device: None (Room air) Oxygen Delivery: 1 LPM  Weight: 304 lbs 3.76 oz    General appearance: not in acute distress  HEENT: PERRL, EOMI  Lungs: Clear breath sounds in bilateral lung fields  Cardiovascular: Regular rate and rhythm, normal S1-S2  Abdomen: Soft, non tender, no distension  Musculoskeletal: No joint swelling  Skin: No rash and no edema  Neurology: AAO ×3.  Cranial nerves II - XII normal.  Normal muscle strength in all four extremities.    Medical Decision Making       35 MINUTES SPENT BY ME on the date of service doing chart review, history, exam, documentation & further activities per the note.      Data     I have personally reviewed the following data over the past 24 hrs:    9.2  \   13.0 (L)   / 150     138 105 34.5 (H) /  294 (H)   4.8 25 1.08 \       ALT: 19 AST: 25 AP: 52 TBILI: 0.5   ALB: 3.7 TOT PROTEIN: 6.2 (L) LIPASE: N/A       TSH: N/A T4: N/A A1C: 6.8 (H)       Imaging results reviewed over the past 24 hrs:   Recent Results (from the past 24 hour(s))   XR Chest Port 1 View    Narrative    EXAM: XR CHEST PORT 1 VIEW  LOCATION: [facility]  DATE/TIME: 4/5/2023 5:45 AM    INDICATION: S P Transcatheter Aortic Valve Replacement  COMPARISON: 04/11/2022      Impression    IMPRESSION: Post TAVR. Aortic atherosclerosis. Stable mild cardiac silhouette enlargement. Cannot exclude minimal pleural fluid. No pneumothorax. Mild basilar atelectasis.      Echocardiogram Complete    Narrative    [id number]  [id number]  [id number]  [id number]^YOLANDE^[place] [address]     Name: AUGUSTO ROBERTSON  MRN: [id number]  : 1940  Study Date: 2023 08:46 AM  Age: 82 yrs  Gender: Male  Patient Location: [facility]  Reason For Study: Aortic Valve Replacement  Ordering Physician: NEGAR LANIER  Referring Physician: EDER GILMORE  Performed By: [facility]     BSA: 2.4 m2  Height: 68 in  Weight: 304 lb  HR: 67  BP: 142/65 mmHg  ______________________________________________________________________________  Procedure  Complete Portable Echo Adult. Definity (NDC #83541-726) given intravenously.  3.0 ml; lot # [id number]. Technically difficult study.Extremely poor acoustic  windows.  ______________________________________________________________________________  Interpretation Summary     1. Technically difficult study despite utilization of ultrasound enhancing  agent.  2. The left ventricle is normal in size. Image quality does not provide for  detailed assessment of LV systolic function, but is felt to be mildly  decreased with a visually estimated ejection fraction of roughly 45-50%.  3. There is probable mild global reduction in left ventricular systolic  performance.  4. There is mild concentric increase in left ventricular wall thickness.  5. There is a bio-prosthetic aortic valve (documented 29 mm Washburn Maribel 3  tissue valve).  Â  Normal aortic valve prosthesis metrics with a mean systolic gradient of 12  mmHg and a peak anterograde velocity of 2.2 m/sec.  Â  No aortic insufficiency is detected.  6. Right ventricular size and systolic performance cannot be accurately  assessed due to inadequate visualization.     The acoustic quality of this study is very poor. If a more accurate assessment  of left ventricular systolic performance, regional wall motion, and other  morphology/function is required; would recommend cardiac MRI or other  alternative imaging modality for further  evaluation.  ______________________________________________________________________________  Left ventricle:  The left ventricle is normal in size. Image quality does not provide for  detailed assessment of LV systolic function, but is felt to be mildly  decreased with a visually estimated ejection fraction of roughly 45-50%. There  is probable mild global reduction in left ventricular systolic performance.  There is mild concentric increase in left ventricular wall thickness.     Right ventricle:  Right ventricular size and systolic performance cannot be accurately assessed  due to inadequate visualization.     Left atrium:  Left atrium is not well visualized     Right atrium:  Right atrium is not well visualized.     IVC:  The IVC is of normal caliber.     Aortic valve:  There is a bio-prosthetic aortic valve (documented 29 mm Washburn Maribel 3  tissue valve). Normal aortic valve prosthesis metrics with a mean systolic  gradient of 12 mmHg and a peak anterograde velocity of 2.2 m/sec. The Ao  Acceleration Time is 0.11 sec. No aortic insufficiency is detected.     Mitral valve:  The mitral valve appears grossly morphologically normal. No excessive mitral  insufficiency is detected.     Tricuspid valve:  Tricuspid valve is not well visualized     Pulmonic valve:  The pulmonic valve is not well visualized     Thoracic aorta:  The aortic root and proximal ascending aorta are not well visualized.     Pericardium:  There is no significant pericardial effusion.  ______________________________________________________________________________  ______________________________________________________________________________  MMode/2D Measurements & Calculations  IVSd: 1.8 cm  LVIDd: 5.9 cm  LVIDs: 3.6 cm  LVPWd: 1.7 cm  FS: 39.5 %  LV mass(C)d: 506.1 grams  LV mass(C)dI: 207.2 grams/m2  Ao root diam: 2.8 cm  LVOT diam: 2.2 cm  LVOT area: 3.8 cm2  RWT: 0.56     Doppler Measurements & Calculations  MV E max ruddy: 58.7 cm/sec  MV A  max artem: 74.7 cm/sec  MV E/A: 0.79  MV dec slope: 183.7 cm/sec2  MV dec time: 0.32 sec  Ao V2 max: 224.5 cm/sec  Ao max P.0 mmHg  Ao V2 mean: 159.0 cm/sec  Ao mean P.0 mmHg  Ao V2 VTI: 42.4 cm  LENNY(I,D): 1.8 cm2  LENNY(V,D): 1.9 cm2  AI P1/2t: 31.5 msec  Ao acc time: 0.11 sec  LV V1 max P.2 mmHg  LV V1 max: 113.4 cm/sec  LV V1 VTI: 19.4 cm  SV(LVOT): 74.7 ml  SI(LVOT): 30.6 ml/m2     PA V2 max: 88.2 cm/sec  PA max PG: 3.1 mmHg  PA acc time: 0.07 sec  AV Artem Ratio (DI): 0.51  LENNY Index (cm2/m2): 0.72     ______________________________________________________________________________  Report approved by: Diony Cuellar 2023 10:06 AM

## 2023-04-05 NOTE — PLAN OF CARE
"  Problem: Plan of Care - These are the overarching goals to be used throughout the patient stay.    Goal: Plan of Care Review  Description: The Plan of Care Review/Shift note should be completed every shift.  The Outcome Evaluation is a brief statement about your assessment that the patient is improving, declining, or no change.  This information will be displayed automatically on your shift note.  Outcome: Progressing  Goal: Patient-Specific Goal (Individualized)  Description: You can add care plan individualizations to a care plan. Examples of Individualization might be:  \"Parent requests to be called daily at 9am for status\", \"I have a hard time hearing out of my right ear\", or \"Do not touch me to wake me up as it startles me\".  Outcome: Progressing  Goal: Absence of Hospital-Acquired Illness or Injury  Outcome: Progressing  Intervention: Identify and Manage Fall Risk  Recent Flowsheet Documentation  Taken 4/5/2023 0400 by Alberto Smalls, RN  Safety Promotion/Fall Prevention:   increase visualization of patient   clutter free environment maintained   activity supervised   safety round/check completed   room organization consistent   room near nurse's station   room door open   patient and family education   nonskid shoes/slippers when out of bed   lighting adjusted   increased rounding and observation  Taken 4/5/2023 0000 by Alberto Smalls RN  Safety Promotion/Fall Prevention:   increase visualization of patient   clutter free environment maintained   activity supervised   safety round/check completed   room organization consistent   room near nurse's station   room door open   patient and family education   nonskid shoes/slippers when out of bed   lighting adjusted   increased rounding and observation  Taken 4/4/2023 2000 by Alberto Smalls, RN  Safety Promotion/Fall Prevention:   increase visualization of patient   clutter free environment maintained   activity supervised   safety " round/check completed   room organization consistent   room near nurse's station   room door open   patient and family education   nonskid shoes/slippers when out of bed   lighting adjusted   increased rounding and observation  Intervention: Prevent Skin Injury  Recent Flowsheet Documentation  Taken 4/5/2023 0400 by Alberto Smalls RN  Body Position: tilted  Taken 4/5/2023 0000 by Alberto Smalls RN  Body Position: tilted  Taken 4/4/2023 2000 by Alberto Smalls RN  Body Position: tilted  Goal: Optimal Comfort and Wellbeing  Outcome: Progressing  Goal: Readiness for Transition of Care  Outcome: Progressing   Goal Outcome Evaluation:    Procedure: Procedure(s):  Transcatheter Aortic Valve Replacement, possible cardiopulmonary bypass, possible surgical intervention  OR TRANSCATHETER AORTIC VALVE REPLACEMENT, FEMORAL PERCUTANEOUS APPROACH (STANDBY)     Post Op day #:1     Subjective (Patient focus/Primary Problem for shift):  C/o chronic lower back pain.  Repositioning ad evgeny as needed.  Remained in bed for duration of NOC.          Pain Goal 3 Pain Rating 0          Pain Medication/ Regime effective to reduce patient pain Yes.     Objective (Physical assessment):           Rhythm:NSR with Bigeminy, rate 70-90's.             Bowel Activity: no if Yes indicate when: NA.          Bowel Medications: not applicable             Incision: healing well          Incentive Spirometry Q 1-2 hour when awake:  not applicable Volume: NA.          Epicardial Pacing Wires:  not applicable             Patient Activity:           Up to chair for meals: yes          Ambulation with RN x2 (Not including CR): no            Is patient in home clothes:no         Assessment (Nursing primary shift focus): States having a chronic back condition.  The prolonged bedrest elevated pain levels.  Off bedrest at approximately 20:00 hrs.  Wound sites CDI.       Plan (Patient Care Plan/focus): Pending possible discharge today.          Alberto Smalls RN   4/5/2023   5:28 AM

## 2023-04-05 NOTE — PROGRESS NOTES
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 Davies   Additional Occupational Profile Info/Pertinent History of Current Problem s/p TAVR   Cognitive Status Examination   Affect/Mental Status (Cognitive) WNL   Follows Commands WNL   Bed Mobility   Bed Mobility supine-sit   Supine-Sit [place] (Bed Mobility) independent   Transfers   Transfers bed-chair transfer;sit-stand transfer   Transfer Skill: Bed to Chair/Chair to Bed   Bed-Chair [place] (Transfers) independent   Sit-Stand Transfer   Sit-Stand [place] (Transfers) independent   Balance   Balance Assessment no deficits were identified   Clinical Impression   Criteria for Skilled Therapeutic Interventions Met (OT) Yes, treatment indicated   OT Diagnosis post TAVR education   Assessment of Occupational Performance 1-3 Performance Deficits   Identified Performance Deficits ADLs post TAVR   Planned Therapy Interventions (OT) ADL retraining;progressive activity/exercise;home program guidelines   Clinical Decision Making Complexity (OT) low complexity   Risk & Benefits of therapy have been explained care plan/treatment goals reviewed   OT Total Evaluation Time   OT Eval, Low Complexity Minutes (85966) 8   OT Goals   Therapy Frequency (OT) One time eval and treatment   OT Predicted Duration/Target Date for Goal Attainment 04/05/23   OT Goals Cardiac Phase 1   OT: Understanding of cardiac education to maximize quality of life, condition management, and  health outcomes Patient;Verbalize   OT: Perform aerobic activity with stable cardiovascular response 5 minutes;ambulation   OT: Functional/aerobic ambulation tolerance with stable cardiovascular response in order to return to home and community environment Supervision/SBA;Independent;200 feet   OT: Navigation of stairs simulating home set up with stable cardiovascular response in order to return to home and community environment 7 stairs;Independent   Interventions   Interventions Quick Adds Self-Care/Home Management;Therapeutic Procedures/Exercise;Cardiac Rehab   Self-Care/Home Management   Self-Care/Home Mgmt/ADL, Compensatory, Meal Prep Minutes (46650) 8   Treatment Detail/Skilled Intervention see CR education, Patient Education on ambulation and outpt CR. Doesn't feel he needs outpt CR 2/2 his active lifestyle   Therapeutic Procedures/Exercise   Therapeutic Procedure: strength, endurance, ROM, flexibillity minutes (24760) 12   Treatment Detail/Skilled Intervention see amb, stairs   Ambulation   Workload 175ft   Cardiovascular Response Normal   Exercise Details I no device   Vital Signs Details WNL, has not been given morning meds (SBP slighlty elevated- 146)   Stairs   Workload 7   Symptoms Denies symptoms   Cardiovascular Response Normal   Exercise Details I with stairs   Vital Signs Details WNL   Cardiac Education   Education Provided Resuming home activities;Outpatient Cardiac Rehab;Precautions;Home exercise program   OT Discharge Planning   OT Discharge Recommendation (DC Rec) home with outpatient cardiac rehab

## 2023-04-05 NOTE — PLAN OF CARE
Problem: Plan of Care - These are the overarching goals to be used throughout the patient stay.    Goal: Absence of Hospital-Acquired Illness or Injury  Intervention: Identify and Manage Fall Risk  Recent Flowsheet Documentation  Taken 4/5/2023 1200 by Tevin Lamar, RN  Safety Promotion/Fall Prevention:   increase visualization of patient   lighting adjusted   nonskid shoes/slippers when out of bed  Taken 4/5/2023 0800 by Tevin Lamar, RN  Safety Promotion/Fall Prevention:   increase visualization of patient   lighting adjusted   nonskid shoes/slippers when out of bed  Goal: Optimal Comfort and Wellbeing  Intervention: Monitor Pain and Promote Comfort  Recent Flowsheet Documentation  Taken 4/5/2023 0817 by Tevin Lamar, RN  Pain Management Interventions: medication (see MAR)   Goal Outcome Evaluation:    Pt is alert and oriented x4. Denied SOB and chest pain. Vitals checked and WNL.  Pt is independent, able to ambulate in room to/from bathroom. Bilateral groin site is WDL; dressing is CDI.    Pt will be discharging to home via family transport. Discharge orders/instructions given to pt; verbalized understanding.

## 2023-04-05 NOTE — PLAN OF CARE
Cardiac Rehab Discharge Summary    Reason for therapy discharge:    All goals and outcomes met, no further needs identified.    Progress towards therapy goal(s). See goals on Care Plan in [facility] electronic health record for goal details.  Goals met    Therapy recommendation(s):    Continue home exercise program.    Ayala Huerta MOT, OTR/L, CLT 4/5/2023 , 10:34 AM

## 2023-04-05 NOTE — DISCHARGE SUMMARY
[facility]  CARDIOLOGY DISCHARGE SUMMARY     Primary Care Physician: Geoffrey Live  Admission Date: 4/4/2023   Discharge Provider: Anh Burgos PA-C Discharge Date: 4/5/2023   Diet: resume previous home diet   Code Status: Full Code   Activity: No lifting > 10 lb, no driving for 10 days        Condition at Discharge: Stable      BRIEF HPI:   Jayesh was first referred to valve clinic in May 2022 and saw Dr. Mason.  At that time he had developed a slight cardiomyopathy with a EF 40 to 45%, but patient was endorsing no symptoms and he wanted to defer valve replacement at that time.  Repeat echocardiogram in December showed mild progression of valve disease, and patient decided to move forward with treatment, given risk of syncope and other associated M&M.  He met with Dr. Mason again and also one of the cardiothoracic surgeons, who both felt he would be a good candidate for transcatheter aortic valve replacement.     He presented yesterday for the planned, elective procedure.     PRINCIPAL & ACTIVE DISCHARGE DIAGNOSES     Principal Problem:    Aortic stenosis, severe  Active Problems:    Morbid obesity (H)    Type 2 diabetes mellitus (H)    Cardiomyopathy (H)    Coronary artery disease involving coronary bypass graft of native heart without angina pectoris    S/P TAVR (transcatheter aortic valve replacement)      SIGNIFICANT FINDINGS (Imaging, labs):   ECHO   Interpretation Summary     1. Technically difficult study despite utilization of ultrasound enhancing  agent.  2. The left ventricle is normal in size. Image quality does not provide for  detailed assessment of LV systolic function, but is felt to be mildly  decreased with a visually estimated ejection fraction of roughly 45-50%.  3. There is probable mild global reduction in left ventricular systolic  performance.  4. There is mild concentric increase in left ventricular wall thickness.  5. There is a bio-prosthetic aortic  valve (documented 29 mm Washburn Maribel 3  tissue valve).  Â  Normal aortic valve prosthesis metrics with a mean systolic gradient of 12  mmHg and a peak anterograde velocity of 2.2 m/sec.  Â  No aortic insufficiency is detected.  6. Right ventricular size and systolic performance cannot be accurately  assessed due to inadequate visualization.     The acoustic quality of this study is very poor. If a more accurate assessment  of left ventricular systolic performance, regional wall motion, and other  morphology/function is required; would recommend cardiac MRI or other  alternative imaging modality for further evaluation.    CXR  EXAM: XR CHEST PORT 1 VIEW  LOCATION: [facility]  DATE/TIME: 4/5/2023 5:45 AM     INDICATION: S P Transcatheter Aortic Valve Replacement  COMPARISON: 04/11/2022                                                                      IMPRESSION: Post TAVR. Aortic atherosclerosis. Stable mild cardiac silhouette enlargement. Cannot exclude minimal pleural fluid. No pneumothorax. Mild basilar atelectasis.     LABS or IMAGING REQUIRING FOLLOW-UP AFTER DISCHARGE:     Echocardiogram on May 11 at 10 am    PROCEDURES ( this hospitalization only)      Procedure(s):  Transcatheter Aortic Valve Replacement-Femoral Approach  OR TRANSCATHETER AORTIC VALVE REPLACEMENT, FEMORAL PERCUTANEOUS APPROACH (STANDBY)    RECOMMENDATIONS TO OUTPATIENT PROVIDER FOR F/U VISIT     With valve clinic RN on April 11 at 9:10 am    With Anh Burgos PA-C for 1 month visit on May 15 at 1:15    DISPOSITION     Home    HOSPITAL COURSE BY DIAGNOSIS:      #Severe aortic stenosis, now s/p TAVR using a 29 mm MARIBEL valve  #Ischemic cardiomyopathy  #Chronic heart failure with mildly reduced ejection fraction  EF pre-TAVR 40-45% and stable on this morning's echo.  He tolerated TAVR well and recovered in [facility] prior to being transferred to .  This morning he feels well.  He has been voiding without difficulty.   He denies dizziness/lightheadedness, N/V.  Sheath sites soft without hematoma.  Stitch was removed without difficulty.  Neuro's intact.  He denies any SOB or CP.     - give one dose of lasix 40 mg today     - ASA for anti-platelet therapy  - OP Cardiac rehab  - Daily weights  - Lifelong antibiotic prophylaxis prior to all dental procedures.  - follow ups have been arranged as listed above    #CAD  #Dyslipidemia, goal LDL < 70  Pre-TAVR coronary angiogram showed a  of the LAD.  With the absence of symptoms, no intervention was done.     LDL was 106 on lipid profile in April 2022.     - continue ASA 81 mg daily  - continue PTA atorvastatin 80 mg daily    #HTN  BP has been mostly stable since procedure    - Continue PTA losartan 50 mg daily    #Type II DM  Most recent A1C is 6.8, which is significantly improved from 13.2 three years ago.     - continue PTA glipizide and actos  - continue metformin on Saturday, April 8    #Hypomagnesemia  Admission Mag was 1.6.  Will give one dose of IV replacement, 2g, prior to discharge.     - recheck Mg outpatient    #Hyperkalemia  One dose of lasix given prior to discharge and repeat K+ was 4.8     Discharge Medications with Med changes:        Review of your medicines      START taking      Dose / Directions   aspirin 81 MG EC tablet  Commonly known as: ASA      Dose: 81 mg  Start taking on: April 6, 2023  Take 1 tablet (81 mg) by mouth daily  Refills: 0        CONTINUE these medicines which may have CHANGED, or have new prescriptions. If we are uncertain of the size of tablets/capsules you have at home, strength may be listed as something that might have changed.      Dose / Directions   glipiZIDE 10 MG tablet  Commonly known as: GLUCOTROL  This may have changed: when to take this  Used for: Type 2 diabetes mellitus without complications (H)      TAKE 1 TABLET BY MOUTH TWICE A DAY  Quantity: 180 tablet  Refills: 1     metFORMIN 500 MG tablet  Commonly known as:  GLUCOPHAGE  This may have changed:     when to take this    additional instructions  Used for: Type 2 diabetes mellitus with hyperglycemia (H)      TAKE 2 TABLETS BY MOUTH TWICE A DAY WITH FOOD  Quantity: 360 tablet  Refills: 0     tamsulosin 0.4 MG capsule  Commonly known as: FLOMAX  This may have changed: how to take this  Used for: Malignant neoplasm of prostate (H)      TAKE 1 CAPSULE BY MOUTH EVERY DAY  Quantity: 90 capsule  Refills: 3        CONTINUE these medicines which have NOT CHANGED      Dose / Directions   atorvastatin 80 MG tablet  Commonly known as: LIPITOR  Used for: Coronary artery disease involving coronary bypass graft of native heart without angina pectoris      Dose: 80 mg  Take 1 tablet (80 mg) by mouth daily  Quantity: 30 tablet  Refills: 12     levothyroxine 175 MCG tablet  Commonly known as: SYNTHROID/LEVOTHROID  Used for: Hypothyroidism, unspecified type      Dose: 175 mcg  Take 1 tablet (175 mcg) by mouth daily  Quantity: 90 tablet  Refills: 3     losartan 50 MG tablet  Commonly known as: COZAAR  Used for: Essential hypertension      Dose: 50 mg  Take 1 tablet (50 mg) by mouth daily  Quantity: 90 tablet  Refills: 3     pioglitazone 45 MG tablet  Commonly known as: ACTOS  Used for: Uncontrolled type 2 diabetes mellitus with hyperglycemia (H)      Dose: 45 mg  TAKE 1 TABLET (45 MG) BY MOUTH IN THE MORNING.  Quantity: 90 tablet  Refills: 0           Where to get your medicines      Some of these will need a paper prescription and others can be bought over the counter. Ask your nurse if you have questions.    You don't need a prescription for these medications    aspirin 81 MG EC tablet               Rationale for medication changes:      Addition of ASA for new valve        Consults   Hospitalist  Cardiac rehab       Pertinent Labs     Lab Results   Component Value Date    WBC 9.2 04/05/2023    HGB 13.0 (L) 04/05/2023    HCT 39.9 (L) 04/05/2023    MCV 97 04/05/2023     04/05/2023      Lab Results   Component Value Date    CR 1.08 04/05/2023     Lab Results   Component Value Date     04/05/2023    POTASSIUM 4.8 04/05/2023    CHLORIDE 105 04/05/2023    CO2 25 04/05/2023     (H) 04/05/2023     Lab Results   Component Value Date    GFRESTIMATED 69 04/05/2023    GFRESTBLACK >60 04/13/2021     Lab Results   Component Value Date     (H) 04/05/2023     Lab Results   Component Value Date    BUN 34.5 (H) 04/05/2023    CR 1.08 04/05/2023           Discharge Orders     Discharge Procedure Orders   CARDIAC REHAB REFERRAL   Standing Status: Future   Referral Priority: Routine Referral Type: Rehab Therapy Cardiac Therapy   Number of Visits Requested: 1     Reason for your hospital stay   Order Comments: New valve     Follow-up and recommended labs and tests   Order Comments: Follow up  - refer to AVS for appointments already made     Activity   Order Comments: See activity restrictions on AVS printout     Order Specific Question Answer Comments   Is discharge order? Yes      Diet   Order Comments: Resume previous home diet     Order Specific Question Answer Comments   Is discharge order? Yes      Examination     Vital Signs in last 24 hours:   Temp:  [97.7  F (36.5  C)-98.4  F (36.9  C)] 98.4  F (36.9  C)  Pulse:  [56-77] 67  Resp:  [12-21] 20  BP: ()/(50-75) 94/50  SpO2:  [90 %-94 %] 91 %        General Appearance:   Alert, cooperative and in no acute distress   ENT/Mouth: membranes moist, no oral lesions or bleeding gums.      EYES:  no scleral icterus, normal conjunctivae   Neck: Supple without lymphadenopathy.  Thyroid not visualized   Chest/Lungs:   lungs are clear to auscultation, no rales or wheezing   Cardiovascular:   Regular. Normal first and second heart sounds with no murmurs, rubs, or gallops; the carotid, radial and posterior tibial pulses are intact, mild edema bilaterally    Abdomen:  Soft and nontender. Bowel sounds are present in all quadrants   Extremities: no  cyanosis or clubbing.  Puncture site is soft and nontender with some mild to moderate bruising in right groin.    Skin: no xanthelasma, warm.    Neurologic: normal gait, normal  bilateral, no tremors   Psychiatric: Normal mood and affect         Please see EMR for more detailed significant labs, imaging, consultant notes etc.

## 2023-04-06 ENCOUNTER — PATIENT OUTREACH (OUTPATIENT)
Dept: CARE COORDINATION | Facility: CLINIC | Age: 83
End: 2023-04-06
Payer: MEDICARE

## 2023-04-06 NOTE — PROGRESS NOTES
Clinic Care Coordination Contact  [facility]: Post-Discharge Note  SITUATION                                                      Admission:    Admission Date: 04/04/23   Reason for Admission: New valve  Discharge:   Discharge Date: 04/05/23  Discharge Diagnosis: aortic stenosis    BACKGROUND                                                      Per hospital discharge summary and inpatient provider notes:  Jayesh was first referred to valve clinic in May 2022 and saw Dr. Mason.  At that time he had developed a slight cardiomyopathy with a EF 40 to 45%, but patient was endorsing no symptoms and he wanted to defer valve replacement at that time.  Repeat echocardiogram in December showed mild progression of valve disease, and patient decided to move forward with treatment, given risk of syncope and other associated M&M.  He met with Dr. Mason again and also one of the cardiothoracic surgeons, who both felt he would be a good candidate for transcatheter aortic valve replacement.    ASSESSMENT           Discharge Assessment  How are you doing now that you are home?: wonderful  How are your symptoms? (Red Flag symptoms escalate to triage hotline per guidelines): Improved  Do you feel your condition is stable enough to be safe at home until your provider visit?: Yes  Does the patient have their discharge instructions? : Yes  Does the patient have questions regarding their discharge instructions? : No  Were you started on any new medications or were there changes to any of your previous medications? : Yes  Does the patient have all of their medications?: Yes  Do you have questions regarding any of your medications? : No  Do you have all of your needed medical supplies or equipment (DME)?  (i.e. oxygen tank, CPAP, cane, etc.): Yes  Discharge follow-up appointment scheduled within 14 calendar days? : Yes                  PLAN                                                      Outpatient Plan:  With valve clinic RN on  April 11 at 9:10 am     With Anh Burgos PA-C for 1 month visit on May 15 at 1:15    Future Appointments   Date Time Provider Department Center   4/11/2023  9:10 AM [facility] VALVE RN [facility]   4/17/2023  8:15 AM [facility] CARD R[facility] 2 [facility]   4/28/2023 10:40 AM [facility]   5/11/2023 10:00 AM [facility] ECHO STAFF [facility]   5/15/2023  1:15 PM [facility]   5/15/2023  1:30 PM Anh Burgos PA-C [facility]         For any urgent concerns, please contact our 24 hour nurse triage line: [phone] ([phone])         Jeanette Enciso MA

## 2023-04-07 ENCOUNTER — TELEPHONE (OUTPATIENT)
Dept: CARDIOLOGY | Facility: CLINIC | Age: 83
End: 2023-04-07
Payer: MEDICARE

## 2023-04-07 NOTE — TELEPHONE ENCOUNTER
[facility] Call Center    Phone Message    May a detailed message be left on voicemail: yes     Reason for Call: Other: Patient called requesting to speak with someone on the care team in regards to the appointments he has. Wants clarifications on if he is needed to go to all of them or if it's okay to have them all done at once on a specific appointment date that is already set.  Please call patient back to further discuss.     Action Taken: Other: Cardiology    Travel Screening: Not Applicable     Thank you!  [facility]

## 2023-04-07 NOTE — TELEPHONE ENCOUNTER
Spoke with the pt who states they do not want to participate in cardiac rehab. 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 If they change their mind they will let them know. Will forward message to cardiac rehab team.    Pt states they are doing well at home. Pt denies pain, shortness of breath, dizziness/lightheadedness, fluid retention. Pt states puncture sites from TAVR procedure are well and without concern. Pt will continue to monitor how they are doing and call with any changes. Patient verbalizes understanding and agrees with plan. No further questions or concerns at this time.

## 2023-04-11 ENCOUNTER — ALLIED HEALTH/NURSE VISIT (OUTPATIENT)
Dept: CARDIOLOGY | Facility: CLINIC | Age: 83
End: 2023-04-11
Payer: MEDICARE

## 2023-04-11 VITALS
BODY MASS INDEX: 47.74 KG/M2 | RESPIRATION RATE: 16 BRPM | DIASTOLIC BLOOD PRESSURE: 60 MMHG | HEART RATE: 76 BPM | SYSTOLIC BLOOD PRESSURE: 158 MMHG | WEIGHT: 314 LBS | OXYGEN SATURATION: 96 %

## 2023-04-11 DIAGNOSIS — I10 ESSENTIAL HYPERTENSION: ICD-10-CM

## 2023-04-11 DIAGNOSIS — Z95.2 S/P TAVR (TRANSCATHETER AORTIC VALVE REPLACEMENT): Primary | ICD-10-CM

## 2023-04-11 DIAGNOSIS — Z95.2 S/P TAVR (TRANSCATHETER AORTIC VALVE REPLACEMENT): ICD-10-CM

## 2023-04-11 PROCEDURE — 93000 ELECTROCARDIOGRAM COMPLETE: CPT | Performed by: INTERNAL MEDICINE

## 2023-04-11 PROCEDURE — 99207 PR NO CHARGE LOS: CPT

## 2023-04-11 RX ORDER — LOSARTAN POTASSIUM 100 MG/1
100 TABLET ORAL DAILY
Qty: 90 TABLET | Refills: 3 | Status: SHIPPED | OUTPATIENT
Start: 2023-04-11 | End: 2024-01-01

## 2023-04-11 NOTE — PATIENT INSTRUCTIONS
Continue to monitor your right groin site. Call with any changes or increase in hematoma. Increase Losartan to 100mg daily. Call if any lightheadedness, dizziness or passing out.

## 2023-04-11 NOTE — PROGRESS NOTES
Patient seen in clinic for 1-2 week post TAVR procedure on 4/4/23 with 29 S3 valve.  Anesthesia type for valve procedure: MAC  TAVR valve access site: right TF  Access site assessment: Right groin to pubic area is bruised. Small 2 inch hematoma above puncture site that is stable per the pt. CMS intact.  BP in clinic: 158/60  Heart rate in clinic: 76 (EKG done in clinic)  Wearing heart monitor: Yes  BP monitoring at home: No, pt does not have a BP monitor at home and is not interested  Patient taking aspirin 81mg daily: yes  Any symptoms or concerns: pt states they are doing well at home. Slight discomfort in right groin that pt states is much improved. Pt has BLE swelling that pt states is stable with no changes. Pt EKG in clinic today with bigeminy that pt states is asymptomatic and does not feel them. Due to pts ongoing elevated BP, will increase losartan to 100mg per Anh. Pt will call the clinic with any changes in how they are doing.   Patient ok to drive: yes    Patient was instructed no routine dental care for 6 months post valve procedure.  After 6 months patient understands they will require prophylactic antibiotic to take 30-60 mins prior lifelong.  Discussed upcoming appointments.   Patient verbalizes understanding and agrees with plan. No further questions or concerns at this time.

## 2023-04-12 LAB
ATRIAL RATE - MUSE: 76 BPM
DIASTOLIC BLOOD PRESSURE - MUSE: NORMAL MMHG
INTERPRETATION ECG - MUSE: NORMAL
P AXIS - MUSE: -8 DEGREES
PR INTERVAL - MUSE: 258 MS
QRS DURATION - MUSE: 156 MS
QT - MUSE: 450 MS
QTC - MUSE: 506 MS
R AXIS - MUSE: -85 DEGREES
SYSTOLIC BLOOD PRESSURE - MUSE: NORMAL MMHG
T AXIS - MUSE: 83 DEGREES
VENTRICULAR RATE- MUSE: 76 BPM

## 2023-04-20 DIAGNOSIS — Z95.2 S/P TAVR (TRANSCATHETER AORTIC VALVE REPLACEMENT): Primary | ICD-10-CM

## 2023-04-28 ENCOUNTER — OFFICE VISIT (OUTPATIENT)
Dept: FAMILY MEDICINE | Facility: CLINIC | Age: 83
End: 2023-04-28
Payer: MEDICARE

## 2023-04-28 VITALS
SYSTOLIC BLOOD PRESSURE: 127 MMHG | HEART RATE: 69 BPM | HEIGHT: 68 IN | BODY MASS INDEX: 47.5 KG/M2 | DIASTOLIC BLOOD PRESSURE: 51 MMHG | RESPIRATION RATE: 18 BRPM | TEMPERATURE: 97.6 F | WEIGHT: 313.4 LBS | OXYGEN SATURATION: 94 %

## 2023-04-28 DIAGNOSIS — I35.0 AORTIC STENOSIS, SEVERE: ICD-10-CM

## 2023-04-28 DIAGNOSIS — I25.10 CORONARY ARTERY DISEASE INVOLVING NATIVE CORONARY ARTERY OF NATIVE HEART WITHOUT ANGINA PECTORIS: Primary | ICD-10-CM

## 2023-04-28 DIAGNOSIS — E11.65 UNCONTROLLED TYPE 2 DIABETES MELLITUS WITH HYPERGLYCEMIA (H): ICD-10-CM

## 2023-04-28 DIAGNOSIS — E03.9 HYPOTHYROIDISM, UNSPECIFIED TYPE: ICD-10-CM

## 2023-04-28 DIAGNOSIS — I10 ESSENTIAL HYPERTENSION: ICD-10-CM

## 2023-04-28 DIAGNOSIS — E78.5 HYPERLIPIDEMIA, UNSPECIFIED HYPERLIPIDEMIA TYPE: ICD-10-CM

## 2023-04-28 DIAGNOSIS — Z95.2 S/P TAVR (TRANSCATHETER AORTIC VALVE REPLACEMENT): ICD-10-CM

## 2023-04-28 DIAGNOSIS — C61 MALIGNANT NEOPLASM OF PROSTATE (H): ICD-10-CM

## 2023-04-28 LAB
ERYTHROCYTE [DISTWIDTH] IN BLOOD BY AUTOMATED COUNT: 13.2 % (ref 10–15)
HCT VFR BLD AUTO: 42.9 % (ref 40–53)
HGB BLD-MCNC: 13.8 G/DL (ref 13.3–17.7)
MCH RBC QN AUTO: 31.4 PG (ref 26.5–33)
MCHC RBC AUTO-ENTMCNC: 32.2 G/DL (ref 31.5–36.5)
MCV RBC AUTO: 98 FL (ref 78–100)
PLATELET # BLD AUTO: 160 10E3/UL (ref 150–450)
RBC # BLD AUTO: 4.4 10E6/UL (ref 4.4–5.9)
WBC # BLD AUTO: 5.5 10E3/UL (ref 4–11)

## 2023-04-28 PROCEDURE — 84443 ASSAY THYROID STIM HORMONE: CPT | Performed by: FAMILY MEDICINE

## 2023-04-28 PROCEDURE — 80048 BASIC METABOLIC PNL TOTAL CA: CPT | Performed by: FAMILY MEDICINE

## 2023-04-28 PROCEDURE — 85027 COMPLETE CBC AUTOMATED: CPT | Performed by: FAMILY MEDICINE

## 2023-04-28 PROCEDURE — 36415 COLL VENOUS BLD VENIPUNCTURE: CPT | Performed by: FAMILY MEDICINE

## 2023-04-28 PROCEDURE — 90677 PCV20 VACCINE IM: CPT | Performed by: FAMILY MEDICINE

## 2023-04-28 PROCEDURE — 99215 OFFICE O/P EST HI 40 MIN: CPT | Mod: 25 | Performed by: FAMILY MEDICINE

## 2023-04-28 PROCEDURE — 80061 LIPID PANEL: CPT | Performed by: FAMILY MEDICINE

## 2023-04-28 PROCEDURE — G0009 ADMIN PNEUMOCOCCAL VACCINE: HCPCS | Performed by: FAMILY MEDICINE

## 2023-04-28 PROCEDURE — 84439 ASSAY OF FREE THYROXINE: CPT | Performed by: FAMILY MEDICINE

## 2023-04-28 PROCEDURE — 83735 ASSAY OF MAGNESIUM: CPT | Performed by: FAMILY MEDICINE

## 2023-04-28 NOTE — PROGRESS NOTES
"  Assessment & Plan       Coronary artery disease involving native artery    Reviewed the evaluation including coronary catheterization prior to his surgery  We will continue aspirin, atorvastatin, and losartan  Follow-up with cardiology as planned    - Magnesium  - Magnesium    Aortic stenosis, severe  S/P TAVR (transcatheter aortic valve replacement)    He was treated with the TAVR procedure as noted  He is currently stable and doing well    Follow-up with cardiology  - Basic metabolic panel  - CBC with platelets    Uncontrolled type 2 diabetes mellitus with hyperglycemia (H)    His most recent hemoglobin A1c improved to 6.8%  He is treated currently with metformin, glipizide, and pioglitazone  We have discussed having him consider a GLP-1 agonist or SGL 2 inhibitor though he declines currently  Recommend monitoring his blood sugars  Recommend monitoring for hypoglycemia    Hypothyroidism, unspecified type    Continue levothyroxine  Monitor thyroid function testing    - TSH with free T4 reflex  - TSH with free T4 reflex    Essential hypertension    Continue losartan  Monitor blood pressure      Hyperlipidemia, unspecified hyperlipidemia type    Continue atorvastatin  - Lipid panel reflex to direct LDL Fasting  - Lipid panel reflex to direct LDL Fasting    Malignant neoplasm of prostate (H)    Continue plan per urology    Spent 60 minutes including time for chart preparation review as well as time with patient and in documenting the plan    Review of external notes as documented elsewhere in note       MED REC REQUIRED  Post Medication Reconciliation Status: discharge medications reconciled, continue medications without change  BMI:    Estimated body mass index is 47.65 kg/m  as calculated from the following:    Height as of this encounter: 1.727 m (5' 8\").    Weight as of this encounter: 142.2 kg (313 lb 6.4 oz).   Weight management plan: Discussed healthy diet and exercise guidelines        Geoffrey Live, " MD [facility]    Otoniel Mcconnell is a 82 year old, presenting for the following health issues:  Recheck Medication and Diabetes    This is a pleasant 82-year-old male presents to the clinic following a recent hospitalization for severe aortic stenosis treated via TAVR.  He had been monitored by cardiology with advancement of his aortic stenosis and therefore met the requirements for a TAVR procedure.  He reports that the procedure was stressful as he recalls waking up while his groin was being stapled.  He has had follow-up with cardiology and reports that he has been feeling well.  He has not had any chest pain or respiratory concerns.  He is due for laboratory testing today.    His medical history is notable for type 2 diabetes mellitus which previously was inadequately controlled, hypertension, hyperlipidemia, and hypothyroidism.  He also has a history of prostate cancer as well as remote history of thyroid cancer.       His last hemoglobin A1c was 7.9% and had increased to 10.2%.    His blood sugars have improved significantly with the addition of pioglitazone.  He has been treated with metformin and glipizide.  We have discussed having him consider a GLP-1 agonist or SGLT 2 inhibitor though he has been reluctant to make changes, especially now that his thyroid testing is better.    He has history of obstructive sleep apnea but does not use a CPAP machine.  He is requiring thyroid supplementation.      He comes to the clinic infrequently.  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          4/11/2022     8:36 AM   Additional Questions   Roomed by Shannen ABRAHAM CMA     History of Present Illness       Reason for visit:  Med check, blood pressure check, diabetes    He eats 2-3 servings of fruits and vegetables daily.He consumes 0 sweetened beverage(s) daily.He exercises with enough effort to increase his heart rate 9  "or less minutes per day.  He exercises with enough effort to increase his heart rate 3 or less days per week.   He is taking medications regularly.               Review of Systems   Constitutional, HEENT, cardiovascular, pulmonary, GI, , musculoskeletal, neuro, skin, endocrine and psych systems are negative, except as otherwise noted.      Objective    /51 (BP Location: Left arm, Patient Position: Sitting, Cuff Size: Adult Large)   Pulse 69   Temp 97.6  F (36.4  C) (Oral)   Resp 18   Ht 1.727 m (5' 8\")   Wt 142.2 kg (313 lb 6.4 oz)   SpO2 94%   BMI 47.65 kg/m    Body mass index is 47.65 kg/m .  Physical Exam   GENERAL: healthy, alert and no distress  EYES: Eyes grossly normal to inspection, PERRL and conjunctivae and sclerae normal  RESP: lungs clear to auscultation - no rales, rhonchi or wheezes  CV: regular rate and rhythm, normal S1 S2, no S3 or S4, no murmur, click or rub, no peripheral edema and peripheral pulses strong  MS: no gross musculoskeletal defects noted, no edema  PSYCH: mentation appears normal, affect normal/bright                    "

## 2023-04-28 NOTE — PATIENT INSTRUCTIONS
Jayesh,    We will check your laboratory testing today  Great job with your diabetes.  Your blood sugar test was excellent recently  Continue to follow-up with cardiology as planned  You received a pneumonia shot today    Geoffrey Live MD

## 2023-04-28 NOTE — LETTER
May 11, 2023      Jayesh Ortiz  [address] [place] [address]        Jayesh,    It was good to talk to you.  As we reviewed, your thyroid test is mildly abnormal.  Make sure that you are taking your thyroid medication.  We will recheck in 3 months and if still abnormal will adjust your thyroid medication.    Your cholesterol is still a bit high.  Continue your atorvastatin and work on your diet and exercise as you are able.    Finally, your magnesium level is low.  I sent a prescription for a supplement to your pharmacy.  We will recheck this at the next visit.    I recommend follow-up in July with me and we will recheck these labs.    Geoffrey Live MD    Resulted Orders   Lipid panel reflex to direct LDL Fasting   Result Value Ref Range    Cholesterol 171 <200 mg/dL    Triglycerides 75 <150 mg/dL    Direct Measure HDL 53 >=40 mg/dL    LDL Cholesterol Calculated 103 (H) <=100 mg/dL    Non HDL Cholesterol 118 <130 mg/dL    Narrative    Cholesterol  Desirable:  <200 mg/dL    Triglycerides  Normal:  Less than 150 mg/dL  Borderline High:  150-199 mg/dL  High:  200-499 mg/dL  Very High:  Greater than or equal to 500 mg/dL    Direct Measure HDL  Female:  Greater than or equal to 50 mg/dL   Male:  Greater than or equal to 40 mg/dL    LDL Cholesterol  Desirable:  <100mg/dL  Above Desirable:  100-129 mg/dL   Borderline High:  130-159 mg/dL   High:  160-189 mg/dL   Very High:  >= 190 mg/dL    Non HDL Cholesterol  Desirable:  130 mg/dL  Above Desirable:  130-159 mg/dL  Borderline High:  160-189 mg/dL  High:  190-219 mg/dL  Very High:  Greater than or equal to 220 mg/dL   Magnesium   Result Value Ref Range    Magnesium 1.5 (L) 1.7 - 2.3 mg/dL   TSH with free T4 reflex   Result Value Ref Range    TSH 5.01 (H) 0.30 - 4.20 uIU/mL   T4 free   Result Value Ref Range    Free T4 1.93 (H) 0.90 - 1.70 ng/dL       If you have any questions or concerns, please call the clinic at the number listed above.        Sincerely,      Geoffrey Live MD

## 2023-04-29 LAB
ANION GAP SERPL CALCULATED.3IONS-SCNC: 13 MMOL/L (ref 7–15)
BUN SERPL-MCNC: 25.2 MG/DL (ref 8–23)
CALCIUM SERPL-MCNC: 9.7 MG/DL (ref 8.8–10.2)
CHLORIDE SERPL-SCNC: 101 MMOL/L (ref 98–107)
CHOLEST SERPL-MCNC: 171 MG/DL
CREAT SERPL-MCNC: 0.93 MG/DL (ref 0.67–1.17)
DEPRECATED HCO3 PLAS-SCNC: 25 MMOL/L (ref 22–29)
GFR SERPL CREATININE-BSD FRML MDRD: 82 ML/MIN/1.73M2
GLUCOSE SERPL-MCNC: 166 MG/DL (ref 70–99)
HDLC SERPL-MCNC: 53 MG/DL
LDLC SERPL CALC-MCNC: 103 MG/DL
MAGNESIUM SERPL-MCNC: 1.5 MG/DL (ref 1.7–2.3)
NONHDLC SERPL-MCNC: 118 MG/DL
POTASSIUM SERPL-SCNC: 5.1 MMOL/L (ref 3.4–5.3)
SODIUM SERPL-SCNC: 139 MMOL/L (ref 136–145)
T4 FREE SERPL-MCNC: 1.93 NG/DL (ref 0.9–1.7)
TRIGL SERPL-MCNC: 75 MG/DL
TSH SERPL DL<=0.005 MIU/L-ACNC: 5.01 UIU/ML (ref 0.3–4.2)

## 2023-05-09 ENCOUNTER — NURSE TRIAGE (OUTPATIENT)
Dept: FAMILY MEDICINE | Facility: CLINIC | Age: 83
End: 2023-05-09
Payer: MEDICARE

## 2023-05-09 NOTE — TELEPHONE ENCOUNTER
Nurse Triage SBAR    Is this a 2nd Level Triage? YES, LICENSED PRACTITIONER REVIEW IS REQUIRED    Situation:   Patient states that he has been experiencing periods of mild lightheadedness on and off throughout the day.  This has been ongoing for about 10 days.    He states that he is not having any chest pain, shortness of breath, fevers, or other symptoms aside from the transient lightheadedness.    He describes the lightheadedness as being mostly brought on by changing positions (orthostatic hypotension?),  But it also seems to happen occasionally when he is been resting for a while.    Background:   Patient has been eating and drinking as he normally does.    Per chart review, there have been no substantial medication changes in the past 2 weeks.    Assessment:   Given that patient is on losartan for blood pressure control, and the patient is describing symptoms consistent with orthostatic hypotension as well as a having a complex cardiac history of her probably be best if he was assessed formally.    Unfortunately, patient does not have the ability to check his blood sugar or blood pressure at home.    Protocol Recommended Disposition:   Discuss With PCP And Callback By Nurse Today    Recommendation:   Please advise, do want to see him in clinic?  Do you want to call him?    Routed to provider    Does the patient meet one of the following criteria for ADS visit consideration? 16+ years old, with an [facility] PCP     TIP  Providers, please consider if this condition is appropriate for management at one of our Acute and Diagnostic Services sites.     If patient is a good candidate, please use dotphrase <dot>triageresponse and select Refer to ADS to document.    Reason for Disposition    Taking a medicine that could cause dizziness (e.g., blood pressure medications, diuretics)    Additional Information    Negative: SEVERE difficulty breathing (e.g., struggling for each breath, speaks in single words)    Negative: Shock  suspected (e.g., cold/pale/clammy skin, too weak to stand, low BP, rapid pulse)    Negative: Difficult to awaken or acting confused (e.g., disoriented, slurred speech)    Negative: Fainted, and still feels dizzy afterwards    Negative: Overdose (accidental or intentional) of medications    Negative: New neurologic deficit that is present now: * Weakness of the face, arm, or leg on one side of the body * Numbness of the face, arm, or leg on one side of the body * Loss of speech or garbled speech    Negative: Heart beating < 50 beats per minute OR > 140 beats per minute    Negative: Sounds like a life-threatening emergency to the triager    Negative: SEVERE dizziness (e.g., unable to stand, requires support to walk, feels like passing out now)    Negative: SEVERE headache or neck pain    Negative: Spinning or tilting sensation (vertigo) present now and one or more stroke risk factors (i.e., hypertension, diabetes mellitus, prior stroke/TIA, heart attack, age over 60) (Exception: prior physician evaluation for this AND no different/worse than usual)    Negative: Neurologic deficit that was brief (now gone), ANY of the following:* Weakness of the face, arm, or leg on one side of the body* Numbness of the face, arm, or leg on one side of the body* Loss of speech or garbled speech    Negative: Loss of vision or double vision  (Exception: Similar to previous migraines.)    Negative: Extra heart beats OR irregular heart beating (i.e., 'palpitations')    Negative: Difficulty breathing    Negative: Drinking very little and has signs of dehydration (e.g., no urine > 12 hours, very dry mouth, very lightheaded)    Negative: Follows bleeding (e.g., stomach, rectum, vagina)  (Exception: Became dizzy from sight of small amount blood.)    Negative: Patient sounds very sick or weak to the triager    Negative: Lightheadedness (dizziness) present now, after 2 hours of rest and fluids    Negative: Spinning or tilting sensation (vertigo)  "present now    Negative: Fever > 103 F (39.4 C)    Negative: Fever > 100.0 F (37.8 C) and has diabetes mellitus or a weak immune system (e.g., HIV positive, cancer chemotherapy, organ transplant, splenectomy, chronic steroids)    Negative: MODERATE dizziness (e.g., interferes with normal activities) (Exception: dizziness caused by heat exposure, sudden standing, or poor fluid intake)    Negative: Vomiting occurs with dizziness    Negative: Patient wants to be seen    Answer Assessment - Initial Assessment Questions  1. DESCRIPTION: \"Describe your dizziness.\"      Lightheaded  2. LIGHTHEADED: \"Do you feel lightheaded?\" (e.g., somewhat faint, woozy, weak upon standing)      Lightheaded  3. VERTIGO: \"Do you feel like either you or the room is spinning or tilting?\" (i.e. vertigo)      No  4. SEVERITY: \"How bad is it?\"  \"Do you feel like you are going to faint?\" \"Can you stand and walk?\"    - MILD: Feels slightly dizzy, but walking normally.    - MODERATE: Feels unsteady when walking, but not falling; interferes with normal activities (e.g., school, work).    - SEVERE: Unable to walk without falling, or requires assistance to walk without falling; feels like passing out now.       Mild  5. ONSET:  \"When did the dizziness begin?\"      1 week ago  6. AGGRAVATING FACTORS: \"Does anything make it worse?\" (e.g., standing, change in head position)      Standing, orthostatic hypotension  7. HEART RATE: \"Can you tell me your heart rate?\" \"How many beats in 15 seconds?\"  (Note: not all patients can do this)        He is unsure  8. CAUSE: \"What do you think is causing the dizziness?\"      Blood pressure  9. RECURRENT SYMPTOM: \"Have you had dizziness before?\" If Yes, ask: \"When was the last time?\" \"What happened that time?\"      First time  10. OTHER SYMPTOMS: \"Do you have any other symptoms?\" (e.g., fever, chest pain, vomiting, diarrhea, bleeding)        Feels  11. PREGNANCY: \"Is there any chance you are pregnant?\" \"When was your " "last menstrual period?\"        No    Protocols used: DIZZINESS-A-OH    "

## 2023-05-09 NOTE — TELEPHONE ENCOUNTER
Patient called and stated he is very dizzy and wondering if he should come in. Stated this is not normal for him but he wants to talk to Dr. Live directly. Sending to nurse pool so they can reach out to patient. He is a diabetic, can be reached at either of his numbers.

## 2023-05-11 ENCOUNTER — HOSPITAL ENCOUNTER (OUTPATIENT)
Dept: CARDIOLOGY | Facility: HOSPITAL | Age: 83
Discharge: HOME | End: 2023-05-11
Attending: INTERNAL MEDICINE | Admitting: INTERNAL MEDICINE
Payer: MEDICARE

## 2023-05-11 DIAGNOSIS — E83.42 HYPOMAGNESEMIA: Primary | ICD-10-CM

## 2023-05-11 DIAGNOSIS — I35.0 NONRHEUMATIC AORTIC VALVE STENOSIS: ICD-10-CM

## 2023-05-11 LAB — LVEF ECHO: NORMAL

## 2023-05-11 PROCEDURE — 999N000248 HC STATISTIC IV INSERT WITH US BY RN

## 2023-05-11 PROCEDURE — 255N000002 HC RX 255 OP 636: Performed by: INTERNAL MEDICINE

## 2023-05-11 PROCEDURE — 93306 TTE W/DOPPLER COMPLETE: CPT | Mod: 26 | Performed by: INTERNAL MEDICINE

## 2023-05-11 PROCEDURE — 999N000208 ECHOCARDIOGRAM COMPLETE

## 2023-05-11 RX ORDER — MULTIVITAMIN WITH IRON
1 TABLET ORAL DAILY
Qty: 90 TABLET | Refills: 1 | Status: SHIPPED | OUTPATIENT
Start: 2023-05-11 | End: 2023-11-14

## 2023-05-11 RX ADMIN — PERFLUTREN 3 ML: 6.52 INJECTION, SUSPENSION INTRAVENOUS at 10:37

## 2023-05-11 NOTE — TELEPHONE ENCOUNTER
CHRIS only.  No need to call.    I called and reviewed with Jayesh personally.  He is feeling much better and believes he may have had too much coffee.  I do recommend that he monitor his blood pressure and also check his blood sugars.  He will follow-up if having ongoing concerns.    Geoffrey Live MD

## 2023-05-15 ENCOUNTER — OFFICE VISIT (OUTPATIENT)
Dept: CARDIOLOGY | Facility: CLINIC | Age: 83
End: 2023-05-15
Payer: MEDICARE

## 2023-05-15 ENCOUNTER — LAB (OUTPATIENT)
Dept: CARDIOLOGY | Facility: CLINIC | Age: 83
End: 2023-05-15
Payer: MEDICARE

## 2023-05-15 VITALS
RESPIRATION RATE: 24 BRPM | SYSTOLIC BLOOD PRESSURE: 176 MMHG | WEIGHT: 315 LBS | HEART RATE: 70 BPM | BODY MASS INDEX: 47.9 KG/M2 | DIASTOLIC BLOOD PRESSURE: 56 MMHG

## 2023-05-15 DIAGNOSIS — I25.810 CORONARY ARTERY DISEASE INVOLVING CORONARY BYPASS GRAFT OF NATIVE HEART WITHOUT ANGINA PECTORIS: ICD-10-CM

## 2023-05-15 DIAGNOSIS — E66.01 MORBID OBESITY (H): ICD-10-CM

## 2023-05-15 DIAGNOSIS — I42.9 CARDIOMYOPATHY, UNSPECIFIED TYPE (H): ICD-10-CM

## 2023-05-15 DIAGNOSIS — Z95.2 S/P TAVR (TRANSCATHETER AORTIC VALVE REPLACEMENT): ICD-10-CM

## 2023-05-15 DIAGNOSIS — E11.9 TYPE 2 DIABETES MELLITUS WITHOUT COMPLICATION, WITHOUT LONG-TERM CURRENT USE OF INSULIN (H): Chronic | ICD-10-CM

## 2023-05-15 DIAGNOSIS — I35.0 NONRHEUMATIC AORTIC VALVE STENOSIS: Primary | ICD-10-CM

## 2023-05-15 PROCEDURE — 93000 ELECTROCARDIOGRAM COMPLETE: CPT | Performed by: INTERNAL MEDICINE

## 2023-05-15 PROCEDURE — 99215 OFFICE O/P EST HI 40 MIN: CPT | Performed by: INTERNAL MEDICINE

## 2023-05-15 NOTE — LETTER
5/15/2023    Geoffrey Live MD  [address]  [facility] [address]    RE: Jayesh Ortiz       Dear Colleague,     I had the pleasure of seeing Jayesh Ortiz in the [facility].  HEART CARE ENCOUNTER NOTE       [facility]  [phone]    Assessment/Recommendations   Assessment:  Severe aortic stenosis - s/p TAVR on 4/4/2023 using a 29 mm Maribel valve  2.   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  EF has improved slightly since TAVR  3.   CAD -with known  of the LAD.  He denies angina.  4.   Dyslipidemia with goal LDL less than 70 -lipid profile up-to-date with  currently  5.   Hypertension -blood pressure high today.  BP followed closely at PCP office.  Last clinic blood pressure was controlled  6.  Type 2 diabetes mellitus - with improvement in A1C since 1 year ago.   7.  Chronic hypomagnesemia    Plan:  Continue aspirin 81 mg daily, indefinitely  Continue high-dose atorvastatin at 80 mg daily.   Continue losartan 100 mg daily with close BP follow-up at PCP office  Follow-up with Dr. Reagan in October for 6-month visit and to establish cardiology care, as patient was referred for TAVR directly from PCP.    He should see the structural team again in April 2024    Thank you for the opportunity to participate in the care of Jayesh Ortiz. It's been a pleasure working with him.  Please do not hesitate to call with any questions or concerns.       History of Present Illness/Subjective    I had the opportunity to see Jayesh Ortiz at the [facility] for his 1 month follow-up visit after transcatheter aortic valve replacement.    Jayesh has history of aortic stenosis, hypertension, obesity, cardiomyopathy with mildly reduced EF at 40 to 45%, and prostate cancer.  He first saw Dr. Mason about 1 year ago regarding his  aortic stenosis and mildly reduced LVEF, but he was asymptomatic at that time and deferred aortic valve replacement.  Repeat echocardiogram in December 2022 showed mild progression of the valve disease and patient decided to move forward with treatment.    He underwent TAVR (transcatheter aortic valve replacement) on 4/4/2023, via the right transfemoral approach and using a 29 mm Maribel valve.   He was not happy about pain control during his procedure and was actually quite upset with how this was managed in the procedure room.  He says everything past that point went well and he has been doing well since being home.  He has not noticed any difference in his symptoms, but he was not significantly limited by his aortic stenosis even prior to TAVR.     Jayesh Ortiz denies exertional chest discomfort, palpitations, shortness of breath at rest, PND, orthopnea, lightheadedness, dizziness, pre-syncope or syncope.  Jayesh Alejandreon also denies any recent weight loss, changes in appetite, nausea or vomiting.   ____________________________________________________________  Echo from 5/11/23  Interpretation Summary     Technically very difficult study despite use of Definity contrast. Patient was  supine on his back during the examination.  Normal left ventricular size. Mild left ventricular hypertrophy. Left  ventricular systolic function grossly appears to be lower limits of normal to  mildly reduced. Left ventricular ejection fraction estimated at 50 to 55%.  Regional wall motion analysis is limited. No obvious regionality. Underlying  rhythm is irregular.  The right ventricle is not well visualized. Right ventricular size and  systolic function grossly appears normal.  Moderate left atrial enlargement suggested.  Valve structures are suboptimally visualized.  Prosthetic valve in aortic position. 29 mm [place] MARIBEL 3 tissue valve by  report. Peak velocity 2.7 m/s. Mean gradient of 14 mmHg. Aortic acceleration  time 0.10  seconds. Cannot accurately assess for paravalvular insufficiency on  this technically difficult study.  Consider alternative imaging modality given that today's echocardiogram and  previous echocardiogram are technically very challenging.  Compared to the echocardiogram April 5, 2023.The peak velocity and mean  gradient across the prosthetic aortic valve are mildly higher on the current  Study.    EKG (personally reviewed):  Sinus rhythm with PACs and LBBB (QRS stable from EKG dated 4/11/2023 and first-degree AV block no longer present     Physical Examination Review of Systems   Vitals: BP (!) 176/56 (BP Location: Left arm, Patient Position: Sitting, Cuff Size: Adult Large)   Pulse 70   Resp 24   Wt 142.9 kg (315 lb)   BMI 47.90 kg/m    BMI= Body mass index is 47.9 kg/m .  Wt Readings from Last 3 Encounters:   05/15/23 142.9 kg (315 lb)   04/28/23 142.2 kg (313 lb 6.4 oz)   04/11/23 142.4 kg (314 lb)       General Appearance:   Alert, cooperative and in no acute distress   ENT/Mouth: membranes moist, no oral lesions or bleeding gums.      EYES:  no scleral icterus, normal conjunctivae   Neck: Supple without lymphadenopathy.  Thyroid not visualized   Chest/Lungs:   lungs are clear to auscultation, no rales or wheezing   Cardiovascular:   Regular. Normal first and second heart sounds with no murmurs, rubs, or gallops; the carotid, radial and posterior tibial pulses are intact, no edema bilaterally    Abdomen:  Soft and nontender. Bowel sounds are present in all quadrants   Extremities: no cyanosis or clubbing.    Skin: no xanthelasma, warm.    Neurologic: normal gait, normal  bilateral, no tremors   Psychiatric: Normal mood and affect       Please refer above for cardiac ROS details.      Medical History  Surgical History Family History Social History   Past Medical History:   Diagnosis Date    Diabetes (H)     Diabetes mellitus, type 2 (H)     Hypertension     Thyroid disease      Past Surgical History:    Procedure Laterality Date    CLOSED REDUCTION, PERCUTANEOUS PINNING LOWER EXTREMITY, COMBINED Right 3/9/2017    Procedure: COMBINED CLOSED REDUCTION, PERCUTANEOUS PINNING LOWER EXTREMITY;  Surgeon: Ankit Coy DPM;  Location: [facility] OR    CV CORONARY ANGIOGRAM N/A 2/9/2023    Procedure: Coronary Angiogram;  Surgeon: Nabeel Davies MD;  Location: [facility] CV    CV TRANSCATHETER AORTIC VALVE REPLACEMENT-FEMORAL APPROACH N/A 4/4/2023    Procedure: Transcatheter Aortic Valve Replacement-Femoral Approach;  Surgeon: Nabeel Davies MD;  Location: [facility] CV    OR TRANSCATHETER AORTIC VALVE REPLACEMENT, FEMORAL PERCUTANEOUS APPROACH (STANDBY) N/A 4/4/2023    Procedure: OR TRANSCATHETER AORTIC VALVE REPLACEMENT, FEMORAL PERCUTANEOUS APPROACH (STANDBY);  Surgeon: Jeramie De Jesus MD;  Location: [facility] CV    THYROIDECTOMY       Family History   Problem Relation Age of Onset    Heart Failure Mother     Heart Failure Father     Social History     Socioeconomic History    Marital status: Single     Spouse name: Not on file    Number of children: Not on file    Years of education: Not on file    Highest education level: Not on file   Occupational History    Not on file   Tobacco Use    Smoking status: Former    Smokeless tobacco: Never   Vaping Use    Vaping status: Never Used   Substance and Sexual Activity    Alcohol use: Not Currently    Drug use: Never    Sexual activity: Not on file   Other Topics Concern    Not on file   Social History Narrative    Not on file     Social Determinants of Health     Financial Resource Strain: Not on file   Food Insecurity: Not on file   Transportation Needs: Not on file   Physical Activity: Not on file   Stress: Not on file   Social Connections: Not on file   Intimate Partner Violence: Not on file   Housing Stability: Not on file          Medications  Allergies   Current Outpatient Medications   Medication Sig Dispense Refill    aspirin  (ASA) 81 MG EC tablet Take 1 tablet (81 mg) by mouth daily      atorvastatin (LIPITOR) 80 MG tablet Take 1 tablet (80 mg) by mouth daily 30 tablet 12    glipiZIDE (GLUCOTROL) 10 MG tablet TAKE 1 TABLET BY MOUTH TWICE A DAY (Patient taking differently: Take 10 mg by mouth 2 times daily (before meals)) 180 tablet 1    levothyroxine (SYNTHROID/LEVOTHROID) 175 MCG tablet Take 1 tablet (175 mcg) by mouth daily 90 tablet 3    losartan (COZAAR) 100 MG tablet Take 1 tablet (100 mg) by mouth daily 90 tablet 3    magnesium 250 MG tablet Take 1 tablet (250 mg) by mouth daily 90 tablet 1    metFORMIN (GLUCOPHAGE) 500 MG tablet TAKE 2 TABLETS BY MOUTH TWICE A DAY WITH FOOD (Patient taking differently: Take 1,000 mg by mouth 2 times daily (with meals)) 360 tablet 0    pioglitazone (ACTOS) 45 MG tablet TAKE 1 TABLET (45 MG) BY MOUTH IN THE MORNING. 90 tablet 0    tamsulosin (FLOMAX) 0.4 MG capsule TAKE 1 CAPSULE BY MOUTH EVERY DAY (Patient taking differently: 0.4 mg daily) 90 capsule 3    Allergies   Allergen Reactions    Nka [No Known Allergies]          Lab Results    Chemistry/lipid CBC Cardiac Enzymes/BNP/TSH/INR   Recent Labs   Lab Test 04/28/23  1132   CHOL 171   HDL 53   *   TRIG 75     Recent Labs   Lab Test 04/28/23  1132 04/11/22  1008 04/13/21  1008   * 106 177*     Recent Labs   Lab Test 04/28/23  1132      POTASSIUM 5.1   CHLORIDE 101   CO2 25   *   BUN 25.2*   CR 0.93   GFRESTIMATED 82   MATT 9.7     Recent Labs   Lab Test 04/28/23  1132 04/05/23  0533 04/04/23  1445   CR 0.93 1.08 0.86     Recent Labs   Lab Test 04/04/23  1445 10/05/22  0810 04/11/22  0845   A1C 6.8* 7.1* 10.2*    Recent Labs   Lab Test 04/28/23  1132   WBC 5.5   HGB 13.8   HCT 42.9   MCV 98        Recent Labs   Lab Test 04/28/23  1132 04/05/23  0533 04/04/23  1445   HGB 13.8 13.0* 13.7    No results for input(s): TROPONINI in the last 13529 hours.  Recent Labs   Lab Test 04/03/23  0839 04/11/22  1008   BNP  --  386*    NTBNP 765  --      Recent Labs   Lab Test 04/28/23  1132   TSH 5.01*     Recent Labs   Lab Test 04/03/23  0839   INR 1.08        48 minutes spent on the date of encounter doing chart review, review of test results, interpretation with above tests, patient visit and documentation.      This note has been dictated using voice recognition software. Any grammatical or context distortions are unintentional and inherent to the software.                    Thank you for allowing me to participate in the care of your patient.      Sincerely,     Anh Burgos PA-C     [facility]  cc:   No referring provider defined for this encounter.

## 2023-05-15 NOTE — PROGRESS NOTES
HEART CARE ENCOUNTER NOTE       [facility]  [phone]    Assessment/Recommendations   Assessment:  1. Severe aortic stenosis - s/p TAVR on 4/4/2023 using a 29 mm Maribel valve  2.   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  EF has improved slightly since TAVR  3.   CAD -with known  of the LAD.  He denies angina.  4.   Dyslipidemia with goal LDL less than 70 -lipid profile up-to-date with  currently  5.   Hypertension -blood pressure high today.  BP followed closely at PCP office.  Last clinic blood pressure was controlled  6.  Type 2 diabetes mellitus - with improvement in A1C since 1 year ago.   7.  Chronic hypomagnesemia    Plan:  1. Continue aspirin 81 mg daily, indefinitely  2. Continue high-dose atorvastatin at 80 mg daily.   3. Continue losartan 100 mg daily with close BP follow-up at PCP office  4. Follow-up with Dr. Reagan in October for 6-month visit and to establish cardiology care, as patient was referred for TAVR directly from PCP.    5. He should see the structural team again in April 2024    Thank you for the opportunity to participate in the care of Jayesh Ortiz. It's been a pleasure working with him.  Please do not hesitate to call with any questions or concerns.       History of Present Illness/Subjective    I had the opportunity to see Jayesh Ortiz at the [facility] for his 1 month follow-up visit after transcatheter aortic valve replacement.    Jayesh has history of aortic stenosis, hypertension, obesity, cardiomyopathy with mildly reduced EF at 40 to 45%, and prostate cancer.  He first saw Dr. Mason about 1 year ago regarding his aortic stenosis and mildly reduced LVEF, but he was asymptomatic at that time and deferred aortic valve replacement.  Repeat echocardiogram in December 2022 showed mild progression of the valve  disease and patient decided to move forward with treatment.    He underwent TAVR (transcatheter aortic valve replacement) on 4/4/2023, via the right transfemoral approach and using a 29 mm Maribel valve.   He was not happy about pain control during his procedure and was actually quite upset with how this was managed in the procedure room.  He says everything past that point went well and he has been doing well since being home.  He has not noticed any difference in his symptoms, but he was not significantly limited by his aortic stenosis even prior to TAVR.     Jayesh Ortiz denies exertional chest discomfort, palpitations, shortness of breath at rest, PND, orthopnea, lightheadedness, dizziness, pre-syncope or syncope.  Jayesh Ortiz also denies any recent weight loss, changes in appetite, nausea or vomiting.   ____________________________________________________________  Echo from 5/11/23  Interpretation Summary     Technically very difficult study despite use of Definity contrast. Patient was  supine on his back during the examination.  Normal left ventricular size. Mild left ventricular hypertrophy. Left  ventricular systolic function grossly appears to be lower limits of normal to  mildly reduced. Left ventricular ejection fraction estimated at 50 to 55%.  Regional wall motion analysis is limited. No obvious regionality. Underlying  rhythm is irregular.  The right ventricle is not well visualized. Right ventricular size and  systolic function grossly appears normal.  Moderate left atrial enlargement suggested.  Valve structures are suboptimally visualized.  Prosthetic valve in aortic position. 29 mm [place] MARIBEL 3 tissue valve by  report. Peak velocity 2.7 m/s. Mean gradient of 14 mmHg. Aortic acceleration  time 0.10 seconds. Cannot accurately assess for paravalvular insufficiency on  this technically difficult study.  Consider alternative imaging modality given that today's echocardiogram and  previous  echocardiogram are technically very challenging.  Compared to the echocardiogram April 5, 2023.The peak velocity and mean  gradient across the prosthetic aortic valve are mildly higher on the current  Study.    EKG (personally reviewed):  Sinus rhythm with PACs and LBBB (QRS stable from EKG dated 4/11/2023 and first-degree AV block no longer present     Physical Examination Review of Systems   Vitals: BP (!) 176/56 (BP Location: Left arm, Patient Position: Sitting, Cuff Size: Adult Large)   Pulse 70   Resp 24   Wt 142.9 kg (315 lb)   BMI 47.90 kg/m    BMI= Body mass index is 47.9 kg/m .  Wt Readings from Last 3 Encounters:   05/15/23 142.9 kg (315 lb)   04/28/23 142.2 kg (313 lb 6.4 oz)   04/11/23 142.4 kg (314 lb)       General Appearance:   Alert, cooperative and in no acute distress   ENT/Mouth: membranes moist, no oral lesions or bleeding gums.      EYES:  no scleral icterus, normal conjunctivae   Neck: Supple without lymphadenopathy.  Thyroid not visualized   Chest/Lungs:   lungs are clear to auscultation, no rales or wheezing   Cardiovascular:   Regular. Normal first and second heart sounds with no murmurs, rubs, or gallops; the carotid, radial and posterior tibial pulses are intact, no edema bilaterally    Abdomen:  Soft and nontender. Bowel sounds are present in all quadrants   Extremities: no cyanosis or clubbing.    Skin: no xanthelasma, warm.    Neurologic: normal gait, normal  bilateral, no tremors   Psychiatric: Normal mood and affect       Please refer above for cardiac ROS details.      Medical History  Surgical History Family History Social History   Past Medical History:   Diagnosis Date     Diabetes (H)      Diabetes mellitus, type 2 (H)      Hypertension      Thyroid disease      Past Surgical History:   Procedure Laterality Date     CLOSED REDUCTION, PERCUTANEOUS PINNING LOWER EXTREMITY, COMBINED Right 3/9/2017    Procedure: COMBINED CLOSED REDUCTION, PERCUTANEOUS PINNING LOWER EXTREMITY;   Surgeon: Ankit Coy DPM;  Location: [facility] OR     CV CORONARY ANGIOGRAM N/A 2/9/2023    Procedure: Coronary Angiogram;  Surgeon: Nabeel Davies MD;  Location: [facility] LAB CV     CV TRANSCATHETER AORTIC VALVE REPLACEMENT-FEMORAL APPROACH N/A 4/4/2023    Procedure: Transcatheter Aortic Valve Replacement-Femoral Approach;  Surgeon: Nabeel Davies MD;  Location: [facility] LAB CV     OR TRANSCATHETER AORTIC VALVE REPLACEMENT, FEMORAL PERCUTANEOUS APPROACH (STANDBY) N/A 4/4/2023    Procedure: OR TRANSCATHETER AORTIC VALVE REPLACEMENT, FEMORAL PERCUTANEOUS APPROACH (STANDBY);  Surgeon: Jeramie De Jesus MD;  Location: [facility] LAB CV     THYROIDECTOMY       Family History   Problem Relation Age of Onset     Heart Failure Mother      Heart Failure Father     Social History     Socioeconomic History     Marital status: Single     Spouse name: Not on file     Number of children: Not on file     Years of education: Not on file     Highest education level: Not on file   Occupational History     Not on file   Tobacco Use     Smoking status: Former     Smokeless tobacco: Never   Vaping Use     Vaping status: Never Used   Substance and Sexual Activity     Alcohol use: Not Currently     Drug use: Never     Sexual activity: Not on file   Other Topics Concern     Not on file   Social History Narrative     Not on file     Social Determinants of Health     Financial Resource Strain: Not on file   Food Insecurity: Not on file   Transportation Needs: Not on file   Physical Activity: Not on file   Stress: Not on file   Social Connections: Not on file   Intimate Partner Violence: Not on file   Housing Stability: Not on file          Medications  Allergies   Current Outpatient Medications   Medication Sig Dispense Refill     aspirin (ASA) 81 MG EC tablet Take 1 tablet (81 mg) by mouth daily       atorvastatin (LIPITOR) 80 MG tablet Take 1 tablet (80 mg) by mouth daily 30 tablet 12     glipiZIDE  (GLUCOTROL) 10 MG tablet TAKE 1 TABLET BY MOUTH TWICE A DAY (Patient taking differently: Take 10 mg by mouth 2 times daily (before meals)) 180 tablet 1     levothyroxine (SYNTHROID/LEVOTHROID) 175 MCG tablet Take 1 tablet (175 mcg) by mouth daily 90 tablet 3     losartan (COZAAR) 100 MG tablet Take 1 tablet (100 mg) by mouth daily 90 tablet 3     magnesium 250 MG tablet Take 1 tablet (250 mg) by mouth daily 90 tablet 1     metFORMIN (GLUCOPHAGE) 500 MG tablet TAKE 2 TABLETS BY MOUTH TWICE A DAY WITH FOOD (Patient taking differently: Take 1,000 mg by mouth 2 times daily (with meals)) 360 tablet 0     pioglitazone (ACTOS) 45 MG tablet TAKE 1 TABLET (45 MG) BY MOUTH IN THE MORNING. 90 tablet 0     tamsulosin (FLOMAX) 0.4 MG capsule TAKE 1 CAPSULE BY MOUTH EVERY DAY (Patient taking differently: 0.4 mg daily) 90 capsule 3    Allergies   Allergen Reactions     Nka [No Known Allergies]          Lab Results    Chemistry/lipid CBC Cardiac Enzymes/BNP/TSH/INR   Recent Labs   Lab Test 04/28/23  1132   CHOL 171   HDL 53   *   TRIG 75     Recent Labs   Lab Test 04/28/23  1132 04/11/22  1008 04/13/21  1008   * 106 177*     Recent Labs   Lab Test 04/28/23  1132      POTASSIUM 5.1   CHLORIDE 101   CO2 25   *   BUN 25.2*   CR 0.93   GFRESTIMATED 82   MATT 9.7     Recent Labs   Lab Test 04/28/23  1132 04/05/23  0533 04/04/23  1445   CR 0.93 1.08 0.86     Recent Labs   Lab Test 04/04/23  1445 10/05/22  0810 04/11/22  0845   A1C 6.8* 7.1* 10.2*    Recent Labs   Lab Test 04/28/23  1132   WBC 5.5   HGB 13.8   HCT 42.9   MCV 98        Recent Labs   Lab Test 04/28/23  1132 04/05/23  0533 04/04/23  1445   HGB 13.8 13.0* 13.7    No results for input(s): TROPONINI in the last 16581 hours.  Recent Labs   Lab Test 04/03/23  0839 04/11/22  1008   BNP  --  386*   NTBNP 765  --      Recent Labs   Lab Test 04/28/23  1132   TSH 5.01*     Recent Labs   Lab Test 04/03/23  0839   INR 1.08        48 minutes spent on the  date of encounter doing chart review, review of test results, interpretation with above tests, patient visit and documentation.      This note has been dictated using voice recognition software. Any grammatical or context distortions are unintentional and inherent to the software.

## 2023-05-15 NOTE — PATIENT INSTRUCTIONS
Jayesh Ortiz,    It was a pleasure to see you today in the clinic regarding your 1 month TAVR follow up.     My recommendations after this visit include:     - no medication changes    You should followup with Dr. Reagan in October for 6 month visit and with me again in April of 2024      If you have questions or concerns, please call using the numbers below:    Valve Clinic Phone   [phone]    After Hours/Scheduling  [phone]    Otherwise you can dial the nurse directly at:    Neyda Morales RN  [phone]    Juan Pablo Guevara RN  [phone]

## 2023-05-16 LAB
ATRIAL RATE - MUSE: 70 BPM
DIASTOLIC BLOOD PRESSURE - MUSE: NORMAL MMHG
INTERPRETATION ECG - MUSE: NORMAL
P AXIS - MUSE: 50 DEGREES
PR INTERVAL - MUSE: 184 MS
QRS DURATION - MUSE: 158 MS
QT - MUSE: 436 MS
QTC - MUSE: 470 MS
R AXIS - MUSE: -54 DEGREES
SYSTOLIC BLOOD PRESSURE - MUSE: NORMAL MMHG
T AXIS - MUSE: 79 DEGREES
VENTRICULAR RATE- MUSE: 70 BPM

## 2023-07-05 DIAGNOSIS — E11.9 TYPE 2 DIABETES MELLITUS WITHOUT COMPLICATIONS (H): ICD-10-CM

## 2023-07-06 RX ORDER — GLIPIZIDE 10 MG/1
TABLET ORAL
Qty: 180 TABLET | Refills: 1 | OUTPATIENT
Start: 2023-07-06

## 2023-07-06 NOTE — TELEPHONE ENCOUNTER
"Routing refill request to provider for review/approval because:  Too soon    Last Written Prescription Date:  3/26/2023  Last Fill Quantity: 180,  # refills: 1   Last office visit provider:  4/28/2023     Requested Prescriptions   Pending Prescriptions Disp Refills     glipiZIDE (GLUCOTROL) 10 MG tablet [Pharmacy Med Name: GLIPIZIDE 10 MG TABLET] 180 tablet 1     Sig: TAKE 1 TABLET BY MOUTH TWICE A DAY       Sulfonylurea Agents Passed - 7/6/2023  6:09 AM        Passed - Patient has documented A1c within the specified period of time.     If HgbA1C is 8 or greater, it needs to be on file within the past 3 months.  If less than 8, must be on file within the past 6 months.     Recent Labs   Lab Test 04/04/23  1445   A1C 6.8*             Passed - Medication is active on med list        Passed - Patient is age 18 or older        Passed - Patient has a recent creatinine (normal) within the past 12 mos.     Recent Labs   Lab Test 04/28/23  1132   CR 0.93       Ok to refill medication if creatinine is low          Passed - Recent (6 mo) or future (30 days) visit within the authorizing provider's specialty     Patient had office visit in the last 6 months or has a visit in the next 30 days with authorizing provider or within the authorizing provider's specialty.  See \"Patient Info\" tab in inbasket, or \"Choose Columns\" in Meds & Orders section of the refill encounter.                 Linda Self RN 07/06/23 6:11 AM  "

## 2023-07-07 ENCOUNTER — TELEPHONE (OUTPATIENT)
Dept: FAMILY MEDICINE | Facility: CLINIC | Age: 83
End: 2023-07-07
Payer: MEDICARE

## 2023-07-07 DIAGNOSIS — E11.65 UNCONTROLLED TYPE 2 DIABETES MELLITUS WITH HYPERGLYCEMIA (H): ICD-10-CM

## 2023-07-07 RX ORDER — PIOGLITAZONEHYDROCHLORIDE 45 MG/1
45 TABLET ORAL DAILY
Qty: 90 TABLET | Refills: 0 | Status: SHIPPED | OUTPATIENT
Start: 2023-07-07 | End: 2023-08-07

## 2023-07-07 RX ORDER — PIOGLITAZONEHYDROCHLORIDE 45 MG/1
45 TABLET ORAL DAILY
Qty: 90 TABLET | Refills: 0 | Status: CANCELLED | OUTPATIENT
Start: 2023-07-07

## 2023-07-07 NOTE — TELEPHONE ENCOUNTER
"Called [facility] & spoke with Mary Ann Pharmacist.  Per pharmacy record, med was picked up on 6/10 at 3:12pm. Pharmacist was unable to tell who picked up Rx. Per Mary Ann, patient's sig other said that patient picked up 6/9 bottle & opened up new bottle and poured meds into old bottle so unable to find new Rx label. RN will call patient's sig other to clarify.    Called & spoke with Dorita. She states that pharmacy \"claimed\" that med was picked up but per Dorita, patient does not  meds only Dorita does.    Called patient to clarify. Patient states that he only has 3 tabs left of the Pioglitazone. The pills that he has left does not match any of other other prescriptions that were picked up. States that the Pioglitazone is very little with 4 letters stamped on top. The only mistake would be if the pill size increased or changed then patient would not be able to identify med.    Called [facility] & spoke with Mary Ann. Informed her what patient stated above. Clarified to see if med had different manufacture but the manufacture was the same so no change. RN will recommend [employer]RX to patient to assist with getting patient through til next refill. Med pended with pharmacy. Will route to PCP to review & advise.    Aden Angel RN, BSN  [facility]  "

## 2023-07-07 NOTE — TELEPHONE ENCOUNTER
Attempted to call [facility] pharmacy to see what the issue was. Phone ringed a few times then  says pharmacy is closed due to lunch & will be open again at 2pm. Will call pharmacy back after 2pm.    Aden Angel RN, BSN  [facility]

## 2023-07-07 NOTE — TELEPHONE ENCOUNTER
Incoming call from sig other Dorita (consent on file) calling with a med question.  States that patient had surgery in April and not sure if patient should still be taking his Pioglitazone. Per chart review, there is no notation that med was d'cd.    Dorita states that she was in contact with [facility] pharmacy and they claimed that Rx from 6/9 was filled & picked up by patient & in order to refill med, it would cost $600. Dorita denies that med was picked up last month. Will route to PCP to review & advise with whether patient should still be on med or not.    Aden Angel, RN, BSN  [facility]

## 2023-07-07 NOTE — TELEPHONE ENCOUNTER
Spoke with Dr. Meehan in clinic. Provider reviewed patient's chart & agreed that patient should still be taking the Pioglitazone. RN will call pharmacy to see what the issue is with the Rx being picked up in June.    Aden Angel RN, BSN  [facility]

## 2023-08-07 ENCOUNTER — HOSPITAL ENCOUNTER (INPATIENT)
Facility: CLINIC | Age: 83
LOS: 3 days | Discharge: HOME | DRG: 280 | End: 2023-08-10
Attending: EMERGENCY MEDICINE | Admitting: INTERNAL MEDICINE
Payer: MEDICARE

## 2023-08-07 ENCOUNTER — NURSE TRIAGE (OUTPATIENT)
Dept: FAMILY MEDICINE | Facility: CLINIC | Age: 83
End: 2023-08-07
Payer: MEDICARE

## 2023-08-07 ENCOUNTER — APPOINTMENT (OUTPATIENT)
Dept: GENERAL RADIOLOGY | Facility: CLINIC | Age: 83
DRG: 280 | End: 2023-08-07
Attending: EMERGENCY MEDICINE
Payer: MEDICARE

## 2023-08-07 DIAGNOSIS — I10 ACCELERATED HYPERTENSION: ICD-10-CM

## 2023-08-07 DIAGNOSIS — I50.33 ACUTE ON CHRONIC DIASTOLIC CONGESTIVE HEART FAILURE (H): Primary | ICD-10-CM

## 2023-08-07 DIAGNOSIS — E11.9 TYPE 2 DIABETES MELLITUS WITHOUT COMPLICATION, WITHOUT LONG-TERM CURRENT USE OF INSULIN (H): Chronic | ICD-10-CM

## 2023-08-07 DIAGNOSIS — I50.9 ACUTE CONGESTIVE HEART FAILURE, UNSPECIFIED HEART FAILURE TYPE (H): ICD-10-CM

## 2023-08-07 LAB
ALBUMIN SERPL BCG-MCNC: 4.2 G/DL (ref 3.5–5.2)
ALP SERPL-CCNC: 66 U/L (ref 40–129)
ALT SERPL W P-5'-P-CCNC: 27 U/L (ref 0–70)
ANION GAP SERPL CALCULATED.3IONS-SCNC: 10 MMOL/L (ref 7–15)
AST SERPL W P-5'-P-CCNC: 21 U/L (ref 0–45)
BASOPHILS # BLD AUTO: 0.1 10E3/UL (ref 0–0.2)
BASOPHILS NFR BLD AUTO: 1 %
BILIRUB SERPL-MCNC: 0.7 MG/DL
BUN SERPL-MCNC: 19.7 MG/DL (ref 8–23)
CALCIUM SERPL-MCNC: 9.7 MG/DL (ref 8.8–10.2)
CHLORIDE SERPL-SCNC: 101 MMOL/L (ref 98–107)
CREAT SERPL-MCNC: 1.01 MG/DL (ref 0.67–1.17)
DEPRECATED HCO3 PLAS-SCNC: 27 MMOL/L (ref 22–29)
EOSINOPHIL # BLD AUTO: 0 10E3/UL (ref 0–0.7)
EOSINOPHIL NFR BLD AUTO: 1 %
ERYTHROCYTE [DISTWIDTH] IN BLOOD BY AUTOMATED COUNT: 13.7 % (ref 10–15)
GFR SERPL CREATININE-BSD FRML MDRD: 75 ML/MIN/1.73M2
GLUCOSE BLDC GLUCOMTR-MCNC: 264 MG/DL (ref 70–99)
GLUCOSE SERPL-MCNC: 196 MG/DL (ref 70–99)
HCT VFR BLD AUTO: 40.6 % (ref 40–53)
HGB BLD-MCNC: 13.2 G/DL (ref 13.3–17.7)
HOLD SPECIMEN: NORMAL
HOLD SPECIMEN: NORMAL
IMM GRANULOCYTES # BLD: 0 10E3/UL
IMM GRANULOCYTES NFR BLD: 0 %
LYMPHOCYTES # BLD AUTO: 0.5 10E3/UL (ref 0.8–5.3)
LYMPHOCYTES NFR BLD AUTO: 9 %
MCH RBC QN AUTO: 31.4 PG (ref 26.5–33)
MCHC RBC AUTO-ENTMCNC: 32.5 G/DL (ref 31.5–36.5)
MCV RBC AUTO: 96 FL (ref 78–100)
MONOCYTES # BLD AUTO: 0.5 10E3/UL (ref 0–1.3)
MONOCYTES NFR BLD AUTO: 8 %
NEUTROPHILS # BLD AUTO: 4.5 10E3/UL (ref 1.6–8.3)
NEUTROPHILS NFR BLD AUTO: 81 %
NRBC # BLD AUTO: 0 10E3/UL
NRBC BLD AUTO-RTO: 0 /100
NT-PROBNP SERPL-MCNC: 2039 PG/ML (ref 0–1800)
PLATELET # BLD AUTO: 163 10E3/UL (ref 150–450)
POTASSIUM SERPL-SCNC: 4.9 MMOL/L (ref 3.4–5.3)
PROT SERPL-MCNC: 6.9 G/DL (ref 6.4–8.3)
RBC # BLD AUTO: 4.21 10E6/UL (ref 4.4–5.9)
SODIUM SERPL-SCNC: 138 MMOL/L (ref 136–145)
TROPONIN T SERPL HS-MCNC: 29 NG/L
TROPONIN T SERPL HS-MCNC: 30 NG/L
WBC # BLD AUTO: 5.6 10E3/UL (ref 4–11)

## 2023-08-07 PROCEDURE — 93308 TTE F-UP OR LMTD: CPT | Mod: 26 | Performed by: EMERGENCY MEDICINE

## 2023-08-07 PROCEDURE — 99223 1ST HOSP IP/OBS HIGH 75: CPT

## 2023-08-07 PROCEDURE — 250N000011 HC RX IP 250 OP 636: Mod: JZ | Performed by: EMERGENCY MEDICINE

## 2023-08-07 PROCEDURE — 84484 ASSAY OF TROPONIN QUANT: CPT | Performed by: EMERGENCY MEDICINE

## 2023-08-07 PROCEDURE — 99285 EMERGENCY DEPT VISIT HI MDM: CPT | Mod: 25 | Performed by: EMERGENCY MEDICINE

## 2023-08-07 PROCEDURE — 80053 COMPREHEN METABOLIC PANEL: CPT | Performed by: EMERGENCY MEDICINE

## 2023-08-07 PROCEDURE — 96374 THER/PROPH/DIAG INJ IV PUSH: CPT

## 2023-08-07 PROCEDURE — 120N000001 HC R&B MED SURG/OB

## 2023-08-07 PROCEDURE — 71046 X-RAY EXAM CHEST 2 VIEWS: CPT

## 2023-08-07 PROCEDURE — 93308 TTE F-UP OR LMTD: CPT

## 2023-08-07 PROCEDURE — 85025 COMPLETE CBC W/AUTO DIFF WBC: CPT | Performed by: EMERGENCY MEDICINE

## 2023-08-07 PROCEDURE — 93005 ELECTROCARDIOGRAM TRACING: CPT

## 2023-08-07 PROCEDURE — 93010 ELECTROCARDIOGRAM REPORT: CPT | Mod: 59 | Performed by: EMERGENCY MEDICINE

## 2023-08-07 PROCEDURE — 82962 GLUCOSE BLOOD TEST: CPT

## 2023-08-07 PROCEDURE — G0378 HOSPITAL OBSERVATION PER HR: HCPCS

## 2023-08-07 PROCEDURE — 83880 ASSAY OF NATRIURETIC PEPTIDE: CPT | Performed by: EMERGENCY MEDICINE

## 2023-08-07 PROCEDURE — 36415 COLL VENOUS BLD VENIPUNCTURE: CPT | Performed by: EMERGENCY MEDICINE

## 2023-08-07 PROCEDURE — 99285 EMERGENCY DEPT VISIT HI MDM: CPT | Mod: 25

## 2023-08-07 RX ORDER — LOSARTAN POTASSIUM 50 MG/1
100 TABLET ORAL DAILY
Status: DISCONTINUED | OUTPATIENT
Start: 2023-08-08 | End: 2023-08-10

## 2023-08-07 RX ORDER — FUROSEMIDE 20 MG
80 TABLET ORAL ONCE
Status: DISCONTINUED | OUTPATIENT
Start: 2023-08-07 | End: 2023-08-07

## 2023-08-07 RX ORDER — DEXTROSE MONOHYDRATE 25 G/50ML
25-50 INJECTION, SOLUTION INTRAVENOUS
Status: DISCONTINUED | OUTPATIENT
Start: 2023-08-07 | End: 2023-08-10 | Stop reason: HOSPADM

## 2023-08-07 RX ORDER — ONDANSETRON 4 MG/1
4 TABLET, ORALLY DISINTEGRATING ORAL EVERY 6 HOURS PRN
Status: DISCONTINUED | OUTPATIENT
Start: 2023-08-07 | End: 2023-08-10 | Stop reason: HOSPADM

## 2023-08-07 RX ORDER — MULTIVITAMIN WITH IRON
250 TABLET ORAL DAILY
Status: DISCONTINUED | OUTPATIENT
Start: 2023-08-08 | End: 2023-08-07

## 2023-08-07 RX ORDER — EPINEPHRINE INHALATION 125 UG/1
1-2 AEROSOL RESPIRATORY (INHALATION) EVERY 4 HOURS PRN
COMMUNITY

## 2023-08-07 RX ORDER — LIDOCAINE 40 MG/G
CREAM TOPICAL
Status: DISCONTINUED | OUTPATIENT
Start: 2023-08-07 | End: 2023-08-10 | Stop reason: HOSPADM

## 2023-08-07 RX ORDER — MAGNESIUM OXIDE 400 MG/1
400 TABLET ORAL DAILY
Status: DISCONTINUED | OUTPATIENT
Start: 2023-08-08 | End: 2023-08-10 | Stop reason: HOSPADM

## 2023-08-07 RX ORDER — FUROSEMIDE 10 MG/ML
100 INJECTION INTRAMUSCULAR; INTRAVENOUS ONCE
Status: COMPLETED | OUTPATIENT
Start: 2023-08-07 | End: 2023-08-07

## 2023-08-07 RX ORDER — FUROSEMIDE 10 MG/ML
60 INJECTION INTRAMUSCULAR; INTRAVENOUS ONCE
Status: DISCONTINUED | OUTPATIENT
Start: 2023-08-07 | End: 2023-08-07

## 2023-08-07 RX ORDER — TAMSULOSIN HYDROCHLORIDE 0.4 MG/1
0.4 CAPSULE ORAL DAILY
Status: DISCONTINUED | OUTPATIENT
Start: 2023-08-08 | End: 2023-08-10 | Stop reason: HOSPADM

## 2023-08-07 RX ORDER — GLIPIZIDE 10 MG/1
10 TABLET ORAL
Status: DISCONTINUED | OUTPATIENT
Start: 2023-08-08 | End: 2023-08-10 | Stop reason: HOSPADM

## 2023-08-07 RX ORDER — ATORVASTATIN CALCIUM 20 MG/1
40 TABLET, FILM COATED ORAL DAILY
Status: DISCONTINUED | OUTPATIENT
Start: 2023-08-08 | End: 2023-08-10 | Stop reason: HOSPADM

## 2023-08-07 RX ORDER — ONDANSETRON 2 MG/ML
4 INJECTION INTRAMUSCULAR; INTRAVENOUS EVERY 6 HOURS PRN
Status: DISCONTINUED | OUTPATIENT
Start: 2023-08-07 | End: 2023-08-10 | Stop reason: HOSPADM

## 2023-08-07 RX ORDER — NICOTINE POLACRILEX 4 MG
15-30 LOZENGE BUCCAL
Status: DISCONTINUED | OUTPATIENT
Start: 2023-08-07 | End: 2023-08-10 | Stop reason: HOSPADM

## 2023-08-07 RX ORDER — ASPIRIN 81 MG/1
81 TABLET ORAL DAILY
Status: DISCONTINUED | OUTPATIENT
Start: 2023-08-08 | End: 2023-08-10 | Stop reason: HOSPADM

## 2023-08-07 RX ORDER — PIOGLITAZONEHYDROCHLORIDE 45 MG/1
45 TABLET ORAL DAILY
Status: DISCONTINUED | OUTPATIENT
Start: 2023-08-08 | End: 2023-08-10

## 2023-08-07 RX ADMIN — FUROSEMIDE 100 MG: 10 INJECTION, SOLUTION INTRAVENOUS at 17:50

## 2023-08-07 ASSESSMENT — ENCOUNTER SYMPTOMS
LOSS OF CONSCIOUSNESS: 0
DIARRHEA: 0
DIZZINESS: 0
FALLS: 0
COUGH: 1
CHILLS: 1
WEAKNESS: 0
VOMITING: 0
BLOOD IN STOOL: 0
SORE THROAT: 0
NAUSEA: 0
HEADACHES: 0
WEIGHT LOSS: 0
PALPITATIONS: 0
WHEEZING: 0
CONSTIPATION: 0
SPUTUM PRODUCTION: 1
SINUS PAIN: 0
ABDOMINAL PAIN: 0
SHORTNESS OF BREATH: 1
FEVER: 0
ORTHOPNEA: 0

## 2023-08-07 ASSESSMENT — ACTIVITIES OF DAILY LIVING (ADL)
ADLS_ACUITY_SCORE: 33
ADLS_ACUITY_SCORE: 37
ADLS_ACUITY_SCORE: 33
ADLS_ACUITY_SCORE: 33
ADLS_ACUITY_SCORE: 37
ADLS_ACUITY_SCORE: 37

## 2023-08-07 NOTE — ED NOTES
"[facility]   Admission Handoff    The patient is Jayesh Ortiz, 82 year old who arrived in the ED by WHEELCHAIR from home with a complaint of Shortness of Breath  . The patient's current symptoms are new and during this time the symptoms have remained the same. In the ED, patient was diagnosed with   Final diagnoses:   Acute congestive heart failure, unspecified heart failure type (H) - With hypoxia with minimal exertion   Accelerated hypertension         Needed?: No    Allergies:    Allergies   Allergen Reactions    Nka [No Known Allergies]        Past Medical Hx:   Past Medical History:   Diagnosis Date    Diabetes (H)     Diabetes mellitus, type 2 (H)     Hypertension     Thyroid disease        Initial vitals were: BP: (!) 180/77  Pulse: 54  Temp: 98.3  F (36.8  C)  Resp: 20  Height: 172.7 cm (5' 8\")  Weight: 149.7 kg (330 lb)  SpO2: 92 %   Recent vital Signs: BP (!) 179/137   Pulse 69   Temp 98.3  F (36.8  C) (Oral)   Resp 21   Ht 1.727 m (5' 8\")   Wt 149.7 kg (330 lb)   SpO2 (!) 89%   BMI 50.18 kg/m      Elimination Status: Continent: Yes     Activity Level: Independent    Fall Status: None  none    Baseline Mental status: WDL  Current Mental Status changes: at basesline    Infection present or suspected this encounter: no  Sepsis suspected: No    Isolation type: none    Bariatric equipment needed?: No    In the ED these meds were given:   Medications   furosemide (LASIX) injection 100 mg (100 mg Intravenous $Given 8/7/23 1750)       Drips running?  No    Home pump  No    Current LDAs: Peripheral IV: Site Lt arm; Gauge 18  none     Results:   Labs/Imaging  Ordered and Resulted from Time of ED Arrival Up to the Time of Departure from the ED  Results for orders placed or performed during the hospital encounter of 08/07/23 (from the past 24 hour(s))   CBC with platelets, differential    Narrative    The following orders were created for panel order CBC with platelets, " differential.  Procedure                               Abnormality         Status                     ---------                               -----------         ------                     CBC with platelets and d...[[id number]]  Abnormal            Final result                 Please view results for these tests on the individual orders.   Comprehensive metabolic panel   Result Value Ref Range    Sodium 138 136 - 145 mmol/L    Potassium 4.9 3.4 - 5.3 mmol/L    Chloride 101 98 - 107 mmol/L    Carbon Dioxide (CO2) 27 22 - 29 mmol/L    Anion Gap 10 7 - 15 mmol/L    Urea Nitrogen 19.7 8.0 - 23.0 mg/dL    Creatinine 1.01 0.67 - 1.17 mg/dL    Calcium 9.7 8.8 - 10.2 mg/dL    Glucose 196 (H) 70 - 99 mg/dL    Alkaline Phosphatase 66 40 - 129 U/L    AST 21 0 - 45 U/L    ALT 27 0 - 70 U/L    Protein Total 6.9 6.4 - 8.3 g/dL    Albumin 4.2 3.5 - 5.2 g/dL    Bilirubin Total 0.7 <=1.2 mg/dL    GFR Estimate 75 >60 mL/min/1.73m2   NT pro BNP   Result Value Ref Range    N terminal Pro BNP Inpatient 2,039 (H) 0 - 1,800 pg/mL   [place] Draw    Narrative    The following orders were created for panel order [place] Draw.  Procedure                               Abnormality         Status                     ---------                               -----------         ------                     Extra Blue Top Tube[[id number]]                              Final result               Extra Red Top Tube[[id number]]                               Final result                 Please view results for these tests on the individual orders.   CBC with platelets and differential   Result Value Ref Range    WBC Count 5.6 4.0 - 11.0 10e3/uL    RBC Count 4.21 (L) 4.40 - 5.90 10e6/uL    Hemoglobin 13.2 (L) 13.3 - 17.7 g/dL    Hematocrit 40.6 40.0 - 53.0 %    MCV 96 78 - 100 fL    MCH 31.4 26.5 - 33.0 pg    MCHC 32.5 31.5 - 36.5 g/dL    RDW 13.7 10.0 - 15.0 %    Platelet Count 163 150 - 450 10e3/uL    % Neutrophils 81 %    % Lymphocytes 9 %    % Monocytes 8  %    % Eosinophils 1 %    % Basophils 1 %    % Immature Granulocytes 0 %    NRBCs per 100 WBC 0 <1 /100    Absolute Neutrophils 4.5 1.6 - 8.3 10e3/uL    Absolute Lymphocytes 0.5 (L) 0.8 - 5.3 10e3/uL    Absolute Monocytes 0.5 0.0 - 1.3 10e3/uL    Absolute Eosinophils 0.0 0.0 - 0.7 10e3/uL    Absolute Basophils 0.1 0.0 - 0.2 10e3/uL    Absolute Immature Granulocytes 0.0 <=0.4 10e3/uL    Absolute NRBCs 0.0 10e3/uL   Extra Blue Top Tube   Result Value Ref Range    Hold Specimen JIC    Extra Red Top Tube   Result Value Ref Range    Hold Specimen JIC    Troponin T, High Sensitivity   Result Value Ref Range    Troponin T, High Sensitivity 30 (H) <=22 ng/L   XR Chest 2 Views    Narrative    XR CHEST 2 VIEWS 8/7/2023 3:28 PM    HISTORY: SOA. Shortness of breath    COMPARISON: 4/5/2023    FINDINGS: Subsegmental atelectasis in the lung bases. Trace bilateral  pleural effusions. No pneumothorax. Unchanged cardiac size. TAVR.  Aortic atherosclerosis.    JANENE PICHARDO MD         SYSTEM ID:  [id number]   Troponin T, High Sensitivity   Result Value Ref Range    Troponin T, High Sensitivity 29 (H) <=22 ng/L       For the majority of the shift this patient's behavior was Green     Cardiac Rhythm: Atrial rhythm and Bradycardia - intermittent bradycardia, bigeminy with non-perfusing PVC's  Pt needs tele? Yes  Skin/wound Issues:  yes, some weeping sores on lower legs    Code Status: Full Code    Pain control: good    Nausea control: good    Abnormal labs/tests/findings requiring intervention: BNP 2,039    Patient tested for COVID 19 prior to admission: NO     OBS brochure/video discussed/provided to patient/family: N/A     Family present during ED course? Yes     Family Comments/Social Situation comments: none     Tasks needing completion: None    Darrell Logan RN

## 2023-08-07 NOTE — ED PROVIDER NOTES
"  History     Chief Complaint   Patient presents with    Shortness of Breath     HPI  History per patient, review of [facility] EMR and Care Everywhere EMR, including extensive chart review of multiple prior cardiology clinic notes, prior cardiac imaging evaluation studies and multiple prior laboratory evaluations.  Jayesh Ortiz is a 82 year old male with history of nonrheumatic aortic stenosis and status post TAVR in April of this year, CHF with mildly reduced EF (NYHA class II ) and ischemic cardiomyopathy with who presents emergency department with ~ 2.5 weeks of dyspnea/dyspnea on exertion and gradually worsening bilateral, symmetrical lower extremity swelling.  Onset of symptoms with URI symptoms with \"a head cold\" and mildly productive cough which is sometimes productive of greenish sputum.  URI symptoms improving, dyspnea and peripheral edema is worsening.  No chest pain, hemoptysis, leg pain, fever or chills.  No prior history of DVT or PE.  He reports a 30 pound weight gain in the past several weeks but review of OMR shows only a 15 pound weight gain since 5/15/2023.  He sleeps in a recliner chronically and has no acute orthopnea but states last night he had shortness of breath progressing throughout the evening and early morning until suddenly improving.  No other pertinent history or acute complaints or concerns.      Previous Records Reviewed:  BP Readings from Last 6 Encounters:   08/07/23 136/42   05/15/23 (!) 176/56   04/28/23 127/51   04/11/23 (!) 158/60   04/05/23 94/50   04/03/23 (!) 140/72      Wt Readings from Last 3 Encounters:   08/07/23 147.3 kg (324 lb 11.8 oz)   05/15/23 142.9 kg (315 lb)   04/28/23 142.2 kg (313 lb 6.4 oz)     5/15/2023 Cardiology clinic visit    [facility]  [phone]     Assessment/Recommendations   Assessment:  Severe aortic stenosis - s/p TAVR on 4/4/2023 using a 29 mm Maribel valve  2.   Ischemic cardiomyopathy, chronic heart failure with mildly " reduced ejection fraction, NYHA class II -currently compensated with no clinical evidence of fluid overload.  EF has improved slightly since TAVR  3.   CAD -with known  of the LAD.  He denies angina.  4.   Dyslipidemia with goal LDL less than 70 -lipid profile up-to-date with  currently  5.   Hypertension -blood pressure high today.  BP followed closely at PCP office.  Last clinic blood pressure was controlled  6.  Type 2 diabetes mellitus - with improvement in A1C since 1 year ago.   7.  Chronic hypomagnesemia     Plan:  Continue aspirin 81 mg daily, indefinitely  Continue high-dose atorvastatin at 80 mg daily.   Continue losartan 100 mg daily with close BP follow-up at PCP office  Follow-up with Dr. Reagan in October for 6-month visit and to establish cardiology care, as patient was referred for TAVR directly from PCP.    He should see the structural team again in April 2024     Thank you for the opportunity to participate in the care of Jayesh Ortiz. It's been a pleasure working with him.  Please do not hesitate to call with any questions or concerns.         History of Present Illness/Subjective    I had the opportunity to see Jayesh Ortiz at the [facility] for his 1 month follow-up visit after transcatheter aortic valve replacement.     Jayesh has history of aortic stenosis, hypertension, obesity, cardiomyopathy with mildly reduced EF at 40 to 45%, and prostate cancer.  He first saw Dr. Mason about 1 year ago regarding his aortic stenosis and mildly reduced LVEF, but he was asymptomatic at that time and deferred aortic valve replacement.  Repeat echocardiogram in December 2022 showed mild progression of the valve disease and patient decided to move forward with treatment.     He underwent TAVR (transcatheter aortic valve replacement) on 4/4/2023, via the right transfemoral approach and using a 29 mm Maribel valve.   He was not happy about pain control during his procedure and was  actually quite upset with how this was managed in the procedure room.  He says everything past that point went well and he has been doing well since being home.  He has not noticed any difference in his symptoms, but he was not significantly limited by his aortic stenosis even prior to TAVR.      Jayesh Ortiz denies exertional chest discomfort, palpitations, shortness of breath at rest, PND, orthopnea, lightheadedness, dizziness, pre-syncope or syncope.  Jayesh Ortiz also denies any recent weight loss, changes in appetite, nausea or vomiting.   ____________________________________________________________  Echo from 5/11/23  Interpretation Summary     Technically very difficult study despite use of Definity contrast. Patient was  supine on his back during the examination.  Normal left ventricular size. Mild left ventricular hypertrophy. Left  ventricular systolic function grossly appears to be lower limits of normal to  mildly reduced. Left ventricular ejection fraction estimated at 50 to 55%.  Regional wall motion analysis is limited. No obvious regionality. Underlying  rhythm is irregular.  The right ventricle is not well visualized. Right ventricular size and  systolic function grossly appears normal.  Moderate left atrial enlargement suggested.  Valve structures are suboptimally visualized.  Prosthetic valve in aortic position. 29 mm [place] HELENA 3 tissue valve by  report. Peak velocity 2.7 m/s. Mean gradient of 14 mmHg. Aortic acceleration  time 0.10 seconds. Cannot accurately assess for paravalvular insufficiency on  this technically difficult study.  Consider alternative imaging modality given that today's echocardiogram and  previous echocardiogram are technically very challenging.  Compared to the echocardiogram April 5, 2023.The peak velocity and mean  gradient across the prosthetic aortic valve are mildly higher on the current  Study.     EKG (personally reviewed):  Sinus rhythm with PACs and LBBB  (QRS stable from EKG dated 4/11/2023 and first-degree AV block no longer present     Allergies:  Allergies   Allergen Reactions    Nka [No Known Allergies]        Problem List:    Patient Active Problem List    Diagnosis Date Noted    Morbid obesity (H) 03/24/2017     Priority: High    Accelerated hypertension 08/07/2023     Priority: Medium    Acute congestive heart failure, unspecified heart failure type (H) 08/07/2023     Priority: Medium    S/P TAVR (transcatheter aortic valve replacement) 04/04/2023     Priority: Medium     4/4/23 - right TF approach, 29 mm HELENA valve.  Manta closure right, angioseal closure left      Aortic stenosis, severe 04/04/2023     Priority: Medium    Coronary artery disease involving coronary bypass graft of native heart without angina pectoris      Priority: Medium    Aortic stenosis 04/29/2022     Priority: Medium    Cardiomyopathy (H) 04/29/2022     Priority: Medium    Uncontrolled type 2 diabetes mellitus with hyperglycemia (H) 02/27/2022     Priority: Medium    Talus fracture 03/09/2017     Priority: Medium    Hypothyroidism 03/09/2017     Priority: Medium    Malignant neoplasm of prostate (H) 03/09/2017     Priority: Medium     Overview:   Created by Conversion      Malignant neoplasm of thyroid gland (H) 03/09/2017     Priority: Medium     Overview:   Created by Conversion      Hyperlipidemia 03/09/2017     Priority: Medium    Type 2 diabetes mellitus (H) 03/09/2017     Priority: Medium    Fracture of right talus 03/09/2017     Priority: Medium        Past Medical History:    Past Medical History:   Diagnosis Date    Diabetes (H)     Diabetes mellitus, type 2 (H)     Hypertension     Thyroid disease        Past Surgical History:    Past Surgical History:   Procedure Laterality Date    CLOSED REDUCTION, PERCUTANEOUS PINNING LOWER EXTREMITY, COMBINED Right 3/9/2017    Procedure: COMBINED CLOSED REDUCTION, PERCUTANEOUS PINNING LOWER EXTREMITY;  Surgeon: Ankit Coy DPM;   "Location: [place] OR    CV CORONARY ANGIOGRAM N/A 2/9/2023    Procedure: Coronary Angiogram;  Surgeon: Nabeel Davies MD;  Location: [facility] CV    CV TRANSCATHETER AORTIC VALVE REPLACEMENT-FEMORAL APPROACH N/A 4/4/2023    Procedure: Transcatheter Aortic Valve Replacement-Femoral Approach;  Surgeon: Nabeel Davies MD;  Location: [facility] CATH LAB CV    OR TRANSCATHETER AORTIC VALVE REPLACEMENT, FEMORAL PERCUTANEOUS APPROACH (STANDBY) N/A 4/4/2023    Procedure: OR TRANSCATHETER AORTIC VALVE REPLACEMENT, FEMORAL PERCUTANEOUS APPROACH (STANDBY);  Surgeon: Jeramie De Jesus MD;  Location: [facility] LAB CV    THYROIDECTOMY         Family History:    Family History   Problem Relation Age of Onset    Heart Failure Mother     Heart Failure Father        Social History:  Marital Status:  Single [1]  Social History     Tobacco Use    Smoking status: Former    Smokeless tobacco: Never   Vaping Use    Vaping Use: Never used   Substance Use Topics    Alcohol use: Not Currently    Drug use: Never        Medications:    No current outpatient medications on file.    Review of Systems  As mentioned in the HPI, in addition focused review of systems was negative.    Physical Exam   BP: (!) 180/77  Pulse: 54  Temp: 98.3  F (36.8  C)  Resp: 20  Height: 172.7 cm (5' 8\")  Weight: 149.7 kg (330 lb)  SpO2: 92 %      Physical Exam  Vitals and nursing note reviewed.   Constitutional:       General: He is in acute distress.      Appearance: Normal appearance. He is well-developed. He is ill-appearing. He is not diaphoretic.   HENT:      Head: Normocephalic and atraumatic.   Eyes:      General: No scleral icterus.     Extraocular Movements: Extraocular movements intact.      Conjunctiva/sclera: Conjunctivae normal.   Neck:      Trachea: No tracheal deviation.   Cardiovascular:      Rate and Rhythm: Normal rate and regular rhythm.      Heart sounds: Normal heart sounds. No murmur heard.     No friction rub. No gallop. "   Pulmonary:      Effort: Tachypnea and accessory muscle usage present.      Breath sounds: Examination of the right-lower field reveals rales. Examination of the left-lower field reveals rales. Rales present. No wheezing or rhonchi.   Musculoskeletal:         General: Swelling present. No tenderness. Normal range of motion.      Cervical back: Normal range of motion and neck supple.      Right lower leg: No tenderness. Edema present.      Left lower leg: No tenderness. Edema present.      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  He did not want to remove his sweatpants to allow me to visually inspect his legs.   Skin:     General: Skin is warm and dry.      Coloration: Skin is not pale.      Findings: No erythema or rash.   Neurological:      General: No focal deficit present.      Mental Status: He is alert and oriented to person, place, and time.      Coordination: Coordination normal.   Psychiatric:         Mood and Affect: Mood normal.         Behavior: Behavior normal.         ED Course             Procedures    Results for orders placed during the hospital encounter of 08/07/23    POC US ECHO LIMITED    [facility] Procedure Note    Limited Bedside ED Cardiac Ultrasound:    PROCEDURE: PERFORMED BY: Dr. Sharan Olea MD  INDICATIONS/SYMPTOM:  Shortness of Breath  PROBE: Cardiac phased array probe  BODY LOCATION: Chest  FINDINGS:  The ultrasound was performed utilizing the subcostal, parasternal long axis, parasternal short axis, and apical 4 chamber views.  Cardiac contractility:  Present  Gross estimation of cardiac kinesis: normal  Pericardial Effusion:  None  RV:LV ratio: LV > RV  IVC: Dilated  Collapsibility:  IVC collapses significantly < 50% with inspiration  INTERPRETATION: Left-sided heart enlargement with IVC showing hypervolemia/CHF No pericardial effusion was found.  IVC visualized and findings indicate CHF.  IMAGE  DOCUMENTATION: Images were archived to PACs system.            EKG Interpretation:      Interpreted by Sharan Olea MD  Time reviewed: 3:06 PM  Symptoms at time of EKG: SOA   Rhythm: Sinus rhythm with ventricular bigeminy  Rate: normal  Axis: LAD  Ectopy: Ventricular bigeminy  Conduction: Left bundle branch block  ST Segments/ T Waves: ST-T wave changes of left bundle branch block  Q Waves: none  Comparison to prior: 5/15/23 EKG shows a left bundle branch block with frequent PVCs or fusion complexes  Clinical Impression: Sinus rhythm with ventricular bigeminy         Results for orders placed or performed during the hospital encounter of 08/07/23 (from the past 24 hour(s))   CBC with platelets, differential    Narrative    The following orders were created for panel order CBC with platelets, differential.  Procedure                               Abnormality         Status                     ---------                               -----------         ------                     CBC with platelets and d...[[id number]]  Abnormal            Final result                 Please view results for these tests on the individual orders.   Comprehensive metabolic panel   Result Value Ref Range    Sodium 138 136 - 145 mmol/L    Potassium 4.9 3.4 - 5.3 mmol/L    Chloride 101 98 - 107 mmol/L    Carbon Dioxide (CO2) 27 22 - 29 mmol/L    Anion Gap 10 7 - 15 mmol/L    Urea Nitrogen 19.7 8.0 - 23.0 mg/dL    Creatinine 1.01 0.67 - 1.17 mg/dL    Calcium 9.7 8.8 - 10.2 mg/dL    Glucose 196 (H) 70 - 99 mg/dL    Alkaline Phosphatase 66 40 - 129 U/L    AST 21 0 - 45 U/L    ALT 27 0 - 70 U/L    Protein Total 6.9 6.4 - 8.3 g/dL    Albumin 4.2 3.5 - 5.2 g/dL    Bilirubin Total 0.7 <=1.2 mg/dL    GFR Estimate 75 >60 mL/min/1.73m2   NT pro BNP   Result Value Ref Range    N terminal Pro BNP Inpatient 2,039 (H) 0 - 1,800 pg/mL   [facility] Draw    Narrative    The following orders were created for panel order [place] Draw.  Procedure                                Abnormality         Status                     ---------                               -----------         ------                     Extra Blue Top Tube[[id number]]                              Final result               Extra Red Top Tube[[id number]]                               Final result                 Please view results for these tests on the individual orders.   CBC with platelets and differential   Result Value Ref Range    WBC Count 5.6 4.0 - 11.0 10e3/uL    RBC Count 4.21 (L) 4.40 - 5.90 10e6/uL    Hemoglobin 13.2 (L) 13.3 - 17.7 g/dL    Hematocrit 40.6 40.0 - 53.0 %    MCV 96 78 - 100 fL    MCH 31.4 26.5 - 33.0 pg    MCHC 32.5 31.5 - 36.5 g/dL    RDW 13.7 10.0 - 15.0 %    Platelet Count 163 150 - 450 10e3/uL    % Neutrophils 81 %    % Lymphocytes 9 %    % Monocytes 8 %    % Eosinophils 1 %    % Basophils 1 %    % Immature Granulocytes 0 %    NRBCs per 100 WBC 0 <1 /100    Absolute Neutrophils 4.5 1.6 - 8.3 10e3/uL    Absolute Lymphocytes 0.5 (L) 0.8 - 5.3 10e3/uL    Absolute Monocytes 0.5 0.0 - 1.3 10e3/uL    Absolute Eosinophils 0.0 0.0 - 0.7 10e3/uL    Absolute Basophils 0.1 0.0 - 0.2 10e3/uL    Absolute Immature Granulocytes 0.0 <=0.4 10e3/uL    Absolute NRBCs 0.0 10e3/uL   Extra Blue Top Tube   Result Value Ref Range    Hold Specimen JIC    Extra Red Top Tube   Result Value Ref Range    Hold Specimen JIC    Troponin T, High Sensitivity   Result Value Ref Range    Troponin T, High Sensitivity 30 (H) <=22 ng/L   XR Chest 2 Views    Narrative    XR CHEST 2 VIEWS 8/7/2023 3:28 PM    HISTORY: SOA. Shortness of breath    COMPARISON: 4/5/2023    FINDINGS: Subsegmental atelectasis in the lung bases. Trace bilateral  pleural effusions. No pneumothorax. Unchanged cardiac size. TAVR.  Aortic atherosclerosis.    JANENE PICHARDO MD         SYSTEM ID:  [id number]   POC US ECHO LIMITED    Narrative    [facility] Procedure Note      Limited Bedside ED Cardiac Ultrasound:    PROCEDURE:  PERFORMED BY: Dr. Sharan Olea MD  INDICATIONS/SYMPTOM:  Shortness of Breath  PROBE: Cardiac phased array probe  BODY LOCATION: Chest  FINDINGS:   The ultrasound was performed utilizing the subcostal, parasternal long axis, parasternal short axis, and apical 4 chamber views.  Cardiac contractility:  Present  Gross estimation of cardiac kinesis: normal  Pericardial Effusion:  None  RV:LV ratio: LV > RV  IVC: Dilated                                                 Collapsibility:  IVC collapses significantly < 50% with inspiration  INTERPRETATION: Left-sided heart enlargement with IVC showing hypervolemia/CHF No pericardial effusion was found.  IVC visualized and findings indicate CHF.  IMAGE DOCUMENTATION: Images were archived to PACs system.        Troponin T, High Sensitivity   Result Value Ref Range    Troponin T, High Sensitivity 29 (H) <=22 ng/L   Glucose by meter   Result Value Ref Range    GLUCOSE BY METER POCT 264 (H) 70 - 99 mg/dL       Medications   aspirin EC tablet 81 mg (has no administration in time range)   atorvastatin (LIPITOR) tablet 40 mg (has no administration in time range)   glipiZIDE (GLUCOTROL) tablet 10 mg (has no administration in time range)   levothyroxine (SYNTHROID/LEVOTHROID) tablet 175 mcg (has no administration in time range)   losartan (COZAAR) tablet 100 mg (has no administration in time range)   metFORMIN (GLUCOPHAGE) tablet 1,000 mg (has no administration in time range)   pioglitazone (ACTOS) tablet 45 mg ( Oral Automatically Held 8/11/23 0800)   tamsulosin (FLOMAX) capsule 0.4 mg (has no administration in time range)   melatonin tablet 1 mg (has no administration in time range)   ondansetron (ZOFRAN ODT) ODT tab 4 mg (has no administration in time range)     Or   ondansetron (ZOFRAN) injection 4 mg (has no administration in time range)   lidocaine 1 % 0.1-1 mL (has no administration in time range)   lidocaine (LMX4) kit (has no administration in time range)   sodium chloride  "(PF) 0.9% PF flush 3 mL (3 mLs Intracatheter $Given 8/7/23 2239)   sodium chloride (PF) 0.9% PF flush 3 mL (has no administration in time range)   glucose gel 15-30 g (has no administration in time range)     Or   dextrose 50 % injection 25-50 mL (has no administration in time range)     Or   glucagon injection 1 mg (has no administration in time range)   insulin aspart (NovoLOG) injection (RAPID ACTING) (has no administration in time range)   insulin aspart (NovoLOG) injection (RAPID ACTING) (3 Units Subcutaneous $Given 8/7/23 2238)   magnesium oxide (MAG-OX) tablet 400 mg (has no administration in time range)   furosemide (LASIX) injection 100 mg (100 mg Intravenous $Given 8/7/23 1750)       I reviewed the case with Dr. Hoff, Cardiology with [facility].  We discussed his history, clinical presentation, EKG, laboratory and bedside cardiac echo study results, treatment plan and disposition plan.  No specific treatment for frequent PVCs or ventricular bigeminy was felt to be indicated.    I reviewed the case with Carolin GUTHRIE of the Hospitalist service who accepted his care upon admission here.      Assessments & Plan (with Medical Decision Making)   82 year old male with history of nonrheumatic aortic stenosis and status post TAVR in April of this year, CHF with mildly reduced EF (NYHA class II ) and ischemic cardiomyopathy with who presents emergency department with ~ 2.5 weeks of dyspnea/dyspnea on exertion and gradually worsening bilateral, symmetrical lower extremity swelling.  Onset of symptoms with URI symptoms with \"a head cold\" and mildly productive cough which is sometimes productive of greenish sputum, but URI symptoms are resolving and dyspnea and peripheral edema has been worsening.  He reports a 30 pound weight loss in the past several weeks but has only gained 15 pounds since 5/15/2023.  He has O2 saturation low 90s which decreases into the upper 80s with minimal exertion in the room.  " Clinical examination is consistent with heart failure.  No evidence of pneumonia on x-ray.  Elevated BNP and evidence of left-sided heart enlargement and hypervolemia/CHF on bedside cardiac echo.  EKG shows chronic left bundle branch block and NS rhythm with ventricular bigeminy which is new.  Mildly elevated troponin of 30 was essentially unchanged at 29 when repeated.  Doubt atypical ACS or MI.  Doubt PE/DVT. Diuresis was initiated with Lasix 100 mg IV.  I reviewed the case with Cardiology and it was felt the patient could be admitted to our facility for diuresis and an Echocardiogram for further evaluation.  Care was accepted upon admission to the hospital service.    I have reviewed the nursing notes.    I have reviewed the findings, diagnosis, plan and need for follow up with the patient.    Medical Decision Making  The patient's presentation was of high complexity (an acute health issue posing potential threat to life or bodily function).    The patient's evaluation involved:  review of external note(s) from 3+ sources (see separate area of note for details)  ordering and/or review of 3+ test(s) in this encounter (see separate area of note for details)  review of 3+ test result(s) ordered prior to this encounter (see separate area of note for details)  independent interpretation of testing performed by another health professional (see separate area of note for details)  discussion of management or test interpretation with another health professional (see separate area of note for details)    The patient's management necessitated high risk (a decision regarding hospitalization).        Current Discharge Medication List          Final diagnoses:   Acute congestive heart failure, unspecified heart failure type (H) - With hypoxia with minimal exertion   Accelerated hypertension       8/7/2023   [facility] EMERGENCY DEPT            Sharan Olea MD  08/07/23 [id number]

## 2023-08-07 NOTE — H&P
[facility]    History and Physical  Hospital Medicine       Date of Admission:  8/7/2023  Date of Service: 8/7/2023     Assessment & Plan   Jayesh Ortiz is a 82 year old male who presents on 8/7/2023 with x 2 weeks of worsening dyspnea and lower extremity swelling. Admitted for acute heart failure and need for diuresis.    Acute decompensation of heart failure    Admission weight 330; last weight on file 315 on 05/15/23. BNP 2,039. Lungs with bibasilar course crackles to auscultation. CXR shows trace bilateral pleural effusions with subsegmental atelectasis in the lung bases.  Bedside ultrasound significant for evidence of left-sided heart failure and on collapsible IVC. Last echo 5/2023. LV size was normal with mild concentric LV hypertrophy, LVEF 50-55%. Was given furosemide 100 mg IV in ED with good response.   - Diuresis with furosemide 40 mg q12hrs  - Monitor electrolytes daily; BMP, Mg, and K.  - Monitor renal function daily.  - Strict I and O's, daily weights    Elevated troponin    Initial troponin of 30 with repeat 29. Not having chest pain or tightness. EKG demonstrates sinus rhythm with ventricular bigeminy. There is ST-T wave changes of left bundle branch block. Compared to prior EKG there was a left bundle branch block with frequent PVCs or fusion complexes. EKG findings, bedside echo, history, and clinical presentation was discussed between ED physician and cardiologist Dr. Hoff. There were no specific treatment recommended for PVCs or ventricular bigeminy felt to be indicated.    Hypertension    Chronic. Blood pressures reviewed, hypertensive. Taking PTA losartan 100 mg daily.  - Continue PTA losartan.  - Furosemide (see acute heart failure above)    Hypomagnesemia    Chronic.Takes magnesium supplementation 250 mg daily.  - Continue supplementation at increased dose 500 mg.   - Magnesium replacement protocol.    Diabetes Mellitus, Type II  Chronic.  Last a1C 6.8% on  "04/04/23. This has been a significant change since 03/2020 when A1c was 13.2. Blood glucose on admission 196. Taking PTA glipizide 10 mg BID, metformin 500 mg BID, and pioglitazone 45 mg daily.   -Continue PTA metformin and glipizide,  - Hold pioglitazone for symptomatic heart failure  -High SSI  -Hypo/hyperglycemia protocol with blood glucose monitoring    H/o severe aortic stenosis, s/p TAVR    Follows with cardiology, last visit 5/15/23.   -Continue ASA 81 mg daily    Dyslipidemia    Chronic. Goal LDL <70. Managed PTA with atorvastatin 40 mg daily.  -Continue PTA statin.    Hypothyroidism    Chronic. Managed PTA with levothyroxine 175 mcg daily.   -Continue PTA levothyroxine.     Benign prostatic hyperplasia    Chronic. Managed PTA with tamsulosin 0.4 mg daily.  - Continue PTA tamsulosisn.     Clinically Significant Risk Factors Present on Admission                  # Drug Induced Platelet Defect: home medication list includes an antiplatelet medication     # Hypertension: Noted on problem list   # Acute Respiratory Failure: Documented O2 saturation < 91%.  Continue supplemental oxygen as needed    # DMII: A1C = N/A within past 6 months      # Severe Obesity: Estimated body mass index is 49.38 kg/m  as calculated from the following:    Height as of this encounter: 1.727 m (5' 8\").    Weight as of this encounter: 147.3 kg (324 lb 11.8 oz).             Diet: Low Saturated Fat Na <2400 mg  DVT Prophylaxis: Low Risk/Ambulatory with no VTE prophylaxis indicated  Welch Catheter: Not present  Code Status: Full Code  Lines: PIV    Disposition Plan      Expected Discharge Date: 08/08/2023             Entered: AP ONEILL PA-C 08/08/2023, 12:19 AM     I have discussed patient and formulated plan with attending hospitalist physician, Dr. Gallegos.  AP ONEILL PA-C        Primary Care Physician   Geoffrey Live [phone]    History is obtained from the patient, emergency room physician, and review of old " "records via the EMR.    History of Present Illness   Jayesh Ortiz is a 82 year old male with past medical history of non-rheumatic aortic stenosis s/p TAVR, CHF, ischemic cardiomyopathy, DM2, HTN, obesity, and BPH now presents on 8/7/2023 with x 2 weeks of worsening dyspnea and bilateral leg swelling.    Jayesh states onset of a \"head cold\" around two weeks ago. This has largely improved, however does have remanence of productive cough.  During this 2-week period he has noticed progressively worsening dyspnea.  He states he does not have shortness of breath with activity at baseline.  Last evening he notes acute shortness of breath with sitting that made him question calling 911, but resolved fairly quickly and remained asymptomatic for the remainder of the night.  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  He now notices that he gets short of breath with walking about 10 feet.  He denies any fevers or chills.  He denies any associated chest pain, tightness, or pressure.  He reports recent weight gain of 30 pounds over the past several weeks, however review of EMR shows likely 15 pound weight gain.  He sleeps upright in a recliner at all times due to past MSK injury, unable to determine orthopnea. No prior history of DVT or PE.    Review of Systems   Review of Systems   Constitutional:  Positive for chills. Negative for fever and weight loss.        30 lb weight gain   HENT:  Negative for congestion, sinus pain and sore throat.    Respiratory:  Positive for cough, sputum production and shortness of breath. Negative for wheezing.    Cardiovascular:  Positive for leg swelling. Negative for chest pain, palpitations and orthopnea.   Gastrointestinal:  Negative for abdominal pain, blood in stool, constipation, diarrhea, melena, nausea and vomiting.   Genitourinary: Negative.    Musculoskeletal:  Negative for falls.   Neurological:  Negative for dizziness, loss of " consciousness, weakness and headaches.     Past Medical History    Past Medical History:   Diagnosis Date    Diabetes (H)     Diabetes mellitus, type 2 (H)     Hypertension     Thyroid disease      Patient Active Problem List    Diagnosis Date Noted    Morbid obesity (H) 03/24/2017     Priority: High    Accelerated hypertension 08/07/2023     Priority: Medium    Acute congestive heart failure, unspecified heart failure type (H) 08/07/2023     Priority: Medium    S/P TAVR (transcatheter aortic valve replacement) 04/04/2023     Priority: Medium     4/4/23 - right TF approach, 29 mm HELENA valve.  Manta closure right, angioseal closure left      Aortic stenosis, severe 04/04/2023     Priority: Medium    Coronary artery disease involving coronary bypass graft of native heart without angina pectoris      Priority: Medium    Aortic stenosis 04/29/2022     Priority: Medium    Cardiomyopathy (H) 04/29/2022     Priority: Medium    Uncontrolled type 2 diabetes mellitus with hyperglycemia (H) 02/27/2022     Priority: Medium    Talus fracture 03/09/2017     Priority: Medium    Hypothyroidism 03/09/2017     Priority: Medium    Malignant neoplasm of prostate (H) 03/09/2017     Priority: Medium     Overview:   Created by Conversion      Malignant neoplasm of thyroid gland (H) 03/09/2017     Priority: Medium     Overview:   Created by Conversion      Hyperlipidemia 03/09/2017     Priority: Medium    Type 2 diabetes mellitus (H) 03/09/2017     Priority: Medium    Fracture of right talus 03/09/2017     Priority: Medium      Past Surgical History   Past Surgical History:   Procedure Laterality Date    CLOSED REDUCTION, PERCUTANEOUS PINNING LOWER EXTREMITY, COMBINED Right 3/9/2017    Procedure: COMBINED CLOSED REDUCTION, PERCUTANEOUS PINNING LOWER EXTREMITY;  Surgeon: Ankit Coy DPM;  Location: [facility] OR    CV CORONARY ANGIOGRAM N/A 2/9/2023    Procedure: Coronary Angiogram;  Surgeon: Nabeel Davies MD;  Location: [facility]  CATH LAB CV    CV TRANSCATHETER AORTIC VALVE REPLACEMENT-FEMORAL APPROACH N/A 4/4/2023    Procedure: Transcatheter Aortic Valve Replacement-Femoral Approach;  Surgeon: Nabeel Davies MD;  Location: [facility] CATH LAB CV    OR TRANSCATHETER AORTIC VALVE REPLACEMENT, FEMORAL PERCUTANEOUS APPROACH (STANDBY) N/A 4/4/2023    Procedure: OR TRANSCATHETER AORTIC VALVE REPLACEMENT, FEMORAL PERCUTANEOUS APPROACH (STANDBY);  Surgeon: Jeramie De Jesus MD;  Location: [facility] CATH LAB CV    THYROIDECTOMY        Prior to Admission Medications   Prior to Admission Medications   Prescriptions Last Dose Informant Patient Reported? Taking?   EPINEPHrine (PRIMATENE MIST) 0.125 MG/ACT AERO 8/6/2023 at 2200 didn't help Self Yes Yes   Sig: Inhale 1-2 sprays into the lungs every 4 hours as needed (for difficulty breathing)   aspirin (ASA) 81 MG EC tablet 8/7/2023 at am Self No Yes   Sig: Take 1 tablet (81 mg) by mouth daily   atorvastatin (LIPITOR) 40 MG tablet 8/7/2023 at am Self No Yes   Sig: TAKE 1 TABLET BY MOUTH EVERY DAY   glipiZIDE (GLUCOTROL) 10 MG tablet 8/7/2023 at am Self No Yes   Sig: TAKE 1 TABLET BY MOUTH TWICE A DAY   Patient taking differently: Take 10 mg by mouth 2 times daily (before meals)   levothyroxine (SYNTHROID/LEVOTHROID) 175 MCG tablet 8/7/2023 at am Self No Yes   Sig: TAKE 1 TABLET BY MOUTH EVERY DAY   losartan (COZAAR) 100 MG tablet 8/7/2023 at am Self No Yes   Sig: Take 1 tablet (100 mg) by mouth daily   magnesium 250 MG tablet 8/7/2023 at am Self No Yes   Sig: Take 1 tablet (250 mg) by mouth daily   metFORMIN (GLUCOPHAGE) 500 MG tablet 8/7/2023 at am Self No Yes   Sig: Take 2 tablets (1,000 mg) by mouth 2 times daily (with meals)   pioglitazone (ACTOS) 45 MG tablet 8/7/2023 at am Self No Yes   Sig: TAKE 1 TABLET (45 MG) BY MOUTH IN THE MORNING.   tamsulosin (FLOMAX) 0.4 MG capsule 8/7/2023 at am Self No Yes   Sig: TAKE 1 CAPSULE BY MOUTH EVERY DAY   Patient taking differently: Take 0.4 mg by  "mouth daily      Facility-Administered Medications: None     Allergies   Allergies   Allergen Reactions    Nka [No Known Allergies]      Family History    Family History   Problem Relation Age of Onset    Heart Failure Mother     Heart Failure Father      Social History   Social History     Socioeconomic History    Marital status: Single     Spouse name: Not on file    Number of children: Not on file    Years of education: Not on file    Highest education level: Not on file   Occupational History    Not on file   Tobacco Use    Smoking status: Former    Smokeless tobacco: Never   Vaping Use    Vaping Use: Never used   Substance and Sexual Activity    Alcohol use: Not Currently    Drug use: Never    Sexual activity: Not on file   Other Topics Concern    Not on file   Social History Narrative    Not on file     Social Determinants of Health     Financial Resource Strain: Not on file   Food Insecurity: Not on file   Transportation Needs: Not on file   Physical Activity: Not on file   Stress: Not on file   Social Connections: Not on file   Intimate Partner Violence: Not on file   Housing Stability: Not on file     Physical Exam   /42 (BP Location: Right arm)   Pulse 68   Temp 97.6  F (36.4  C) (Oral)   Resp 16   Ht 1.727 m (5' 8\")   Wt 147.3 kg (324 lb 11.8 oz)   SpO2 95%   BMI 49.38 kg/m       Weight: 324 lbs 11.8 oz Body mass index is 49.38 kg/m .     Constitutional: Sitting wheelchair, obese, alert, oriented x 4, cooperative, pleasant, no apparent distress, appears nontoxic.  Eyes: Eyes are clear, pupils are reactive.  HENT: Oropharynx is clear and moist. No evidence of cranial trauma.  Cardiovascular: Regular rate and rhythm. Normal S1 and S2, and no murmur noted. JVP is distended. Strong.peripheral pulses in wrists bilaterally.  Bilateral pitting edema extending up into the thighs.  Respiratory: Bibasilar coarse crackles.  No wheezes or rhonchi.  Not actively shortness of breath, normal respiratory " effort on room air with O2 saturations in the low 90s.  Speaking in full sentences.  GI: Soft, obese, non-tender to palpation throughout, normal bowel sounds, no hepatomegaly.  Genitourinary: Deferred  Musculoskeletal: Normal muscle bulk and tone.  Skin: Warm and dry, no rashes.   Neurologic: Neck supple. Cranial nerves II-XII are grossly intact.  is symmetric.     Data   Data reviewed today:   Recent Labs   Lab 08/07/23  2205 08/07/23  1314   WBC  --  5.6   HGB  --  13.2*   MCV  --  96   PLT  --  163   NA  --  138   POTASSIUM  --  4.9   CHLORIDE  --  101   CO2  --  27   BUN  --  19.7   CR  --  1.01   ANIONGAP  --  10   MATT  --  9.7   * 196*   ALBUMIN  --  4.2   PROTTOTAL  --  6.9   BILITOTAL  --  0.7   ALKPHOS  --  66   ALT  --  27   AST  --  21     Recent Results (from the past 24 hour(s))   XR Chest 2 Views    Narrative    XR CHEST 2 VIEWS 8/7/2023 3:28 PM    HISTORY: SOA. Shortness of breath    COMPARISON: 4/5/2023    FINDINGS: Subsegmental atelectasis in the lung bases. Trace bilateral  pleural effusions. No pneumothorax. Unchanged cardiac size. TAVR.  Aortic atherosclerosis.    JANENE PICHARDO MD         SYSTEM ID:  [id number]   POC US ECHO LIMITED    Narrative    [facility] Procedure Note      Limited Bedside ED Cardiac Ultrasound:    PROCEDURE: PERFORMED BY: Dr. Sharan Olea MD  INDICATIONS/SYMPTOM:  Shortness of Breath  PROBE: Cardiac phased array probe  BODY LOCATION: Chest  FINDINGS:   The ultrasound was performed utilizing the subcostal, parasternal long axis, parasternal short axis, and apical 4 chamber views.  Cardiac contractility:  Present  Gross estimation of cardiac kinesis: normal  Pericardial Effusion:  None  RV:LV ratio: LV > RV  IVC: Dilated                                                 Collapsibility:  IVC collapses significantly < 50% with inspiration  INTERPRETATION: Left-sided heart enlargement with IVC showing hypervolemia/CHF No pericardial effusion was found.   IVC visualized and findings indicate CHF.  IMAGE DOCUMENTATION: Images were archived to PACs system.          I personally reviewed the EKG tracing showing sinus rhythm with ventricular bigeminy and old LBBB, the chest x-ray image(s) showing trace bilateral pleural effusions, and the bedside US image(s) showing left-sided heart failure and not collapsible IVC.

## 2023-08-07 NOTE — TELEPHONE ENCOUNTER
I agree that he should be evaluated today, especially given his heart history with his previous valve replacement surgery.  I recommend that he go to the emergency department as they can quickly do a chest x-ray and do an assessment and rule out worrisome things.

## 2023-08-07 NOTE — ED TRIAGE NOTES
Patient reports gradual 30 lb weight gain over a couple of weeks. States he has increased shortness of breath with activity. Patient got up from wheelchair and took about 3 steps to scale and sats decreased from 92% at rest to 87% once back in the wheelchair. No history of heart failure but states he had a valve replacement in April.     Triage Assessment       Row Name 08/07/23 5893       Triage Assessment (Adult)    Airway WDL WDL       Respiratory WDL    Respiratory WDL X;cough;rhythm/pattern    Rhythm/Pattern, Respiratory shortness of breath;tachypneic    Cough Frequency frequent    Cough Type dry       Skin Circulation/Temperature WDL    Skin Circulation/Temperature WDL WDL       Cardiac WDL    Cardiac WDL WDL       Peripheral/Neurovascular WDL    Peripheral Neurovascular WDL WDL       Cognitive/Neuro/Behavioral WDL    Cognitive/Neuro/Behavioral WDL WDL

## 2023-08-07 NOTE — TELEPHONE ENCOUNTER
Called and relayed the recommendation for Dr. Live.  She understands and will talk with Jayesh to see if she can convince him to be seen today.    Richie Oneal RN     [facility]

## 2023-08-07 NOTE — MEDICATION SCRIBE - ADMISSION MEDICATION HISTORY
Medication Scribe Admission Medication History    Admission medication history is complete. The information provided in this note is only as accurate as the sources available at the time of the update.    Medication reconciliation/reorder completed by provider prior to medication history? No    Information Source(s): Patient, Clinic records, and CareEverywhere/SureScripts via  in room with patient and his girlfriend.    Pertinent Information: Patient states that he started using a Primatene Mist Inhaler a few months ago.  Last used last night.  It didn't help him.    Changes made to PTA medication list:  Added: Primatene Mist Inhaler.  Deleted: Atorvastatin 80 mg tab.  Pioglitazone 45 mg tab(extra script to replace missing drug).  Changed: None    Medication Affordability:  Not including over the counter (OTC) medications, was there a time in the past 3 months when you did not take your medications as prescribed because of cost?: No    Allergies reviewed with patient and updates made in EHR: yes, no change.    Medication History Completed By: Gloria Abdalla 8/7/2023 3:54 PM    Prior to Admission medications    Medication Sig Last Dose Taking? Auth Provider Long Term End Date   aspirin (ASA) 81 MG EC tablet Take 1 tablet (81 mg) by mouth daily 8/7/2023 at am Yes Anh Burgos PA-C     atorvastatin (LIPITOR) 40 MG tablet TAKE 1 TABLET BY MOUTH EVERY DAY 8/7/2023 at am Yes Geoffrey Live MD Yes    EPINEPHrine (PRIMATENE MIST) 0.125 MG/ACT AERO Inhale 1-2 sprays into the lungs every 4 hours as needed (for difficulty breathing) 8/6/2023 at 2200 didn't help Yes Reported, Patient     glipiZIDE (GLUCOTROL) 10 MG tablet TAKE 1 TABLET BY MOUTH TWICE A DAY  Patient taking differently: Take 10 mg by mouth 2 times daily (before meals) 8/7/2023 at am Yes Geoffrey Live MD Yes    levothyroxine (SYNTHROID/LEVOTHROID) 175 MCG tablet TAKE 1 TABLET BY MOUTH EVERY DAY 8/7/2023 at am Yes Geoffrey Live MD Yes     losartan (COZAAR) 100 MG tablet Take 1 tablet (100 mg) by mouth daily 8/7/2023 at am Yes Anh Burgos PA-C Yes    magnesium 250 MG tablet Take 1 tablet (250 mg) by mouth daily 8/7/2023 at am Yes Geoffrey Live MD     metFORMIN (GLUCOPHAGE) 500 MG tablet Take 2 tablets (1,000 mg) by mouth 2 times daily (with meals) 8/7/2023 at am Yes Geoffrey Live MD Yes    pioglitazone (ACTOS) 45 MG tablet TAKE 1 TABLET (45 MG) BY MOUTH IN THE MORNING. 8/7/2023 at am Yes Geoffrey Live MD Yes    tamsulosin (FLOMAX) 0.4 MG capsule TAKE 1 CAPSULE BY MOUTH EVERY DAY  Patient taking differently: Take 0.4 mg by mouth daily 8/7/2023 at am Yes Geoffrey Live MD

## 2023-08-07 NOTE — ED NOTES
Positioned pt up in a recliner per pt request due to SOB while laying down. Pt hooked up to monitors and call light at side. Daughter in room.

## 2023-08-07 NOTE — TELEPHONE ENCOUNTER
Nurse Triage SBAR    Is this a 2nd Level Triage? YES, LICENSED PRACTITIONER REVIEW IS REQUIRED    Situation:  patient's wife calling in to report concern over her 's breathing.  She states that Jayesh has had moderate shortness of breath present for the previous 2 weeks.  Last night patient developed significant wheezing and shortness of breath that almost necessitated a 911 call however he spontaneously improved.  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    Background: Significant cardiac hx-CAD, aorctic stenosis, CHF, HTN,    Assessment:  Patient has a significant cardiac history including CHF.  With report of increased swelling in legs ankles feet in the setting of worsening shortness of breath I am suspicious of acute CHF exacerbation. I recommend evaluation today.    Protocol Recommended Disposition:   Go To Office Now    Recommendation:  please advise second-level triage needed.    Routed to provider    Does the patient meet one of the following criteria for ADS visit consideration? 16+ years old, with an [facility] PCP     TIP  Providers, please consider if this condition is appropriate for management at one of our Acute and Diagnostic Services sites.     If patient is a good candidate, please use dotphrase <dot>triageresponse and select Refer to ADS to document.  Reason for Disposition   MILD difficulty breathing (e.g., minimal/no SOB at rest, SOB with walking, pulse < 100) of new-onset or worse than normal    Additional Information   Negative: SEVERE difficulty breathing (e.g., struggling for each breath, speaks in single words, pulse > 120)   Negative: Breathing stopped and hasn't returned   Negative: Choking on something   Negative: Bluish (or gray) lips or face   Negative: Difficult to awaken or acting confused (e.g., disoriented, slurred speech)   Negative: Passed out (i.e., fainted, collapsed and was not responding)   Negative: Wheezing started suddenly after medicine,  "an allergic food, or bee sting   Negative: Stridor   Negative: Slow, shallow and weak breathing   Negative: Sounds like a life-threatening emergency to the triager   Negative: Chest pain   Negative: Wheezing (high pitched whistling sound) and previous asthma attacks or use of asthma medicines   Negative: Difficulty breathing and within 14 days of COVID-19 Exposure   Negative: Difficulty breathing and only present when coughing   Negative: Difficulty breathing and only from stuffy nose   Negative: Difficulty breathing and only from stuffy nose or runny nose from common cold   Negative: MODERATE difficulty breathing (e.g., speaks in phrases, SOB even at rest, pulse 100-120) of new-onset or worse than normal   Negative: Oxygen level (e.g., pulse oximetry) 90 percent or lower   Negative: Wheezing can be heard across the room   Negative: Drooling or spitting out saliva (because can't swallow)   Negative: Any history of prior \"blood clot\" in leg or lungs   Negative: Illness requiring prolonged bedrest in past month (e.g., immobilization, long hospital stay)   Negative: Hip or leg fracture (broken bone) in past month (or had cast on leg or ankle in past month)   Negative: Major surgery in the past month   Negative: Long-distance travel in past month (e.g., car, bus, train, plane; with trip lasting 6 or more hours)   Negative: Cancer treatment in past six months (or has cancer now)   Negative: Extra heart beats OR irregular heart beating (i.e., \"palpitations\")   Negative: Fever > 103 F (39.4 C)   Negative: Fever > 101 F (38.3 C) and over 60 years of age   Negative: Fever > 100.0 F (37.8 C) and bedridden (e.g., nursing home patient, stroke, chronic illness, recovering from surgery)   Negative: Fever > 100.0 F (37.8 C) and diabetes mellitus or weak immune system (e.g., HIV positive, cancer chemo, splenectomy, organ transplant, chronic steroids)   Negative: Periods where breathing stops and then resumes normally and bedridden " "(e.g., nursing home patient, CVA)   Negative: Pregnant or postpartum (from 0 to 6 weeks after delivery)   Negative: Patient sounds very sick or weak to the triager    Answer Assessment - Initial Assessment Questions  1. RESPIRATORY STATUS: \"Describe your breathing?\" (e.g., wheezing, shortness of breath, unable to speak, severe coughing)       Sudden onset wheezing last night  2. ONSET: \"When did this breathing problem begin?\"       Yesterday   3. PATTERN \"Does the difficult breathing come and go, or has it been constant since it started?\"       Has since subsided  4. SEVERITY: \"How bad is your breathing?\" (e.g., mild, moderate, severe)     - MILD: No SOB at rest, mild SOB with walking, speaks normally in sentences, can lie down, no retractions, pulse < 100.     - MODERATE: SOB at rest, SOB with minimal exertion and prefers to sit, cannot lie down flat, speaks in phrases, mild retractions, audible wheezing, pulse 100-120.     - SEVERE: Very SOB at rest, speaks in single words, struggling to breathe, sitting hunched forward, retractions, pulse > 120       Mild   5. RECURRENT SYMPTOM: \"Have you had difficulty breathing before?\" If Yes, ask: \"When was the last time?\" and \"What happened that time?\"       Yes over 2 weeks  6. CARDIAC HISTORY: \"Do you have any history of heart disease?\" (e.g., heart attack, angina, bypass surgery, angioplasty)       Cardiac  7. LUNG HISTORY: \"Do you have any history of lung disease?\"  (e.g., pulmonary embolus, asthma, emphysema)      None  8. CAUSE: \"What do you think is causing the breathing problem?\"       Unsure  9. OTHER SYMPTOMS: \"Do you have any other symptoms? (e.g., dizziness, runny nose, cough, chest pain, fever)      Runny nose  10. O2 SATURATION MONITOR:  \"Do you use an oxygen saturation monitor (pulse oximeter) at home?\" If Yes, \"What is your reading (oxygen level) today?\" \"What is your usual oxygen saturation reading?\" (e.g., 95%)        None  11. PREGNANCY: \"Is there any " "chance you are pregnant?\" \"When was your last menstrual period?\"        No  12. TRAVEL: \"Have you traveled out of the country in the last month?\" (e.g., travel history, exposures)        No    Protocols used: Breathing Difficulty-A-[place]    "

## 2023-08-07 NOTE — ED NOTES
Writer was at pt bedside when pt started to feel sob. Pulse was 30. Episode lasted just a few minutes. Updated MD. Pts sob resolved.

## 2023-08-08 ENCOUNTER — APPOINTMENT (OUTPATIENT)
Dept: OCCUPATIONAL THERAPY | Facility: CLINIC | Age: 83
DRG: 280 | End: 2023-08-08
Payer: MEDICARE

## 2023-08-08 LAB
ANION GAP SERPL CALCULATED.3IONS-SCNC: 7 MMOL/L (ref 7–15)
BUN SERPL-MCNC: 23 MG/DL (ref 8–23)
CALCIUM SERPL-MCNC: 8.8 MG/DL (ref 8.8–10.2)
CHLORIDE SERPL-SCNC: 103 MMOL/L (ref 98–107)
CREAT SERPL-MCNC: 1.17 MG/DL (ref 0.67–1.17)
DEPRECATED HCO3 PLAS-SCNC: 31 MMOL/L (ref 22–29)
ERYTHROCYTE [DISTWIDTH] IN BLOOD BY AUTOMATED COUNT: 13.7 % (ref 10–15)
GFR SERPL CREATININE-BSD FRML MDRD: 63 ML/MIN/1.73M2
GLUCOSE BLDC GLUCOMTR-MCNC: 121 MG/DL (ref 70–99)
GLUCOSE BLDC GLUCOMTR-MCNC: 126 MG/DL (ref 70–99)
GLUCOSE BLDC GLUCOMTR-MCNC: 138 MG/DL (ref 70–99)
GLUCOSE BLDC GLUCOMTR-MCNC: 154 MG/DL (ref 70–99)
GLUCOSE BLDC GLUCOMTR-MCNC: 211 MG/DL (ref 70–99)
GLUCOSE BLDC GLUCOMTR-MCNC: 236 MG/DL (ref 70–99)
GLUCOSE SERPL-MCNC: 134 MG/DL (ref 70–99)
HCT VFR BLD AUTO: 37.2 % (ref 40–53)
HGB BLD-MCNC: 12 G/DL (ref 13.3–17.7)
MAGNESIUM SERPL-MCNC: 1.5 MG/DL (ref 1.7–2.3)
MAGNESIUM SERPL-MCNC: 2.3 MG/DL (ref 1.7–2.3)
MCH RBC QN AUTO: 31.4 PG (ref 26.5–33)
MCHC RBC AUTO-ENTMCNC: 32.3 G/DL (ref 31.5–36.5)
MCV RBC AUTO: 97 FL (ref 78–100)
PLATELET # BLD AUTO: 153 10E3/UL (ref 150–450)
POTASSIUM SERPL-SCNC: 4.7 MMOL/L (ref 3.4–5.3)
RBC # BLD AUTO: 3.82 10E6/UL (ref 4.4–5.9)
SODIUM SERPL-SCNC: 141 MMOL/L (ref 136–145)
T4 FREE SERPL-MCNC: 1.51 NG/DL (ref 0.9–1.7)
TSH SERPL DL<=0.005 MIU/L-ACNC: 8.4 UIU/ML (ref 0.3–4.2)
WBC # BLD AUTO: 4.7 10E3/UL (ref 4–11)

## 2023-08-08 PROCEDURE — 120N000001 HC R&B MED SURG/OB

## 2023-08-08 PROCEDURE — 36415 COLL VENOUS BLD VENIPUNCTURE: CPT | Performed by: HOSPITALIST

## 2023-08-08 PROCEDURE — G0378 HOSPITAL OBSERVATION PER HR: HCPCS

## 2023-08-08 PROCEDURE — 93005 ELECTROCARDIOGRAM TRACING: CPT

## 2023-08-08 PROCEDURE — 97535 SELF CARE MNGMENT TRAINING: CPT | Mod: GO

## 2023-08-08 PROCEDURE — 250N000011 HC RX IP 250 OP 636: Mod: JZ | Performed by: HOSPITALIST

## 2023-08-08 PROCEDURE — 84443 ASSAY THYROID STIM HORMONE: CPT | Performed by: HOSPITALIST

## 2023-08-08 PROCEDURE — 82962 GLUCOSE BLOOD TEST: CPT

## 2023-08-08 PROCEDURE — 97165 OT EVAL LOW COMPLEX 30 MIN: CPT | Mod: GO

## 2023-08-08 PROCEDURE — 999N000157 HC STATISTIC RCP TIME EA 10 MIN

## 2023-08-08 PROCEDURE — 80048 BASIC METABOLIC PNL TOTAL CA: CPT

## 2023-08-08 PROCEDURE — 250N000011 HC RX IP 250 OP 636: Mod: JZ | Performed by: INTERNAL MEDICINE

## 2023-08-08 PROCEDURE — 36415 COLL VENOUS BLD VENIPUNCTURE: CPT

## 2023-08-08 PROCEDURE — 250N000013 HC RX MED GY IP 250 OP 250 PS 637

## 2023-08-08 PROCEDURE — 250N000012 HC RX MED GY IP 250 OP 636 PS 637

## 2023-08-08 PROCEDURE — 84439 ASSAY OF FREE THYROXINE: CPT | Performed by: HOSPITALIST

## 2023-08-08 PROCEDURE — 99233 SBSQ HOSP IP/OBS HIGH 50: CPT | Performed by: HOSPITALIST

## 2023-08-08 PROCEDURE — 83735 ASSAY OF MAGNESIUM: CPT

## 2023-08-08 PROCEDURE — 250N000011 HC RX IP 250 OP 636: Mod: JZ

## 2023-08-08 PROCEDURE — 85027 COMPLETE CBC AUTOMATED: CPT

## 2023-08-08 PROCEDURE — 83735 ASSAY OF MAGNESIUM: CPT | Performed by: HOSPITALIST

## 2023-08-08 RX ORDER — MAGNESIUM SULFATE HEPTAHYDRATE 40 MG/ML
4 INJECTION, SOLUTION INTRAVENOUS ONCE
Status: COMPLETED | OUTPATIENT
Start: 2023-08-08 | End: 2023-08-08

## 2023-08-08 RX ORDER — FUROSEMIDE 10 MG/ML
40 INJECTION INTRAMUSCULAR; INTRAVENOUS EVERY 12 HOURS
Status: DISCONTINUED | OUTPATIENT
Start: 2023-08-08 | End: 2023-08-08

## 2023-08-08 RX ORDER — MAGNESIUM SULFATE HEPTAHYDRATE 40 MG/ML
4 INJECTION, SOLUTION INTRAVENOUS ONCE
Status: DISCONTINUED | OUTPATIENT
Start: 2023-08-08 | End: 2023-08-08

## 2023-08-08 RX ORDER — FUROSEMIDE 10 MG/ML
60 INJECTION INTRAMUSCULAR; INTRAVENOUS EVERY 12 HOURS
Status: DISCONTINUED | OUTPATIENT
Start: 2023-08-08 | End: 2023-08-09

## 2023-08-08 RX ADMIN — GLIPIZIDE 10 MG: 10 TABLET ORAL at 17:49

## 2023-08-08 RX ADMIN — MAGNESIUM SULFATE HEPTAHYDRATE 4 G: 40 INJECTION, SOLUTION INTRAVENOUS at 08:26

## 2023-08-08 RX ADMIN — LEVOTHYROXINE SODIUM 175 MCG: 0.03 TABLET ORAL at 08:23

## 2023-08-08 RX ADMIN — METFORMIN HYDROCHLORIDE 1000 MG: 500 TABLET, FILM COATED ORAL at 17:49

## 2023-08-08 RX ADMIN — GLIPIZIDE 10 MG: 10 TABLET ORAL at 08:23

## 2023-08-08 RX ADMIN — TAMSULOSIN HYDROCHLORIDE 0.4 MG: 0.4 CAPSULE ORAL at 08:23

## 2023-08-08 RX ADMIN — METFORMIN HYDROCHLORIDE 1000 MG: 500 TABLET, FILM COATED ORAL at 08:23

## 2023-08-08 RX ADMIN — ATORVASTATIN CALCIUM 40 MG: 20 TABLET, FILM COATED ORAL at 08:23

## 2023-08-08 RX ADMIN — FUROSEMIDE 60 MG: 10 INJECTION, SOLUTION INTRAMUSCULAR; INTRAVENOUS at 17:49

## 2023-08-08 RX ADMIN — FUROSEMIDE 40 MG: 10 INJECTION, SOLUTION INTRAMUSCULAR; INTRAVENOUS at 07:01

## 2023-08-08 RX ADMIN — Medication 400 MG: at 08:23

## 2023-08-08 RX ADMIN — INSULIN ASPART 4 UNITS: 100 INJECTION, SOLUTION INTRAVENOUS; SUBCUTANEOUS at 12:17

## 2023-08-08 RX ADMIN — LOSARTAN POTASSIUM 100 MG: 50 TABLET, FILM COATED ORAL at 08:23

## 2023-08-08 RX ADMIN — ASPIRIN 81 MG: 81 TABLET, COATED ORAL at 08:23

## 2023-08-08 ASSESSMENT — ACTIVITIES OF DAILY LIVING (ADL)
DIFFICULTY_EATING/SWALLOWING: NO
CHANGE_IN_FUNCTIONAL_STATUS_SINCE_ONSET_OF_CURRENT_ILLNESS/INJURY: NO
CONCENTRATING,_REMEMBERING_OR_MAKING_DECISIONS_DIFFICULTY: NO
ADLS_ACUITY_SCORE: 33
ADLS_ACUITY_SCORE: 33
ADLS_ACUITY_SCORE: 22
ADLS_ACUITY_SCORE: 33
DIFFICULTY_COMMUNICATING: NO
WALKING_OR_CLIMBING_STAIRS_DIFFICULTY: NO
TOILETING_ISSUES: NO
WEAR_GLASSES_OR_BLIND: YES
ADLS_ACUITY_SCORE: 33
ADLS_ACUITY_SCORE: 22
ADLS_ACUITY_SCORE: 33
ADLS_ACUITY_SCORE: 33
DOING_ERRANDS_INDEPENDENTLY_DIFFICULTY: NO
VISION_MANAGEMENT: GLASSES
ADLS_ACUITY_SCORE: 22
ADLS_ACUITY_SCORE: 33
ADLS_ACUITY_SCORE: 33
DRESSING/BATHING_DIFFICULTY: NO
ADLS_ACUITY_SCORE: 33

## 2023-08-08 NOTE — PLAN OF CARE
Situation: CHF     Plan: Diurese; Echo today; 1800 mL fluid restriction; I/O     Subjective/Objective:     Neuro: A/O x 4     Cardiac: Irregular apical pulse. On telemetry - NSR w/ BBB     Resp: Exertional dyspnea, crackles in BLL, on 2 LPM via nasal cannula     GI/: WDL - voiding in urinal independently      MSK: SBA - moves well, steady on feet. Will stand independently at bed or chair to use urinal.     Skin: Trace edema in BLE; cracking/peeling in between toes     LDAs: PIV in L arm is saline locked    VSS on 2 LPM; denies pain.  Mag & K+ replacement protocols

## 2023-08-08 NOTE — PROGRESS NOTES
Patient had removed Tele monitor, replaced by staff and was notified by ICU  that patients heart rate was 33. Patient was sitting up in chair. Asymptomatic. Dr Jackson notified. EKG ordered.

## 2023-08-08 NOTE — PROGRESS NOTES
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, lives alone   Self-Care   Usual Activity Tolerance good   Current Activity Tolerance moderate   Regular Exercise Yes   Activity/Exercise Type   (moving around farm)   Equipment Currently Used at Home none   Fall history within last six months no   Activity/Exercise/Self-Care Comment has walk in shower, has place to sit if needed, reports being very independent, currently shower   Instrumental Activities of Daily Living (IADL)   Previous Responsibilities yardwork;meal prep;work;driving;finances;medication management   IADL Comments gets help with laundry and house cleaning   General Information   Onset of Illness/Injury or Date of Surgery 08/07/23   Referring Physician Florecita Jackson   Existing Precautions/Restrictions cardiac   General Observations and Info Pt anxious to return home, no further needs ind   Cognitive Status Examination   Orientation Status orientation to person, place and time   Cognitive Status Comments no major issues noted   Pain Assessment   Patient Currently in Pain No   Range of Motion Comprehensive   General Range of Motion no range of motion deficits identified;bilateral upper extremity ROM WFL   Comment, General Range of Motion B UE WFL   Strength Comprehensive (MMT)   General Manual Muscle Testing (MMT) Assessment no strength deficits identified   Comment, General Manual Muscle Testing (MMT) Assessment B UE  WFL   Coordination   Upper Extremity Coordination No deficits were identified   Transfers   Transfers toilet transfer   Toilet Transfer   Type (Toilet Transfer) stand pivot/stand step   Toilet Transfer Comments pt able to stand w/o g/b   Clinical Impression   Criteria for Skilled Therapeutic Interventions Met (OT) Yes, treatment indicated   OT Diagnosis weakness   Influenced by the following impairments LE edema on 2 Lts O2   OT Problem List-Impairments impacting ADL activity tolerance impaired   ADL comments/analysis pt uses flip flops, no socks at home   Assessment of Occupational Performance 1-3 Performance Deficits   Identified Performance Deficits LB dressing with edema, CFH   Planned Therapy Interventions (OT) ADL retraining;progressive activity/exercise;home program guidelines   Clinical Decision Making Complexity (OT) low complexity   Risk & Benefits of therapy have been explained evaluation/treatment results reviewed;care plan/treatment goals reviewed;risks/benefits reviewed;current/potential barriers reviewed;patient;participants included;participants voiced agreement with care plan   Clinical Impression Comments Pt seen for cardiac education   OT Total Evaluation Time   OT Eval, Low Complexity Minutes (18329) 10   Therapy Certification   Medical Diagnosis CHF   Start of Care Date 08/08/23   Certification date from 08/08/23   Certification date to 08/08/23   OT Goals   Therapy Frequency (OT) One time eval and treatment   OT Predicted Duration/Target Date for Goal Attainment 08/08/23   OT Goals OT Goal 1   OT: Goal 1 Pt to verbalize heart healthy diet for home use   Interventions   Interventions Quick Adds Self-Care/Home Management   Self-Care/Home Management   Self-Care/Home Mgmt/ADL, Compensatory, Meal Prep Minutes (66997) 10   Symptoms Noted During/After Treatment (Meal Preparation/Planning Training) shortness of breath   Treatment Detail/Skilled Intervention OT: Pt on 2 lts O2 with SATS 96% at rest. Pt  seen for amb in room and toilet transfer w/o use of g/b. Educated pt on low sodium diet and importance or EC and pacing .   OT Discharge Planning   OT Plan D/c to home when med stable   OT Discharge Recommendation (DC Rec) home   OT Rationale for DC Rec Pt to d/c home educated on sodium restrictions and need for ex and energy conserv ec with pt in agreement   OT Brief overview of current status mod I amb in room w/o AD, mod I toilet transfers,   Total Session Time   Timed Code Treatment Minutes 10   Total Session Time (sum of timed and untimed services) 20

## 2023-08-08 NOTE — PROGRESS NOTES
Internal Medicine Progress Note     Service Date: 08/08/2023  [facility] - Admission Date: 8/7/2023     Problems:     Patient Active Problem List   Diagnosis    Talus fracture    Hypothyroidism    Malignant neoplasm of prostate (H)    Malignant neoplasm of thyroid gland (H)    Hyperlipidemia    Type 2 diabetes mellitus (H)    Fracture of right talus    Morbid obesity (H)    Uncontrolled type 2 diabetes mellitus with hyperglycemia (H)    Aortic stenosis    Cardiomyopathy (H)    Coronary artery disease involving coronary bypass graft of native heart without angina pectoris    S/P TAVR (transcatheter aortic valve replacement)    Aortic stenosis, severe    Accelerated hypertension    Acute congestive heart failure, unspecified heart failure type (H)       Assessments and Plans:     An 81 yo morbidly obese male with a h/o CHF, DM, hypothyroidism, HTN, HLD, BPH and aortic stenosis s/p TAVR who p/w progressive SOB, weight gain and leg swelling, found to have respiratory failure 2/2 acute on chronic diastolic CHF.     Acute hypoxic respiratory failure 2/2 acute on chronic diastolic CHF: Likely precipitated by non-compliance with salt/fluid restrictions at home. BNP 2039. CXR shows bibasilar atelectasis and trace b/l pleural effusions. Echo from 5/2023 showed preserved EF; limited study to evaluate AVR. Currently on 2L NC.   -Cont lasix, increase to 60mg IV BID  -Strict I/O  -Fluid restriction  -Daily weights  -Hold actos  -02 prn to maintain sats >92%    Hypomagnesemia: Likely 2/2 diuresis.  -Replete mg per protocol  -Trend mag    Elevated troponin: 2/2 demand ischemia / type II NSTEMI. Trop flat at 30, 29. EKG X 2 showed ventricular bigeminy and old LBBB. No c/o CP.    Sick euthyroid: TSH elevated at 8.4, but free T4 WNL.   -Needs repeat TFTs in 4-6 weeks    Acute anemia: Likely 2/2 acute illness. No e/o active bleeding.  -Trend H/H    DM: Recent A1c 6.8%. Goal A1c <8% given advanced age.  "  -Cont metformin, glipizide  -Hold actos as above  -ISS    HTN:  -Cont losartan    Hypothyroidism:  -Cont levothyroxine    HLD:  -Cont statin    BPH:  -Cont flomax    H/o aortic stenosis s/p TAVR      DVT Prophylaxis: SCD    Code Status: Full      Subjective:      Patient reports that his SOB and dry cough are better this AM. Leg swelling persists but is a little bit better this AM compared to yesterday per patient. Denies CP, abd pain, N/V/D, urinary symptoms, chills or diaphoresis.     Objective:      Vitals: /50 (BP Location: Right arm)   Pulse 63   Temp 97.7  F (36.5  C) (Oral)   Resp 17   Ht 1.727 m (5' 8\")   Wt 147.3 kg (324 lb 11.8 oz)   SpO2 94%   BMI 49.38 kg/m  Temp (24hrs), Av.7  F (36.5  C), Min:97.3  F (36.3  C), Max:98  F (36.7  C)    Gen: A+Ox3, no acute distress  Eyes: No scleral icterus  Neck: No JVD  ENT: MMM  Heart: RRR, clear S1S2, no rubs or gallops, no murmurs  Lungs: Reduced a/e at bases b/l  Abdomen: Normal bowel sounds, soft, no tenderness to palpation  Extremities: 2+ b/l lower extremity edema  Skin: No rash  Neuro: Cranial nerves grossly intact, no focal deficits  Psych: Appropriate affect / mood    I have reviewed all labs, imaging and other investigations.     Labs/Imaging:     Results for orders placed or performed during the hospital encounter of 23 (from the past 24 hour(s))   Troponin T, High Sensitivity   Result Value Ref Range    Troponin T, High Sensitivity 29 (H) <=22 ng/L   Glucose by meter   Result Value Ref Range    GLUCOSE BY METER POCT 264 (H) 70 - 99 mg/dL   Glucose by meter   Result Value Ref Range    GLUCOSE BY METER POCT 126 (H) 70 - 99 mg/dL   Basic metabolic panel   Result Value Ref Range    Sodium 141 136 - 145 mmol/L    Potassium 4.7 3.4 - 5.3 mmol/L    Chloride 103 98 - 107 mmol/L    Carbon Dioxide (CO2) 31 (H) 22 - 29 mmol/L    Anion Gap 7 7 - 15 mmol/L    Urea Nitrogen 23.0 8.0 - 23.0 mg/dL    Creatinine 1.17 0.67 - 1.17 mg/dL    Calcium " 8.8 8.8 - 10.2 mg/dL    Glucose 134 (H) 70 - 99 mg/dL    GFR Estimate 63 >60 mL/min/1.73m2   CBC with platelets   Result Value Ref Range    WBC Count 4.7 4.0 - 11.0 10e3/uL    RBC Count 3.82 (L) 4.40 - 5.90 10e6/uL    Hemoglobin 12.0 (L) 13.3 - 17.7 g/dL    Hematocrit 37.2 (L) 40.0 - 53.0 %    MCV 97 78 - 100 fL    MCH 31.4 26.5 - 33.0 pg    MCHC 32.3 31.5 - 36.5 g/dL    RDW 13.7 10.0 - 15.0 %    Platelet Count 153 150 - 450 10e3/uL   Magnesium   Result Value Ref Range    Magnesium 1.5 (L) 1.7 - 2.3 mg/dL   TSH with free T4 reflex   Result Value Ref Range    TSH 8.40 (H) 0.30 - 4.20 uIU/mL   T4 free   Result Value Ref Range    Free T4 1.51 0.90 - 1.70 ng/dL   Glucose by meter   Result Value Ref Range    GLUCOSE BY METER POCT 138 (H) 70 - 99 mg/dL   Glucose by meter   Result Value Ref Range    GLUCOSE BY METER POCT 236 (H) 70 - 99 mg/dL   Magnesium   Result Value Ref Range    Magnesium 2.3 1.7 - 2.3 mg/dL   Glucose by meter   Result Value Ref Range    GLUCOSE BY METER POCT 154 (H) 70 - 99 mg/dL         Florecita Jackson MD  08/08/2023 4:40 PM

## 2023-08-08 NOTE — CONSULTS
Care Management Note:     Care Management team received referral due to CHF Protocol.        Per IDT rounds, EMR review, discussion with pt's hospital medical provider, and/or brief discussion with pt, it has been determined that pt will discharge to home with no identifiable discharge needs.      CM spoke with patient and patient identifies no needs at home as well.     Pt will need hand off to clinic care coordination team at discharge.      Care Management will close referral at this time, but please place new consult should pt develop new needs during this stay.        Fay Laughlin [facility]  Inpatient Care Coordinator   [facility] [phone]  [facility] [phone]

## 2023-08-08 NOTE — UTILIZATION REVIEW
Admission Status; Secondary Review Determination     Admission Date: 8/7/2023 12:59 PM       Under the authority of the Utilization Management Committee, the utilization review process indicated a secondary review on the above patient.  The review outcome is based on review of the medical records, discussions with staff, and applying clinical experience noted on the date of the review.        (x)      Inpatient Status Appropriate - This patient's medical care is consistent with medical management for inpatient care and reasonable inpatient medical practice.       RATIONALE FOR DETERMINATION      Brief clinical presentation, information copied from the chart, abbreviated and edited for relevant content:     Discussed with team, still significantly overloaded, will be in house for at least 2 more nights for IV diuresis. Agreed to advance to IP.     Jayesh Ortiz is a 82 year old male who presented on 8/7/2023 with x 2 weeks of worsening dyspnea and lower extremity swelling. Admitted for acute heart failure and need for diuresis.Admission weight 330; last weight on file 315 on 05/15/23. BNP 2,039. Lungs with bibasilar course crackles to auscultation. CXR shows trace bilateral pleural effusions  with subsegmental atelectasis in the lung bases.  Bedside ultrasound significant for evidence of left-sided heart failure and on collapsible IVC. Last echo 5/2023. LV size was normal with mild concentric LV hypertrophy, LVEF 50-55%. Was given furosemide 100 mg IV in ED with good response. Monitor renal function daily. Strict I and O's, daily weights   Initial troponin of 30 with repeat 29. Not having chest pain or tightness. EKG demonstrates sinus rhythm with ventricular bigeminy. There is ST-T wave changes of left bundle branch block. Compared to prior EKG there was a left bundle branch block with frequent PVCs or fusion complexes. Today, still hypoxic on 2 LPM.     At the time of admission with the information available to  the attending physician, more than 2 nights hospital complex care was anticipated. Also, there was a risk of adverse outcome if patient was treated outpatient or observation. High intensity of services anticipated. Inpatient admission appropriate based on Medicare guidelines.       The information on this document is developed by the utilization review team in order for the business office to ensure compliance.  This only denotes the appropriateness of proper admission status and does not reflect the quality of care rendered.         The definitions of Inpatient Status and Observation Status used in making the determination above are those provided in the CMS Coverage Manual, Chapter 1 and Chapter 6, section 70.4.      Sincerely,      Sally Andersen MD   Utilization Review/ Case Management  [facility].

## 2023-08-08 NOTE — PROGRESS NOTES
Alert and oriented, up independently in room.  Using urinal at bedside and calling staff to empty it.   Lower extremity edema .  Patient did not let staff look at his sacrum, groin or legs under sweat pants.   States he is just fine.   Tele monitor leads changed and received a new box.  Crackles heard in lung bases.   Oxygen dropped to 1 lpm with saturation 90-93%.

## 2023-08-08 NOTE — CARE PLAN
[facility]      OUTPATIENT OCCUPATIONAL THERAPY  EVALUATION  PLAN OF TREATMENT FOR OUTPATIENT REHABILITATION  (COMPLETE FOR INITIAL CLAIMS ONLY)  Patient's Last Name, First Name, M.I.  YOB: 1940  AngleJayesh  VU                          Provider's Name  [facility] Medical Record No.  [id number]                               Onset Date:  08/07/23   Start of Care Date:  08/08/23     Type:     ___PT   _X_OT   ___SLP Medical Diagnosis:  CHF                        OT Diagnosis:  weakness   Visits from SOC:  1   _________________________________________________________________________________  Plan of Treatment/Functional Goals    Planned Interventions: ADL retraining, progressive activity/exercise, home program guidelines   Goals: See Occupational Therapy Goals on Care Plan in [employer] electronic health record.    Therapy Frequency: One time eval and treatment  Predicted Duration of Therapy Intervention: 08/08/23  _________________________________________________________________________________  Tracy Martel OTR/L    Certification date from: 08/08/23, Certification date to: 08/08/23    Referring Physician: Florecita Jackson            Initial Assessment        See Occupational Therapy evaluation dated 08/08/23 in Epic electronic health record.

## 2023-08-08 NOTE — PLAN OF CARE
"[place] [facility] ADMISSION NOTE    Patient admitted to room 2308 at approximately 2005 via wheel chair from emergency room. Patient was accompanied by transport tech.     Verbal SBAR report received from ABDIAS Kirby prior to patient arrival.     Patient ambulated to bed with stand-by assist. Patient alert and oriented X 4. The patient is not having any pain.  . Admission vital signs: Blood pressure 136/42, pulse 68, temperature 97.6  F (36.4  C), temperature source Oral, resp. rate 16, height 1.727 m (5' 8\"), weight 147.3 kg (324 lb 11.8 oz), SpO2 95 %. Patient was oriented to plan of care, call light, bed controls, tv, telephone, bathroom, and visiting hours.     Risk Assessment    The following safety risks were identified during admission: fall. Yellow risk band applied: YES.     Skin Initial Assessment    This writer admitted this patient a partial skin assessment and Darci score in the Adult PCS flowsheet. Appropriate interventions initiated as needed. Patient refused full skin assessment. Unable to assess buttocks, sacrum, and groin/panda-area.     Secondary skin check completed by PATIENT REFUSED.    Darci Risk Assessment  Sensory Perception: 3-->slightly limited  Moisture: 3-->occasionally moist  Activity: 3-->walks occasionally  Mobility: 3-->slightly limited  Nutrition: 3-->adequate  Friction and Shear: 3-->no apparent problem  Darci Score: 18  Moisture Interventions: Encourage regular toileting, Urinary collection device  Mattress: Standard gel/foam mattress (IsoFlex, Atmos Air, etc.)  Bed Frame: Standard width and length    Education    Patient has a [place] to Observation order: Yes  Observation education completed and documented: Yes      Polina Pitt RN        "

## 2023-08-09 LAB
ANION GAP SERPL CALCULATED.3IONS-SCNC: 8 MMOL/L (ref 7–15)
ANION GAP SERPL CALCULATED.3IONS-SCNC: 9 MMOL/L (ref 7–15)
BUN SERPL-MCNC: 29.2 MG/DL (ref 8–23)
BUN SERPL-MCNC: 31.7 MG/DL (ref 8–23)
CALCIUM SERPL-MCNC: 8.9 MG/DL (ref 8.8–10.2)
CALCIUM SERPL-MCNC: 9.2 MG/DL (ref 8.8–10.2)
CHLORIDE SERPL-SCNC: 100 MMOL/L (ref 98–107)
CHLORIDE SERPL-SCNC: 96 MMOL/L (ref 98–107)
CREAT SERPL-MCNC: 1.29 MG/DL (ref 0.67–1.17)
CREAT SERPL-MCNC: 1.31 MG/DL (ref 0.67–1.17)
DEPRECATED HCO3 PLAS-SCNC: 32 MMOL/L (ref 22–29)
DEPRECATED HCO3 PLAS-SCNC: 32 MMOL/L (ref 22–29)
ERYTHROCYTE [DISTWIDTH] IN BLOOD BY AUTOMATED COUNT: 13.7 % (ref 10–15)
GFR SERPL CREATININE-BSD FRML MDRD: 55 ML/MIN/1.73M2
GFR SERPL CREATININE-BSD FRML MDRD: 56 ML/MIN/1.73M2
GLUCOSE BLDC GLUCOMTR-MCNC: 126 MG/DL (ref 70–99)
GLUCOSE BLDC GLUCOMTR-MCNC: 163 MG/DL (ref 70–99)
GLUCOSE BLDC GLUCOMTR-MCNC: 221 MG/DL (ref 70–99)
GLUCOSE SERPL-MCNC: 124 MG/DL (ref 70–99)
GLUCOSE SERPL-MCNC: 212 MG/DL (ref 70–99)
HCT VFR BLD AUTO: 38.9 % (ref 40–53)
HGB BLD-MCNC: 12.4 G/DL (ref 13.3–17.7)
MAGNESIUM SERPL-MCNC: 1.9 MG/DL (ref 1.7–2.3)
MCH RBC QN AUTO: 31.2 PG (ref 26.5–33)
MCHC RBC AUTO-ENTMCNC: 31.9 G/DL (ref 31.5–36.5)
MCV RBC AUTO: 98 FL (ref 78–100)
PHOSPHATE SERPL-MCNC: 5 MG/DL (ref 2.5–4.5)
PLATELET # BLD AUTO: 149 10E3/UL (ref 150–450)
POTASSIUM SERPL-SCNC: 4.6 MMOL/L (ref 3.4–5.3)
POTASSIUM SERPL-SCNC: 4.8 MMOL/L (ref 3.4–5.3)
POTASSIUM SERPL-SCNC: 5 MMOL/L (ref 3.4–5.3)
RBC # BLD AUTO: 3.97 10E6/UL (ref 4.4–5.9)
SODIUM SERPL-SCNC: 137 MMOL/L (ref 136–145)
SODIUM SERPL-SCNC: 140 MMOL/L (ref 136–145)
WBC # BLD AUTO: 5 10E3/UL (ref 4–11)

## 2023-08-09 PROCEDURE — 36415 COLL VENOUS BLD VENIPUNCTURE: CPT | Performed by: HOSPITALIST

## 2023-08-09 PROCEDURE — 85027 COMPLETE CBC AUTOMATED: CPT | Performed by: HOSPITALIST

## 2023-08-09 PROCEDURE — 250N000013 HC RX MED GY IP 250 OP 250 PS 637

## 2023-08-09 PROCEDURE — 250N000013 HC RX MED GY IP 250 OP 250 PS 637: Performed by: HOSPITALIST

## 2023-08-09 PROCEDURE — 84132 ASSAY OF SERUM POTASSIUM: CPT | Performed by: HOSPITALIST

## 2023-08-09 PROCEDURE — 93005 ELECTROCARDIOGRAM TRACING: CPT

## 2023-08-09 PROCEDURE — 84132 ASSAY OF SERUM POTASSIUM: CPT | Performed by: INTERNAL MEDICINE

## 2023-08-09 PROCEDURE — 250N000011 HC RX IP 250 OP 636: Mod: JZ | Performed by: HOSPITALIST

## 2023-08-09 PROCEDURE — 83735 ASSAY OF MAGNESIUM: CPT | Performed by: HOSPITALIST

## 2023-08-09 PROCEDURE — 36415 COLL VENOUS BLD VENIPUNCTURE: CPT | Performed by: INTERNAL MEDICINE

## 2023-08-09 PROCEDURE — 84100 ASSAY OF PHOSPHORUS: CPT | Performed by: HOSPITALIST

## 2023-08-09 PROCEDURE — 120N000001 HC R&B MED SURG/OB

## 2023-08-09 PROCEDURE — 99233 SBSQ HOSP IP/OBS HIGH 50: CPT | Performed by: HOSPITALIST

## 2023-08-09 RX ORDER — HYDRALAZINE HYDROCHLORIDE 20 MG/ML
10 INJECTION INTRAMUSCULAR; INTRAVENOUS EVERY 6 HOURS PRN
Status: DISCONTINUED | OUTPATIENT
Start: 2023-08-09 | End: 2023-08-10 | Stop reason: HOSPADM

## 2023-08-09 RX ORDER — POLYETHYLENE GLYCOL 3350 17 G/17G
17 POWDER, FOR SOLUTION ORAL DAILY
Status: DISCONTINUED | OUTPATIENT
Start: 2023-08-09 | End: 2023-08-10 | Stop reason: HOSPADM

## 2023-08-09 RX ORDER — FUROSEMIDE 10 MG/ML
40 INJECTION INTRAMUSCULAR; INTRAVENOUS EVERY 12 HOURS
Status: DISCONTINUED | OUTPATIENT
Start: 2023-08-09 | End: 2023-08-09

## 2023-08-09 RX ORDER — FUROSEMIDE 20 MG
20 TABLET ORAL
Status: DISCONTINUED | OUTPATIENT
Start: 2023-08-09 | End: 2023-08-10

## 2023-08-09 RX ADMIN — FUROSEMIDE 60 MG: 10 INJECTION, SOLUTION INTRAMUSCULAR; INTRAVENOUS at 06:03

## 2023-08-09 RX ADMIN — LEVOTHYROXINE SODIUM 175 MCG: 0.03 TABLET ORAL at 08:31

## 2023-08-09 RX ADMIN — GLIPIZIDE 10 MG: 10 TABLET ORAL at 08:31

## 2023-08-09 RX ADMIN — INSULIN ASPART 3 UNITS: 100 INJECTION, SOLUTION INTRAVENOUS; SUBCUTANEOUS at 16:08

## 2023-08-09 RX ADMIN — INSULIN ASPART 1 UNITS: 100 INJECTION, SOLUTION INTRAVENOUS; SUBCUTANEOUS at 12:53

## 2023-08-09 RX ADMIN — ASPIRIN 81 MG: 81 TABLET, COATED ORAL at 08:32

## 2023-08-09 RX ADMIN — Medication 400 MG: at 08:30

## 2023-08-09 RX ADMIN — TAMSULOSIN HYDROCHLORIDE 0.4 MG: 0.4 CAPSULE ORAL at 08:31

## 2023-08-09 RX ADMIN — ATORVASTATIN CALCIUM 40 MG: 20 TABLET, FILM COATED ORAL at 08:30

## 2023-08-09 RX ADMIN — FUROSEMIDE 20 MG: 20 TABLET ORAL at 16:08

## 2023-08-09 RX ADMIN — GLIPIZIDE 10 MG: 10 TABLET ORAL at 16:08

## 2023-08-09 RX ADMIN — POLYETHYLENE GLYCOL 3350 17 G: 17 POWDER, FOR SOLUTION ORAL at 12:54

## 2023-08-09 ASSESSMENT — ACTIVITIES OF DAILY LIVING (ADL)
ADLS_ACUITY_SCORE: 22

## 2023-08-09 NOTE — PROGRESS NOTES
CLINICAL NUTRITION SERVICES    Reason for Assessment:  Low-sodium (2 g/day) nutrition education    Diet History:  Pt reports no history of receiving low-sodium nutrition education in the past.    Nutrition Diagnosis:  Food- and nutrition-related knowledge deficit r/t no previous knowledge of low-sodium diet AEB pt report of no previous formal low-sodium nutrition education.    Nutrition Prescription/Recs:  Continue low-sodium diet.      Interventions:  Nutrition Education  1. Provided verbal instruction on low-sodium meal planning.  2. Provided the following handouts: How to Read Nutrition Labels, Low-Sodium Foods and Drinks, Tips for a Low-Sodium Diet, Seasoning Your Foods Without Adding Salt, and Managing Fluid Restriction.      Goals:    Pt will verbalize at least five high sodium foods and the importance of avoiding added salt to foods for cooking or seasoning foods.     Follow-up:   Patient to ask any further nutrition-related questions before discharge. In addition, pt may request outpatient RD appointment.    Lety Addison RDN, MAYDA  Clinical Dietitian  Office: [phone]  Weekend pager: [phone]

## 2023-08-09 NOTE — PROGRESS NOTES
"Time: 8/9/23   8035-6925    Reason for Admission: HTN  Activity: Standby     Neuro: AO x 4    Cardiac: Apical pulse irregular  Telemetry: Yes, occurring PACs and BBB    Resp: Diminished in lower lobes   O2: Weaned to RA    GI/:  Voids using urinal. Good output.     Lines/Drains: Saline locked    Vitals: Blood pressure (!) 153/45, pulse 68, temperature 97.8  F (36.6  C), temperature source Oral, resp. rate 20, height 1.727 m (5' 8\"), weight 147.3 kg (324 lb 11.8 oz), SpO2 92 %    Pain: Denies    Plan: Strict I&O. Continue Lasix. Monitor Creatinine.     Other: Toe nails clipped but may require drummel due to thickness for future trimmings. Informed patient Podiatry may be beneficial.   "

## 2023-08-09 NOTE — PROGRESS NOTES
Internal Medicine Progress Note     Service Date: 08/09/2023  [facility] - Admission Date: 8/7/2023     Problems:     Patient Active Problem List   Diagnosis    Talus fracture    Hypothyroidism    Malignant neoplasm of prostate (H)    Malignant neoplasm of thyroid gland (H)    Hyperlipidemia    Type 2 diabetes mellitus (H)    Fracture of right talus    Morbid obesity (H)    Uncontrolled type 2 diabetes mellitus with hyperglycemia (H)    Aortic stenosis    Cardiomyopathy (H)    Coronary artery disease involving coronary bypass graft of native heart without angina pectoris    S/P TAVR (transcatheter aortic valve replacement)    Aortic stenosis, severe    Accelerated hypertension    Acute congestive heart failure, unspecified heart failure type (H)       Assessments and Plans:     An 83 yo morbidly obese male with a h/o CHF, DM, hypothyroidism, HTN, HLD, BPH and aortic stenosis s/p TAVR who p/w progressive SOB, weight gain and leg swelling, found to have respiratory failure 2/2 acute on chronic diastolic CHF. Hospital course complicated by MAYDA.     Acute hypoxic respiratory failure 2/2 acute on chronic diastolic CHF: Likely precipitated by non-compliance with salt/fluid restrictions at home. BNP 2039. CXR shows bibasilar atelectasis and trace b/l pleural effusions. Echo from 5/2023 showed preserved EF; limited study to evaluate AVR. Currently on 2L NC. Net neg 1.5L past 24H.   -Cont lasix, will stop IV lasix and transition to 20mg po BID this evening given MAYDA as below  -Strict I/O  -Fluid restriction  -Daily weights  -Hold actos - will not resume at discharge given risk of CHF precipitation  -Will start farxiga tmrw if renal fx better  -02 prn to maintain sats >92%    MAYDA: Likely 2/2 IV diuresis.  -Transitioned IV lasix to po today as above  -Hold losartan for now  -Trend cr    Thrombocytopenia: Mild with plt count of 149.  -Trend plt count    Hypomagnesemia: Likely 2/2 diuresis and  "resolved s/p repletion   -Trend mag    Elevated troponin: 2/2 demand ischemia / type II NSTEMI. Trop flat at 30, 29. EKG X 2 showed ventricular bigeminy and old LBBB. No c/o CP.    Sick euthyroid: TSH elevated at 8.4, but free T4 WNL.   -Needs repeat TFTs in 4-6 weeks    Acute anemia: Likely 2/2 acute illness. No e/o active bleeding. H/H improving on labs this AM.  -Trend H/H    DM: Recent A1c 6.8%. Goal A1c <8% given advanced age.   -Cont metformin, glipizide  -Hold actos as above - will not resume at discharge as above  -Plan to start farxiga tmrw if renal fx allows  -ISS    HTN:  -Holding losartan as above  -Hydralazine IV prn    Hypothyroidism:  -Cont levothyroxine    HLD:  -Cont statin    BPH:  -Cont flomax    H/o aortic stenosis s/p TAVR      DVT Prophylaxis: SCD    Code Status: Full      Subjective:      Patient reports that his SOB has resolved and his dry cough continues to improve. Leg swelling persists. Denies CP, abd pain, N/V/D, urinary symptoms, chills or diaphoresis.     Objective:      Vitals: BP (!) 160/57 (BP Location: Right arm)   Pulse 64   Temp 97.9  F (36.6  C) (Oral)   Resp 20   Ht 1.727 m (5' 8\")   Wt 147.3 kg (324 lb 11.8 oz)   SpO2 93%   BMI 49.38 kg/m  Temp (24hrs), Av.7  F (36.5  C), Min:97.3  F (36.3  C), Max:98  F (36.7  C)    Gen: A+Ox3, no acute distress  Eyes: No scleral icterus  Neck: No JVD  ENT: MMM  Heart: RRR, clear S1S2, no rubs or gallops, no murmurs  Lungs: CTAB  Abdomen: Normal bowel sounds, soft, no tenderness to palpation  Extremities: 2+ b/l lower extremity edema  Skin: No rash  Neuro: Cranial nerves grossly intact, no focal deficits  Psych: Appropriate affect / mood    I have reviewed all labs, imaging and other investigations.     Labs/Imaging:     Results for orders placed or performed during the hospital encounter of 23 (from the past 24 hour(s))   Glucose by meter   Result Value Ref Range    GLUCOSE BY METER POCT 121 (H) 70 - 99 mg/dL   Glucose by " meter   Result Value Ref Range    GLUCOSE BY METER POCT 211 (H) 70 - 99 mg/dL   Potassium   Result Value Ref Range    Potassium 5.0 3.4 - 5.3 mmol/L   CBC with platelets   Result Value Ref Range    WBC Count 5.0 4.0 - 11.0 10e3/uL    RBC Count 3.97 (L) 4.40 - 5.90 10e6/uL    Hemoglobin 12.4 (L) 13.3 - 17.7 g/dL    Hematocrit 38.9 (L) 40.0 - 53.0 %    MCV 98 78 - 100 fL    MCH 31.2 26.5 - 33.0 pg    MCHC 31.9 31.5 - 36.5 g/dL    RDW 13.7 10.0 - 15.0 %    Platelet Count 149 (L) 150 - 450 10e3/uL   Magnesium   Result Value Ref Range    Magnesium 1.9 1.7 - 2.3 mg/dL   Phosphorus   Result Value Ref Range    Phosphorus 5.0 (H) 2.5 - 4.5 mg/dL   Basic metabolic panel   Result Value Ref Range    Sodium 140 136 - 145 mmol/L    Potassium 4.8 3.4 - 5.3 mmol/L    Chloride 100 98 - 107 mmol/L    Carbon Dioxide (CO2) 32 (H) 22 - 29 mmol/L    Anion Gap 8 7 - 15 mmol/L    Urea Nitrogen 29.2 (H) 8.0 - 23.0 mg/dL    Creatinine 1.31 (H) 0.67 - 1.17 mg/dL    Calcium 8.9 8.8 - 10.2 mg/dL    Glucose 124 (H) 70 - 99 mg/dL    GFR Estimate 55 (L) >60 mL/min/1.73m2   Glucose by meter   Result Value Ref Range    GLUCOSE BY METER POCT 126 (H) 70 - 99 mg/dL   Glucose by meter   Result Value Ref Range    GLUCOSE BY METER POCT 163 (H) 70 - 99 mg/dL   Basic metabolic panel   Result Value Ref Range    Sodium 137 136 - 145 mmol/L    Potassium 4.6 3.4 - 5.3 mmol/L    Chloride 96 (L) 98 - 107 mmol/L    Carbon Dioxide (CO2) 32 (H) 22 - 29 mmol/L    Anion Gap 9 7 - 15 mmol/L    Urea Nitrogen 31.7 (H) 8.0 - 23.0 mg/dL    Creatinine 1.29 (H) 0.67 - 1.17 mg/dL    Calcium 9.2 8.8 - 10.2 mg/dL    Glucose 212 (H) 70 - 99 mg/dL    GFR Estimate 56 (L) >60 mL/min/1.73m2         Florecita Jackson MD  08/09/2023 3:46 PM

## 2023-08-09 NOTE — PROGRESS NOTES
Time: Assumed care of this pt at 1900  Reason for Admission: Accelerated HTN  Activity: Independent. Up in recliner chair with feet elevated.   Neuro:A/O x4  GI/: Using urinal at bedside  Respiratory Oxygen at 2 L N/C 93 %. Lower lobes are diminished and have crackles.  Cardiac Irregular apical pulse.Pulse 65-75 per minute on Tele at 0000. Remains on telemetry. Low diastolic BP. States he feels fine. At 0034 Pulse 33 on Tele with sinus bradycardia BBB and bigeminal PAC. Charge RN Notified MD. Order for EKG and potassium level checked ( 5.0).Applied new tele box. HR 55-62. Pt Asymptomatic.   Skin: Bilateral LE edema 3+. Refused to allow me to look at skin.States he has no sores.   Diet: Comb Moderate Consistent Carb  IV Access: SL  Vitals: Manual BP.Temp: 97.9  F (36.6  C) Temp src: Oral BP: (!) 160/57 Pulse: 64   Resp: 20 SpO2: 93 % O2 Device: Nasal cannula Oxygen Delivery: 2 LPM   Pain: Denies  Plan:  Mag and Potassium protocol. Strict I/O. Lasix administered at 0600

## 2023-08-09 NOTE — PROGRESS NOTES
Noted significant heart rate change on telemetry. Currently sustained 33 BPM. [facility] charge nurse notified and recommended physical assessment of pt.

## 2023-08-09 NOTE — PROGRESS NOTES
Writer notified by ICU charge pt's telemetry monitor was alerting for heart rate in the low 30's. Writer went and assessed pt -- apical pulse 38, B/P 144/45; pt asymptomatic, denied being dizziness, lightheaded, chest pain, palpations, or SOB. Pt A&O x4, able to hold and follow conversations without difficulty.    Writer paged and updated telemed provider Dr. Dalton with findings. Received order for EKG and STAT potassium check. EKG showed heart rate 69 and potassium 5.0. Telemetry monitor switched. Heart rate now remaining between 55 - 62; pt continues to be asymptomatic.

## 2023-08-10 VITALS
OXYGEN SATURATION: 93 % | HEIGHT: 68 IN | WEIGHT: 315 LBS | RESPIRATION RATE: 19 BRPM | SYSTOLIC BLOOD PRESSURE: 140 MMHG | HEART RATE: 57 BPM | TEMPERATURE: 97.4 F | DIASTOLIC BLOOD PRESSURE: 47 MMHG | BODY MASS INDEX: 47.74 KG/M2

## 2023-08-10 LAB
ANION GAP SERPL CALCULATED.3IONS-SCNC: 10 MMOL/L (ref 7–15)
BUN SERPL-MCNC: 34.9 MG/DL (ref 8–23)
CALCIUM SERPL-MCNC: 8.7 MG/DL (ref 8.8–10.2)
CHLORIDE SERPL-SCNC: 99 MMOL/L (ref 98–107)
CREAT SERPL-MCNC: 1.2 MG/DL (ref 0.67–1.17)
DEPRECATED HCO3 PLAS-SCNC: 31 MMOL/L (ref 22–29)
ERYTHROCYTE [DISTWIDTH] IN BLOOD BY AUTOMATED COUNT: 13.4 % (ref 10–15)
GFR SERPL CREATININE-BSD FRML MDRD: 61 ML/MIN/1.73M2
GLUCOSE BLDC GLUCOMTR-MCNC: 144 MG/DL (ref 70–99)
GLUCOSE BLDC GLUCOMTR-MCNC: 192 MG/DL (ref 70–99)
GLUCOSE BLDC GLUCOMTR-MCNC: 209 MG/DL (ref 70–99)
GLUCOSE BLDC GLUCOMTR-MCNC: 247 MG/DL (ref 70–99)
GLUCOSE SERPL-MCNC: 155 MG/DL (ref 70–99)
HCT VFR BLD AUTO: 38.4 % (ref 40–53)
HGB BLD-MCNC: 12.3 G/DL (ref 13.3–17.7)
MAGNESIUM SERPL-MCNC: 1.9 MG/DL (ref 1.7–2.3)
MCH RBC QN AUTO: 30.8 PG (ref 26.5–33)
MCHC RBC AUTO-ENTMCNC: 32 G/DL (ref 31.5–36.5)
MCV RBC AUTO: 96 FL (ref 78–100)
PLATELET # BLD AUTO: 155 10E3/UL (ref 150–450)
POTASSIUM SERPL-SCNC: 4.7 MMOL/L (ref 3.4–5.3)
RBC # BLD AUTO: 3.99 10E6/UL (ref 4.4–5.9)
SODIUM SERPL-SCNC: 140 MMOL/L (ref 136–145)
WBC # BLD AUTO: 5 10E3/UL (ref 4–11)

## 2023-08-10 PROCEDURE — 250N000013 HC RX MED GY IP 250 OP 250 PS 637

## 2023-08-10 PROCEDURE — 85027 COMPLETE CBC AUTOMATED: CPT | Performed by: HOSPITALIST

## 2023-08-10 PROCEDURE — 83735 ASSAY OF MAGNESIUM: CPT | Performed by: HOSPITALIST

## 2023-08-10 PROCEDURE — 250N000013 HC RX MED GY IP 250 OP 250 PS 637: Performed by: HOSPITALIST

## 2023-08-10 PROCEDURE — 80048 BASIC METABOLIC PNL TOTAL CA: CPT | Performed by: HOSPITALIST

## 2023-08-10 PROCEDURE — 36415 COLL VENOUS BLD VENIPUNCTURE: CPT | Performed by: HOSPITALIST

## 2023-08-10 PROCEDURE — 99239 HOSP IP/OBS DSCHRG MGMT >30: CPT | Performed by: HOSPITALIST

## 2023-08-10 RX ORDER — DAPAGLIFLOZIN 10 MG/1
10 TABLET, FILM COATED ORAL DAILY
Qty: 90 TABLET | Refills: 0 | Status: SHIPPED | OUTPATIENT
Start: 2023-08-11 | End: 2023-08-16

## 2023-08-10 RX ORDER — FUROSEMIDE 40 MG
40 TABLET ORAL DAILY
Status: DISCONTINUED | OUTPATIENT
Start: 2023-08-11 | End: 2023-08-10 | Stop reason: HOSPADM

## 2023-08-10 RX ORDER — FUROSEMIDE 20 MG
TABLET ORAL
Qty: 90 TABLET | Refills: 1 | Status: SHIPPED | OUTPATIENT
Start: 2023-08-10 | End: 2023-09-06

## 2023-08-10 RX ORDER — FUROSEMIDE 20 MG
20 TABLET ORAL ONCE
Status: COMPLETED | OUTPATIENT
Start: 2023-08-10 | End: 2023-08-10

## 2023-08-10 RX ORDER — DAPAGLIFLOZIN 5 MG/1
10 TABLET, FILM COATED ORAL DAILY
Status: DISCONTINUED | OUTPATIENT
Start: 2023-08-10 | End: 2023-08-10

## 2023-08-10 RX ORDER — LOSARTAN POTASSIUM 50 MG/1
100 TABLET ORAL DAILY
Status: DISCONTINUED | OUTPATIENT
Start: 2023-08-10 | End: 2023-08-10 | Stop reason: HOSPADM

## 2023-08-10 RX ADMIN — Medication 400 MG: at 08:10

## 2023-08-10 RX ADMIN — FUROSEMIDE 20 MG: 20 TABLET ORAL at 14:38

## 2023-08-10 RX ADMIN — INSULIN ASPART 5 UNITS: 100 INJECTION, SOLUTION INTRAVENOUS; SUBCUTANEOUS at 12:34

## 2023-08-10 RX ADMIN — INSULIN ASPART 3 UNITS: 100 INJECTION, SOLUTION INTRAVENOUS; SUBCUTANEOUS at 16:26

## 2023-08-10 RX ADMIN — INSULIN ASPART 3 UNITS: 100 INJECTION, SOLUTION INTRAVENOUS; SUBCUTANEOUS at 08:13

## 2023-08-10 RX ADMIN — LOSARTAN POTASSIUM 100 MG: 50 TABLET, FILM COATED ORAL at 12:34

## 2023-08-10 RX ADMIN — GLIPIZIDE 10 MG: 10 TABLET ORAL at 16:26

## 2023-08-10 RX ADMIN — TAMSULOSIN HYDROCHLORIDE 0.4 MG: 0.4 CAPSULE ORAL at 08:10

## 2023-08-10 RX ADMIN — ASPIRIN 81 MG: 81 TABLET, COATED ORAL at 08:10

## 2023-08-10 RX ADMIN — EMPAGLIFLOZIN 10 MG: 10 TABLET, FILM COATED ORAL at 12:33

## 2023-08-10 RX ADMIN — ATORVASTATIN CALCIUM 40 MG: 20 TABLET, FILM COATED ORAL at 08:10

## 2023-08-10 RX ADMIN — LEVOTHYROXINE SODIUM 175 MCG: 0.03 TABLET ORAL at 08:10

## 2023-08-10 RX ADMIN — GLIPIZIDE 10 MG: 10 TABLET ORAL at 08:10

## 2023-08-10 RX ADMIN — FUROSEMIDE 20 MG: 20 TABLET ORAL at 08:10

## 2023-08-10 ASSESSMENT — ACTIVITIES OF DAILY LIVING (ADL)
ADLS_ACUITY_SCORE: 22

## 2023-08-10 NOTE — PROGRESS NOTES
[facility] DISCHARGE NOTE    Patient discharged to home at 4:39 PM via wheel chair. Accompanied by significant other and staff. Discharge instructions reviewed with patient and spouse, opportunity offered to ask questions. Prescriptions sent to patients preferred pharmacy. All belongings sent with patient.    Jaqueline Leslie RN

## 2023-08-10 NOTE — PROGRESS NOTES
Patient alert and oriented.  On telemetry.  Denies pain.  Voiding with bedside urinal.  Saline locked.  On 1.5L O2.  VSS.

## 2023-08-10 NOTE — DISCHARGE SUMMARY
"Internal Medicine Discharge Summary     Name: Jayesh Ortiz  YOB: 1940 (Age: 82 year old)  [facility]  Admitting physician: Florecita Jackson MD   Discharging Physician: Florecita Jackson MD   Date of Admission: 8/7/2023  Date of Discharge: 8/10/2023     Primary Discharge Diagnosis:      Acute hypoxic respiratory failure 2/2 acute on chronic diastolic CHF  Mild MAYDA    HPI (per admitting physician):      Jayesh Ortiz is a 82 year old male with past medical history of non-rheumatic aortic stenosis s/p TAVR, CHF, ischemic cardiomyopathy, DM2, HTN, obesity, and BPH now presents on 8/7/2023 with x 2 weeks of worsening dyspnea and bilateral leg swelling.     Jayesh states onset of a \"head cold\" around two weeks ago. This has largely improved, however does have remanence of productive cough.  During this 2-week period he has noticed progressively worsening dyspnea.  He states he does not have shortness of breath with activity at baseline.  Last evening he notes acute shortness of breath with sitting that made him question calling 911, but resolved fairly quickly and remained asymptomatic for the remainder of the night.  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  He now notices that he gets short of breath with walking about 10 feet.  He denies any fevers or chills.  He denies any associated chest pain, tightness, or pressure.  He reports recent weight gain of 30 pounds over the past several weeks, however review of EMR shows likely 15 pound weight gain.  He sleeps upright in a recliner at all times due to past MSK injury, unable to determine orthopnea. No prior history of DVT or PE.     Hospital Course by Main Problems:      An 83 yo morbidly obese male with a h/o CHF, DM, hypothyroidism, HTN, HLD, BPH and aortic stenosis s/p TAVR who p/w progressive SOB, weight gain and leg swelling, found to have respiratory failure 2/2 acute on chronic " diastolic CHF. Hospital course complicated by MAYDA.      Acute hypoxic respiratory failure 2/2 acute on chronic diastolic CHF: Likely precipitated by non-compliance with salt/fluid restrictions at home + actos. BNP was elevated at 2039. CXR showed bibasilar atelectasis and trace b/l pleural effusions. Echo from 5/2023 showed preserved EF; limited study to evaluate AVR. The patient was diuresed with IV lasix until he was euvolemic. He was weaned off of supplemental oxygen and did not desaturate with ambulation prior to discharge home. The patient was continued on lasix 40mg QAM and 20mg in the afternoon at discharge. Actos was discontinued as below. The patient was educated on the importance of compliance with medications and fluid/salt restrictions at home. He will need labs to check his cr, k and mag levels at his PCP follow up appointment.      MAYDA: Likely 2/2 IV diuresis. The patient's losartan was transiently held and lasix was transitioned to po. His renal function improved with these measures, but remained above his baseline at the time of discharge home. The patient will need repeat labs at his PCP follow up to recheck his cr.     B/l LE lymphedema: Patient declined referral for o/p lymphedema treatment     Thrombocytopenia: Patient's plt count normalized prior to discharge home     Hypomagnesemia: Likely 2/2 diuresis and resolved s/p repletion      Elevated troponin: 2/2 demand ischemia / type II NSTEMI. Trop flat at 30, 29. EKG X 2 showed ventricular bigeminy and old LBBB. The patient denied CP throughout his hospitalization.     Sick euthyroid: TSH elevated at 8.4, but free T4 WNL. The patient needs repeat TFTs in 4-6 weeks with his PCP.      Acute anemia: Likely 2/2 acute illness. The patient had no e/o active bleeding during this admission and his H/H was stable at discharge.      DM: Recent A1c 6.8%. Goal A1c <8% given advanced age. The patient was continued on his PTA metformin and glipizide. His actos  "was not resumed at discharge given that this can trigger a CHF exacerbation. The patient was started on farxiga in place of actos.      HTN: Cont losartan      Hypothyroidism:  Cont levothyroxine     HLD: Cont statin     BPH: Cont flomax     H/o aortic stenosis s/p TAVR     Morbid obesity: BMI 49. Weight loss was encouraged.     Discharge Exam:     BP (!) 140/47 (BP Location: Left arm)   Pulse 57   Temp 97.4  F (36.3  C) (Oral)   Resp 19   Ht 1.727 m (5' 8\")   Wt 147.3 kg (324 lb 11.8 oz)   SpO2 93%   BMI 49.38 kg/m    Gen: A+Ox3, no acute distress  Eyes: No scleral icterus  Neck: No JVD  ENT: MMM  Heart: RRR, clear S1S2, no rubs or gallops, no murmurs  Lungs: CTAB  Abdomen: Normal bowel sounds, soft, no tenderness to palpation  Extremities: B/l lower extremity lymphedema, pitting edema on top of lymphedema has now resolved  Skin: No rash  Neuro: Cranial nerves grossly intact, no focal deficits  Psych: Appropriate affect / mood    Discharge/Recent Laboratory Results:     Results for orders placed or performed during the hospital encounter of 08/07/23 (from the past 24 hour(s))   Glucose by meter   Result Value Ref Range    GLUCOSE BY METER POCT 221 (H) 70 - 99 mg/dL   Glucose by meter   Result Value Ref Range    GLUCOSE BY METER POCT 144 (H) 70 - 99 mg/dL   CBC with platelets   Result Value Ref Range    WBC Count 5.0 4.0 - 11.0 10e3/uL    RBC Count 3.99 (L) 4.40 - 5.90 10e6/uL    Hemoglobin 12.3 (L) 13.3 - 17.7 g/dL    Hematocrit 38.4 (L) 40.0 - 53.0 %    MCV 96 78 - 100 fL    MCH 30.8 26.5 - 33.0 pg    MCHC 32.0 31.5 - 36.5 g/dL    RDW 13.4 10.0 - 15.0 %    Platelet Count 155 150 - 450 10e3/uL   Magnesium   Result Value Ref Range    Magnesium 1.9 1.7 - 2.3 mg/dL   Basic metabolic panel   Result Value Ref Range    Sodium 140 136 - 145 mmol/L    Potassium 4.7 3.4 - 5.3 mmol/L    Chloride 99 98 - 107 mmol/L    Carbon Dioxide (CO2) 31 (H) 22 - 29 mmol/L    Anion Gap 10 7 - 15 mmol/L    Urea Nitrogen 34.9 (H) 8.0 " - 23.0 mg/dL    Creatinine 1.20 (H) 0.67 - 1.17 mg/dL    Calcium 8.7 (L) 8.8 - 10.2 mg/dL    Glucose 155 (H) 70 - 99 mg/dL    GFR Estimate 61 >60 mL/min/1.73m2   Glucose by meter   Result Value Ref Range    GLUCOSE BY METER POCT 192 (H) 70 - 99 mg/dL   Glucose by meter   Result Value Ref Range    GLUCOSE BY METER POCT 247 (H) 70 - 99 mg/dL       Condition on Discharge:   -Good    Discharge Medications:      Current Discharge Medication List        START taking these medications    Details   dapagliflozin (FARXIGA) 10 MG TABS tablet Take 1 tablet (10 mg) by mouth daily  Qty: 90 tablet, Refills: 0    Associated Diagnoses: Acute on chronic diastolic congestive heart failure (H); Type 2 diabetes mellitus without complication, without long-term current use of insulin (H)      furosemide (LASIX) 20 MG tablet Take 2 tablets (40mg) in the morning and 1 tablet (20mg) at 4PM  Qty: 90 tablet, Refills: 1    Associated Diagnoses: Acute on chronic diastolic congestive heart failure (H)           CONTINUE these medications which have NOT CHANGED    Details   aspirin (ASA) 81 MG EC tablet Take 1 tablet (81 mg) by mouth daily    Associated Diagnoses: S/P TAVR (transcatheter aortic valve replacement)      atorvastatin (LIPITOR) 40 MG tablet TAKE 1 TABLET BY MOUTH EVERY DAY  Qty: 90 tablet, Refills: 3    Associated Diagnoses: Hyperlipidemia, unspecified      EPINEPHrine (PRIMATENE MIST) 0.125 MG/ACT AERO Inhale 1-2 sprays into the lungs every 4 hours as needed (for difficulty breathing)      glipiZIDE (GLUCOTROL) 10 MG tablet TAKE 1 TABLET BY MOUTH TWICE A DAY  Qty: 180 tablet, Refills: 1    Associated Diagnoses: Type 2 diabetes mellitus without complications (H)      levothyroxine (SYNTHROID/LEVOTHROID) 175 MCG tablet TAKE 1 TABLET BY MOUTH EVERY DAY  Qty: 90 tablet, Refills: 3    Associated Diagnoses: Hypothyroidism, unspecified type      losartan (COZAAR) 100 MG tablet Take 1 tablet (100 mg) by mouth daily  Qty: 90 tablet, Refills:  3    Comments: Patient will call when ready for a refill.  Associated Diagnoses: Essential hypertension      magnesium 250 MG tablet Take 1 tablet (250 mg) by mouth daily  Qty: 90 tablet, Refills: 1    Associated Diagnoses: Hypomagnesemia      metFORMIN (GLUCOPHAGE) 500 MG tablet Take 2 tablets (1,000 mg) by mouth 2 times daily (with meals)  Qty: 360 tablet, Refills: 3    Associated Diagnoses: Type 2 diabetes mellitus with hyperglycemia (H)      tamsulosin (FLOMAX) 0.4 MG capsule TAKE 1 CAPSULE BY MOUTH EVERY DAY  Qty: 90 capsule, Refills: 3    Associated Diagnoses: Malignant neoplasm of prostate (H)           STOP taking these medications       pioglitazone (ACTOS) 45 MG tablet Comments:   Reason for Stopping:               Discharge Instructions:        Reason for your hospital stay    Congestive heart failure exacerbation     Follow-up and recommended labs and tests     Follow up with your PCP within 1 week if possible; please call for an appointment if you do not already have one. You will need labs at this appointment to recheck your kidney function, potassium and magnesium levels. You also need repeat labs to check your thyroid function in 4-6 weeks.     Activity    Your activity upon discharge: activity as tolerated     Discharge Instructions    Please ensure that all medications are taken as prescribed. As we discussed, we have taken you off of actos as this can trigger an exacerbation of congestive heart failure and we have prescribed farxiga in place of actos. Please try to adhere to the fluid and salt restrictions as we discussed. You should also weigh yourself daily around the same time and bring these readings to your PCP appointment. If you develop intractable pain / vomiting, shortness of breath, note worsening of your leg swelling, have a fever, or if you have any other concerning symptoms, please seek medical attention.     Diet    Follow this diet upon discharge: Diabetic, no more than 2000  milligrams of sodium/salt per day, only drink enough fluid to quench your thirst as we discussed        Dispo:   -Home    Recommended PCP Follow Up:   -Needs labs at f/u to check cr, mag, and potassium  -Needs repeat TFTs in 4-6 weeks      Signed:     Florecita Jackson MD  8/10/2023 4:10 PM      38 minutes was spent evaluating the patient, writing orders, discharge planning, and providing patient discharge education and counseling in preparation for discharge.

## 2023-08-10 NOTE — PROGRESS NOTES
"Time: 8/9/23   4220-3020     Reason for Admission: HTN  Activity: Standby      Neuro: AO x 4     Cardiac: Apical pulse irregular                      Telemetry: Yes, occurring PACs and BBB     Resp: Diminished in lower lobes               O2: Weaned to RA. Ambulated ~100 yards. SpO2 90-92% RA. P 115-122, /74 with activity. At rest: P 56, /59, SpO@ 94% RA.     GI/:  Voids using urinal. Good output.      Lines/Drains: Saline locked     Vitals: Blood pressure (!) 177/74, pulse 80, temperature 97.5  F (36.4  C), temperature source Oral, resp. rate 21, height 1.727 m (5' 8\"), weight 147.3 kg (324 lb 11.8 oz), SpO2 92 %.     Pain: Denies     Plan: Strict I&O. Total output 1125 ml. Discharge pt     "

## 2023-08-11 ENCOUNTER — PATIENT OUTREACH (OUTPATIENT)
Dept: CARE COORDINATION | Facility: CLINIC | Age: 83
End: 2023-08-11
Payer: MEDICARE

## 2023-08-11 NOTE — PROGRESS NOTES
Clinic Care Coordination Contact  [facility]: Post-Discharge Note  SITUATION                                                      Admission:    Admission Date: 08/07/23   Reason for Admission: Shortness of Breath  Discharge:   Discharge Date: 08/10/23  Discharge Diagnosis: Accelerated hypertension    Acute congestive heart failure, unspecified heart failure type (H)    BACKGROUND                                                      Per hospital discharge summary and inpatient provider notes:  An 83 yo morbidly obese male with a h/o CHF, DM, hypothyroidism, HTN, HLD, BPH and aortic stenosis s/p TAVR who p/w progressive SOB, weight gain and leg swelling, found to have respiratory failure 2/2 acute on chronic diastolic CHF. Hospital course complicated by MAYDA.      Acute hypoxic respiratory failure 2/2 acute on chronic diastolic CHF: Likely precipitated by non-compliance with salt/fluid restrictions at home + actos. BNP was elevated at 2039. CXR showed bibasilar atelectasis and trace b/l pleural effusions. Echo from 5/2023 showed preserved EF; limited study to evaluate AVR. The patient was diuresed with IV lasix until he was euvolemic. He was weaned off of supplemental oxygen and did not desaturate with ambulation prior to discharge home. The patient was continued on lasix 40mg QAM and 20mg in the afternoon at discharge. Actos was discontinued as below. The patient was educated on the importance of compliance with medications and fluid/salt restrictions at home. He will need labs to check his cr, k and mag levels at his PCP follow up appointment.      MAYDA: Likely 2/2 IV diuresis. The patient's losartan was transiently held and lasix was transitioned to po. His renal function improved with these measures, but remained above his baseline at the time of discharge home. The patient will need repeat labs at his PCP follow up to recheck his cr.      B/l LE lymphedema: Patient declined referral for o/p lymphedema  treatment     Thrombocytopenia: Patient's plt count normalized prior to discharge home     Hypomagnesemia: Likely 2/2 diuresis and resolved s/p repletion      Elevated troponin: 2/2 demand ischemia / type II NSTEMI. Trop flat at 30, 29. EKG X 2 showed ventricular bigeminy and old LBBB. The patient denied CP throughout his hospitalization.     Sick euthyroid: TSH elevated at 8.4, but free T4 WNL. The patient needs repeat TFTs in 4-6 weeks with his PCP.      Acute anemia: Likely 2/2 acute illness. The patient had no e/o active bleeding during this admission and his H/H was stable at discharge.      DM: Recent A1c 6.8%. Goal A1c <8% given advanced age. The patient was continued on his PTA metformin and glipizide. His actos was not resumed at discharge given that this can trigger a CHF exacerbation. The patient was started on farxiga in place of actos.      HTN: Cont losartan      Hypothyroidism:  Cont levothyroxine     HLD: Cont statin     BPH: Cont flomax     H/o aortic stenosis s/p TAVR      Morbid obesity: BMI 49. Weight loss was encouraged.     ASSESSMENT           Discharge Assessment  How are you doing now that you are home?: I'm doing great!  Happy that I'm home.  Only thing that I'm not loving is this low salt diet but I'm figuring it out every day.  Declined any other resources stating they gave him lots of information while in the hospital.  How are your symptoms? (Red Flag symptoms escalate to triage hotline per guidelines): Improved  Do you feel your condition is stable enough to be safe at home until your provider visit?: Yes  Does the patient have their discharge instructions? : Yes  Does the patient have questions regarding their discharge instructions? : No  Were you started on any new medications or were there changes to any of your previous medications? : Yes  Does the patient have all of their medications?: Yes  Do you have questions regarding any of your medications? : No  Do you have all of your  needed medical supplies or equipment (DME)?  (i.e. oxygen tank, CPAP, cane, etc.): Yes  Discharge follow-up appointment scheduled within 14 calendar days? : Yes  Discharge Follow Up Appointment Date: 08/14/23  Discharge Follow Up Appointment Scheduled with?: Primary Care Provider    Post-op ([facility] Only)  If the patient had a surgery or procedure, do they have any questions for a nurse?: No    Post-op (Clinicians Only)  Did the patient have surgery or a procedure: No  Eating & Drinking: eating and drinking without complaints/concerns  PO Intake: regular diet (low salt diet <2000 gm)  Bowel Function: normal  Urinary Status: voiding without complaint/concerns    Spoke with Jayesh.  See note above.  He was very complementary about the hospital and the staff.  Stated he received excellent care.  Denies any CCC needs.  Offered low Na diet information and he declined.  Instructed to weigh himself daily and write down on a daily log.  Stated he just got home last night and 'he doesn't have that type of scale'.  He is hoping to purchase one this weekend.  He will start writing down his daily wgts once he is able to purchase a scale.  Writer instructed once he has done this to bring his wgt log to any/all appointments.    PLAN                                                      Outpatient Plan:  Patient to attend 8/14 appointment with PCP.    Future Appointments   Date Time Provider Department Center   8/14/2023  3:00 PM Geoffrey Live MD [facility]         For any urgent concerns, please contact our 24 hour nurse triage line: [phone] ([phone])         Rosa Meléndez RN

## 2023-08-14 ENCOUNTER — OFFICE VISIT (OUTPATIENT)
Dept: FAMILY MEDICINE | Facility: CLINIC | Age: 83
End: 2023-08-14
Payer: MEDICARE

## 2023-08-14 ENCOUNTER — TELEPHONE (OUTPATIENT)
Dept: FAMILY MEDICINE | Facility: CLINIC | Age: 83
End: 2023-08-14

## 2023-08-14 VITALS
BODY MASS INDEX: 46.32 KG/M2 | HEART RATE: 40 BPM | HEIGHT: 68 IN | OXYGEN SATURATION: 95 % | RESPIRATION RATE: 18 BRPM | DIASTOLIC BLOOD PRESSURE: 52 MMHG | WEIGHT: 305.6 LBS | SYSTOLIC BLOOD PRESSURE: 155 MMHG

## 2023-08-14 DIAGNOSIS — E11.65 UNCONTROLLED TYPE 2 DIABETES MELLITUS WITH HYPERGLYCEMIA (H): ICD-10-CM

## 2023-08-14 DIAGNOSIS — I50.9 ACUTE CONGESTIVE HEART FAILURE, UNSPECIFIED HEART FAILURE TYPE (H): Primary | ICD-10-CM

## 2023-08-14 DIAGNOSIS — E03.9 HYPOTHYROIDISM, UNSPECIFIED TYPE: Chronic | ICD-10-CM

## 2023-08-14 LAB
ANION GAP SERPL CALCULATED.3IONS-SCNC: 11 MMOL/L (ref 7–15)
BUN SERPL-MCNC: 25.4 MG/DL (ref 8–23)
CALCIUM SERPL-MCNC: 9.5 MG/DL (ref 8.8–10.2)
CHLORIDE SERPL-SCNC: 98 MMOL/L (ref 98–107)
CREAT SERPL-MCNC: 1.23 MG/DL (ref 0.67–1.17)
DEPRECATED HCO3 PLAS-SCNC: 29 MMOL/L (ref 22–29)
GFR SERPL CREATININE-BSD FRML MDRD: 59 ML/MIN/1.73M2
GLUCOSE SERPL-MCNC: 195 MG/DL (ref 70–99)
HBA1C MFR BLD: 7.3 % (ref 0–5.6)
MAGNESIUM SERPL-MCNC: 1.9 MG/DL (ref 1.7–2.3)
POTASSIUM SERPL-SCNC: 5.3 MMOL/L (ref 3.4–5.3)
SODIUM SERPL-SCNC: 138 MMOL/L (ref 136–145)

## 2023-08-14 PROCEDURE — 36415 COLL VENOUS BLD VENIPUNCTURE: CPT | Performed by: FAMILY MEDICINE

## 2023-08-14 PROCEDURE — 99214 OFFICE O/P EST MOD 30 MIN: CPT | Performed by: FAMILY MEDICINE

## 2023-08-14 PROCEDURE — 83735 ASSAY OF MAGNESIUM: CPT | Performed by: FAMILY MEDICINE

## 2023-08-14 PROCEDURE — 80048 BASIC METABOLIC PNL TOTAL CA: CPT | Performed by: FAMILY MEDICINE

## 2023-08-14 PROCEDURE — 83036 HEMOGLOBIN GLYCOSYLATED A1C: CPT | Performed by: FAMILY MEDICINE

## 2023-08-14 NOTE — TELEPHONE ENCOUNTER
General Call    Contacts         Type Contact Phone/Fax    08/14/2023 09:50 AM CDT Phone (Incoming) Jayesh Ortiz (Self) [phone] (W)          Reason for Call:  Patient's wife Mami called in to check and see if his AVS from the hospital last Thursday can be sent to them. She is on the consent to communicate for everything so I emailed this to her at [email or web address]    Date of last appointment with provider: 4-    Okay to leave a detailed message?: No at Cell number on file:    Telephone Information:   Mobile [phone]

## 2023-08-14 NOTE — PATIENT INSTRUCTIONS
Jayesh,    Monitor your weight on a daily basis  This may help us know if you have fluid retention  Continue to monitor your blood pressure  Continue your water pills  We will check laboratory testing  I will call to give you an update in the coming days    Geoffrey Live MD

## 2023-08-14 NOTE — PROGRESS NOTES
Assessment & Plan     Acute congestive heart failure, unspecified heart failure type (H)    Reviewed his recent hospitalization where he was admitted for acute on chronic congestive heart failure  He received IV diuresis and required supplemental oxygen which was weaned  He follows up today and is currently stable  Continue current regimen  Continue aspirin, atorvastatin, and furosemide  Monitor daily weight  Follow-up with cardiology    Uncontrolled type 2 diabetes mellitus with hyperglycemia (H)    His most recent hemoglobin A1c is 7.3%  He is treated with metformin and glipizide  Recommend an SGLT2 inhibitor though cost has been a challenge  Consider follow-up with diabetes education    - Basic metabolic panel; Future  - Hemoglobin A1c; Future  - Magnesium; Future  - Basic metabolic panel  - Hemoglobin A1c  - Magnesium    Hypothyroidism    Continue levothyroxine  Check a thyroid level and adjust his thyroid medication as warranted      Review of external notes as documented elsewhere in note       MED REC REQUIRED  Post Medication Reconciliation Status: discharge medications reconciled, continue medications without change      Geoffrey Live MD  [facility]    Otoniel Mcconnell is a 82 year old, presenting for the following health issues:  [facility] F/U    Jayesh follows up today following a recent hospitalization for heart failure.  He recently experienced symptoms where he felt like he had a head cold.  He became creasingly short of breath.  He therefore presented to the emergency department under the advisement of the clinic.  He was assessed to have acute hypoxic respiratory failure secondary to acute on chronic congestive heart failure.  He admitted he was having extra salt and was not following fluid restrictions.  His weight increased.  He was admitted and diuresed with IV Lasix.  He required supplemental oxygen which was weaned.  He was stable at time of discharge.    He  follows up today stating that he is feeling better.  His weight has dropped significantly.  He denies significant shortness of breath.  He has a follow-up for diabetes as well as his last hemoglobin A1c was elevated to 7.3%.    At the last visit we reviewed a hospitalization for severe aortic stenosis treated via TAVR.  He had been monitored by cardiology with advancement of his aortic stenosis and therefore met the requirements for a TAVR procedure.  He reports that the procedure was stressful as he recalls waking up while his groin was being stapled.  He has had follow-up with cardiology and reports that he has been feeling well.  He has not had any chest pain or respiratory concerns.  He is due for laboratory testing today.     His medical history is notable for type 2 diabetes mellitus which previously was inadequately controlled, hypertension, hyperlipidemia, and hypothyroidism.  He also has a history of prostate cancer as well as remote history of thyroid cancer.       His last hemoglobin A1c was 7.9% and had increased to 10.2%.    His blood sugars have improved significantly with the addition of pioglitazone.  He has been treated with metformin and glipizide.  We have discussed having him consider a GLP-1 agonist or SGLT 2 inhibitor though he has been reluctant to make changes, especially now that his thyroid testing is better.     He has history of obstructive sleep apnea but does not use a CPAP machine.  He is requiring thyroid supplementation.         8/14/2023     2:53 PM   Additional Questions   Roomed by Shannen PARKINSON         8/11/2023     2:04 PM   Post Discharge Outreach   Admission Date 8/7/2023   Reason for Admission Shortness of Breath   Discharge Date 8/10/2023   Discharge Diagnosis Accelerated hypertension    Acute congestive heart failure, unspecified heart failure type (H)   How are you doing now that you are home? I'm doing great!  Happy that I'm home.  Only thing that I'm not loving is  this low salt diet but I'm figuring it out every day.  Declined any other resources stating they gave him lots of information while in the hospital.   How are your symptoms? (Red Flag symptoms escalate to triage hotline per guidelines) Improved   Do you feel your condition is stable enough to be safe at home until your provider visit? Yes   Does the patient have their discharge instructions?  Yes   Does the patient have questions regarding their discharge instructions?  No   Were you started on any new medications or were there changes to any of your previous medications?  Yes   Does the patient have all of their medications? Yes   Do you have questions regarding any of your medications?  No   Do you have all of your needed medical supplies or equipment (DME)?  (i.e. oxygen tank, CPAP, cane, etc.) Yes   Discharge follow-up appointment scheduled within 14 calendar days?  Yes   Discharge Follow Up Appointment Date 8/14/2023   Discharge Follow Up Appointment Scheduled with? Primary Care Provider     Hospital Follow-up Visit:    Hospital/Nursing Home/IP Rehab Facility: [facility]  Date of Admission: 8/7/2023  Date of Discharge: 8/10/2023  Reason(s) for Admission: Accelerated hypertension     Was your hospitalization related to COVID-19? No   Problems taking medications regularly:  None  Medication changes since discharge: None  Problems adhering to non-medication therapy:  None    Summary of hospitalization:  [facility] discharge summary reviewed  Diagnostic Tests/Treatments reviewed.  Follow up needed: none  Other Healthcare Providers Involved in Patient s Care:         None  Update since discharge: improved.         Plan of care communicated with patient                 Review of Systems   Constitutional, HEENT, cardiovascular, pulmonary, GI, , musculoskeletal, neuro, skin, endocrine and psych systems are negative, except as otherwise noted.      Objective    Resp 18   Ht  "1.727 m (5' 8\")   Wt 138.6 kg (305 lb 9.6 oz)   SpO2 95%   BMI 46.47 kg/m    Body mass index is 46.47 kg/m .  Physical Exam   GENERAL: healthy, alert and no distress  EYES: Eyes grossly normal to inspection, PERRL and conjunctivae and sclerae normal  RESP: lungs clear to auscultation - no rales, rhonchi or wheezes  CV: regular rate and rhythm, normal S1 S2, no S3 or S4, no murmur, click or rub, no peripheral edema and peripheral pulses strong  MS: no gross musculoskeletal defects noted, 1+ edema of the feet and ankles bilaterally  SKIN: no suspicious lesions or rashes  NEURO: Normal strength and tone, mentation intact and speech normal  PSYCH: mentation appears normal, affect normal/bright                      "

## 2023-08-16 ENCOUNTER — TELEPHONE (OUTPATIENT)
Dept: FAMILY MEDICINE | Facility: CLINIC | Age: 83
End: 2023-08-16
Payer: MEDICARE

## 2023-08-16 DIAGNOSIS — E11.9 TYPE 2 DIABETES MELLITUS WITHOUT COMPLICATION, UNSPECIFIED WHETHER LONG TERM INSULIN USE (H): Primary | ICD-10-CM

## 2023-08-16 NOTE — TELEPHONE ENCOUNTER
I called and reviewed lab tests. Also, I will have him start jardiance for kidney protection and given his coronary artery disease as well as ckd. Patient will take half. He is following up in 6 weeks. Sooner as needed.

## 2023-08-16 NOTE — TELEPHONE ENCOUNTER
General Call    Contacts         Type Contact Phone/Fax    08/16/2023 09:44 AM CDT Phone (Incoming) Jayesh Ortiz (Self) [phone] (W)          Reason for Call:  Patient calling to let us know that he needs to know the results of his tests and if he should be taking Farxiga still. Please call patient. He stated he would get a call from Dr. Live yesterday Tuesday 8-15- he never received a call.     Date of last appointment with provider: 8-    Okay to leave a detailed message?: Yes at Home number on file [phone] (home)

## 2023-08-17 NOTE — TELEPHONE ENCOUNTER
Attempted to call patient. Left message for patient to return call to clinic. Please relay provider's message below when patient calls back.    WELLINGTON Yusuf, RN  [facility]

## 2023-08-17 NOTE — TELEPHONE ENCOUNTER
Incoming call from patient's EC Dorita. Patient hasn't tried Jardiance or Farxiga. Both of those options are around $500 a month.     They are looking for guidance on how to proceed. May be helpful to pull in pharmacy or DE on this matter.     Richie Oneal RN     [facility]

## 2023-08-17 NOTE — TELEPHONE ENCOUNTER
Please follow-up.  I spoke to him by phone yesterday and told him that there was a possibility that this medication would be very expensive.  I had sent a 90-day supply and he is going to take 1/2 a pill so the prescription should last for 6 months meaning that the total cost would be spread over 6 months and not monthly.     However, given that the cost is high he does not need to start this medication.  His last hemoglobin A1c had actually improved so he can continue current occasions and should make sure to minimize carbohydrates in his diet.  He already is planning on following up with me and we can review in detail at the next visit.  We can look at other options at that time.

## 2023-08-18 ENCOUNTER — TELEPHONE (OUTPATIENT)
Dept: FAMILY MEDICINE | Facility: CLINIC | Age: 83
End: 2023-08-18
Payer: MEDICARE

## 2023-08-18 NOTE — TELEPHONE ENCOUNTER
Pt returned call, message relayed. Pt stated that he will not be taking the new medication that was prescribed as it is $1800, so he will hold off and will discuss other options at his upcoming office visit

## 2023-08-18 NOTE — TELEPHONE ENCOUNTER
FYI - Status Update    Who is Calling: patient    Update: While in the hospital he was told to quit taking Actos, patient's question for Dr. Live is whether he wants him to go back on the Actos temporarily until he sees him or forget about it completely. Please advise     Does caller want a call/response back: Yes     Okay to leave a detailed message?: Yes at Work number on file:  [phone] (work)

## 2023-08-18 NOTE — TELEPHONE ENCOUNTER
Please tell him not to resume Actos. There are potential side effects. We can review at his next appointment. In the meantime, he should really make sure to watch his diet and limit carbohydrates.

## 2023-09-06 DIAGNOSIS — I50.33 ACUTE ON CHRONIC DIASTOLIC CONGESTIVE HEART FAILURE (H): ICD-10-CM

## 2023-09-06 RX ORDER — FUROSEMIDE 20 MG
TABLET ORAL
Qty: 90 TABLET | Refills: 3 | Status: SHIPPED | OUTPATIENT
Start: 2023-09-06 | End: 2023-10-03

## 2023-09-30 DIAGNOSIS — I50.33 ACUTE ON CHRONIC DIASTOLIC CONGESTIVE HEART FAILURE (H): ICD-10-CM

## 2023-10-03 DIAGNOSIS — E11.9 TYPE 2 DIABETES MELLITUS WITHOUT COMPLICATIONS (H): ICD-10-CM

## 2023-10-03 RX ORDER — FUROSEMIDE 20 MG
TABLET ORAL
Qty: 90 TABLET | Refills: 3 | Status: SHIPPED | OUTPATIENT
Start: 2023-10-03 | End: 2024-01-01

## 2023-10-03 RX ORDER — GLIPIZIDE 10 MG/1
TABLET ORAL
Qty: 180 TABLET | Refills: 1 | Status: SHIPPED | OUTPATIENT
Start: 2023-10-03 | End: 2024-01-01

## 2023-11-11 DIAGNOSIS — E83.42 HYPOMAGNESEMIA: ICD-10-CM

## 2023-11-14 ENCOUNTER — OFFICE VISIT (OUTPATIENT)
Dept: CARDIOLOGY | Facility: CLINIC | Age: 83
End: 2023-11-14
Attending: INTERNAL MEDICINE
Payer: MEDICARE

## 2023-11-14 VITALS
SYSTOLIC BLOOD PRESSURE: 160 MMHG | OXYGEN SATURATION: 96 % | WEIGHT: 279 LBS | RESPIRATION RATE: 18 BRPM | HEART RATE: 41 BPM | DIASTOLIC BLOOD PRESSURE: 60 MMHG | BODY MASS INDEX: 42.42 KG/M2

## 2023-11-14 DIAGNOSIS — I25.810 CORONARY ARTERY DISEASE INVOLVING CORONARY BYPASS GRAFT OF NATIVE HEART WITHOUT ANGINA PECTORIS: ICD-10-CM

## 2023-11-14 DIAGNOSIS — E11.9 TYPE 2 DIABETES MELLITUS WITHOUT COMPLICATION, WITHOUT LONG-TERM CURRENT USE OF INSULIN (H): Chronic | ICD-10-CM

## 2023-11-14 DIAGNOSIS — I50.32 CHRONIC HEART FAILURE WITH PRESERVED EJECTION FRACTION (H): ICD-10-CM

## 2023-11-14 DIAGNOSIS — I49.3 PVC (PREMATURE VENTRICULAR CONTRACTION): Primary | ICD-10-CM

## 2023-11-14 DIAGNOSIS — I10 ESSENTIAL HYPERTENSION: ICD-10-CM

## 2023-11-14 DIAGNOSIS — E11.69 TYPE 2 DIABETES MELLITUS WITH OTHER SPECIFIED COMPLICATION, WITHOUT LONG-TERM CURRENT USE OF INSULIN (H): ICD-10-CM

## 2023-11-14 DIAGNOSIS — Z95.2 S/P TAVR (TRANSCATHETER AORTIC VALVE REPLACEMENT): ICD-10-CM

## 2023-11-14 DIAGNOSIS — I35.0 NONRHEUMATIC AORTIC VALVE STENOSIS: ICD-10-CM

## 2023-11-14 DIAGNOSIS — E78.5 HYPERLIPIDEMIA LDL GOAL <70: ICD-10-CM

## 2023-11-14 LAB
ANION GAP SERPL CALCULATED.3IONS-SCNC: 12 MMOL/L (ref 7–15)
BUN SERPL-MCNC: 32.6 MG/DL (ref 8–23)
CALCIUM SERPL-MCNC: 8.9 MG/DL (ref 8.8–10.2)
CHLORIDE SERPL-SCNC: 100 MMOL/L (ref 98–107)
CHOLEST SERPL-MCNC: 154 MG/DL
CREAT SERPL-MCNC: 1.19 MG/DL (ref 0.67–1.17)
DEPRECATED HCO3 PLAS-SCNC: 28 MMOL/L (ref 22–29)
EGFRCR SERPLBLD CKD-EPI 2021: 61 ML/MIN/1.73M2
GLUCOSE SERPL-MCNC: 142 MG/DL (ref 70–99)
HDLC SERPL-MCNC: 45 MG/DL
LDLC SERPL CALC-MCNC: 90 MG/DL
NONHDLC SERPL-MCNC: 109 MG/DL
POTASSIUM SERPL-SCNC: 4.1 MMOL/L (ref 3.4–5.3)
SODIUM SERPL-SCNC: 140 MMOL/L (ref 135–145)
TRIGL SERPL-MCNC: 93 MG/DL

## 2023-11-14 PROCEDURE — 80061 LIPID PANEL: CPT | Performed by: GENERAL ACUTE CARE HOSPITAL

## 2023-11-14 PROCEDURE — 80048 BASIC METABOLIC PNL TOTAL CA: CPT | Performed by: GENERAL ACUTE CARE HOSPITAL

## 2023-11-14 PROCEDURE — 99215 OFFICE O/P EST HI 40 MIN: CPT | Performed by: GENERAL ACUTE CARE HOSPITAL

## 2023-11-14 PROCEDURE — 36415 COLL VENOUS BLD VENIPUNCTURE: CPT | Performed by: GENERAL ACUTE CARE HOSPITAL

## 2023-11-14 RX ORDER — PNV NO.95/FERROUS FUM/FOLIC AC 28MG-0.8MG
250 TABLET ORAL DAILY
Qty: 90 TABLET | Refills: 0 | Status: SHIPPED | OUTPATIENT
Start: 2023-11-14

## 2023-11-14 RX ORDER — CARVEDILOL 6.25 MG/1
6.25 TABLET ORAL 2 TIMES DAILY WITH MEALS
Qty: 180 TABLET | Refills: 3 | Status: SHIPPED | OUTPATIENT
Start: 2023-11-14 | End: 2023-01-01

## 2023-11-14 NOTE — PATIENT INSTRUCTIONS
"We will start a new heart medicine called \"carvedilol\".  Based on your blood work today, we may cut your furosemide dose.  See my colleague Tamara Chowdhury in 3 months and see me back in 1 year.  "

## 2023-11-14 NOTE — PROGRESS NOTES
HEART CARE ENCOUNTER NOTE          Assessment/Recommendations   Assessment:    Severe aortic stenosis status post transcatheter aortic valve replacement with a 29 mm Washburn Maribel 3 bioprosthetic aortic valve 4/4/2023. Likely normal prosthetic aortic valve function noted on transthoracic echocardiogram 5/11/2023.  Chronic congestive heart failure with improved left ventricular ejection fraction most recently 50-55% which may be due to a combination of coronary artery disease and severe aortic stenosis. NYHA class I, possibly hypovolemic now.  Frequent premature ventricular contractions (12% burden) noted on ambulatory cardiac monitoring 4/5/2023 to 4/18/2023. He seems asymptomatic although these could potentially worsen his cardiomyopathy. This is also artifactually making his vital signs show bradycardia.  Coronary artery disease with chronic total occlusion of the mid left anterior descending artery and right-to-left collaterals noted on coronary angiography 2/9/2023. He denies angina.  Essential hypertension. Elevated.  Hyperlipidemia. Last  mg/dL.  Non insulin-dependent diabetes mellitus type 2. Last hemoglobin A1c 7.3%.  Body mass index is 42.42 kg/m .    Plan:  Start carvedilol 6.25 mg twice daily.  Based on his response to this, we may consider increasing the dose and/or assessing his response with a 24-hour Holter monitor.  Check basic metabolic panel today and possibly reduce his current dose of furosemide 40 mg in the morning and 20 mg in the afternoon if there is any evidence of volume contraction.  Check lipids today and consider increasing atorvastatin to 80 mg daily if his LDL is still above 70 mg/dL.  Aspirin 81 mg daily.  Losartan 100 mg daily   Empagliflozin 25 mg daily.  Spironolactone 25 mg daily could be added in the future if his renal function and serum potassium level allow.  Follow-up with advanced practitioner Tamara Chowdhury in 3 months, with me in 1 year.       A total time of 40  minutes was spent on the date of this encounter.    History of Present Illness   Mr. Jayesh Ortiz is a 83 year old male with a significant past history of severe aortic stenosis s/p TAVR with a 29 mm Washburn Maribel 3 bioprosthetic aortic valve 4/4/2023, CAD being managed medically, HFpEF, HTN, HLD and NIDDM2 presenting to establish care in general cardiology.    Since his TAVR, he has had a dramatic improvement in breathing and functional capacity. However, he was hospitalized 8/7/2023 - 8/10/2023 with 2 weeks of leg swelling, exertional dyspnea, and a 15-30 pound weight gain. It does not appear he was on a diuretic at that time. He was hypoxic with an elevated NT-proBNP of 2039. He had a rapid improvement with IV diuresis. He was discharged home on oral furosemide 40 mg in the morning and 20 mg in the evening.     He feels great since. He continues to lose weight. He does feel thirsty a lot but denies chest pain/pressure/tightness, shortness of breath at rest or with exertion, light headedness/dizziness, pre-syncope, syncope, lower extremity swelling, palpitations, paroxysmal nocturnal dyspnea (PND), or orthopnea.    He does not check his BP at home. 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 He does not snore to his knowledge and feels he sleeps great.     Cardiac Problems and Cardiac Diagnostics     Most Recent Cardiac testing:  ECG dated 5/15/2023 (personaly reviewed and interpreted): SR, PVCs, LBBB QRS duration 158 ms    Mobile cardiac outpatient telemetry monitor 4/5/2023 (report reviewed):  Mobile cardiac telemetry monitoring from 4/5/2023 to 4/18/2023 (monitored duration 11d 17h 14m).  Predominant underlying rhythm was sinus rhythm, 49 to 181bpm, average 65bpm.  1 episode of nonsustained ventricular tachycardia 5 beats, 181bpm.  No sustained tachyarrhythmias.  No atrial fibrillation.  Intermittent first degree AV block.  IVCD present.  There were no pauses noted.  Occasional supraventricular ectopic beats  (3%).  Frequent premature ventricular contractions (12%).  No symptoms recorded.    ECHO 5/11/2023 (report reviewed):   Technically very difficult study despite use of Definity contrast. Patient was  supine on his back during the examination.  Normal left ventricular size. Mild left ventricular hypertrophy. Left  ventricular systolic function grossly appears to be lower limits of normal to  mildly reduced. Left ventricular ejection fraction estimated at 50 to 55%.  Regional wall motion analysis is limited. No obvious regionality. Underlying  rhythm is irregular.  The right ventricle is not well visualized. Right ventricular size and  systolic function grossly appears normal.  Moderate left atrial enlargement suggested.  Valve structures are suboptimally visualized.  Prosthetic valve in aortic position. 29 mm Washburn HELENA 3 tissue valve by  report. Peak velocity 2.7 m/s. Mean gradient of 14 mmHg. Aortic acceleration  time 0.10 seconds. Cannot accurately assess for paravalvular insufficiency on  this technically difficult study.  Consider alternative imaging modality given that today's echocardiogram and  previous echocardiogram are technically very challenging.  Compared to the echocardiogram April 5, 2023.The peak velocity and mean  gradient across the prosthetic aortic valve are mildly higher on the current  study.    Cardiac cath 2/9/2023 (report reviewed):  LM:no obstruction  LAD:50% proximal, 100% occluded mid-vessel c/w . Fills distally via R-L collaterals  Lcx:mildly irregular  RCA:dominant, no obstruction     Access:  R Radial artery     Medications  Allergies   Current Outpatient Medications   Medication Sig Dispense Refill    aspirin (ASA) 81 MG EC tablet Take 1 tablet (81 mg) by mouth daily      atorvastatin (LIPITOR) 40 MG tablet TAKE 1 TABLET BY MOUTH EVERY DAY 90 tablet 3    empagliflozin (JARDIANCE) 25 MG TABS tablet Take 1 tablet (25 mg) by mouth daily 90 tablet 1    furosemide (LASIX) 20 MG  tablet TAKE 2 TABLETS (40MG) IN THE MORNING AND 1 TABLET (20MG) AT 4PM 90 tablet 3    glipiZIDE (GLUCOTROL) 10 MG tablet TAKE 1 TABLET BY MOUTH TWICE A  tablet 1    levothyroxine (SYNTHROID/LEVOTHROID) 175 MCG tablet TAKE 1 TABLET BY MOUTH EVERY DAY 90 tablet 3    losartan (COZAAR) 100 MG tablet Take 1 tablet (100 mg) by mouth daily 90 tablet 3    Magnesium Oxide 250 MG tablet TAKE 1 TABLET BY MOUTH ONCE DAILY 90 tablet 0    metFORMIN (GLUCOPHAGE) 500 MG tablet Take 2 tablets (1,000 mg) by mouth 2 times daily (with meals) 360 tablet 3    tamsulosin (FLOMAX) 0.4 MG capsule TAKE 1 CAPSULE BY MOUTH EVERY DAY (Patient taking differently: Take 0.4 mg by mouth daily) 90 capsule 3    EPINEPHrine (PRIMATENE MIST) 0.125 MG/ACT AERO Inhale 1-2 sprays into the lungs every 4 hours as needed (for difficulty breathing)        Allergies   Allergen Reactions    Nka [No Known Allergies]         Physical Examination Review of Systems   BP (!) 160/60 (BP Location: Left arm, Patient Position: Sitting, Cuff Size: Adult Large)   Pulse (!) 41   Resp 18   Wt 126.6 kg (279 lb)   SpO2 96%   BMI 42.42 kg/m    Body mass index is 42.42 kg/m .  Wt Readings from Last 3 Encounters:   11/14/23 126.6 kg (279 lb)   08/14/23 138.6 kg (305 lb 9.6 oz)   08/07/23 147.3 kg (324 lb 11.8 oz)   Recheck 145/60    General Appearance:   Pleasant  male, appears  stated age. no acute distress, obese body habitus   ENT/Mouth: membranes moist, no apparent gingival bleeding.      EYES:  no scleral icterus, normal conjunctivae   Neck: no carotid bruits. No anterior cervical lymphadenopaty   Respiratory:   lungs are clear to auscultation, no rales or wheezing, equal chest wall expansion    Cardiovascular:   Regular rhythm with ectopic beats, normal rate. Normal first and second heart sounds with no murmurs, rubs, or gallops; the carotid, radial and posterior tibial pulses are intact, Jugular venous pressure not elevated, no edema bilaterally    Abdomen/GI:   no organomegaly, masses, bruits, or tenderness; bowel sounds are present   Extremities: no cyanosis or clubbing   Skin: no xanthelasma, warm.    Heme/lymph/ Immunology No apparent bleeding noted.   Neurologic: Alert and oriented. normal gait, no tremors     Psychiatric: Pleasant, calm, appropriate affect.    A complete 10 system review of systems was performed and is negative except as mentioned in the HPI/subjective.         Past History   Past Medical History:   Past Medical History:   Diagnosis Date    Diabetes (H)     Diabetes mellitus, type 2 (H)     Hypertension     Thyroid disease        Past Surgical History:   Past Surgical History:   Procedure Laterality Date    CLOSED REDUCTION, PERCUTANEOUS PINNING LOWER EXTREMITY, COMBINED Right 3/9/2017    Procedure: COMBINED CLOSED REDUCTION, PERCUTANEOUS PINNING LOWER EXTREMITY;  Surgeon: Ankit Coy DPM;  Location: [facility] OR    CV CORONARY ANGIOGRAM N/A 2/9/2023    Procedure: Coronary Angiogram;  Surgeon: Nabeel Davies MD;  Location: [facility]    CV TRANSCATHETER AORTIC VALVE REPLACEMENT-FEMORAL APPROACH N/A 4/4/2023    Procedure: Transcatheter Aortic Valve Replacement-Femoral Approach;  Surgeon: Nabeel Davies MD;  Location: [facility] CV    OR TRANSCATHETER AORTIC VALVE REPLACEMENT, FEMORAL PERCUTANEOUS APPROACH (STANDBY) N/A 4/4/2023    Procedure: OR TRANSCATHETER AORTIC VALVE REPLACEMENT, FEMORAL PERCUTANEOUS APPROACH (STANDBY);  Surgeon: Jeramie De Jesus MD;  Location: [facility] CV    THYROIDECTOMY         Family History:   Family History   Problem Relation Age of Onset    Heart Failure Mother     Heart Failure Father         Social History:   Social History     Socioeconomic History    Marital status: Single     Spouse name: Not on file    Number of children: Not on file    Years of education: Not on file    Highest education level: Not on file   Occupational History    Not on file   Tobacco Use    Smoking  status: Former    Smokeless tobacco: Never   Vaping Use    Vaping Use: Never used   Substance and Sexual Activity    Alcohol use: Not Currently    Drug use: Never    Sexual activity: Not on file   Other Topics Concern    Not on file   Social History Narrative    Not on file     Social Determinants of Health     Financial Resource Strain: Not on file   Food Insecurity: Not on file   Transportation Needs: Not on file   Physical Activity: Not on file   Stress: Not on file   Social Connections: Not on file   Interpersonal Safety: Not on file   Housing Stability: Not on file              Lab Results    Chemistry/lipid CBC Cardiac Enzymes/BNP/TSH/INR   Lab Results   Component Value Date    CHOL 171 04/28/2023    HDL 53 04/28/2023     (H) 04/28/2023    TRIG 75 04/28/2023    CR 1.23 (H) 08/14/2023    BUN 25.4 (H) 08/14/2023    POTASSIUM 5.3 08/14/2023     08/14/2023    CO2 29 08/14/2023      Lab Results   Component Value Date    WBC 5.0 08/10/2023    HGB 12.3 (L) 08/10/2023    HCT 38.4 (L) 08/10/2023    MCV 96 08/10/2023     08/10/2023    A1C 7.3 (H) 08/14/2023     Lab Results   Component Value Date    A1C 7.3 (H) 08/14/2023    Lab Results   Component Value Date     (H) 04/11/2022    NTBNP 765 04/03/2023    TSH 8.40 (H) 08/08/2023    INR 1.08 04/03/2023          Micheal Reagan MD [facility]  Non-Invasive Cardiologist  [facility]  Pager [phone]

## 2023-11-14 NOTE — LETTER
11/14/2023    Geoffrey Live MD  [address]  [facility] [address]    RE: Jayesh Ortiz       Dear Colleague,     I had the pleasure of seeing Jayesh VU Ortiz in the [facility].  HEART CARE ENCOUNTER NOTE          Assessment/Recommendations   Assessment:    Severe aortic stenosis status post transcatheter aortic valve replacement with a 29 mm Washburn Maribel 3 bioprosthetic aortic valve 4/4/2023. Likely normal prosthetic aortic valve function noted on transthoracic echocardiogram 5/11/2023.  Chronic congestive heart failure with improved left ventricular ejection fraction most recently 50-55% which may be due to a combination of coronary artery disease and severe aortic stenosis. NYHA class I, possibly hypovolemic now.  Frequent premature ventricular contractions (12% burden) noted on ambulatory cardiac monitoring 4/5/2023 to 4/18/2023. He seems asymptomatic although these could potentially worsen his cardiomyopathy. This is also artifactually making his vital signs show bradycardia.  Coronary artery disease with chronic total occlusion of the mid left anterior descending artery and right-to-left collaterals noted on coronary angiography 2/9/2023. He denies angina.  Essential hypertension. Elevated.  Hyperlipidemia. Last  mg/dL.  Non insulin-dependent diabetes mellitus type 2. Last hemoglobin A1c 7.3%.  Body mass index is 42.42 kg/m .    Plan:  Start carvedilol 6.25 mg twice daily.  Based on his response to this, we may consider increasing the dose and/or assessing his response with a 24-hour Holter monitor.  Check basic metabolic panel today and possibly reduce his current dose of furosemide 40 mg in the morning and 20 mg in the afternoon if there is any evidence of volume contraction.  Check lipids today and consider increasing atorvastatin to 80 mg daily if his LDL is still above 70 mg/dL.  Aspirin 81 mg daily.  Losartan 100 mg daily   Empagliflozin 25 mg daily.  Spironolactone  25 mg daily could be added in the future if his renal function and serum potassium level allow.  Follow-up with advanced practitioner Tamara Chowdhury in 3 months, with me in 1 year.       A total time of 40 minutes was spent on the date of this encounter.    History of Present Illness   Mr. Jayesh Ortiz is a 83 year old male with a significant past history of severe aortic stenosis s/p TAVR with a 29 mm Washburn Maribel 3 bioprosthetic aortic valve 4/4/2023, CAD being managed medically, HFpEF, HTN, HLD and NIDDM2 presenting to establish care in general cardiology.    Since his TAVR, he has had a dramatic improvement in breathing and functional capacity. However, he was hospitalized 8/7/2023 - 8/10/2023 with 2 weeks of leg swelling, exertional dyspnea, and a 15-30 pound weight gain. It does not appear he was on a diuretic at that time. He was hypoxic with an elevated NT-proBNP of 2039. He had a rapid improvement with IV diuresis. He was discharged home on oral furosemide 40 mg in the morning and 20 mg in the evening.     He feels great since. He continues to lose weight. He does feel thirsty a lot but denies chest pain/pressure/tightness, shortness of breath at rest or with exertion, light headedness/dizziness, pre-syncope, syncope, lower extremity swelling, palpitations, paroxysmal nocturnal dyspnea (PND), or orthopnea.    He does not check his BP at home. 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 He does not snore to his knowledge and feels he sleeps great.     Cardiac Problems and Cardiac Diagnostics     Most Recent Cardiac testing:  ECG dated 5/15/2023 (personaly reviewed and interpreted): SR, PVCs, LBBB QRS duration 158 ms    Mobile cardiac outpatient telemetry monitor 4/5/2023 (report reviewed):  Mobile cardiac telemetry monitoring from 4/5/2023 to 4/18/2023 (monitored duration 11d 17h 14m).  Predominant underlying rhythm was sinus rhythm, 49 to 181bpm, average 65bpm.  1 episode of nonsustained ventricular  tachycardia 5 beats, 181bpm.  No sustained tachyarrhythmias.  No atrial fibrillation.  Intermittent first degree AV block.  IVCD present.  There were no pauses noted.  Occasional supraventricular ectopic beats (3%).  Frequent premature ventricular contractions (12%).  No symptoms recorded.    ECHO 5/11/2023 (report reviewed):   Technically very difficult study despite use of Definity contrast. Patient was  supine on his back during the examination.  Normal left ventricular size. Mild left ventricular hypertrophy. Left  ventricular systolic function grossly appears to be lower limits of normal to  mildly reduced. Left ventricular ejection fraction estimated at 50 to 55%.  Regional wall motion analysis is limited. No obvious regionality. Underlying  rhythm is irregular.  The right ventricle is not well visualized. Right ventricular size and  systolic function grossly appears normal.  Moderate left atrial enlargement suggested.  Valve structures are suboptimally visualized.  Prosthetic valve in aortic position. 29 mm Washburn HELENA 3 tissue valve by  report. Peak velocity 2.7 m/s. Mean gradient of 14 mmHg. Aortic acceleration  time 0.10 seconds. Cannot accurately assess for paravalvular insufficiency on  this technically difficult study.  Consider alternative imaging modality given that today's echocardiogram and  previous echocardiogram are technically very challenging.  Compared to the echocardiogram April 5, 2023.The peak velocity and mean  gradient across the prosthetic aortic valve are mildly higher on the current  study.    Cardiac cath 2/9/2023 (report reviewed):  LM:no obstruction  LAD:50% proximal, 100% occluded mid-vessel c/w . Fills distally via R-L collaterals  Lcx:mildly irregular  RCA:dominant, no obstruction     Access:  R Radial artery     Medications  Allergies   Current Outpatient Medications   Medication Sig Dispense Refill    aspirin (ASA) 81 MG EC tablet Take 1 tablet (81 mg) by mouth daily       atorvastatin (LIPITOR) 40 MG tablet TAKE 1 TABLET BY MOUTH EVERY DAY 90 tablet 3    empagliflozin (JARDIANCE) 25 MG TABS tablet Take 1 tablet (25 mg) by mouth daily 90 tablet 1    furosemide (LASIX) 20 MG tablet TAKE 2 TABLETS (40MG) IN THE MORNING AND 1 TABLET (20MG) AT 4PM 90 tablet 3    glipiZIDE (GLUCOTROL) 10 MG tablet TAKE 1 TABLET BY MOUTH TWICE A  tablet 1    levothyroxine (SYNTHROID/LEVOTHROID) 175 MCG tablet TAKE 1 TABLET BY MOUTH EVERY DAY 90 tablet 3    losartan (COZAAR) 100 MG tablet Take 1 tablet (100 mg) by mouth daily 90 tablet 3    Magnesium Oxide 250 MG tablet TAKE 1 TABLET BY MOUTH ONCE DAILY 90 tablet 0    metFORMIN (GLUCOPHAGE) 500 MG tablet Take 2 tablets (1,000 mg) by mouth 2 times daily (with meals) 360 tablet 3    tamsulosin (FLOMAX) 0.4 MG capsule TAKE 1 CAPSULE BY MOUTH EVERY DAY (Patient taking differently: Take 0.4 mg by mouth daily) 90 capsule 3    EPINEPHrine (PRIMATENE MIST) 0.125 MG/ACT AERO Inhale 1-2 sprays into the lungs every 4 hours as needed (for difficulty breathing)        Allergies   Allergen Reactions    Nka [No Known Allergies]         Physical Examination Review of Systems   BP (!) 160/60 (BP Location: Left arm, Patient Position: Sitting, Cuff Size: Adult Large)   Pulse (!) 41   Resp 18   Wt 126.6 kg (279 lb)   SpO2 96%   BMI 42.42 kg/m    Body mass index is 42.42 kg/m .  Wt Readings from Last 3 Encounters:   11/14/23 126.6 kg (279 lb)   08/14/23 138.6 kg (305 lb 9.6 oz)   08/07/23 147.3 kg (324 lb 11.8 oz)   Recheck 145/60    General Appearance:   Pleasant  male, appears  stated age. no acute distress, obese body habitus   ENT/Mouth: membranes moist, no apparent gingival bleeding.      EYES:  no scleral icterus, normal conjunctivae   Neck: no carotid bruits. No anterior cervical lymphadenopaty   Respiratory:   lungs are clear to auscultation, no rales or wheezing, equal chest wall expansion    Cardiovascular:   Regular rhythm with ectopic beats, normal rate.  Normal first and second heart sounds with no murmurs, rubs, or gallops; the carotid, radial and posterior tibial pulses are intact, Jugular venous pressure not elevated, no edema bilaterally    Abdomen/GI:  no organomegaly, masses, bruits, or tenderness; bowel sounds are present   Extremities: no cyanosis or clubbing   Skin: no xanthelasma, warm.    Heme/lymph/ Immunology No apparent bleeding noted.   Neurologic: Alert and oriented. normal gait, no tremors     Psychiatric: Pleasant, calm, appropriate affect.    A complete 10 system review of systems was performed and is negative except as mentioned in the HPI/subjective.         Past History   Past Medical History:   Past Medical History:   Diagnosis Date    Diabetes (H)     Diabetes mellitus, type 2 (H)     Hypertension     Thyroid disease        Past Surgical History:   Past Surgical History:   Procedure Laterality Date    CLOSED REDUCTION, PERCUTANEOUS PINNING LOWER EXTREMITY, COMBINED Right 3/9/2017    Procedure: COMBINED CLOSED REDUCTION, PERCUTANEOUS PINNING LOWER EXTREMITY;  Surgeon: Ankit Coy DPM;  Location: [facility] OR    CV CORONARY ANGIOGRAM N/A 2/9/2023    Procedure: Coronary Angiogram;  Surgeon: Nabeel Davies MD;  Location: [facility]    CV TRANSCATHETER AORTIC VALVE REPLACEMENT-FEMORAL APPROACH N/A 4/4/2023    Procedure: Transcatheter Aortic Valve Replacement-Femoral Approach;  Surgeon: Nabeel Davies MD;  Location: [facility] CV    OR TRANSCATHETER AORTIC VALVE REPLACEMENT, FEMORAL PERCUTANEOUS APPROACH (STANDBY) N/A 4/4/2023    Procedure: OR TRANSCATHETER AORTIC VALVE REPLACEMENT, FEMORAL PERCUTANEOUS APPROACH (STANDBY);  Surgeon: Jeramie De Jesus MD;  Location: [facility] CV    THYROIDECTOMY         Family History:   Family History   Problem Relation Age of Onset    Heart Failure Mother     Heart Failure Father         Social History:   Social History     Socioeconomic History    Marital status: Single      Spouse name: Not on file    Number of children: Not on file    Years of education: Not on file    Highest education level: Not on file   Occupational History    Not on file   Tobacco Use    Smoking status: Former    Smokeless tobacco: Never   Vaping Use    Vaping Use: Never used   Substance and Sexual Activity    Alcohol use: Not Currently    Drug use: Never    Sexual activity: Not on file   Other Topics Concern    Not on file   Social History Narrative    Not on file     Social Determinants of Health     Financial Resource Strain: Not on file   Food Insecurity: Not on file   Transportation Needs: Not on file   Physical Activity: Not on file   Stress: Not on file   Social Connections: Not on file   Interpersonal Safety: Not on file   Housing Stability: Not on file              Lab Results    Chemistry/lipid CBC Cardiac Enzymes/BNP/TSH/INR   Lab Results   Component Value Date    CHOL 171 04/28/2023    HDL 53 04/28/2023     (H) 04/28/2023    TRIG 75 04/28/2023    CR 1.23 (H) 08/14/2023    BUN 25.4 (H) 08/14/2023    POTASSIUM 5.3 08/14/2023     08/14/2023    CO2 29 08/14/2023      Lab Results   Component Value Date    WBC 5.0 08/10/2023    HGB 12.3 (L) 08/10/2023    HCT 38.4 (L) 08/10/2023    MCV 96 08/10/2023     08/10/2023    A1C 7.3 (H) 08/14/2023     Lab Results   Component Value Date    A1C 7.3 (H) 08/14/2023    Lab Results   Component Value Date     (H) 04/11/2022    NTBNP 765 04/03/2023    TSH 8.40 (H) 08/08/2023    INR 1.08 04/03/2023          Micheal Reagan MD [facility]  Non-Invasive Cardiologist  [facility]  Pager [phone]      Thank you for allowing me to participate in the care of your patient.      Sincerely,     Micheal Reagan MD     [facility]  cc:   Anh Burgos PA-C  [address] [facility] [address]  [place] [address]

## 2023-11-15 DIAGNOSIS — E78.5 HYPERLIPIDEMIA, UNSPECIFIED: ICD-10-CM

## 2023-11-15 DIAGNOSIS — I50.33 ACUTE ON CHRONIC DIASTOLIC CONGESTIVE HEART FAILURE (H): ICD-10-CM

## 2023-11-15 RX ORDER — FUROSEMIDE 20 MG
20 TABLET ORAL 2 TIMES DAILY
Qty: 180 TABLET | Refills: 3 | Status: SHIPPED | OUTPATIENT
Start: 2023-11-15 | End: 2024-01-01

## 2023-11-15 RX ORDER — ATORVASTATIN CALCIUM 80 MG/1
80 TABLET, FILM COATED ORAL DAILY
Qty: 90 TABLET | Refills: 3 | Status: SHIPPED | OUTPATIENT
Start: 2023-11-15 | End: 2024-01-01

## 2023-11-15 RX ORDER — FUROSEMIDE 20 MG
20 TABLET ORAL DAILY
Start: 2023-11-15 | End: 2023-11-15

## 2023-11-22 NOTE — TELEPHONE ENCOUNTER
Return call to patient - informed him of Dr. Aguilar's recommendations in the absence of Dr. Reagan - patient verbalized understanding after questions addressed and agreed to cut tabs in half - instructed patient to call back if sx recur with reduced dose - patient agreed with plan.  mg

## 2023-11-22 NOTE — TELEPHONE ENCOUNTER
Health Call Center    Phone Message    May a detailed message be left on voicemail: yes     Reason for Call: Medication Question or concern regarding medication   Prescription Clarification  Name of Medication: carvedilol (COREG) 6.25 MG tablet   Prescribing Provider: White   Pharmacy: [facility]/PHARMACY #[id number] - [address]    What on the order needs clarification? Patient called requesting to speak with a member of his care team. Patient states he believes this medication is causing blurry vision. Please call patient back to further discuss.    Action Taken: Message routed to:  Other: Cardiology    Travel Screening: Not Applicable    Thank you!  [facility]

## 2023-11-22 NOTE — TELEPHONE ENCOUNTER
Please review sx update after starting Coreg, in the absence of Dr. Reagan - follow-up with TRISTEN 2-21-24 - any new orders at this time?  mg

## 2023-11-22 NOTE — TELEPHONE ENCOUNTER
Return call to patient who stated he rec'd new Rx for Coreg at 11-14-23 visit but did not start it until this past weekend - patient developed blurred vision within 1hr of taking first dose which lasted for 2-3hrs, vision cleared prior to pm dose but returned again within hour of taking 2nd dose.    Patient explained that this cycle of blurred vision after taking Coreg continued through last night so he did not resume medication today and his vision is clear again.    Patient also reported light sensitivity and feeling like a film was over his eyes - also experienced slight dizziness.    Informed patient that update would be forwarded to Dr. Reagan's colleague in his absence - understanding verbalized.  mg

## 2024-01-01 ENCOUNTER — HOSPITAL ENCOUNTER (OUTPATIENT)
Dept: CARDIOLOGY | Facility: HOSPITAL | Age: 84
Discharge: HOME | End: 2024-07-15
Attending: INTERNAL MEDICINE | Admitting: INTERNAL MEDICINE
Payer: MEDICARE

## 2024-01-01 ENCOUNTER — TELEPHONE (OUTPATIENT)
Dept: FAMILY MEDICINE | Facility: CLINIC | Age: 84
End: 2024-01-01
Payer: MEDICARE

## 2024-01-01 ENCOUNTER — TELEPHONE (OUTPATIENT)
Dept: CARDIOLOGY | Facility: CLINIC | Age: 84
End: 2024-01-01
Payer: MEDICARE

## 2024-01-01 ENCOUNTER — MEDICAL CORRESPONDENCE (OUTPATIENT)
Dept: HEALTH INFORMATION MANAGEMENT | Facility: CLINIC | Age: 84
End: 2024-01-01
Payer: MEDICARE

## 2024-01-01 ENCOUNTER — ALLIED HEALTH/NURSE VISIT (OUTPATIENT)
Dept: CARDIOLOGY | Facility: CLINIC | Age: 84
End: 2024-01-01
Payer: MEDICARE

## 2024-01-01 ENCOUNTER — HOSPITAL ENCOUNTER (OUTPATIENT)
Dept: CARDIOLOGY | Facility: HOSPITAL | Age: 84
Discharge: HOME | End: 2024-03-27
Attending: INTERNAL MEDICINE
Payer: MEDICARE

## 2024-01-01 ENCOUNTER — OFFICE VISIT (OUTPATIENT)
Dept: CARDIOLOGY | Facility: CLINIC | Age: 84
End: 2024-01-01
Payer: MEDICARE

## 2024-01-01 ENCOUNTER — OFFICE VISIT (OUTPATIENT)
Dept: FAMILY MEDICINE | Facility: CLINIC | Age: 84
End: 2024-01-01
Payer: MEDICARE

## 2024-01-01 ENCOUNTER — OFFICE VISIT (OUTPATIENT)
Dept: CARDIOLOGY | Facility: CLINIC | Age: 84
End: 2024-01-01
Attending: GENERAL ACUTE CARE HOSPITAL
Payer: MEDICARE

## 2024-01-01 ENCOUNTER — NURSE TRIAGE (OUTPATIENT)
Dept: NURSING | Facility: CLINIC | Age: 84
End: 2024-01-01
Payer: MEDICARE

## 2024-01-01 ENCOUNTER — TELEPHONE (OUTPATIENT)
Dept: FAMILY MEDICINE | Facility: CLINIC | Age: 84
End: 2024-01-01

## 2024-01-01 ENCOUNTER — TELEPHONE (OUTPATIENT)
Dept: CARDIOLOGY | Facility: CLINIC | Age: 84
End: 2024-01-01

## 2024-01-01 ENCOUNTER — OFFICE VISIT (OUTPATIENT)
Dept: CARDIOLOGY | Facility: CLINIC | Age: 84
End: 2024-01-01
Attending: INTERNAL MEDICINE
Payer: MEDICARE

## 2024-01-01 ENCOUNTER — HOSPITAL ENCOUNTER (OUTPATIENT)
Dept: CARDIOLOGY | Facility: HOSPITAL | Age: 84
Discharge: HOME | End: 2024-02-23
Attending: STUDENT IN AN ORGANIZED HEALTH CARE EDUCATION/TRAINING PROGRAM | Admitting: STUDENT IN AN ORGANIZED HEALTH CARE EDUCATION/TRAINING PROGRAM
Payer: MEDICARE

## 2024-01-01 ENCOUNTER — HOSPITAL ENCOUNTER (OUTPATIENT)
Dept: CARDIOLOGY | Facility: HOSPITAL | Age: 84
Discharge: HOME | End: 2024-03-04
Attending: STUDENT IN AN ORGANIZED HEALTH CARE EDUCATION/TRAINING PROGRAM | Admitting: STUDENT IN AN ORGANIZED HEALTH CARE EDUCATION/TRAINING PROGRAM
Payer: MEDICARE

## 2024-01-01 VITALS
SYSTOLIC BLOOD PRESSURE: 139 MMHG | OXYGEN SATURATION: 98 % | WEIGHT: 263.2 LBS | BODY MASS INDEX: 39.89 KG/M2 | RESPIRATION RATE: 16 BRPM | TEMPERATURE: 97.5 F | HEIGHT: 68 IN | HEART RATE: 51 BPM | DIASTOLIC BLOOD PRESSURE: 62 MMHG

## 2024-01-01 VITALS
HEART RATE: 60 BPM | SYSTOLIC BLOOD PRESSURE: 148 MMHG | DIASTOLIC BLOOD PRESSURE: 60 MMHG | RESPIRATION RATE: 14 BRPM | BODY MASS INDEX: 39.99 KG/M2 | WEIGHT: 263 LBS

## 2024-01-01 VITALS
SYSTOLIC BLOOD PRESSURE: 148 MMHG | RESPIRATION RATE: 14 BRPM | DIASTOLIC BLOOD PRESSURE: 62 MMHG | BODY MASS INDEX: 37.86 KG/M2 | HEART RATE: 56 BPM | WEIGHT: 249 LBS

## 2024-01-01 VITALS
WEIGHT: 270 LBS | RESPIRATION RATE: 16 BRPM | HEIGHT: 68 IN | SYSTOLIC BLOOD PRESSURE: 142 MMHG | HEART RATE: 34 BPM | DIASTOLIC BLOOD PRESSURE: 58 MMHG | BODY MASS INDEX: 40.92 KG/M2

## 2024-01-01 VITALS
BODY MASS INDEX: 40.11 KG/M2 | RESPIRATION RATE: 20 BRPM | SYSTOLIC BLOOD PRESSURE: 154 MMHG | WEIGHT: 263.8 LBS | DIASTOLIC BLOOD PRESSURE: 67 MMHG | HEART RATE: 66 BPM

## 2024-01-01 DIAGNOSIS — E11.65 TYPE 2 DIABETES MELLITUS WITH HYPERGLYCEMIA (H): ICD-10-CM

## 2024-01-01 DIAGNOSIS — I49.3 PVC (PREMATURE VENTRICULAR CONTRACTION): ICD-10-CM

## 2024-01-01 DIAGNOSIS — I44.7 LBBB (LEFT BUNDLE BRANCH BLOCK): Primary | ICD-10-CM

## 2024-01-01 DIAGNOSIS — E78.00 PURE HYPERCHOLESTEROLEMIA: Primary | Chronic | ICD-10-CM

## 2024-01-01 DIAGNOSIS — E78.5 HYPERLIPIDEMIA LDL GOAL <70: ICD-10-CM

## 2024-01-01 DIAGNOSIS — I49.3 PVC'S (PREMATURE VENTRICULAR CONTRACTIONS): Primary | ICD-10-CM

## 2024-01-01 DIAGNOSIS — E11.9 TYPE 2 DIABETES MELLITUS WITHOUT COMPLICATION, WITHOUT LONG-TERM CURRENT USE OF INSULIN (H): ICD-10-CM

## 2024-01-01 DIAGNOSIS — I49.3 PVC'S (PREMATURE VENTRICULAR CONTRACTIONS): ICD-10-CM

## 2024-01-01 DIAGNOSIS — E11.69 TYPE 2 DIABETES MELLITUS WITH OTHER SPECIFIED COMPLICATION, WITHOUT LONG-TERM CURRENT USE OF INSULIN (H): ICD-10-CM

## 2024-01-01 DIAGNOSIS — I10 ESSENTIAL HYPERTENSION: ICD-10-CM

## 2024-01-01 DIAGNOSIS — Z23 COVID-19 VACCINE ADMINISTERED: ICD-10-CM

## 2024-01-01 DIAGNOSIS — I50.32 CHRONIC HEART FAILURE WITH PRESERVED EJECTION FRACTION (H): Primary | ICD-10-CM

## 2024-01-01 DIAGNOSIS — I25.810 CORONARY ARTERY DISEASE INVOLVING CORONARY BYPASS GRAFT OF NATIVE HEART WITHOUT ANGINA PECTORIS: ICD-10-CM

## 2024-01-01 DIAGNOSIS — E03.9 HYPOTHYROIDISM, UNSPECIFIED TYPE: Chronic | ICD-10-CM

## 2024-01-01 DIAGNOSIS — C61 MALIGNANT NEOPLASM OF PROSTATE (H): ICD-10-CM

## 2024-01-01 DIAGNOSIS — E03.9 HYPOTHYROIDISM, UNSPECIFIED TYPE: ICD-10-CM

## 2024-01-01 DIAGNOSIS — I42.9 CARDIOMYOPATHY, UNSPECIFIED TYPE (H): ICD-10-CM

## 2024-01-01 DIAGNOSIS — I50.32 CHRONIC HEART FAILURE WITH PRESERVED EJECTION FRACTION (H): ICD-10-CM

## 2024-01-01 DIAGNOSIS — E11.9 TYPE 2 DIABETES MELLITUS WITHOUT COMPLICATION, WITHOUT LONG-TERM CURRENT USE OF INSULIN (H): Chronic | ICD-10-CM

## 2024-01-01 DIAGNOSIS — E11.9 TYPE 2 DIABETES MELLITUS WITHOUT COMPLICATION, WITHOUT LONG-TERM CURRENT USE OF INSULIN (H): Primary | ICD-10-CM

## 2024-01-01 DIAGNOSIS — I44.7 LBBB (LEFT BUNDLE BRANCH BLOCK): ICD-10-CM

## 2024-01-01 DIAGNOSIS — I50.33 ACUTE ON CHRONIC DIASTOLIC CONGESTIVE HEART FAILURE (H): ICD-10-CM

## 2024-01-01 DIAGNOSIS — I50.33 ACUTE ON CHRONIC DIASTOLIC CONGESTIVE HEART FAILURE (H): Primary | ICD-10-CM

## 2024-01-01 DIAGNOSIS — Z95.2 S/P TAVR (TRANSCATHETER AORTIC VALVE REPLACEMENT): ICD-10-CM

## 2024-01-01 DIAGNOSIS — E11.9 TYPE 2 DIABETES MELLITUS WITHOUT COMPLICATIONS (H): ICD-10-CM

## 2024-01-01 DIAGNOSIS — I35.0 NONRHEUMATIC AORTIC VALVE STENOSIS: ICD-10-CM

## 2024-01-01 DIAGNOSIS — I49.3 PVC (PREMATURE VENTRICULAR CONTRACTION): Primary | ICD-10-CM

## 2024-01-01 LAB
ALBUMIN SERPL BCG-MCNC: 4 G/DL (ref 3.5–5.2)
ALP SERPL-CCNC: 57 U/L (ref 40–150)
ALT SERPL W P-5'-P-CCNC: 14 U/L (ref 0–70)
ANION GAP SERPL CALCULATED.3IONS-SCNC: 10 MMOL/L (ref 7–15)
ANION GAP SERPL CALCULATED.3IONS-SCNC: 13 MMOL/L (ref 7–15)
AST SERPL W P-5'-P-CCNC: 16 U/L (ref 0–45)
ATRIAL RATE - MUSE: 66 BPM
BILIRUB SERPL-MCNC: 0.6 MG/DL
BUN SERPL-MCNC: 34.6 MG/DL (ref 8–23)
BUN SERPL-MCNC: 38.3 MG/DL (ref 8–23)
CALCIUM SERPL-MCNC: 9.2 MG/DL (ref 8.8–10.2)
CALCIUM SERPL-MCNC: 9.4 MG/DL (ref 8.8–10.2)
CHLORIDE SERPL-SCNC: 103 MMOL/L (ref 98–107)
CHLORIDE SERPL-SCNC: 103 MMOL/L (ref 98–107)
CHOLEST SERPL-MCNC: 165 MG/DL
CREAT SERPL-MCNC: 1.34 MG/DL (ref 0.67–1.17)
CREAT SERPL-MCNC: 1.43 MG/DL (ref 0.67–1.17)
CREAT UR-MCNC: 18.3 MG/DL
DEPRECATED HCO3 PLAS-SCNC: 26 MMOL/L (ref 22–29)
DEPRECATED HCO3 PLAS-SCNC: 31 MMOL/L (ref 22–29)
DIASTOLIC BLOOD PRESSURE - MUSE: NORMAL MMHG
EGFRCR SERPLBLD CKD-EPI 2021: 49 ML/MIN/1.73M2
EGFRCR SERPLBLD CKD-EPI 2021: 53 ML/MIN/1.73M2
FASTING STATUS PATIENT QL REPORTED: NO
GLUCOSE SERPL-MCNC: 116 MG/DL (ref 70–99)
GLUCOSE SERPL-MCNC: 135 MG/DL (ref 70–99)
HBA1C MFR BLD: 6.2 % (ref 0–5.6)
HDLC SERPL-MCNC: 46 MG/DL
HOLD SPECIMEN: NORMAL
INTERPRETATION ECG - MUSE: NORMAL
LDLC SERPL CALC-MCNC: 104 MG/DL
LVEF ECHO: NORMAL
LVEF ECHO: NORMAL
MICROALBUMIN UR-MCNC: <12 MG/L
MICROALBUMIN/CREAT UR: NORMAL MG/G{CREAT}
NONHDLC SERPL-MCNC: 119 MG/DL
P AXIS - MUSE: 59 DEGREES
POTASSIUM SERPL-SCNC: 4.2 MMOL/L (ref 3.4–5.3)
POTASSIUM SERPL-SCNC: 4.6 MMOL/L (ref 3.4–5.3)
PR INTERVAL - MUSE: 194 MS
PROT SERPL-MCNC: 6.5 G/DL (ref 6.4–8.3)
QRS DURATION - MUSE: 162 MS
QT - MUSE: 436 MS
QTC - MUSE: 457 MS
R AXIS - MUSE: -49 DEGREES
SODIUM SERPL-SCNC: 142 MMOL/L (ref 135–145)
SODIUM SERPL-SCNC: 144 MMOL/L (ref 135–145)
SYSTOLIC BLOOD PRESSURE - MUSE: NORMAL MMHG
T AXIS - MUSE: 104 DEGREES
TRIGL SERPL-MCNC: 75 MG/DL
TSH SERPL DL<=0.005 MIU/L-ACNC: 3.02 UIU/ML (ref 0.3–4.2)
VENTRICULAR RATE- MUSE: 66 BPM

## 2024-01-01 PROCEDURE — G0008 ADMIN INFLUENZA VIRUS VAC: HCPCS | Performed by: FAMILY MEDICINE

## 2024-01-01 PROCEDURE — 99207 PR NO CHARGE NURSE ONLY: CPT

## 2024-01-01 PROCEDURE — C8929 TTE W OR WO FOL WCON,DOPPLER: HCPCS

## 2024-01-01 PROCEDURE — 93325 DOPPLER ECHO COLOR FLOW MAPG: CPT | Mod: 26 | Performed by: INTERNAL MEDICINE

## 2024-01-01 PROCEDURE — 99214 OFFICE O/P EST MOD 30 MIN: CPT | Performed by: STUDENT IN AN ORGANIZED HEALTH CARE EDUCATION/TRAINING PROGRAM

## 2024-01-01 PROCEDURE — G2211 COMPLEX E/M VISIT ADD ON: HCPCS | Performed by: INTERNAL MEDICINE

## 2024-01-01 PROCEDURE — G2211 COMPLEX E/M VISIT ADD ON: HCPCS | Performed by: STUDENT IN AN ORGANIZED HEALTH CARE EDUCATION/TRAINING PROGRAM

## 2024-01-01 PROCEDURE — 93225 XTRNL ECG REC<48 HRS REC: CPT

## 2024-01-01 PROCEDURE — 93227 XTRNL ECG REC<48 HR R&I: CPT | Performed by: INTERNAL MEDICINE

## 2024-01-01 PROCEDURE — 84443 ASSAY THYROID STIM HORMONE: CPT | Performed by: FAMILY MEDICINE

## 2024-01-01 PROCEDURE — 82043 UR ALBUMIN QUANTITATIVE: CPT | Performed by: FAMILY MEDICINE

## 2024-01-01 PROCEDURE — 83036 HEMOGLOBIN GLYCOSYLATED A1C: CPT | Performed by: FAMILY MEDICINE

## 2024-01-01 PROCEDURE — 93228 REMOTE 30 DAY ECG REV/REPORT: CPT | Performed by: INTERNAL MEDICINE

## 2024-01-01 PROCEDURE — 80048 BASIC METABOLIC PNL TOTAL CA: CPT | Performed by: FAMILY MEDICINE

## 2024-01-01 PROCEDURE — 80061 LIPID PANEL: CPT | Performed by: STUDENT IN AN ORGANIZED HEALTH CARE EDUCATION/TRAINING PROGRAM

## 2024-01-01 PROCEDURE — 99214 OFFICE O/P EST MOD 30 MIN: CPT | Performed by: FAMILY MEDICINE

## 2024-01-01 PROCEDURE — 90480 ADMN SARSCOV2 VAC 1/ONLY CMP: CPT | Performed by: FAMILY MEDICINE

## 2024-01-01 PROCEDURE — 36415 COLL VENOUS BLD VENIPUNCTURE: CPT | Performed by: FAMILY MEDICINE

## 2024-01-01 PROCEDURE — 93325 DOPPLER ECHO COLOR FLOW MAPG: CPT

## 2024-01-01 PROCEDURE — 93000 ELECTROCARDIOGRAM COMPLETE: CPT | Performed by: INTERNAL MEDICINE

## 2024-01-01 PROCEDURE — 93308 TTE F-UP OR LMTD: CPT | Mod: 26 | Performed by: INTERNAL MEDICINE

## 2024-01-01 PROCEDURE — 82570 ASSAY OF URINE CREATININE: CPT | Performed by: FAMILY MEDICINE

## 2024-01-01 PROCEDURE — 80053 COMPREHEN METABOLIC PANEL: CPT | Performed by: STUDENT IN AN ORGANIZED HEALTH CARE EDUCATION/TRAINING PROGRAM

## 2024-01-01 PROCEDURE — 999N000096 CARDIAC MOBILE TELEMETRY MONITOR

## 2024-01-01 PROCEDURE — 36415 COLL VENOUS BLD VENIPUNCTURE: CPT | Performed by: STUDENT IN AN ORGANIZED HEALTH CARE EDUCATION/TRAINING PROGRAM

## 2024-01-01 PROCEDURE — 90662 IIV NO PRSV INCREASED AG IM: CPT | Performed by: FAMILY MEDICINE

## 2024-01-01 PROCEDURE — 255N000002 HC RX 255 OP 636: Performed by: STUDENT IN AN ORGANIZED HEALTH CARE EDUCATION/TRAINING PROGRAM

## 2024-01-01 PROCEDURE — 93321 DOPPLER ECHO F-UP/LMTD STD: CPT | Mod: 26 | Performed by: INTERNAL MEDICINE

## 2024-01-01 PROCEDURE — 91320 SARSCV2 VAC 30MCG TRS-SUC IM: CPT | Performed by: FAMILY MEDICINE

## 2024-01-01 PROCEDURE — 93306 TTE W/DOPPLER COMPLETE: CPT | Mod: 26 | Performed by: INTERNAL MEDICINE

## 2024-01-01 PROCEDURE — 255N000002 HC RX 255 OP 636: Performed by: INTERNAL MEDICINE

## 2024-01-01 PROCEDURE — 99204 OFFICE O/P NEW MOD 45 MIN: CPT | Performed by: INTERNAL MEDICINE

## 2024-01-01 PROCEDURE — 99214 OFFICE O/P EST MOD 30 MIN: CPT | Performed by: INTERNAL MEDICINE

## 2024-01-01 RX ORDER — SOTALOL HYDROCHLORIDE 120 MG/1
60 TABLET ORAL 2 TIMES DAILY
Qty: 30 TABLET | Refills: 11 | Status: SHIPPED | OUTPATIENT
Start: 2024-01-01 | End: 2024-01-01

## 2024-01-01 RX ORDER — CARVEDILOL 6.25 MG/1
3.12 TABLET ORAL 2 TIMES DAILY WITH MEALS
Qty: 90 TABLET | Refills: 1 | Status: SHIPPED | OUTPATIENT
Start: 2024-01-01

## 2024-01-01 RX ORDER — LEVOTHYROXINE SODIUM 175 UG/1
TABLET ORAL
Qty: 90 TABLET | Refills: 2 | Status: SHIPPED | OUTPATIENT
Start: 2024-01-01

## 2024-01-01 RX ORDER — GLIPIZIDE 10 MG/1
10 TABLET ORAL 2 TIMES DAILY
Qty: 180 TABLET | Refills: 1 | Status: SHIPPED | OUTPATIENT
Start: 2024-01-01 | End: 2024-01-01

## 2024-01-01 RX ORDER — RESPIRATORY SYNCYTIAL VIRUS VACCINE 120MCG/0.5
0.5 KIT INTRAMUSCULAR ONCE
Qty: 1 EACH | Refills: 0 | Status: CANCELLED | OUTPATIENT
Start: 2024-01-01 | End: 2024-01-01

## 2024-01-01 RX ORDER — LANCETS
EACH MISCELLANEOUS
Qty: 200 EACH | Refills: 3 | Status: SHIPPED | OUTPATIENT
Start: 2024-01-01

## 2024-01-01 RX ORDER — EZETIMIBE 10 MG/1
10 TABLET ORAL DAILY
Qty: 90 TABLET | Refills: 3 | Status: SHIPPED | OUTPATIENT
Start: 2024-01-01

## 2024-01-01 RX ORDER — LEVOTHYROXINE SODIUM 175 UG/1
TABLET ORAL
Qty: 90 TABLET | Refills: 3 | OUTPATIENT
Start: 2024-01-01

## 2024-01-01 RX ORDER — CARVEDILOL 6.25 MG/1
3.12 TABLET ORAL 2 TIMES DAILY WITH MEALS
Qty: 180 TABLET | Refills: 0 | Status: SHIPPED | OUTPATIENT
Start: 2024-01-01 | End: 2024-01-01

## 2024-01-01 RX ORDER — BUMETANIDE 1 MG/1
TABLET ORAL
Qty: 135 TABLET | Refills: 3 | Status: SHIPPED | OUTPATIENT
Start: 2024-01-01

## 2024-01-01 RX ORDER — LOSARTAN POTASSIUM 100 MG/1
100 TABLET ORAL DAILY
Qty: 90 TABLET | Refills: 2 | Status: SHIPPED | OUTPATIENT
Start: 2024-01-01

## 2024-01-01 RX ORDER — TAMSULOSIN HYDROCHLORIDE 0.4 MG/1
CAPSULE ORAL
Qty: 90 CAPSULE | Refills: 3 | OUTPATIENT
Start: 2024-01-01

## 2024-01-01 RX ORDER — LOSARTAN POTASSIUM 100 MG/1
100 TABLET ORAL DAILY
Qty: 90 TABLET | Refills: 2 | Status: SHIPPED | OUTPATIENT
Start: 2024-01-01 | End: 2024-01-01

## 2024-01-01 RX ORDER — FUROSEMIDE 20 MG
TABLET ORAL
Qty: 270 TABLET | Refills: 1 | Status: SHIPPED | OUTPATIENT
Start: 2024-01-01 | End: 2024-01-01

## 2024-01-01 RX ORDER — SOTALOL HYDROCHLORIDE 120 MG/1
60 TABLET ORAL 2 TIMES DAILY
Qty: 90 TABLET | Refills: 3 | Status: SHIPPED | OUTPATIENT
Start: 2024-01-01

## 2024-01-01 RX ORDER — TAMSULOSIN HYDROCHLORIDE 0.4 MG/1
CAPSULE ORAL
Qty: 90 CAPSULE | Refills: 3 | Status: SHIPPED | OUTPATIENT
Start: 2024-01-01

## 2024-01-01 RX ADMIN — PERFLUTREN 2 ML: 6.52 INJECTION, SUSPENSION INTRAVENOUS at 08:53

## 2024-01-01 RX ADMIN — PERFLUTREN 4 ML: 6.52 INJECTION, SUSPENSION INTRAVENOUS at 08:30

## 2024-01-06 NOTE — TELEPHONE ENCOUNTER
Patient and his friend are calling stating that the patient is completely out of Losartan.  This prescription was last filled on 11/10/23.  Patient states that he is taking it as directed, which is once a day, but that he is completely out.  Patient's friend, Dorita, is stating that she will see an occasional pill on the patient's floor but isn't sure which medication it is.  Patient and friend, Dorita, are calling for refill.  Paged Dr. Live who is on call and he gave a telephone order for Losartan 100 mg tablet, quantity of 90, with 2 refills.  Prescription was sent to [facility] pharmacy in [facility].  Returned call to patient's friend, Dorita, per patient's request to let her know that a refill was authorized.  Dorita had no further questions.     Reason for Disposition   [1] Prescription refill request for ESSENTIAL medicine (i.e., likelihood of harm to patient if not taken) AND [2] triager unable to refill per department policy    Protocols used: Medication Refill and Renewal Call-A-

## 2024-01-08 NOTE — TELEPHONE ENCOUNTER
"  General Call      Reason for Call:  patient called in to let us know that he stopped taking his furosemide on this past Friday because he has been dizzy and having very blurred vision for a long time now. Stated when he stopped this the symptoms disappeared. He stated he lost some weight a while back and thought this was \"due to his blood sugars but it looks like it 's not.\" Patient stated his dizziness was making him fall back when he stood up.  Please advise, pharmacy attached.     Okay to leave a detailed message?: Yes at Cell number on file:    Telephone Information:   Mobile [phone]      " no

## 2024-01-09 NOTE — TELEPHONE ENCOUNTER
Patient called back because no one has called him back. He stated he needs someone to let him know what to do today. Please urgently advise.

## 2024-01-09 NOTE — TELEPHONE ENCOUNTER
Please call.  I recommend that he be evaluated.  Furosemide is a diuretic and is important given his history of heart failure.  The one concern is that if he stops this he may retain water and develop symptoms of concern related to his heart failure.  He was more recently evaluated by cardiology and can reach out to Dr. Reagan regarding this.  Looks like they had discussed other potential diuretics.  I think that is a good first step.  If not feeling well I think he can also be evaluated for an in person visit and I can make sure to work him in.

## 2024-01-10 NOTE — TELEPHONE ENCOUNTER
Patient called and LM asking to speak to Dr. Reagan's team about a medication problem.     Please call.    Thank you,  Neyda

## 2024-01-10 NOTE — TELEPHONE ENCOUNTER
Pt returned call, message relayed, pt stated understanding, said he would reach out to Dr Reagan and would call back if needed.

## 2024-01-11 NOTE — TELEPHONE ENCOUNTER
Patient called and left voicemail to speak to Dr. Reagan's team regarding medication issues. Patient indicated he has been unable to reach anyone at the number provided to him when he called previously.     Msg forwarded to Dr. Reagan's RN team.     Ashley Guevara RN on 1/11/2024 at 8:38 AM

## 2024-01-11 NOTE — TELEPHONE ENCOUNTER
Patient calling, he has questions regarding medications from Dr. Reagan.  Pls return call.  Thanks!

## 2024-01-16 NOTE — TELEPHONE ENCOUNTER
Dr. Reagan,  Please see patient update below.  Patient is looking for an alternative to furosemide or some other recommendations.  Follow up is scheduled with Tamara 2/21/24.  Thank you,  Anamika      Per tele call 11/22/23: patient called with symptoms of blurred vision, light sensitivity, feeling like a film was over his eyes and slight dizziness that he thought were related to Coreg. Coreg dose was reduced to 3.125 mg twice a day for these symptoms.   Patient has ongoing symptoms and so he stopped taking furosemide for 1 week and symptoms were relieved/better.  Patient is currently taking furosemide 40 mg in the morning and 20 mg in the afternoon.  He does not monitor home BP's or HR's.  He is willing to trial an alternative or continue until seen in follow up 2/21/24.  Assured patient an update will be forwarded to Dr. Reagan and patient will be contacted once reviewed.

## 2024-01-16 NOTE — TELEPHONE ENCOUNTER
PC to patient with recommendation to switch furosemide to Bumex. Reviewed with patient dosing and to read the prescription bottle carefully once he picks up from the pharmacy.   Patient verbalized understanding and will continue monitoring until seen in clinic 2/21/24.    ------------------------------------------  Micheal Reagan MD [facility] now (10:13 AM)     BW  Can try bumetanide 1 mg in the morning and 0.5 mg in the afternoon.

## 2024-02-20 NOTE — PROGRESS NOTES
HEART CARE ENCOUNTER NOTE      [facility]  [phone]      Assessment/Recommendations   Assessment:   Coronary artery disease:  of mid LAD with right to left collaterals seen on coronary angiogram 2/9/2023.  Pending symptoms could be considered for PCI.  Patient denies any anginal symptoms.   Remains on aspirin 81 mg daily.   Severe aortic stenosis: Status post TAVR with bioprosthetic aortic valve 4/4/2023.  Echocardiogram 5/11/2023 showed likely normal prosthetic aortic valve function.  Chronic congestive heart failure: Improved EF most recently 50 to 55%.  On losartan 100 mg daily, Bumex 1.5 mg daily, carvedilol 3.125 mg twice daily, Jardiance.  BMP 11/14/2023 was stable.  Essential hypertension: Elevated today. Discussed starting spironolactone for better blood pressure control and to further improve EF which patient declined at this time as he notes already being on a lot of medications.   Hyperlipidemia: On atorvastatin 80 mg daily.  Last lipids 11/14/2023 showed LDL of 90 and total cholesterol of 154.  This was prior to the increase from 40 mg daily.  Patient tolerating higher dose without any myalgias.  PVCs: Cardiac mobile telemetry April 2023 showed 12% burden of PVCs.  Carvedilol 6.25 mg twice daily was started November 2023 but patient noted blurry vision within 1 hour of taking the first dose and this was decreased to 3.125 mg twice daily.  Patient still notes occasional lightheadedness but notes that it is much improved with the lower dose and seems to continue to improve every day.  Will reassess burden with 24-hour Holter monitor.    Plan:   24-hour Holter monitor to reassess PVC burden.  If still high burden would recommend repeating an echocardiogram to make sure her EF is not decreasing  Discussed starting spironolactone today for better blood pressure control and to further improve EF which patient declined.  If we end up needing echocardiogram and EF is reduced patient  would consider starting it.  CMP and cholesterol check today as atorvastatin was increased and furosemide was switched to Bumex at last visit  Continue current medications    Follow up in 6 months with Dr. Reagan, sooner pending results.    The longitudinal plan of care for Jayesh Ortiz was addressed during this visit. Due to the added complexity in care, I will continue to support Jayesh in the subsequent management of this condition(s) and with the ongoing continuity of care of this condition(s).      History of Present Illness/Subjective    HPI: Jayesh Ortiz is a 83 year old male with PMHx of coronary artery disease, severe aortic stenosis status post-TAVR 4/4/2023, chronic congestive heart failure with improved EF, essential hypertension, hyperlipidemia, history of frequent PVCs presents for follow-up.  Patient last saw Dr. Reagan 11/14/2023 where atorvastatin was increased and furosemide was switched to Bumex.  Carvedilol was started at 6.25 mg twice daily but then decreased to 3.125 mg twice daily given side effects.    Patient notes that he is feeling great.  Endorses occasional lightheadedness but that it improved a lot after decreasing the dose of carvedilol.  Still feels like it is getting better over time.  Denies any exertional chest pain.  Notes he is able to go up and down the stairs now without any shortness of breath and walked a block and a half into clinic today and felt good.  Patient denies any heart palpitations, orthopnea, lower extremity edema.        Echocardiogram 5/11/2023 results:  Technically very difficult study despite use of Definity contrast. Patient was  supine on his back during the examination.  Normal left ventricular size. Mild left ventricular hypertrophy. Left  ventricular systolic function grossly appears to be lower limits of normal to  mildly reduced. Left ventricular ejection fraction estimated at 50 to 55%.  Regional wall motion analysis is limited. No obvious  "regionality. Underlying  rhythm is irregular.  The right ventricle is not well visualized. Right ventricular size and  systolic function grossly appears normal.  Moderate left atrial enlargement suggested.  Valve structures are suboptimally visualized.  Prosthetic valve in aortic position. 29 mm [place] HELENA 3 tissue valve by  report. Peak velocity 2.7 m/s. Mean gradient of 14 mmHg. Aortic acceleration  time 0.10 seconds. Cannot accurately assess for paravalvular insufficiency on  this technically difficult study.  Consider alternative imaging modality given that today's echocardiogram and  previous echocardiogram are technically very challenging.  Compared to the echocardiogram April 5, 2023.The peak velocity and mean  gradient across the prosthetic aortic valve are mildly higher on the current  study.    Cardiac mobile telemetry for 4/5/23:  Mobile cardiac telemetry monitoring from 4/5/2023 to 4/18/2023 (monitored duration 11d 17h 14m).  Predominant underlying rhythm was sinus rhythm, 49 to 181bpm, average 65bpm.  1 episode of nonsustained ventricular tachycardia 5 beats, 181bpm.  No sustained tachyarrhythmias.  No atrial fibrillation.  Intermittent first degree AV block.  IVCD present.  There were no pauses noted.  Occasional supraventricular ectopic beats (3%).  Frequent premature ventricular contractions (12%).  No symptoms recorded.    Coronary angiogram 2/9/2023:  LM:no obstruction  LAD:50% proximal, 100% occluded mid-vessel c/w . Fills distally via R-L collaterals  Lcx:mildly irregular  RCA:dominant, no obstruction     Physical Examination  Review of Systems   Vitals: BP (!) 142/58 (BP Location: Right arm, Patient Position: Sitting, Cuff Size: Adult Large)   Pulse (!) 34   Resp 16   Ht 1.727 m (5' 8\")   Wt 122.5 kg (270 lb)   BMI 41.05 kg/m    BMI= Body mass index is 41.05 kg/m .  Wt Readings from Last 3 Encounters:   02/21/24 122.5 kg (270 lb)   11/14/23 126.6 kg (279 lb)   08/14/23 138.6 kg (305 " lb 9.6 oz)           ENT/Mouth: membranes moist, no oral lesions or bleeding gums.      EYES:  no scleral icterus, normal conjunctivae       Chest/Lungs:   lungs are clear to auscultation, no rales or wheezing,  equal chest wall expansion    Cardiovascular:   Regular. Normal first and second heart sounds with no murmurs, rubs, or gallops; the carotid, radial and posterior tibial pulses are intact, Jugular venous pressure normal, no edema bilaterally        Extremities: no cyanosis or clubbing   Skin: no xanthelasma, warm.    Neurologic: no tremors     Psychiatric: alert and oriented x3, calm        Please refer above for cardiac ROS details.        Medical History  Surgical History Family History Social History   Past Medical History:   Diagnosis Date    Diabetes (H)     Diabetes mellitus, type 2 (H)     Hypertension     S/p TAVR (transcatheter aortic valve replacement), bioprosthetic     Thyroid disease      Past Surgical History:   Procedure Laterality Date    CLOSED REDUCTION, PERCUTANEOUS PINNING LOWER EXTREMITY, COMBINED Right 3/9/2017    Procedure: COMBINED CLOSED REDUCTION, PERCUTANEOUS PINNING LOWER EXTREMITY;  Surgeon: Ankit Coy DPM;  Location: [facility]    CV CORONARY ANGIOGRAM N/A 2/9/2023    Procedure: Coronary Angiogram;  Surgeon: Nabeel Davies MD;  Location: [facility]    CV TRANSCATHETER AORTIC VALVE REPLACEMENT-FEMORAL APPROACH N/A 4/4/2023    Procedure: Transcatheter Aortic Valve Replacement-Femoral Approach;  Surgeon: Nabeel Davies MD;  Location: [facility]    OR TRANSCATHETER AORTIC VALVE REPLACEMENT, FEMORAL PERCUTANEOUS APPROACH (STANDBY) N/A 4/4/2023    Procedure: OR TRANSCATHETER AORTIC VALVE REPLACEMENT, FEMORAL PERCUTANEOUS APPROACH (STANDBY);  Surgeon: Jeramie De Jesus MD;  Location: [facility] CV    THYROIDECTOMY       Family History   Problem Relation Age of Onset    Heart Failure Mother     Heart Failure Father         Social History      Socioeconomic History    Marital status: Single     Spouse name: Not on file    Number of children: Not on file    Years of education: Not on file    Highest education level: Not on file   Occupational History    Not on file   Tobacco Use    Smoking status: Former    Smokeless tobacco: Never   Vaping Use    Vaping Use: Never used   Substance and Sexual Activity    Alcohol use: Not Currently    Drug use: Never    Sexual activity: Not on file   Other Topics Concern    Not on file   Social History Narrative    Not on file     Social Determinants of Health     Financial Resource Strain: Not on file   Food Insecurity: Not on file   Transportation Needs: Not on file   Physical Activity: Not on file   Stress: Not on file   Social Connections: Not on file   Interpersonal Safety: Not on file   Housing Stability: Not on file           Medications  Allergies   Current Outpatient Medications   Medication Sig Dispense Refill    aspirin (ASA) 81 MG EC tablet Take 1 tablet (81 mg) by mouth daily      atorvastatin (LIPITOR) 80 MG tablet Take 1 tablet (80 mg) by mouth daily 90 tablet 3    bumetanide (BUMEX) 1 MG tablet Bumetanide 1 mg in the morning and 0.5 mg in the afternoon. 135 tablet 3    carvedilol (COREG) 6.25 MG tablet Take 0.5 tablets (3.125 mg) by mouth 2 times daily (with meals)      empagliflozin (JARDIANCE) 25 MG TABS tablet Take 1 tablet (25 mg) by mouth daily 90 tablet 1    EPINEPHrine (PRIMATENE MIST) 0.125 MG/ACT AERO Inhale 1-2 sprays into the lungs every 4 hours as needed (for difficulty breathing)      glipiZIDE (GLUCOTROL) 10 MG tablet TAKE 1 TABLET BY MOUTH TWICE A  tablet 1    levothyroxine (SYNTHROID/LEVOTHROID) 175 MCG tablet TAKE 1 TABLET BY MOUTH EVERY DAY 90 tablet 3    losartan (COZAAR) 100 MG tablet Take 1 tablet (100 mg) by mouth daily 90 tablet 2    Magnesium Oxide 250 MG tablet TAKE 1 TABLET BY MOUTH ONCE DAILY 90 tablet 0    metFORMIN (GLUCOPHAGE) 500 MG tablet Take 2 tablets (1,000 mg)  "by mouth 2 times daily (with meals) 360 tablet 3    tamsulosin (FLOMAX) 0.4 MG capsule TAKE 1 CAPSULE BY MOUTH EVERY DAY (Patient taking differently: Take 0.4 mg by mouth daily) 90 capsule 3       Allergies   Allergen Reactions    Nka [No Known Allergies]           Lab Results    Chemistry/lipid CBC Cardiac Enzymes/BNP/TSH/INR   Recent Labs   Lab Test 11/14/23  0839   CHOL 154   HDL 45   LDL 90   TRIG 93     Recent Labs   Lab Test 11/14/23  0839 04/28/23  1132 04/11/22  1008   LDL 90 103* 106     Recent Labs   Lab Test 11/14/23  0839      POTASSIUM 4.1   CHLORIDE 100   CO2 28   *   BUN 32.6*   CR 1.19*   GFRESTIMATED 61   MATT 8.9     Recent Labs   Lab Test 11/14/23  0839 08/14/23  1604 08/10/23  0521   CR 1.19* 1.23* 1.20*     Recent Labs   Lab Test 08/14/23  1604 04/04/23  1445 10/05/22  0810   A1C 7.3* 6.8* 7.1*          Recent Labs   Lab Test 08/10/23  0521   WBC 5.0   HGB 12.3*   HCT 38.4*   MCV 96        Recent Labs   Lab Test 08/10/23  0521 08/09/23  0547 08/08/23  0518   HGB 12.3* 12.4* 12.0*    No results for input(s): \"TROPONINI\" in the last 74001 hours.  Recent Labs   Lab Test 08/07/23  1314 04/03/23  0839 04/11/22  1008   BNP  --   --  386*   NTBNPI 2,039*  --   --    NTBNP  --  765  --      Recent Labs   Lab Test 08/08/23  0518   TSH 8.40*     Recent Labs   Lab Test 04/03/23  0839   INR 1.08          Tamara Chowdhury PA-C                                       "

## 2024-02-21 NOTE — PATIENT INSTRUCTIONS
Jayesh Ortiz,    It was a pleasure to see you today at the [facility].     My recommendations after this visit include:    - Continue current medications.   - Schedule holter monitor  - Lab work today  - Follow up with Dr. Reagan in 6 months, sooner if needed    - Please call [phone], if you have any questions or concerns      Tamara Chowdhury PA-C

## 2024-02-21 NOTE — LETTER
2/21/2024    Geoffrey Live MD  [address]  [facility] [address]    RE: Jayesh Ortiz       Dear Colleague,     I had the pleasure of seeing Jayesh VU Ortiz in the [facility].    HEART CARE ENCOUNTER NOTE      [facility]  [phone]      Assessment/Recommendations   Assessment:   Coronary artery disease:  of mid LAD with right to left collaterals seen on coronary angiogram 2/9/2023.  Pending symptoms could be considered for PCI.  Patient denies any anginal symptoms.   Remains on aspirin 81 mg daily.   Severe aortic stenosis: Status post TAVR with bioprosthetic aortic valve 4/4/2023.  Echocardiogram 5/11/2023 showed likely normal prosthetic aortic valve function.  Chronic congestive heart failure: Improved EF most recently 50 to 55%.  On losartan 100 mg daily, Bumex 1.5 mg daily, carvedilol 3.125 mg twice daily, Jardiance.  BMP 11/14/2023 was stable.  Essential hypertension: Elevated today. Discussed starting spironolactone for better blood pressure control and to further improve EF which patient declined at this time as he notes already being on a lot of medications.   Hyperlipidemia: On atorvastatin 80 mg daily.  Last lipids 11/14/2023 showed LDL of 90 and total cholesterol of 154.  This was prior to the increase from 40 mg daily.  Patient tolerating higher dose without any myalgias.  PVCs: Cardiac mobile telemetry April 2023 showed 12% burden of PVCs.  Carvedilol 6.25 mg twice daily was started November 2023 but patient noted blurry vision within 1 hour of taking the first dose and this was decreased to 3.125 mg twice daily.  Patient still notes occasional lightheadedness but notes that it is much improved with the lower dose and seems to continue to improve every day.  Will reassess burden with 24-hour Holter monitor.    Plan:   24-hour Holter monitor to reassess PVC burden.  If still high burden would recommend repeating an echocardiogram to make sure her EF  is not decreasing  Discussed starting spironolactone today for better blood pressure control and to further improve EF which patient declined.  If we end up needing echocardiogram and EF is reduced patient would consider starting it.  CMP and cholesterol check today as atorvastatin was increased on furosemide we will switch to Bumex  Continue current medications    Follow up in 6 months with Dr. Raegan, sooner pending results.    The longitudinal plan of care for Jayesh Ortiz was addressed during this visit. Due to the added complexity in care, I will continue to support Jayesh in the subsequent management of this condition(s) and with the ongoing continuity of care of this condition(s).      History of Present Illness/Subjective    HPI: Jayesh rOtiz is a 83 year old male with PMHx of coronary artery disease, severe aortic stenosis status post-TAVR 4/4/2023, chronic congestive heart failure with improved EF, essential hypertension, hyperlipidemia, history of frequent PVCs presents for follow-up.  Patient last saw Dr. Reagan 11/14/2023 where atorvastatin was increased and furosemide was switched to Bumex.  Carvedilol was started at 6.25 mg twice daily but then decreased to 3.125 mg twice daily given side effects.    Patient notes that he is feeling great.  Endorses occasional lightheadedness but that it improved a lot after decreasing the dose of carvedilol.  Still feels like it is getting better over time.  Denies any exertional chest pain.  Notes he is able to go up and down the stairs now without any shortness of breath and walked a block and a half into clinic today and felt good.  Patient denies any heart palpitations, orthopnea, lower extremity edema.        Echocardiogram 5/11/2023 results:  Technically very difficult study despite use of Definity contrast. Patient was  supine on his back during the examination.  Normal left ventricular size. Mild left ventricular hypertrophy. Left  ventricular systolic  "function grossly appears to be lower limits of normal to  mildly reduced. Left ventricular ejection fraction estimated at 50 to 55%.  Regional wall motion analysis is limited. No obvious regionality. Underlying  rhythm is irregular.  The right ventricle is not well visualized. Right ventricular size and  systolic function grossly appears normal.  Moderate left atrial enlargement suggested.  Valve structures are suboptimally visualized.  Prosthetic valve in aortic position. 29 mm [place] HELENA 3 tissue valve by  report. Peak velocity 2.7 m/s. Mean gradient of 14 mmHg. Aortic acceleration  time 0.10 seconds. Cannot accurately assess for paravalvular insufficiency on  this technically difficult study.  Consider alternative imaging modality given that today's echocardiogram and  previous echocardiogram are technically very challenging.  Compared to the echocardiogram April 5, 2023.The peak velocity and mean  gradient across the prosthetic aortic valve are mildly higher on the current  study.    Cardiac mobile telemetry for 4/5/23:  Mobile cardiac telemetry monitoring from 4/5/2023 to 4/18/2023 (monitored duration 11d 17h 14m).  Predominant underlying rhythm was sinus rhythm, 49 to 181bpm, average 65bpm.  1 episode of nonsustained ventricular tachycardia 5 beats, 181bpm.  No sustained tachyarrhythmias.  No atrial fibrillation.  Intermittent first degree AV block.  IVCD present.  There were no pauses noted.  Occasional supraventricular ectopic beats (3%).  Frequent premature ventricular contractions (12%).  No symptoms recorded.    Coronary angiogram 2/9/2023:  LM:no obstruction  LAD:50% proximal, 100% occluded mid-vessel c/w . Fills distally via R-L collaterals  Lcx:mildly irregular  RCA:dominant, no obstruction     Physical Examination  Review of Systems   Vitals: BP (!) 142/58 (BP Location: Right arm, Patient Position: Sitting, Cuff Size: Adult Large)   Pulse (!) 34   Resp 16   Ht 1.727 m (5' 8\")   Wt 122.5 kg " (270 lb)   BMI 41.05 kg/m    BMI= Body mass index is 41.05 kg/m .  Wt Readings from Last 3 Encounters:   02/21/24 122.5 kg (270 lb)   11/14/23 126.6 kg (279 lb)   08/14/23 138.6 kg (305 lb 9.6 oz)           ENT/Mouth: membranes moist, no oral lesions or bleeding gums.      EYES:  no scleral icterus, normal conjunctivae       Chest/Lungs:   lungs are clear to auscultation, no rales or wheezing,  equal chest wall expansion    Cardiovascular:   Regular. Normal first and second heart sounds with no murmurs, rubs, or gallops; the carotid, radial and posterior tibial pulses are intact, Jugular venous pressure normal, no edema bilaterally        Extremities: no cyanosis or clubbing   Skin: no xanthelasma, warm.    Neurologic: no tremors     Psychiatric: alert and oriented x3, calm        Please refer above for cardiac ROS details.        Medical History  Surgical History Family History Social History   Past Medical History:   Diagnosis Date    Diabetes (H)     Diabetes mellitus, type 2 (H)     Hypertension     S/p TAVR (transcatheter aortic valve replacement), bioprosthetic     Thyroid disease      Past Surgical History:   Procedure Laterality Date    CLOSED REDUCTION, PERCUTANEOUS PINNING LOWER EXTREMITY, COMBINED Right 3/9/2017    Procedure: COMBINED CLOSED REDUCTION, PERCUTANEOUS PINNING LOWER EXTREMITY;  Surgeon: Ankit Coy DPM;  Location: [facility]    CV CORONARY ANGIOGRAM N/A 2/9/2023    Procedure: Coronary Angiogram;  Surgeon: Nabeel Davies MD;  Location: [facility]    CV TRANSCATHETER AORTIC VALVE REPLACEMENT-FEMORAL APPROACH N/A 4/4/2023    Procedure: Transcatheter Aortic Valve Replacement-Femoral Approach;  Surgeon: Nabeel Davies MD;  Location: [facility] CV    OR TRANSCATHETER AORTIC VALVE REPLACEMENT, FEMORAL PERCUTANEOUS APPROACH (STANDBY) N/A 4/4/2023    Procedure: OR TRANSCATHETER AORTIC VALVE REPLACEMENT, FEMORAL PERCUTANEOUS APPROACH (STANDBY);  Surgeon: Liza  Jeramie Holt MD;  Location: [facility] CV    THYROIDECTOMY       Family History   Problem Relation Age of Onset    Heart Failure Mother     Heart Failure Father         Social History     Socioeconomic History    Marital status: Single     Spouse name: Not on file    Number of children: Not on file    Years of education: Not on file    Highest education level: Not on file   Occupational History    Not on file   Tobacco Use    Smoking status: Former    Smokeless tobacco: Never   Vaping Use    Vaping Use: Never used   Substance and Sexual Activity    Alcohol use: Not Currently    Drug use: Never    Sexual activity: Not on file   Other Topics Concern    Not on file   Social History Narrative    Not on file     Social Determinants of Health     Financial Resource Strain: Not on file   Food Insecurity: Not on file   Transportation Needs: Not on file   Physical Activity: Not on file   Stress: Not on file   Social Connections: Not on file   Interpersonal Safety: Not on file   Housing Stability: Not on file           Medications  Allergies   Current Outpatient Medications   Medication Sig Dispense Refill    aspirin (ASA) 81 MG EC tablet Take 1 tablet (81 mg) by mouth daily      atorvastatin (LIPITOR) 80 MG tablet Take 1 tablet (80 mg) by mouth daily 90 tablet 3    bumetanide (BUMEX) 1 MG tablet Bumetanide 1 mg in the morning and 0.5 mg in the afternoon. 135 tablet 3    carvedilol (COREG) 6.25 MG tablet Take 0.5 tablets (3.125 mg) by mouth 2 times daily (with meals)      empagliflozin (JARDIANCE) 25 MG TABS tablet Take 1 tablet (25 mg) by mouth daily 90 tablet 1    EPINEPHrine (PRIMATENE MIST) 0.125 MG/ACT AERO Inhale 1-2 sprays into the lungs every 4 hours as needed (for difficulty breathing)      glipiZIDE (GLUCOTROL) 10 MG tablet TAKE 1 TABLET BY MOUTH TWICE A  tablet 1    levothyroxine (SYNTHROID/LEVOTHROID) 175 MCG tablet TAKE 1 TABLET BY MOUTH EVERY DAY 90 tablet 3    losartan (COZAAR) 100 MG tablet  "Take 1 tablet (100 mg) by mouth daily 90 tablet 2    Magnesium Oxide 250 MG tablet TAKE 1 TABLET BY MOUTH ONCE DAILY 90 tablet 0    metFORMIN (GLUCOPHAGE) 500 MG tablet Take 2 tablets (1,000 mg) by mouth 2 times daily (with meals) 360 tablet 3    tamsulosin (FLOMAX) 0.4 MG capsule TAKE 1 CAPSULE BY MOUTH EVERY DAY (Patient taking differently: Take 0.4 mg by mouth daily) 90 capsule 3       Allergies   Allergen Reactions    Nka [No Known Allergies]           Lab Results    Chemistry/lipid CBC Cardiac Enzymes/BNP/TSH/INR   Recent Labs   Lab Test 11/14/23  0839   CHOL 154   HDL 45   LDL 90   TRIG 93     Recent Labs   Lab Test 11/14/23  0839 04/28/23  1132 04/11/22  1008   LDL 90 103* 106     Recent Labs   Lab Test 11/14/23  0839      POTASSIUM 4.1   CHLORIDE 100   CO2 28   *   BUN 32.6*   CR 1.19*   GFRESTIMATED 61   MATT 8.9     Recent Labs   Lab Test 11/14/23  0839 08/14/23  1604 08/10/23  0521   CR 1.19* 1.23* 1.20*     Recent Labs   Lab Test 08/14/23  1604 04/04/23  1445 10/05/22  0810   A1C 7.3* 6.8* 7.1*          Recent Labs   Lab Test 08/10/23  0521   WBC 5.0   HGB 12.3*   HCT 38.4*   MCV 96        Recent Labs   Lab Test 08/10/23  0521 08/09/23  0547 08/08/23  0518   HGB 12.3* 12.4* 12.0*    No results for input(s): \"TROPONINI\" in the last 30425 hours.  Recent Labs   Lab Test 08/07/23  1314 04/03/23  0839 04/11/22  1008   BNP  --   --  386*   NTBNPI 2,039*  --   --    NTBNP  --  765  --      Recent Labs   Lab Test 08/08/23  0518   TSH 8.40*     Recent Labs   Lab Test 04/03/23  0839   INR 1.08          Tamara Chowdhury PA-C            Thank you for allowing me to participate in the care of your patient.      Sincerely,     Tamara Chowdhury PA-C     [facility]  cc:   Micheal Reagan MD  [address]  [place] [address]      "

## 2024-03-18 NOTE — PROGRESS NOTES
"  Assessment & Plan     Type 2 diabetes mellitus without complication, without long-term current use of insulin (H)    Hemoglobin A1c has increased to 6.2%  He has lost a significant amount of weight   Continue metformin, glipizide, and jardiance  Recommend monitoring for hypoglycemia  Favor weaning glipizide    - Albumin Random Urine Quantitative with Creat Ratio; Future  - HEMOGLOBIN A1C; Future  - Albumin Random Urine Quantitative with Creat Ratio  - HEMOGLOBIN A1C  - Basic metabolic panel    Coronary artery disease involving coronary bypass graft of native heart without angina pectoris  Cardiomyopathy, unspecified type (H)    Recent echocardiogram showed an ejection fraction of 45-50%  Continue plan per cardiology  Sotalol was started given frequent PVCs    S/P TAVR (transcatheter aortic valve replacement)      COVID-19 vaccine administered    - COVID-19 mRNA vaccine 12+y (COMIRNATY (PFIZER)) injection 30 mcg    Hypothyroidism, unspecified type    Check thyroid laboratory testing  Continue levothyroxine  - TSH with free T4 reflex    Malignant neoplasm of prostate (H)    Continue plan per urology    The longitudinal plan of care for the diagnosis(es)/condition(s) as documented were addressed during this visit. Due to the added complexity in care, I will continue to support Jayesh in the subsequent management and with ongoing continuity of care.     Type 2 diabetes  Hypothyroidism            BMI  Estimated body mass index is 40.02 kg/m  as calculated from the following:    Height as of this encounter: 1.727 m (5' 8\").    Weight as of this encounter: 119.4 kg (263 lb 3.2 oz).             Otoniel Mcocnnell is a 83 year old, presenting for the following health issues:  Follow Up        3/18/2024     8:41 AM   Additional Questions   Roomed by Shannen ABRAHAM CMA     Via the Health Maintenance questionnaire, the patient has reported the following services have been completed -Eye Exam, this information has been sent to the " "abstraction team.  History of Present Illness       Reason for visit:  Follow up from cardiology    He eats 2-3 servings of fruits and vegetables daily.He consumes 0 sweetened beverage(s) daily.He exercises with enough effort to increase his heart rate 9 or less minutes per day.  He exercises with enough effort to increase his heart rate 3 or less days per week.   He is taking medications regularly.     Jayesh presents to the clinic for a medication check.     His medical history is notable for type 2 diabetes mellitus which previously was inadequately controlled, hypertension, hyperlipidemia, and hypothyroidism.  He also has a history of prostate cancer as well as remote history of thyroid cancer.      Regarding his diabetes his hemoglobin A1c is improved to 6.2% today.  He is treated with metformin, glipizide, and jardiance, which was most recently added. His blood sugars have been much better.     He continues to follow-up with cardiology. He has a complex history of coronary artery disease, aortic stenosis with previous TAVR, congestive heart failure, and frequent PVC's. He was recently started on sotalol. He continues to take carvedilo and losartan.       He has history of obstructive sleep apnea but does not use a CPAP machine.  He is requiring thyroid supplementation.                   Objective    BP (!) 150/67 (BP Location: Left arm, Patient Position: Sitting, Cuff Size: Adult Regular)   Pulse 51   Temp 97.5  F (36.4  C) (Oral)   Resp 16   Ht 1.727 m (5' 8\")   Wt 119.4 kg (263 lb 3.2 oz)   SpO2 98%   BMI 40.02 kg/m    Body mass index is 40.02 kg/m .  Physical Exam   GENERAL: alert and no distress  EYES: Eyes grossly normal to inspection, PERRL and conjunctivae and sclerae normal  RESP: lungs clear to auscultation - no rales, rhonchi or wheezes  CV: regular rate and rhythm, normal S1 S2, no S3 or S4, no murmur, click or rub, no peripheral edema  PSYCH: mentation appears normal, affect " normal/bright            Signed Electronically by: Geoffrey Live MD

## 2024-03-18 NOTE — LETTER
October 3, 2024      Jayesh Ortiz  [address] [place] [address]        Dear Jayesh,    I hope all is well. Here is a copy of your results for your records.    Your diabetes testing was excellent.    Your urine test was normal.     The thyroid testing was normal.     Finally, your kidney tests remain abnormal but stable. Make sure to drink plenty of water. Avoid ibuprofen/advil/motrin which can be hard on the kidneys.     I recommend follow-up in 1-2 months and we can recheck.    Geoffrey Live MD            Resulted Orders   Albumin Random Urine Quantitative with Creat Ratio   Result Value Ref Range    Creatinine Urine mg/dL 18.3 mg/dL      Comment:      The reference ranges have not been established in urine creatinine. The results should be integrated into the clinical context for interpretation.    Albumin Urine mg/L <12.0 mg/L      Comment:      The reference ranges have not been established in urine albumin. The results should be integrated into the clinical context for interpretation.    Albumin Urine mg/g Cr        Comment:      Unable to calculate, urine albumin and/or urine creatinine is outside detectable limits.  Microalbuminuria is defined as an albumin:creatinine ratio of 17 to 299 for males and 25 to 299 for females. A ratio of albumin:creatinine of 300 or higher is indicative of overt proteinuria.  Due to biologic variability, positive results should be confirmed by a second, first-morning random or 24-hour timed urine specimen. If there is discrepancy, a third specimen is recommended. When 2 out of 3 results are in the microalbuminuria range, this is evidence for incipient nephropathy and warrants increased efforts at glucose control, blood pressure control, and institution of therapy with an angiotensin-converting-enzyme (ACE) inhibitor (if the patient can tolerate it).     HEMOGLOBIN A1C   Result Value Ref Range    Hemoglobin A1C 6.2 (H) 0.0 - 5.6 %      Comment:      Normal <5.7%    Prediabetes 5.7-6.4%    Diabetes 6.5% or higher     Note: Adopted from ADA consensus guidelines.   Basic metabolic panel   Result Value Ref Range    Sodium 142 135 - 145 mmol/L      Comment:      Reference intervals for this test were updated on 09/26/2023 to more accurately reflect our healthy population. There may be differences in the flagging of prior results with similar values performed with this method. Interpretation of those prior results can be made in the context of the updated reference intervals.     Potassium 4.2 3.4 - 5.3 mmol/L    Chloride 103 98 - 107 mmol/L    Carbon Dioxide (CO2) 26 22 - 29 mmol/L    Anion Gap 13 7 - 15 mmol/L    Urea Nitrogen 34.6 (H) 8.0 - 23.0 mg/dL    Creatinine 1.34 (H) 0.67 - 1.17 mg/dL    GFR Estimate 53 (L) >60 mL/min/1.73m2    Calcium 9.2 8.8 - 10.2 mg/dL    Glucose 116 (H) 70 - 99 mg/dL   TSH with free T4 reflex   Result Value Ref Range    TSH 3.02 0.30 - 4.20 uIU/mL       If you have any questions or concerns, please call the clinic at the number listed above.       Sincerely,      Geoffrey Live MD

## 2024-03-18 NOTE — PATIENT INSTRUCTIONS
Jayesh,    Great job with your blood sugars.  Your hemoglobin A1c has improved to 6.2%  Continue to monitor your blood sugars  Please let me know if you develop episodes of low blood sugar where your glucose is less than 100  If you experience episodes of low blood sugar we may need to decrease glipizide  Your recheck blood pressure today is 139/62  Continue to follow-up with cardiology  We will check a urine test today  You received a COVID and influenza vaccine today  You may follow-up in July or August    Geoffrey Live MD

## 2024-03-18 NOTE — PROGRESS NOTES
[facility]  Cardiac Electrophysiology  [address]  [place] [address]   Office: [phone]  Fax: [phone]     Cardiac Electrophysiology Consultation    Patient: Jayesh Ortiz   : 1940     Referring Provider: Tamara Chowdhury PA-C  Primary Care Provider: Geoffrey Live MD    CHIEF COMPLAINT/REASON FOR VISIT  Premature ventricular contractions  Systolic heart failure (LVEF 45-50% by 3/4/2024 TTE)  LBBB    Assessment/Recommendations   Jayesh Ortiz is a 83 year old male with premature ventricular contractions, LBBB following TAVR, systolic heart failure (LVEF 45-50% by 3/4/2024 TTE), TAVR 2023, CKD referred by Tamara Chowdhury PA-C for consultation regarding PVCs.    Premature ventricular contractions - asymptomatic, LVEF 45-50% by 3/4/2024.  Prior LVEF 50-55% 2023 with LBBB, 12% PVCs around this time.  Unifocal of RBLSA (R I, QS III>II, R->Rs V1->V6) morphology consistent with basal inferoseptal site of origin.  36% burden by 2024 Holter monitoring.    We reviewed PVCs and treatment options including observation, medical therapy (limited given LV dysfunction, LBBB, CKD), and PVC mapping and ablation.  We reviewed PVC mapping and ablation procedures, risks (including groin bleeding, stroke, tamponade, heart block) and recovery expectations.  He would prefer a trial of medical therapy, with ablation thereafter if medications are ineffective.  - start sotalol 60mg twice daily (reduced dose given borderline bradycardia, CKD), ECG 3 days thereafter, 2 week MCT thereafter     Systolic heart failure - LVEF 45-50% by 3/4/2024 TTE, NYHA I.  Time course consistent with association with PVCs, though cannot excluded LBBB-dyssynchrony mediated cardiomyopathy  - PVC management as above  - re-assess LV function after 2-3 months PVC suppression    LBBB - post TAVR 2023    Follow up: as above         History of Present Illness   Jayesh Ortiz is a 83 year old  male with premature ventricular contractions, LBBB following TAVR, systolic heart failure (LVEF 45-50% by 3/4/2024 TTE), TAVR 4/4/2023, CKD referred by Tamara Chowdhury PA-C for consultation regarding PVCs.    Mr. Ortiz underwent TAVR 4/4/2023 with note of new LBBB thereafter.  He underwent MCT 4/2024 showing 12% PVCs, TTE 5/11/2023 showed LVEF 50-55%.  24hr Holter monitoring 2/23/2024 showed 36% PVCs, TTE 3/4/2024 showed LVEF 45-50%.     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  He denies chest pain, exertional dyspnea, palpitations.         Physical Examination  Review of Systems   VITALS: BP (!) 148/60 (BP Location: Right arm, Patient Position: Sitting, Cuff Size: Adult Large)   Pulse 60   Resp 14   Wt 119.3 kg (263 lb)   BMI 39.99 kg/m    Wt Readings from Last 3 Encounters:   02/21/24 122.5 kg (270 lb)   11/14/23 126.6 kg (279 lb)   08/14/23 138.6 kg (305 lb 9.6 oz)     CONSTITUTIONAL: well nourished, comfortable, no distress  EYES:  Conjunctivae pink, sclerae clear.    E/N/T:  Oral mucosa pink  RESPIRATORY:  Respiratory effort is normal  CARDIOVASCULAR:  bradycardic ~40bpm, normal S1 and S2  GASTROINTESTINAL:  Abdomen without masses or tenderness  EXTREMITIES:  No clubbing or cyanosis.    MUSCULOSKELETAL:  Overall grossly normal muscle strength  SKIN:  Overall, skin warm and dry, no lesions.  NEURO/PSYCH:  Oriented x 3 with normal affect.   Constitutional:  No weight loss or loss of appetite    Eyes:  No difficulty with vision, no double vision, no dry eyes  ENT:  No sore throat, difficulty swallowing; changes in hearing or tinnitus  Cardiovascular: As detailed above  Respiratory:  No cough  Musculoskeletal  No joint pain, muscle aches  Neurologic:  No syncope, lightheadedness, fainting spells   Hematologic: No easy bruising, excessive bleeding tendency   Gastrointestinal:  No jaundice, abdominal pain or abdominal bloating  Genitourinary: No changes in urinary habits, no trouble urinating    Psychiatric: No anxiety or  depression      Medical History  Surgical History   Past Medical History:   Diagnosis Date    Diabetes (H)     Diabetes mellitus, type 2 (H)     Hypertension     S/p TAVR (transcatheter aortic valve replacement), bioprosthetic     Thyroid disease     Past Surgical History:   Procedure Laterality Date    CLOSED REDUCTION, PERCUTANEOUS PINNING LOWER EXTREMITY, COMBINED Right 3/9/2017    Procedure: COMBINED CLOSED REDUCTION, PERCUTANEOUS PINNING LOWER EXTREMITY;  Surgeon: Ankit Coy DPM;  Location: [facility] OR    CV CORONARY ANGIOGRAM N/A 2/9/2023    Procedure: Coronary Angiogram;  Surgeon: Nabeel Davies MD;  Location: [facility] CV    CV TRANSCATHETER AORTIC VALVE REPLACEMENT-FEMORAL APPROACH N/A 4/4/2023    Procedure: Transcatheter Aortic Valve Replacement-Femoral Approach;  Surgeon: Nabeel Davies MD;  Location: [facility] LAB CV    OR TRANSCATHETER AORTIC VALVE REPLACEMENT, FEMORAL PERCUTANEOUS APPROACH (STANDBY) N/A 4/4/2023    Procedure: OR TRANSCATHETER AORTIC VALVE REPLACEMENT, FEMORAL PERCUTANEOUS APPROACH (STANDBY);  Surgeon: Jeramie De Jesus MD;  Location: [facility] LAB CV    THYROIDECTOMY           Family History Social History   Family History   Problem Relation Age of Onset    Heart Failure Mother     Heart Failure Father         Social History     Tobacco Use    Smoking status: Former    Smokeless tobacco: Never   Vaping Use    Vaping Use: Never used   Substance Use Topics    Alcohol use: Not Currently    Drug use: Never         Medications  Allergies     Current Outpatient Medications:     aspirin (ASA) 81 MG EC tablet, Take 1 tablet (81 mg) by mouth daily, Disp: , Rfl:     atorvastatin (LIPITOR) 80 MG tablet, Take 1 tablet (80 mg) by mouth daily, Disp: 90 tablet, Rfl: 3    bumetanide (BUMEX) 1 MG tablet, Bumetanide 1 mg in the morning and 0.5 mg in the afternoon., Disp: 135 tablet, Rfl: 3    carvedilol (COREG) 6.25 MG tablet, Take 0.5 tablets (3.125 mg) by mouth 2  "times daily (with meals), Disp: 180 tablet, Rfl: 0    empagliflozin (JARDIANCE) 25 MG TABS tablet, Take 1 tablet (25 mg) by mouth daily, Disp: 90 tablet, Rfl: 1    EPINEPHrine (PRIMATENE MIST) 0.125 MG/ACT AERO, Inhale 1-2 sprays into the lungs every 4 hours as needed (for difficulty breathing), Disp: , Rfl:     ezetimibe (ZETIA) 10 MG tablet, Take 1 tablet (10 mg) by mouth daily, Disp: 90 tablet, Rfl: 3    glipiZIDE (GLUCOTROL) 10 MG tablet, Take 1 tablet (10 mg) by mouth 2 times daily, Disp: 180 tablet, Rfl: 1    levothyroxine (SYNTHROID/LEVOTHROID) 175 MCG tablet, TAKE 1 TABLET BY MOUTH EVERY DAY, Disp: 90 tablet, Rfl: 3    losartan (COZAAR) 100 MG tablet, Take 1 tablet (100 mg) by mouth daily, Disp: 90 tablet, Rfl: 2    Magnesium Oxide 250 MG tablet, TAKE 1 TABLET BY MOUTH ONCE DAILY, Disp: 90 tablet, Rfl: 0    metFORMIN (GLUCOPHAGE) 500 MG tablet, Take 2 tablets (1,000 mg) by mouth 2 times daily (with meals), Disp: 360 tablet, Rfl: 3    tamsulosin (FLOMAX) 0.4 MG capsule, TAKE 1 CAPSULE BY MOUTH EVERY DAY (Patient taking differently: Take 0.4 mg by mouth daily), Disp: 90 capsule, Rfl: 3     Allergies   Allergen Reactions    Nka [No Known Allergies]           Lab Results    Chemistry CBC Cardiac Enzymes/BNP/TSH/INR   Recent Labs   Lab Test 02/21/24  0824      POTASSIUM 4.6   CHLORIDE 103   CO2 31*   *   BUN 38.3*   CR 1.43*   GFRESTIMATED 49*   MATT 9.4     Recent Labs   Lab Test 02/21/24  0824 11/14/23  0839 08/14/23  1604   CR 1.43* 1.19* 1.23*          Recent Labs   Lab Test 08/10/23  0521   WBC 5.0   HGB 12.3*   HCT 38.4*   MCV 96        Recent Labs   Lab Test 08/10/23  0521 08/09/23  0547 08/08/23  0518   HGB 12.3* 12.4* 12.0*    No results for input(s): \"TROPONINI\" in the last 60204 hours.  Recent Labs   Lab Test 08/07/23  1314 04/03/23  0839 04/11/22  1008   BNP  --   --  386*   NTBNPI 2,039*  --   --    NTBNP  --  765  --      Recent Labs   Lab Test 08/08/23  0518   TSH 8.40* "     Recent Labs   Lab Test 04/03/23  0839   INR 1.08         Data Review    ECGs (tracings independently reviewed)  5/15/2023 - SR 70bpm, LBBB QRS 158ms, PVCs of RBLSA (R I, QS III>II, R->Rs V1->V6)  4/11/2023 - SR, LBBB QRS 156ms, bigeminal PVCs (RBLSA)  2/9/2024 - SR 74bpm, QRS 108ms, PVCs    Holter monitoring from 2/23/2024 to 2/24/2024 (independently reviewed)  Predominant underlying rhythm was sinus rhythm, 45 (2:43pm) to 91bpm, average 61bpm.  No nonsustained or sustained tachyarrhythmias.  No atrial fibrillation.  There were no pauses of greater than 3 seconds.  Occasional supraventricular ectopic beats (4%).  Frequent premature ventricular contractions (36%).  No diary available for review.     3/4/2024 TTE  The left ventricle is mildly dilated.  Left ventricular function is decreased. The ejection fraction is 45-50%  (mildly reduced).  There is mild concentric left ventricular hypertrophy.  Diastolic Doppler findings (E/E' ratio and/or other parameters) suggest left  ventricular filling pressures are increased.  The left atrium is moderately dilated.  The right atrium is mildly dilated.  Borderline dilated aortic root.  Documented 29 mm [place] HELENA 3 tissue valve in the aortic position that  appears to be functioning normally. Peak velocity 2.2 m/s. Mean gradient of 14  mmHg. No significant paravalvular leak.  Compared to prior study, there is no significant change.       Cc: Tamara Chowdhury PA-C, Geoffrey Live MD Amila Dilusha William, MD  3/20/2024  7:36 AM

## 2024-03-20 PROBLEM — I44.7 LBBB (LEFT BUNDLE BRANCH BLOCK): Status: ACTIVE | Noted: 2024-01-01

## 2024-03-20 PROBLEM — I49.3 PVC'S (PREMATURE VENTRICULAR CONTRACTIONS): Status: ACTIVE | Noted: 2024-01-01

## 2024-03-20 NOTE — TELEPHONE ENCOUNTER
Dorita, calling into request handicap sticker or sign for Jayesh's vehicle.    CTC 7/2/19        Forms/Letter Request    DMV/Handicap Parking  OV 3/18/24    Do we have the form/letter: No    Who is the form from? Dorita hoping Dr Live can obtain on line (if other please explain)      When is form/letter needed by: ASAP.  Hoping to have form completed by end of this week    How would you like the form/letter returned:  Please call Jayesh when form is completed    Patient Notified form requests are processed in 5-7 business days:Yes    Okay to leave a detailed message?: Yes at Cell number on file:    Telephone Information:   Mobile [phone]

## 2024-03-20 NOTE — LETTER
3/20/2024    Geoffrey Live MD  [address]  [facility] [address]    RE: Jayesh Ortiz       Dear Colleague,     I had the pleasure of seeing Jayesh Ortiz in the [facility].     [facility]  Cardiac Electrophysiology  [address]  [place] [address]   Office: [phone]  Fax: [phone]     Cardiac Electrophysiology Consultation    Patient: Jayesh Ortiz   : 1940     Referring Provider: Tamara Chowdhury PA-C  Primary Care Provider: Geoffrey Live MD    CHIEF COMPLAINT/REASON FOR VISIT  Premature ventricular contractions  Systolic heart failure (LVEF 45-50% by 3/4/2024 TTE)  LBBB    Assessment/Recommendations   Jayesh Ortiz is a 83 year old male with premature ventricular contractions, LBBB following TAVR, systolic heart failure (LVEF 45-50% by 3/4/2024 TTE), TAVR 2023, CKD referred by Tamara Chowdhury PA-C for consultation regarding PVCs.    Premature ventricular contractions - asymptomatic, LVEF 45-50% by 3/4/2024.  Prior LVEF 50-55% 2023 with LBBB, 12% PVCs around this time.  Unifocal of RBLSA (R I, QS III>II, R->Rs V1->V6) morphology consistent with basal inferoseptal site of origin.  36% burden by 2024 Holter monitoring.    We reviewed PVCs and treatment options including observation, medical therapy (limited given LV dysfunction, LBBB, CKD), and PVC mapping and ablation.  We reviewed PVC mapping and ablation procedures, risks (including groin bleeding, stroke, tamponade, heart block) and recovery expectations.  He would prefer a trial of medical therapy, with ablation thereafter if medications are ineffective.  - start sotalol 60mg twice daily (reduced dose given borderline bradycardia, CKD), ECG 3 days thereafter, 2 week MCT thereafter     Systolic heart failure - LVEF 45-50% by 3/4/2024 TTE, NYHA I.  Time course consistent with association with PVCs, though cannot excluded LBBB-dyssynchrony mediated  cardiomyopathy  - PVC management as above  - re-assess LV function after 2-3 months PVC suppression    LBBB - post TAVR 4/2023    Follow up: as above         History of Present Illness   Jayesh Ortiz is a 83 year old male with premature ventricular contractions, LBBB following TAVR, systolic heart failure (LVEF 45-50% by 3/4/2024 TTE), TAVR 4/4/2023, CKD referred by Tamara Chowdhury PA-C for consultation regarding PVCs.    Mr. Ortiz underwent TAVR 4/4/2023 with note of new LBBB thereafter.  He underwent MCT 4/2024 showing 12% PVCs, TTE 5/11/2023 showed LVEF 50-55%.  24hr Holter monitoring 2/23/2024 showed 36% PVCs, TTE 3/4/2024 showed LVEF 45-50%.     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  He denies chest pain, exertional dyspnea, palpitations.         Physical Examination  Review of Systems   VITALS: BP (!) 148/60 (BP Location: Right arm, Patient Position: Sitting, Cuff Size: Adult Large)   Pulse 60   Resp 14   Wt 119.3 kg (263 lb)   BMI 39.99 kg/m    Wt Readings from Last 3 Encounters:   02/21/24 122.5 kg (270 lb)   11/14/23 126.6 kg (279 lb)   08/14/23 138.6 kg (305 lb 9.6 oz)     CONSTITUTIONAL: well nourished, comfortable, no distress  EYES:  Conjunctivae pink, sclerae clear.    E/N/T:  Oral mucosa pink  RESPIRATORY:  Respiratory effort is normal  CARDIOVASCULAR:  bradycardic ~40bpm, normal S1 and S2  GASTROINTESTINAL:  Abdomen without masses or tenderness  EXTREMITIES:  No clubbing or cyanosis.    MUSCULOSKELETAL:  Overall grossly normal muscle strength  SKIN:  Overall, skin warm and dry, no lesions.  NEURO/PSYCH:  Oriented x 3 with normal affect.   Constitutional:  No weight loss or loss of appetite    Eyes:  No difficulty with vision, no double vision, no dry eyes  ENT:  No sore throat, difficulty swallowing; changes in hearing or tinnitus  Cardiovascular: As detailed above  Respiratory:  No cough  Musculoskeletal  No joint pain, muscle aches  Neurologic:  No syncope, lightheadedness, fainting spells   Hematologic:  No easy bruising, excessive bleeding tendency   Gastrointestinal:  No jaundice, abdominal pain or abdominal bloating  Genitourinary: No changes in urinary habits, no trouble urinating    Psychiatric: No anxiety or depression      Medical History  Surgical History   Past Medical History:   Diagnosis Date    Diabetes (H)     Diabetes mellitus, type 2 (H)     Hypertension     S/p TAVR (transcatheter aortic valve replacement), bioprosthetic     Thyroid disease     Past Surgical History:   Procedure Laterality Date    CLOSED REDUCTION, PERCUTANEOUS PINNING LOWER EXTREMITY, COMBINED Right 3/9/2017    Procedure: COMBINED CLOSED REDUCTION, PERCUTANEOUS PINNING LOWER EXTREMITY;  Surgeon: Ankit Coy DPM;  Location: [facility] OR    CV CORONARY ANGIOGRAM N/A 2/9/2023    Procedure: Coronary Angiogram;  Surgeon: Nabeel Davies MD;  Location: [facility] CV    CV TRANSCATHETER AORTIC VALVE REPLACEMENT-FEMORAL APPROACH N/A 4/4/2023    Procedure: Transcatheter Aortic Valve Replacement-Femoral Approach;  Surgeon: Nabeel Davies MD;  Location: [facility] CV    OR TRANSCATHETER AORTIC VALVE REPLACEMENT, FEMORAL PERCUTANEOUS APPROACH (STANDBY) N/A 4/4/2023    Procedure: OR TRANSCATHETER AORTIC VALVE REPLACEMENT, FEMORAL PERCUTANEOUS APPROACH (STANDBY);  Surgeon: Jeramie De Jesus MD;  Location: [facility] LAB CV    THYROIDECTOMY           Family History Social History   Family History   Problem Relation Age of Onset    Heart Failure Mother     Heart Failure Father         Social History     Tobacco Use    Smoking status: Former    Smokeless tobacco: Never   Vaping Use    Vaping Use: Never used   Substance Use Topics    Alcohol use: Not Currently    Drug use: Never         Medications  Allergies     Current Outpatient Medications:     aspirin (ASA) 81 MG EC tablet, Take 1 tablet (81 mg) by mouth daily, Disp: , Rfl:     atorvastatin (LIPITOR) 80 MG tablet, Take 1 tablet (80 mg) by mouth daily, Disp: 90  "tablet, Rfl: 3    bumetanide (BUMEX) 1 MG tablet, Bumetanide 1 mg in the morning and 0.5 mg in the afternoon., Disp: 135 tablet, Rfl: 3    carvedilol (COREG) 6.25 MG tablet, Take 0.5 tablets (3.125 mg) by mouth 2 times daily (with meals), Disp: 180 tablet, Rfl: 0    empagliflozin (JARDIANCE) 25 MG TABS tablet, Take 1 tablet (25 mg) by mouth daily, Disp: 90 tablet, Rfl: 1    EPINEPHrine (PRIMATENE MIST) 0.125 MG/ACT AERO, Inhale 1-2 sprays into the lungs every 4 hours as needed (for difficulty breathing), Disp: , Rfl:     ezetimibe (ZETIA) 10 MG tablet, Take 1 tablet (10 mg) by mouth daily, Disp: 90 tablet, Rfl: 3    glipiZIDE (GLUCOTROL) 10 MG tablet, Take 1 tablet (10 mg) by mouth 2 times daily, Disp: 180 tablet, Rfl: 1    levothyroxine (SYNTHROID/LEVOTHROID) 175 MCG tablet, TAKE 1 TABLET BY MOUTH EVERY DAY, Disp: 90 tablet, Rfl: 3    losartan (COZAAR) 100 MG tablet, Take 1 tablet (100 mg) by mouth daily, Disp: 90 tablet, Rfl: 2    Magnesium Oxide 250 MG tablet, TAKE 1 TABLET BY MOUTH ONCE DAILY, Disp: 90 tablet, Rfl: 0    metFORMIN (GLUCOPHAGE) 500 MG tablet, Take 2 tablets (1,000 mg) by mouth 2 times daily (with meals), Disp: 360 tablet, Rfl: 3    tamsulosin (FLOMAX) 0.4 MG capsule, TAKE 1 CAPSULE BY MOUTH EVERY DAY (Patient taking differently: Take 0.4 mg by mouth daily), Disp: 90 capsule, Rfl: 3     Allergies   Allergen Reactions    Nka [No Known Allergies]           Lab Results    Chemistry CBC Cardiac Enzymes/BNP/TSH/INR   Recent Labs   Lab Test 02/21/24  0824      POTASSIUM 4.6   CHLORIDE 103   CO2 31*   *   BUN 38.3*   CR 1.43*   GFRESTIMATED 49*   MATT 9.4     Recent Labs   Lab Test 02/21/24  0824 11/14/23  0839 08/14/23  1604   CR 1.43* 1.19* 1.23*          Recent Labs   Lab Test 08/10/23  0521   WBC 5.0   HGB 12.3*   HCT 38.4*   MCV 96        Recent Labs   Lab Test 08/10/23  0521 08/09/23  0547 08/08/23  0518   HGB 12.3* 12.4* 12.0*    No results for input(s): \"TROPONINI\" in the last " 54502 hours.  Recent Labs   Lab Test 08/07/23  1314 04/03/23  0839 04/11/22  1008   BNP  --   --  386*   NTBNPI 2,039*  --   --    NTBNP  --  765  --      Recent Labs   Lab Test 08/08/23  0518   TSH 8.40*     Recent Labs   Lab Test 04/03/23  0839   INR 1.08         Data Review    ECGs (tracings independently reviewed)  5/15/2023 - SR 70bpm, LBBB QRS 158ms, PVCs of RBLSA (R I, QS III>II, R->Rs V1->V6)  4/11/2023 - SR, LBBB QRS 156ms, bigeminal PVCs (RBLSA)  2/9/2024 - SR 74bpm, QRS 108ms, PVCs    Holter monitoring from 2/23/2024 to 2/24/2024 (independently reviewed)  Predominant underlying rhythm was sinus rhythm, 45 (2:43pm) to 91bpm, average 61bpm.  No nonsustained or sustained tachyarrhythmias.  No atrial fibrillation.  There were no pauses of greater than 3 seconds.  Occasional supraventricular ectopic beats (4%).  Frequent premature ventricular contractions (36%).  No diary available for review.     3/4/2024 TTE  The left ventricle is mildly dilated.  Left ventricular function is decreased. The ejection fraction is 45-50%  (mildly reduced).  There is mild concentric left ventricular hypertrophy.  Diastolic Doppler findings (E/E' ratio and/or other parameters) suggest left  ventricular filling pressures are increased.  The left atrium is moderately dilated.  The right atrium is mildly dilated.  Borderline dilated aortic root.  Documented 29 mm [place] HELENA 3 tissue valve in the aortic position that  appears to be functioning normally. Peak velocity 2.2 m/s. Mean gradient of 14  mmHg. No significant paravalvular leak.  Compared to prior study, there is no significant change.       Cc: Tamara Chowhdury PA-C, Geoffrey Live MD Amila Dilusha William, MD  3/20/2024  7:36 AM        Thank you for allowing me to participate in the care of your patient.      Sincerely,     Martin Bowen MD     [facility]  cc:   Tamara Chowdhury PA-C  [address] [place] [address]

## 2024-03-20 NOTE — PATIENT INSTRUCTIONS
[facility]  Cardiac Electrophysiology  [address]  [place] [address]   Office: [phone]  Fax: [phone]       Thank you for seeing us in clinic today - it is a pleasure to be a part of your care team.  Below is a summary of our plan from today's visit.      You have frequent premature ventricular contractions (PVCs, 36% burden by 2/23/2024 Holter), slightly reduced heart function (left ventricular ejection fraction, LVEF, 45-50%, normal 55-65%), and left bundle branch block.  We reviewed PVCs and treatment options including observation, medical therapy (limited options given your reduced heart function, kidneys), and PVC mapping and ablation.  We reviewed PVC ablation procedures, risks and recovery expectations.    You elected for an initial trial of medical therapy, with further consideration for ablation if medicine is ineffective.  We will plan for the following:  - start sotalol 60mg twice daily, ECG 3 days thereafter, 2 week heart rhythm monitoring thereafter     Please do not hesitate to be in touch with our office at [phone] with any questions that may arise.      Thank you for trusting us with your care,    Martin Bowen MD  Clinical Cardiac Electrophysiology  [facility]  [address]  [place] [address]   Office: [phone]  Fax: [phone]

## 2024-03-21 NOTE — TELEPHONE ENCOUNTER
This is appropriate. Please pull the form from one of the rooms and put on my desk for completion tomorrow when back in clinic.

## 2024-03-23 NOTE — TELEPHONE ENCOUNTER
ATTENTION DR. LIVE  Medication Question regarding Carvedilol 6.25 MG Oral Tablet (COREG) prescribed by Dr. Live, and Sotalol HCl 120 MG Oral Tablet (BETAPACE) prescribed by Dr. Martin Bowen Cardiologist at [facility]    Significant other Dorita El calling for Jayesh Ortiz who has been prescribed both these medications and the pharmacist alerted her to if he should be taking both of these medications together. Is it possible that Dr. Live can help answer this? Patient is feeling faint and light headed, asked if wanted to speak to triage nurse for his symptoms, but patient said sx's have not changed since saw his cardiologist this past Wednesday, and sees cardiology for a cardiogram on Monday. Patient said that he saw Dr. Live for his blood sugars being low, and will wait to see Dr. Live again about his blood sugars and dizziness. Patient did not want to speak to a nurse this evening. He just saw you on Monday, but if you think he should be doing something different, he does not have a follow up with you yet scheduled for next time. However, he is going in for a cardiogram on Monday.       What medication are you calling about (include dose and sig)?: Carvedilol, and Sotalol possible drug interaction. He does have an appointment on Monday with Heart care, but wanted to ask Dr. Live as well.     Preferred Pharmacy:   [facility]/pharmacy #[id number] - [place], [place] - [address] [place] [address] E  [address] [place] [address] E  [facility] [address]  Phone: [phone] Fax: [phone]      Controlled Substance Agreement on file:   CSA -- Patient Level:    CSA: None found at the patient level.       Who prescribed the medication?: Dr. Live, and Cardiologist     Do you need a refill? No    When did you use the medication last? Has not started the medication yet. Was supposed to start on Wednesday, but pharmacist alerted them to a possible drug issue with the 2 medications    Patient offered an  appointment? No    Do you have any questions or concerns?  No      Okay to leave a detailed message?: Yes at Cell number on file:    Telephone Information:   [phone]

## 2024-03-25 NOTE — PROGRESS NOTES
EKG Visit    Pt here for EKG, QTc check after starting 60mg Sotalol BID on 3/23/24    EKG shows  sinus rhythm with frequent PVCS  with HR of 66 bpm, QT of 436 ms, and QTc of 457 ms  QTc within acceptable limits, pts EKG was compared to previous EKG in EMR, EKG is stable, and there has been minimal change in QTc  Vital signes were reviewed and are stable    Pt reports tolerating medication without complaint, reviewed with pt reasoning for above medication, reviewed and confirmed pt understands current dose, administration, and frequency of medication, most common potential side effects from the medication, s/s to call the clinic with, and when to seek emergency medical care    Pt was instructed to continue current medication as prescribed, results would be sent to ordering provider for review, pt will be contacted with further changes if necessary, and pt verbalized understanding     Results sent to  Dr Bowen for further review and recommendations if necessary  3/25/2024 9:46 AM  Lenora Barbosa RN

## 2024-03-25 NOTE — TELEPHONE ENCOUNTER
Patient contacted valve clinic and said that he saw Dr. Bowen on 3/20 last week and he was started on sotalol. Patient was instructed by pharmacist that he should not take this while taking carvedilol. Patient reports he was not instructed to stop this medication at that visit and would like clarification on what he should do. Patient instructed that message would be forwarded to Dr. Childers EP nursing team .    Msg forwarded.     Ashley Guevara RN on 3/25/2024 at 8:08 AM

## 2024-03-26 NOTE — TELEPHONE ENCOUNTER
Martin Bowen MD  P [facility] Ep Support [facility]  Caller: Unspecified (Yesterday,  8:02 AM)  Dhaval Choe,    He is okay to continue both low dose sotalol for PVC suppression and low dose carvedilol.  Can further assess PVC burden and sinus rates on follow-up MCT.    Thanks,  Buffy

## 2024-03-28 NOTE — TELEPHONE ENCOUNTER
Prescription for meter sent.  Please let him know that I can refer him to see a diabetes educator to teach him how to check his blood sugars if you would like that.    Also, I do think it is a good idea to forward the question about carvedilol and sotalol to his cardiology team with the question as to whether they want him to take both.

## 2024-03-28 NOTE — TELEPHONE ENCOUNTER
Please follow-up.  Patient has diabetes.  He has been taking carvedilol and cardiology added the sotalol given his cardiac rhythm abnormality.  Please ask if he has been able to checking his blood sugars.  He can check and see his blood sugars are running low (close to 100 or lower) as that could cause his symptoms.  If running low he can hold the glipizide and see if he is feeling better.    He should make sure to drink plenty of water to stay hydrated.     Also, he should have check his blood pressure to see if his blood pressure is going too low. You can ask if he has a cuff or he can make an appointment for nurse check.      I recommend that he review with his cardiologists, Dr. eRagan and Dr. Bowen whether they want him to take both carvedilol and sotalol as that was added after his visit with me. If needed this message can be forwarded to them.    Finally, if having severe symptoms he should be seen.

## 2024-03-28 NOTE — TELEPHONE ENCOUNTER
Called patient and discussed blood sugar monitoring and offered diabetic ed. Patient declined at this point. He will keep tabs on BGLs and reach out with readings in the near future.    Patient is following closely with cardiology regarding taking sotalol and carvedilol. Will begin wearing mobile telemetry.    Richie Oneal RN     [facility]

## 2024-03-28 NOTE — TELEPHONE ENCOUNTER
Spoke with patient.  He did not know his blood sugars as he does not check them and he does not have a meter. Will pend meter to pharmacy with instructions to check twice a day please update as needed.  Relayed message from PCP.       Regarding blood pressures. Does not check at home. Denying prescription for a blood pressure cuff.     Per chart review cardiology is monitoring carvedilol and sotalol.     Aleshia Colin RN

## 2024-05-16 NOTE — TELEPHONE ENCOUNTER
Called patient and relayed information/instructions from provider. Patient has no further questions at this time and will follow up as needed/instructed.    Richie Oneal RN     [facility]

## 2024-05-16 NOTE — TELEPHONE ENCOUNTER
Significant other called stating patient is needing a refill of his test strips, informed her patient has refills remaining on file at [facility] in [place] she will reach out to them for fill.    Wanting to know if it is okay that patient take a CoQ10 supplement as he is on atorvastatin.    Message routed to provider to please advise.     Julie Behrendt RN

## 2024-07-02 NOTE — TELEPHONE ENCOUNTER
New glucometer order pended. Strip and lancet prescriptions are written as generic with refills available, patient should be able to fill with insurance-preferred alternative.     Kim Kennedy RN

## 2024-07-02 NOTE — TELEPHONE ENCOUNTER
New Medication Request    What medication are you requesting?: New Glucose Monitor and Strips and lancets    Reason for medication request: Current Accu check monitor and supplies are on recall.    Have you taken this medication before?: Yes    Controlled Substance Agreement on file:   CSA -- Patient Level:    CSA: None found at the patient level.       Preferred Pharmacy  [id number] - [place], [place] - [facility] [facility] [address]  [address] [place] [address] E  [facility] [address]  Phone: [phone] Fax: [phone]        Okay to leave a detailed message?: Yes at Cell number on file:    Telephone Information:   Mobile [phone]

## 2024-07-03 NOTE — TELEPHONE ENCOUNTER
"Per medicare part B- we cannot have \"or as directed\" included. Please remove for this and resend  "

## 2024-07-22 NOTE — PATIENT INSTRUCTIONS
[facility]  Cardiac Electrophysiology  [address]  [place] [address]   Office: [phone]  Fax: [phone]       Thank you for seeing us in clinic today - it is a pleasure to be a part of your care team.  Below is a summary of our plan from today's visit.      You have frequent premature ventricular contractions (PVCs, 36% burden by 2/23/2024 Holter), slightly reduced heart function (left ventricular ejection fraction, LVEF, 45-50%, normal 55-65%), and left bundle branch block.    You were started on sotalol 3/2024, with subsequent PVC burden reduced to 16% and LVEF improved to 50-55%.      We will plan for continued medical therapy, with further consideration for ablation if medicine is ineffective.  We will plan for the following:  - continue sotalol 60mg twice daily  - follow-up with your cardiology team in 1 year    Please do not hesitate to be in touch with our office at [phone] with any questions that may arise.      Thank you for trusting us with your care,    Martin Bowen MD  Clinical Cardiac Electrophysiology  [facility]  [address]  [place] [address]   Office: [phone]  Fax: [phone]

## 2024-07-22 NOTE — PROGRESS NOTES
[facility]  Cardiac Electrophysiology  [address]  [place] [address]   Office: [phone]  Fax: [phone]     Cardiac Electrophysiology Consultation    Patient: Jayesh Ortiz   : 1940     Referring Provider: Tamara Chowdhury PA-C  Primary Care Provider: Geoffrey Live MD    CHIEF COMPLAINT/REASON FOR VISIT  Premature ventricular contractions  Systolic heart failure (LVEF 45-50% by 3/4/2024 TTE)  LBBB    Assessment/Recommendations   Jayesh Ortiz is a 83 year old male with premature ventricular contractions, LBBB following TAVR, systolic heart failure (LVEF 45-50% by 3/4/2024 TTE), TAVR 2023, CKD referred by Tamara Chowdhury PA-C for consultation regarding PVCs.    Premature ventricular contractions - asymptomatic, LVEF 45-50% by 3/4/2024.  Prior LVEF 50-55% 2023 with LBBB, 12% PVCs around this time.  Unifocal of RBLSA (R I, QS III>II, R->Rs V1->V6) morphology consistent with basal inferoseptal site of origin.  36% burden by 2024 Holter monitoring.    Sotalol 3/20/2024 - present, 16% PVCs by 3/27/2024 MCT, LVEF improved to 50-55% by 7/15/2024 TTE  We reviewed PVCs and treatment options including observation, medical therapy (limited given LV dysfunction, LBBB, CKD), and PVC mapping and ablation.  We reviewed PVC mapping and ablation procedures, risks (including groin bleeding, stroke, tamponade, heart block) and recovery expectations.  He would prefer to continue medical therapy, with ablation thereafter if medications are ineffective.  - continue sotalol 60mg twice daily (reduced dose given borderline bradycardia, CKD)  - cardiology follow-up 1 year, consider Gild Rene (mail out) around that time, EP follow-up as needed    Systolic heart failure - LVEF 50-55% by 7/15/2024 TTE with partial PVC suppression 16% burden.  LVEF previously 45-50% by 3/4/2024 TTE in the setting of 36% PVCs.  Time course consistent with association with PVCs, though cannot  excluded LBBB-dyssynchrony mediated cardiomyopathy  - PVC management as above  - re-assess LV function after 2-3 months PVC suppression    LBBB - post TAVR 4/2023    Follow up: as above         History of Present Illness   Jayesh Ortiz is a 83 year old male with premature ventricular contractions, LBBB following TAVR, systolic heart failure (LVEF 45-50% by 3/4/2024 TTE), TAVR 4/4/2023, CKD referred by Tamara Chowdhury PA-C for consultation regarding PVCs.    Mr. Ortiz underwent TAVR 4/4/2023 with note of new LBBB thereafter.  He underwent MCT 4/2024 showing 12% PVCs, TTE 5/11/2023 showed LVEF 50-55%.  24hr Holter monitoring 2/23/2024 showed 36% PVCs, TTE 3/4/2024 showed LVEF 45-50%.     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  He denies chest pain, exertional dyspnea, palpitations.         Physical Examination  Review of Systems   VITALS: BP (!) 148/62 (BP Location: Right arm, Patient Position: Sitting, Cuff Size: Adult Large)   Pulse 56   Resp 14   Wt 112.9 kg (249 lb)   BMI 37.86 kg/m    Wt Readings from Last 3 Encounters:   03/25/24 119.7 kg (263 lb 12.8 oz)   03/20/24 119.3 kg (263 lb)   03/18/24 119.4 kg (263 lb 3.2 oz)     CONSTITUTIONAL: well nourished, comfortable, no distress  EYES:  Conjunctivae pink, sclerae clear.    E/N/T:  Oral mucosa pink  RESPIRATORY:  Respiratory effort is normal  CARDIOVASCULAR:  bradycardic ~40bpm, normal S1 and S2  GASTROINTESTINAL:  Abdomen without masses or tenderness  EXTREMITIES:  No clubbing or cyanosis.    MUSCULOSKELETAL:  Overall grossly normal muscle strength  SKIN:  Overall, skin warm and dry, no lesions.  NEURO/PSYCH:  Oriented x 3 with normal affect.   Constitutional:  No weight loss or loss of appetite    Eyes:  No difficulty with vision, no double vision, no dry eyes  ENT:  No sore throat, difficulty swallowing; changes in hearing or tinnitus  Cardiovascular: As detailed above  Respiratory:  No cough  Musculoskeletal  No joint pain, muscle aches  Neurologic:  No syncope,  lightheadedness, fainting spells   Hematologic: No easy bruising, excessive bleeding tendency   Gastrointestinal:  No jaundice, abdominal pain or abdominal bloating  Genitourinary: No changes in urinary habits, no trouble urinating    Psychiatric: No anxiety or depression      Medical History  Surgical History   Past Medical History:   Diagnosis Date    Diabetes (H)     Diabetes mellitus, type 2 (H)     Hypertension     S/p TAVR (transcatheter aortic valve replacement), bioprosthetic     Thyroid disease     Past Surgical History:   Procedure Laterality Date    CLOSED REDUCTION, PERCUTANEOUS PINNING LOWER EXTREMITY, COMBINED Right 3/9/2017    Procedure: COMBINED CLOSED REDUCTION, PERCUTANEOUS PINNING LOWER EXTREMITY;  Surgeon: Ankit Coy DPM;  Location: [facility] OR    CV CORONARY ANGIOGRAM N/A 2/9/2023    Procedure: Coronary Angiogram;  Surgeon: Nabeel Davies MD;  Location: [facility]    CV TRANSCATHETER AORTIC VALVE REPLACEMENT-FEMORAL APPROACH N/A 4/4/2023    Procedure: Transcatheter Aortic Valve Replacement-Femoral Approach;  Surgeon: Nabeel Davies MD;  Location: [facility] CV    OR TRANSCATHETER AORTIC VALVE REPLACEMENT, FEMORAL PERCUTANEOUS APPROACH (STANDBY) N/A 4/4/2023    Procedure: OR TRANSCATHETER AORTIC VALVE REPLACEMENT, FEMORAL PERCUTANEOUS APPROACH (STANDBY);  Surgeon: Jeramie De Jesus MD;  Location: [facility] CV    THYROIDECTOMY           Family History Social History   Family History   Problem Relation Age of Onset    Heart Failure Mother     Heart Failure Father         Social History     Tobacco Use    Smoking status: Former    Smokeless tobacco: Never   Vaping Use    Vaping status: Never Used   Substance Use Topics    Alcohol use: Not Currently    Drug use: Never         Medications  Allergies     Current Outpatient Medications:     aspirin (ASA) 81 MG EC tablet, Take 1 tablet (81 mg) by mouth daily, Disp: , Rfl:     atorvastatin (LIPITOR) 80 MG  tablet, Take 1 tablet (80 mg) by mouth daily, Disp: 90 tablet, Rfl: 3    blood glucose (NO BRAND SPECIFIED) lancets standard, Use to test blood sugar 1 times daily., Disp: 200 each, Rfl: 3    blood glucose (NO BRAND SPECIFIED) test strip, Use to test blood sugar 1 times daily or as directed., Disp: 200 strip, Rfl: 3    blood glucose (NO BRAND SPECIFIED) test strip, Use to test blood sugar 1 times daily or as directed. To accompany: Blood Glucose Monitor Brands: per insurance., Disp: 200 strip, Rfl: 3    blood glucose monitoring (NO BRAND SPECIFIED) meter device kit, Use to test blood sugar 1 times daily or as directed., Disp: 1 kit, Rfl: 0    blood glucose monitoring (NO BRAND SPECIFIED) meter device kit, Use to test blood sugar 1 times daily or as directed. Preferred blood glucose meter OR supplies to accompany: Blood Glucose Monitor Brands: per insurance., Disp: 1 kit, Rfl: 0    bumetanide (BUMEX) 1 MG tablet, Bumetanide 1 mg in the morning and 0.5 mg in the afternoon., Disp: 135 tablet, Rfl: 3    carvedilol (COREG) 6.25 MG tablet, TAKE 0.5 TABLETS (3.125 MG) BY MOUTH 2 TIMES DAILY (WITH MEALS), Disp: 90 tablet, Rfl: 1    empagliflozin (JARDIANCE) 25 MG TABS tablet, Take 1 tablet (25 mg) by mouth daily, Disp: 90 tablet, Rfl: 1    EPINEPHrine (PRIMATENE MIST) 0.125 MG/ACT AERO, Inhale 1-2 sprays into the lungs every 4 hours as needed (for difficulty breathing), Disp: , Rfl:     ezetimibe (ZETIA) 10 MG tablet, Take 1 tablet (10 mg) by mouth daily, Disp: 90 tablet, Rfl: 3    glipiZIDE (GLUCOTROL) 10 MG tablet, Take 1 tablet (10 mg) by mouth 2 times daily, Disp: 180 tablet, Rfl: 1    levothyroxine (SYNTHROID/LEVOTHROID) 175 MCG tablet, TAKE 1 TABLET BY MOUTH EVERY DAY, Disp: 90 tablet, Rfl: 2    losartan (COZAAR) 100 MG tablet, Take 1 tablet (100 mg) by mouth daily, Disp: 90 tablet, Rfl: 2    Magnesium Oxide 250 MG tablet, TAKE 1 TABLET BY MOUTH ONCE DAILY, Disp: 90 tablet, Rfl: 0    metFORMIN (GLUCOPHAGE) 500 MG  "tablet, TAKE 2 TABLETS BY MOUTH 2 TIMES DAILY WITH MEALS, Disp: 360 tablet, Rfl: 3    sotalol (BETAPACE) 120 MG tablet, Take 0.5 tablets (60 mg) by mouth 2 times daily, Disp: 30 tablet, Rfl: 11    tamsulosin (FLOMAX) 0.4 MG capsule, TAKE 1 CAPSULE BY MOUTH EVERY DAY, Disp: 90 capsule, Rfl: 3    thin (NO BRAND SPECIFIED) lancets, Use with lanceting device to check glucose one time daily. To accompany: Blood Glucose Monitor Brands: per insurance., Disp: 200 each, Rfl: 3     Allergies   Allergen Reactions    Nka [No Known Allergies]           Lab Results    Chemistry CBC Cardiac Enzymes/BNP/TSH/INR   Recent Labs   Lab Test 02/21/24  0824      POTASSIUM 4.6   CHLORIDE 103   CO2 31*   *   BUN 38.3*   CR 1.43*   GFRESTIMATED 49*   MATT 9.4     Recent Labs   Lab Test 02/21/24  0824 11/14/23  0839 08/14/23  1604   CR 1.43* 1.19* 1.23*          Recent Labs   Lab Test 08/10/23  0521   WBC 5.0   HGB 12.3*   HCT 38.4*   MCV 96        Recent Labs   Lab Test 08/10/23  0521 08/09/23  0547 08/08/23  0518   HGB 12.3* 12.4* 12.0*    No results for input(s): \"TROPONINI\" in the last 06084 hours.  Recent Labs   Lab Test 08/07/23  1314 04/03/23  0839 04/11/22  1008   BNP  --   --  386*   NTBNPI 2,039*  --   --    NTBNP  --  765  --      Recent Labs   Lab Test 08/08/23  0518   TSH 8.40*     Recent Labs   Lab Test 04/03/23  0839   INR 1.08         Data Review    ECGs (tracings independently reviewed)  5/15/2023 - SR 70bpm, LBBB QRS 158ms, PVCs of RBLSA (R I, QS III>II, R->Rs V1->V6)  4/11/2023 - SR, LBBB QRS 156ms, bigeminal PVCs (RBLSA)  2/9/2024 - SR 74bpm, QRS 108ms, PVCs    Mobile cardiac telemetry monitoring from 3/27/2024 to 4/9/2024 (monitored duration 12d 22h 54m).  Predominant rhythm was sinus rhythm, 33 (4/7/2024 0131am) to 120bpm, average 48bpm.  No tachyarrhythmias.  No atrial fibrillation.  There were no pauses of over 3.0s.  First degree AV block.  IVCD.  Frequent supraventricular ectopic beats " (17%).  Frequent premature ventricular contractions (16%).  Symptom triggers (1) correlated to sinus rhythm with IVCD and bigeminal PVCs.    Holter monitoring from 2/23/2024 to 2/24/2024 (independently reviewed)  Predominant underlying rhythm was sinus rhythm, 45 (2:43pm) to 91bpm, average 61bpm.  No nonsustained or sustained tachyarrhythmias.  No atrial fibrillation.  There were no pauses of greater than 3 seconds.  Occasional supraventricular ectopic beats (4%).  Frequent premature ventricular contractions (36%).  No diary available for review.     7/15/2024 TTE  1.Left ventricular function is decreased. The ejection fraction is 50-55%  (borderline).  2.Normal right ventricle size and systolic function.  3.Bi atrial enlargement.  4.Aortic valve has been repaired by a 29 mm Washburn HELENA 3, that is not  well-visualized. Functioning well with a peak velocity of 2.1 m/sec and mean  gradient of 9 mmHg.  Compared to the prior study dated 3/4/2024, the LV EF is better.    3/4/2024 TTE  The left ventricle is mildly dilated.  Left ventricular function is decreased. The ejection fraction is 45-50%  (mildly reduced).  There is mild concentric left ventricular hypertrophy.  Diastolic Doppler findings (E/E' ratio and/or other parameters) suggest left  ventricular filling pressures are increased.  The left atrium is moderately dilated.  The right atrium is mildly dilated.  Borderline dilated aortic root.  Documented 29 mm Washburn HELENA 3 tissue valve in the aortic position that  appears to be functioning normally. Peak velocity 2.2 m/s. Mean gradient of 14  mmHg. No significant paravalvular leak.  Compared to prior study, there is no significant change.       Cc: Tamara Chowdhury PA-C, Geoffrey Live MD Amila Dilusha William, MD  7/22/2024  7:46 AM

## 2024-07-22 NOTE — LETTER
2024    Geoffrey Live MD  [address]  [facility] [address]    RE: Jayesh Ortiz       Dear Colleague,     I had the pleasure of seeing Jayesh Ortiz in the [facility].     [facility]  Cardiac Electrophysiology  [address]  [place] [address]   Office: [phone]  Fax: [phone]     Cardiac Electrophysiology Consultation    Patient: Jayesh Ortiz   : 1940     Referring Provider: Tamara Chowdhury PA-C  Primary Care Provider: Geoffrey Live MD    CHIEF COMPLAINT/REASON FOR VISIT  Premature ventricular contractions  Systolic heart failure (LVEF 45-50% by 3/4/2024 TTE)  LBBB    Assessment/Recommendations   Jayesh Ortiz is a 83 year old male with premature ventricular contractions, LBBB following TAVR, systolic heart failure (LVEF 45-50% by 3/4/2024 TTE), TAVR 2023, CKD referred by Tamara Chowdhury PA-C for consultation regarding PVCs.    Premature ventricular contractions - asymptomatic, LVEF 45-50% by 3/4/2024.  Prior LVEF 50-55% 2023 with LBBB, 12% PVCs around this time.  Unifocal of RBLSA (R I, QS III>II, R->Rs V1->V6) morphology consistent with basal inferoseptal site of origin.  36% burden by 2024 Holter monitoring.    Sotalol 3/20/2024 - present, 16% PVCs by 3/27/2024 MCT, LVEF improved to 50-55% by 7/15/2024 TTE  We reviewed PVCs and treatment options including observation, medical therapy (limited given LV dysfunction, LBBB, CKD), and PVC mapping and ablation.  We reviewed PVC mapping and ablation procedures, risks (including groin bleeding, stroke, tamponade, heart block) and recovery expectations.  He would prefer to continue medical therapy, with ablation thereafter if medications are ineffective.  - continue sotalol 60mg twice daily (reduced dose given borderline bradycardia, CKD)  - cardiology follow-up 1 year, consider 14d Zio (mail out) around that time, EP follow-up as needed    Systolic heart failure  - LVEF 50-55% by 7/15/2024 TTE with partial PVC suppression 16% burden.  LVEF previously 45-50% by 3/4/2024 TTE in the setting of 36% PVCs.  Time course consistent with association with PVCs, though cannot excluded LBBB-dyssynchrony mediated cardiomyopathy  - PVC management as above  - re-assess LV function after 2-3 months PVC suppression    LBBB - post TAVR 4/2023    Follow up: as above         History of Present Illness   Jayesh Ortiz is a 83 year old male with premature ventricular contractions, LBBB following TAVR, systolic heart failure (LVEF 45-50% by 3/4/2024 TTE), TAVR 4/4/2023, CKD referred by Tamara Chowdhury PA-C for consultation regarding PVCs.    Mr. Ortiz underwent TAVR 4/4/2023 with note of new LBBB thereafter.  He underwent MCT 4/2024 showing 12% PVCs, TTE 5/11/2023 showed LVEF 50-55%.  24hr Holter monitoring 2/23/2024 showed 36% PVCs, TTE 3/4/2024 showed LVEF 45-50%.     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  He denies chest pain, exertional dyspnea, palpitations.         Physical Examination  Review of Systems   VITALS: BP (!) 148/62 (BP Location: Right arm, Patient Position: Sitting, Cuff Size: Adult Large)   Pulse 56   Resp 14   Wt 112.9 kg (249 lb)   BMI 37.86 kg/m    Wt Readings from Last 3 Encounters:   03/25/24 119.7 kg (263 lb 12.8 oz)   03/20/24 119.3 kg (263 lb)   03/18/24 119.4 kg (263 lb 3.2 oz)     CONSTITUTIONAL: well nourished, comfortable, no distress  EYES:  Conjunctivae pink, sclerae clear.    E/N/T:  Oral mucosa pink  RESPIRATORY:  Respiratory effort is normal  CARDIOVASCULAR:  bradycardic ~40bpm, normal S1 and S2  GASTROINTESTINAL:  Abdomen without masses or tenderness  EXTREMITIES:  No clubbing or cyanosis.    MUSCULOSKELETAL:  Overall grossly normal muscle strength  SKIN:  Overall, skin warm and dry, no lesions.  NEURO/PSYCH:  Oriented x 3 with normal affect.   Constitutional:  No weight loss or loss of appetite    Eyes:  No difficulty with vision, no double vision, no dry eyes  ENT:   No sore throat, difficulty swallowing; changes in hearing or tinnitus  Cardiovascular: As detailed above  Respiratory:  No cough  Musculoskeletal  No joint pain, muscle aches  Neurologic:  No syncope, lightheadedness, fainting spells   Hematologic: No easy bruising, excessive bleeding tendency   Gastrointestinal:  No jaundice, abdominal pain or abdominal bloating  Genitourinary: No changes in urinary habits, no trouble urinating    Psychiatric: No anxiety or depression      Medical History  Surgical History   Past Medical History:   Diagnosis Date    Diabetes (H)     Diabetes mellitus, type 2 (H)     Hypertension     S/p TAVR (transcatheter aortic valve replacement), bioprosthetic     Thyroid disease     Past Surgical History:   Procedure Laterality Date    CLOSED REDUCTION, PERCUTANEOUS PINNING LOWER EXTREMITY, COMBINED Right 3/9/2017    Procedure: COMBINED CLOSED REDUCTION, PERCUTANEOUS PINNING LOWER EXTREMITY;  Surgeon: Ankit Coy DPM;  Location: [facility] OR    CV CORONARY ANGIOGRAM N/A 2/9/2023    Procedure: Coronary Angiogram;  Surgeon: Nabeel Davies MD;  Location: [facility] CV    CV TRANSCATHETER AORTIC VALVE REPLACEMENT-FEMORAL APPROACH N/A 4/4/2023    Procedure: Transcatheter Aortic Valve Replacement-Femoral Approach;  Surgeon: Nabeel Davies MD;  Location: [facility] LAB CV    OR TRANSCATHETER AORTIC VALVE REPLACEMENT, FEMORAL PERCUTANEOUS APPROACH (STANDBY) N/A 4/4/2023    Procedure: OR TRANSCATHETER AORTIC VALVE REPLACEMENT, FEMORAL PERCUTANEOUS APPROACH (STANDBY);  Surgeon: Jeramie De Jesus MD;  Location: [facility] LAB CV    THYROIDECTOMY           Family History Social History   Family History   Problem Relation Age of Onset    Heart Failure Mother     Heart Failure Father         Social History     Tobacco Use    Smoking status: Former    Smokeless tobacco: Never   Vaping Use    Vaping status: Never Used   Substance Use Topics    Alcohol use: Not Currently    Drug  use: Never         Medications  Allergies     Current Outpatient Medications:     aspirin (ASA) 81 MG EC tablet, Take 1 tablet (81 mg) by mouth daily, Disp: , Rfl:     atorvastatin (LIPITOR) 80 MG tablet, Take 1 tablet (80 mg) by mouth daily, Disp: 90 tablet, Rfl: 3    blood glucose (NO BRAND SPECIFIED) lancets standard, Use to test blood sugar 1 times daily., Disp: 200 each, Rfl: 3    blood glucose (NO BRAND SPECIFIED) test strip, Use to test blood sugar 1 times daily or as directed., Disp: 200 strip, Rfl: 3    blood glucose (NO BRAND SPECIFIED) test strip, Use to test blood sugar 1 times daily or as directed. To accompany: Blood Glucose Monitor Brands: per insurance., Disp: 200 strip, Rfl: 3    blood glucose monitoring (NO BRAND SPECIFIED) meter device kit, Use to test blood sugar 1 times daily or as directed., Disp: 1 kit, Rfl: 0    blood glucose monitoring (NO BRAND SPECIFIED) meter device kit, Use to test blood sugar 1 times daily or as directed. Preferred blood glucose meter OR supplies to accompany: Blood Glucose Monitor Brands: per insurance., Disp: 1 kit, Rfl: 0    bumetanide (BUMEX) 1 MG tablet, Bumetanide 1 mg in the morning and 0.5 mg in the afternoon., Disp: 135 tablet, Rfl: 3    carvedilol (COREG) 6.25 MG tablet, TAKE 0.5 TABLETS (3.125 MG) BY MOUTH 2 TIMES DAILY (WITH MEALS), Disp: 90 tablet, Rfl: 1    empagliflozin (JARDIANCE) 25 MG TABS tablet, Take 1 tablet (25 mg) by mouth daily, Disp: 90 tablet, Rfl: 1    EPINEPHrine (PRIMATENE MIST) 0.125 MG/ACT AERO, Inhale 1-2 sprays into the lungs every 4 hours as needed (for difficulty breathing), Disp: , Rfl:     ezetimibe (ZETIA) 10 MG tablet, Take 1 tablet (10 mg) by mouth daily, Disp: 90 tablet, Rfl: 3    glipiZIDE (GLUCOTROL) 10 MG tablet, Take 1 tablet (10 mg) by mouth 2 times daily, Disp: 180 tablet, Rfl: 1    levothyroxine (SYNTHROID/LEVOTHROID) 175 MCG tablet, TAKE 1 TABLET BY MOUTH EVERY DAY, Disp: 90 tablet, Rfl: 2    losartan (COZAAR) 100 MG  "tablet, Take 1 tablet (100 mg) by mouth daily, Disp: 90 tablet, Rfl: 2    Magnesium Oxide 250 MG tablet, TAKE 1 TABLET BY MOUTH ONCE DAILY, Disp: 90 tablet, Rfl: 0    metFORMIN (GLUCOPHAGE) 500 MG tablet, TAKE 2 TABLETS BY MOUTH 2 TIMES DAILY WITH MEALS, Disp: 360 tablet, Rfl: 3    sotalol (BETAPACE) 120 MG tablet, Take 0.5 tablets (60 mg) by mouth 2 times daily, Disp: 30 tablet, Rfl: 11    tamsulosin (FLOMAX) 0.4 MG capsule, TAKE 1 CAPSULE BY MOUTH EVERY DAY, Disp: 90 capsule, Rfl: 3    thin (NO BRAND SPECIFIED) lancets, Use with lanceting device to check glucose one time daily. To accompany: Blood Glucose Monitor Brands: per insurance., Disp: 200 each, Rfl: 3     Allergies   Allergen Reactions    Nka [No Known Allergies]           Lab Results    Chemistry CBC Cardiac Enzymes/BNP/TSH/INR   Recent Labs   Lab Test 02/21/24  0824      POTASSIUM 4.6   CHLORIDE 103   CO2 31*   *   BUN 38.3*   CR 1.43*   GFRESTIMATED 49*   MATT 9.4     Recent Labs   Lab Test 02/21/24  0824 11/14/23  0839 08/14/23  1604   CR 1.43* 1.19* 1.23*          Recent Labs   Lab Test 08/10/23  0521   WBC 5.0   HGB 12.3*   HCT 38.4*   MCV 96        Recent Labs   Lab Test 08/10/23  0521 08/09/23  0547 08/08/23  0518   HGB 12.3* 12.4* 12.0*    No results for input(s): \"TROPONINI\" in the last 60517 hours.  Recent Labs   Lab Test 08/07/23  1314 04/03/23  0839 04/11/22  1008   BNP  --   --  386*   NTBNPI 2,039*  --   --    NTBNP  --  765  --      Recent Labs   Lab Test 08/08/23  0518   TSH 8.40*     Recent Labs   Lab Test 04/03/23  0839   INR 1.08         Data Review    ECGs (tracings independently reviewed)  5/15/2023 - SR 70bpm, LBBB QRS 158ms, PVCs of RBLSA (R I, QS III>II, R->Rs V1->V6)  4/11/2023 - SR, LBBB QRS 156ms, bigeminal PVCs (RBLSA)  2/9/2024 - SR 74bpm, QRS 108ms, PVCs    Mobile cardiac telemetry monitoring from 3/27/2024 to 4/9/2024 (monitored duration 12d 22h 54m).  Predominant rhythm was sinus rhythm, 33 (4/7/2024 " 0131am) to 120bpm, average 48bpm.  No tachyarrhythmias.  No atrial fibrillation.  There were no pauses of over 3.0s.  First degree AV block.  IVCD.  Frequent supraventricular ectopic beats (17%).  Frequent premature ventricular contractions (16%).  Symptom triggers (1) correlated to sinus rhythm with IVCD and bigeminal PVCs.    Holter monitoring from 2/23/2024 to 2/24/2024 (independently reviewed)  Predominant underlying rhythm was sinus rhythm, 45 (2:43pm) to 91bpm, average 61bpm.  No nonsustained or sustained tachyarrhythmias.  No atrial fibrillation.  There were no pauses of greater than 3 seconds.  Occasional supraventricular ectopic beats (4%).  Frequent premature ventricular contractions (36%).  No diary available for review.     7/15/2024 TTE  1.Left ventricular function is decreased. The ejection fraction is 50-55%  (borderline).  2.Normal right ventricle size and systolic function.  3.Bi atrial enlargement.  4.Aortic valve has been repaired by a 29 mm Washburn HELENA 3, that is not  well-visualized. Functioning well with a peak velocity of 2.1 m/sec and mean  gradient of 9 mmHg.  Compared to the prior study dated 3/4/2024, the LV EF is better.    3/4/2024 TTE  The left ventricle is mildly dilated.  Left ventricular function is decreased. The ejection fraction is 45-50%  (mildly reduced).  There is mild concentric left ventricular hypertrophy.  Diastolic Doppler findings (E/E' ratio and/or other parameters) suggest left  ventricular filling pressures are increased.  The left atrium is moderately dilated.  The right atrium is mildly dilated.  Borderline dilated aortic root.  Documented 29 mm Washburn HELENA 3 tissue valve in the aortic position that  appears to be functioning normally. Peak velocity 2.2 m/s. Mean gradient of 14  mmHg. No significant paravalvular leak.  Compared to prior study, there is no significant change.       Cc: Tamara Chowdhury PA-C, Geoffrey Live MD Amila Dilusha William,  MD  7/22/2024  7:46 AM          Thank you for allowing me to participate in the care of your patient.      Sincerely,     Martin Bowen MD     [facility]  cc:   Martin Bowen MD  [address]  [place] [address]

## 2024-08-05 NOTE — TELEPHONE ENCOUNTER
Spouse Dorita calling to request that the following medications be added and approved to his medication list if approved by provider:    CoQ10 Liquid: 100mL  Gummies - Tumeric Complex: 500mg, 50mg Julissa    Please advise if these can be approved for patient without appointment. They would like to know the best time of day for these medications if it will have any interactions with current medications.    Please leave message if patient does not answer.  Pearl Xiao RN on 8/5/2024 at 11:27 AM

## 2024-08-15 NOTE — TELEPHONE ENCOUNTER
Please follow-up with Dorita. There are no major interactions noted but supplements should be taken with caution as they are not regulated by the FDA. He can take the supplements. I do encourage a follow-up visit with me in September for a 6 month check. We can review in greater detail then.

## 2024-10-01 NOTE — TELEPHONE ENCOUNTER
"Removed \"as directed\" from the sig. Sent to PCP to sign if appropriate,.    Richie Oneal RN     [facility]    "

## 2024-10-01 NOTE — TELEPHONE ENCOUNTER
"Fax from pharmacy requesting to move \" or as directed\" from direction for One touch verio test strips from 7/2/24. That addition will not pass a medicare audit. Direction must be direct use to test once daily.   "

## 2024-10-03 DIAGNOSIS — E11.9 TYPE 2 DIABETES MELLITUS WITHOUT COMPLICATIONS (H): ICD-10-CM

## 2024-10-03 DIAGNOSIS — E78.5 HYPERLIPIDEMIA, UNSPECIFIED: ICD-10-CM

## 2024-10-03 RX ORDER — GLIPIZIDE 10 MG/1
10 TABLET ORAL 2 TIMES DAILY
Qty: 180 TABLET | Refills: 1 | Status: ON HOLD | OUTPATIENT
Start: 2024-10-03 | End: 2024-11-03

## 2024-10-03 RX ORDER — ATORVASTATIN CALCIUM 80 MG/1
80 TABLET, FILM COATED ORAL DAILY
Qty: 90 TABLET | Refills: 2 | Status: ON HOLD | OUTPATIENT
Start: 2024-10-03 | End: 2024-11-03

## 2024-10-03 NOTE — TELEPHONE ENCOUNTER
"Per 7/22/24 office visit with EP: \" cardiology follow-up 1 year, consider luis daniel López (mail out) around that time, EP follow-up as needed\". 90 day supply sent with 2 refills. LMS  "

## 2024-10-11 ENCOUNTER — VIRTUAL VISIT (OUTPATIENT)
Dept: INTERNAL MEDICINE | Facility: CLINIC | Age: 84
End: 2024-10-11
Payer: MEDICARE

## 2024-10-11 ENCOUNTER — TELEPHONE (OUTPATIENT)
Dept: FAMILY MEDICINE | Facility: CLINIC | Age: 84
End: 2024-10-11

## 2024-10-11 DIAGNOSIS — G47.00 ACUTE INSOMNIA: Primary | ICD-10-CM

## 2024-10-11 PROCEDURE — 99441 PR PHYSICIAN TELEPHONE EVALUATION 5-10 MIN: CPT | Mod: 93 | Performed by: INTERNAL MEDICINE

## 2024-10-11 RX ORDER — HYDROXYZINE HYDROCHLORIDE 25 MG/1
25 TABLET, FILM COATED ORAL
Qty: 30 TABLET | Refills: 0 | Status: SHIPPED | OUTPATIENT
Start: 2024-10-11 | End: 2024-11-04

## 2024-10-11 NOTE — TELEPHONE ENCOUNTER
Thanks for the update. I can make sure to see him next week and move his appointment up if needed. Agree with visit today if there are concerns.

## 2024-10-11 NOTE — PROGRESS NOTES
Jayesh is a 84 year old who is being evaluated via a billable telephone visit.    What phone number would you like to be contacted at? [phone]  How would you like to obtain your AVS? Mail a copy  Originating Location (pt. Location): Home  Distant Location (provider location):  On-site    Assessment & Plan   Acute insomnia  Acute onset. Unclear etiology. Discussed possible causes, including thyroid (on replacement, has history of thyroid cancer) vs cardiac (history of cardiac surgery and decreased EF) vs psychiatric vs other. He denies CP, SOB, orthopnea. Denies stress or mood symptoms. Has been taking OTC sleep aids without relief. Discussed work-up to look into cause of acute insomnia, but today he tells me he'd just like to try a sleep aid and if that doesn't work he'll follow-up with his regular PCP for further work-up. I would avoid trazodone in patient with significant cardiac history given QTc prolonging effects. Will trial hydroxyzine PRN.  - hydrOXYzine HCl (ATARAX) 25 MG tablet; Take 1 tablet (25 mg) by mouth nightly as needed for anxiety (sleep).    F/u with regular PCP at regular interval or sooner PRN    Signed Electronically by:  Thomas Fonseca MD, MPH  [facility]  Internal Medicine    Subjective   Jayesh is a 84 year old who presents for a same day acute care virtual telephone visit with chief concern of: sleep concern. This is the first time I have met Jayesh, who typically gets care at clinics closer to his residence. Started 2 weeks ago, can't get much sleep. Has had this once before 2 years ago. Happened for 1 week at that time and went away on its own. Ongoing for 2 weeks now. Has tried OTC sleep aids with no luck. Would like to discuss sleep aid.        Objective       Vitals: No vitals were obtained today due to virtual visit.    Physical Exam   General: Alert and no distress //Respiratory: No audible wheeze, cough, or shortness of breath // Psychiatric:   Appropriate affect, tone, and pace of words    Phone call duration: 6 minutes

## 2024-10-11 NOTE — TELEPHONE ENCOUNTER
"  General Call    Contacts       Contact Date/Time Type Contact Phone/Fax    10/11/2024 11:23 AM CDT Phone (Incoming) MARISABEL CHU (Emergency Contact) [phone] (H)          Reason for Call:  patient's significant other called in (on consent to communicate). Told me that she is very worried about Jayesh because he told her this morning \"I don't know if I am going to make it.\" She told me that she is very worried because she stated he has never said this before. Told me that he is still working and she thinks he has been having troubles sleeping because work is slow and it usually is not. Stated there are no money issues what so ever but he is still worried. Patient is scheduled for virtual visit this afternoon. Is scheduled with Dr. Live next week to talk about this more.       Okay to leave a detailed message?: Yes at Cell number on file:    Telephone Information:   Mobile [phone]      "

## 2024-10-21 ENCOUNTER — HOSPITAL ENCOUNTER (EMERGENCY)
Facility: CLINIC | Age: 84
Discharge: HOME | End: 2024-10-21
Payer: MEDICARE

## 2024-10-21 ENCOUNTER — APPOINTMENT (OUTPATIENT)
Dept: CT IMAGING | Facility: CLINIC | Age: 84
End: 2024-10-21
Payer: MEDICARE

## 2024-10-21 ENCOUNTER — NURSE TRIAGE (OUTPATIENT)
Dept: FAMILY MEDICINE | Facility: CLINIC | Age: 84
End: 2024-10-21

## 2024-10-21 VITALS
OXYGEN SATURATION: 97 % | WEIGHT: 245 LBS | HEIGHT: 68 IN | DIASTOLIC BLOOD PRESSURE: 64 MMHG | RESPIRATION RATE: 20 BRPM | BODY MASS INDEX: 37.13 KG/M2 | SYSTOLIC BLOOD PRESSURE: 128 MMHG | TEMPERATURE: 97.2 F | HEART RATE: 50 BPM

## 2024-10-21 DIAGNOSIS — K22.89 ESOPHAGEAL THICKENING: ICD-10-CM

## 2024-10-21 DIAGNOSIS — R53.81 MALAISE AND FATIGUE: ICD-10-CM

## 2024-10-21 DIAGNOSIS — R59.0 ABDOMINAL LYMPHADENOPATHY: ICD-10-CM

## 2024-10-21 DIAGNOSIS — K76.9 LIVER LESION: ICD-10-CM

## 2024-10-21 DIAGNOSIS — R91.8 PULMONARY NODULES: ICD-10-CM

## 2024-10-21 DIAGNOSIS — R53.83 MALAISE AND FATIGUE: ICD-10-CM

## 2024-10-21 DIAGNOSIS — R63.4 UNINTENTIONAL WEIGHT LOSS: ICD-10-CM

## 2024-10-21 LAB
ALBUMIN SERPL BCG-MCNC: 3.1 G/DL (ref 3.5–5.2)
ALBUMIN UR-MCNC: NEGATIVE MG/DL
ALP SERPL-CCNC: 323 U/L (ref 40–150)
ALT SERPL W P-5'-P-CCNC: 132 U/L (ref 0–70)
ANION GAP SERPL CALCULATED.3IONS-SCNC: 14 MMOL/L (ref 7–15)
APPEARANCE UR: CLEAR
AST SERPL W P-5'-P-CCNC: 164 U/L (ref 0–45)
BASOPHILS # BLD AUTO: 0.1 10E3/UL (ref 0–0.2)
BASOPHILS NFR BLD AUTO: 1 %
BILIRUB SERPL-MCNC: 0.6 MG/DL
BILIRUB UR QL STRIP: NEGATIVE
BUN SERPL-MCNC: 57.6 MG/DL (ref 8–23)
CALCIUM SERPL-MCNC: 8.2 MG/DL (ref 8.8–10.4)
CHLORIDE SERPL-SCNC: 100 MMOL/L (ref 98–107)
COLOR UR AUTO: YELLOW
CREAT SERPL-MCNC: 2.13 MG/DL (ref 0.67–1.17)
EGFRCR SERPLBLD CKD-EPI 2021: 30 ML/MIN/1.73M2
EOSINOPHIL # BLD AUTO: 0.1 10E3/UL (ref 0–0.7)
EOSINOPHIL NFR BLD AUTO: 1 %
ERYTHROCYTE [DISTWIDTH] IN BLOOD BY AUTOMATED COUNT: 12.6 % (ref 10–15)
FLUAV RNA SPEC QL NAA+PROBE: NEGATIVE
FLUBV RNA RESP QL NAA+PROBE: NEGATIVE
GLUCOSE SERPL-MCNC: 246 MG/DL (ref 70–99)
GLUCOSE UR STRIP-MCNC: NEGATIVE MG/DL
HCO3 SERPL-SCNC: 22 MMOL/L (ref 22–29)
HCT VFR BLD AUTO: 33.3 % (ref 40–53)
HGB BLD-MCNC: 10.6 G/DL (ref 13.3–17.7)
HGB UR QL STRIP: ABNORMAL
HOLD SPECIMEN: NORMAL
HYALINE CASTS: 27 /LPF
IMM GRANULOCYTES # BLD: 0.1 10E3/UL
IMM GRANULOCYTES NFR BLD: 1 %
KETONES UR STRIP-MCNC: NEGATIVE MG/DL
LEUKOCYTE ESTERASE UR QL STRIP: ABNORMAL
LIPASE SERPL-CCNC: 20 U/L (ref 13–60)
LYMPHOCYTES # BLD AUTO: 0.7 10E3/UL (ref 0.8–5.3)
LYMPHOCYTES NFR BLD AUTO: 9 %
MCH RBC QN AUTO: 30.5 PG (ref 26.5–33)
MCHC RBC AUTO-ENTMCNC: 31.8 G/DL (ref 31.5–36.5)
MCV RBC AUTO: 96 FL (ref 78–100)
MONOCYTES # BLD AUTO: 0.8 10E3/UL (ref 0–1.3)
MONOCYTES NFR BLD AUTO: 10 %
MUCOUS THREADS #/AREA URNS LPF: PRESENT /LPF
NEUTROPHILS # BLD AUTO: 6.1 10E3/UL (ref 1.6–8.3)
NEUTROPHILS NFR BLD AUTO: 79 %
NITRATE UR QL: NEGATIVE
NRBC # BLD AUTO: 0 10E3/UL
NRBC BLD AUTO-RTO: 0 /100
PH UR STRIP: 5 [PH] (ref 5–7)
PLATELET # BLD AUTO: 253 10E3/UL (ref 150–450)
POTASSIUM SERPL-SCNC: 5.2 MMOL/L (ref 3.4–5.3)
PROT SERPL-MCNC: 6.3 G/DL (ref 6.4–8.3)
RBC # BLD AUTO: 3.48 10E6/UL (ref 4.4–5.9)
RBC URINE: 2 /HPF
RSV RNA SPEC NAA+PROBE: NEGATIVE
SARS-COV-2 RNA RESP QL NAA+PROBE: NEGATIVE
SODIUM SERPL-SCNC: 136 MMOL/L (ref 135–145)
SP GR UR STRIP: 1.01 (ref 1–1.03)
SQUAMOUS EPITHELIAL: 1 /HPF
T4 FREE SERPL-MCNC: 1.48 NG/DL (ref 0.9–1.7)
TSH SERPL DL<=0.005 MIU/L-ACNC: 7.44 UIU/ML (ref 0.3–4.2)
UROBILINOGEN UR STRIP-MCNC: NORMAL MG/DL
WBC # BLD AUTO: 7.8 10E3/UL (ref 4–11)
WBC URINE: 9 /HPF

## 2024-10-21 PROCEDURE — 99285 EMERGENCY DEPT VISIT HI MDM: CPT | Performed by: STUDENT IN AN ORGANIZED HEALTH CARE EDUCATION/TRAINING PROGRAM

## 2024-10-21 PROCEDURE — 93005 ELECTROCARDIOGRAM TRACING: CPT | Performed by: STUDENT IN AN ORGANIZED HEALTH CARE EDUCATION/TRAINING PROGRAM

## 2024-10-21 PROCEDURE — 87086 URINE CULTURE/COLONY COUNT: CPT

## 2024-10-21 PROCEDURE — 84439 ASSAY OF FREE THYROXINE: CPT

## 2024-10-21 PROCEDURE — 96360 HYDRATION IV INFUSION INIT: CPT | Mod: 59 | Performed by: STUDENT IN AN ORGANIZED HEALTH CARE EDUCATION/TRAINING PROGRAM

## 2024-10-21 PROCEDURE — 81001 URINALYSIS AUTO W/SCOPE: CPT

## 2024-10-21 PROCEDURE — 87637 SARSCOV2&INF A&B&RSV AMP PRB: CPT

## 2024-10-21 PROCEDURE — 99285 EMERGENCY DEPT VISIT HI MDM: CPT | Mod: 25 | Performed by: STUDENT IN AN ORGANIZED HEALTH CARE EDUCATION/TRAINING PROGRAM

## 2024-10-21 PROCEDURE — 36415 COLL VENOUS BLD VENIPUNCTURE: CPT

## 2024-10-21 PROCEDURE — 93010 ELECTROCARDIOGRAM REPORT: CPT | Performed by: STUDENT IN AN ORGANIZED HEALTH CARE EDUCATION/TRAINING PROGRAM

## 2024-10-21 PROCEDURE — 85004 AUTOMATED DIFF WBC COUNT: CPT

## 2024-10-21 PROCEDURE — 84443 ASSAY THYROID STIM HORMONE: CPT

## 2024-10-21 PROCEDURE — 250N000009 HC RX 250

## 2024-10-21 PROCEDURE — 96361 HYDRATE IV INFUSION ADD-ON: CPT | Performed by: STUDENT IN AN ORGANIZED HEALTH CARE EDUCATION/TRAINING PROGRAM

## 2024-10-21 PROCEDURE — 80053 COMPREHEN METABOLIC PANEL: CPT

## 2024-10-21 PROCEDURE — 258N000003 HC RX IP 258 OP 636

## 2024-10-21 PROCEDURE — 74177 CT ABD & PELVIS W/CONTRAST: CPT | Mod: MA

## 2024-10-21 PROCEDURE — 250N000011 HC RX IP 250 OP 636

## 2024-10-21 PROCEDURE — 83690 ASSAY OF LIPASE: CPT

## 2024-10-21 RX ORDER — IOPAMIDOL 755 MG/ML
120 INJECTION, SOLUTION INTRAVASCULAR ONCE
Status: COMPLETED | OUTPATIENT
Start: 2024-10-21 | End: 2024-10-21

## 2024-10-21 RX ORDER — IOPAMIDOL 755 MG/ML
84 INJECTION, SOLUTION INTRAVASCULAR ONCE
Status: DISCONTINUED | OUTPATIENT
Start: 2024-10-21 | End: 2024-10-22 | Stop reason: HOSPADM

## 2024-10-21 RX ADMIN — SODIUM CHLORIDE, POTASSIUM CHLORIDE, SODIUM LACTATE AND CALCIUM CHLORIDE 1000 ML: 600; 310; 30; 20 INJECTION, SOLUTION INTRAVENOUS at 20:13

## 2024-10-21 RX ADMIN — SODIUM CHLORIDE 70 ML: 9 INJECTION, SOLUTION INTRAVENOUS at 20:43

## 2024-10-21 RX ADMIN — IOPAMIDOL 120 ML: 755 INJECTION, SOLUTION INTRAVENOUS at 20:43

## 2024-10-21 ASSESSMENT — COLUMBIA-SUICIDE SEVERITY RATING SCALE - C-SSRS
6. HAVE YOU EVER DONE ANYTHING, STARTED TO DO ANYTHING, OR PREPARED TO DO ANYTHING TO END YOUR LIFE?: NO
1. IN THE PAST MONTH, HAVE YOU WISHED YOU WERE DEAD OR WISHED YOU COULD GO TO SLEEP AND NOT WAKE UP?: NO
2. HAVE YOU ACTUALLY HAD ANY THOUGHTS OF KILLING YOURSELF IN THE PAST MONTH?: NO

## 2024-10-21 ASSESSMENT — ACTIVITIES OF DAILY LIVING (ADL)
ADLS_ACUITY_SCORE: 37
ADLS_ACUITY_SCORE: 35

## 2024-10-21 NOTE — ED TRIAGE NOTES
"Presents with multiple complaints-  weakness, fatigue, diarrhea and difficulty sleeping x 4 weeks.  Patient reports initially things started with not being able to sleep for 4 nights, which is abnormal patient called MD and was given sleeping medication.  Patient reports a few days later started having diarrhea 1-2 times/day.  Patient has been taking immodium.  Patient denies chills, fever or vomiting.  Patient has been able to both eat and drink, although reports decreased appetite.  States \"things haven't tasted very well in last week or so.\"     Triage Assessment (Adult)       Row Name 10/21/24 7205          Triage Assessment    Airway WDL WDL        Respiratory WDL    Respiratory WDL WDL        Skin Circulation/Temperature WDL    Skin Circulation/Temperature WDL WDL        Cardiac WDL    Cardiac WDL WDL        Peripheral/Neurovascular WDL    Peripheral Neurovascular WDL WDL                     "

## 2024-10-21 NOTE — TELEPHONE ENCOUNTER
"Nurse Triage SBAR    Is this a 2nd Level Triage? YES, LICENSED PRACTITIONER REVIEW IS REQUIRED    Situation: diarrhea for 2 weeks    Background:   Past Medical History:   Diagnosis Date    Diabetes (H)     Diabetes mellitus, type 2 (H)     Hypertension     S/p TAVR (transcatheter aortic valve replacement), bioprosthetic     Thyroid disease      Assessment: Diarrhea started 2 weeks ago and is described as \"explosive\" and states that it smells very bad. He is going 3-4 times per day and stool is liquid consistency. No blood noted. No fever, no abdominal pain. Patient does endorse weakness, lightheadedness and dry mouth.     Protocol Recommended Disposition:   Call ADS/Go to ED/UCC Now (Or To Office with PCP Approval)    Recommendation: Advised patient to go to UC. Patient agreeable and will go in.     Routed to provider    Does the patient meet one of the following criteria for ADS visit consideration? 16+ years old, with an [facility] PCP     TIP  Providers, please consider if this condition is appropriate for management at one of our Acute and Diagnostic Services sites.     If patient is a good candidate, please use dotphrase <dot>triageresponse and select Refer to ADS to document.      Reason for Disposition   Drinking very little and dehydration suspected (e.g., no urine > 12 hours, very dry mouth, very lightheaded)    Protocols used: Diarrhea-A-OH    "

## 2024-10-21 NOTE — ED PROVIDER NOTES
Jayesh Ortiz is a 84-year-old male presenting with complaints of generalized weakness, diarrhea, fatigue, and disorientation for the past 4 weeks.  Patient has history of type 2 diabetes mellitus, cardiomyopathy, CAD, aortic stenosis s/p TAVR, hypothyroidism, CHF.  Given patient's history, I recommended he be evaluated in the emergency department.  We discussed that he will likely require further testing and/or treatment beyond the capabilities of the current urgent care setting.  Patient expresses understanding of this and agreement with the plan.     Leydi Carrasco PA-C  10/21/24 1820

## 2024-10-22 ENCOUNTER — PATIENT OUTREACH (OUTPATIENT)
Dept: ONCOLOGY | Facility: CLINIC | Age: 84
End: 2024-10-22
Payer: MEDICARE

## 2024-10-22 ENCOUNTER — TELEPHONE (OUTPATIENT)
Dept: FAMILY MEDICINE | Facility: CLINIC | Age: 84
End: 2024-10-22
Payer: MEDICARE

## 2024-10-22 NOTE — TELEPHONE ENCOUNTER
General Call    Contacts       Contact Date/Time Type Contact Phone/Fax    10/22/2024 03:12 PM CDT Phone (Incoming) OrtizJayesh kathleen (Self) [phone] (M)          Reason for Call:  patient's significant other called in and wanted to let us know that Polina the RN did a wonderful job of helping them yesterday. She stated they found out he has cancer and she did a really well job of telling them that he urgently needed to be seen.   She had no further questions.

## 2024-10-22 NOTE — PROGRESS NOTES
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  Circumferential wall thickening of the distal esophagus, which could be attributable to an underlying esophagitis or primary esophageal malignancy. Endoscopic correlation is recommended.  2.  Multiple hepatic lesions, highly suspicious for metastatic disease.  3.  Upper abdominal lymphadenopathy, suspicious for venessa metastatic disease.  4.  Few sub-6 mm pulmonary nodules, new from prior and also suspicious for metastatic disease.  5.  Cirrhosis.  6.  Cholelithiasis.  7.  Colonic diverticulosis.     Findings of suspected malignancy discussed verbally via telephone with CLEVELAND Salinas at 10:05 PM on 10/21/2024.      Admission on 10/21/2024, Discharged on 10/21/2024   Component Date Value Ref Range Status    Sodium 10/21/2024 136  135 - 145 mmol/L Final    Potassium 10/21/2024 5.2  3.4 - 5.3 mmol/L Final    Carbon Dioxide (CO2) 10/21/2024 22  22 - 29 mmol/L Final    Anion Gap 10/21/2024 14  7 - 15 mmol/L Final    Urea Nitrogen 10/21/2024 57.6 (H)  8.0 - 23.0 mg/dL Final    Creatinine 10/21/2024 2.13 (H)  0.67 - 1.17 mg/dL Final    GFR Estimate 10/21/2024 30 (L)  >60 mL/min/1.73m2 Final    eGFR calculated using 2021 CKD-EPI equation.    Calcium 10/21/2024 8.2 (L)  8.8 - 10.4 mg/dL Final    Reference intervals for this test were updated on 7/16/2024 to reflect our healthy population more accurately. There may be differences in the flagging of prior results with similar values performed with this method. Those prior results can  be interpreted in the context of the updated reference intervals.    Chloride 10/21/2024 100  98 - 107 mmol/L Final    Glucose 10/21/2024 246 (H)  70 - 99 mg/dL Final    Alkaline Phosphatase 10/21/2024 323 (H)  40 - 150 U/L Final    AST 10/21/2024 164 (H)  0 - 45 U/L Final    ALT 10/21/2024 132 (H)  0 - 70 U/L Final    Protein Total 10/21/2024 6.3 (L)  6.4 - 8.3 g/dL Final    Albumin 10/21/2024 3.1 (L)  3.5 - 5.2 g/dL Final    Bilirubin Total 10/21/2024 0.6  <=1.2 mg/dL Final    Lipase 10/21/2024 20  13 - 60 U/L Final    Color Urine 10/21/2024 Yellow  Colorless, Straw, Light Yellow, Yellow Final    Appearance Urine 10/21/2024 Clear  Clear Final    Glucose Urine 10/21/2024 Negative  Negative mg/dL Final    Bilirubin Urine 10/21/2024 Negative  Negative Final    Ketones Urine 10/21/2024 Negative  Negative mg/dL Final    Specific Gravity Urine 10/21/2024 1.010  1.003 - 1.035 Final    Blood Urine 10/21/2024 Small (A)  Negative Final    pH Urine 10/21/2024 5.0  5.0 - 7.0 Final    Protein Albumin Urine 10/21/2024 Negative  Negative mg/dL Final    Urobilinogen Urine 10/21/2024 Normal  Normal, 2.0 mg/dL Final    Nitrite Urine 10/21/2024 Negative  Negative Final    Leukocyte Esterase Urine 10/21/2024 Moderate (A)  Negative Final    Mucus Urine 10/21/2024 Present (A)  None Seen /LPF Final    RBC Urine 10/21/2024 2  <=2 /HPF Final    WBC Urine 10/21/2024 9 (H)  <=5 /HPF Final    Squamous Epithelials Urine 10/21/2024 1  <=1 /HPF Final    Hyaline Casts Urine 10/21/2024 27 (H)  <=2 /LPF Final    TSH 10/21/2024 7.44 (H)  0.30 - 4.20 uIU/mL Final    WBC Count 10/21/2024 7.8  4.0 - 11.0 10e3/uL Final    RBC Count 10/21/2024 3.48 (L)  4.40 - 5.90 10e6/uL Final    Hemoglobin 10/21/2024 10.6 (L)  13.3 - 17.7 g/dL Final    Hematocrit 10/21/2024 33.3 (L)  40.0 - 53.0 % Final    MCV 10/21/2024 96  78 - 100 fL Final    MCH 10/21/2024 30.5  26.5 - 33.0 pg Final    MCHC 10/21/2024 31.8  31.5 - 36.5 g/dL Final    RDW 10/21/2024 12.6  10.0 -  15.0 % Final    Platelet Count 10/21/2024 253  150 - 450 10e3/uL Final    % Neutrophils 10/21/2024 79  % Final    % Lymphocytes 10/21/2024 9  % Final    % Monocytes 10/21/2024 10  % Final    % Eosinophils 10/21/2024 1  % Final    % Basophils 10/21/2024 1  % Final    % Immature Granulocytes 10/21/2024 1  % Final    NRBCs per 100 WBC 10/21/2024 0  <1 /100 Final    Absolute Neutrophils 10/21/2024 6.1  1.6 - 8.3 10e3/uL Final    Absolute Lymphocytes 10/21/2024 0.7 (L)  0.8 - 5.3 10e3/uL Final    Absolute Monocytes 10/21/2024 0.8  0.0 - 1.3 10e3/uL Final    Absolute Eosinophils 10/21/2024 0.1  0.0 - 0.7 10e3/uL Final    Absolute Basophils 10/21/2024 0.1  0.0 - 0.2 10e3/uL Final    Absolute Immature Granulocytes 10/21/2024 0.1  <=0.4 10e3/uL Final    Absolute NRBCs 10/21/2024 0.0  10e3/uL Final    Influenza A PCR 10/21/2024 Negative  Negative Final    Influenza B PCR 10/21/2024 Negative  Negative Final    RSV PCR 10/21/2024 Negative  Negative Final    SARS CoV2 PCR 10/21/2024 Negative  Negative Final    NEGATIVE: SARS-CoV-2 (COVID-19) RNA not detected, presumed negative.    Free T4 10/21/2024 1.48  0.90 - 1.70 ng/dL Final         Clinical Assessment / Barriers to Care (Per Nurse):    Pt left ED without CT chest. 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      Pt's lives in [facility], closest to our [facility]. Dr Friedell has availability Fri 10/25/24 at 11am, in person. Will offer appt to pt, or schedule per pt preference.     Records Location: [facility]     Records Needed:     N/A    Additional testing needed prior to consult:     CT chest prior to Onc or after Onc (as ordered by ED team)    Referral updates and Plan:     Pt was contacted and VM left to call back New Intake line to schedule with Oncology. Awaiting call back.       Jonatan Geiger, RN, BSN, OCN  Oncology New Patient Nurse Navigator   [facility]  [phone]

## 2024-10-22 NOTE — DISCHARGE INSTRUCTIONS
As we discussed, we are concerned that you have undiagnosed cancer.  It is unclear where the primary cancer could be.  We discussed getting more imaging in the ER versus going home and you have opted to go home and that is reasonable.  Make sure you follow-up with your regular doctor as soon as you are able.  I have also referred you to follow-up with oncology and you will get a call to schedule this appointment.  Return to the ER with any new or worsening symptoms.

## 2024-10-22 NOTE — ED PROVIDER NOTES
History     Chief Complaint   Patient presents with    Generalized Weakness    Dizziness     Dizziness, weakness and fatigue       Jayesh Ortiz is a 84 year old male with significant pmhx of T2DM, hypothyroidism, HLD, HTN, cardiomyopathy, CAD s/p bypass, aortic stenosis s/p TAVR who presents for evaluation of generalized fatigue.  Patient states about 4 weeks ago he had a 4-day stretch where he had insomnia and could not fall asleep.  His primary care provider prescribed him a medication to take, and he states this resolved his insomnia.  However, since this stretch of difficulty sleeping he has since developed generalized fatigue, decreased appetite, and intermittent diarrhea.  He has been treating the diarrhea with Imodium and Pepto-Bismol.  He states whenever he takes this his diarrhea improves for 3-4 days before returning.  He states after pepto bismol his stools are black but clear to brown within a few days. His partner Dorita who is present at this visit also states he has had a 90 pound weight loss over the last year.  Patient states that he was trying to watch his diet, but Dorita is concerned that no major changes took place and he has continued to lose weight.  He denies associated headache, vision changes, sore throat, cough, congestion, chest pain, shortness of breath, orthopnea, nausea/vomiting, abdominal pain, dysuria, hematuria, leg swelling or pain.  He also noted intermittent episodes of orthostatic lightheadedness which lasted for seconds when he would go from sitting to standing, but he states after he adjusted his glipizide the symptom went away and he is no longer having any dizziness.    Allergies:  Allergies   Allergen Reactions    Nka [No Known Allergies]        Problem List:    Patient Active Problem List    Diagnosis Date Noted    Morbid obesity (H) 03/24/2017     Priority: High    PVC's (premature ventricular contractions) 03/20/2024     Priority: Medium    LBBB (left bundle branch block)  03/20/2024     Priority: Medium    Accelerated hypertension 08/07/2023     Priority: Medium    Acute congestive heart failure, unspecified heart failure type (H) 08/07/2023     Priority: Medium    S/P TAVR (transcatheter aortic valve replacement) 04/04/2023     Priority: Medium     4/4/23 - right TF approach, 29 mm HELENA valve.  Manta closure right, angioseal closure left      Aortic stenosis, severe 04/04/2023     Priority: Medium    Coronary artery disease involving coronary bypass graft of native heart without angina pectoris      Priority: Medium    Aortic stenosis 04/29/2022     Priority: Medium    Cardiomyopathy (H) 04/29/2022     Priority: Medium    Uncontrolled type 2 diabetes mellitus with hyperglycemia (H) 02/27/2022     Priority: Medium    Talus fracture 03/09/2017     Priority: Medium    Hypothyroidism 03/09/2017     Priority: Medium    Malignant neoplasm of prostate (H) 03/09/2017     Priority: Medium     Overview:   Created by Conversion      Malignant neoplasm of thyroid gland (H) 03/09/2017     Priority: Medium     Overview:   Created by Conversion      Hyperlipidemia 03/09/2017     Priority: Medium    Type 2 diabetes mellitus (H) 03/09/2017     Priority: Medium    Fracture of right talus 03/09/2017     Priority: Medium        Past Medical History:    Past Medical History:   Diagnosis Date    Diabetes (H)     Diabetes mellitus, type 2 (H)     Hypertension     S/p TAVR (transcatheter aortic valve replacement), bioprosthetic     Thyroid disease        Past Surgical History:    Past Surgical History:   Procedure Laterality Date    CLOSED REDUCTION, PERCUTANEOUS PINNING LOWER EXTREMITY, COMBINED Right 3/9/2017    Procedure: COMBINED CLOSED REDUCTION, PERCUTANEOUS PINNING LOWER EXTREMITY;  Surgeon: Ankit Coy DPM;  Location: [place] OR    CV CORONARY ANGIOGRAM N/A 2/9/2023    Procedure: Coronary Angiogram;  Surgeon: Nabeel Davies MD;  Location: [facility] CATH LAB CV    CV TRANSCATHETER AORTIC  "VALVE REPLACEMENT-FEMORAL APPROACH N/A 4/4/2023    Procedure: Transcatheter Aortic Valve Replacement-Femoral Approach;  Surgeon: Nabeel Davies MD;  Location: [facility] LAB CV    OR TRANSCATHETER AORTIC VALVE REPLACEMENT, FEMORAL PERCUTANEOUS APPROACH (STANDBY) N/A 4/4/2023    Procedure: OR TRANSCATHETER AORTIC VALVE REPLACEMENT, FEMORAL PERCUTANEOUS APPROACH (STANDBY);  Surgeon: Jeramie De Jesus MD;  Location: [facility] LAB CV    THYROIDECTOMY         Family History:    Family History   Problem Relation Age of Onset    Heart Failure Mother     Heart Failure Father        Social History:  Marital Status:  Single [1]  Social History     Tobacco Use    Smoking status: Former    Smokeless tobacco: Never   Vaping Use    Vaping status: Never Used   Substance Use Topics    Alcohol use: Not Currently    Drug use: Never        Medications:    aspirin (ASA) 81 MG EC tablet  atorvastatin (LIPITOR) 80 MG tablet  blood glucose (NO BRAND SPECIFIED) lancets standard  blood glucose (NO BRAND SPECIFIED) test strip  blood glucose (NO BRAND SPECIFIED) test strip  blood glucose monitoring (NO BRAND SPECIFIED) meter device kit  blood glucose monitoring (NO BRAND SPECIFIED) meter device kit  bumetanide (BUMEX) 1 MG tablet  carvedilol (COREG) 6.25 MG tablet  empagliflozin (JARDIANCE) 25 MG TABS tablet  EPINEPHrine (PRIMATENE MIST) 0.125 MG/ACT AERO  ezetimibe (ZETIA) 10 MG tablet  glipiZIDE (GLUCOTROL) 10 MG tablet  hydrOXYzine HCl (ATARAX) 25 MG tablet  levothyroxine (SYNTHROID/LEVOTHROID) 175 MCG tablet  losartan (COZAAR) 100 MG tablet  Magnesium Oxide 250 MG tablet  metFORMIN (GLUCOPHAGE) 500 MG tablet  sotalol (BETAPACE) 120 MG tablet  tamsulosin (FLOMAX) 0.4 MG capsule  thin (NO BRAND SPECIFIED) lancets          Physical Exam   BP: 134/58  Pulse: (!) 48  Temp: 97.2  F (36.2  C)  Resp: 20  Height: 172.7 cm (5' 8\")  Weight: 111.1 kg (245 lb)  SpO2: 96 %      Physical Exam  Vitals and nursing note reviewed. "   Constitutional:       Appearance: He is ill-appearing. He is not toxic-appearing or diaphoretic.   HENT:      Head: Normocephalic and atraumatic.      Nose: Nose normal.      Mouth/Throat:      Mouth: Mucous membranes are moist.      Pharynx: Oropharynx is clear.   Eyes:      Extraocular Movements: Extraocular movements intact.      Conjunctiva/sclera: Conjunctivae normal.      Pupils: Pupils are equal, round, and reactive to light.   Cardiovascular:      Rate and Rhythm: Normal rate and regular rhythm.      Heart sounds: Normal heart sounds.   Pulmonary:      Effort: Pulmonary effort is normal.   Abdominal:      Palpations: Abdomen is soft.      Tenderness: There is no abdominal tenderness.   Musculoskeletal:         General: Normal range of motion.      Cervical back: Normal range of motion.      Right lower leg: No edema.      Left lower leg: No edema.   Neurological:      General: No focal deficit present.      Mental Status: He is alert and oriented to person, place, and time.      Cranial Nerves: No cranial nerve deficit.      Sensory: No sensory deficit.      Motor: No weakness.   Psychiatric:         Mood and Affect: Mood normal.         Behavior: Behavior normal.         ED Course     ED Course as of 10/21/24 2303   Mon Oct 21, 2024   2205 Per [facility] regarding the CT AP: new metastatic disease in liver, pulmonary nodules, upper abdominal lymph nodes. Esophageal thickening. Recommend CT chest.    2302 Patient signed out from [facility] pending CT.  Patient's workup is concerning for new metastatic disease with unclear primary source.  CT of the chest was recommended and patient was signed out pending to this.  Previous provider had a discussion with the patient regarding if he want to be admitted and patient is adamant about wanting to go home.  Plan was to discharge him home as long as there was nothing on the CT that would warrant admission.  Patient was awaiting his CT and he got upset and  wanted to go home.  He stated that he was tired and did not want to wait for the CT because it was getting change anything.  He explained that he would follow-up as an outpatient for all of these imaging studies.  We discussed the risks and benefits of this.  I do think it is unlikely that the imaging studies will change his disposition.  His symptoms are controlled at this time and his vitals are stable and he was discharged home at his request.  I did place that primary care and oncology referral.  Return precautions were also discussed.     Procedures                    Results for orders placed or performed during the hospital encounter of 10/21/24 (from the past 24 hour(s))   Comprehensive metabolic panel   Result Value Ref Range    Sodium 136 135 - 145 mmol/L    Potassium 5.2 3.4 - 5.3 mmol/L    Carbon Dioxide (CO2) 22 22 - 29 mmol/L    Anion Gap 14 7 - 15 mmol/L    Urea Nitrogen 57.6 (H) 8.0 - 23.0 mg/dL    Creatinine 2.13 (H) 0.67 - 1.17 mg/dL    GFR Estimate 30 (L) >60 mL/min/1.73m2    Calcium 8.2 (L) 8.8 - 10.4 mg/dL    Chloride 100 98 - 107 mmol/L    Glucose 246 (H) 70 - 99 mg/dL    Alkaline Phosphatase 323 (H) 40 - 150 U/L     (H) 0 - 45 U/L     (H) 0 - 70 U/L    Protein Total 6.3 (L) 6.4 - 8.3 g/dL    Albumin 3.1 (L) 3.5 - 5.2 g/dL    Bilirubin Total 0.6 <=1.2 mg/dL   CBC with platelets, differential    Narrative    The following orders were created for panel order CBC with platelets, differential.  Procedure                               Abnormality         Status                     ---------                               -----------         ------                     CBC with platelets and d...[[id number]]  Abnormal            Final result                 Please view results for these tests on the individual orders.   Bock Draw    Narrative    The following orders were created for panel order B[place] Draw.  Procedure                               Abnormality         Status                      ---------                               -----------         ------                     Extra Blue Top Tube[[id number]]                              Final result                 Please view results for these tests on the individual orders.   Lipase   Result Value Ref Range    Lipase 20 13 - 60 U/L   TSH with free T4 reflex   Result Value Ref Range    TSH 7.44 (H) 0.30 - 4.20 uIU/mL   CBC with platelets and differential   Result Value Ref Range    WBC Count 7.8 4.0 - 11.0 10e3/uL    RBC Count 3.48 (L) 4.40 - 5.90 10e6/uL    Hemoglobin 10.6 (L) 13.3 - 17.7 g/dL    Hematocrit 33.3 (L) 40.0 - 53.0 %    MCV 96 78 - 100 fL    MCH 30.5 26.5 - 33.0 pg    MCHC 31.8 31.5 - 36.5 g/dL    RDW 12.6 10.0 - 15.0 %    Platelet Count 253 150 - 450 10e3/uL    % Neutrophils 79 %    % Lymphocytes 9 %    % Monocytes 10 %    % Eosinophils 1 %    % Basophils 1 %    % Immature Granulocytes 1 %    NRBCs per 100 WBC 0 <1 /100    Absolute Neutrophils 6.1 1.6 - 8.3 10e3/uL    Absolute Lymphocytes 0.7 (L) 0.8 - 5.3 10e3/uL    Absolute Monocytes 0.8 0.0 - 1.3 10e3/uL    Absolute Eosinophils 0.1 0.0 - 0.7 10e3/uL    Absolute Basophils 0.1 0.0 - 0.2 10e3/uL    Absolute Immature Granulocytes 0.1 <=0.4 10e3/uL    Absolute NRBCs 0.0 10e3/uL   Extra Blue Top Tube   Result Value Ref Range    Hold Specimen     T4 free   Result Value Ref Range    Free T4 1.48 0.90 - 1.70 ng/dL   UA with Microscopic reflex to Culture    Specimen: Urine, Clean Catch   Result Value Ref Range    Color Urine Yellow Colorless, Straw, Light Yellow, Yellow    Appearance Urine Clear Clear    Glucose Urine Negative Negative mg/dL    Bilirubin Urine Negative Negative    Ketones Urine Negative Negative mg/dL    Specific Gravity Urine 1.010 1.003 - 1.035    Blood Urine Small (A) Negative    pH Urine 5.0 5.0 - 7.0    Protein Albumin Urine Negative Negative mg/dL    Urobilinogen Urine Normal Normal, 2.0 mg/dL    Nitrite Urine Negative Negative    Leukocyte Esterase Urine Moderate  (A) Negative    Mucus Urine Present (A) None Seen /LPF    RBC Urine 2 <=2 /HPF    WBC Urine 9 (H) <=5 /HPF    Squamous Epithelials Urine 1 <=1 /HPF    Hyaline Casts Urine 27 (H) <=2 /LPF    Narrative    Urine Culture ordered based on laboratory criteria   Asymptomatic Influenza A/B, RSV, & SARS-CoV2 PCR (COVID-19) Nasopharyngeal    Specimen: Nasopharyngeal; Swab   Result Value Ref Range    Influenza A PCR Negative Negative    Influenza B PCR Negative Negative    RSV PCR Negative Negative    SARS CoV2 PCR Negative Negative    Narrative    Testing was performed using the Xpert Xpress CoV2/Flu/RSV Assay on the Cepheid GeneXpert Instrument. This test should be ordered for the detection of SARS-CoV2, influenza, and RSV viruses in individuals with signs and symptoms of respiratory tract infection. This test is for in vitro diagnostic use under the [place] FDA for laboratories certified under CLIA to perform high or moderate complexity testing. This test has been [place] FDA cleared. A negative result does not rule out the presence of PCR inhibitors in the specimen or target RNA in concentration below the limit of detection for the assay. If only one viral target is positive but coinfection with multiple targets is suspected, the sample should be re-tested with another FDA cleared, approved, or authorized test, if coninfection would change clinical management. This test was validated by the [facility] [employer] [facility]. These laboratories are certified under the Clinical Laboratory Improvement Amendments of 1988 (CLIA-88) as qualified to perfom high complexity laboratory testing.   CT Abdomen Pelvis w Contrast    Narrative    EXAM: CT ABDOMEN PELVIS W CONTRAST  LOCATION: [facility]  DATE: 10/21/2024    INDICATION: Diarrhea, decreased appetite and weight loss.  COMPARISON: 1/3/2011.  TECHNIQUE: CT scan of the abdomen and pelvis was performed following injection of IV contrast. Multiplanar reformats were  obtained. Dose reduction techniques were used.  CONTRAST: 120 mL Isovue 370.    FINDINGS:    LOWER CHEST: Few sub-6 mm pulmonary nodules are new from prior. For reference, there is a new 3 mm nodule in the subpleural right lower lobe, series 3 image 53. Mild cardiomegaly and postprocedural changes of transcatheter aortic valve replacement.   Atherosclerotic calcification of coronary arteries and visualized thoracic aorta.    New, mild circumferential wall thickening of the distal esophagus, series 3 image 43, which could be attributable to an underlying esophagitis or primary esophageal malignancy. Adjacent subcentimeter periesophageal lymph node, measuring up to 9 mm in   short axis, series 3 image 33.    HEPATOBILIARY: Liver surface nodularity, compatible cirrhosis. Multiple low-attenuation hepatic lesions, highly suspicious for metastatic disease. For reference, a lesion within the anterior left hepatic lobe measures up to 3.0 cm, series 3 image 51.    Cholelithiasis and diffuse gallbladder wall edema, likely secondary to underlying liver disease.    No intrahepatic or intrahepatic biliary ductal dilatation.    PANCREAS: Enhances normally. No peripancreatic inflammatory fat stranding.    SPLEEN: Enhances normally. Normal size.    ADRENAL GLANDS: Normal.    KIDNEYS: Both kidneys enhance symmetrically, without hydronephrosis.    No nephroureterolithiasis.    Urinary bladder is unremarkable.    PELVIC ORGANS: Mildly enlarged prostate gland, containing brachytherapy seeds.    BOWEL: No evidence of acute gastrointestinal inflammation or obstruction. Colonic diverticulosis. Normal appendix. No convincing CT evidence of malignancy arising from the small or large intestines.    No intraperitoneal free fluid or free air.    LYMPH NODES: Multiple new and enlarged upper abdominal lymph nodes, suspicious for venessa metastatic disease. For reference, an enlarged gastrohepatic lymph node measures 19 mm in short axis, series 3  image 70.    VASCULATURE: Focal ectasia of the infrarenal abdominal aorta, measuring up to 2.8 cm. Moderate to severe atheromatous disease.    MUSCULOSKELETAL: No suspicious abnormality. Grade 2 anterolisthesis of L5 on S1, secondary to chronic bilateral pars defects.    OTHER: No additionally significant abnormalities.      Impression    IMPRESSION:   1.  Circumferential wall thickening of the distal esophagus, which could be attributable to an underlying esophagitis or primary esophageal malignancy. Endoscopic correlation is recommended.  2.  Multiple hepatic lesions, highly suspicious for metastatic disease.  3.  Upper abdominal lymphadenopathy, suspicious for venessa metastatic disease.  4.  Few sub-6 mm pulmonary nodules, new from prior and also suspicious for metastatic disease.  5.  Cirrhosis.  6.  Cholelithiasis.  7.  Colonic diverticulosis.    Findings of suspected malignancy discussed verbally via telephone with CLEVELAND Salinas at 10:05 PM on 10/21/2024.         Medications   iopamidol (ISOVUE-370) solution 84 mL (has no administration in time range)   sodium chloride 0.9 % bag 500mL for CT scan flush use (has no administration in time range)   iopamidol (ISOVUE-370) solution 120 mL (120 mLs Intravenous $Given 10/21/24 2043)   sodium chloride 0.9 % bag 500 mL for CT scan flush use (70 mLs Intravenous $Given 10/21/24 2043)   lactated ringers BOLUS 1,000 mL (0 mLs Intravenous Stopped 10/21/24 2303)       Assessments & Plan (with Medical Decision Making)     I have reviewed the nursing notes.    I have reviewed the findings, diagnosis, plan and need for follow up with the patient.    Medical Decision Making  Jayesh Ortiz is a 84 year old male with significant pmhx of T2DM, hypothyroidism, HLD, HTN, cardiomyopathy, CAD s/p bypass, aortic stenosis s/p TAVR who presents for evaluation of generalized fatigue.  Differential diagnoses include COVID, influenza, electrolyte abnormality, renal dysfunction, liver dysfunction,  malignancy, UTI, other occult infection.  Vital signs within normal limits.  Patient is normotensive, afebrile, he is satting 96% on room air with a regular respiratory rate and effort.  Heart rate is noted to be 48, per review of previous clinic visits his heart rate is usually in the 50s and he is on sotalol.    On examination patient is alert and oriented, no acute distress, but is ill-appearing and fatigued.  He has a protuberant but soft abdomen to palpation, no tenderness.  Heart sounds normal, no abnormal lung sounds on auscultation.  Eyes PERRLA, EOMs intact.  Cranial nerves grossly intact.  CBC with mild drop in hemoglobin to 10.6, however most recent hemoglobin is from 1 year ago and was 12.3.  No leukocytosis.  CMP with multiple new abnormalities including elevated BUN to 57, creatinine increased of 2.13 up from 1.34 seven months ago.  There is also an elevation in alk phos to 323, , .  Normal bilirubin.  The LFT elevations are new compared to prior.  Lipase within normal limits.  TSH is elevated 7.44, but free T4 was within normal limits.  Urinalysis with a moderate amount of leukocyte esterase, but no significant white blood cells or nitrates to suggest infection.  Culture sent.  Patient had denied any urinary symptoms.  COVID and influenza testing is negative.  EKG stable compared to prior with left bundle branch block and bradycardia.  No acute ischemic changes or arrhythmias.  CT abdomen pelvis was obtained given reported diarrhea, decreased appetite, and renal and liver function changes.  Unfortunately this revealed likely metastatic disease with lesions in the liver, new pulmonary nodules, and upper abdominal lymphadenopathy.  No obvious primary tumor seen in the abdomen/pelvis.  There was evidence of esophageal thickening, and the radiologist from [place] [facility] recommended obtaining a CT chest.    Findings including lab abnormalities and CT abnormalities were discussed with the  patient and his partner above.  We discussed that we cannot confirm this is cancer based on imaging alone, but the findings are highly concerning for a metastatic cancer.  At this time patient does not want to be admitted to the hospital for further management.  I believe this is appropriate as he is tolerating p.o. without nausea or vomiting or obstruction, he does not have any infectious signs or symptoms, and no significant renal abnormalities that would warrant acute management of MAYDA.  He was open to obtaining a CT chest in the emergency department for further characterization and for aid with follow-up.  Patient was signed out to Dr. Franks CT chest completion and results.  He will require follow-up with oncology and close follow-up with his primary.      New Prescriptions    No medications on file       Final diagnoses:   Liver lesion   Pulmonary nodules   Abdominal lymphadenopathy   Unintentional weight loss   Malaise and fatigue   Esophageal thickening       Bridget Salinas PA-C  6/8/2024   [facility] EMERGENCY DEPT     Bridget Salinas PA-C  10/21/24 0525       Vito Franks MD  10/21/24 9423

## 2024-10-23 LAB — BACTERIA UR CULT: NORMAL

## 2024-10-23 NOTE — TELEPHONE ENCOUNTER
RECORDS STATUS - ALL OTHER DIAGNOSIS      RECORDS RECEIVED FROM: [facility]   NOTES STATUS DETAILS   OFFICE NOTE from referring provider Epic Dr. Vito Franks via ED visit on 10/21/2024   DISCHARGE REPORT from the ER [facility] 10/21/2024 - [place] [facility]   MEDICATION LIST [facility]    LABS     PATHOLOGY REPORTS     ANYTHING RELATED TO DIAGNOSIS Epic 10/21/2024   IMAGING (NEED IMAGES & REPORT)     CT SCANS PACS CT Abdomen Pelvis: 10/21/2024

## 2024-10-25 ENCOUNTER — PRE VISIT (OUTPATIENT)
Dept: ONCOLOGY | Facility: CLINIC | Age: 84
End: 2024-10-25
Payer: MEDICARE

## 2024-10-29 ENCOUNTER — APPOINTMENT (OUTPATIENT)
Dept: ULTRASOUND IMAGING | Facility: CLINIC | Age: 84
DRG: 436 | End: 2024-10-29
Attending: FAMILY MEDICINE
Payer: MEDICARE

## 2024-10-29 ENCOUNTER — HOSPITAL ENCOUNTER (OUTPATIENT)
Age: 84
End: 2024-10-29
Payer: MEDICARE

## 2024-10-29 ENCOUNTER — APPOINTMENT (OUTPATIENT)
Dept: GENERAL RADIOLOGY | Facility: CLINIC | Age: 84
DRG: 436 | End: 2024-10-29
Attending: FAMILY MEDICINE
Payer: MEDICARE

## 2024-10-29 ENCOUNTER — HOSPITAL ENCOUNTER (INPATIENT)
Facility: CLINIC | Age: 84
LOS: 5 days | Discharge: HOME | DRG: 436 | End: 2024-11-03
Attending: FAMILY MEDICINE | Admitting: INTERNAL MEDICINE
Payer: MEDICARE

## 2024-10-29 DIAGNOSIS — N17.9 ACUTE KIDNEY INJURY (H): ICD-10-CM

## 2024-10-29 DIAGNOSIS — R74.8 ALKALINE PHOSPHATASE ELEVATION: ICD-10-CM

## 2024-10-29 DIAGNOSIS — E11.9 TYPE 2 DIABETES MELLITUS TREATED WITHOUT INSULIN (H): Primary | ICD-10-CM

## 2024-10-29 DIAGNOSIS — R12 HEARTBURN: ICD-10-CM

## 2024-10-29 DIAGNOSIS — R29.6 FALLS FREQUENTLY: ICD-10-CM

## 2024-10-29 DIAGNOSIS — E11.65 TYPE 2 DIABETES MELLITUS WITH HYPERGLYCEMIA (H): ICD-10-CM

## 2024-10-29 DIAGNOSIS — R53.1 GENERALIZED WEAKNESS: ICD-10-CM

## 2024-10-29 DIAGNOSIS — E11.9 TYPE 2 DIABETES MELLITUS WITHOUT COMPLICATIONS (H): ICD-10-CM

## 2024-10-29 LAB
ALBUMIN SERPL BCG-MCNC: 3 G/DL (ref 3.5–5.2)
ALBUMIN UR-MCNC: NEGATIVE MG/DL
ALP SERPL-CCNC: 524 U/L (ref 40–150)
ALT SERPL W P-5'-P-CCNC: 134 U/L (ref 0–70)
ANION GAP SERPL CALCULATED.3IONS-SCNC: 12 MMOL/L (ref 7–15)
APPEARANCE UR: ABNORMAL
AST SERPL W P-5'-P-CCNC: 269 U/L (ref 0–45)
BACTERIA #/AREA URNS HPF: ABNORMAL /HPF
BASOPHILS # BLD AUTO: 0.1 10E3/UL (ref 0–0.2)
BASOPHILS NFR BLD AUTO: 1 %
BILIRUB SERPL-MCNC: 1.1 MG/DL
BILIRUB UR QL STRIP: NEGATIVE
BUN SERPL-MCNC: 57.9 MG/DL (ref 8–23)
CALCIUM SERPL-MCNC: 8.6 MG/DL (ref 8.8–10.4)
CHLORIDE SERPL-SCNC: 99 MMOL/L (ref 98–107)
COLOR UR AUTO: YELLOW
CREAT SERPL-MCNC: 2.6 MG/DL (ref 0.67–1.17)
EGFRCR SERPLBLD CKD-EPI 2021: 24 ML/MIN/1.73M2
EOSINOPHIL # BLD AUTO: 0.1 10E3/UL (ref 0–0.7)
EOSINOPHIL NFR BLD AUTO: 1 %
ERYTHROCYTE [DISTWIDTH] IN BLOOD BY AUTOMATED COUNT: 13.2 % (ref 10–15)
GLUCOSE SERPL-MCNC: 142 MG/DL (ref 70–99)
GLUCOSE UR STRIP-MCNC: NEGATIVE MG/DL
HCO3 SERPL-SCNC: 24 MMOL/L (ref 22–29)
HCT VFR BLD AUTO: 33.7 % (ref 40–53)
HGB BLD-MCNC: 11.1 G/DL (ref 13.3–17.7)
HGB UR QL STRIP: NEGATIVE
HOLD SPECIMEN: NORMAL
HYALINE CASTS: 1 /LPF
IMM GRANULOCYTES # BLD: 0.1 10E3/UL
IMM GRANULOCYTES NFR BLD: 1 %
KETONES UR STRIP-MCNC: NEGATIVE MG/DL
LEUKOCYTE ESTERASE UR QL STRIP: ABNORMAL
LIPASE SERPL-CCNC: 30 U/L (ref 13–60)
LYMPHOCYTES # BLD AUTO: 0.6 10E3/UL (ref 0.8–5.3)
LYMPHOCYTES NFR BLD AUTO: 6 %
MCH RBC QN AUTO: 30.8 PG (ref 26.5–33)
MCHC RBC AUTO-ENTMCNC: 32.9 G/DL (ref 31.5–36.5)
MCV RBC AUTO: 94 FL (ref 78–100)
MONOCYTES # BLD AUTO: 1.1 10E3/UL (ref 0–1.3)
MONOCYTES NFR BLD AUTO: 10 %
MUCOUS THREADS #/AREA URNS LPF: PRESENT /LPF
NEUTROPHILS # BLD AUTO: 8.4 10E3/UL (ref 1.6–8.3)
NEUTROPHILS NFR BLD AUTO: 81 %
NITRATE UR QL: NEGATIVE
NRBC # BLD AUTO: 0 10E3/UL
NRBC BLD AUTO-RTO: 0 /100
NT-PROBNP SERPL-MCNC: 2783 PG/ML (ref 0–1800)
PH UR STRIP: 5 [PH] (ref 5–7)
PLATELET # BLD AUTO: 277 10E3/UL (ref 150–450)
POTASSIUM SERPL-SCNC: 5.1 MMOL/L (ref 3.4–5.3)
PROT SERPL-MCNC: 6.6 G/DL (ref 6.4–8.3)
RBC # BLD AUTO: 3.6 10E6/UL (ref 4.4–5.9)
RBC URINE: 1 /HPF
SODIUM SERPL-SCNC: 135 MMOL/L (ref 135–145)
SP GR UR STRIP: 1.01 (ref 1–1.03)
SQUAMOUS EPITHELIAL: <1 /HPF
UROBILINOGEN UR STRIP-MCNC: NORMAL MG/DL
WBC # BLD AUTO: 10.4 10E3/UL (ref 4–11)
WBC URINE: 9 /HPF

## 2024-10-29 PROCEDURE — 93010 ELECTROCARDIOGRAM REPORT: CPT | Performed by: FAMILY MEDICINE

## 2024-10-29 PROCEDURE — 36415 COLL VENOUS BLD VENIPUNCTURE: CPT | Performed by: FAMILY MEDICINE

## 2024-10-29 PROCEDURE — 99285 EMERGENCY DEPT VISIT HI MDM: CPT | Mod: 25

## 2024-10-29 PROCEDURE — 83880 ASSAY OF NATRIURETIC PEPTIDE: CPT | Performed by: FAMILY MEDICINE

## 2024-10-29 PROCEDURE — 120N000001 HC R&B MED SURG/OB

## 2024-10-29 PROCEDURE — 93005 ELECTROCARDIOGRAM TRACING: CPT

## 2024-10-29 PROCEDURE — 76705 ECHO EXAM OF ABDOMEN: CPT

## 2024-10-29 PROCEDURE — 96361 HYDRATE IV INFUSION ADD-ON: CPT

## 2024-10-29 PROCEDURE — 83690 ASSAY OF LIPASE: CPT | Performed by: FAMILY MEDICINE

## 2024-10-29 PROCEDURE — 71046 X-RAY EXAM CHEST 2 VIEWS: CPT

## 2024-10-29 PROCEDURE — 96360 HYDRATION IV INFUSION INIT: CPT

## 2024-10-29 PROCEDURE — 81001 URINALYSIS AUTO W/SCOPE: CPT | Performed by: FAMILY MEDICINE

## 2024-10-29 PROCEDURE — 82435 ASSAY OF BLOOD CHLORIDE: CPT | Performed by: FAMILY MEDICINE

## 2024-10-29 PROCEDURE — 99223 1ST HOSP IP/OBS HIGH 75: CPT

## 2024-10-29 PROCEDURE — 258N000003 HC RX IP 258 OP 636

## 2024-10-29 PROCEDURE — 258N000003 HC RX IP 258 OP 636: Performed by: FAMILY MEDICINE

## 2024-10-29 PROCEDURE — 99285 EMERGENCY DEPT VISIT HI MDM: CPT | Mod: 25 | Performed by: FAMILY MEDICINE

## 2024-10-29 PROCEDURE — 87086 URINE CULTURE/COLONY COUNT: CPT | Performed by: FAMILY MEDICINE

## 2024-10-29 PROCEDURE — 85025 COMPLETE CBC W/AUTO DIFF WBC: CPT | Performed by: FAMILY MEDICINE

## 2024-10-29 RX ORDER — ASPIRIN 81 MG/1
81 TABLET ORAL DAILY
Status: DISCONTINUED | OUTPATIENT
Start: 2024-10-30 | End: 2024-11-03 | Stop reason: HOSPADM

## 2024-10-29 RX ORDER — SOTALOL HYDROCHLORIDE 80 MG/1
80 TABLET ORAL 2 TIMES DAILY
Status: DISCONTINUED | OUTPATIENT
Start: 2024-10-29 | End: 2024-11-03

## 2024-10-29 RX ORDER — BUMETANIDE 1 MG/1
1 TABLET ORAL EVERY MORNING
Status: DISCONTINUED | OUTPATIENT
Start: 2024-10-30 | End: 2024-11-03 | Stop reason: HOSPADM

## 2024-10-29 RX ORDER — DEXTROSE MONOHYDRATE 25 G/50ML
25-50 INJECTION, SOLUTION INTRAVENOUS
Status: DISCONTINUED | OUTPATIENT
Start: 2024-10-29 | End: 2024-10-31

## 2024-10-29 RX ORDER — ATORVASTATIN CALCIUM 80 MG/1
80 TABLET, FILM COATED ORAL DAILY
Status: DISCONTINUED | OUTPATIENT
Start: 2024-10-30 | End: 2024-11-03 | Stop reason: HOSPADM

## 2024-10-29 RX ORDER — ONDANSETRON 2 MG/ML
4 INJECTION INTRAMUSCULAR; INTRAVENOUS EVERY 6 HOURS PRN
Status: DISCONTINUED | OUTPATIENT
Start: 2024-10-29 | End: 2024-11-03 | Stop reason: HOSPADM

## 2024-10-29 RX ORDER — HYDROXYZINE HYDROCHLORIDE 25 MG/1
25 TABLET, FILM COATED ORAL
Status: DISCONTINUED | OUTPATIENT
Start: 2024-10-29 | End: 2024-11-03 | Stop reason: HOSPADM

## 2024-10-29 RX ORDER — CALCIUM CARBONATE 500 MG/1
1000 TABLET, CHEWABLE ORAL 4 TIMES DAILY PRN
Status: DISCONTINUED | OUTPATIENT
Start: 2024-10-29 | End: 2024-11-03 | Stop reason: HOSPADM

## 2024-10-29 RX ORDER — ONDANSETRON 4 MG/1
4 TABLET, ORALLY DISINTEGRATING ORAL EVERY 6 HOURS PRN
Status: DISCONTINUED | OUTPATIENT
Start: 2024-10-29 | End: 2024-11-03 | Stop reason: HOSPADM

## 2024-10-29 RX ORDER — SODIUM CHLORIDE 9 MG/ML
1000 INJECTION, SOLUTION INTRAVENOUS CONTINUOUS
Status: DISCONTINUED | OUTPATIENT
Start: 2024-10-29 | End: 2024-10-29

## 2024-10-29 RX ORDER — NICOTINE POLACRILEX 4 MG
15-30 LOZENGE BUCCAL
Status: DISCONTINUED | OUTPATIENT
Start: 2024-10-29 | End: 2024-10-31

## 2024-10-29 RX ORDER — AMOXICILLIN 250 MG
1 CAPSULE ORAL 2 TIMES DAILY PRN
Status: DISCONTINUED | OUTPATIENT
Start: 2024-10-29 | End: 2024-11-03 | Stop reason: HOSPADM

## 2024-10-29 RX ORDER — GLIPIZIDE 5 MG/1
5 TABLET ORAL 2 TIMES DAILY
Status: DISCONTINUED | OUTPATIENT
Start: 2024-10-29 | End: 2024-11-03 | Stop reason: HOSPADM

## 2024-10-29 RX ORDER — CARVEDILOL 3.12 MG/1
3.12 TABLET ORAL 2 TIMES DAILY WITH MEALS
Status: DISCONTINUED | OUTPATIENT
Start: 2024-10-30 | End: 2024-11-03 | Stop reason: HOSPADM

## 2024-10-29 RX ORDER — AMOXICILLIN 250 MG
2 CAPSULE ORAL 2 TIMES DAILY PRN
Status: DISCONTINUED | OUTPATIENT
Start: 2024-10-29 | End: 2024-11-03 | Stop reason: HOSPADM

## 2024-10-29 RX ORDER — BUMETANIDE 0.5 MG/1
0.5 TABLET ORAL DAILY
Status: DISCONTINUED | OUTPATIENT
Start: 2024-10-30 | End: 2024-11-03 | Stop reason: HOSPADM

## 2024-10-29 RX ORDER — EZETIMIBE 10 MG/1
10 TABLET ORAL DAILY
Status: DISCONTINUED | OUTPATIENT
Start: 2024-10-30 | End: 2024-11-03 | Stop reason: HOSPADM

## 2024-10-29 RX ORDER — PROCHLORPERAZINE 25 MG
12.5 SUPPOSITORY, RECTAL RECTAL EVERY 12 HOURS PRN
Status: DISCONTINUED | OUTPATIENT
Start: 2024-10-29 | End: 2024-11-03 | Stop reason: HOSPADM

## 2024-10-29 RX ORDER — CALCIUM CARBONATE 500 MG/1
1000 TABLET, CHEWABLE ORAL 4 TIMES DAILY PRN
Status: DISCONTINUED | OUTPATIENT
Start: 2024-10-29 | End: 2024-10-29

## 2024-10-29 RX ORDER — BUMETANIDE 1 MG/1
1 TABLET ORAL EVERY MORNING
Status: ON HOLD | COMMUNITY
Start: 2024-08-25 | End: 2024-11-03

## 2024-10-29 RX ORDER — PROCHLORPERAZINE MALEATE 5 MG/1
5 TABLET ORAL EVERY 6 HOURS PRN
Status: DISCONTINUED | OUTPATIENT
Start: 2024-10-29 | End: 2024-11-03 | Stop reason: HOSPADM

## 2024-10-29 RX ORDER — LIDOCAINE 40 MG/G
CREAM TOPICAL
Status: DISCONTINUED | OUTPATIENT
Start: 2024-10-29 | End: 2024-11-03 | Stop reason: HOSPADM

## 2024-10-29 RX ORDER — POLYETHYLENE GLYCOL 3350 17 G/17G
17 POWDER, FOR SOLUTION ORAL 2 TIMES DAILY PRN
Status: DISCONTINUED | OUTPATIENT
Start: 2024-10-29 | End: 2024-11-03 | Stop reason: HOSPADM

## 2024-10-29 RX ORDER — LOSARTAN POTASSIUM 50 MG/1
100 TABLET ORAL DAILY
Status: DISCONTINUED | OUTPATIENT
Start: 2024-10-30 | End: 2024-11-03 | Stop reason: HOSPADM

## 2024-10-29 RX ORDER — BUMETANIDE 0.5 MG/1
0.5 TABLET ORAL DAILY
Status: ON HOLD | COMMUNITY
Start: 2024-08-25 | End: 2024-11-03

## 2024-10-29 RX ADMIN — SODIUM CHLORIDE, POTASSIUM CHLORIDE, SODIUM LACTATE AND CALCIUM CHLORIDE 1000 ML: 600; 310; 30; 20 INJECTION, SOLUTION INTRAVENOUS at 23:09

## 2024-10-29 RX ADMIN — SODIUM CHLORIDE 1000 ML: 9 INJECTION, SOLUTION INTRAVENOUS at 14:34

## 2024-10-29 RX ADMIN — SODIUM CHLORIDE 1000 ML: 9 INJECTION, SOLUTION INTRAVENOUS at 15:21

## 2024-10-29 ASSESSMENT — ENCOUNTER SYMPTOMS
ABDOMINAL PAIN: 0
BLOOD IN STOOL: 0
CONSTIPATION: 0
SORE THROAT: 0
NAUSEA: 0
COUGH: 0
VOMITING: 0
CHILLS: 0
DYSURIA: 0
DIAPHORESIS: 0
FEVER: 0
HEADACHES: 0
WHEEZING: 0
WEAKNESS: 1
SINUS PRESSURE: 0
SHORTNESS OF BREATH: 0
PALPITATIONS: 0
DIARRHEA: 1
FREQUENCY: 0

## 2024-10-29 ASSESSMENT — COLUMBIA-SUICIDE SEVERITY RATING SCALE - C-SSRS
1. IN THE PAST MONTH, HAVE YOU WISHED YOU WERE DEAD OR WISHED YOU COULD GO TO SLEEP AND NOT WAKE UP?: NO
6. HAVE YOU EVER DONE ANYTHING, STARTED TO DO ANYTHING, OR PREPARED TO DO ANYTHING TO END YOUR LIFE?: NO
2. HAVE YOU ACTUALLY HAD ANY THOUGHTS OF KILLING YOURSELF IN THE PAST MONTH?: NO

## 2024-10-29 ASSESSMENT — ACTIVITIES OF DAILY LIVING (ADL)
ADLS_ACUITY_SCORE: 0

## 2024-10-29 NOTE — ED NOTES
Observation Brochure  Patient and family informed of observation status based on provider's order.  Observation brochure was given. Patient/Family stated understanding. Questions answered.  Crystal Ortiz RN

## 2024-10-29 NOTE — ED PROVIDER NOTES
"  History     Chief Complaint   Patient presents with    Generalized Weakness     Pt reports feeling ill for about 3 weeks now, was evaluated and given a pill to help him sleep; pt then sleeping better but began having diarrhea. Was seen again when \"they saw some nodules\", pt notes since then he has had generalized weakness, falls, fever, chills and fatigue. Pt reports he's hardly able to walk due to his weakness and this is new for him.      HPI  Jayesh Ortiz is a 84 year old male who presents with a history of type 2 diabetes hypothyroidism hyperlipidemia hypertension cardiomyopathy.  He has a history of coronary disease and status post bypass surgery aortic stenosis and status post TAVR.  Has been generally fatigued for at least 4 months.  He had a history of insomnia that was transiently treated with hydroxyzine.  He had had decreased appetite and intermittent diarrhea that became more explosive after starting hydroxyzine.  That seemed to improve afterwards and now has more flaky stool.  90 pound weight loss over the prior year.  He is a retired farmer and notes that the fatigue is new to him.  He has no localizing signs or symptoms.  History of orthostatic lightheadedness and is on chronic diuretics Bumex for congestive heart failure as well as other cardiac vascular agents.  He has had a gradually increasing acute kidney injury with increased creatinine and BUN when he was seen about a week ago on 21 October.  He at that time also had a CT of the abdomen and pelvis demonstrating multiple liver lesions suspicious for metastases.  He is pending follow-up with oncology.    Since his last evaluation he has been gradually more weak.  He is fallen at least 3 or 4 times although he denies injury with this falling onto carpet.  He had no pain related to this and was still able to ambulate but he feels that his legs overall give way.  He has no recent fever cough congestion.  He has had no chest pain or shortness of " breath.  There is no abdominal pain nausea vomiting.  He has no urinary changes.     Allergies:  Allergies   Allergen Reactions    Nka [No Known Allergies]        Problem List:    Patient Active Problem List    Diagnosis Date Noted    Morbid obesity (H) 03/24/2017     Priority: High    PVC's (premature ventricular contractions) 03/20/2024     Priority: Medium    LBBB (left bundle branch block) 03/20/2024     Priority: Medium    Accelerated hypertension 08/07/2023     Priority: Medium    Acute congestive heart failure, unspecified heart failure type (H) 08/07/2023     Priority: Medium    S/P TAVR (transcatheter aortic valve replacement) 04/04/2023     Priority: Medium     4/4/23 - right TF approach, 29 mm HELENA valve.  Manta closure right, angioseal closure left      Aortic stenosis, severe 04/04/2023     Priority: Medium    Coronary artery disease involving coronary bypass graft of native heart without angina pectoris      Priority: Medium    Aortic stenosis 04/29/2022     Priority: Medium    Cardiomyopathy (H) 04/29/2022     Priority: Medium    Uncontrolled type 2 diabetes mellitus with hyperglycemia (H) 02/27/2022     Priority: Medium    Talus fracture 03/09/2017     Priority: Medium    Hypothyroidism 03/09/2017     Priority: Medium    Malignant neoplasm of prostate (H) 03/09/2017     Priority: Medium     Overview:   Created by Conversion      Malignant neoplasm of thyroid gland (H) 03/09/2017     Priority: Medium     Overview:   Created by Conversion      Hyperlipidemia 03/09/2017     Priority: Medium    Type 2 diabetes mellitus (H) 03/09/2017     Priority: Medium    Fracture of right talus 03/09/2017     Priority: Medium        Past Medical History:    Past Medical History:   Diagnosis Date    Diabetes (H)     Diabetes mellitus, type 2 (H)     Hypertension     S/p TAVR (transcatheter aortic valve replacement), bioprosthetic     Thyroid disease        Past Surgical History:    Past Surgical History:   Procedure  Laterality Date    CLOSED REDUCTION, PERCUTANEOUS PINNING LOWER EXTREMITY, COMBINED Right 3/9/2017    Procedure: COMBINED CLOSED REDUCTION, PERCUTANEOUS PINNING LOWER EXTREMITY;  Surgeon: Ankit Coy DPM;  Location: [facility] OR    CV CORONARY ANGIOGRAM N/A 2/9/2023    Procedure: Coronary Angiogram;  Surgeon: Nabeel Davies MD;  Location: [facility] CV    CV TRANSCATHETER AORTIC VALVE REPLACEMENT-FEMORAL APPROACH N/A 4/4/2023    Procedure: Transcatheter Aortic Valve Replacement-Femoral Approach;  Surgeon: Nabeel Davies MD;  Location: [facility] LAB CV    OR TRANSCATHETER AORTIC VALVE REPLACEMENT, FEMORAL PERCUTANEOUS APPROACH (STANDBY) N/A 4/4/2023    Procedure: OR TRANSCATHETER [facility]ORTIC VAL[facility]AC[facility]  Surgeon: Jeramie De Jesus MD;  Location: [facility] LAB CV    THYROIDECTOMY         Family History:    Family History   Problem Relation Age of Onset    Heart Failure Mother     Heart Failure Father        Social History:  Marital Status:  Single [1]  Social History     Tobacco Use    Smoking status: Former    Smokeless tobacco: Never   Vaping Use    Vaping status: Never Used   Substance Use Topics    Alcohol use: Not Currently    Drug use: Never        Medications:    aspirin (ASA) 81 MG EC tablet  atorvastatin (LIPITOR) 80 MG tablet  blood glucose (NO BRAND SPECIFIED) lancets standard  blood glucose (NO BRAND SPECIFIED) test strip  blood glucose (NO BRAND SPECIFIED) test strip  blood glucose monitoring (NO BRAND SPECIFIED) meter device kit  blood glucose monitoring (NO BRAND SPECIFIED) meter device kit  bumetanide (BUMEX) 1 MG tablet  carvedilol (COREG) 6.25 MG tablet  empagliflozin (JARDIANCE) 25 MG TABS tablet  EPINEPHrine (PRIMATENE MIST) 0.125 MG/ACT AERO  ezetimibe (ZETIA) 10 MG tablet  glipiZIDE (GLUCOTROL) 10 MG tablet  hydrOXYzine HCl (ATARAX) 25 MG tablet  levothyroxine (SYNTHROID/LEVOTHROID) 175 MCG tablet  losartan (COZAAR) 100  "MG tablet  Magnesium Oxide 250 MG tablet  metFORMIN (GLUCOPHAGE) 500 MG tablet  sotalol (BETAPACE) 120 MG tablet  tamsulosin (FLOMAX) 0.4 MG capsule  thin (NO BRAND SPECIFIED) lancets          Review of Systems   Constitutional:  Negative for chills, diaphoresis and fever.   HENT:  Negative for ear pain, sinus pressure and sore throat.    Eyes:  Negative for visual disturbance.   Respiratory:  Negative for cough, shortness of breath and wheezing.    Cardiovascular:  Negative for chest pain and palpitations.   Gastrointestinal:  Positive for diarrhea (resolving). Negative for abdominal pain, blood in stool, constipation, nausea and vomiting.   Genitourinary:  Negative for dysuria, frequency and urgency.   Skin:  Negative for rash.   Neurological:  Positive for weakness. Negative for headaches.   All other systems reviewed and are negative.      Physical Exam   BP: 133/60  Pulse: 67  Temp: 97.9  F (36.6  C)  Resp: 18  Height: 172.7 cm (5' 8\")  Weight: 108.9 kg (240 lb)  SpO2: 95 %      Physical Exam  Constitutional:       General: He is in acute distress.      Appearance: He is not diaphoretic.   Eyes:      Conjunctiva/sclera: Conjunctivae normal.   Cardiovascular:      Rate and Rhythm: Normal rate and regular rhythm.      Heart sounds: No murmur heard.  Pulmonary:      Effort: Pulmonary effort is normal. No respiratory distress.      Breath sounds: Normal breath sounds. No stridor. No wheezing or rhonchi.   Abdominal:      General: Abdomen is flat. There is no distension.      Palpations: Abdomen is soft. There is no mass.      Tenderness: There is no abdominal tenderness. There is no guarding.   Musculoskeletal:      Cervical back: Neck supple.      Right lower leg: No edema.      Left lower leg: No edema.   Skin:     Coloration: Skin is not pale.      Findings: No rash.   Neurological:      General: No focal deficit present.      Mental Status: He is alert and oriented to person, place, and time.      Cranial " Nerves: No cranial nerve deficit.      Sensory: No sensory deficit.      Motor: No weakness.      Coordination: Coordination normal.         ED Course        Procedures                EKG Interpretation:      Interpreted by Edgard Nickerson MD  EKG done at 1242 hrs. demonstrates a what appears to be sinus rhythm 65 bpm with a left bundle branch block.  Negative scar Bosa criteria.  Inferior Q waves.  Normal intervals with exception of a QRS of 150.  Single PVC.  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  Procedure                               Abnormality         Status                     ---------                               -----------         ------                     CBC with platelets and d...[[id number]]  Abnormal            Final result                 Please view results for these tests on the individual orders.   Comprehensive metabolic panel   Result Value Ref Range    Sodium 135 135 - 145 mmol/L    Potassium 5.1 3.4 - 5.3 mmol/L    Carbon Dioxide (CO2) 24 22 - 29 mmol/L    Anion Gap 12 7 - 15 mmol/L    Urea Nitrogen 57.9 (H) 8.0 - 23.0 mg/dL    Creatinine 2.60 (H) 0.67 - 1.17 mg/dL    GFR Estimate 24 (L) >60 mL/min/1.73m2    Calcium 8.6 (L) 8.8 - 10.4 mg/dL    Chloride 99 98 - 107 mmol/L    Glucose 142 (H) 70 - 99 mg/dL    Alkaline Phosphatase 524 (H) 40 - 150 U/L     (H) 0 - 45 U/L     (H) 0 - 70 U/L    Protein Total 6.6 6.4 - 8.3 g/dL    Albumin 3.0 (L) 3.5 - 5.2 g/dL    Bilirubin Total 1.1 <=1.2 mg/dL   Lipase   Result Value Ref Range    Lipase 30 13 - 60 U/L   CBC with platelets and differential   Result Value Ref Range    WBC Count  10.4 4.0 - 11.0 10e3/uL    RBC Count 3.60 (L) 4.40 - 5.90 10e6/uL    Hemoglobin 11.1 (L) 13.3 - 17.7 g/dL    Hematocrit 33.7 (L) 40.0 - 53.0 %    MCV 94 78 - 100 fL    MCH 30.8 26.5 - 33.0 pg    MCHC 32.9 31.5 - 36.5 g/dL    RDW 13.2 10.0 - 15.0 %    Platelet Count 277 150 - 450 10e3/uL    % Neutrophils 81 %    % Lymphocytes 6 %    % Monocytes 10 %    % Eosinophils 1 %    % Basophils 1 %    % Immature Granulocytes 1 %    NRBCs per 100 WBC 0 <1 /100    Absolute Neutrophils 8.4 (H) 1.6 - 8.3 10e3/uL    Absolute Lymphocytes 0.6 (L) 0.8 - 5.3 10e3/uL    Absolute Monocytes 1.1 0.0 - 1.3 10e3/uL    Absolute Eosinophils 0.1 0.0 - 0.7 10e3/uL    Absolute Basophils 0.1 0.0 - 0.2 10e3/uL    Absolute Immature Granulocytes 0.1 <=0.4 10e3/uL    Absolute NRBCs 0.0 10e3/uL   [facility] Draw    Narrative    The following orders were created for panel order [facility] Draw.  Procedure                               Abnormality         Status                     ---------                               -----------         ------                     Extra Blue Top Tube[[id number]]                              In process                   Please view results for these tests on the individual orders.       Medications - No data to display    Assessments & Plan (with Medical Decision Making)     MDM: Jayesh Ortiz is a 84 year old male presenting with generalized weakness multiple falls progressive illness over the last several days.  New onset metastatic disease unclear source pending follow-up with oncology findings of increasing cholestatic labs since even last week.  Right upper quadrant ultrasound pending.  Findings of an acute kidney injury worsening on Bumex.  Gentle IV fluids consider bedside ultrasound.  Weigh patient.  Patient is unsafe to return home per family and the patient.  He is fallen multiple times.  Will need disposition to hospital at least for evaluation with occupational therapy physical therapy and also with management of  his acute kidney injury.    I spoke initially to triage hospitalist and there was a bed available at [facility].  Ultimately the patient did not wish to go there and a bed ultimately opened at [facility].    I have reviewed the nursing notes.    I have reviewed the findings, diagnosis, plan and need for follow up with the patient.       ED to Inpatient Handoff:    Discu 00 ssed with [facility]   Patient accepted for Observation Stay  Pending studies include nnone  Code Status: Not Addressed             Medical Decision Making  The patient's presentation was of moderate complexity (an acute illness with systemic symptoms).    The patient's evaluation involved:  ordering and/or review of 3+ test(s) in this encounter (see separate area of note for details)    The patient's management necessitated moderate risk (prescription drug management including medications given in the ED).        New Prescriptions    No medications on file       Final diagnoses:   Generalized weakness   Falls frequently       10/29/2024   [facility] EMERGENCY DEPT       Edgard Nickerson MD  10/29/24 2016

## 2024-10-29 NOTE — H&P
[facility]    History and Physical  Hospital Medicine       Date of Admission:  10/29/2024  Date of Service: 10/29/2024     Assessment & Plan   Jayesh Ortiz is a 84 year old male with past medical history significant for type 2 DM, CAD with  of mid LAD, hypertension, hyperlipidemia, PVCs, severe aortic stenosis with LBBB following TAVR (04/2023), HFrEF, CKD, hypothyroidism, BPH, who presents on 10/29/2024 with generalized weakness.     Acute kidney injury on CKD  Baseline creatinine 1.2-1.4. Admission creatinine 2.60 and GFR 24, worsened from recent check (2.13 on 10/21/24). CT abd/pelvis 10/21/24 without hydronephrosis, obstruction. Admit BUN 57.9.Endorses poor oral intake and frequent diarrhea x4 weeks, denies urinary symptoms. Likely secondary to dehydration.   - LR @ 100mL/hr    - strict I/Os, daily weights   - hold pta losartan, bumex, glipizide, metformin, sotalol     Generalized weakness  Frequent falls   Endorses generalized weakness, fatigue, and myalgias onset 6-7 weeks prior to admission with gradual progression. Has had several ground level mechanical falls one week prior to admission with no obvious resulting injuries. 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   - PT/OT/Care management consult   - fall precautions   - treat MAYDA as above.     Abnormal UA   UA borderline positive with moderate leuk esterase, 9WBCs, few bacteria. No leukocytosis. Denies urinary symptoms, does endorse generalized weakness and fatigue. Afebrile, VS reassuring.   - IVF as above   - AM cbc    Elevated LFTs   Liver lesion   Pulmonary nodules   Abdominal lymphadenopathy   Esophageal thickening   Unintentional weight loss   Incidental imaging findings on ED visit 10/21/24 when he presented with generalized fatigue/weakness. Endorses 90# unintentional weight loss x1 year. CT abd/pelvis 10/21/24 revealed: Circumferential wall thickening of the distal  esophagus, which could be attributable to an underlying esophagitis or primary esophageal malignancy. Multiple hepatic lesions, highly suspicious for metastatic disease. Upper abdominal lymphadenopathy, suspicious for venessa metastatic disease. Few sub-6 mm pulmonary nodules, new from prior and also suspicious for metastatic disease. Cirrhosis, cholelithiasis, colonic diverticulitis.   Elevated alk phos (524), ALT (134), and AST (269). Tbili wnl (1.1). severe MAYDA as noted above.   US abd on admission with heterogenous nodularity throughout liver, cholelithiasis and trace pericholecystic fluid, mild diffuse gallbladder wall thickening, probable enlarged periportal lymph node. Patient is scheduled for oncology clinic visit at 9am on 10/30/24.    - AM BMP, hepatic panel   - reschedule oncology follow up     Hypertension   BP stable, well controlled on admission. Managed prior to admission with carvedilol 3.125mg BID, sotalol 0.5mg BID, losartan 100mg daily.   - hold pta losartan, bumex and sotalol given MAYDA as above.   - continue pta carvedilol     Diabetes mellitus type II   HgbA1c 6.2% on 03/18/24.  on admission. Managed prior to admission with glipizide 5mg BID, metformin 1000mg BID.   - moderate consistent carb diet    - hold pta glipizide and metformin given MAYDA as above    Heart failure with recovered ejection fraction   Hx severe aortic stenosis s/p TAVR 04/2023  Echocardiogram 07/15/24 with LVEF 50-55% (improved from 45-50% on 03/04/24), biatrial enlargement. LVEF previously 45-50% by 3/4/2024 TTE in the setting of 36% PVCs, improved to LVEF 50-55% on 07/15/24 RUSSELL with partial PCV suppression (16% burden at that time). HF time course consistent with association with PVCs, though cannot excluded LBBB-dyssynchrony mediated cardiomyopathy.   CXR on admission with stable size of cardiac silhouette, no pleural effusion.   BUN mildly elevated on admission (2783), appears euvolemic.   Managed prior to admission  "with bumetanide 1mg every morning and 0.5mg daily at noon, carvedilol 3.125mg BID, sotalol 60mg BID, losartan 100mg daily.   -  hold pta losartan, bumex, sotalol given MAYDA as above.  - continue pta carvedilol.     LBBB following TAVR 04/2023  Hx of PVCs   Frequent premature ventricular contractions (12% burden) noted on ambulatory cardiac monitoring 4/5/2023 to 4/18/2023. He seems asymptomatic although these could potentially worsen his cardiomyopathy. Started on sotalol 03/20/24, 16% PVCs by 3/27/2024 mobile cardiac telemetry. Admission EKG with sinus rhythm with LBBB, inferior Q waves. Managed prior to admission with carvedilol 3.125mg BID, sotalol 60mg BID.   - continue pta carvedilol  - hold pta sotalol     Hyperlipidemia   Managed prior to admission with atorvastatin 80mg daily, ezetimibe 10mg daily; continue     Hypothyroidism   Managed prior to admission with levothyroxine 175mcg daily; continue       Clinically Significant Risk Factors Present on Admission               # Hypoalbuminemia: Lowest albumin = 3 g/dL at 10/29/2024 12:07 PM, will monitor as appropriate   # Drug Induced Platelet Defect: home medication list includes an antiplatelet medication   # Hypertension: Noted on problem list    # Chronic heart failure with preserved ejection fraction: heart failure noted on problem list and last echo with EF >50%        # Obesity: Estimated body mass index is 36.49 kg/m  as calculated from the following:    Height as of this encounter: 1.727 m (5' 8\").    Weight as of this encounter: 108.9 kg (240 lb).               Diet: Combination Diet Regular Diet Adult; Moderate Consistent Carb (60 g CHO per Meal) Diet    DVT Prophylaxis: Pneumatic Compression Devices  Welch Catheter: Not present  Code Status: Full Code      Disposition Plan     Medically Ready for Discharge: Anticipated in 2-4 Days     The patient's care was discussed with the Attending Physician, Dr. Jimmy Zuniga and Patient.  I have discussed patient " and formulated plan with attending hospitalist physician, Dr. Jimmy Berrios PA-C        Primary Care Physician   Maria T Geoffrey CLYDE [phone]    History is obtained from the patient, and review of old records via the EMR.    History of Present Illness   Jayesh Ortiz is a 84 year old male with past medical history significant for  type 2 DM, CAD with  of mid LAD, hypertension, hyperlipidemia, PVCs, severe aortic stenosis with LBBB following TAVR (04/2023), HFrEF, CKD, hypothyroidism, BPH, who presents on 10/29/2024 with frequent falls, generalized weakness.     On 10/11/24 he contacted his primary care provider due to acute insomnia. He reports that he did not sleep for several days prior to seeking care from his PCP. He was prescribed hydroxyzine 25mg prn for sleep. This medication did relieve his insomnia immediately.   However, within days of starting hydroxyzine he developed recurrent, explosive diarrhea for the next several days. He has tried OTC medications for this including imodium and Pepto bismol, which have offered some relief. He states the diarrhea has been gradually improving over the past two weeks with continuing the OTC medications, is nearly resolved at time of admission. He reports that any time he takes the imodium or Pepto bismol his stools will be black for the next 24hrs or so before becoming normal brown color again.   He endorses very poor appetite and metallic taste in his mouth which has been persistent x4 weeks.   He endorses generalized weakness, fatigue, and myalgias which have been slowing progressing for the past 6-7 weeks, since before the episode of acute insomnia.     He was evaluated in the emergency room on 10/21/24 for generalized fatigue, and ED workup was highly suspicious for metastatic cancer. CT chest was recommended at that time however patient discharged from the ED that day with plans for outpatient imaging and clinic follow up with his PCP and  oncology.     Since recent ED visit on 10/23/24, patient has had continued progression of his generalized weakness resulting in several falls. Patient believes he has fallen 4 times in the past week. He has not had any syncope and denies injuries resulting from these falls, states that the falls were all very minor, secondary to feeling weak and fatigued.      He endorses 90# weight loss over the past year. He attributes this to active lifestyle and dietary changes however his partner Dorita states that there hasn't been any major changes that would likely cause this degree of weight loss. He believes his max weight was 340#.     He had intermittent orthostatic symptoms which he attributed to glipizide use, these symptoms resolved after he reduced his glipizide dose by 50%.     Hx of heart failure, bilateral LE edema. He states that his current LE edema is at baseline if not slightly improved from baseline.    [place], works as a [employer]. His partner Dorita lives approximately 20 minutes away from his home. He manages his own medications and denies recent changes or missed doses. He denies alcohol use, tobacco use, illicit drug use.     Review of Systems   The 10 point Review of Systems is negative other than noted in the HPI or here.     Past Medical History    Past Medical History:   Diagnosis Date    Diabetes (H)     Diabetes mellitus, type 2 (H)     Hypertension     S/p TAVR (transcatheter aortic valve replacement), bioprosthetic     Thyroid disease        Past Surgical History   Past Surgical History:   Procedure Laterality Date    CLOSED REDUCTION, PERCUTANEOUS PINNING LOWER EXTREMITY, COMBINED Right 3/9/2017    Procedure: COMBINED CLOSED REDUCTION, PERCUTANEOUS PINNING LOWER EXTREMITY;  Surgeon: Ankit Coy DPM;  Location: [facility] OR    CV CORONARY ANGIOGRAM N/A 2/9/2023    Procedure: Coronary Angiogram;  Surgeon: Nabeel Davies MD;  Location: [facility] CATH LAB CV    CV TRANSCATHETER AORTIC  VALVE REPLACEMENT-FEMORAL APPROACH N/A 4/4/2023    Procedure: Transcatheter Aortic Valve Replacement-Femoral Approach;  Surgeon: Nabeel Davies MD;  Location: [facility] CV    OR TRANSCATHETER AORTIC VALVE REPLACEMENT, FEMORAL PERCUTANEOUS APPROACH (STANDBY) N/A 4/4/2023    Procedure: OR TRANSCATHETER AORTIC VALVE REPLACEMENT, FEMORAL PERCUTANEOUS APPROACH (STANDBY);  Surgeon: Jeramie De Jesus MD;  Location: [facility] CV    THYROIDECTOMY          Prior to Admission Medications   Prior to Admission Medications   Prescriptions Last Dose Informant Patient Reported? Taking?   Magnesium Oxide 250 MG tablet 10/28/2024 Morning Self No Yes   Sig: TAKE 1 TABLET BY MOUTH ONCE DAILY   aspirin (ASA) 81 MG EC tablet 10/28/2024 Morning Self No Yes   Sig: Take 1 tablet (81 mg) by mouth daily   atorvastatin (LIPITOR) 80 MG tablet 10/28/2024 Morning Self No Yes   Sig: TAKE 1 TABLET BY MOUTH EVERY DAY   blood glucose (NO BRAND SPECIFIED) lancets standard Past Month Self No Yes   Sig: Use to test blood sugar 1 times daily.   blood glucose (NO BRAND SPECIFIED) test strip Past Month Self No Yes   Sig: Use to test blood sugar 1 times daily   blood glucose monitoring (NO BRAND SPECIFIED) meter device kit Past Month Self No Yes   Sig: Use to test blood sugar 1 times daily or as directed.   bumetanide (BUMEX) 0.5 MG tablet 10/28/2024 Noon Self Yes Yes   Sig: Take 0.5 mg by mouth daily. In afternoon   bumetanide (BUMEX) 1 MG tablet 10/28/2024 Self Yes Yes   Sig: Take 1 mg by mouth every morning.   carvedilol (COREG) 6.25 MG tablet 10/28/2024 Evening Self No Yes   Sig: TAKE 0.5 TABLETS (3.125 MG) BY MOUTH 2 TIMES DAILY (WITH MEALS)   ezetimibe (ZETIA) 10 MG tablet 10/28/2024 Self No Yes   Sig: Take 1 tablet (10 mg) by mouth daily   glipiZIDE (GLUCOTROL) 10 MG tablet 10/28/2024 Evening Self No Yes   Sig: TAKE 1 TABLET BY MOUTH TWICE A DAY   Patient taking differently: Take 5 mg by mouth 2 times daily.   hydrOXYzine  HCl (ATARAX) 25 MG tablet Past Month Self No Yes   Sig: Take 1 tablet (25 mg) by mouth nightly as needed for anxiety (sleep).   levothyroxine (SYNTHROID/LEVOTHROID) 175 MCG tablet 10/28/2024 Morning Self No Yes   Sig: TAKE 1 TABLET BY MOUTH EVERY DAY   losartan (COZAAR) 100 MG tablet 10/28/2024 Morning Self No Yes   Sig: TAKE 1 TABLET BY MOUTH EVERY DAY   metFORMIN (GLUCOPHAGE) 500 MG tablet 10/28/2024 Evening Self No Yes   Sig: TAKE 2 TABLETS BY MOUTH 2 TIMES DAILY WITH MEALS   sotalol (BETAPACE) 120 MG tablet 10/28/2024 Evening Self No Yes   Sig: Take 0.5 tablets (60 mg) by mouth 2 times daily   thin (NO BRAND SPECIFIED) lancets Past Month Self No Yes   Sig: Use with lanceting device to check glucose one time daily. To accompany: Blood Glucose Monitor Brands: per insurance.      Facility-Administered Medications: None     Allergies   Allergies   Allergen Reactions    Nka [No Known Allergies]        Family History    Family History   Problem Relation Age of Onset    Heart Failure Mother     Heart Failure Father        Social History   Social History     Socioeconomic History    Marital status: Single     Spouse name: Not on file    Number of children: Not on file    Years of education: Not on file    Highest education level: Not on file   Occupational History    Not on file   Tobacco Use    Smoking status: Former    Smokeless tobacco: Never   Vaping Use    Vaping status: Never Used   Substance and Sexual Activity    Alcohol use: Not Currently    Drug use: Never    Sexual activity: Not on file   Other Topics Concern    Not on file   Social History Narrative    Not on file     Social Drivers of Health     Financial Resource Strain: Not on file   Food Insecurity: Not on file   Transportation Needs: Not on file   Physical Activity: Not on file   Stress: Not on file   Social Connections: Not on file   Interpersonal Safety: Low Risk  (3/18/2024)    Interpersonal Safety     Do you feel physically and emotionally safe  "where you currently live?: Yes     Within the past 12 months, have you been hit, slapped, kicked or otherwise physically hurt by someone?: No     Within the past 12 months, have you been humiliated or emotionally abused in other ways by your partner or ex-partner?: No   Housing Stability: Not on file       Physical Exam   /47 (BP Location: Left arm)   Pulse 72   Temp 98.2  F (36.8  C) (Oral)   Resp 18   Ht 1.727 m (5' 8\")   Wt 108.9 kg (240 lb)   SpO2 94%   BMI 36.49 kg/m       Weight: 240 lbs 0 oz Body mass index is 36.49 kg/m .     Constitutional: Well developed obese adult male appears stated age, sitting up in recliner chair. Alert and oriented x4, appears comfortable, nontoxic, no acute distress.  Eyes: Eyes are clear, no scleral icterus, pupils are reactive, EOM intact bilaterally.  HENT: Oropharynx is clear and moist. No evidence of cranial trauma.   Cardiovascular: Regular rate and rhythm and no murmur, rub, or gallops noted. Radial & dorsalis pedis pulses palpable bilaterally. 1+ lower extremity edema.  Respiratory: Respirations unlabored on room air, Clear to auscultation bilaterally without wheezes, crackles, rhonchi.   GI: Active bowel sounds throughout. Soft, not distended, non-tender to palpation, without rebound or guarding. No palpable masses, no hepatomegaly.   Genitourinary: Deferred  Musculoskeletal: Normal muscle bulk and tone. Moves all extremities spontaneously. No gross deformity.  Skin: Warm and dry, no rashes.   Neurologic: Neck supple.  is symmetric. No notable tremor.       Data   Data reviewed today:     I have personally reviewed the following data over the past 24 hrs:    10.4  \   11.1 (L)   / 277     135 99 57.9 (H) /  142 (H)   5.1 24 2.60 (H) \     ALT: 134 (H) AST: 269 (H) AP: 524 (H) TBILI: 1.1   ALB: 3.0 (L) TOT PROTEIN: 6.6 LIPASE: 30     Trop: N/A BNP: 2,783 (H)         Recent Results (from the past 24 hours)   Chest XR,  PA & LAT    Narrative    XR CHEST 2 " VIEWS   10/29/2024 12:59 PM     HISTORY: generalized weakness    COMPARISON: Chest radiograph 8/7/2023.      Impression    IMPRESSION: Stable size of cardiac silhouette status post aortic valve  replacement. No definite airspace consolidation, pleural effusion or  pneumothorax. No acute bony abnormality.    JANIE BRAUN MD         SYSTEM ID:  [id number]   US Abdomen Limited    Narrative    ULTRASOUND ABDOMEN LIMITED October 29, 2024 2:14 PM    CLINICAL HISTORY: Cholestasis.    TECHNIQUE: Limited abdominal ultrasound.    COMPARISON: CT of the abdomen and pelvis 10/21/2024.    FINDINGS:  GALLBLADDER: Calcified gallstones are noted within the gallbladder.  The gallbladder wall is diffusely thickened, possibly due to  gallbladder contraction. There is a trace amount of pericholecystic  fluid. Negative sonographic Morales's sign.    BILE DUCTS: There is no biliary dilatation. The common duct measures 3  mm. Portions of the common duct could not be visualized due to  overlying bowel gas.    LIVER: Heterogeneous nodularity throughout the liver is suspicious for  metastatic disease. No evidence for hepatic steatosis.    RIGHT KIDNEY: Unremarkable. No hydronephrosis.    PANCREAS: The visualized portions of the pancreas are unremarkable.    No ascites. Probable mildly enlarged periportal lymph node adjacent to  the pancreatic head measures 2 x 2.5 x 1.6 cm.      Impression    IMPRESSION:  1.  Heterogeneous nodularity throughout the liver is suspicious for  metastatic disease.  2.  There is cholelithiasis and a trace amount of pericholecystic  fluid. Mild diffuse gallbladder wall thickening may be related to  gallbladder contraction.  3.  Probable mildly enlarged periportal lymph node was better  demonstrated on the prior CT scan.    MARY BOLANOS MD         SYSTEM ID:  [id number]       I personally reviewed no images or EKG's today

## 2024-10-29 NOTE — ED TRIAGE NOTES
"Pt reports feeling ill for about 3 weeks now, was evaluated and given a pill to help him sleep; pt then sleeping better but began having diarrhea. Was seen again when \"they saw some nodules\", pt notes since then he has had generalized weakness, falls, fever, chills and fatigue. Pt reports he's hardly able to walk due to his weakness and this is new for him.      Triage Assessment (Adult)       Row Name 10/29/24 1149          Triage Assessment    Airway WDL WDL        Cardiac WDL    Cardiac WDL WDL        Cognitive/Neuro/Behavioral WDL    Cognitive/Neuro/Behavioral WDL WDL                     "

## 2024-10-29 NOTE — ED NOTES
"[facility]   Admission Handoff    The patient is Jayesh Ortiz, 84 year old who arrived in the ED by WALKED from home with a complaint of Generalized Weakness (Pt reports feeling ill for about 3 weeks now, was evaluated and given a pill to help him sleep; pt then sleeping better but began having diarrhea. Was seen again when \"they saw some nodules\", pt notes since then he has had generalized weakness, falls, fever, chills and fatigue. Pt reports he's hardly able to walk due to his weakness and this is new for him. )  . The patient's current symptoms are new and during this time the symptoms have remained the same. In the ED, patient was diagnosed with   Final diagnoses:   Generalized weakness   Falls frequently   Acute kidney injury (H)   Alkaline phosphatase elevation         Needed?: No    Allergies:    Allergies   Allergen Reactions    Nka [No Known Allergies]        Past Medical Hx:   Past Medical History:   Diagnosis Date    Diabetes (H)     Diabetes mellitus, type 2 (H)     Hypertension     S/p TAVR (transcatheter aortic valve replacement), bioprosthetic     Thyroid disease        Initial vitals were: BP: 133/60  Pulse: 67  Temp: 97.9  F (36.6  C)  Resp: 18  Height: 172.7 cm (5' 8\")  Weight: 108.9 kg (240 lb)  SpO2: 95 %   Recent vital Signs: /62   Pulse 70   Temp 97.9  F (36.6  C) (Oral)   Resp 18   Ht 1.727 m (5' 8\")   Wt 108.9 kg (240 lb)   SpO2 94%   BMI 36.49 kg/m      Elimination Status: Continent: Yes     Activity Level: SBA    Fall Status: Reason for falls risk:  Mobility  nonskid shoes/slippers when out of bed, arm band in place, patient and family education, assistive device/personal items within reach, activity supervised, and mobility aid in reach    Baseline Mental status: WDL  Current Mental Status changes: at basesline    Infection present or suspected this encounter: no  Sepsis suspected: No    Isolation type: none    Bariatric equipment " needed?: No    In the ED these meds were given:   Medications   sodium chloride 0.9% BOLUS 1,000 mL (0 mLs Intravenous Stopped 10/29/24 1518)       Drips running?  No    Home pump  No    Current LDAs: Peripheral IV: Site Rt AC; Gauge 20  none     Results:   Labs/Imaging  Ordered and Resulted from Time of ED Arrival Up to the Time of Departure from the ED  Results for orders placed or performed during the hospital encounter of 10/29/24 (from the past 24 hours)   CBC with platelets differential    Narrative    The following orders were created for panel order CBC with platelets differential.  Procedure                               Abnormality         Status                     ---------                               -----------         ------                     CBC with platelets and d...[[id number]]  Abnormal            Final result                 Please view results for these tests on the individual orders.   Comprehensive metabolic panel   Result Value Ref Range    Sodium 135 135 - 145 mmol/L    Potassium 5.1 3.4 - 5.3 mmol/L    Carbon Dioxide (CO2) 24 22 - 29 mmol/L    Anion Gap 12 7 - 15 mmol/L    Urea Nitrogen 57.9 (H) 8.0 - 23.0 mg/dL    Creatinine 2.60 (H) 0.67 - 1.17 mg/dL    GFR Estimate 24 (L) >60 mL/min/1.73m2    Calcium 8.6 (L) 8.8 - 10.4 mg/dL    Chloride 99 98 - 107 mmol/L    Glucose 142 (H) 70 - 99 mg/dL    Alkaline Phosphatase 524 (H) 40 - 150 U/L     (H) 0 - 45 U/L     (H) 0 - 70 U/L    Protein Total 6.6 6.4 - 8.3 g/dL    Albumin 3.0 (L) 3.5 - 5.2 g/dL    Bilirubin Total 1.1 <=1.2 mg/dL   Lipase   Result Value Ref Range    Lipase 30 13 - 60 U/L   CBC with platelets and differential   Result Value Ref Range    WBC Count 10.4 4.0 - 11.0 10e3/uL    RBC Count 3.60 (L) 4.40 - 5.90 10e6/uL    Hemoglobin 11.1 (L) 13.3 - 17.7 g/dL    Hematocrit 33.7 (L) 40.0 - 53.0 %    MCV 94 78 - 100 fL    MCH 30.8 26.5 - 33.0 pg    MCHC 32.9 31.5 - 36.5 g/dL    RDW 13.2 10.0 - 15.0 %    Platelet Count 277  150 - 450 10e3/uL    % Neutrophils 81 %    % Lymphocytes 6 %    % Monocytes 10 %    % Eosinophils 1 %    % Basophils 1 %    % Immature Granulocytes 1 %    NRBCs per 100 WBC 0 <1 /100    Absolute Neutrophils 8.4 (H) 1.6 - 8.3 10e3/uL    Absolute Lymphocytes 0.6 (L) 0.8 - 5.3 10e3/uL    Absolute Monocytes 1.1 0.0 - 1.3 10e3/uL    Absolute Eosinophils 0.1 0.0 - 0.7 10e3/uL    Absolute Basophils 0.1 0.0 - 0.2 10e3/uL    Absolute Immature Granulocytes 0.1 <=0.4 10e3/uL    Absolute NRBCs 0.0 10e3/uL   NT pro BNP   Result Value Ref Range    N terminal Pro BNP Inpatient 2,783 (H) 0 - 1,800 pg/mL   [place] Draw    Narrative    The following orders were created for panel order [place] Draw.  Procedure                               Abnormality         Status                     ---------                               -----------         ------                     Extra Blue Top Tube[[id number]]                              Final result                 Please view results for these tests on the individual orders.   Extra Blue Top Tube   Result Value Ref Range    Hold Specimen JIC    Chest XR,  PA & LAT    Narrative    XR CHEST 2 VIEWS   10/29/2024 12:59 PM     HISTORY: generalized weakness    COMPARISON: Chest radiograph 8/7/2023.      Impression    IMPRESSION: Stable size of cardiac silhouette status post aortic valve  replacement. No definite airspace consolidation, pleural effusion or  pneumothorax. No acute bony abnormality.    JANIE BRUAN MD         SYSTEM ID:  [id number]   US Abdomen Limited    Narrative    ULTRASOUND ABDOMEN LIMITED October 29, 2024 2:14 PM    CLINICAL HISTORY: Cholestasis.    TECHNIQUE: Limited abdominal ultrasound.    COMPARISON: CT of the abdomen and pelvis 10/21/2024.    FINDINGS:  GALLBLADDER: Calcified gallstones are noted within the gallbladder.  The gallbladder wall is diffusely thickened, possibly due to  gallbladder contraction. There is a trace amount of pericholecystic  fluid. Negative  sonographic Morales's sign.    BILE DUCTS: There is no biliary dilatation. The common duct measures 3  mm. Portions of the common duct could not be visualized due to  overlying bowel gas.    LIVER: Heterogeneous nodularity throughout the liver is suspicious for  metastatic disease. No evidence for hepatic steatosis.    RIGHT KIDNEY: Unremarkable. No hydronephrosis.    PANCREAS: The visualized portions of the pancreas are unremarkable.    No ascites. Probable mildly enlarged periportal lymph node adjacent to  the pancreatic head measures 2 x 2.5 x 1.6 cm.      Impression    IMPRESSION:  1.  Heterogeneous nodularity throughout the liver is suspicious for  metastatic disease.  2.  There is cholelithiasis and a trace amount of pericholecystic  fluid. Mild diffuse gallbladder wall thickening may be related to  gallbladder contraction.  3.  Probable mildly enlarged periportal lymph node was better  demonstrated on the prior CT scan.    MARY BOLANOS MD         SYSTEM ID:  [id number]   UA with Microscopic reflex to Culture    Specimen: Urine, Clean Catch   Result Value Ref Range    Color Urine Yellow Colorless, Straw, Light Yellow, Yellow    Appearance Urine Slightly Cloudy (A) Clear    Glucose Urine Negative Negative mg/dL    Bilirubin Urine Negative Negative    Ketones Urine Negative Negative mg/dL    Specific Gravity Urine 1.014 1.003 - 1.035    Blood Urine Negative Negative    pH Urine 5.0 5.0 - 7.0    Protein Albumin Urine Negative Negative mg/dL    Urobilinogen Urine Normal Normal, 2.0 mg/dL    Nitrite Urine Negative Negative    Leukocyte Esterase Urine Moderate (A) Negative    Bacteria Urine Few (A) None Seen /HPF    Mucus Urine Present (A) None Seen /LPF    RBC Urine 1 <=2 /HPF    WBC Urine 9 (H) <=5 /HPF    Squamous Epithelials Urine <1 <=1 /HPF    Hyaline Casts Urine 1 <=2 /LPF    Narrative    Urine Culture ordered based on laboratory criteria       For the majority of the shift this patient's behavior was  Green     Cardiac Rhythm: Other - Sinus rhythm, rate irregularity, LBBB, occasionally rate below 60, but rate generally in the 60's.   Pt needs tele? No  Skin/wound Issues: None    Code Status: Full Code    Pain control: pt had none    Nausea control: fair    Abnormal labs/tests/findings requiring intervention: Kidney function/liver function    Patient tested for COVID 19 prior to admission: NO     OBS brochure/video discussed/provided to patient/family: N/A     Family present during ED course? Yes     Family Comments/Social Situation comments: None    Tasks needing completion: None    Darrell Logan RN

## 2024-10-29 NOTE — PROGRESS NOTES
"Transfer Type: [facility]  Transfer Triage Note    Date of call: 10/29/24  Time of call: 1:17 PM    Current Patient Location:  [facility]  Current Level of Care: ED    Vitals: Temp: 97.9  F (36.6  C) Temp src: Oral BP: 133/60 Pulse: 67   Resp: 18 SpO2: 95 % Height: 172.7 cm (5' 8\") Weight: 108.9 kg (240 lb)  O2 Device: None (Room air) at    Diagnosis: Falls   Reason for requested transfer: Further diagnostic work up, management, and consultation for specialized care   Isolation Needs: None    Care everywhere has been updated and reviewed: Yes  Necessary images have been sent through PACS: Yes    If patient is transferring for specialty care or specific procedure, the specialist required has participated in the transfer call and agreed with need for transfer and anticipated timeline: No    Transfer accepted: Yes  Stability of Patient: Patient is vitally stable, with no critical labs, and will likely remain stable throughout the transfer process  Is the patient appropriate for [facility]? Yes   Level of Care Needed: Med Surg  Telemetry Needed:  Med Tele  Expected Time of Arrival for Transfer: 0-8 hours  Arrival Location:  [facility]     85 Y/O M with history of type 2 diabetes hypothyroidism hyperlipidemia hypertension cardiomyopathy. He has a history of coronary disease and status post bypass surgery aortic stenosis and status post TAVR presenting with fatigued for at least 4 months associated with vianey loss, insomnia, Diarrhea. He has had a gradually increasing acute kidney injury with increased creatinine and BUN when he was seen about a week ago on 21 October.  He at that time also had a CT of the abdomen and pelvis demonstrating multiple liver lesions suspicious for metastases with unclear source.  He is pending follow-up with oncology. Will need PT/OT and management of MAYDA. Pending RUQ for elevated LFT from baseline. Might need onc inpt vs " outpt     GELACIO GOULD MD

## 2024-10-29 NOTE — MEDICATION SCRIBE - ADMISSION MEDICATION HISTORY
Medication Scribe Admission Medication History    Admission medication history is complete. The information provided in this note is only as accurate as the sources available at the time of the update.    Information Source(s): Patient, Caregiver, and CareEverywhere/SureScripts via with patient in room and finished at desk.    Pertinent Information: He states that for 3 months he has been taking a 1/2 tab of Glipizide bid instead of a whole tab because when he took the whole tab he got dizzy.  No dizziness from the 1/2 tab.  He had not taken the Tamsulosin since June.  There were no refills left on it.    Changes made to PTA medication list:  Added: None  Deleted: Empagliflozin 25 mg, Primatene Mist, Tamsulosin 0.4 mg, extra blood glucose supplies.  Changed: None    Allergies reviewed with patient and updates made in EHR: yes, no allergies.    Medication History Completed By: Gloria Abdalla 10/29/2024 5:29 PM    PTA Med List   Medication Sig Note Last Dose/Taking    aspirin (ASA) 81 MG EC tablet Take 1 tablet (81 mg) by mouth daily  10/28/2024 Morning    atorvastatin (LIPITOR) 80 MG tablet TAKE 1 TABLET BY MOUTH EVERY DAY  10/28/2024 Morning    blood glucose (NO BRAND SPECIFIED) lancets standard Use to test blood sugar 1 times daily.  Past Month    blood glucose (NO BRAND SPECIFIED) test strip Use to test blood sugar 1 times daily  Past Month    blood glucose monitoring (NO BRAND SPECIFIED) meter device kit Use to test blood sugar 1 times daily or as directed.  Past Month    bumetanide (BUMEX) 0.5 MG tablet Take 0.5 mg by mouth daily. In afternoon  10/28/2024 Noon    bumetanide (BUMEX) 1 MG tablet Take 1 mg by mouth every morning.  10/28/2024    carvedilol (COREG) 6.25 MG tablet TAKE 0.5 TABLETS (3.125 MG) BY MOUTH 2 TIMES DAILY (WITH MEALS)  10/28/2024 Evening    ezetimibe (ZETIA) 10 MG tablet Take 1 tablet (10 mg) by mouth daily  10/28/2024    glipiZIDE (GLUCOTROL) 10 MG tablet TAKE 1 TABLET BY MOUTH TWICE A DAY  (Patient taking differently: Take 5 mg by mouth 2 times daily.) 10/29/2024: Has been taking 5 mg bid for 3 months as the 10 mg bid had made him dizzy. 10/28/2024 Evening    hydrOXYzine HCl (ATARAX) 25 MG tablet Take 1 tablet (25 mg) by mouth nightly as needed for anxiety (sleep).  Past Month    levothyroxine (SYNTHROID/LEVOTHROID) 175 MCG tablet TAKE 1 TABLET BY MOUTH EVERY DAY  10/28/2024 Morning    losartan (COZAAR) 100 MG tablet TAKE 1 TABLET BY MOUTH EVERY DAY  10/28/2024 Morning    Magnesium Oxide 250 MG tablet TAKE 1 TABLET BY MOUTH ONCE DAILY  10/28/2024 Morning    metFORMIN (GLUCOPHAGE) 500 MG tablet TAKE 2 TABLETS BY MOUTH 2 TIMES DAILY WITH MEALS  10/28/2024 Evening    sotalol (BETAPACE) 120 MG tablet Take 0.5 tablets (60 mg) by mouth 2 times daily  10/28/2024 Evening    thin (NO BRAND SPECIFIED) lancets Use with lanceting device to check glucose one time daily. To accompany: Blood Glucose Monitor Brands: per insurance.  Past Month

## 2024-10-29 NOTE — ED NOTES
Pt refusing to lay in bed, states chronic back pain after an accident. Pt positioned into recliner.

## 2024-10-30 ENCOUNTER — APPOINTMENT (OUTPATIENT)
Dept: OCCUPATIONAL THERAPY | Facility: CLINIC | Age: 84
DRG: 436 | End: 2024-10-30
Payer: MEDICARE

## 2024-10-30 PROBLEM — I10 ACCELERATED HYPERTENSION: Status: RESOLVED | Noted: 2023-08-07 | Resolved: 2024-10-30

## 2024-10-30 PROBLEM — Z85.850 HISTORY OF THYROID CANCER: Chronic | Status: ACTIVE | Noted: 2017-03-09

## 2024-10-30 PROBLEM — I50.9 ACUTE CONGESTIVE HEART FAILURE, UNSPECIFIED HEART FAILURE TYPE (H): Status: RESOLVED | Noted: 2023-08-07 | Resolved: 2024-10-30

## 2024-10-30 PROBLEM — D64.9 ANEMIA: Status: ACTIVE | Noted: 2024-10-30

## 2024-10-30 PROBLEM — R94.5 ABNORMAL RESULTS OF LIVER FUNCTION STUDIES: Status: ACTIVE | Noted: 2024-10-29

## 2024-10-30 PROBLEM — R93.2 ABNORMAL LIVER SCAN: Status: ACTIVE | Noted: 2024-10-21

## 2024-10-30 PROBLEM — S92.101A FRACTURE OF RIGHT TALUS: Status: RESOLVED | Noted: 2017-03-09 | Resolved: 2024-10-30

## 2024-10-30 PROBLEM — Z85.46 HISTORY OF PROSTATE CANCER: Chronic | Status: ACTIVE | Noted: 2017-03-09

## 2024-10-30 PROBLEM — E11.65 UNCONTROLLED TYPE 2 DIABETES MELLITUS WITH HYPERGLYCEMIA (H): Status: RESOLVED | Noted: 2022-02-27 | Resolved: 2024-10-30

## 2024-10-30 PROBLEM — S92.109A TALUS FRACTURE: Status: RESOLVED | Noted: 2017-03-09 | Resolved: 2024-10-30

## 2024-10-30 PROBLEM — E66.01 MORBID OBESITY (H): Chronic | Status: ACTIVE | Noted: 2017-03-24

## 2024-10-30 PROBLEM — Z95.2 S/P TAVR (TRANSCATHETER AORTIC VALVE REPLACEMENT): Chronic | Status: ACTIVE | Noted: 2023-04-04

## 2024-10-30 PROBLEM — Z86.79 HISTORY OF CARDIOMYOPATHY: Chronic | Status: ACTIVE | Noted: 2022-04-29

## 2024-10-30 LAB
ALBUMIN SERPL BCG-MCNC: 2.9 G/DL (ref 3.5–5.2)
ALP SERPL-CCNC: 513 U/L (ref 40–150)
ALT SERPL W P-5'-P-CCNC: 123 U/L (ref 0–70)
ANION GAP SERPL CALCULATED.3IONS-SCNC: 12 MMOL/L (ref 7–15)
AST SERPL W P-5'-P-CCNC: 207 U/L (ref 0–45)
BACTERIA UR CULT: NORMAL
BASOPHILS # BLD AUTO: 0.1 10E3/UL (ref 0–0.2)
BASOPHILS NFR BLD AUTO: 1 %
BILIRUB DIRECT SERPL-MCNC: 0.62 MG/DL (ref 0–0.3)
BILIRUB SERPL-MCNC: 1.2 MG/DL
BUN SERPL-MCNC: 47.4 MG/DL (ref 8–23)
CALCIUM SERPL-MCNC: 8.2 MG/DL (ref 8.8–10.4)
CHLORIDE SERPL-SCNC: 101 MMOL/L (ref 98–107)
CREAT SERPL-MCNC: 2.01 MG/DL (ref 0.67–1.17)
EGFRCR SERPLBLD CKD-EPI 2021: 32 ML/MIN/1.73M2
EOSINOPHIL # BLD AUTO: 0.2 10E3/UL (ref 0–0.7)
EOSINOPHIL NFR BLD AUTO: 2 %
ERYTHROCYTE [DISTWIDTH] IN BLOOD BY AUTOMATED COUNT: 13 % (ref 10–15)
EST. AVERAGE GLUCOSE BLD GHB EST-MCNC: 126 MG/DL
GLUCOSE SERPL-MCNC: 122 MG/DL (ref 70–99)
HBA1C MFR BLD: 6 %
HCO3 SERPL-SCNC: 23 MMOL/L (ref 22–29)
HCT VFR BLD AUTO: 31.5 % (ref 40–53)
HGB BLD-MCNC: 10.4 G/DL (ref 13.3–17.7)
IMM GRANULOCYTES # BLD: 0.1 10E3/UL
IMM GRANULOCYTES NFR BLD: 1 %
LYMPHOCYTES # BLD AUTO: 0.6 10E3/UL (ref 0.8–5.3)
LYMPHOCYTES NFR BLD AUTO: 7 %
MCH RBC QN AUTO: 31 PG (ref 26.5–33)
MCHC RBC AUTO-ENTMCNC: 33 G/DL (ref 31.5–36.5)
MCV RBC AUTO: 94 FL (ref 78–100)
MONOCYTES # BLD AUTO: 1 10E3/UL (ref 0–1.3)
MONOCYTES NFR BLD AUTO: 10 %
NEUTROPHILS # BLD AUTO: 7.4 10E3/UL (ref 1.6–8.3)
NEUTROPHILS NFR BLD AUTO: 79 %
NRBC # BLD AUTO: 0 10E3/UL
NRBC BLD AUTO-RTO: 0 /100
PLATELET # BLD AUTO: 258 10E3/UL (ref 150–450)
POTASSIUM SERPL-SCNC: 4.8 MMOL/L (ref 3.4–5.3)
PROT SERPL-MCNC: 6.2 G/DL (ref 6.4–8.3)
RBC # BLD AUTO: 3.35 10E6/UL (ref 4.4–5.9)
SODIUM SERPL-SCNC: 136 MMOL/L (ref 135–145)
WBC # BLD AUTO: 9.3 10E3/UL (ref 4–11)

## 2024-10-30 PROCEDURE — 250N000013 HC RX MED GY IP 250 OP 250 PS 637

## 2024-10-30 PROCEDURE — 82310 ASSAY OF CALCIUM: CPT

## 2024-10-30 PROCEDURE — 99233 SBSQ HOSP IP/OBS HIGH 50: CPT | Performed by: INTERNAL MEDICINE

## 2024-10-30 PROCEDURE — 83036 HEMOGLOBIN GLYCOSYLATED A1C: CPT | Performed by: INTERNAL MEDICINE

## 2024-10-30 PROCEDURE — 258N000003 HC RX IP 258 OP 636: Performed by: INTERNAL MEDICINE

## 2024-10-30 PROCEDURE — 36415 COLL VENOUS BLD VENIPUNCTURE: CPT

## 2024-10-30 PROCEDURE — 250N000013 HC RX MED GY IP 250 OP 250 PS 637: Performed by: INTERNAL MEDICINE

## 2024-10-30 PROCEDURE — 85004 AUTOMATED DIFF WBC COUNT: CPT

## 2024-10-30 PROCEDURE — 82248 BILIRUBIN DIRECT: CPT

## 2024-10-30 PROCEDURE — 250N000011 HC RX IP 250 OP 636

## 2024-10-30 PROCEDURE — 120N000001 HC R&B MED SURG/OB

## 2024-10-30 RX ORDER — LOPERAMIDE HYDROCHLORIDE 2 MG/1
2 CAPSULE ORAL 4 TIMES DAILY PRN
Status: DISCONTINUED | OUTPATIENT
Start: 2024-10-30 | End: 2024-11-03 | Stop reason: HOSPADM

## 2024-10-30 RX ADMIN — SODIUM CHLORIDE, POTASSIUM CHLORIDE, SODIUM LACTATE AND CALCIUM CHLORIDE 1000 ML: 600; 310; 30; 20 INJECTION, SOLUTION INTRAVENOUS at 15:35

## 2024-10-30 RX ADMIN — ONDANSETRON 4 MG: 4 TABLET, ORALLY DISINTEGRATING ORAL at 18:20

## 2024-10-30 RX ADMIN — LEVOTHYROXINE SODIUM 175 MCG: 0.15 TABLET ORAL at 06:44

## 2024-10-30 RX ADMIN — ONDANSETRON 4 MG: 4 TABLET, ORALLY DISINTEGRATING ORAL at 12:27

## 2024-10-30 RX ADMIN — CARVEDILOL 3.12 MG: 3.12 TABLET, FILM COATED ORAL at 08:08

## 2024-10-30 RX ADMIN — EZETIMIBE 10 MG: 10 TABLET ORAL at 08:10

## 2024-10-30 RX ADMIN — LOPERAMIDE HYDROCHLORIDE 2 MG: 2 CAPSULE ORAL at 18:20

## 2024-10-30 RX ADMIN — CARVEDILOL 3.12 MG: 3.12 TABLET, FILM COATED ORAL at 18:20

## 2024-10-30 RX ADMIN — ATORVASTATIN CALCIUM 80 MG: 80 TABLET, FILM COATED ORAL at 08:08

## 2024-10-30 RX ADMIN — ASPIRIN 81 MG: 81 TABLET, COATED ORAL at 08:08

## 2024-10-30 NOTE — PLAN OF CARE
Goal Outcome Evaluation:      Plan of Care Reviewed With: patient, significant other          Outcome Evaluation: Home

## 2024-10-30 NOTE — PROGRESS NOTES
"End Of Shift Note       Neuro: Alert, oriented x3-4  speech clear and logical. . Denies today but states had been lightheaded before. [place]. Denies HA.      He and significant other both a bit frustrated with trying to navigate things and some misunderstandings today and last night. Sounds like  the oncology appointment was supposed to be addressed by PA last night after their long conversation and then they were notified that they missed it this morning. They spoke with care coordinator and were under the impression that it was just regarding PT so told Andra they did not need services but then talking to Dorita it sounds like they do b/c Dorita (sig other) states that she cannot care for him full time and he has been unable to care for himself-However he states the opposite so hard to know who to listen to.  Spoke with Andra and she is following up. Jayesh states she doesn't need it at this time and that we will see if it is needed going forward- he is seeing onc on Monday.  But Dorita shared frustration that PT/OT barely saw him and that he always steps up for that and that today he is all perked up and doing a bit better but it waxes and wanes . He has been able to ambulate today but RN had to assist with dressing x1 , other times he did it himself.     Cardiac: T max 98.8.  HR 60-70's. SBP's soft this afternoon- addressed with MD. Fluids restarted and encouraged. Significant BLE edema. Pale. Pulses difficult to palpate.  BP 94/40 (BP Location: Left arm)   Pulse 68   Temp 98.3  F (36.8  C) (Oral)   Resp 20   Ht 1.727 m (5' 8\")   Wt 108.9 kg (240 lb)   SpO2 93%   BMI 36.49 kg/m         Resp:  RRR on room air. Spot checks 93-97% on RA.  No cough or sore throat. Denies pressure or congestion. Up in the chair for most of the day. Walking back and forth in room and to bathroom- gets mildly winded with more activity.      GI/: Abdomen soft, tender, Bowel sounds active. Intermittent  diarrhea for more than a month- " states yesterday it was formed but this morning back to liquid. Loose stool this morning then more soft/formed this afternoon. Stools have been dark brown- NO blood, coffee ground, or black tarry stool noted.     Voiding with no pain, burning.      MSK: Feeling weak overall a bit better last night after fluids and then crumby again today but still stronger than yesterday.      Skin: Pale.      LDAs: PIV CDI, infusing.

## 2024-10-30 NOTE — PROGRESS NOTES
Physical Therapy: Orders received. Chart reviewed and discussed with care team.? Physical Therapy not indicated due to moving well with SBA per OT, no need for skilled PT, OT to continue to follow.? Defer discharge recommendations to OT and medical team.? Will complete orders.

## 2024-10-30 NOTE — CONSULTS
Care Management Note:     Care Management team received referral for discharge planning. Per therapy recommendations- home with assist.     Met with patient and significant other at bedside and had a brief discussion regarding discharge planning. They do not anticipate patient will need any additional services at discharge.      No further care management intervention anticipated at this time.  Please re-consult if further needs arise.  Care management signing off.      Yanet Mojica RN  Care Transitions  [phone]

## 2024-10-30 NOTE — PROGRESS NOTES
"   10/30/24 1100   Appointment Info   Signing Clinician's Name / Credentials (OT) Mi Barbara, OTR/L   Living Environment   People in Home significant other  (girlfriend.)   Current Living Arrangements house   Home Accessibility no concerns   Self-Care   Equipment Currently Used at Home cane, straight  (has walker to use if needed.)   Fall history within last six months no   Activity/Exercise/Self-Care Comment at baseline: independent with ADLs and related mobility. Significant other is able to assist as needed. At times has helped him with dressing.   General Information   Onset of Illness/Injury or Date of Surgery 10/29/24   Referring Physician Shyla Berrios PA-C   Patient/Family Therapy Goal Statement (OT) to return home.   Additional Occupational Profile Info/Pertinent History of Current Problem Jayesh Ortiz is a 84 year old male with past medical history significant for type 2 DM, CAD with  of mid LAD, hypertension, hyperlipidemia, PVCs, severe aortic stenosis with LBBB following TAVR (04/2023), HFrEF, CKD, hypothyroidism, BPH, who presents on 10/29/2024 with generalized weakness.   Existing Precautions/Restrictions no known precautions/restrictions   Cognitive Status Examination   Orientation Status orientation to person, place and time   Pain Assessment   Patient Currently in Pain Yes, see Vital Sign flowsheet  (\"stomach ache\")   Strength Comprehensive (MMT)   Comment, General Manual Muscle Testing (MMT) Assessment feels like his strength is good. generally \"just not feeling good\". appears fatigued with activity. Would benefit from follow-up on general strength and activity tolerance.   Bed Mobility   Comment (Bed Mobility) SBA   Transfers   Transfer Comments SBA   Balance   Balance Comments SBA with static standing and dynamic standing.   Activities of Daily Living   BADL Assessment/Intervention upper body dressing;lower body dressing;toileting   Upper Body Dressing Assessment/Training "   [place] Level (Upper Body Dressing) supervision   Lower Body Dressing Assessment/Training   [place] Level (Lower Body Dressing) supervision   Toileting   [place] Level (Toileting) supervision   Clinical Impression   Criteria for Skilled Therapeutic Interventions Met (OT) Yes, treatment indicated   OT Diagnosis decreased activity tolerance for ADLs/IADLs   OT Problem List-Impairments impacting ADL problems related to;activity tolerance impaired;strength   Assessment of Occupational Performance 1-3 Performance Deficits   Identified Performance Deficits general activity tolerance   Planned Therapy Interventions (OT) strengthening;progressive activity/exercise   Clinical Decision Making Complexity (OT) problem focused assessment/low complexity   Risk & Benefits of therapy have been explained patient;participants included;participants voiced agreement with care plan;current/potential barriers reviewed;risks/benefits reviewed;care plan/treatment goals reviewed;evaluation/treatment results reviewed   OT Total Evaluation Time   OT Eval, Low Complexity Minutes (43692) 10   OT Goals   Therapy Frequency (OT) 5 times/week   OT Predicted Duration/Target Date for Goal Attainment 11/06/24   OT Goals Hygiene/Grooming;Aerobic Activity   OT: Hygiene/Grooming supervision/stand-by assist;while standing   OT: Perform aerobic activity with stable cardiovascular response 5 minutes;continuous activity   Interventions   Interventions Quick Adds Self-Care/Home Management   Self-Care/Home Management   Self-Care/Home Mgmt/ADL, Compensatory, Meal Prep Minutes (33713) 10   Symptoms Noted During/After Treatment (Meal Preparation/Planning Training) none   Treatment Detail/Skilled Intervention completes sit to stand with SBAChino Rodriguez in place x30 seconds. States just generally not feeling well. Had episode of diarrhea but states he was able to clean self up and dress/undress self.  Declines any further activity d/t not feeling  well. Educated pt on recommended activity while IP to maintain strength/activity tolerance in prep of return home. Pt left seated up in chair with all needs wihtin reach.   OT Discharge Planning   OT Plan POC wed 1/5; activity tolerance   OT Discharge Recommendation (DC Rec) home with assist   OT Rationale for DC Rec anticipate home with assist once medically appropriate. Is moving with SBA and is active/independent at baseline   OT Brief overview of current status SBA   Total Session Time   Timed Code Treatment Minutes 10   Total Session Time (sum of timed and untimed services) 20

## 2024-10-30 NOTE — PROGRESS NOTES
"[facility] ADMISSION NOTE    Patient admitted to room 2305 at approximately [address]21 via wheel chair from emergency room. Patient was accompanied by significant other and transport tech.     Verbal SBAR report received from ABDIAS Gould prior to patient arrival.     Patient ambulated to bed with stand-by assist. Patient alert and oriented X 4. The patient is not having any pain.  . Admission vital signs: Blood pressure 120/47, pulse 74, temperature 98.1  F (36.7  C), temperature source Oral, resp. rate 18, height 1.727 m (5' 8\"), weight 108.9 kg (240 lb), SpO2 94%. Patient was oriented to plan of care, call light, bed controls, tv, telephone, bathroom, and visiting hours.     Risk Assessment    The following safety risks were identified during admission: fall. Yellow risk band applied: YES.     Skin Initial Assessment    This writer admitted this patient and completed a full skin assessment and Darci score in the Adult PCS flowsheet.       Darci Risk Assessment  Sensory Perception: 4-->no impairment  Moisture: 3-->occasionally moist  Activity: 3-->walks occasionally  Mobility: 3-->slightly limited  Nutrition: 3-->adequate  Friction and Shear: 2-->potential problem  Darci Score: 18  Moisture Interventions: Encourage regular toileting  Mattress: Standard gel/foam mattress (IsoFlex, Atmos Air, etc.)  Bed Frame: Standard width and length    Education    Patient has a May to Observation order: Yes  Observation education completed and documented: N/A      Kim Longoria RN      "

## 2024-10-30 NOTE — PLAN OF CARE
Problem: Adult Inpatient Plan of Care  Goal: Plan of Care Review  Description: The Plan of Care Review/Shift note should be completed every shift.  The Outcome Evaluation is a brief statement about your assessment that the patient is improving, declining, or no change.  This information will be displayed automatically on your shift  note.  Outcome: Progressing  Flowsheets (Taken 10/30/2024 0628)  Outcome Evaluation: Pt alert and oriented x4. SBA and states he feels better than when he first got here. Pt denies any pain and states he just hadn't been feeling well for a couple of weeks with falls. Pt in chair throughout shift stating it's better to sit in that to sleep.  Plan of Care Reviewed With: patient  Overall Patient Progress: improving  Goal: Absence of Hospital-Acquired Illness or Injury  Intervention: Prevent Skin Injury  Recent Flowsheet Documentation  Taken 10/29/2024 2309 by Kim Longoria, RN  Body Position: position changed independently  Intervention: Prevent and Manage VTE (Venous Thromboembolism) Risk  Recent Flowsheet Documentation  Taken 10/30/2024 0430 by Kim Longoria, RN  VTE Prevention/Management: SCDs off (sequential compression devices)  Goal: Readiness for Transition of Care  Intervention: Mutually Develop Transition Plan  Recent Flowsheet Documentation  Taken 10/29/2024 2150 by Kim Longoria, RN  Equipment Currently Used at Home: cane, straight         Goal Outcome Evaluation:      Plan of Care Reviewed With: patient    Overall Patient Progress: improvingOverall Patient Progress: improving    Outcome Evaluation: Pt alert and oriented x4. SBA and states he feels better than when he first got here. Pt denies any pain and states he just hadn't been feeling well for a couple of weeks with falls. Pt in chair throughout shift stating it's better to sit in that to sleep.

## 2024-10-30 NOTE — PROGRESS NOTES
[facility]    Medicine Progress Note - Hospitalist Service    Date of Admission:  10/29/2024    Assessment & Plan   Jayesh Ortiz is a 84 year old male with past medical history significant for type 2 DM, CAD with  of mid LAD, hypertension, hyperlipidemia, PVCs, severe aortic stenosis with LBBB following TAVR (04/2023), HFrEF, CKD, hypothyroidism, BPH, who presents on 10/29/2024 with generalized weakness.     Acute kidney injury on CKD 3  Baseline creatinine 1.2-1.4, GFR 50-60. Admission BUN/creatinine 57/2.60 and GFR 24, worsened from recent check (2.13 on 10/21/24). CT abd/pelvis 10/21/24 without hydronephrosis, obstruction. Admit Endorses poor oral intake and frequent diarrhea x4 weeks, denies urinary symptoms. Likely secondary to dehydration.     Received LR 1 L    BUN/creatinine 47/2.0  - Repeat  LR  x 1 L  - Hold pta losartan, bumex, glipizide, metformin, sotalol     Normocytic Anemia  Baseline HGB 2023 <12.0. No symptoms of acute blood loss   Admission HGB 11.0-? 10.4  - Follow     Intermittent diarrhea for 6 weeks   Doubt infectious  - Immodium as needed    Generalized weakness  Frequent falls   Endorses generalized weakness, fatigue, and myalgias onset 6-7 weeks prior to admission with gradual progression. Has had several ground level mechanical falls one week prior to admission with no obvious resulting injuries. 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   - PT/OT/Care management consult   - fall precautions   - treat MAYDA as above.     Abnormal UA   UA borderline positive with moderate leuk esterase, 9WBCs, few bacteria. No leukocytosis. Denies urinary symptoms, does endorse generalized weakness and fatigue. Afebrile, VS reassuring. Urine culture negative       Probable metastatic cancer  History of prostate cancer 2000s  History of thyroid cancer 1990s  Elevated LFTs   Liver lesions   Pulmonary nodules   Abdominal  lymphadenopathy   Esophageal thickening   Unintentional weight loss   Incidental imaging findings on ED visit 10/21/24 when he presented with generalized fatigue/weakness. Endorses 90# unintentional weight loss x1 year. CT abd/pelvis 10/21/24 revealed: Circumferential wall thickening of the distal esophagus, which could be attributable to an underlying esophagitis or primary esophageal malignancy. Multiple hepatic lesions, highly suspicious for metastatic disease. Upper abdominal lymphadenopathy, suspicious for venessa metastatic disease. Few sub-6 mm pulmonary nodules, new from prior and also suspicious for metastatic disease. Cirrhosis, cholelithiasis, colonic diverticulitis.  Elevated alk phos (524), ALT (134), and AST (269). Tbili wnl (1.1).  US abd on admission with heterogenous nodularity throughout liver, cholelithiasis and trace pericholecystic fluid, mild diffuse gallbladder wall thickening, probable enlarged periportal lymph node. Patient is scheduled for oncology clinic visit at 9am on 10/30/24.     Has not had a colonoscopy. Some of GI symptoms intermittent explosive diarrhea are suggestive of colon cancer   - Reschedule oncology follow up for 11/4/24      Diabetes mellitus type 2   HgbA1c 6.2% on 03/18/24.  on admission. Managed prior to admission with glipizide 5mg BID, metformin 1000mg BID.     AM blood sugars are good  - Recheck A1c  - Moderate consistent carb diet    - Hold prior to admission glipizide and metformin given MAYDA as above    Heart failure with recovered ejection fraction   Hx severe aortic stenosis s/p TAVR 04/2023  Hypertension   Echocardiogram 07/15/24 with LVEF 50-55% (improved from 45-50% on 03/04/24), biatrial enlargement. LVEF previously 45-50% by 3/4/2024 TTE in the setting of 36% PVCs, improved to LVEF 50-55% on 07/15/24 RUSSELL with partial PCV suppression (16% burden at that time). HF time course consistent with association with PVCs, though cannot excluded LBBB-dyssynchrony  mediated cardiomyopathy.   CXR on admission with stable size of cardiac silhouette, no pleural effusion.   BUN mildly elevated on admission (2783), appears euvolemic.   Managed prior to admission with bumetanide 1mg every morning and 0.5mg daily at noon, carvedilol 3.125mg BID, losartan 100mg daily.     BPs are soft  - Hold prior to admission losartan, bumex  due to acute kidney injury  - Continue prior to admission carvedilol.     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 He seems asymptomatic although these could potentially worsen his cardiomyopathy. Started on sotalol 03/20/24, 16% PVCs by 3/27/2024 mobile cardiac telemetry. Admission EKG with sinus rhythm with LBBB, inferior Q waves. Managed prior to admission with carvedilol 3.125mg BID, sotalol 60mg BID.   - Continue prior to admission carvedilol  - Restart prior to admission Sotalol     Hyperlipidemia   Managed prior to admission with atorvastatin 80mg daily, ezetimibe 10mg daily; continue     Hypothyroidism   TSH 7.44 on 10/21/24  Managed prior to admission with levothyroxine 175mcg daily; continue           Diet: Combination Diet Regular Diet Adult; Moderate Consistent Carb (60 g CHO per Meal) Diet    DVT Prophylaxis: Low Risk/Ambulatory with no VTE prophylaxis indicated  Welch Catheter: Not present  Lines: None     Cardiac Monitoring: None  Code Status: Full Code      Clinically Significant Risk Factors Present on Admission               # Hypoalbuminemia: Lowest albumin = 2.9 g/dL at 10/30/2024  6:06 AM, will monitor as appropriate   # Drug Induced Platelet Defect: home medication list includes an antiplatelet medication   # Hypertension: Home medication list includes antihypertensive(s)    # Anemia: based on hgb <11       # Obesity: Estimated body mass index is 36.49 kg/m  as calculated from the following:    Height as of  "this encounter: 1.727 m (5' 8\").    Weight as of this encounter: 108.9 kg (240 lb).              Disposition Plan     Medically Ready for Discharge: Anticipated Tomorrow             Dani Wang MD  Hospitalist Service  [facility]  Securely message with [employer] (more info)  Text page via [employer] Paging/Directory   ______________________________________________________________________    Interval History     Feels better, still not well. Feels like having some distension in abdomen, relieved by BMs    Physical Exam   Vital Signs: Temp: 97.8  F (36.6  C) Temp src: Oral BP: 103/40 Pulse: 76   Resp: 18 SpO2: 93 % O2 Device: None (Room air)    Weight: 240 lbs 0 oz    Constitutional: awake, alert, cooperative, no apparent distress, and appears stated age    Medical Decision Making       >> 60 MINUTES SPENT BY ME on the date of service doing chart review, history, exam, documentation & further activities per the note.      Data     Recent Labs   Lab 10/30/24  0606 10/29/24  1207   * 142*     CMP  Recent Labs   Lab 10/30/24  0606 10/29/24  1207    135   POTASSIUM 4.8 5.1   CHLORIDE 101 99   CO2 23 24   ANIONGAP 12 12   * 142*   BUN 47.4* 57.9*   CR 2.01* 2.60*   GFRESTIMATED 32* 24*   MATT 8.2* 8.6*   PROTTOTAL 6.2* 6.6   ALBUMIN 2.9* 3.0*   BILITOTAL 1.2 1.1   ALKPHOS 513* 524*   * 269*   * 134*     CBC  Recent Labs   Lab 10/30/24  0606 10/29/24  1207   WBC 9.3 10.4   RBC 3.35* 3.60*   HGB 10.4* 11.1*   HCT 31.5* 33.7*   MCV 94 94   MCH 31.0 30.8   MCHC 33.0 32.9   RDW 13.0 13.2    277     UA RESULTS:  Recent Labs   Lab Test 10/29/24  1518   COLOR Yellow   APPEARANCE Slightly Cloudy*   URINEGLC Negative   URINEBILI Negative   URINEKETONE Negative   SG 1.014   UBLD Negative   URINEPH 5.0   PROTEIN Negative   NITRITE Negative   LEUKEST Moderate*   RBCU 1   WBCU 9*      "

## 2024-10-31 ENCOUNTER — APPOINTMENT (OUTPATIENT)
Dept: CT IMAGING | Facility: CLINIC | Age: 84
DRG: 436 | End: 2024-10-31
Attending: PHYSICIAN ASSISTANT
Payer: MEDICARE

## 2024-10-31 ENCOUNTER — APPOINTMENT (OUTPATIENT)
Dept: OCCUPATIONAL THERAPY | Facility: CLINIC | Age: 84
DRG: 436 | End: 2024-10-31
Payer: MEDICARE

## 2024-10-31 ENCOUNTER — APPOINTMENT (OUTPATIENT)
Dept: GENERAL RADIOLOGY | Facility: CLINIC | Age: 84
DRG: 436 | End: 2024-10-31
Attending: INTERNAL MEDICINE
Payer: MEDICARE

## 2024-10-31 LAB
ALBUMIN SERPL BCG-MCNC: 2.8 G/DL (ref 3.5–5.2)
ALP SERPL-CCNC: 610 U/L (ref 40–150)
ALT SERPL W P-5'-P-CCNC: 147 U/L (ref 0–70)
ANION GAP SERPL CALCULATED.3IONS-SCNC: 14 MMOL/L (ref 7–15)
AST SERPL W P-5'-P-CCNC: 379 U/L (ref 0–45)
BASE EXCESS BLDV CALC-SCNC: -1.4 MMOL/L (ref -3–3)
BILIRUB DIRECT SERPL-MCNC: 1.07 MG/DL (ref 0–0.3)
BILIRUB SERPL-MCNC: 1.6 MG/DL
BILIRUB SERPL-MCNC: 1.6 MG/DL
BUN SERPL-MCNC: 48.1 MG/DL (ref 8–23)
CALCIUM SERPL-MCNC: 8.1 MG/DL (ref 8.8–10.4)
CHLORIDE SERPL-SCNC: 99 MMOL/L (ref 98–107)
CREAT SERPL-MCNC: 2.11 MG/DL (ref 0.67–1.17)
EGFRCR SERPLBLD CKD-EPI 2021: 30 ML/MIN/1.73M2
ERYTHROCYTE [DISTWIDTH] IN BLOOD BY AUTOMATED COUNT: 13.2 % (ref 10–15)
FERRITIN SERPL-MCNC: 5601 NG/ML (ref 31–409)
GLUCOSE BLDC GLUCOMTR-MCNC: 189 MG/DL (ref 70–99)
GLUCOSE BLDC GLUCOMTR-MCNC: 205 MG/DL (ref 70–99)
GLUCOSE BLDC GLUCOMTR-MCNC: 230 MG/DL (ref 70–99)
GLUCOSE BLDC GLUCOMTR-MCNC: 250 MG/DL (ref 70–99)
GLUCOSE SERPL-MCNC: 233 MG/DL (ref 70–99)
HCO3 BLDV-SCNC: 23 MMOL/L (ref 21–28)
HCO3 SERPL-SCNC: 18 MMOL/L (ref 22–29)
HCT VFR BLD AUTO: 32.7 % (ref 40–53)
HGB BLD-MCNC: 10.7 G/DL (ref 13.3–17.7)
IRON BINDING CAPACITY (ROCHE): 153 UG/DL (ref 240–430)
IRON SATN MFR SERPL: 16 % (ref 15–46)
IRON SERPL-MCNC: 25 UG/DL (ref 61–157)
LACTATE SERPL-SCNC: 1.9 MMOL/L (ref 0.7–2)
LDH SERPL L TO P-CCNC: >2500 U/L (ref 0–250)
MCH RBC QN AUTO: 30.7 PG (ref 26.5–33)
MCHC RBC AUTO-ENTMCNC: 32.7 G/DL (ref 31.5–36.5)
MCV RBC AUTO: 94 FL (ref 78–100)
O2/TOTAL GAS SETTING VFR VENT: 28 %
OXYHGB MFR BLDV: 90 % (ref 70–75)
PCO2 BLDV: 37 MM HG (ref 40–50)
PH BLDV: 7.4 [PH] (ref 7.32–7.43)
PLATELET # BLD AUTO: 264 10E3/UL (ref 150–450)
PO2 BLDV: 63 MM HG (ref 25–47)
POTASSIUM SERPL-SCNC: 5.5 MMOL/L (ref 3.4–5.3)
POTASSIUM SERPL-SCNC: 5.5 MMOL/L (ref 3.4–5.3)
POTASSIUM SERPL-SCNC: 5.6 MMOL/L (ref 3.4–5.3)
POTASSIUM SERPL-SCNC: 5.7 MMOL/L (ref 3.4–5.3)
PROT SERPL-MCNC: 6.1 G/DL (ref 6.4–8.3)
RBC # BLD AUTO: 3.49 10E6/UL (ref 4.4–5.9)
RETICS # AUTO: 0.04 10E6/UL (ref 0.03–0.1)
RETICS/RBC NFR AUTO: 1.2 % (ref 0.5–2)
SAO2 % BLDV: 91.6 % (ref 70–75)
SODIUM SERPL-SCNC: 131 MMOL/L (ref 135–145)
URATE SERPL-MCNC: 7.9 MG/DL (ref 3.4–7)
WBC # BLD AUTO: 15.3 10E3/UL (ref 4–11)

## 2024-10-31 PROCEDURE — 250N000009 HC RX 250: Performed by: PHYSICIAN ASSISTANT

## 2024-10-31 PROCEDURE — 82248 BILIRUBIN DIRECT: CPT | Performed by: INTERNAL MEDICINE

## 2024-10-31 PROCEDURE — 86709 HEPATITIS A IGM ANTIBODY: CPT | Performed by: INTERNAL MEDICINE

## 2024-10-31 PROCEDURE — 250N000013 HC RX MED GY IP 250 OP 250 PS 637: Performed by: FAMILY MEDICINE

## 2024-10-31 PROCEDURE — 250N000013 HC RX MED GY IP 250 OP 250 PS 637

## 2024-10-31 PROCEDURE — 93005 ELECTROCARDIOGRAM TRACING: CPT

## 2024-10-31 PROCEDURE — 84132 ASSAY OF SERUM POTASSIUM: CPT | Performed by: INTERNAL MEDICINE

## 2024-10-31 PROCEDURE — 36415 COLL VENOUS BLD VENIPUNCTURE: CPT | Performed by: INTERNAL MEDICINE

## 2024-10-31 PROCEDURE — 82533 TOTAL CORTISOL: CPT | Performed by: INTERNAL MEDICINE

## 2024-10-31 PROCEDURE — 74177 CT ABD & PELVIS W/CONTRAST: CPT | Mod: MG

## 2024-10-31 PROCEDURE — 74019 RADEX ABDOMEN 2 VIEWS: CPT

## 2024-10-31 PROCEDURE — 83615 LACTATE (LD) (LDH) ENZYME: CPT | Performed by: INTERNAL MEDICINE

## 2024-10-31 PROCEDURE — 250N000013 HC RX MED GY IP 250 OP 250 PS 637: Performed by: INTERNAL MEDICINE

## 2024-10-31 PROCEDURE — 120N000001 HC R&B MED SURG/OB

## 2024-10-31 PROCEDURE — 97535 SELF CARE MNGMENT TRAINING: CPT | Mod: GO

## 2024-10-31 PROCEDURE — 82607 VITAMIN B-12: CPT | Performed by: INTERNAL MEDICINE

## 2024-10-31 PROCEDURE — 83605 ASSAY OF LACTIC ACID: CPT | Performed by: INTERNAL MEDICINE

## 2024-10-31 PROCEDURE — 250N000011 HC RX IP 250 OP 636: Performed by: PHYSICIAN ASSISTANT

## 2024-10-31 PROCEDURE — 85014 HEMATOCRIT: CPT | Performed by: INTERNAL MEDICINE

## 2024-10-31 PROCEDURE — 99222 1ST HOSP IP/OBS MODERATE 55: CPT | Performed by: STUDENT IN AN ORGANIZED HEALTH CARE EDUCATION/TRAINING PROGRAM

## 2024-10-31 PROCEDURE — 85045 AUTOMATED RETICULOCYTE COUNT: CPT | Performed by: INTERNAL MEDICINE

## 2024-10-31 PROCEDURE — 84550 ASSAY OF BLOOD/URIC ACID: CPT | Performed by: INTERNAL MEDICINE

## 2024-10-31 PROCEDURE — 82728 ASSAY OF FERRITIN: CPT | Performed by: INTERNAL MEDICINE

## 2024-10-31 PROCEDURE — 83550 IRON BINDING TEST: CPT | Performed by: INTERNAL MEDICINE

## 2024-10-31 PROCEDURE — 80053 COMPREHEN METABOLIC PANEL: CPT | Performed by: INTERNAL MEDICINE

## 2024-10-31 PROCEDURE — 87040 BLOOD CULTURE FOR BACTERIA: CPT | Performed by: INTERNAL MEDICINE

## 2024-10-31 PROCEDURE — 258N000003 HC RX IP 258 OP 636: Performed by: INTERNAL MEDICINE

## 2024-10-31 PROCEDURE — 250N000011 HC RX IP 250 OP 636

## 2024-10-31 PROCEDURE — 99233 SBSQ HOSP IP/OBS HIGH 50: CPT | Performed by: INTERNAL MEDICINE

## 2024-10-31 PROCEDURE — 250N000012 HC RX MED GY IP 250 OP 636 PS 637: Performed by: INTERNAL MEDICINE

## 2024-10-31 PROCEDURE — 82805 BLOOD GASES W/O2 SATURATION: CPT | Performed by: INTERNAL MEDICINE

## 2024-10-31 PROCEDURE — 97110 THERAPEUTIC EXERCISES: CPT | Mod: GO

## 2024-10-31 RX ORDER — NICOTINE POLACRILEX 4 MG
15-30 LOZENGE BUCCAL
Status: DISCONTINUED | OUTPATIENT
Start: 2024-10-31 | End: 2024-11-03 | Stop reason: HOSPADM

## 2024-10-31 RX ORDER — SODIUM CHLORIDE, SODIUM LACTATE, POTASSIUM CHLORIDE, CALCIUM CHLORIDE 600; 310; 30; 20 MG/100ML; MG/100ML; MG/100ML; MG/100ML
INJECTION, SOLUTION INTRAVENOUS CONTINUOUS
Status: DISCONTINUED | OUTPATIENT
Start: 2024-10-31 | End: 2024-11-02

## 2024-10-31 RX ORDER — ACETAMINOPHEN 325 MG/1
325 TABLET ORAL ONCE
Status: COMPLETED | OUTPATIENT
Start: 2024-10-31 | End: 2024-10-31

## 2024-10-31 RX ORDER — DEXTROSE MONOHYDRATE 25 G/50ML
25-50 INJECTION, SOLUTION INTRAVENOUS
Status: DISCONTINUED | OUTPATIENT
Start: 2024-10-31 | End: 2024-11-03 | Stop reason: HOSPADM

## 2024-10-31 RX ORDER — IOPAMIDOL 755 MG/ML
117 INJECTION, SOLUTION INTRAVASCULAR ONCE
Status: COMPLETED | OUTPATIENT
Start: 2024-10-31 | End: 2024-10-31

## 2024-10-31 RX ADMIN — CARVEDILOL 3.12 MG: 3.12 TABLET, FILM COATED ORAL at 17:38

## 2024-10-31 RX ADMIN — SODIUM POLYSTYRENE SULFONATE 15 G: 15 SUSPENSION ORAL; RECTAL at 14:56

## 2024-10-31 RX ADMIN — ONDANSETRON 4 MG: 2 INJECTION INTRAMUSCULAR; INTRAVENOUS at 14:55

## 2024-10-31 RX ADMIN — ACETAMINOPHEN 325 MG: 325 TABLET ORAL at 05:12

## 2024-10-31 RX ADMIN — ASPIRIN 81 MG: 81 TABLET, COATED ORAL at 08:17

## 2024-10-31 RX ADMIN — CALCIUM CARBONATE (ANTACID) CHEW TAB 500 MG 1000 MG: 500 CHEW TAB at 14:56

## 2024-10-31 RX ADMIN — INSULIN ASPART 3 UNITS: 100 INJECTION, SOLUTION INTRAVENOUS; SUBCUTANEOUS at 09:39

## 2024-10-31 RX ADMIN — ONDANSETRON 4 MG: 4 TABLET, ORALLY DISINTEGRATING ORAL at 04:13

## 2024-10-31 RX ADMIN — PROCHLORPERAZINE EDISYLATE 5 MG: 5 INJECTION INTRAMUSCULAR; INTRAVENOUS at 08:18

## 2024-10-31 RX ADMIN — LEVOTHYROXINE SODIUM 175 MCG: 0.15 TABLET ORAL at 06:21

## 2024-10-31 RX ADMIN — SODIUM POLYSTYRENE SULFONATE 30 G: 15 SUSPENSION ORAL; RECTAL at 21:15

## 2024-10-31 RX ADMIN — INSULIN ASPART 2 UNITS: 100 INJECTION, SOLUTION INTRAVENOUS; SUBCUTANEOUS at 11:46

## 2024-10-31 RX ADMIN — INSULIN ASPART 2 UNITS: 100 INJECTION, SOLUTION INTRAVENOUS; SUBCUTANEOUS at 17:38

## 2024-10-31 RX ADMIN — CALCIUM CARBONATE (ANTACID) CHEW TAB 500 MG 1000 MG: 500 CHEW TAB at 08:17

## 2024-10-31 RX ADMIN — SODIUM CHLORIDE, POTASSIUM CHLORIDE, SODIUM LACTATE AND CALCIUM CHLORIDE: 600; 310; 30; 20 INJECTION, SOLUTION INTRAVENOUS at 08:19

## 2024-10-31 RX ADMIN — EZETIMIBE 10 MG: 10 TABLET ORAL at 08:18

## 2024-10-31 RX ADMIN — SODIUM CHLORIDE 69 ML: 9 INJECTION, SOLUTION INTRAVENOUS at 12:59

## 2024-10-31 RX ADMIN — IOPAMIDOL 117 ML: 755 INJECTION, SOLUTION INTRAVENOUS at 12:58

## 2024-10-31 RX ADMIN — LOPERAMIDE HYDROCHLORIDE 2 MG: 2 CAPSULE ORAL at 08:18

## 2024-10-31 ASSESSMENT — ACTIVITIES OF DAILY LIVING (ADL)
ADLS_ACUITY_SCORE: 0
DEPENDENT_IADLS:: INDEPENDENT
ADLS_ACUITY_SCORE: 0

## 2024-10-31 NOTE — CONSULTS
Surgical Consultation/History and Physical  [facility]    Jayesh is seen in consultation for possible bowel obstruction, at the request of Dr. Dani Wang    Chief Complaint:  weakness    History of Present Illness: Jayesh Ortiz is a 84 year old male presents with generalized weakness. He has multiple medical issues at baseline, including T2DM, CAD, HTN, severe aortic stenosis with LBBB, HFrEF, CKD, and hypothyroidism. He was admitted on 10/29/24 for management of generalized weakness and evaluation of multiple falls. He has also been experiencing diarrhea intermittently for several weeks. Earlier today, he was experiencing abdominal bloating and nausea, and an abdominal XR was performed. Given concern for possible bowel obstruction, surgery team was consulted. He states his abdominal discomfort has improved now. He last had a watery bowel movement yesterday evening. Denies any nausea currently, but reportedly had some earlier today. Denies any prior abdominal surgeries.    Patient Active Problem List   Diagnosis    Hypothyroidism    History of prostate cancer    History of thyroid cancer    Hyperlipidemia    Type 2 diabetes mellitus (H)    Morbid obesity (H)    History of cardiomyopathy    Coronary artery disease involving coronary bypass graft of native heart without angina pectoris    S/P TAVR (transcatheter aortic valve replacement)    Aortic stenosis, severe    PVC's (premature ventricular contractions)    LBBB (left bundle branch block)    Generalized weakness    Falls frequently    Abnormal results of liver function studies    Acute kidney injury (H)    Abnormal liver scan    Anemia       Past Medical History:   Diagnosis Date    Accelerated hypertension 08/07/2023    Acute congestive heart failure, unspecified heart failure type (H) 08/07/2023    Diabetes mellitus, type 2 (H)     Fracture of right talus 03/09/2017    Hypertension     Prostate cancer (H)     S/p TAVR (transcatheter aortic  "valve replacement), bioprosthetic     Thyroid cancer (H)     Thyroid disease        Past Surgical History:   Procedure Laterality Date    CLOSED REDUCTION, PERCUTANEOUS PINNING LOWER EXTREMITY, COMBINED Right 3/9/2017    Procedure: COMBINED CLOSED REDUCTION, PERCUTANEOUS PINNING LOWER EXTREMITY;  Surgeon: Ankit Coy DPM;  Location: [facility] OR    CV CORONARY ANGIOGRAM N/A 2/9/2023    Procedure: Coronary Angiogram;  Surgeon: Nabeel Davies MD;  Location: [facility] CV    CV TRANSCATHETER AORTIC VALVE REPLACEMENT-FEMORAL APPROACH N/A 4/4/2023    Procedure: Transcatheter Aortic Valve Replacement-Femoral Approach;  Surgeon: Nabeel Davies MD;  Location: [facility] CATH LAB CV    OR TRANSCATHETER AORTIC VALVE REPLACEMENT, FEMORAL PERCUTANEOUS APPROACH (STANDBY) N/A 4/4/2023    Procedure: OR TRANSCATHETER AORTIC VALVE REPLACEMENT, FEMORAL PERCUTANEOUS APPROACH (STANDBY);  Surgeon: Jeramie De Jesus MD;  Location: [facility] CATH LAB CV    THYROIDECTOMY         Family History   Problem Relation Age of Onset    Heart Failure Mother     Heart Failure Father        Social History     Tobacco Use    Smoking status: Former    Smokeless tobacco: Never   Substance Use Topics    Alcohol use: Not Currently        History   Drug Use Unknown       No current outpatient medications on file.       Allergies   Allergen Reactions    Nka [No Known Allergies]        Review of Systems:   10 point ROS negative other than what was listed in HPI    Physical Exam:  BP (!) 101/39 (BP Location: Left arm)   Pulse 56   Temp 97.7  F (36.5  C) (Oral)   Resp 18   Ht 1.727 m (5' 8\")   Wt 108.9 kg (240 lb)   SpO2 94%   BMI 36.49 kg/m      Constitutional- No acute distress, non-toxic  Eyes: Anicteric, no injection.  PERRL  ENT:  Normocephalic, atraumatic, Nose midline, moist mucus membranes  Neck - supple, no LAD  Respiratory- Nonlabored breathing on room air  Cardiovascular - Extremities warm and well perfused.  Abdomen - " "Soft, obese, distended, non-tender,   Neuro - No focal neuro deficits, Alert and oriented x 3  Psych: Flat affect  Musculoskeletal: Normal gait, symmetric strength.  FROM upper and lower extremities.  Skin: Warm, Dry    CBC RESULTS:   Recent Labs   Lab Test 10/31/24  0515   WBC 15.3*   RBC 3.49*   HGB 10.7*   HCT 32.7*   MCV 94   MCH 30.7   MCHC 32.7   RDW 13.2        Last Comprehensive Metabolic Panel:  Lab Results   Component Value Date     (L) 10/31/2024    POTASSIUM 5.5 (H) 10/31/2024    CHLORIDE 99 10/31/2024    CO2 18 (L) 10/31/2024    ANIONGAP 14 10/31/2024     (H) 10/31/2024    BUN 48.1 (H) 10/31/2024    CR 2.11 (H) 10/31/2024    GFRESTIMATED 30 (L) 10/31/2024    MATT 8.1 (L) 10/31/2024       Lab Results   Component Value Date     10/31/2024    AST 15 03/09/2017     Lab Results   Component Value Date     10/31/2024    ALT 20 03/09/2017     No results found for: \"BILICONJ\"   Lab Results   Component Value Date    BILITOTAL 1.6 10/31/2024    BILITOTAL 0.6 03/09/2017     Lab Results   Component Value Date    ALBUMIN 2.8 10/31/2024    ALBUMIN 4.0 05/18/2022    ALBUMIN 3.7 03/09/2017     Lab Results   Component Value Date    PROTTOTAL 6.1 10/31/2024    PROTTOTAL 6.8 03/09/2017      Lab Results   Component Value Date    ALKPHOS 610 10/31/2024    ALKPHOS 44 03/09/2017     CT Abdomen/pelvis 10/31/24  FINDINGS:   LUNGS AND PLEURA: Multiple scattered bilateral noncalcified pulmonary  nodules are present. Largest noncalcified pulmonary nodule is seen in  the anterior left upper lobe measuring 8 x 7 mm (3-160). A couple  benign densely calcified pulmonary granulomas are also noted. Mild  bibasilar atelectasis. No airspace consolidation or significant  pleural fluid. Large airways are patent.     MEDIASTINUM/AXILLAE: Fluid is seen throughout the esophagus. There is  eccentric somewhat masslike appearing distal esophageal wall  thickening, greatest along the right lateral aspect. Heart size " is  mildly enlarged. No pericardial fluid. Prior aortic valve repair.  Moderate to severe coronary artery atherosclerosis. Thoracic aorta is  normal in course and caliber. Mild to moderate thoracic aortic  atherosclerosis. No enlarged thoracic lymph nodes by CT size criteria.  A couple prominent retroperitoneal lymph nodes are noted. For example,  a distal right paraesophageal lymph node measures 10 mm (7-109).     HEPATOBILIARY: There are innumerable low-density hepatic lesions  throughout the liver parenchyma with multiple large confluent low  density hepatic masses. When compared to the recent prior examination,  there has been interval progression of liver disease versus better  visualization of liver disease. There is a similar-appearing edematous  gallbladder wall thickening and/or pericholecystic fluid present.  Several small calcified gallstones are seen in the gallbladder lumen.  No bile duct dilation. Small volume perihepatic free fluid is present.  There is note of mild nodular contour of the liver, which may be  secondary to widespread liver metastases. Superimposed cirrhosis is  difficult to entirely exclude.     PANCREAS: Normal.     SPLEEN: Normal.     ADRENAL GLANDS: Normal.     KIDNEYS/BLADDER: Normal.     BOWEL: Colonic diverticulosis is present without evidence of  diverticulitis. Large and small bowel loops are nonobstructed and  noninflamed. No signs of acute appendicitis.     PELVIC ORGANS: A few small metallic suspected fiducial markers are  seen in the prostate gland.     LYMPH NODES: There are numerous enlarged gastrohepatic, periportal,  and retroperitoneal/para-aortic lymph nodes present. A few examples  are as follows:  -Periportal lymph node measuring 25 x 20 mm (7-161), previously 25 x  20 mm.  -Cavoatrial lymph node measuring 33 x 20 mm (7-175), previously 34 x  18 mm.  -Left periaortic lymph node measuring 34 x 21 mm (7-186), previously  30 x 21 mm.     ADDITIONAL FINDINGS: At least  moderate atherosclerosis of the  abdominal aorta and iliac arteries. Mild fusiform aneurysmal dilation  of the infrarenal abdominal aorta measuring up to 3 cm, similar to  comparison. As mention above, there is minimal perihepatic free fluid  present. There is also note of mild fat stranding and edema in the  mesentery as well as in the pelvis/presacral space.     MUSCULOSKELETAL: Multilevel hypertrophic and degenerative changes of  the spine. Grade 1 spondylolisthesis of L5 on S1 secondary to  bilateral L5 spondylolysis. No acute osseous abnormality. No  aggressive appearing lytic or blastic osseous lesions. Mild body wall  edema.                                                                      IMPRESSION:  1.  No evidence of bowel obstruction.  2.  Eccentric masslike wall thickening in the distal esophagus.  Consider further evaluation with endoscopy to exclude primary  malignancy.  3.  Innumerable confluent low density hepatic lesions, consistent with  metastatic disease.  4.  Multiple enlarged and pathologic appearing abdominal lymph nodes,  consistent with metastatic disease.  5.  Moderate edematous gallbladder wall thickening and/or  pericholecystic fluid, nonspecific but likely reactive to underlying  liver disease. Acute cholecystitis is difficult to entirely exclude,  but thought somewhat less likely.  6.  Several subcentimeter pulmonary nodules measuring up to 8 mm,  possibly representing metastatic disease in light of additional  findings.  7.  Minimal ascites.  8.  Additional nonacute findings as above.    Assessment:  1. Jayesh Ortiz is a 84 year old male with multiple medical comorbidities who was admitted for weakness. Today, patient developed new transient bloating, now slightly improved. CT abdomen/pelvis obtained which showed multiple lesions in liver, thickening of distal esophagus, and multiple enlarged lymph nodes throughout abdomen, but no evidence of small or large bowel  obstruction.    With regards to the concern for possible bowel obstruction, there is evidence of increased stool burden in colon, but not to the degree of causing obstruction. He is currently not experiencing nausea or emesis. Ok for diet. Would likely benefit from daily suppository to promote colon motility.     Findings of multiple lesions throughout abdomen suggestive of malignancy, though not clear where likely source is. He has a history of prostate cancer, but also newly discovered distal esophagus thickening which may possibly be malignancy. Recommend possible outpatient upper endoscopy once medically optimized to evaluate esophagus thickening. Also consider Palliative Care consult given patient's advanced age and diffuse metastasis.    Plan:   1. No evidence of bowel obstruction on CT, ok for diet. No urgent surgical intervention indicated.  2. Recommend daily suppository to reduce stool burden seen on CT  3. Consider upper endoscopy for esophageal thickening evaluation  4. Consider outpatient colonoscopy to also evaluate for colon cancer given he has not had a colonoscopy in the past  5. Consider oncology and palliative care consult given findings of diffuse metastasis with unknown primary   6. Surgery team will follow peripherally. Please reach out if there are any further questions or concerns.        Junito Jenkins MD on 10/31/2024 at 2:58 PM

## 2024-10-31 NOTE — PROGRESS NOTES
[facility]    Medicine Progress Note - Hospitalist Service    Date of Admission:  10/29/2024    Assessment & Plan   Jayesh Ortiz is a 84 year old male with past medical history significant for type 2 DM, CAD with  of mid LAD, hypertension, hyperlipidemia, PVCs, severe aortic stenosis with LBBB following TAVR (04/2023), HFrEF, CKD, hypothyroidism, BPH, who presents on 10/29/2024 with generalized weakness.     Generalized weakness  Frequent falls   Endorses generalized weakness, fatigue, and myalgias onset 6-7 weeks prior to admission with gradual progression. Has had several ground level mechanical falls one week prior to admission with no obvious resulting injuries. 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     PT/OT/Care management consulted. Felt he is able to return home, but wife says he 'puts on a good show', and has 'ups and downs.'  - Fall precautions     Acute kidney injury on CKD 3  Baseline creatinine 1.2-1.4, GFR 50-60. Admission BUN/creatinine 57/2.60 and GFR 24, worsened from recent check (2.13 on 10/21/24). CT abd/pelvis 10/21/24 without hydronephrosis, obstruction. UA 10/29/24 bland. Admit endorses poor oral intake and frequent diarrhea x4 weeks, denies urinary symptoms. Likely secondary to dehydration.     Received LR 1 L on admission  Repeated  LR  x 1 L 10/30/2024     BUN/creatinine 47/2.0 ? 48/2.1  Hyperkalemia 5.7 (10/31/2024)    Not improving   - Recheck K. Check EKG. Consider kayexalate  - Check PVR  - Start continuous LR 75  - Hold prior to admission Losartan, bumex, glipizide, metformin, sotalol     Low grade Temp 100.9 overnight 10/31/2024  - Check blood cultures with fever   - Check Blood culture now     Intermittent diarrhea for 6 weeks   Doubt infectious, consider partial bowel obstruction from cancer    Increased distension, discomfort 10/31/2024. BS present. 2 Views abdomen - Prominent air-filled loops of  small bowel are seen measuring up to just over 4 cm in diameter, which can be seen with bowel obstruction versus ileus.     - Stop Immodium as needed  - Surgery consult, consider NG     Normocytic Anemia  Baseline HGB 2023 <12.0. No symptoms of acute blood loss   Admission HGB 11.0-? 10.4 ? 10.7  - Follow   - Check B12, retic, iron studies, LDH     Abnormal UA   UA borderline positive with moderate leuk esterase, 9WBCs, few bacteria. No leukocytosis. Denies urinary symptoms, does endorse generalized weakness and fatigue. Afebrile, VS reassuring. Urine culture negative     Probable metastatic cancer  History of prostate cancer 2000s  History of thyroid cancer 1990s  Elevated LFTs   Liver lesions   Pulmonary nodules   Abdominal lymphadenopathy   Esophageal thickening   Unintentional weight loss   Incidental imaging findings on ED visit 10/21/24 when he presented with generalized fatigue/weakness. Endorses 90# unintentional weight loss x1 year. CT abd/pelvis 10/21/24 revealed: Circumferential wall thickening of the distal esophagus, which could be attributable to an underlying esophagitis or primary esophageal malignancy. Multiple hepatic lesions, highly suspicious for metastatic disease. Upper abdominal lymphadenopathy, suspicious for venessa metastatic disease. Few sub-6 mm pulmonary nodules, new from prior and also suspicious for metastatic disease. Cirrhosis, cholelithiasis, colonic diverticulitis.  Elevated alk phos (524), ALT (134), and AST (269). Tbili wnl (1.1).  US abd on admission with heterogenous nodularity throughout liver, cholelithiasis and trace pericholecystic fluid, mild diffuse gallbladder wall thickening, probable enlarged periportal lymph node. Patient is scheduled for oncology clinic visit at 9am on 10/30/24.     Has not had a colonoscopy. Some of GI symptoms intermittent explosive diarrhea are suggestive of colon cancer     LFTS:  Aphos 323 ? 524 ? 610  ALT     132 ? 134 ? 147  AST     164 ? 269 ?  379  Bili  0.6 ? 1.1 ? 1.6      LFTs worsening, suspect malignancy  - Rescheduled oncology follow up for 11/4/24  - Continue to trend   - Check direct and indirect bilirubin   - Check acute hepatitis panel    - Consider MRI/MRCP    Nocturnal hypoxemia 10/30-31/2024   Possible undiagnosed obstructive sleep apnea     Diabetes mellitus type 2   HgbA1c 6.2% on 03/18/24.  on admission. Managed prior to admission with glipizide 5mg BID, metformin 1000mg BID. A1c 6.0 10/31/2024     AM blood sugar high 10/31/2024   - Moderate consistent carb diet    - Hold prior to admission glipizide and metformin given MAYDA as above, consider discontinue glipizide with good A1c  - Star medium insulin sliding scale 10/31/2024     Heart failure with recovered ejection fraction   Hx severe aortic stenosis s/p TAVR 04/2023  Hypertension   Echocardiogram 07/15/24 with LVEF 50-55% (improved from 45-50% on 03/04/24), biatrial enlargement. LVEF previously 45-50% by 3/4/2024 TTE in the setting of 36% PVCs, improved to LVEF 50-55% on 07/15/24 RUSSELL with partial PCV suppression (16% burden at that time). HF time course consistent with association with PVCs, though cannot excluded LBBB-dyssynchrony mediated cardiomyopathy.   CXR on admission with stable size of cardiac silhouette, no pleural effusion.   BUN mildly elevated on admission (2783), appears euvolemic.   Managed prior to admission with bumetanide 1mg every morning and 0.5mg daily at noon, carvedilol 3.125mg BID, losartan 100mg daily.     BPs are soft  - Holding prior to admission losartan, bumex due to acute kidney injury  - Continue prior to admission carvedilol.     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 He seems asymptomatic although these could potentially worsen his cardiomyopathy. Started on sotalol 03/20/24, 16% PVCs by 3/27/2024 mobile cardiac  "telemetry. Admission EKG with sinus rhythm with LBBB, inferior Q waves. Managed prior to admission with carvedilol 3.125mg BID, sotalol 60mg BID.   - Continue prior to admission carvedilol  - Restart prior to admission Sotalol     Hyperlipidemia   Managed prior to admission with atorvastatin 80mg daily, [id number] daily;   - Continue ezetimibe  - Hold atorvastatin due to LFTs    Hypothyroidism   TSH 7.44 on 10/21/24  Managed prior to admission with levothyroxine 175mcg daily; continue           Diet: Combination Diet Regular Diet Adult; Moderate Consistent Carb (60 g CHO per Meal) Diet    DVT Prophylaxis: Low Risk/Ambulatory with no VTE prophylaxis indicated  Welch Catheter: Not present  Lines: None     Cardiac Monitoring: None  Code Status: Full Code      Clinically Significant Risk Factors        # Hyperkalemia: Highest K = 5.7 mmol/L in last 2 days, will monitor as appropriate  # Hyponatremia: Lowest Na = 131 mmol/L in last 2 days, will monitor as appropriate       # Hypoalbuminemia: Lowest albumin = 2.8 g/dL at 10/31/2024  5:15 AM, will monitor as appropriate               # Obesity: Estimated body mass index is 36.49 kg/m  as calculated from the following:    Height as of this encounter: 1.727 m (5' 8\").    Weight as of this encounter: 108.9 kg (240 lb)., PRESENT ON ADMISSION            Disposition Plan     Medically Ready for Discharge: Anticipated in 2-4 Days             Dani Wang MD  Hospitalist Service  [facility]  Securely message with [employer] (more info)  Text page via [facility] Paging/Directory   ______________________________________________________________________    Interval History   As above  Feels unwell, hd diarrhea last PM, but not passing gas  Abdomen uncomfortable and 'churning'    Physical Exam   Vital Signs: Temp: 97.9  F (36.6  C) Temp src: Oral BP: 99/46 Pulse: 74   Resp: 20 SpO2: 96 % O2 Device: Nasal cannula Oxygen Delivery: 2 LPM  Weight: 240 lbs 0 " oz    Constitutional: awake, cooperative, and mild distress  GI: normal bowel sounds, firm, distended, and non-tender    Medical Decision Making       60 MINUTES SPENT BY ME on the date of service doing chart review, history, exam, documentation & further activities per the note.      Data     Recent Labs   Lab 10/31/24  0515 10/30/24  0606 10/29/24  1207   * 122* 142*     Lab Results   Component Value Date    A1C 6.0 10/30/2024    A1C 6.8 03/09/2017      CMP  Recent Labs   Lab 10/31/24  0515 10/30/24  0606 10/29/24  1207   * 136 135   POTASSIUM 5.7* 4.8 5.1   CHLORIDE 99 101 99   CO2 18* 23 24   ANIONGAP 14 12 12   * 122* 142*   BUN 48.1* 47.4* 57.9*   CR 2.11* 2.01* 2.60*   GFRESTIMATED 30* 32* 24*   MATT 8.1* 8.2* 8.6*   PROTTOTAL 6.1* 6.2* 6.6   ALBUMIN 2.8* 2.9* 3.0*   BILITOTAL 1.6* 1.2 1.1   ALKPHOS 610* 513* 524*   * 207* 269*   * 123* 134*     CBC  Recent Labs   Lab 10/31/24  0515 10/30/24  0606 10/29/24  1207   WBC 15.3* 9.3 10.4   RBC 3.49* 3.35* 3.60*   HGB 10.7* 10.4* 11.1*   HCT 32.7* 31.5* 33.7*   MCV 94 94 94   MCH 30.7 31.0 30.8   MCHC 32.7 33.0 32.9   RDW 13.2 13.0 13.2    258 277       Venous Blood Gas  Recent Labs   Lab 10/31/24  0740   PHV 7.40   PCO2V 37*   PO2V 63*   HCO3V 23   MARISABEL -1.4   O2PER 28       UA RESULTS:  Recent Labs   Lab Test 10/29/24  1518   COLOR Yellow   APPEARANCE Slightly Cloudy*   URINEGLC Negative   URINEBILI Negative   URINEKETONE Negative   SG 1.014   UBLD Negative   URINEPH 5.0   PROTEIN Negative   NITRITE Negative   LEUKEST Moderate*   RBCU 1   WBCU 9*

## 2024-10-31 NOTE — CONSULTS
Care Management Initial Consult    General Information  Assessment completed with: Spouse or significant other, significant other Dorita  Type of CM/SW Visit: Initial Assessment    Primary Care Provider verified and updated as needed: Yes   Readmission within the last 30 days: no previous admission in last 30 days      Reason for Consult: discharge planning, community resources  Advance Care Planning: Advance Care Planning Reviewed: concerns discussed, other (see comments) (Per S/O patient has one, none listed on file)          Communication Assessment  Patient's communication style: spoken language (English or Bilingual)    Hearing Difficulty or Deaf: no   Wear Glasses or Blind: yes    Cognitive  Cognitive/Neuro/Behavioral: WDL                      Living Environment:   People in home: alone     Current living Arrangements: house      Able to return to prior arrangements:         Family/Social Support:  Care provided by: self, spouse/significant other  Provides care for: no one  Marital Status: Single  Support system: Significant Other       Dorita  Description of Support System: Supportive, Involved         Current Resources:   Patient receiving home care services: No        Community Resources: None  Equipment currently used at home: cane, straight  Supplies currently used at home: None    Employment/Financial:  Employment Status:          Financial Concerns:             Does the patient's insurance plan have a 3 day qualifying hospital stay waiver?  Yes     Which insurance plan 3 day waiver is available? [employer]    Will the waiver be used for post-acute placement? Undetermined at this time    Lifestyle & Psychosocial Needs:  Social Drivers of Health     Food Insecurity: Not on file   Depression: Not at risk (3/18/2024)    PHQ-2     PHQ-2 Score: 0   Housing Stability: Not on file   Tobacco Use: Medium Risk (10/11/2024)    Patient History     Smoking Tobacco Use: Former     Smokeless Tobacco Use: Never     Passive  Exposure: Not on file   Financial Resource Strain: Not on file   Alcohol Use: Not on file   Transportation Needs: Not on file   Physical Activity: Not on file   Interpersonal Safety: Low Risk  (3/18/2024)    Interpersonal Safety     Do you feel physically and emotionally safe where you currently live?: Yes     Within the past 12 months, have you been hit, slapped, kicked or otherwise physically hurt by someone?: No     Within the past 12 months, have you been humiliated or emotionally abused in other ways by your partner or ex-partner?: No   Stress: Not on file   Social Connections: Not on file   Health Literacy: Not on file       Functional Status:  Prior to admission patient needed assistance:   Dependent ADLs:: Ambulation-cane  Dependent IADLs:: Independent       Additional Information:  CM re consulted due to family request to speak with CM team regarding discharge and community resource options from bedside RN.    CM met bedside with patient and significant other Dorita. Patient sound asleep, per Dorita pt has had a long day and wishes to not be disturbed. Per Dorita her and pt are not  but have been together for 46 years. Per Dorita she does not live with patient, he lives alone in a multilevel home, 4 steps to get in, most living is on the main level, pt needs to go up 22 steps to get to the bathtub. At baseline pt is independent with IADLS and ADLS, does have a cane he uses for ambulation. Patient still drives cars and machinery per Dorita. Dorita feeling overwhelmed stated pt will be seeing an oncologist on Monday 11/4 for potential new found cancer.     CM discussed with Dorita her goals for discharge for patient, per notes pt was SBA yesterday, OT cleared pt to go home. Per bedside RN patient not doing as well today. CM discussed patient at this time can make his own decisions about discharge, so far per notes has declined any needs. Dorita understanding that pt needs to be accepting of services. CM discussed several  community resource options with her if patient is willing. CM discussed homecare, and this is intermittent care and meant to be short term, CM also discussed private pay PCA services that pt could have a homemaker come in and assist with household tasks. Dorita stated pt is very private and may not want people in the home. CM discussed pt would need to be willing to allow people in his home for help. Dorita stated she may have to assist patient more and they have a family friend Job who they may need to rely on for help.     CM discussed CM will come back and visit patient tomorrow and hopefully patient will be more alert to discuss his goals for discharge. CM to bring resources for private pay PCA services, per Dorita pt has some [facility] connection but not much. CM encouraged Dorita to look into [facility] services. CM discussed [facility] services, Per Dorita pt would not qualify for [facility] services as he has too much money.    Plan: home with family support and community resources    Transport: S/O DoritaKYLIE Mccain  Care Transitions Registered Nurse  Tele: [phone]

## 2024-10-31 NOTE — PROGRESS NOTES
"End Of Shift Note     K= elevated- Kayexalate ordered rectally around 100-100- he refused and so the order was changed to oral- got around 1430.     Neuro: Lethargic but awakens easily, oriented x4 and can add. Speech clear and logical. However, he has been forgetful of recent events and parts of what has been happening here at the hospital and does a poor job of advocating for self. Denies HA or dizziness. Denies sinus pressure, changes in vision, or hearing- [place] at baseline but no hearing aids.        Cardiac: T max 100.9 NOC got 325 of tylenol and now back down to the 97-98 after cold sweats and chills. This morning his blankets were wet and we changed them out. None so far today. HR 60-90's. SBP's soft again this morning, held his coreg and still his BP's remained on the soft side. Fluids restarted at 75  and oral fluids encouraged. He is sipping tea and water.  Significant BLE edema that he states appears to be at his baseline or even improved as he used to have leg ulcers. Skin warm and dry. Pale. Pulses 1+.  BP (P) 102/42 (BP Location: Left arm)   Pulse (P) 67   Temp 97.9  F (36.6  C) (Oral)   Resp (P) 20   Ht 1.727 m (5' 8\")   Wt 108.9 kg (240 lb)   SpO2 (P) 96%   BMI 36.49 kg/m         Resp:  Lungs clear. Was on O2 at 2L NOC for desats while asleep. Turned this off as he has been much better while awake. RRR on room air but has some SOB with activity.  Spot checks 93-97% on RA. No cough or sore throat. Denies pressure or congestion. Up in the chair for most of the day.     GI/: Abdomen soft, non tender to palpation, rounded/bloated/distended. Bowel sounds hyper active.  Loose stool last night, gave imodium, tums, and compazine this morning. Taken for Xray around 1000, Surgery consult came to see him, recommended NG tube which he refused. Took to  CT around 1300.     Did not void most of the morning. Bladder scan at 1300 for nearly 400- then voided with no pain, burning- minimal retention PVR.    "   MSK: Feeling  at about 30% of normal- weak and tired. Got up with PT in the room.     Skin: Pale. Edema.      LDAs: PIV CDI, infusing.      Dorita at bedside- sat down and walked through updates and questions for 10-15 minutes and later followed up.

## 2024-10-31 NOTE — PLAN OF CARE
"  Problem: Adult Inpatient Plan of Care  Goal: Plan of Care Review  Description: The Plan of Care Review/Shift note should be completed every shift.  The Outcome Evaluation is a brief statement about your assessment that the patient is improving, declining, or no change.  This information will be displayed automatically on your shift  note.  10/31/2024 0445 by Selena Jimenez, RN  Outcome: Not Progressing  Flowsheets (Taken 10/31/2024 0445)  Plan of Care Reviewed With: patient  Overall Patient Progress: no change  10/31/2024 0140 by Selena Jimenez, RN  Outcome: Progressing  Flowsheets (Taken 10/31/2024 0140)  Plan of Care Reviewed With: patient  Overall Patient Progress: improving     Problem: Pain Acute  Goal: Optimal Pain Control and Function  10/31/2024 0445 by Selena Jimenez, RN  Outcome: Not Progressing   Goal Outcome Evaluation:      Plan of Care Reviewed With: patient    Overall Patient Progress: no change    Pt very vague when asked questions about how he is feeling. Denies nausea and pain when asked directly, but after talking more with him he reports \"not feeling good\" & \"feels his belly tumbling\". Given zofran 4mg po. Pt sat 93-95% on RA while awake, but dropped to 88% while asleep. Placed on 2L/nc. Pt also running low grade temp 100.9. Paged Dr. Dalton & received order for 1 low dose tylenol. Temp recheck was 98.4.         "

## 2024-11-01 LAB
ALBUMIN SERPL BCG-MCNC: 2.7 G/DL (ref 3.5–5.2)
ALP SERPL-CCNC: 497 U/L (ref 40–150)
ALT SERPL W P-5'-P-CCNC: 143 U/L (ref 0–70)
ANION GAP SERPL CALCULATED.3IONS-SCNC: 13 MMOL/L (ref 7–15)
AST SERPL W P-5'-P-CCNC: 431 U/L (ref 0–45)
BASOPHILS # BLD AUTO: 0.1 10E3/UL (ref 0–0.2)
BASOPHILS NFR BLD AUTO: 0 %
BILIRUB SERPL-MCNC: 1.5 MG/DL
BUN SERPL-MCNC: 49.6 MG/DL (ref 8–23)
CALCIUM SERPL-MCNC: 7.9 MG/DL (ref 8.8–10.4)
CHLORIDE SERPL-SCNC: 96 MMOL/L (ref 98–107)
CORTIS SERPL-MCNC: 26.1 UG/DL
CREAT SERPL-MCNC: 2.3 MG/DL (ref 0.67–1.17)
EGFRCR SERPLBLD CKD-EPI 2021: 27 ML/MIN/1.73M2
EOSINOPHIL # BLD AUTO: 0 10E3/UL (ref 0–0.7)
EOSINOPHIL NFR BLD AUTO: 0 %
ERYTHROCYTE [DISTWIDTH] IN BLOOD BY AUTOMATED COUNT: 13.2 % (ref 10–15)
ERYTHROCYTE [DISTWIDTH] IN BLOOD BY AUTOMATED COUNT: 13.3 % (ref 10–15)
GLUCOSE BLDC GLUCOMTR-MCNC: 176 MG/DL (ref 70–99)
GLUCOSE BLDC GLUCOMTR-MCNC: 182 MG/DL (ref 70–99)
GLUCOSE BLDC GLUCOMTR-MCNC: 190 MG/DL (ref 70–99)
GLUCOSE BLDC GLUCOMTR-MCNC: 219 MG/DL (ref 70–99)
GLUCOSE BLDC GLUCOMTR-MCNC: 229 MG/DL (ref 70–99)
GLUCOSE SERPL-MCNC: 215 MG/DL (ref 70–99)
HAV IGM SERPL QL IA: NONREACTIVE
HBV CORE IGM SERPL QL IA: NONREACTIVE
HBV SURFACE AG SERPL QL IA: NONREACTIVE
HCO3 SERPL-SCNC: 21 MMOL/L (ref 22–29)
HCT VFR BLD AUTO: 28.3 % (ref 40–53)
HCT VFR BLD AUTO: 29.2 % (ref 40–53)
HCV AB SERPL QL IA: NONREACTIVE
HGB BLD-MCNC: 9.6 G/DL (ref 13.3–17.7)
HGB BLD-MCNC: 9.7 G/DL (ref 13.3–17.7)
HOLD SPECIMEN: NORMAL
HOLD SPECIMEN: NORMAL
IMM GRANULOCYTES # BLD: 0.1 10E3/UL
IMM GRANULOCYTES NFR BLD: 1 %
INR PPP: 1.4 (ref 0.85–1.15)
LACTATE SERPL-SCNC: 2 MMOL/L (ref 0.7–2)
LACTATE SERPL-SCNC: 2.5 MMOL/L (ref 0.7–2)
LACTATE SERPL-SCNC: 3 MMOL/L (ref 0.7–2)
LYMPHOCYTES # BLD AUTO: 0.5 10E3/UL (ref 0.8–5.3)
LYMPHOCYTES NFR BLD AUTO: 4 %
MCH RBC QN AUTO: 31 PG (ref 26.5–33)
MCH RBC QN AUTO: 31.4 PG (ref 26.5–33)
MCHC RBC AUTO-ENTMCNC: 33.2 G/DL (ref 31.5–36.5)
MCHC RBC AUTO-ENTMCNC: 33.9 G/DL (ref 31.5–36.5)
MCV RBC AUTO: 93 FL (ref 78–100)
MCV RBC AUTO: 93 FL (ref 78–100)
MONOCYTES # BLD AUTO: 1.1 10E3/UL (ref 0–1.3)
MONOCYTES NFR BLD AUTO: 8 %
NEUTROPHILS # BLD AUTO: 12.5 10E3/UL (ref 1.6–8.3)
NEUTROPHILS NFR BLD AUTO: 87 %
NRBC # BLD AUTO: 0 10E3/UL
NRBC BLD AUTO-RTO: 0 /100
PLATELET # BLD AUTO: 220 10E3/UL (ref 150–450)
PLATELET # BLD AUTO: 224 10E3/UL (ref 150–450)
POTASSIUM SERPL-SCNC: 5 MMOL/L (ref 3.4–5.3)
PROT SERPL-MCNC: 5.7 G/DL (ref 6.4–8.3)
RBC # BLD AUTO: 3.06 10E6/UL (ref 4.4–5.9)
RBC # BLD AUTO: 3.13 10E6/UL (ref 4.4–5.9)
RETICS # AUTO: 0.04 10E6/UL (ref 0.03–0.1)
RETICS/RBC NFR AUTO: 1.2 % (ref 0.5–2)
SODIUM SERPL-SCNC: 130 MMOL/L (ref 135–145)
VIT B12 SERPL-MCNC: 1291 PG/ML (ref 232–1245)
WBC # BLD AUTO: 14.4 10E3/UL (ref 4–11)
WBC # BLD AUTO: 14.7 10E3/UL (ref 4–11)

## 2024-11-01 PROCEDURE — 85014 HEMATOCRIT: CPT | Performed by: FAMILY MEDICINE

## 2024-11-01 PROCEDURE — 120N000001 HC R&B MED SURG/OB

## 2024-11-01 PROCEDURE — 84300 ASSAY OF URINE SODIUM: CPT | Performed by: FAMILY MEDICINE

## 2024-11-01 PROCEDURE — 250N000013 HC RX MED GY IP 250 OP 250 PS 637

## 2024-11-01 PROCEDURE — 99418 PROLNG IP/OBS E/M EA 15 MIN: CPT | Performed by: FAMILY MEDICINE

## 2024-11-01 PROCEDURE — 85025 COMPLETE CBC W/AUTO DIFF WBC: CPT | Performed by: INTERNAL MEDICINE

## 2024-11-01 PROCEDURE — 36415 COLL VENOUS BLD VENIPUNCTURE: CPT | Performed by: FAMILY MEDICINE

## 2024-11-01 PROCEDURE — 83605 ASSAY OF LACTIC ACID: CPT | Performed by: INTERNAL MEDICINE

## 2024-11-01 PROCEDURE — 80053 COMPREHEN METABOLIC PANEL: CPT | Performed by: INTERNAL MEDICINE

## 2024-11-01 PROCEDURE — 99233 SBSQ HOSP IP/OBS HIGH 50: CPT | Performed by: FAMILY MEDICINE

## 2024-11-01 PROCEDURE — 83010 ASSAY OF HAPTOGLOBIN QUANT: CPT | Performed by: FAMILY MEDICINE

## 2024-11-01 PROCEDURE — 36415 COLL VENOUS BLD VENIPUNCTURE: CPT | Performed by: INTERNAL MEDICINE

## 2024-11-01 PROCEDURE — 85045 AUTOMATED RETICULOCYTE COUNT: CPT | Performed by: FAMILY MEDICINE

## 2024-11-01 PROCEDURE — 85610 PROTHROMBIN TIME: CPT | Performed by: INTERNAL MEDICINE

## 2024-11-01 RX ADMIN — LEVOTHYROXINE SODIUM 175 MCG: 0.15 TABLET ORAL at 06:15

## 2024-11-01 RX ADMIN — CARVEDILOL 3.12 MG: 3.12 TABLET, FILM COATED ORAL at 07:46

## 2024-11-01 RX ADMIN — INSULIN ASPART 2 UNITS: 100 INJECTION, SOLUTION INTRAVENOUS; SUBCUTANEOUS at 11:12

## 2024-11-01 RX ADMIN — EZETIMIBE 10 MG: 10 TABLET ORAL at 07:46

## 2024-11-01 RX ADMIN — INSULIN ASPART 1 UNITS: 100 INJECTION, SOLUTION INTRAVENOUS; SUBCUTANEOUS at 17:21

## 2024-11-01 RX ADMIN — ASPIRIN 81 MG: 81 TABLET, COATED ORAL at 07:46

## 2024-11-01 RX ADMIN — INSULIN ASPART 1 UNITS: 100 INJECTION, SOLUTION INTRAVENOUS; SUBCUTANEOUS at 07:47

## 2024-11-01 NOTE — PLAN OF CARE
Problem: Adult Inpatient Plan of Care  Goal: Plan of Care Review  Description: The Plan of Care Review/Shift note should be completed every shift.  The Outcome Evaluation is a brief statement about your assessment that the patient is improving, declining, or no change.  This information will be displayed automatically on your shift  note.  10/31/2024 2024 by Gayatri Major RN  Flowsheets (Taken 10/31/2024 2024)  Plan of Care Reviewed With:   patient   spouse  10/31/2024 1947 by Gayatri Major RN  Flowsheets (Taken 10/31/2024 1947)  Plan of Care Reviewed With:   patient   spouse  Goal: Absence of Hospital-Acquired Illness or Injury  Intervention: Identify and Manage Fall Risk  Recent Flowsheet Documentation  Taken 10/31/2024 1944 by Gayatri Major RN  Safety Promotion/Fall Prevention:   assistive device/personal items within reach   clutter free environment maintained   nonskid shoes/slippers when out of bed  Intervention: Prevent and Manage VTE (Venous Thromboembolism) Risk  Recent Flowsheet Documentation  Taken 10/31/2024 1944 by Gayatri Major RN  VTE Prevention/Management: SCDs off (sequential compression devices)     Problem: Pain Acute  Goal: Optimal Pain Control and Function  Intervention: Prevent or Manage Pain  Recent Flowsheet Documentation  Taken 10/31/2024 1944 by Gayatri Major RN  Medication Review/Management: medications reviewed     Problem: Fall Injury Risk  Goal: Absence of Fall and Fall-Related Injury  Intervention: Identify and Manage Contributors  Recent Flowsheet Documentation  Taken 10/31/2024 1944 by Gayatri Major RN  Medication Review/Management: medications reviewed  Intervention: Promote Injury-Free Environment  Recent Flowsheet Documentation  Taken 10/31/2024 1944 by Gayatri Major RN  Safety Promotion/Fall Prevention:   assistive device/personal items within reach   clutter free environment maintained   nonskid shoes/slippers when out of bed       Pt is A&O. Currently denies pain. SBA  with ambulation. Spouse was at bedside for most of the day. CT showed no evidence of bowel obstruction. Will continue with current plan of care.

## 2024-11-01 NOTE — PLAN OF CARE
Goal Outcome Evaluation:      Plan of Care Reviewed With: patient    Overall Patient Progress: improvingOverall Patient Progress: improving    Pt is alert, oriented, assist 1. On 2 L O2 at night. Pt has had multiple large bowel movements overnight, states he feels a lot better. Denies nausea, SOB. Lactic 3.0,2.5 and 2.0 overnight, provider notified. BLLE +2.

## 2024-11-01 NOTE — PLAN OF CARE
Goal Outcome Evaluation:      Plan of Care Reviewed With: patient        Patient report feeling better today after multiple loose stools over night vital signs remain stable no complaints of pain

## 2024-11-01 NOTE — PLAN OF CARE
Problem: Adult Inpatient Plan of Care  Goal: Plan of Care Review  Description: The Plan of Care Review/Shift note should be completed every shift.  The Outcome Evaluation is a brief statement about your assessment that the patient is improving, declining, or no change.  This information will be displayed automatically on your shift  note.  Outcome: Progressing  Flowsheets (Taken 11/1/2024 9854)  Outcome Evaluation: Patient A&O x4 continues to have edema of legs and feet, urine sample unable to be collected due to having diarrhea at the same time. Mood is slightly agitated but compliant with staff requests. BP low resulting with evening BP med not given per doctor order. Continuous fluids being administered per MAR.  Plan of Care Reviewed With:   patient   spouse  Overall Patient Progress: improving   Goal Outcome Evaluation:      Plan of Care Reviewed With: patient, spouse    Overall Patient Progress: improvingOverall Patient Progress: improving    Outcome Evaluation: Patient A&O x4 continues to have edema of legs and feet, urine sample unable to be collected due to having diarrhea at the same time. Mood is slightly agitated but compliant with staff requests. BP low resulting with evening BP med not given per doctor order. Continuous fluids being administered per MAR.

## 2024-11-01 NOTE — PROGRESS NOTES
"Care Management Follow Up    Length of Stay (days): 3    Expected Discharge Date: 11/01/2024     Concerns to be Addressed: discharge planning     Patient plan of care discussed at interdisciplinary rounds: Yes    Anticipated Discharge Disposition: Home  Disposition Comments: Return home with S/O and family friend assistance and resources for private pay PCA   Anticipated Discharge Services: PCA  Anticipated Discharge DME: None    Patient/family educated on Medicare website which has current facility and service quality ratings: no  Education Provided on the Discharge Plan: Yes  Patient/Family in Agreement with the Plan:      Referrals Placed by CM/SW: Internal Clinic Care Coordination, Community Resources  Private pay costs discussed: Not applicable    Discussed  Partnership in Safe Discharge Planning  document with patient/family: No     Handoff Completed: Yes, [facility] PCP: Internal handoff referral completed    Additional Information:  Per MD at IDT rounds pt is not medically stable for discharge today. Per notes pts more alert today and feeling better. Therapy recommending home at discharge.    CM met bedside with patient to discuss his discharge plan and if pt has any needs. CM discussed homecare with patient, patient open to the idea at \"some point\" but was not interested in this post hospital discharge. CM discussed pt can get homecare through his primary care provider which will be paid for by his medicare, pt understanding of this. CM discussed also private pay PCA options and community resources for elderly in the community and left those resources in patients room, pt stated that he will give them to Dorita when she gets to the hospital today.    Patient denied other needs from care management at this time. CM to close this referral.    Plan: Return home    Transport: Significant other Dorita    KYLIE Melendez  Care Transitions Registered Nurse  Tele: [phone]      "

## 2024-11-01 NOTE — PROGRESS NOTES
[facility] Hospitalist Progress Note           Assessment & Plan          Jayesh Ortiz is a 84 year old male with past medical history significant for type 2 DM, CAD with  of mid LAD, hypertension, hyperlipidemia, PVCs, severe aortic stenosis with LBBB following TAVR (04/2023), HFrEF, CKD, hypothyroidism, BPH, who presents on 10/29/2024 with generalized weakness.      Generalized weakness  Frequent falls   Endorses generalized weakness, fatigue, and myalgias onset 6-7 weeks prior to admission with gradual progression. Has had several ground level mechanical falls one week prior to admission with no obvious resulting injuries. 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      PT/OT/Care management consulted. Felt he is able to return home, but significant other says he 'puts on a good show', and has 'ups and downs.'  - patient has done well with stand by assist here - ok to return home on discharge per physical therapy/occupational therapy/care transitions.   - improving         Probable metastatic cancer  History of prostate cancer 2000s  History of thyroid cancer 1990s  Elevated LFTs   Liver lesions   Pulmonary nodules   Abdominal lymphadenopathy   Esophageal thickening   Unintentional weight loss   Incidental imaging findings on ED visit 10/21/24 when he presented with generalized fatigue/weakness. Endorses 90# unintentional weight loss x1 year. CT abd/pelvis 10/21/24 revealed: Circumferential wall thickening of the distal esophagus, which could be attributable to an underlying esophagitis or primary esophageal malignancy. Multiple hepatic lesions, highly suspicious for metastatic disease. Upper abdominal lymphadenopathy, suspicious for venessa metastatic disease. Few sub-6 mm pulmonary nodules, new from prior and also suspicious for metastatic disease. Cirrhosis, cholelithiasis, colonic diverticulitis.  Elevated alk phos (524), ALT (134), and AST (269). Tbili  wnl (1.1).  US abd on admission with heterogenous nodularity throughout liver, cholelithiasis and trace pericholecystic fluid, mild diffuse gallbladder wall thickening, probable enlarged periportal lymph node. Patient is scheduled for oncology clinic visit at 9am on 10/30/24.   -Has not had a colonoscopy. Some of GI symptoms intermittent explosive diarrhea are suggestive of colon cancer   LFTS:  Aphos 323 ? 524 ? 610 ? 497  ALT     132 ? 134 ? 147 ? 143  AST     164 ? 269 ? 379 ? 431  Bili       0.6 ? 1.1 ? 1.6 ? 1.5   LFTs worsened, then remained relatively stable - suspect due to malignancy  - following   - acute hepatitis panel negative   - Rescheduled oncology follow up for 11/4/24       Acute kidney injury on CKD 3  Baseline creatinine 1.2-1.4, GFR 50-60. Admission BUN/creatinine 57/2.60 and GFR 24, worsened from recent check (2.13 on 10/21/24). CT abd/pelvis 10/21/24 without hydronephrosis, obstruction. UA 10/29/24 bland. Admit endorses poor oral intake and frequent diarrhea x4 weeks, denies urinary symptoms. Likely secondary to dehydration.   Received LR 1 L on admission  Repeated  LR  x 1 L 10/30/2024   Then IVF starting pm 10/31 as below  Cr 2.60 ? 2.01 ? 2.11 ? 2.30   - reassess IVF 11/2   - FENa pending       Hyperkalemia   - 5.7 (10/31/2024)  -  Checked EKG. Gave kayexalate x 2 10/31  - Started  continuous LR 75 10/31   - Held prior to admission Losartan, bumex, glipizide, metformin, sotalol    - potassium normalized 11/1   - following - reassess 11/2        Low grade Temp 100.9 overnight 10/30/2024  - afebrile since.    - WBC up 10/31 but trending down 11/1  - no symptoms or apparent source of infection   - afebrile and feeling well 11/1   - Blood culture pending, negative so far         Intermittent diarrhea for 6 weeks   Doubt infectious, consider partial bowel obstruction from cancer  Increased distension, discomfort 10/31/2024. BS present. 2 Views abdomen - Prominent air-filled loops of small bowel  are seen measuring up to just over 4 cm in diameter, which can be seen with bowel obstruction versus ileus.   - Stopped Immodium as needed  - Surgery consulted - nothing to add at present  - was constipated here, then started having bowel movements pm 10/31.    - following stools, but does not look infectious so far        Normocytic Anemia - suspect due to chronic disease compounded by dilution   Baseline HGB 2023 <12.0. No symptoms of acute blood loss   Admission HGB 11.0-? 10.4 ? 10.7 ? 9.7   - suspect mostly dilutional on top of Follow   - B12 pending  - retic count normal   - Iron and TIBC both low  - LDH elevated - but suspect thi sis due to malignancy and not hemolysis in this case.  Will check haptoglobin and periphreal smear      - Following        Nocturnal hypoxemia 10/30-31/2024   Possible undiagnosed obstructive sleep apnea    - follow-up with primary care provider, consider sleep study       Diabetes mellitus type 2   HgbA1c 6.2% on 03/18/24.  on admission. Managed prior to admission with glipizide 5mg BID, metformin 1000mg BID. A1c 6.0 10/31/2024   AM blood sugar high 10/31/2024   - Moderate consistent carb diet    - Hold prior to admission glipizide and metformin given MAYDA as above, consider discontinue glipizide with good A1c  - Started medium insulin sliding scale 10/31/2024         Hyponatremia  Sodium 131 ? 130   Mild, following with IVF       Heart failure with recovered ejection fraction   Hx severe aortic stenosis s/p TAVR 04/2023  Hypertension   Echocardiogram 07/15/24 with LVEF 50-55% (improved from 45-50% on 03/04/24), biatrial enlargement. LVEF previously 45-50% by 3/4/2024 TTE in the setting of 36% PVCs, improved to LVEF 50-55% on 07/15/24 RUSSELL with partial PCV suppression (16% burden at that time). HF time course consistent with association with PVCs, though cannot excluded LBBB-dyssynchrony mediated cardiomyopathy.   CXR on admission with stable size of cardiac silhouette, no  pleural effusion.   BUN mildly elevated on admission (2783), appears euvolemic.   Managed prior to admission with bumetanide 1mg every morning and 0.5mg daily at noon, carvedilol 3.125mg BID, losartan 100mg daily.   - Held prior to admission losartan, bumex due to acute kidney injury  - Continued prior to admission carvedilol.   BPs were soft.  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 He seems asymptomatic although these could potentially worsen his cardiomyopathy. Started on sotalol 03/20/24, 16% PVCs by 3/27/2024 mobile cardiac telemetry. Admission EKG with sinus rhythm with LBBB, inferior Q waves. Managed prior to admission with carvedilol 3.125mg BID, sotalol 60mg BID.   - Continued prior to admission carvedilol   - Reassess Sotalol 11/2          Abnormal UA without UTI  UA borderline positive with moderate leuk esterase, 9WBCs, few bacteria. No leukocytosis. Denies urinary symptoms, does endorse generalized weakness and fatigue. Afebrile, VS reassuring. Urine culture negative        Hyperlipidemia   Managed prior to admission with atorvastatin 80mg daily, [id number] daily;   - Continue ezetimibe  - Held atorvastatin due to LFTs         Hypothyroidism   TSH 7.44 on 10/21/24  Managed prior to admission with levothyroxine 175mcg daily; continue            DVT Prophylaxis: mechanical given anemia - reassess 11/2       Welch Catheter: Not present  Lines: None     Cardiac Monitoring: None  Code Status: Full Code          Clinically Significant Risk Factors         # Hyperkalemia: Highest K = 5.7 mmol/L in last 2 days, will monitor as appropriate  # Hyponatremia: Lowest Na = 131 mmol/L in last 2 days, will monitor as appropriate       # Hypoalbuminemia: Lowest albumin = 2.8  "g/dL at 10/31/2024  5:15 AM, will monitor as appropriate                # Obesity: Estimated body mass index is 36.49 kg/m  as calculated from the following:    Height as of this encounter: 1.727 m (5' 8\").    Weight as of this encounter: 108.9 kg (240 lb)., PRESENT ON ADMISSION                     Diet  Orders Placed This Encounter      Combination Diet Regular Diet Adult; Moderate Consistent Carb (60 g CHO per Meal) Diet                      Disposition Plan       Likely discharge home in 1-2 days       Medically Ready for Discharge: Anticipated in 2-4 Days                  Kadeem Bates MD  Hospitalist Service  [facility]  Securely message with the Vocera Web Console (learn more here)  Text page via [employer] Paging/Directory             Interval History:   Patient had been constipated and then had a bowel movement and has had a couple more since and feels markedly better today.  Strength and function improving today.  Up and about independently and doing well with that per patient and nursing.  Was sleeping on my arrival but awakened easily.  No pain. No dyspnea.  No urinary symptoms.  No focal urinary symptoms.                  Review of Systems:    ROS: 10 point ROS neg other than the symptoms noted above in the HPI.           Medications:   Current active medications and PTA medications reviewed, see medication list for details.            Physical Exam:   Vitals were reviewed  Patient Vitals for the past 24 hrs:   BP Temp Temp src Pulse Resp SpO2   11/01/24 0739 111/43 98  F (36.7  C) Oral -- 16 95 %   11/01/24 0354 117/47 98.8  F (37.1  C) Oral 80 14 90 %   11/01/24 0310 109/48 98.1  F (36.7  C) Oral 72 20 95 %   11/01/24 0005 100/51 98.5  F (36.9  C) Oral 94 28 96 %   10/31/24 2306 (!) 119/39 99  F (37.2  C) Oral (!) 48 24 94 %   10/31/24 1952 (!) 126/36 99.3  F (37.4  C) Oral 56 24 --   10/31/24 1738 (!) 163/54 -- -- 89 -- --   10/31/24 1629 116/49 97.4  F (36.3  C) Oral 67 18 95 " %       Temperatures:  Current - Temp: 98  F (36.7  C); Max - Temp  Av.4  F (36.9  C)  Min: 97.4  F (36.3  C)  Max: 99.3  F (37.4  C)  Respiration range: Resp  Av.6  Min: 14  Max: 28  Pulse range: Pulse  Av.3  Min: 48  Max: 94  Blood pressure range: Systolic (24hrs), Av , Min:100 , Max:163   ; Diastolic (24hrs), Av, Min:36, Max:54    Pulse oximetry range: SpO2  Av.2 %  Min: 90 %  Max: 96 %  I/O last 3 completed shifts:  In: 586.25 [P.O.:160; I.V.:426.25]  Out: 300 [Urine:300]    Intake/Output Summary (Last 24 hours) at 2024 1237  Last data filed at 10/31/2024 1835  Gross per 24 hour   Intake 310 ml   Output 300 ml   Net 10 ml     EXAM:  General: awake and alert, NAD, oriented x 3  Head: normocephalic  Neck: unremarkable, no lymphadenopathy   HEENT: oropharynx pink and moist    Heart: Regular rate and rhythm, no murmurs, rubs, or gallops  Lungs: clear to auscultation bilaterally with good air movement throughout  Abdomen: soft, non-tender, no masses or organomegaly  Extremities: trace edema in lower extremities   Skin unremarkable as visualized.              Data:     Reviewed data:  Results for orders placed or performed during the hospital encounter of 10/29/24 (from the past 24 hours)   CT Chest/Abdomen/Pelvis w Contrast    Narrative    CT CHEST/ABDOMEN/PELVIS W CONTRAST 10/31/2024 1:21 PM    CLINICAL HISTORY: Obstruction vs ileus concern for malignancy.    TECHNIQUE: CT scan of the chest, abdomen, and pelvis was performed  following injection of IV contrast. Multiplanar reformats were  obtained. Dose reduction techniques were used.     CONTRAST: 117mL [id number]-370    COMPARISON: 10/31/2024, 10/21/2024, 3/2/2023.    FINDINGS:   LUNGS AND PLEURA: Multiple scattered bilateral noncalcified pulmonary  nodules are present. Largest noncalcified pulmonary nodule is seen in  the anterior left upper lobe measuring 8 x 7 mm (3-160). A couple  benign densely calcified pulmonary granulomas are  also noted. Mild  bibasilar atelectasis. No airspace consolidation or significant  pleural fluid. Large airways are patent.    MEDIASTINUM/AXILLAE: Fluid is seen throughout the esophagus. There is  eccentric somewhat masslike appearing distal esophageal wall  thickening, greatest along the right lateral aspect. Heart size is  mildly enlarged. No pericardial fluid. Prior aortic valve repair.  Moderate to severe coronary artery atherosclerosis. Thoracic aorta is  normal in course and caliber. Mild to moderate thoracic aortic  atherosclerosis. No enlarged thoracic lymph nodes by CT size criteria.  A couple prominent retroperitoneal lymph nodes are noted. For example,  a distal right paraesophageal lymph node measures 10 mm (7-109).    HEPATOBILIARY: There are innumerable low-density hepatic lesions  throughout the liver parenchyma with multiple large confluent low  density hepatic masses. When compared to the recent prior examination,  there has been interval progression of liver disease versus better  visualization of liver disease. There is a similar-appearing edematous  gallbladder wall thickening and/or pericholecystic fluid present.  Several small calcified gallstones are seen in the gallbladder lumen.  No bile duct dilation. Small volume perihepatic free fluid is present.  There is note of mild nodular contour of the liver, which may be  secondary to widespread liver metastases. Superimposed cirrhosis is  difficult to entirely exclude.    PANCREAS: Normal.    SPLEEN: Normal.    ADRENAL GLANDS: Normal.    KIDNEYS/BLADDER: Normal.    BOWEL: Colonic diverticulosis is present without evidence of  diverticulitis. Large and small bowel loops are nonobstructed and  noninflamed. No signs of acute appendicitis.    PELVIC ORGANS: A few small metallic suspected fiducial markers are  seen in the prostate gland.    LYMPH NODES: There are numerous enlarged gastrohepatic, periportal,  and retroperitoneal/para-aortic lymph nodes  present. A few examples  are as follows:  -Periportal lymph node measuring 25 x 20 mm (7-161), previously 25 x  20 mm.  -Cavoatrial lymph node measuring 33 x 20 mm (7-175), previously 34 x  18 mm.  -Left periaortic lymph node measuring 34 x 21 mm (7-186), previously  30 x 21 mm.    ADDITIONAL FINDINGS: At least moderate atherosclerosis of the  abdominal aorta and iliac arteries. Mild fusiform aneurysmal dilation  of the infrarenal abdominal aorta measuring up to 3 cm, similar to  comparison. As mention above, there is minimal perihepatic free fluid  present. There is also note of mild fat stranding and edema in the  mesentery as well as in the pelvis/presacral space.    MUSCULOSKELETAL: Multilevel hypertrophic and degenerative changes of  the spine. Grade 1 spondylolisthesis of L5 on S1 secondary to  bilateral L5 spondylolysis. No acute osseous abnormality. No  aggressive appearing lytic or blastic osseous lesions. Mild body wall  edema.      Impression    IMPRESSION:  1.  No evidence of bowel obstruction.  2.  Eccentric masslike wall thickening in the distal esophagus.  Consider further evaluation with endoscopy to exclude primary  malignancy.  3.  Innumerable confluent low density hepatic lesions, consistent with  metastatic disease.  4.  Multiple enlarged and pathologic appearing abdominal lymph nodes,  consistent with metastatic disease.  5.  Moderate edematous gallbladder wall thickening and/or  pericholecystic fluid, nonspecific but likely reactive to underlying  liver disease. Acute cholecystitis is difficult to entirely exclude,  but thought somewhat less likely.  6.  Several subcentimeter pulmonary nodules measuring up to 8 mm,  possibly representing metastatic disease in light of additional  findings.  7.  Minimal ascites.  8.  Additional nonacute findings as above.    KIRSTY REINOSO MD         SYSTEM ID:  [id number]   Potassium   Result Value Ref Range    Potassium 5.5 (H) 3.4 - 5.3 mmol/L   Glucose by  meter   Result Value Ref Range    GLUCOSE BY METER POCT 230 (H) 70 - 99 mg/dL   Potassium   Result Value Ref Range    Potassium 5.6 (H) 3.4 - 5.3 mmol/L   Glucose by meter   Result Value Ref Range    GLUCOSE BY METER POCT 189 (H) 70 - 99 mg/dL   Lactic Acid Whole Blood w/ 1x repeat in 2 hrs when >2   Result Value Ref Range    Lactic Acid, Initial 3.0 (H) 0.7 - 2.0 mmol/L   CBC with platelets   Result Value Ref Range    WBC Count 14.7 (H) 4.0 - 11.0 10e3/uL    RBC Count 3.13 (L) 4.40 - 5.90 10e6/uL    Hemoglobin 9.7 (L) 13.3 - 17.7 g/dL    Hematocrit 29.2 (L) 40.0 - 53.0 %    MCV 93 78 - 100 fL    MCH 31.0 26.5 - 33.0 pg    MCHC 33.2 31.5 - 36.5 g/dL    RDW 13.2 10.0 - 15.0 %    Platelet Count 224 150 - 450 10e3/uL   Comprehensive metabolic panel   Result Value Ref Range    Sodium 130 (L) 135 - 145 mmol/L    Potassium 5.0 3.4 - 5.3 mmol/L    Carbon Dioxide (CO2) 21 (L) 22 - 29 mmol/L    Anion Gap 13 7 - 15 mmol/L    Urea Nitrogen 49.6 (H) 8.0 - 23.0 mg/dL    Creatinine 2.30 (H) 0.67 - 1.17 mg/dL    GFR Estimate 27 (L) >60 mL/min/1.73m2    Calcium 7.9 (L) 8.8 - 10.4 mg/dL    Chloride 96 (L) 98 - 107 mmol/L    Glucose 215 (H) 70 - 99 mg/dL    Alkaline Phosphatase 497 (H) 40 - 150 U/L     (H) 0 - 45 U/L     (H) 0 - 70 U/L    Protein Total 5.7 (L) 6.4 - 8.3 g/dL    Albumin 2.7 (L) 3.5 - 5.2 g/dL    Bilirubin Total 1.5 (H) <=1.2 mg/dL   INR   Result Value Ref Range    INR 1.40 (H) 0.85 - 1.15   Lactic acid whole blood   Result Value Ref Range    Lactic Acid 2.5 (H) 0.7 - 2.0 mmol/L   Glucose by meter   Result Value Ref Range    GLUCOSE BY METER POCT 219 (H) 70 - 99 mg/dL   Lactic Acid Whole Blood w/ 1x repeat in 2 hrs when >2   Result Value Ref Range    Lactic Acid, Initial 2.0 0.7 - 2.0 mmol/L   Extra Purple Top EDTA (LAB USE ONLY)   Result Value Ref Range    Hold Specimen     Glucose by meter   Result Value Ref Range    GLUCOSE BY METER POCT 176 (H) 70 - 99 mg/dL   Glucose by meter   Result Value Ref Range     GLUCOSE BY METER POCT 190 (H) 70 - 99 mg/dL           Attestation:  I have reviewed today's vital signs, notes, medications, labs and imaging.  Amount of time spent managing this patient today:  70 minutes.

## 2024-11-02 DIAGNOSIS — G47.00 ACUTE INSOMNIA: ICD-10-CM

## 2024-11-02 LAB
ALBUMIN SERPL BCG-MCNC: 2.5 G/DL (ref 3.5–5.2)
ALP SERPL-CCNC: 447 U/L (ref 40–150)
ALT SERPL W P-5'-P-CCNC: 128 U/L (ref 0–70)
ANION GAP SERPL CALCULATED.3IONS-SCNC: 12 MMOL/L (ref 7–15)
AST SERPL W P-5'-P-CCNC: 319 U/L (ref 0–45)
BILIRUB SERPL-MCNC: 1.9 MG/DL
BUN SERPL-MCNC: 55.4 MG/DL (ref 8–23)
CALCIUM SERPL-MCNC: 7.7 MG/DL (ref 8.8–10.4)
CHLORIDE SERPL-SCNC: 95 MMOL/L (ref 98–107)
CREAT SERPL-MCNC: 2.21 MG/DL (ref 0.67–1.17)
CREAT SERPL-MCNC: 2.26 MG/DL (ref 0.67–1.17)
CREAT UR-MCNC: 154.8 MG/DL
EGFRCR SERPLBLD CKD-EPI 2021: 28 ML/MIN/1.73M2
ERYTHROCYTE [DISTWIDTH] IN BLOOD BY AUTOMATED COUNT: 13.3 % (ref 10–15)
FRACT EXCRET NA UR+SERPL-RTO: 0.2 %
GLUCOSE BLDC GLUCOMTR-MCNC: 176 MG/DL (ref 70–99)
GLUCOSE BLDC GLUCOMTR-MCNC: 178 MG/DL (ref 70–99)
GLUCOSE BLDC GLUCOMTR-MCNC: 188 MG/DL (ref 70–99)
GLUCOSE BLDC GLUCOMTR-MCNC: 206 MG/DL (ref 70–99)
GLUCOSE BLDC GLUCOMTR-MCNC: 230 MG/DL (ref 70–99)
GLUCOSE SERPL-MCNC: 189 MG/DL (ref 70–99)
HAPTOGLOB SERPL-MCNC: 169 MG/DL (ref 30–200)
HCO3 SERPL-SCNC: 21 MMOL/L (ref 22–29)
HCT VFR BLD AUTO: 27.2 % (ref 40–53)
HGB BLD-MCNC: 9.2 G/DL (ref 13.3–17.7)
LACTATE SERPL-SCNC: 2.3 MMOL/L (ref 0.7–2)
LACTATE SERPL-SCNC: 2.3 MMOL/L (ref 0.7–2)
LACTATE SERPL-SCNC: 2.5 MMOL/L (ref 0.7–2)
LACTATE SERPL-SCNC: 2.6 MMOL/L (ref 0.7–2)
MAGNESIUM SERPL-MCNC: 1.4 MG/DL (ref 1.7–2.3)
MAGNESIUM SERPL-MCNC: 1.8 MG/DL (ref 1.7–2.3)
MCH RBC QN AUTO: 31 PG (ref 26.5–33)
MCHC RBC AUTO-ENTMCNC: 33.8 G/DL (ref 31.5–36.5)
MCV RBC AUTO: 92 FL (ref 78–100)
OSMOLALITY SERPL: 294 MMOL/KG (ref 280–301)
OSMOLALITY UR: 407 MMOL/KG (ref 100–1200)
PHOSPHATE SERPL-MCNC: 3.6 MG/DL (ref 2.5–4.5)
PLATELET # BLD AUTO: 229 10E3/UL (ref 150–450)
POTASSIUM SERPL-SCNC: 4.3 MMOL/L (ref 3.4–5.3)
PROT SERPL-MCNC: 5.4 G/DL (ref 6.4–8.3)
RBC # BLD AUTO: 2.97 10E6/UL (ref 4.4–5.9)
SODIUM SERPL-SCNC: 128 MMOL/L (ref 135–145)
SODIUM SERPL-SCNC: 129 MMOL/L (ref 135–145)
SODIUM UR-SCNC: 20 MMOL/L
SODIUM UR-SCNC: 20 MMOL/L
WBC # BLD AUTO: 13.4 10E3/UL (ref 4–11)

## 2024-11-02 PROCEDURE — 82040 ASSAY OF SERUM ALBUMIN: CPT | Performed by: FAMILY MEDICINE

## 2024-11-02 PROCEDURE — 250N000011 HC RX IP 250 OP 636: Performed by: FAMILY MEDICINE

## 2024-11-02 PROCEDURE — 83935 ASSAY OF URINE OSMOLALITY: CPT | Performed by: FAMILY MEDICINE

## 2024-11-02 PROCEDURE — 250N000013 HC RX MED GY IP 250 OP 250 PS 637: Performed by: FAMILY MEDICINE

## 2024-11-02 PROCEDURE — 250N000013 HC RX MED GY IP 250 OP 250 PS 637: Performed by: HOSPITALIST

## 2024-11-02 PROCEDURE — 84295 ASSAY OF SERUM SODIUM: CPT | Performed by: FAMILY MEDICINE

## 2024-11-02 PROCEDURE — 83605 ASSAY OF LACTIC ACID: CPT | Performed by: FAMILY MEDICINE

## 2024-11-02 PROCEDURE — 250N000013 HC RX MED GY IP 250 OP 250 PS 637

## 2024-11-02 PROCEDURE — 83735 ASSAY OF MAGNESIUM: CPT | Performed by: FAMILY MEDICINE

## 2024-11-02 PROCEDURE — 82565 ASSAY OF CREATININE: CPT | Performed by: FAMILY MEDICINE

## 2024-11-02 PROCEDURE — 83930 ASSAY OF BLOOD OSMOLALITY: CPT | Performed by: FAMILY MEDICINE

## 2024-11-02 PROCEDURE — 84100 ASSAY OF PHOSPHORUS: CPT | Performed by: FAMILY MEDICINE

## 2024-11-02 PROCEDURE — 85027 COMPLETE CBC AUTOMATED: CPT | Performed by: FAMILY MEDICINE

## 2024-11-02 PROCEDURE — 84300 ASSAY OF URINE SODIUM: CPT | Performed by: FAMILY MEDICINE

## 2024-11-02 PROCEDURE — 120N000001 HC R&B MED SURG/OB

## 2024-11-02 PROCEDURE — 99233 SBSQ HOSP IP/OBS HIGH 50: CPT | Performed by: FAMILY MEDICINE

## 2024-11-02 PROCEDURE — 36415 COLL VENOUS BLD VENIPUNCTURE: CPT | Performed by: FAMILY MEDICINE

## 2024-11-02 RX ORDER — ACETAMINOPHEN 325 MG/1
650 TABLET ORAL ONCE
Status: COMPLETED | OUTPATIENT
Start: 2024-11-02 | End: 2024-11-02

## 2024-11-02 RX ORDER — MAGNESIUM SULFATE HEPTAHYDRATE 40 MG/ML
2 INJECTION, SOLUTION INTRAVENOUS ONCE
Status: COMPLETED | OUTPATIENT
Start: 2024-11-02 | End: 2024-11-02

## 2024-11-02 RX ADMIN — INSULIN ASPART 1 UNITS: 100 INJECTION, SOLUTION INTRAVENOUS; SUBCUTANEOUS at 12:11

## 2024-11-02 RX ADMIN — CARVEDILOL 3.12 MG: 3.12 TABLET, FILM COATED ORAL at 08:53

## 2024-11-02 RX ADMIN — EZETIMIBE 10 MG: 10 TABLET ORAL at 08:32

## 2024-11-02 RX ADMIN — LEVOTHYROXINE SODIUM 175 MCG: 0.15 TABLET ORAL at 05:34

## 2024-11-02 RX ADMIN — ACETAMINOPHEN 650 MG: 325 TABLET ORAL at 01:39

## 2024-11-02 RX ADMIN — ASPIRIN 81 MG: 81 TABLET, COATED ORAL at 08:31

## 2024-11-02 RX ADMIN — INSULIN ASPART 1 UNITS: 100 INJECTION, SOLUTION INTRAVENOUS; SUBCUTANEOUS at 17:03

## 2024-11-02 RX ADMIN — INSULIN ASPART 1 UNITS: 100 INJECTION, SOLUTION INTRAVENOUS; SUBCUTANEOUS at 08:33

## 2024-11-02 RX ADMIN — CARVEDILOL 3.12 MG: 3.12 TABLET, FILM COATED ORAL at 17:05

## 2024-11-02 RX ADMIN — MAGNESIUM SULFATE HEPTAHYDRATE 2 G: 2 INJECTION, SOLUTION INTRAVENOUS at 10:10

## 2024-11-02 RX ADMIN — SOTALOL HYDROCHLORIDE 80 MG: 80 TABLET ORAL at 20:46

## 2024-11-02 NOTE — PROGRESS NOTES
1900 - 2330:    This author was able to collect a UA on pt this evening. Sample then sent to the lab. Pt received 1 unit of novolog at bedtime for blood sugar of 229. Pt resting in recliner by end of shift. Will continue to monitor per plan of care.

## 2024-11-02 NOTE — PLAN OF CARE
Problem: Adult Inpatient Plan of Care  Goal: Plan of Care Review  Description: The Plan of Care Review/Shift note should be completed every shift.  The Outcome Evaluation is a brief statement about your assessment that the patient is improving, declining, or no change.  This information will be displayed automatically on your shift  note.  Flowsheets (Taken 11/2/2024 0418)  Outcome Evaluation: Patient is A/O x4 and denies any pain. Patient had a Temp of 101.7. MD notified and a one time order for Tylenol was ordered to be given d/t LFT levels. MD stated that a cold rag can be used as well. Lactic acid reported off to MD as well for a lactic level of 2.3. Patient is up with assist x1.  Overall Patient Progress: no change   Goal Outcome Evaluation:           Overall Patient Progress: no changeOverall Patient Progress: no change    Outcome Evaluation: Patient is A/O x4 and denies any pain. Patient had a Temp of 101.7. MD notified and a one time order for Tylenol was ordered to be given d/t LFT levels. MD stated that a cold rag can be used as well. Lactic acid reported off to MD as well for a lactic level of 2.3. Patient is up with assist x1.

## 2024-11-02 NOTE — CONSULTS
Care Management Note:     CM entered the chart due to automatic referral for SDOH concerns.     After discussion with Charge RN Yajaira, CM learned the referral was result of SDOH questions being answered incorrectly and will be corrected by bedside RN.     CM team had been following pt for discharge planning & pt declined the need for services and will discharge home with family support and community resources.    LUDIN South

## 2024-11-02 NOTE — PROGRESS NOTES
[facility] Hospitalist Progress Note           Assessment & Plan          Jayesh Ortiz is a 84 year old male with past medical history significant for type 2 DM, CAD with  of mid LAD, hypertension, hyperlipidemia, PVCs, severe aortic stenosis with LBBB following TAVR (04/2023), HFrEF, CKD, hypothyroidism, BPH, who presents on 10/29/2024 with generalized weakness.      Generalized weakness  Frequent falls   Endorses generalized weakness, fatigue, and myalgias onset 6-7 weeks prior to admission with gradual progression. Has had several ground level mechanical falls one week prior to admission with no obvious resulting injuries. 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      PT/OT/Care management consulted. Felt he is able to return home, but significant other says he 'puts on a good show', and has 'ups and downs.'  - patient has done well with stand by assist here - ok to return home on discharge per physical therapy/occupational therapy/care transitions.   - improving, doing well up independently here        Probable metastatic cancer  History of prostate cancer 2000s  History of thyroid cancer 1990s  Elevated LFTs   Liver lesions   Pulmonary nodules   Abdominal lymphadenopathy   Esophageal thickening   Unintentional weight loss   Incidental imaging findings on ED visit 10/21/24 when he presented with generalized fatigue/weakness. Endorses 90# unintentional weight loss x1 year. CT abd/pelvis 10/21/24 revealed: Circumferential wall thickening of the distal esophagus, which could be attributable to an underlying esophagitis or primary esophageal malignancy. Multiple hepatic lesions, highly suspicious for metastatic disease. Upper abdominal lymphadenopathy, suspicious for venessa metastatic disease. Few sub-6 mm pulmonary nodules, new from prior and also suspicious for metastatic disease. Cirrhosis, cholelithiasis, colonic diverticulitis.  Elevated alk phos (524),  ALT (134), and AST (269). Tbili wnl (1.1).  US abd on admission with heterogenous nodularity throughout liver, cholelithiasis and trace pericholecystic fluid, mild diffuse gallbladder wall thickening, probable enlarged periportal lymph node. Patient is scheduled for oncology clinic visit at 9am on 10/30/24.   -Has not had a colonoscopy. Some of GI symptoms intermittent explosive diarrhea are suggestive of colon cancer   LFTS:  Aphos 323 ? 524 ? 610 ? 497 ? 447  ALT     132 ? 134 ? 147 ? 143 ? 128  AST     164 ? 269 ? 379 ? 431 ? 319  Bili       0.6 ? 1.1 ? 1.6 ? 1.5 ? 1.9   LFTs worsened, then remained relatively stable - suspect due to malignancy  - following   - acute hepatitis panel negative   - Rescheduled oncology follow up for 11/4/24         Acute kidney injury on CKD 3  Baseline creatinine 1.2-1.4, GFR 50-60. Admission BUN/creatinine 57/2.60 and GFR 24, worsened from recent check (2.13 on 10/21/24). CT abd/pelvis 10/21/24 without hydronephrosis, obstruction. UA 10/29/24 bland. Admit endorses poor oral intake and frequent diarrhea x4 weeks, denies urinary symptoms. Likely secondary to dehydration.   Received LR 1 L on admission  Repeated  LR  x 1 L 10/30/2024   Then IVF starting pm 10/31 as below   - FENa consistent with pre-renal injury, but intake better and do not want to overload him with fluids especially with hyponatremia as below  Cr 2.60 ? 2.01 ? 2.11 ? 2.30 ? 2.26   - saline locked 11/2, creatinine stabilized - follow overnight of IVF.         Hyperkalemia   - 5.7 (10/31/2024)  -  Checked EKG. Gave kayexalate x 2 10/31  - Started  continuous LR 75 10/31   - Held prior to admission Losartan, bumex, glipizide, metformin, sotalol    - potassium normalized 11/1         Fever overnight 10/30/2024 and again 11/1   - afebrile since.    - WBC up 10/31 but trending down 11/1 ? 11/2  - no symptoms or apparent source of infection   - afebrile and feeling well during the day   - suspect due to malignancy but  following for evidence of acute infection   - Blood culture pending, negative so far          Intermittent diarrhea for 6 weeks   Doubt infectious, consider partial bowel obstruction from cancer  Increased distension, discomfort 10/31/2024. BS present. 2 Views abdomen - Prominent air-filled loops of small bowel are seen measuring up to just over 4 cm in diameter, which can be seen with bowel obstruction versus ileus.   - Stopped Immodium as needed  - Surgery consulted - nothing to add at present  - was constipated here, then started having bowel movements pm 10/31.    - following stools, but does not look infectious so far          Normocytic Anemia - suspect due to chronic disease compounded by dilution   Baseline HGB 2023 <12.0. No symptoms of acute blood loss   Admission HGB 11.0-? 10.4 ? 10.7 ? 9.7   - suspect mostly dilutional on top of Follow   - B12 high normal   - retic count normal   - Iron and TIBC both low  - LDH elevated but haptoglobin normal - suspect this is due to malignancy and not hemolysis in this case.   - peripheral smear pending   - Following         Nocturnal hypoxemia 10/30-31/2024   Possible undiagnosed obstructive sleep apnea    - follow-up with primary care provider, consider sleep study         Diabetes mellitus type 2   HgbA1c 6.2% on 03/18/24.  on admission. Managed prior to admission with glipizide 5mg BID, metformin 1000mg BID. A1c 6.0 10/31/2024   AM blood sugar high 10/31/2024   - Moderate consistent carb diet    - Started medium insulin sliding scale 10/31/2024   - Held prior to admission glipizide and metformin given MAYDA as above, glucose somewhat elevated here so anticipate resuming medications on discharge but could discuss if he needs to continue the glipizide or not going forward at follow-up with primary care provider.           Hyponatremia  Sodium 131 ? 130 ? 128 (corrected 130)   Mild, but trending slowly down with IVF.    - checking urine sodium/osmolality and serum  osmolality         Heart failure with recovered ejection fraction   Hx severe aortic stenosis s/p TAVR 04/2023  Hypertension   Echocardiogram 07/15/24 with LVEF 50-55% (improved from 45-50% on 03/04/24), biatrial enlargement. LVEF previously 45-50% by 3/4/2024 TTE in the setting of 36% PVCs, improved to LVEF 50-55% on 07/15/24 RUSSELL with partial PCV suppression (16% burden at that time). HF time course consistent with association with PVCs, though cannot excluded LBBB-dyssynchrony mediated cardiomyopathy.   CXR on admission with stable size of cardiac silhouette, no pleural effusion.   BUN mildly elevated on admission (2783), appears euvolemic.   Managed prior to admission with bumetanide 1mg every morning and 0.5mg daily at noon, carvedilol 3.125mg BID, losartan 100mg daily.   - Held prior to admission losartan, bumex due to acute kidney injury  - Continued prior to admission carvedilol.   BPs were soft.  Improved but SBP still 100-120 11/1    - blood pressures still soft - anticipate remaining off losartan on discharge, reassess bumex 11/3.          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 He seems asymptomatic although these could potentially worsen his cardiomyopathy. Started on sotalol 03/20/24, 16% PVCs by 3/27/2024 mobile cardiac telemetry. Admission EKG with sinus rhythm with LBBB, inferior Q waves. Managed prior to admission with carvedilol 3.125mg BID, sotalol 60mg BID.   - Continued prior to admission carvedilol   - blood pressure intermittently low here but stable AM 11/2 - gave coreg and resumed sotalol pm 11/2 - follow blood pressure with this.           Abnormal UA without UTI  UA borderline positive with moderate leuk esterase, 9WBCs, few bacteria. No leukocytosis. Denies urinary symptoms, does endorse generalized weakness and fatigue. Afebrile, VS reassuring. Urine  "culture negative         Hyperlipidemia   Managed prior to admission with atorvastatin 80mg daily, [id number] daily;   - Continue ezetimibe  - Held atorvastatin due to LFTs          Hypothyroidism   TSH 7.44 on 10/21/24  Managed prior to admission with levothyroxine 175mcg daily; continue            DVT Prophylaxis: mechanical given anemia - reassess 11/3        Welch Catheter: Not present  Lines: None     Cardiac Monitoring: None  Code Status: Full Code          Clinically Significant Risk Factors         # Hyperkalemia: Highest K = 5.7 mmol/L in last 2 days, will monitor as appropriate  # Hyponatremia: Lowest Na = 131 mmol/L in last 2 days, will monitor as appropriate       # Hypoalbuminemia: Lowest albumin = 2.8 g/dL at 10/31/2024  5:15 AM, will monitor as appropriate                # Obesity: Estimated body mass index is 36.49 kg/m  as calculated from the following:    Height as of this encounter: 1.727 m (5' 8\").    Weight as of this encounter: 108.9 kg (240 lb)., PRESENT ON ADMISSION             Diet  Orders Placed This Encounter      Combination Diet Regular Diet Adult; Moderate Consistent Carb (60 g CHO per Meal) Diet                        Disposition Plan           Medically Ready for Discharge: Anticipated Tomorrow                  Kadeem Bates MD  Hospitalist Service  [facility]  Securely message with the Vocera Web Console (learn more here)  Text page via "[employer] Paging/Directory             Interval History:   No new concerns today. Patient says he's doing well getting up independently still.  No pain.  No dyspnea or fever today.  No new or worsening symptoms.  Overall feels like he's improving.                  Review of Systems:    ROS: 10 point ROS neg other than the symptoms noted above in the HPI.           Medications:   Current active medications and PTA medications reviewed, see medication list for details.            Physical Exam:   Vitals were reviewed  Patient " Vitals for the past 24 hrs:   BP Temp Temp src Pulse Resp SpO2 Weight   24 0939 107/44 -- -- 72 18 93 % --   24 0759 101/43 97.2  F (36.2  C) Axillary 54 18 93 % --   24 0700 -- -- -- -- -- -- 110 kg (242 lb 8.1 oz)   24 0517 128/45 -- -- -- -- -- --   24 0436 (!) 108/36 100.3  F (37.9  C) Axillary -- -- -- --   24 0430 (!) 110/33 (!) 101  F (38.3  C) Oral 50 16 93 % --   24 0030 -- 99.7  F (37.6  C) Oral -- -- -- --   24 0023 114/48 (!) 101.7  F (38.7  C) Oral 86 18 92 % --   24 1945 109/43 97.5  F (36.4  C) Oral 75 16 97 % --   24 1715 90/47 -- -- 76 -- -- --   24 1640 93/42 97.7  F (36.5  C) Oral 67 16 96 % --   24 1308 105/40 98.2  F (36.8  C) Oral 78 16 92 % --       Temperatures:  Current - Temp: 97.2  F (36.2  C); Max - Temp  Av.2  F (37.3  C)  Min: 97.2  F (36.2  C)  Max: 101.7  F (38.7  C)  Respiration range: Resp  Av.9  Min: 16  Max: 18  Pulse range: Pulse  Av.8  Min: 50  Max: 86  Blood pressure range: Systolic (24hrs), Av , Min:90 , Max:128   ; Diastolic (24hrs), Av, Min:33, Max:48    Pulse oximetry range: SpO2  Av.7 %  Min: 92 %  Max: 97 %  No intake/output data recorded.    Intake/Output Summary (Last 24 hours) at 2024 1046  Last data filed at 2024 0854  Gross per 24 hour   Intake 60 ml   Output --   Net 60 ml     EXAM:  General: awake and alert, NAD, oriented x 3  Head: normocephalic  Neck: unremarkable, no lymphadenopathy   HEENT: oropharynx pink and moist    Heart: Regular rate and rhythm, no murmurs, rubs, or gallops  Lungs: clear to auscultation bilaterally with good air movement throughout  Abdomen: soft, non-tender, no masses or organomegaly  Extremities: trace edema in lower extremities   Skin unremarkable as visualized.              Data:     Reviewed data:  Results for orders placed or performed during the hospital encounter of 10/29/24 (from the past 24 hours)   Glucose by meter    Result Value Ref Range    GLUCOSE BY METER POCT 190 (H) 70 - 99 mg/dL   Lab Blood Morphology Pathologist Review    Narrative    The following orders were created for panel order Lab Blood Morphology Pathologist Review.  Procedure                               Abnormality         Status                     ---------                               -----------         ------                     Bld morphology pathology...[[id number]]                      In process                 CBC with platelets and d...[[id number]]  Abnormal            Final result               Reticulocyte count[[id number]]           Normal              Final result               Morphology Tracking[[id number]]                              Final result                 Please view results for these tests on the individual orders.   Haptoglobin   Result Value Ref Range    Haptoglobin 169 30 - 200 mg/dL   CBC with platelets and differential   Result Value Ref Range    WBC Count 14.4 (H) 4.0 - 11.0 10e3/uL    RBC Count 3.06 (L) 4.40 - 5.90 10e6/uL    Hemoglobin 9.6 (L) 13.3 - 17.7 g/dL    Hematocrit 28.3 (L) 40.0 - 53.0 %    MCV 93 78 - 100 fL    MCH 31.4 26.5 - 33.0 pg    MCHC 33.9 31.5 - 36.5 g/dL    RDW 13.3 10.0 - 15.0 %    Platelet Count 220 150 - 450 10e3/uL    % Neutrophils 87 %    % Lymphocytes 4 %    % Monocytes 8 %    % Eosinophils 0 %    % Basophils 0 %    % Immature Granulocytes 1 %    NRBCs per 100 WBC 0 <1 /100    Absolute Neutrophils 12.5 (H) 1.6 - 8.3 10e3/uL    Absolute Lymphocytes 0.5 (L) 0.8 - 5.3 10e3/uL    Absolute Monocytes 1.1 0.0 - 1.3 10e3/uL    Absolute Eosinophils 0.0 0.0 - 0.7 10e3/uL    Absolute Basophils 0.1 0.0 - 0.2 10e3/uL    Absolute Immature Granulocytes 0.1 <=0.4 10e3/uL    Absolute NRBCs 0.0 10e3/uL   Reticulocyte count   Result Value Ref Range    % Reticulocyte 1.2 0.5 - 2.0 %    Absolute Reticulocyte 0.036 0.025 - 0.095 10e6/uL   Extra Tube    Narrative    The following orders were created for panel order Extra  Tube.  Procedure                               Abnormality         Status                     ---------                               -----------         ------                     Extra Purple Top Tube[[id number]]                            Final result                 Please view results for these tests on the individual orders.   Extra Purple Top Tube   Result Value Ref Range    Hold Specimen JI    Glucose by meter   Result Value Ref Range    GLUCOSE BY METER POCT 182 (H) 70 - 99 mg/dL   Fractional excretion of sodium    Narrative    The following orders were created for panel order Fractional excretion of sodium.  Procedure                               Abnormality         Status                     ---------                               -----------         ------                     Fractional Excretion of ...[[id number]]                      Final result               Sodium[[id number]]                       Abnormal            Final result               Creatinine, serum[[id number]]            Abnormal            Final result                 Please view results for these tests on the individual orders.   Fractional Excretion of Sodium   Result Value Ref Range    Creatinine Urine mg/dL 154.8 mg/dL    Sodium Urine mmol/L 20 mmol/L    %FENA 0.2 %   Glucose by meter   Result Value Ref Range    GLUCOSE BY METER POCT 229 (H) 70 - 99 mg/dL   Sodium   Result Value Ref Range    Sodium 129 (L) 135 - 145 mmol/L   Creatinine, serum   Result Value Ref Range    Creatinine 2.21 (H) 0.67 - 1.17 mg/dL   Lactic Acid Whole Blood w/ 1x repeat in 2 hrs when >2   Result Value Ref Range    Lactic Acid, Initial 2.3 (H) 0.7 - 2.0 mmol/L   Glucose by meter   Result Value Ref Range    GLUCOSE BY METER POCT 206 (H) 70 - 99 mg/dL   Comprehensive metabolic panel   Result Value Ref Range    Sodium 128 (L) 135 - 145 mmol/L    Potassium 4.3 3.4 - 5.3 mmol/L    Carbon Dioxide (CO2) 21 (L) 22 - 29 mmol/L    Anion Gap 12 7 - 15 mmol/L    Urea  Nitrogen 55.4 (H) 8.0 - 23.0 mg/dL    Creatinine 2.26 (H) 0.67 - 1.17 mg/dL    GFR Estimate 28 (L) >60 mL/min/1.73m2    Calcium 7.7 (L) 8.8 - 10.4 mg/dL    Chloride 95 (L) 98 - 107 mmol/L    Glucose 189 (H) 70 - 99 mg/dL    Alkaline Phosphatase 447 (H) 40 - 150 U/L     (H) 0 - 45 U/L     (H) 0 - 70 U/L    Protein Total 5.4 (L) 6.4 - 8.3 g/dL    Albumin 2.5 (L) 3.5 - 5.2 g/dL    Bilirubin Total 1.9 (H) <=1.2 mg/dL   Magnesium   Result Value Ref Range    Magnesium 1.4 (L) 1.7 - 2.3 mg/dL   Phosphorus   Result Value Ref Range    Phosphorus 3.6 2.5 - 4.5 mg/dL   CBC with platelets   Result Value Ref Range    WBC Count 13.4 (H) 4.0 - 11.0 10e3/uL    RBC Count 2.97 (L) 4.40 - 5.90 10e6/uL    Hemoglobin 9.2 (L) 13.3 - 17.7 g/dL    Hematocrit 27.2 (L) 40.0 - 53.0 %    MCV 92 78 - 100 fL    MCH 31.0 26.5 - 33.0 pg    MCHC 33.8 31.5 - 36.5 g/dL    RDW 13.3 10.0 - 15.0 %    Platelet Count 229 150 - 450 10e3/uL   Lactic acid whole blood   Result Value Ref Range    Lactic Acid 2.3 (H) 0.7 - 2.0 mmol/L   Glucose by meter   Result Value Ref Range    GLUCOSE BY METER POCT 178 (H) 70 - 99 mg/dL           Attestation:  I have reviewed today's vital signs, notes, medications, labs and imaging.  Amount of time spent managing this patient today:  50 minutes.

## 2024-11-03 VITALS
TEMPERATURE: 99.5 F | DIASTOLIC BLOOD PRESSURE: 47 MMHG | BODY MASS INDEX: 38.48 KG/M2 | OXYGEN SATURATION: 91 % | HEART RATE: 68 BPM | SYSTOLIC BLOOD PRESSURE: 119 MMHG | RESPIRATION RATE: 20 BRPM | WEIGHT: 253.9 LBS | HEIGHT: 68 IN

## 2024-11-03 LAB
ALBUMIN SERPL BCG-MCNC: 2.4 G/DL (ref 3.5–5.2)
ALP SERPL-CCNC: 464 U/L (ref 40–150)
ALT SERPL W P-5'-P-CCNC: 107 U/L (ref 0–70)
ANION GAP SERPL CALCULATED.3IONS-SCNC: 11 MMOL/L (ref 7–15)
AST SERPL W P-5'-P-CCNC: 224 U/L (ref 0–45)
BILIRUB SERPL-MCNC: 2.2 MG/DL
BUN SERPL-MCNC: 61.1 MG/DL (ref 8–23)
CALCIUM SERPL-MCNC: 7.7 MG/DL (ref 8.8–10.4)
CHLORIDE SERPL-SCNC: 98 MMOL/L (ref 98–107)
CREAT SERPL-MCNC: 2.12 MG/DL (ref 0.67–1.17)
EGFRCR SERPLBLD CKD-EPI 2021: 30 ML/MIN/1.73M2
ERYTHROCYTE [DISTWIDTH] IN BLOOD BY AUTOMATED COUNT: 13.6 % (ref 10–15)
GLUCOSE BLDC GLUCOMTR-MCNC: 190 MG/DL (ref 70–99)
GLUCOSE BLDC GLUCOMTR-MCNC: 210 MG/DL (ref 70–99)
GLUCOSE BLDC GLUCOMTR-MCNC: 220 MG/DL (ref 70–99)
GLUCOSE SERPL-MCNC: 178 MG/DL (ref 70–99)
HCO3 SERPL-SCNC: 22 MMOL/L (ref 22–29)
HCT VFR BLD AUTO: 27.2 % (ref 40–53)
HGB BLD-MCNC: 9.2 G/DL (ref 13.3–17.7)
MAGNESIUM SERPL-MCNC: 1.8 MG/DL (ref 1.7–2.3)
MCH RBC QN AUTO: 31.1 PG (ref 26.5–33)
MCHC RBC AUTO-ENTMCNC: 33.8 G/DL (ref 31.5–36.5)
MCV RBC AUTO: 92 FL (ref 78–100)
PLATELET # BLD AUTO: 251 10E3/UL (ref 150–450)
POTASSIUM SERPL-SCNC: 4.3 MMOL/L (ref 3.4–5.3)
PROT SERPL-MCNC: 5.3 G/DL (ref 6.4–8.3)
RBC # BLD AUTO: 2.96 10E6/UL (ref 4.4–5.9)
SODIUM SERPL-SCNC: 131 MMOL/L (ref 135–145)
WBC # BLD AUTO: 12.3 10E3/UL (ref 4–11)

## 2024-11-03 PROCEDURE — 36415 COLL VENOUS BLD VENIPUNCTURE: CPT | Performed by: FAMILY MEDICINE

## 2024-11-03 PROCEDURE — 99239 HOSP IP/OBS DSCHRG MGMT >30: CPT | Performed by: FAMILY MEDICINE

## 2024-11-03 PROCEDURE — 84155 ASSAY OF PROTEIN SERUM: CPT | Performed by: FAMILY MEDICINE

## 2024-11-03 PROCEDURE — 250N000013 HC RX MED GY IP 250 OP 250 PS 637

## 2024-11-03 PROCEDURE — 82947 ASSAY GLUCOSE BLOOD QUANT: CPT | Performed by: FAMILY MEDICINE

## 2024-11-03 PROCEDURE — 250N000013 HC RX MED GY IP 250 OP 250 PS 637: Performed by: FAMILY MEDICINE

## 2024-11-03 PROCEDURE — 85018 HEMOGLOBIN: CPT | Performed by: FAMILY MEDICINE

## 2024-11-03 PROCEDURE — 83735 ASSAY OF MAGNESIUM: CPT | Performed by: FAMILY MEDICINE

## 2024-11-03 RX ORDER — GLIPIZIDE 5 MG/1
2.5 TABLET ORAL 2 TIMES DAILY
Status: ON HOLD | COMMUNITY
Start: 2024-11-03 | End: 2024-11-15

## 2024-11-03 RX ADMIN — SOTALOL HYDROCHLORIDE 80 MG: 80 TABLET ORAL at 08:27

## 2024-11-03 RX ADMIN — EZETIMIBE 10 MG: 10 TABLET ORAL at 08:27

## 2024-11-03 RX ADMIN — INSULIN ASPART 2 UNITS: 100 INJECTION, SOLUTION INTRAVENOUS; SUBCUTANEOUS at 12:29

## 2024-11-03 RX ADMIN — INSULIN ASPART 2 UNITS: 100 INJECTION, SOLUTION INTRAVENOUS; SUBCUTANEOUS at 08:29

## 2024-11-03 RX ADMIN — CARVEDILOL 3.12 MG: 3.12 TABLET, FILM COATED ORAL at 08:26

## 2024-11-03 RX ADMIN — LEVOTHYROXINE SODIUM 175 MCG: 0.15 TABLET ORAL at 07:47

## 2024-11-03 RX ADMIN — ASPIRIN 81 MG: 81 TABLET, COATED ORAL at 08:25

## 2024-11-03 ASSESSMENT — ACTIVITIES OF DAILY LIVING (ADL)
ADLS_ACUITY_SCORE: 0

## 2024-11-03 NOTE — PLAN OF CARE
[facility] DISCHARGE NOTE    Patient discharged to home at 1:51 PM via wheel chair. Accompanied by significant other and staff. Discharge instructions reviewed with patient and significant other , opportunity offered to ask questions. Prescriptions sent to patients preferred pharmacy. All belongings sent with patient.    Lety Kim

## 2024-11-03 NOTE — PLAN OF CARE
Problem: Adult Inpatient Plan of Care  Goal: Plan of Care Review  Description: The Plan of Care Review/Shift note should be completed every shift.  The Outcome Evaluation is a brief statement about your assessment that the patient is improving, declining, or no change.  This information will be displayed automatically on your shift  note.  Outcome: Progressing  Flowsheets (Taken 11/2/2024 6862)  Outcome Evaluation: Patient is A/O x4 and states that he doesn't have pain but does have difficulty swallowing. Low magnesium level was improved with magnesium replacement. Patient voided twice and had one BM with assist 1. Appetite was minimal at breakfast and lunch. Patient refused physical therapy.   Goal Outcome Evaluation:                 Outcome Evaluation: Patient is A/O x4 and states that he doesn't have pain but does have difficulty swallowing. Low magnesium level was improved with magnesium replacement. Patient voided twice and had one BM with assist 1. Appetite was minimal at breakfast and lunch. Patient refused physical therapy.

## 2024-11-03 NOTE — DISCHARGE SUMMARY
[facility] Discharge Summary    Jayesh Ortiz MRN# [id number]   Age: 84 year old YOB: 1940     Date of Admission:  10/29/2024  Date of Discharge::  11/3/2024  Admitting Physician:  Tico Gallegos MD  Discharge Physician:  Kadeem Bates MD             Admission Diagnoses:   Generalized weakness [R53.1]  Falls frequently [R29.6]  Alkaline phosphatase elevation [R74.8]  Acute kidney injury (H) [N17.9]          Principle Discharge Diagnosis:       Probable metastatic cancer    See hospital course for further active diagnoses addressed during this admission.                 Medications Prior to Admission:     Medications Prior to Admission   Medication Sig Dispense Refill Last Dose/Taking    aspirin (ASA) 81 MG EC tablet Take 1 tablet (81 mg) by mouth daily   10/28/2024 Morning    blood glucose (NO BRAND SPECIFIED) lancets standard Use to test blood sugar 1 times daily. 200 each 3 Past Month    blood glucose (NO BRAND SPECIFIED) test strip Use to test blood sugar 1 times daily 200 strip 3 Past Month    blood glucose monitoring (NO BRAND SPECIFIED) meter device kit Use to test blood sugar 1 times daily or as directed. 1 kit 0 Past Month    carvedilol (COREG) 6.25 MG tablet TAKE 0.5 TABLETS (3.125 MG) BY MOUTH 2 TIMES DAILY (WITH MEALS) 90 tablet 1 10/28/2024 Evening    ezetimibe (ZETIA) 10 MG tablet Take 1 tablet (10 mg) by mouth daily 90 tablet 3 10/28/2024    hydrOXYzine HCl (ATARAX) 25 MG tablet Take 1 tablet (25 mg) by mouth nightly as needed for anxiety (sleep). 30 tablet 0 Past Month    levothyroxine (SYNTHROID/LEVOTHROID) 175 MCG tablet TAKE 1 TABLET BY MOUTH EVERY DAY 90 tablet 2 10/28/2024 Morning    Magnesium Oxide 250 MG tablet TAKE 1 TABLET BY MOUTH ONCE DAILY 90 tablet 0 10/28/2024 Morning    thin (NO BRAND SPECIFIED) lancets Use with lanceting device to check glucose one time daily. To accompany: Blood Glucose Monitor Brands: per insurance. 200 each 3 Past Month     [DISCONTINUED] atorvastatin (LIPITOR) 80 MG tablet TAKE 1 TABLET BY MOUTH EVERY DAY 90 tablet 2 10/28/2024 Morning    [DISCONTINUED] bumetanide (BUMEX) 0.5 MG tablet Take 0.5 mg by mouth daily. In afternoon   10/28/2024 Noon    [DISCONTINUED] bumetanide (BUMEX) 1 MG tablet Take 1 mg by mouth every morning.   10/28/2024    [DISCONTINUED] glipiZIDE (GLUCOTROL) 10 MG tablet TAKE 1 TABLET BY MOUTH TWICE A DAY (Patient taking differently: Take 5 mg by mouth 2 times daily.) 180 tablet 1 10/28/2024 Evening    [DISCONTINUED] losartan (COZAAR) 100 MG tablet TAKE 1 TABLET BY MOUTH EVERY DAY 90 tablet 2 10/28/2024 Morning    [DISCONTINUED] metFORMIN (GLUCOPHAGE) 500 MG tablet TAKE 2 TABLETS BY MOUTH 2 TIMES DAILY WITH MEALS 360 tablet 3 10/28/2024 Evening    [DISCONTINUED] sotalol (BETAPACE) 120 MG tablet Take 0.5 tablets (60 mg) by mouth 2 times daily 90 tablet 3 10/28/2024 Evening             Discharge Medications:     Current Discharge Medication List        START taking these medications    Details   omeprazole (PRILOSEC) 20 MG DR capsule Take 1 capsule (20 mg) by mouth daily.  Qty: 30 capsule, Refills: 0    Associated Diagnoses: Heartburn           CONTINUE these medications which have CHANGED    Details   glipiZIDE (GLUCOTROL) 5 MG tablet Take 1 tablet (5 mg) by mouth 2 times daily.      metFORMIN (GLUCOPHAGE) 500 MG tablet Take 1 tablet (500 mg) by mouth 2 times daily (with meals).    Associated Diagnoses: Type 2 diabetes mellitus treated without insulin (H)           CONTINUE these medications which have NOT CHANGED    Details   aspirin (ASA) 81 MG EC tablet Take 1 tablet (81 mg) by mouth daily    Associated Diagnoses: S/P TAVR (transcatheter aortic valve replacement)      blood glucose (NO BRAND SPECIFIED) lancets standard Use to test blood sugar 1 times daily.  Qty: 200 each, Refills: 3    Associated Diagnoses: Type 2 diabetes mellitus without complication, without long-term current use of insulin (H)      blood  glucose (NO BRAND SPECIFIED) test strip Use to test blood sugar 1 times daily  Qty: 200 strip, Refills: 3    Associated Diagnoses: Type 2 diabetes mellitus without complication, without long-term current use of insulin (H)      blood glucose monitoring (NO BRAND SPECIFIED) meter device kit Use to test blood sugar 1 times daily or as directed.  Qty: 1 kit, Refills: 0    Associated Diagnoses: Type 2 diabetes mellitus without complication, without long-term current use of insulin (H)      carvedilol (COREG) 6.25 MG tablet TAKE 0.5 TABLETS (3.125 MG) BY MOUTH 2 TIMES DAILY (WITH MEALS)  Qty: 90 tablet, Refills: 1    Associated Diagnoses: PVC (premature ventricular contraction); Essential hypertension      ezetimibe (ZETIA) 10 MG tablet Take 1 tablet (10 mg) by mouth daily  Qty: 90 tablet, Refills: 3    Associated Diagnoses: Chronic heart failure with preserved ejection fraction (H); Hyperlipidemia LDL goal <70      hydrOXYzine HCl (ATARAX) 25 MG tablet Take 1 tablet (25 mg) by mouth nightly as needed for anxiety (sleep).  Qty: 30 tablet, Refills: 0    Associated Diagnoses: Acute insomnia      levothyroxine (SYNTHROID/LEVOTHROID) 175 MCG tablet TAKE 1 TABLET BY MOUTH EVERY DAY  Qty: 90 tablet, Refills: 2    Associated Diagnoses: Hypothyroidism, unspecified type      Magnesium Oxide 250 MG tablet TAKE 1 TABLET BY MOUTH ONCE DAILY  Qty: 90 tablet, Refills: 0    Associated Diagnoses: Hypomagnesemia      thin (NO BRAND SPECIFIED) lancets Use with lanceting device to check glucose one time daily. To accompany: Blood Glucose Monitor Brands: per insurance.  Qty: 200 each, Refills: 3    Associated Diagnoses: Type 2 diabetes mellitus without complication, without long-term current use of insulin (H)           STOP taking these medications       atorvastatin (LIPITOR) 80 MG tablet Comments:   Reason for Stopping:         bumetanide (BUMEX) 0.5 MG tablet Comments:   Reason for Stopping:         bumetanide (BUMEX) 1 MG tablet  Comments:   Reason for Stopping:         losartan (COZAAR) 100 MG tablet Comments:   Reason for Stopping:         sotalol (BETAPACE) 120 MG tablet Comments:   Reason for Stopping:                          Brief History of Illness from time of admission:     From Admission H+P:   Jayesh Ortiz is a 84 year old male with past medical history significant for  type 2 DM, CAD with  of mid LAD, hypertension, hyperlipidemia, PVCs, severe aortic stenosis with LBBB following TAVR (04/2023), HFrEF, CKD, hypothyroidism, BPH, who presents on 10/29/2024 with frequent falls, generalized weakness.      On 10/11/24 he contacted his primary care provider due to acute insomnia. He reports that he did not sleep for several days prior to seeking care from his PCP. He was prescribed hydroxyzine 25mg prn for sleep. This medication did relieve his insomnia immediately.   However, within days of starting hydroxyzine he developed recurrent, explosive diarrhea for the next several days. He has tried OTC medications for this including imodium and Pepto bismol, which have offered some relief. He states the diarrhea has been gradually improving over the past two weeks with continuing the OTC medications, is nearly resolved at time of admission. He reports that any time he takes the imodium or Pepto bismol his stools will be black for the next 24hrs or so before becoming normal brown color again.   He endorses very poor appetite and metallic taste in his mouth which has been persistent x4 weeks.   He endorses generalized weakness, fatigue, and myalgias which have been slowing progressing for the past 6-7 weeks, since before the episode of acute insomnia.      He was evaluated in the emergency room on 10/21/24 for generalized fatigue, and ED workup was highly suspicious for metastatic cancer. CT chest was recommended at that time however patient discharged from the ED that day with plans for outpatient imaging and clinic follow up with his  "PCP and oncology.      Since recent ED visit on 10/23/24, patient has had continued progression of his generalized weakness resulting in several falls. Patient believes he has fallen 4 times in the past week. He has not had any syncope and denies injuries resulting from these falls, states that the falls were all very minor, secondary to feeling weak and fatigued.       He endorses 90# weight loss over the past year. He attributes this to active lifestyle and dietary changes however his partner Dorita states that there hasn't been any major changes that would likely cause this degree of weight loss. He believes his max weight was 340#.      He had intermittent orthostatic symptoms which he attributed to glipizide use, these symptoms resolved after he reduced his glipizide dose by 50%.      Hx of heart failure, bilateral LE edema. He states that his current LE edema is at baseline if not slightly improved from baseline.     [place], works as a [employer]. His partner Dorita lives approximately 20 minutes away from his home. He manages his own medications and denies recent changes or missed doses. He denies alcohol use, tobacco use, illicit drug use.             TODAY:     Subjective:  Doing well today.  No fever or chills. No dyspnea. Legs unchanged, no increased edema.  Up and walking independently in the [place] today, strength unchanged.  No new or other changes.    No pain.     Has had some sensation of things getting stuck in his throat, which is not new.  No emesis.  No pain.  Still able to take orals ok including thin liquids and solids.  History of heartburn but doesn't think he's had heartburn recently.  No other changes.  No black or bloody stools.      ROS:   ROS: 10 point ROS neg other than the symptoms noted above in the HPI.   /47 (BP Location: Right arm)   Pulse 68   Temp 99.5  F (37.5  C) (Oral)   Resp 20   Ht 1.727 m (5' 8\")   Wt 115.2 kg (253 lb 14.4 oz)   SpO2 91%   BMI 38.61 kg/m  "    EXAM:  General: awake and alert, NAD, oriented x 3  Head: normocephalic  Neck: unremarkable, no lymphadenopathy   HEENT: oropharynx pink and moist    Heart: Regular rate and rhythm, no murmurs, rubs, or gallops  Lungs: clear to auscultation bilaterally with good air movement throughout  Abdomen: soft, non-tender, no masses or organomegaly  Extremities: trace edema in lower extremities   Skin unremarkable as visualized.       Hospital Course:       Jayesh Ortiz is a 84 year old male with past medical history significant for type 2 DM, CAD with  of mid LAD, hypertension, hyperlipidemia, PVCs, severe aortic stenosis with LBBB following TAVR (04/2023), HFrEF, CKD, hypothyroidism, BPH, who presents on 10/29/2024 with generalized weakness.      Generalized weakness  Frequent falls   Endorses generalized weakness, fatigue, and myalgias onset 6-7 weeks prior to admission with gradual progression. Has had several ground level mechanical falls one week prior to admission with no obvious resulting injuries. 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      PT/OT/Care management consulted. Felt he is able to return home, but significant other says he 'puts on a good show', and has 'ups and downs.'  - patient has done well with stand by assist here - ok to return home on discharge per physical therapy/occupational therapy/care transitions.   - improving, doing well up independently here        Probable metastatic cancer  History of prostate cancer 2000s  History of thyroid cancer 1990s  Elevated LFTs   Liver lesions   Pulmonary nodules   Abdominal lymphadenopathy   Esophageal thickening   Unintentional weight loss   Incidental imaging findings on ED visit 10/21/24 when he presented with generalized fatigue/weakness. Endorses 90# unintentional weight loss x1 year. CT abd/pelvis 10/21/24 revealed: Circumferential wall thickening of the distal esophagus, which could be  attributable to an underlying esophagitis or primary esophageal malignancy. Multiple hepatic lesions, highly suspicious for metastatic disease. Upper abdominal lymphadenopathy, suspicious for venessa metastatic disease. Few sub-6 mm pulmonary nodules, new from prior and also suspicious for metastatic disease. Cirrhosis, cholelithiasis, colonic diverticulitis.  Elevated alk phos (524), ALT (134), and AST (269). Tbili wnl (1.1).  US abd on admission with heterogenous nodularity throughout liver, cholelithiasis and trace pericholecystic fluid, mild diffuse gallbladder wall thickening, probable enlarged periportal lymph node. Patient is scheduled for oncology clinic visit at 9am on 10/30/24.   -Has not had a colonoscopy. Some of GI symptoms intermittent explosive diarrhea are suggestive of colon cancer   LFTS:  Aphos 323 ? 524 ? 610 ? 497 ? 447 ? 464  ALT     132 ? 134 ? 147 ? 143 ? 128 ? 107  AST     164 ? 269 ? 379 ? 431 ? 319 ? 224  Bili       0.6 ? 1.1 ? 1.6 ? 1.5 ? 1.9 ? 2.2   LFTs worsened, then remained relatively stable with bili and alk phos up a bit and AST/ALT down a bit - suspect due to malignancy  - acute hepatitis panel negative   - Rescheduled oncology follow up for 11/4/24 - further work-up/follow-up per their recommendations   - follow-up with primary care provider in 1 week.  Consider recheck of CMP at that time.          Acute kidney injury on CKD 3  Baseline creatinine 1.2-1.4, GFR 50-60. Admission BUN/creatinine 57/2.60 and GFR 24, worsened from recent check (2.13 on 10/21/24). CT abd/pelvis 10/21/24 without hydronephrosis, obstruction. UA 10/29/24 bland. Admit endorses poor oral intake and frequent diarrhea x4 weeks, denies urinary symptoms. Likely secondary to dehydration.   Received LR 1 L on admission  Repeated  LR  x 1 L 10/30/2024   Then IVF starting pm 10/31 as below   - FENa consistent with pre-renal injury, but intake better and do not want to overload him with fluids especially with  hyponatremia as below  Cr 2.60 ? 2.01 ? 2.11 ? 2.30 ? 2.26 (saline locked) ? 2.12   - saline locked 11/2, creatinine stabilized  - improving - ok for discharge, recheck CMP at follow-up with primary care provider in 1 week        Hyperkalemia   - 5.7 (10/31/2024)  -  Checked EKG. Gave kayexalate x 2 10/31  - Started  continuous LR 75 10/31   - Held prior to admission Losartan, bumex, glipizide, metformin, sotalol    - potassium normalized 11/1 and remained normal through 11/3     - remained off losartan , sotalol and bumex, reassess at follow-up with primary care provider    - resumed glipizide and metformin as below         ? Swallowing trouble   - sensation of things getting stuck in his throat at times, but able to take solids and clear liquids with no choking.  No emesis.  Still taking orals.   - will start on protonix daily   - follow-up with primary care provider in 1 week.  If not resolved, consider further evaluation including possible swallow evaluation and EGD.        Fever overnight 10/30/2024 and again 11/1   - afebrile since.    - WBC up a bit 10/31 but trending down 11/1 ? 11/3  - no symptoms or apparent source of infection   - afebrile and feeling well during the day   - suspect due to malignancy but following for evidence of acute infection   - Blood culture pending, negative so far          Intermittent diarrhea for 6 weeks   Doubt infectious, consider partial bowel obstruction from cancer  Increased distension, discomfort 10/31/2024. BS present. 2 Views abdomen - Prominent air-filled loops of small bowel are seen measuring up to just over 4 cm in diameter, which can be seen with bowel obstruction versus ileus.   - Stopped Immodium as needed  - Surgery consulted - nothing to add at present  - was constipated here, then started having bowel movements pm 10/31.    - minimal diarrhea here, nothing that appeared infectious         Normocytic Anemia - suspect due to chronic disease compounded by dilution    Baseline HGB 2023 <12.0. No symptoms of acute blood loss   Admission HGB 11.0-? 10.4 ? 10.7 ? 9.7   - suspect mostly dilutional on top of Follow   - B12 high normal   - retic count normal   - Iron and TIBC both low  - LDH elevated but haptoglobin normal - suspect this is due to malignancy and not hemolysis in this case.   - peripheral smear pending - follow-up on results at follow-up with primary care provider    - Following         Nocturnal hypoxemia 10/30-31/2024   Possible undiagnosed obstructive sleep apnea    - follow-up with primary care provider, consider sleep study         Diabetes mellitus type 2   HgbA1c 6.2% on 03/18/24.  on admission. Managed prior to admission with glipizide 5mg BID, metformin 1000mg BID. A1c 6.0 10/31/2024   AM blood sugar high 10/31/2024   - Moderate consistent carb diet    - Started medium insulin sliding scale 10/31/2024   - Held prior to admission glipizide and metformin given MAYDA as above, glucose somewhat elevated here so resumed glipizide and metformin on discharge but at 500 twice daily for metformin given renal function.  Could discuss if he needs to continue the glipizide or not going forward at follow-up with primary care provider.            Hyponatremia  Sodium 131 ? 130 ? 128 (corrected 130) ? 131 (corrected 133)  Mild, but trended slowly down with IVF then improved and near normal without fluid restriction.    - urine sodium/osmolality and serum osmolality did not show clear SIADH  - recheck CMP at follow-up with primary care provider         Heart failure with recovered ejection fraction   Hx severe aortic stenosis s/p TAVR 04/2023  Hypertension   Echocardiogram 07/15/24 with LVEF 50-55% (improved from 45-50% on 03/04/24), biatrial enlargement. LVEF previously 45-50% by 3/4/2024 TTE in the setting of 36% PVCs, improved to LVEF 50-55% on 07/15/24 RUSSELL with partial PCV suppression (16% burden at that time). HF time course consistent with association with PVCs,  though cannot excluded LBBB-dyssynchrony mediated cardiomyopathy.   CXR on admission with stable size of cardiac silhouette, no pleural effusion.   BUN mildly elevated on admission (2783), appears euvolemic.   Managed prior to admission with bumetanide 1mg every morning and 0.5mg daily at noon, carvedilol 3.125mg BID, losartan 100mg daily.   - Held prior to admission losartan, bumex due to acute kidney injury  - Continued prior to admission carvedilol.   BPs were soft.  Improved but SBP still 100-120 11/1    - blood pressures still soft - continued off losartan and bumex on discharge as above - reassess at follow-up with primary care provider.         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 He seems asymptomatic although these could potentially worsen his cardiomyopathy. Started on sotalol 03/20/24, 16% PVCs by 3/27/2024 mobile cardiac telemetry. Admission EKG with sinus rhythm with LBBB, inferior Q waves. Managed prior to admission with carvedilol 3.125mg BID, sotalol 60mg BID.   - Continued prior to admission carvedilol   - blood pressure intermittently low here but  tolerated coreg well prior to discharge.   - continued coreg  - stopped sotalol on discharge (Creatine clearane was 32 and sotalol is contraindicated for Cr clearnace < 40) - reassess at follow-up with primary care provider          Abnormal UA without UTI  UA borderline positive with moderate leuk esterase, 9WBCs, few bacteria. No leukocytosis. Denies urinary symptoms, does endorse generalized weakness and fatigue. Afebrile, VS reassuring. Urine culture negative         Hyperlipidemia   Managed prior to admission with atorvastatin 80mg daily, [id number] daily;   - Continue ezetimibe  - Held atorvastatin due to LFTs  - reassess at follow-up         Hypothyroidism   TSH 7.44 on 10/21/24  Managed prior to admission with  "levothyroxine 175mcg daily; continued                Welch Catheter: Not present  Lines: None     Cardiac Monitoring: None  Code Status: Full Code          Clinically Significant Risk Factors         # Hyperkalemia: Highest K = 5.7 mmol/L in last 2 days, will monitor as appropriate  # Hyponatremia: Lowest Na = 131 mmol/L in last 2 days, will monitor as appropriate       # Hypoalbuminemia: Lowest albumin = 2.8 g/dL at 10/31/2024  5:15 AM, will monitor as appropriate                # Obesity: Estimated body mass index is 36.49 kg/m  as calculated from the following:    Height as of this encounter: 1.727 m (5' 8\").    Weight as of this encounter: 108.9 kg (240 lb)., PRESENT ON ADMISSION                Diet  Orders Placed This Encounter      Combination Diet Regular Diet Adult; Moderate Consistent Carb (60 g CHO per Meal) Diet                          Discharge Instructions and Follow-Up:      Discharge diet: Orders Placed This Encounter      Combination Diet Regular Diet Adult; Moderate Consistent Carb (60 g CHO per Meal) Diet       Discharge activity: Activity as tolerated   Discharge follow-up: Follow-up with oncology on 11/4 as planned  Follow-up with your primary care provider within 1 week. At that time:  - recheck CMP and CBC  - follow-up on hyponatremia, hyperkalemia, MAYDA, and multiple held medications.  - follow-up on results of peripheral smear   - follow-up on swallowing and if this improved with omeprazole.  If not, likely order swallow study and/or EGD to evaluate further.             Discharge Disposition:     Discharged to home       Attestation:  Amount of time performed on this discharge summary: 60 minutes.    Kadeem Bates MD       "

## 2024-11-03 NOTE — PLAN OF CARE
"Goal Outcome Evaluation:    Problem: Fall Injury Risk  Goal: Absence of Fall and Fall-Related Injury  Outcome: Not Progressing       Plan of Care Reviewed With: patient    Overall Patient Progress: improvingOverall Patient Progress: improving    Outcome Evaluation: Patient is A & O x 3. IV is saline locked and flushes successfully. He has been very crabby/agitated all night. At beginning of shift he c/o swallowing issues with discomfort in upper chest that he stated was a result of meatloaf \"getting stuck\".   He stated that he can't drink any fluids, including water, as it \"won't go down\".  Patient wanted to be hooked back up to an IV so he \"wouldn't get dehydrated\". Writer stated that the IV was removed to see if he could consume enough liquids orally to be able to go home. If not, he might have to stay longer. He didn't like that at all.  Writer then suggested we try a few things.  He tried a popsicle first d/t it being a small volume of liquid to tolerate. That was successful. Then he tried thickened juice which he also was able to successfully drink. Eventually, patient was able to consume regular liquid again with no difficulty.  Pain in chest was also mostly relieved with these interventions. Writer felt that much of the swallowing issue was cognitively induced vs. a physical problem. He has been up to the bathroom several times this shift. He was not happy when he was asked to use a walker because he is still a bit unsteady.  He eventually complied.      "

## 2024-11-04 ENCOUNTER — PATIENT OUTREACH (OUTPATIENT)
Dept: CARE COORDINATION | Facility: CLINIC | Age: 84
End: 2024-11-04
Payer: MEDICARE

## 2024-11-04 ENCOUNTER — ONCOLOGY VISIT (OUTPATIENT)
Dept: ONCOLOGY | Facility: CLINIC | Age: 84
End: 2024-11-04
Attending: STUDENT IN AN ORGANIZED HEALTH CARE EDUCATION/TRAINING PROGRAM
Payer: MEDICARE

## 2024-11-04 VITALS
SYSTOLIC BLOOD PRESSURE: 90 MMHG | OXYGEN SATURATION: 91 % | HEART RATE: 66 BPM | RESPIRATION RATE: 12 BRPM | TEMPERATURE: 98.7 F | DIASTOLIC BLOOD PRESSURE: 37 MMHG

## 2024-11-04 DIAGNOSIS — R91.8 PULMONARY NODULES: ICD-10-CM

## 2024-11-04 DIAGNOSIS — R63.4 UNINTENTIONAL WEIGHT LOSS: ICD-10-CM

## 2024-11-04 DIAGNOSIS — K22.89 ESOPHAGEAL THICKENING: ICD-10-CM

## 2024-11-04 DIAGNOSIS — R59.0 ABDOMINAL LYMPHADENOPATHY: ICD-10-CM

## 2024-11-04 DIAGNOSIS — K76.9 LIVER LESION: ICD-10-CM

## 2024-11-04 DIAGNOSIS — R53.83 MALAISE AND FATIGUE: ICD-10-CM

## 2024-11-04 DIAGNOSIS — R53.81 MALAISE AND FATIGUE: ICD-10-CM

## 2024-11-04 LAB
PATH REPORT.COMMENTS IMP SPEC: NORMAL
PATH REPORT.COMMENTS IMP SPEC: NORMAL
PATH REPORT.FINAL DX SPEC: NORMAL
PATH REPORT.MICROSCOPIC SPEC OTHER STN: NORMAL
PATH REPORT.MICROSCOPIC SPEC OTHER STN: NORMAL
PATH REPORT.RELEVANT HX SPEC: NORMAL

## 2024-11-04 PROCEDURE — G0463 HOSPITAL OUTPT CLINIC VISIT: HCPCS | Performed by: INTERNAL MEDICINE

## 2024-11-04 PROCEDURE — 85060 BLOOD SMEAR INTERPRETATION: CPT | Performed by: PATHOLOGY

## 2024-11-04 PROCEDURE — 99204 OFFICE O/P NEW MOD 45 MIN: CPT | Performed by: INTERNAL MEDICINE

## 2024-11-04 RX ORDER — HYDROXYZINE HYDROCHLORIDE 25 MG/1
25 TABLET, FILM COATED ORAL
Qty: 90 TABLET | Refills: 1 | Status: ON HOLD | OUTPATIENT
Start: 2024-11-04 | End: 2024-11-15

## 2024-11-04 ASSESSMENT — PAIN SCALES - GENERAL: PAINLEVEL_OUTOF10: NO PAIN (0)

## 2024-11-04 NOTE — PROGRESS NOTES
"Oncology Rooming Note    November 4, 2024 11:07 AM   Jayesh Ortiz is a 84 year old male who presents for:    Chief Complaint   Patient presents with    Oncology Clinic Visit     Liver lesion - New consult     Initial Vitals: BP (!) 90/37 (BP Location: Right arm, Patient Position: Sitting, Cuff Size: Adult Large)   Pulse 66   Temp 98.7  F (37.1  C) (Tympanic)   Resp 12   SpO2 91%  Estimated body mass index is 38.61 kg/m  as calculated from the following:    Height as of 10/29/24: 1.727 m (5' 8\").    Weight as of 11/3/24: 115.2 kg (253 lb 14.4 oz). There is no height or weight on file to calculate BSA.  No Pain (0) Comment: Data Unavailable   No LMP for male patient.  Allergies reviewed: Yes  Medications reviewed: Yes    Medications: Medication refills not needed today.  Pharmacy name entered into [employer]:    [id number] - [place], [place] - [address] [place] [address] E  [facility] [place], [place] - [address] [place] [address]    Frailty Screening:   Is the patient here for a new oncology consult visit in cancer care? 2. No      Clinical concerns: New consult      Kerri Rush CMA            "

## 2024-11-04 NOTE — PROGRESS NOTES
Clinic Care Coordination Contact  Transitions of Care Outreach  Chief Complaint   Patient presents with    Clinic Care Coordination - Post Hospital       Most Recent Admission Date: 10/29/2024   Most Recent Admission Diagnosis: Generalized weakness - R53.1  Falls frequently - R29.6  Alkaline phosphatase elevation - R74.8  Acute kidney injury (H) - N17.9     Most Recent Discharge Date: 11/3/2024   Most Recent Discharge Diagnosis: Generalized weakness - R53.1  Falls frequently - R29.6  Acute kidney injury (H) - N17.9  Alkaline phosphatase elevation - R74.8  Type 2 diabetes mellitus without complications (H) - E11.9  Type 2 diabetes mellitus with hyperglycemia (H) - E11.65  Type 2 diabetes mellitus treated without insulin (H) - E11.9  Heartburn - R12     Transitions of Care Assessment    Discharge Assessment  How are you doing now that you are home?: 'horrible' pt noted they are getting ready to go to their appointment in [place] - they confirmed they have everything they need at this time, no questions or concerns. Pt stated again they are not feeling well and are glad to be going to an appointment at this time to speak with the doctor. Pt is aware of appointment with PCP next week  How are your symptoms? (Red Flag symptoms escalate to triage hotline per guidelines): Unchanged  Do you know how to contact your clinic care team if you have future questions or changes to your health status? : Yes  Does the patient have their discharge instructions? : Yes  Does the patient have questions regarding their discharge instructions? : No  Were you started on any new medications or were there changes to any of your previous medications? : Yes  Does the patient have all of their medications?: Yes  Do you have questions regarding any of your medications? : No  Do you have all of your needed medical supplies or equipment (DME)?  (i.e. oxygen tank, CPAP, cane, etc.): Yes         Post-op (Clinicians Only)  Did the patient have  surgery or a procedure: No  Fever: No  Chills: No  Eating & Drinking: eating and drinking without complaints/concerns  PO Intake: regular diet        Follow up Plan     Discharge Follow-Up  Discharge follow up appointment scheduled in alignment with recommended follow up timeframe or Transitions of Risk Category? (Low = within 30 days; Moderate= within 14 days; High= within 7 days): Yes  Discharge Follow Up Appointment Date: 11/11/24  Discharge Follow Up Appointment Scheduled with?: Primary Care Provider    Future Appointments   Date Time Provider Department Center   11/4/2024 11:00 AM Friedell, Peter E, MD [facility]   11/11/2024  1:00 PM Geoffrey Live MD [facility]       Outpatient Plan as outlined on AVS reviewed with patient.    For any urgent concerns, please contact our 24 hour nurse triage line: [phone] ([phone])       ENMA VillarrealSW

## 2024-11-04 NOTE — PROGRESS NOTES
[facility] Consult Note    Patient: Jayesh Ortiz  MRN: [id number]  Date of Service: Nov 4, 2024       Reason for Visit    I was consulted by Vito Franks MD for evaluation of esophageal mass and apparent liver metastases.      Encounter Diagnoses Assessment and Plan:    Problem List Items Addressed This Visit    None  Visit Diagnoses       Liver lesion        Relevant Orders    Adult GI  Referral - Consult Only    Pulmonary nodules        Abdominal lymphadenopathy        Unintentional weight loss        Relevant Orders    Adult GI  Referral - Consult Only    Malaise and fatigue        Esophageal thickening        Relevant Orders    Adult GI  Referral - Consult Only    Social Work Referral Specific Sites Only - See Locations in Order                ______________________________________________________________________________    Staging History  Cancer Staging   No matching staging information was found for the patient.    ECOG Performance  3 - Capable of only limited self-care.  Confined to bed/chair > 50% of time.      History of Present Illness    Mr. Jayesh Ortiz is a 84 year old man who has generally been in good health until last year.  He does have a history of diabetes, coronary artery disease and aortic valve replacement.  Over the last year he has noted about 90 pound weight loss.  More recently he has noticed difficulty in swallowing and feeling weak all over.  Presently he can only swallow water or other liquids without a sensation of food getting stuck.  His appetite is reduced and no food tastes good.  He was recently discharged from the hospital where imaging showed a lower esophageal and apparent liver metastases.  He has also noted skin breakdown in the perirectal area.  He notes alternating constipation and diarrhea.    He lives with a significant other for the last 46 years.  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  He is a former smoker  [email or web address]





[address] [address] 

Main: [phone] 



Fax: 



Transthoracic Echocardiogram 

Name:             LONA CALLES                            MR#:

[id number]

Study Date:       2019                             Study Time:

10:48 AM

YOB: 1951                             Age:

67 year(s)

Height:           165.1 cm (65 in.)                      Weight:

52.62 kg (116 lb.)

BSA:              1.57 m2                                Gender:

Female

Examination:      Echo                                   Indication:

edema and cancer

Image Quality:    Adequate                               Contrast: 

Requested by:     Adalberto Arthur                         BP:

107 mmHg/59 mmHg

Heart Rate:                                              Rhythm: 

Indication:       edema and cancer 



Procedure Staff 

Ultrasound Technician:   Karyna Kapadia [facility] 

Reading Physician:       Sreekanth Wheeler MD 

Requesting Provider: 



Conclusions:          Normal size left ventricle.  

No LV hypertrophy.  

Normal global systolic LV function.  

EF is 67 %.  

No regional wall motion abnormality.  

Normal diastolic LV function.  

Normal size right ventricle.  

The left atrium is normal in size.  

The right atrium is normal in size.  

Lipomatous interatrial septum versus small RA mass. Most likely

lipomatous IAS..

The mitral valve is normal in appearance and function.  

There is no mitral valve regurgitation.  

The aortic valve is tri-leaflet and functions normally.  

There is no aortic valve regurgitation.  

The tricuspid valve is normal in appearance and function.  

There is no significant tricuspid valve regurgitation.  

Pulmonary artery pressure is not obtained due to inadequate TR jet.  

Pulmonary valve not well visualized.  

There is no pulmonic regurgitation seen.  

Normal size aortic root measuring 3.1 cm.  

Trivial anterior pericardial effusion.  

Given the interatrial echodensity that is noted - cardiology is

recommending a cardiac MRI for better

assessment/visualization of the atrial septum. 



Measurements: 

Chambers                   Valvular Assessment AV/MV          Valvular

Assessment TV/PV



Normal                                  Normal

Normal

Name         Value    Range             Name        Value Range

Name          Value Range



Patient: LONA CALLES                         MRN: [id number]

Study Date: 2019   Page 1 of 2

10:48 AM 









Ao Chaparrita (MM): 3.1 cm (2.2 cm-3.7 AV Vmax: 1.02 m/s (1 m/s-1.7 PV Vmax:

1.03 m/s (0.6 m/s-0.9

cm)                                 m/s)

m/s)



IVSd (2D):   0.7 cm (0.6 cm-1.1             AV maxP mmHg ( - )

PV PGmax:     4  mmHg ( - )

cm)               LVOT Vmax:  1.17 m/s (0.7 m/s-1.1 



LVDd (2D):   3.5 cm   (3.9 cm-5.3                             m/s)  

cm)               LI (Vmax): 2.9 cm2 ( - )  



LVDs (2D):   2.4 cm   (2.1 cm-4             MV E Vmax:  0.59 m/s ( - )



cm)               MV A Vmax:  0.51 m/s ( - )  



LVPWd (2D):  0.8 cm   ( - )             MV E/A:     1.16  ( - )  

LVOTd        1.8 cm   1.8 cm mm 

LVEF (BP):   67 %     (>=55 %)   

RVDd(2D):    3.4 cm   (1.9 cm-3.8 



cmmm)  



Continued Measurements: 

Chambers                       Valvular Assessment AV/MV  



Name                        Value       Name

Value

LADs:                    2.4 cm             MV DecTime:

377 m/s

LADs Lon.5 cm             MV E/E' Septal:

9.40

LA Area:                 15.6 cm2       MV E/E' Lateral:        4.30



LA Volume:               31 ml   

LA Volume Index:         19.7 ml/m2   

TAPSE:                   2.6 cm    

RA Area:                 14.3 cm2   



Findings:             Left Ventricle: 

Normal size left ventricle. No LV hypertrophy. Normal global systolic

LV function. EF is 67 %. No

regional wall motion abnormality. Normal diastolic LV function.  

Right Ventricle: 

Normal size right ventricle. Normal RV function.  

Left Atrium: 

The left atrium is normal in size.  

Right Atrium: 

The right atrium is normal in size. Lipomatous interatrial septum

versus small RA mass. Most likely

lipomatous IAS..  

Mitral Valve: 

The mitral valve is normal in appearance and function. There is no

mitral valve regurgitation. No

mitral stenosis is present.  

Aortic Valve: 

The aortic valve is tri-leaflet and functions normally. There is no

aortic valve regurgitation. No aortic

valve stenosis is present.  

Tricuspid Valve: 

The tricuspid valve is normal in appearance and function. There is no

significant tricuspid valve

regurgitation. Pulmonary artery pressure is not obtained due to

inadequate TR jet.

Pulmonic Valve: 

Pulmonary valve not well visualized. There is no pulmonic

regurgitation seen.

Aorta: 

The aorta is normal. Normal size aortic root measuring 3.1 cm.  

IVC: 

The IVC is dilated. There is greater kimmy 50% respiratory excursion.  

Pericardium: 

Trivial anterior pericardial effusion. 







Electronically signed by Sreekanth Wheeler MD on 2019 at 02:03 PM 

(No Signature Object) 



Patient: LONA CALLES                         MRN: [id number]

Study Date: 2019   Page 2 of 2

10:48 AM 







[id number] and does not use alcohol.    Review of systems.  Pertinent Findings are included in the History of Present Illness    Physical Exam    BP (!) 90/37 (BP Location: Right arm, Patient Position: Sitting, Cuff Size: Adult Large)   Pulse 66   Temp 98.7  F (37.1  C) (Tympanic)   Resp 12   SpO2 91%      GENERAL APPEARANCE: Chronically ill-appearing man in no apparent distress.  He is presently using a wheelchair.  HEAD: Atraumatic; normocephalic; without lesions.  EYES: Conjunctiva pale, corneas and eyelids normal; pupils equal, round, reactive to light; No Icterus.  MOUTH/OROPHARYNX: Oral mucosa appears dry.  NECK: Supple with no nodes.  LUNGS:  Clear to auscultation and percussion with no extra sounds.  HEART: Regular rhythm and rate; S1 and S2 normal; no murmurs noted.  ABDOMEN: Soft; no masses or tenderness, no hepatosplenomegaly.  NEUROLOGIC: Alert and oriented.  No obvious focal findings.  EXTREMITIES: No cyanosis, or edema.  SKIN: No abnormal bruising or bleeding. No suspicious lesions noted on exposed skin.  PSYCHIATRIC: Mental status normal; no apparent psychiatric issues    Medications:    Current Outpatient Medications   Medication Sig Dispense Refill    blood glucose (NO BRAND SPECIFIED) lancets standard Use to test blood sugar 1 times daily. 200 each 3    blood glucose (NO BRAND SPECIFIED) test strip Use to test blood sugar 1 times daily 200 strip 3    blood glucose monitoring (NO BRAND SPECIFIED) meter device kit Use to test blood sugar 1 times daily or as directed. 1 kit 0    carvedilol (COREG) 6.25 MG tablet TAKE 0.5 TABLETS (3.125 MG) BY MOUTH 2 TIMES DAILY (WITH MEALS) 90 tablet 1    glipiZIDE (GLUCOTROL) 5 MG tablet Take 2.5 mg by mouth 2 times daily.      levothyroxine (SYNTHROID/LEVOTHROID) 175 MCG tablet TAKE 1 TABLET BY MOUTH EVERY DAY 90 tablet 2    metFORMIN (GLUCOPHAGE) 500 MG tablet Take 1 tablet (500 mg) by mouth 2 times daily (with meals).      thin (NO BRAND SPECIFIED) lancets Use with  lanceting device to check glucose one time daily. To accompany: Blood Glucose Monitor Brands: per insurance. 200 each 3    aspirin (ASA) 81 MG EC tablet Take 1 tablet (81 mg) by mouth daily (Patient not taking: Reported on 11/4/2024)      ezetimibe (ZETIA) 10 MG tablet Take 1 tablet (10 mg) by mouth daily (Patient not taking: Reported on 11/4/2024) 90 tablet 3    hydrOXYzine HCl (ATARAX) 25 MG tablet Take 1 tablet (25 mg) by mouth nightly as needed for anxiety (sleep). (Patient not taking: Reported on 11/4/2024) 30 tablet 0    Magnesium Oxide 250 MG tablet TAKE 1 TABLET BY MOUTH ONCE DAILY (Patient not taking: Reported on 11/4/2024) 90 tablet 0    omeprazole (PRILOSEC) 20 MG DR capsule Take 1 capsule (20 mg) by mouth daily. (Patient not taking: Reported on 11/4/2024) 30 capsule 0     No current facility-administered medications for this visit.           Past History    Past Medical History:   Diagnosis Date    Accelerated hypertension 08/07/2023    Acute congestive heart failure, unspecified heart failure type (H) 08/07/2023    Diabetes mellitus, type 2 (H)     Fracture of right talus 03/09/2017    Hypertension     Prostate cancer (H)     S/p TAVR (transcatheter aortic valve replacement), bioprosthetic     Thyroid cancer (H)     Thyroid disease      Past Surgical History:   Procedure Laterality Date    CLOSED REDUCTION, PERCUTANEOUS PINNING LOWER EXTREMITY, COMBINED Right 3/9/2017    Procedure: COMBINED CLOSED REDUCTION, PERCUTANEOUS PINNING LOWER EXTREMITY;  Surgeon: Ankit Coy DPM;  Location: [facility] OR    CV CORONARY ANGIOGRAM N/A 2/9/2023    Procedure: Coronary Angiogram;  Surgeon: Nabeel Davies MD;  Location: [facility] LAB CV    CV TRANSCATHETER AORTIC VALVE REPLACEMENT-FEMORAL APPROACH N/A 4/4/2023    Procedure: Transcatheter Aortic Valve Replacement-Femoral Approach;  Surgeon: Nabeel Davies MD;  Location: [facility] LAB CV    OR TRANSCATHETER AORTIC VALVE REPLACEMENT, FEMORAL  PERCUTANEOUS APPROACH (STANDBY) N/A 4/4/2023    Procedure: OR TRANSCATHETER AORTIC VALVE REPLACEMENT, FEMORAL PERCUTANEOUS APPROACH (STANDBY);  Surgeon: Jeramie De Jesus MD;  Location: [facility] LAB CV    THYROIDECTOMY       Allergies   Allergen Reactions    Nka [No Known Allergies]      Family History   Problem Relation Age of Onset    Heart Failure Mother     Heart Failure Father      Social History     Socioeconomic History    Marital status: Single     Spouse name: None    Number of children: None    Years of education: None    Highest education level: None   Tobacco Use    Smoking status: Former    Smokeless tobacco: Never   Vaping Use    Vaping status: Never Used   Substance and Sexual Activity    Alcohol use: Not Currently    Drug use: Never     Social Drivers of Health     Financial Resource Strain: Low Risk  (11/2/2024)    Financial Resource Strain     Within the past 12 months, have you or your family members you live with been unable to get utilities (heat, electricity) when it was really needed?: No   Food Insecurity: Low Risk  (11/2/2024)    Food Insecurity     Within the past 12 months, did you worry that your food would run out before you got money to buy more?: No     Within the past 12 months, did the food you bought just not last and you didn t have money to get more?: No   Transportation Needs: Low Risk  (11/2/2024)    Transportation Needs     Within the past 12 months, has lack of transportation kept you from medical appointments, getting your medicines, non-medical meetings or appointments, work, or from getting things that you need?: No   Interpersonal Safety: Low Risk  (11/2/2024)    Interpersonal Safety     Do you feel physically and emotionally safe where you currently live?: Yes     Within the past 12 months, have you been hit, slapped, kicked or otherwise physically hurt by someone?: No     Within the past 12 months, have you been humiliated or emotionally abused in other  ways by your partner or ex-partner?: No   Housing Stability: Low Risk  (11/2/2024)    Housing Stability     Do you have housing? : Yes     Are you worried about losing your housing?: No   Recent Concern: Housing Stability - High Risk (11/2/2024)    Housing Stability     Do you have housing? : No     Are you worried about losing your housing?: No           Lab Results    Recent Results (from the past 240 hours)   Comprehensive metabolic panel    Collection Time: 10/29/24 12:07 PM   Result Value Ref Range    Sodium 135 135 - 145 mmol/L    Potassium 5.1 3.4 - 5.3 mmol/L    Carbon Dioxide (CO2) 24 22 - 29 mmol/L    Anion Gap 12 7 - 15 mmol/L    Urea Nitrogen 57.9 (H) 8.0 - 23.0 mg/dL    Creatinine 2.60 (H) 0.67 - 1.17 mg/dL    GFR Estimate 24 (L) >60 mL/min/1.73m2    Calcium 8.6 (L) 8.8 - 10.4 mg/dL    Chloride 99 98 - 107 mmol/L    Glucose 142 (H) 70 - 99 mg/dL    Alkaline Phosphatase 524 (H) 40 - 150 U/L     (H) 0 - 45 U/L     (H) 0 - 70 U/L    Protein Total 6.6 6.4 - 8.3 g/dL    Albumin 3.0 (L) 3.5 - 5.2 g/dL    Bilirubin Total 1.1 <=1.2 mg/dL   Lipase    Collection Time: 10/29/24 12:07 PM   Result Value Ref Range    Lipase 30 13 - 60 U/L   CBC with platelets and differential    Collection Time: 10/29/24 12:07 PM   Result Value Ref Range    WBC Count 10.4 4.0 - 11.0 10e3/uL    RBC Count 3.60 (L) 4.40 - 5.90 10e6/uL    Hemoglobin 11.1 (L) 13.3 - 17.7 g/dL    Hematocrit 33.7 (L) 40.0 - 53.0 %    MCV 94 78 - 100 fL    MCH 30.8 26.5 - 33.0 pg    MCHC 32.9 31.5 - 36.5 g/dL    RDW 13.2 10.0 - 15.0 %    Platelet Count 277 150 - 450 10e3/uL    % Neutrophils 81 %    % Lymphocytes 6 %    % Monocytes 10 %    % Eosinophils 1 %    % Basophils 1 %    % Immature Granulocytes 1 %    NRBCs per 100 WBC 0 <1 /100    Absolute Neutrophils 8.4 (H) 1.6 - 8.3 10e3/uL    Absolute Lymphocytes 0.6 (L) 0.8 - 5.3 10e3/uL    Absolute Monocytes 1.1 0.0 - 1.3 10e3/uL    Absolute Eosinophils 0.1 0.0 - 0.7 10e3/uL    Absolute Basophils  0.1 0.0 - 0.2 10e3/uL    Absolute Immature Granulocytes 0.1 <=0.4 10e3/uL    Absolute NRBCs 0.0 10e3/uL   NT pro BNP    Collection Time: 10/29/24 12:07 PM   Result Value Ref Range    N terminal Pro BNP Inpatient 2,783 (H) 0 - 1,800 pg/mL   Extra Blue Top Tube    Collection Time: 10/29/24 12:08 PM   Result Value Ref Range    Hold Specimen JIC    UA with Microscopic reflex to Culture    Collection Time: 10/29/24  3:18 PM    Specimen: Urine, Clean Catch   Result Value Ref Range    Color Urine Yellow Colorless, Straw, Light Yellow, Yellow    Appearance Urine Slightly Cloudy (A) Clear    Glucose Urine Negative Negative mg/dL    Bilirubin Urine Negative Negative    Ketones Urine Negative Negative mg/dL    Specific Gravity Urine 1.014 1.003 - 1.035    Blood Urine Negative Negative    pH Urine 5.0 5.0 - 7.0    Protein Albumin Urine Negative Negative mg/dL    Urobilinogen Urine Normal Normal, 2.0 mg/dL    Nitrite Urine Negative Negative    Leukocyte Esterase Urine Moderate (A) Negative    Bacteria Urine Few (A) None Seen /HPF    Mucus Urine Present (A) None Seen /LPF    RBC Urine 1 <=2 /HPF    WBC Urine 9 (H) <=5 /HPF    Squamous Epithelials Urine <1 <=1 /HPF    Hyaline Casts Urine 1 <=2 /LPF   Urine Culture    Collection Time: 10/29/24  3:18 PM    Specimen: Urine, Clean Catch   Result Value Ref Range    Culture <10,000 CFU/mL Urogenital sushil    Basic metabolic panel    Collection Time: 10/30/24  6:06 AM   Result Value Ref Range    Sodium 136 135 - 145 mmol/L    Potassium 4.8 3.4 - 5.3 mmol/L    Chloride 101 98 - 107 mmol/L    Carbon Dioxide (CO2) 23 22 - 29 mmol/L    Anion Gap 12 7 - 15 mmol/L    Urea Nitrogen 47.4 (H) 8.0 - 23.0 mg/dL    Creatinine 2.01 (H) 0.67 - 1.17 mg/dL    GFR Estimate 32 (L) >60 mL/min/1.73m2    Calcium 8.2 (L) 8.8 - 10.4 mg/dL    Glucose 122 (H) 70 - 99 mg/dL   Hepatic panel    Collection Time: 10/30/24  6:06 AM   Result Value Ref Range    Protein Total 6.2 (L) 6.4 - 8.3 g/dL    Albumin 2.9 (L) 3.5  - 5.2 g/dL    Bilirubin Total 1.2 <=1.2 mg/dL    Alkaline Phosphatase 513 (H) 40 - 150 U/L     (H) 0 - 45 U/L     (H) 0 - 70 U/L    Bilirubin Direct 0.62 (H) 0.00 - 0.30 mg/dL   CBC with platelets and differential    Collection Time: 10/30/24  6:06 AM   Result Value Ref Range    WBC Count 9.3 4.0 - 11.0 10e3/uL    RBC Count 3.35 (L) 4.40 - 5.90 10e6/uL    Hemoglobin 10.4 (L) 13.3 - 17.7 g/dL    Hematocrit 31.5 (L) 40.0 - 53.0 %    MCV 94 78 - 100 fL    MCH 31.0 26.5 - 33.0 pg    MCHC 33.0 31.5 - 36.5 g/dL    RDW 13.0 10.0 - 15.0 %    Platelet Count 258 150 - 450 10e3/uL    % Neutrophils 79 %    % Lymphocytes 7 %    % Monocytes 10 %    % Eosinophils 2 %    % Basophils 1 %    % Immature Granulocytes 1 %    NRBCs per 100 WBC 0 <1 /100    Absolute Neutrophils 7.4 1.6 - 8.3 10e3/uL    Absolute Lymphocytes 0.6 (L) 0.8 - 5.3 10e3/uL    Absolute Monocytes 1.0 0.0 - 1.3 10e3/uL    Absolute Eosinophils 0.2 0.0 - 0.7 10e3/uL    Absolute Basophils 0.1 0.0 - 0.2 10e3/uL    Absolute Immature Granulocytes 0.1 <=0.4 10e3/uL    Absolute NRBCs 0.0 10e3/uL   Hemoglobin A1c    Collection Time: 10/30/24  6:06 AM   Result Value Ref Range    Estimated Average Glucose 126 (H) <117 mg/dL    Hemoglobin A1C 6.0 (H) <5.7 %   CBC with platelets    Collection Time: 10/31/24  5:15 AM   Result Value Ref Range    WBC Count 15.3 (H) 4.0 - 11.0 10e3/uL    RBC Count 3.49 (L) 4.40 - 5.90 10e6/uL    Hemoglobin 10.7 (L) 13.3 - 17.7 g/dL    Hematocrit 32.7 (L) 40.0 - 53.0 %    MCV 94 78 - 100 fL    MCH 30.7 26.5 - 33.0 pg    MCHC 32.7 31.5 - 36.5 g/dL    RDW 13.2 10.0 - 15.0 %    Platelet Count 264 150 - 450 10e3/uL   Comprehensive metabolic panel    Collection Time: 10/31/24  5:15 AM   Result Value Ref Range    Sodium 131 (L) 135 - 145 mmol/L    Potassium 5.7 (H) 3.4 - 5.3 mmol/L    Carbon Dioxide (CO2) 18 (L) 22 - 29 mmol/L    Anion Gap 14 7 - 15 mmol/L    Urea Nitrogen 48.1 (H) 8.0 - 23.0 mg/dL    Creatinine 2.11 (H) 0.67 - 1.17 mg/dL     GFR Estimate 30 (L) >60 mL/min/1.73m2    Calcium 8.1 (L) 8.8 - 10.4 mg/dL    Chloride 99 98 - 107 mmol/L    Glucose 233 (H) 70 - 99 mg/dL    Alkaline Phosphatase 610 (H) 40 - 150 U/L     (H) 0 - 45 U/L     (H) 0 - 70 U/L    Protein Total 6.1 (L) 6.4 - 8.3 g/dL    Albumin 2.8 (L) 3.5 - 5.2 g/dL    Bilirubin Total 1.6 (H) <=1.2 mg/dL   Vitamin B12    Collection Time: 10/31/24  5:15 AM   Result Value Ref Range    Vitamin B12 1,291 (H) 232 - 1,245 pg/mL   Lactate Dehydrogenase    Collection Time: 10/31/24  5:15 AM   Result Value Ref Range    Lactate Dehydrogenase >2,500 (H) 0 - 250 U/L   Reticulocyte count    Collection Time: 10/31/24  5:15 AM   Result Value Ref Range    % Reticulocyte 1.2 0.5 - 2.0 %    Absolute Reticulocyte 0.041 0.025 - 0.095 10e6/uL   Lactic Acid Whole Blood w/ 1x repeat in 2 hrs when >2    Collection Time: 10/31/24  6:48 AM   Result Value Ref Range    Lactic Acid, Initial 1.9 0.7 - 2.0 mmol/L   Ferritin    Collection Time: 10/31/24  6:48 AM   Result Value Ref Range    Ferritin 5,601 (H) 31 - 409 ng/mL   Iron and iron binding capacity    Collection Time: 10/31/24  6:48 AM   Result Value Ref Range    Iron 25 (L) 61 - 157 ug/dL    Iron Binding Capacity 153 (L) 240 - 430 ug/dL    Iron Sat Index 16 15 - 46 %   Blood Culture Arm, Left    Collection Time: 10/31/24  7:40 AM    Specimen: Arm, Left; Blood   Result Value Ref Range    Culture No growth after 3 days    Blood gas venous    Collection Time: 10/31/24  7:40 AM   Result Value Ref Range    pH Venous 7.40 7.32 - 7.43    pCO2 Venous 37 (L) 40 - 50 mm Hg    pO2 Venous 63 (H) 25 - 47 mm Hg    Bicarbonate Venous 23 21 - 28 mmol/L    Base Excess/Deficit Venous -1.4 -3.0 - 3.0 mmol/L    FIO2 28     Oxyhemoglobin Venous 90 (H) 70 - 75 %    O2 Sat, Venous 91.6 (H) 70.0 - 75.0 %   Cortisol    Collection Time: 10/31/24  7:40 AM   Result Value Ref Range    Cortisol 26.1   ug/dL   Potassium    Collection Time: 10/31/24  9:25 AM   Result Value Ref  Range    Potassium 5.5 (H) 3.4 - 5.3 mmol/L   Bilirubin Direct and Total    Collection Time: 10/31/24  9:25 AM   Result Value Ref Range    Bilirubin Direct 1.07 (H) 0.00 - 0.30 mg/dL    Bilirubin Total 1.6 (H) <=1.2 mg/dL   Acute hepatitis panel    Collection Time: 10/31/24  9:25 AM   Result Value Ref Range    Hepatitis A Antibody IgM Nonreactive Nonreactive    Hepatitis B Core Antibody IgM Nonreactive Nonreactive    Hepatitis C Antibody Nonreactive Nonreactive    Hepatitis B Surface Antigen Nonreactive Nonreactive   Uric acid    Collection Time: 10/31/24  9:25 AM   Result Value Ref Range    Uric Acid 7.9 (H) 3.4 - 7.0 mg/dL   Glucose by meter    Collection Time: 10/31/24  9:34 AM   Result Value Ref Range    GLUCOSE BY METER POCT 250 (H) 70 - 99 mg/dL   Glucose by meter    Collection Time: 10/31/24 11:41 AM   Result Value Ref Range    GLUCOSE BY METER POCT 205 (H) 70 - 99 mg/dL   Potassium    Collection Time: 10/31/24  3:06 PM   Result Value Ref Range    Potassium 5.5 (H) 3.4 - 5.3 mmol/L   Glucose by meter    Collection Time: 10/31/24  4:27 PM   Result Value Ref Range    GLUCOSE BY METER POCT 230 (H) 70 - 99 mg/dL   Potassium    Collection Time: 10/31/24  6:02 PM   Result Value Ref Range    Potassium 5.6 (H) 3.4 - 5.3 mmol/L   Glucose by meter    Collection Time: 10/31/24  9:14 PM   Result Value Ref Range    GLUCOSE BY METER POCT 189 (H) 70 - 99 mg/dL   Lactic Acid Whole Blood w/ 1x repeat in 2 hrs when >2    Collection Time: 10/31/24 11:40 PM   Result Value Ref Range    Lactic Acid, Initial 3.0 (H) 0.7 - 2.0 mmol/L   CBC with platelets    Collection Time: 11/01/24  2:05 AM   Result Value Ref Range    WBC Count 14.7 (H) 4.0 - 11.0 10e3/uL    RBC Count 3.13 (L) 4.40 - 5.90 10e6/uL    Hemoglobin 9.7 (L) 13.3 - 17.7 g/dL    Hematocrit 29.2 (L) 40.0 - 53.0 %    MCV 93 78 - 100 fL    MCH 31.0 26.5 - 33.0 pg    MCHC 33.2 31.5 - 36.5 g/dL    RDW 13.2 10.0 - 15.0 %    Platelet Count 224 150 - 450 10e3/uL   Comprehensive  metabolic panel    Collection Time: 11/01/24  2:05 AM   Result Value Ref Range    Sodium 130 (L) 135 - 145 mmol/L    Potassium 5.0 3.4 - 5.3 mmol/L    Carbon Dioxide (CO2) 21 (L) 22 - 29 mmol/L    Anion Gap 13 7 - 15 mmol/L    Urea Nitrogen 49.6 (H) 8.0 - 23.0 mg/dL    Creatinine 2.30 (H) 0.67 - 1.17 mg/dL    GFR Estimate 27 (L) >60 mL/min/1.73m2    Calcium 7.9 (L) 8.8 - 10.4 mg/dL    Chloride 96 (L) 98 - 107 mmol/L    Glucose 215 (H) 70 - 99 mg/dL    Alkaline Phosphatase 497 (H) 40 - 150 U/L     (H) 0 - 45 U/L     (H) 0 - 70 U/L    Protein Total 5.7 (L) 6.4 - 8.3 g/dL    Albumin 2.7 (L) 3.5 - 5.2 g/dL    Bilirubin Total 1.5 (H) <=1.2 mg/dL   INR    Collection Time: 11/01/24  2:05 AM   Result Value Ref Range    INR 1.40 (H) 0.85 - 1.15   Lactic acid whole blood    Collection Time: 11/01/24  2:05 AM   Result Value Ref Range    Lactic Acid 2.5 (H) 0.7 - 2.0 mmol/L   Glucose by meter    Collection Time: 11/01/24  2:18 AM   Result Value Ref Range    GLUCOSE BY METER POCT 219 (H) 70 - 99 mg/dL   Lactic Acid Whole Blood w/ 1x repeat in 2 hrs when >2    Collection Time: 11/01/24  3:56 AM   Result Value Ref Range    Lactic Acid, Initial 2.0 0.7 - 2.0 mmol/L   Extra Purple Top EDTA (LAB USE ONLY)    Collection Time: 11/01/24  3:56 AM   Result Value Ref Range    Hold Specimen     Glucose by meter    Collection Time: 11/01/24  7:38 AM   Result Value Ref Range    GLUCOSE BY METER POCT 176 (H) 70 - 99 mg/dL   Glucose by meter    Collection Time: 11/01/24 11:10 AM   Result Value Ref Range    GLUCOSE BY METER POCT 190 (H) 70 - 99 mg/dL   Haptoglobin    Collection Time: 11/01/24  1:05 PM   Result Value Ref Range    Haptoglobin 169 30 - 200 mg/dL   CBC with platelets and differential    Collection Time: 11/01/24  1:05 PM   Result Value Ref Range    WBC Count 14.4 (H) 4.0 - 11.0 10e3/uL    RBC Count 3.06 (L) 4.40 - 5.90 10e6/uL    Hemoglobin 9.6 (L) 13.3 - 17.7 g/dL    Hematocrit 28.3 (L) 40.0 - 53.0 %    MCV 93 78 -  100 fL    MCH 31.4 26.5 - 33.0 pg    MCHC 33.9 31.5 - 36.5 g/dL    RDW 13.3 10.0 - 15.0 %    Platelet Count 220 150 - 450 10e3/uL    % Neutrophils 87 %    % Lymphocytes 4 %    % Monocytes 8 %    % Eosinophils 0 %    % Basophils 0 %    % Immature Granulocytes 1 %    NRBCs per 100 WBC 0 <1 /100    Absolute Neutrophils 12.5 (H) 1.6 - 8.3 10e3/uL    Absolute Lymphocytes 0.5 (L) 0.8 - 5.3 10e3/uL    Absolute Monocytes 1.1 0.0 - 1.3 10e3/uL    Absolute Eosinophils 0.0 0.0 - 0.7 10e3/uL    Absolute Basophils 0.1 0.0 - 0.2 10e3/uL    Absolute Immature Granulocytes 0.1 <=0.4 10e3/uL    Absolute NRBCs 0.0 10e3/uL   Reticulocyte count    Collection Time: 11/01/24  1:05 PM   Result Value Ref Range    % Reticulocyte 1.2 0.5 - 2.0 %    Absolute Reticulocyte 0.036 0.025 - 0.095 10e6/uL   Extra Purple Top Tube    Collection Time: 11/01/24  1:05 PM   Result Value Ref Range    Hold Specimen JIC    Glucose by meter    Collection Time: 11/01/24  4:37 PM   Result Value Ref Range    GLUCOSE BY METER POCT 182 (H) 70 - 99 mg/dL   Fractional Excretion of Sodium    Collection Time: 11/01/24  9:44 PM   Result Value Ref Range    Creatinine Urine mg/dL 154.8 mg/dL    Sodium Urine mmol/L 20 mmol/L    %FENA 0.2 %   Glucose by meter    Collection Time: 11/01/24 10:29 PM   Result Value Ref Range    GLUCOSE BY METER POCT 229 (H) 70 - 99 mg/dL   Sodium    Collection Time: 11/02/24  1:17 AM   Result Value Ref Range    Sodium 129 (L) 135 - 145 mmol/L   Creatinine, serum    Collection Time: 11/02/24  1:17 AM   Result Value Ref Range    Creatinine 2.21 (H) 0.67 - 1.17 mg/dL   Lactic Acid Whole Blood w/ 1x repeat in 2 hrs when >2    Collection Time: 11/02/24  1:17 AM   Result Value Ref Range    Lactic Acid, Initial 2.3 (H) 0.7 - 2.0 mmol/L   Glucose by meter    Collection Time: 11/02/24  2:16 AM   Result Value Ref Range    GLUCOSE BY METER POCT 206 (H) 70 - 99 mg/dL   Comprehensive metabolic panel    Collection Time: 11/02/24  3:15 AM   Result Value Ref  Range    Sodium 128 (L) 135 - 145 mmol/L    Potassium 4.3 3.4 - 5.3 mmol/L    Carbon Dioxide (CO2) 21 (L) 22 - 29 mmol/L    Anion Gap 12 7 - 15 mmol/L    Urea Nitrogen 55.4 (H) 8.0 - 23.0 mg/dL    Creatinine 2.26 (H) 0.67 - 1.17 mg/dL    GFR Estimate 28 (L) >60 mL/min/1.73m2    Calcium 7.7 (L) 8.8 - 10.4 mg/dL    Chloride 95 (L) 98 - 107 mmol/L    Glucose 189 (H) 70 - 99 mg/dL    Alkaline Phosphatase 447 (H) 40 - 150 U/L     (H) 0 - 45 U/L     (H) 0 - 70 U/L    Protein Total 5.4 (L) 6.4 - 8.3 g/dL    Albumin 2.5 (L) 3.5 - 5.2 g/dL    Bilirubin Total 1.9 (H) <=1.2 mg/dL   Magnesium    Collection Time: 11/02/24  3:15 AM   Result Value Ref Range    Magnesium 1.4 (L) 1.7 - 2.3 mg/dL   Phosphorus    Collection Time: 11/02/24  3:15 AM   Result Value Ref Range    Phosphorus 3.6 2.5 - 4.5 mg/dL   CBC with platelets    Collection Time: 11/02/24  3:15 AM   Result Value Ref Range    WBC Count 13.4 (H) 4.0 - 11.0 10e3/uL    RBC Count 2.97 (L) 4.40 - 5.90 10e6/uL    Hemoglobin 9.2 (L) 13.3 - 17.7 g/dL    Hematocrit 27.2 (L) 40.0 - 53.0 %    MCV 92 78 - 100 fL    MCH 31.0 26.5 - 33.0 pg    MCHC 33.8 31.5 - 36.5 g/dL    RDW 13.3 10.0 - 15.0 %    Platelet Count 229 150 - 450 10e3/uL   Lactic acid whole blood    Collection Time: 11/02/24  3:15 AM   Result Value Ref Range    Lactic Acid 2.3 (H) 0.7 - 2.0 mmol/L   Osmolality    Collection Time: 11/02/24  3:15 AM   Result Value Ref Range    Osmolality Blood 294 280 - 301 mmol/kg   Glucose by meter    Collection Time: 11/02/24  7:58 AM   Result Value Ref Range    GLUCOSE BY METER POCT 178 (H) 70 - 99 mg/dL   Sodium random urine    Collection Time: 11/02/24 11:07 AM   Result Value Ref Range    Sodium Urine mmol/L 20 mmol/L   Osmolality urine    Collection Time: 11/02/24 11:07 AM   Result Value Ref Range    Osmolality Urine 407 100 - 1,200 mmol/kg   Glucose by meter    Collection Time: 11/02/24 11:34 AM   Result Value Ref Range    GLUCOSE BY METER POCT 188 (H) 70 - 99 mg/dL    Lactic Acid Whole Blood w/ 1x repeat in 2 hrs when >2    Collection Time: 11/02/24 11:44 AM   Result Value Ref Range    Lactic Acid, Initial 2.6 (H) 0.7 - 2.0 mmol/L   Magnesium    Collection Time: 11/02/24  1:40 PM   Result Value Ref Range    Magnesium 1.8 1.7 - 2.3 mg/dL   Lactic acid whole blood    Collection Time: 11/02/24  1:40 PM   Result Value Ref Range    Lactic Acid 2.5 (H) 0.7 - 2.0 mmol/L   Glucose by meter    Collection Time: 11/02/24  5:02 PM   Result Value Ref Range    GLUCOSE BY METER POCT 176 (H) 70 - 99 mg/dL   Glucose by meter    Collection Time: 11/02/24  9:57 PM   Result Value Ref Range    GLUCOSE BY METER POCT 230 (H) 70 - 99 mg/dL   Glucose by meter    Collection Time: 11/03/24  1:23 AM   Result Value Ref Range    GLUCOSE BY METER POCT 220 (H) 70 - 99 mg/dL   Comprehensive metabolic panel    Collection Time: 11/03/24  5:57 AM   Result Value Ref Range    Sodium 131 (L) 135 - 145 mmol/L    Potassium 4.3 3.4 - 5.3 mmol/L    Carbon Dioxide (CO2) 22 22 - 29 mmol/L    Anion Gap 11 7 - 15 mmol/L    Urea Nitrogen 61.1 (H) 8.0 - 23.0 mg/dL    Creatinine 2.12 (H) 0.67 - 1.17 mg/dL    GFR Estimate 30 (L) >60 mL/min/1.73m2    Calcium 7.7 (L) 8.8 - 10.4 mg/dL    Chloride 98 98 - 107 mmol/L    Glucose 178 (H) 70 - 99 mg/dL    Alkaline Phosphatase 464 (H) 40 - 150 U/L     (H) 0 - 45 U/L     (H) 0 - 70 U/L    Protein Total 5.3 (L) 6.4 - 8.3 g/dL    Albumin 2.4 (L) 3.5 - 5.2 g/dL    Bilirubin Total 2.2 (H) <=1.2 mg/dL   CBC with platelets    Collection Time: 11/03/24  5:57 AM   Result Value Ref Range    WBC Count 12.3 (H) 4.0 - 11.0 10e3/uL    RBC Count 2.96 (L) 4.40 - 5.90 10e6/uL    Hemoglobin 9.2 (L) 13.3 - 17.7 g/dL    Hematocrit 27.2 (L) 40.0 - 53.0 %    MCV 92 78 - 100 fL    MCH 31.1 26.5 - 33.0 pg    MCHC 33.8 31.5 - 36.5 g/dL    RDW 13.6 10.0 - 15.0 %    Platelet Count 251 150 - 450 10e3/uL   Magnesium    Collection Time: 11/03/24  5:57 AM   Result Value Ref Range    Magnesium 1.8 1.7 -  2.3 mg/dL   Glucose by meter    Collection Time: 11/03/24  8:19 AM   Result Value Ref Range    GLUCOSE BY METER POCT 190 (H) 70 - 99 mg/dL   Glucose by meter    Collection Time: 11/03/24 12:28 PM   Result Value Ref Range    GLUCOSE BY METER POCT 210 (H) 70 - 99 mg/dL       Imaging Results    CT Chest/Abdomen/Pelvis w Contrast    Result Date: 10/31/2024  CT CHEST/ABDOMEN/PELVIS W CONTRAST 10/31/2024 1:21 PM CLINICAL HISTORY: Obstruction vs ileus concern for malignancy. TECHNIQUE: CT scan of the chest, abdomen, and pelvis was performed following injection of IV contrast. Multiplanar reformats were obtained. Dose reduction techniques were used. CONTRAST: 117mL Isovue-370 COMPARISON: 10/31/2024, 10/21/2024, 3/2/2023. FINDINGS: LUNGS AND PLEURA: Multiple scattered bilateral noncalcified pulmonary nodules are present. Largest noncalcified pulmonary nodule is seen in the anterior left upper lobe measuring 8 x 7 mm (3-160). A couple benign densely calcified pulmonary granulomas are also noted. Mild bibasilar atelectasis. No airspace consolidation or significant pleural fluid. Large airways are patent. MEDIASTINUM/AXILLAE: Fluid is seen throughout the esophagus. There is eccentric somewhat masslike appearing distal esophageal wall thickening, greatest along the right lateral aspect. Heart size is mildly enlarged. No pericardial fluid. Prior aortic valve repair. Moderate to severe coronary artery atherosclerosis. Thoracic aorta is normal in course and caliber. Mild to moderate thoracic aortic atherosclerosis. No enlarged thoracic lymph nodes by CT size criteria. A couple prominent retroperitoneal lymph nodes are noted. For example, a distal right paraesophageal lymph node measures 10 mm (7-109). HEPATOBILIARY: There are innumerable low-density hepatic lesions throughout the liver parenchyma with multiple large confluent low density hepatic masses. When compared to the recent prior examination, there has been interval  progression of liver disease versus better visualization of liver disease. There is a similar-appearing edematous gallbladder wall thickening and/or pericholecystic fluid present. Several small calcified gallstones are seen in the gallbladder lumen. No bile duct dilation. Small volume perihepatic free fluid is present. There is note of mild nodular contour of the liver, which may be secondary to widespread liver metastases. Superimposed cirrhosis is difficult to entirely exclude. PANCREAS: Normal. SPLEEN: Normal. ADRENAL GLANDS: Normal. KIDNEYS/BLADDER: Normal. BOWEL: Colonic diverticulosis is present without evidence of diverticulitis. Large and small bowel loops are nonobstructed and noninflamed. No signs of acute appendicitis. PELVIC ORGANS: A few small metallic suspected fiducial markers are seen in the prostate gland. LYMPH NODES: There are numerous enlarged gastrohepatic, periportal, and retroperitoneal/para-aortic lymph nodes present. A few examples are as follows: -Periportal lymph node measuring 25 x 20 mm (7-161), previously 25 x 20 mm. -Cavoatrial lymph node measuring 33 x 20 mm (7-175), previously 34 x 18 mm. -Left periaortic lymph node measuring 34 x 21 mm (7-186), previously 30 x 21 mm. ADDITIONAL FINDINGS: At least moderate atherosclerosis of the abdominal aorta and iliac arteries. 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 As mention above, there is minimal perihepatic free fluid present. There is also note of mild fat stranding and edema in the mesentery as well as in the pelvis/presacral space. MUSCULOSKELETAL: Multilevel hypertrophic and degenerative changes of the spine. 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 No acute osseous abnormality. No aggressive appearing lytic or blastic osseous lesions. Mild body wall edema.     IMPRESSION: 1.  No evidence of bowel obstruction. 2.  Eccentric  masslike wall thickening in the distal esophagus. Consider further evaluation with endoscopy to exclude primary malignancy. 3.  Innumerable confluent low density hepatic lesions, consistent with metastatic disease. 4.  Multiple enlarged and pathologic appearing abdominal lymph nodes, consistent with metastatic disease. 5.  Moderate edematous gallbladder wall thickening and/or pericholecystic fluid, nonspecific but likely reactive to underlying liver disease. Acute cholecystitis is difficult to entirely exclude, but thought somewhat less likely. 6.  Several subcentimeter pulmonary nodules measuring up to 8 mm, possibly representing metastatic disease in light of additional findings. 7.  Minimal ascites. 8.  Additional nonacute findings as above. KIRSTY REINOSO MD   SYSTEM ID:  [id number]    Abdomen 2 - 3 View*    Result Date: 10/31/2024  XR ABDOMEN 2 VIEWS 10/31/2024 8:59 AM HISTORY: Increase distension, question obstruction. COMPARISON: 10/29/2024, 10/21/2024. FINDINGS: Prominent air-filled loops of small bowel are seen measuring up to just over 4 cm in diameter, which can be seen with bowel obstruction versus ileus. Consider further evaluation with contrast-enhanced CT of the abdomen and pelvis. No signs of pneumatosis or pneumoperitoneum. No pathologic calcifications or signs of organomegaly. Status post TAVR. Several tiny surgical clips are seen in the pelvis. Multilevel hypertrophic degenerative changes of the spine. No acute osseous abnormality. KIRSTY REINOSO MD   SYSTEM ID:  [id number]    US Abdomen Limited    Result Date: 10/29/2024  ULTRASOUND ABDOMEN LIMITED October 29, 2024 2:14 PM CLINICAL HISTORY: Cholestasis. TECHNIQUE: Limited abdominal ultrasound. COMPARISON: CT of the abdomen and pelvis 10/21/2024. FINDINGS: GALLBLADDER: Calcified gallstones are noted within the gallbladder. The gallbladder wall is diffusely thickened, possibly due to gallbladder contraction. There is a trace amount of  pericholecystic fluid. Negative sonographic Morales's sign. BILE DUCTS: There is no biliary dilatation. The common duct measures 3 mm. Portions of the common duct could not be visualized due to overlying bowel gas. LIVER: Heterogeneous nodularity throughout the liver is suspicious for metastatic disease. No evidence for hepatic steatosis. RIGHT KIDNEY: Unremarkable. No hydronephrosis. PANCREAS: The visualized portions of the pancreas are unremarkable. No ascites. Probable mildly enlarged periportal lymph node adjacent to the pancreatic head measures 2 x 2.5 x 1.6 cm.     IMPRESSION: 1.  Heterogeneous nodularity throughout the liver is suspicious for metastatic disease. 2.  There is cholelithiasis and a trace amount of pericholecystic fluid. Mild diffuse gallbladder wall thickening may be related to gallbladder contraction. 3.  Probable mildly enlarged periportal lymph node was better demonstrated on the prior CT scan. MARY BOLANOS MD   SYSTEM ID:  [id number]    Chest XR,  PA & LAT    Result Date: 10/29/2024  XR CHEST 2 VIEWS   10/29/2024 12:59 PM HISTORY: generalized weakness COMPARISON: Chest radiograph 8/7/2023.     IMPRESSION: Stable size of cardiac silhouette status post aortic valve replacement. No definite airspace consolidation, pleural effusion or pneumothorax. No acute bony abnormality. JANIE BRAUN MD   SYSTEM ID:  [id number]    CT Abdomen Pelvis w Contrast    Result Date: 10/21/2024  EXAM: CT ABDOMEN PELVIS W CONTRAST LOCATION: [facility] DATE: 10/21/2024 INDICATION: Diarrhea, decreased appetite and weight loss. COMPARISON: 1/3/2011. TECHNIQUE: CT scan of the abdomen and pelvis was performed following injection of IV contrast. Multiplanar reformats were obtained. Dose reduction techniques were used. CONTRAST: 120 mL Isovue 370. FINDINGS: LOWER CHEST: Few sub-6 mm pulmonary nodules are new from prior. For reference, there is a new 3 mm nodule in the subpleural right lower  lobe, series 3 image 53. Mild cardiomegaly and postprocedural changes of transcatheter aortic valve replacement. Atherosclerotic calcification of coronary arteries and visualized thoracic aorta. New, mild circumferential wall thickening of the distal esophagus, series 3 image 43, which could be attributable to an underlying esophagitis or primary esophageal malignancy. Adjacent subcentimeter periesophageal lymph node, measuring up to 9 mm in short axis, series 3 image 33. HEPATOBILIARY: Liver surface nodularity, compatible cirrhosis. Multiple low-attenuation hepatic lesions, highly suspicious for metastatic disease. For reference, a lesion within the anterior left hepatic lobe measures up to 3.0 cm, series 3 image 51. Cholelithiasis and diffuse gallbladder wall edema, likely secondary to underlying liver disease. No intrahepatic or intrahepatic biliary ductal dilatation. PANCREAS: Enhances normally. No peripancreatic inflammatory fat stranding. SPLEEN: Enhances normally. Normal size. ADRENAL GLANDS: Normal. KIDNEYS: Both kidneys enhance symmetrically, without hydronephrosis. No nephroureterolithiasis. Urinary bladder is unremarkable. PELVIC ORGANS: Mildly enlarged prostate gland, containing brachytherapy seeds. BOWEL: No evidence of acute gastrointestinal inflammation or obstruction. Colonic diverticulosis. Normal appendix. No convincing CT evidence of malignancy arising from the small or large intestines. No intraperitoneal free fluid or free air. LYMPH NODES: Multiple new and enlarged upper abdominal lymph nodes, suspicious for venessa metastatic disease. For reference, an enlarged gastrohepatic lymph node measures 19 mm in short axis, series 3 image 70. VASCULATURE: Focal ectasia of the infrarenal abdominal aorta, measuring up to 2.8 cm. Moderate to severe atheromatous disease. MUSCULOSKELETAL: No suspicious abnormality. Grade 2 anterolisthesis of L5 on S1, secondary to chronic bilateral pars defects. OTHER: No  additionally significant abnormalities.     IMPRESSION: 1.  Circumferential wall thickening of the distal esophagus, which could be attributable to an underlying esophagitis or primary esophageal malignancy. Endoscopic correlation is recommended. 2.  Multiple hepatic lesions, highly suspicious for metastatic disease. 3.  Upper abdominal lymphadenopathy, suspicious for venessa metastatic disease. 4.  Few sub-6 mm pulmonary nodules, new from prior and also suspicious for metastatic disease. 5.  Cirrhosis. 6.  Cholelithiasis. 7.  Colonic diverticulosis. Findings of suspected malignancy discussed verbally via telephone with CLEVELAND Salinas at 10:05 PM on 10/21/2024.        I spent 45 minutes on the patient's visit today.  This included preparation for the visit, face-to-face time with the patient and documentation following the visit.  It did not include teaching or procedure time.    Signed by: Peter E. Friedell, MD

## 2024-11-04 NOTE — LETTER
"11/4/2024      Jayesh Ortiz  [address] [place] [address]      Dear Colleague,    Thank you for referring your patient, Jayesh Ortiz, to the [facility] [place]. Please see a copy of my visit note below.    Oncology Rooming Note    November 4, 2024 11:07 AM   Jayesh Ortiz is a 84 year old male who presents for:    Chief Complaint   Patient presents with     Oncology Clinic Visit     Liver lesion - New consult     Initial Vitals: BP (!) 90/37 (BP Location: Right arm, Patient Position: Sitting, Cuff Size: Adult Large)   Pulse 66   Temp 98.7  F (37.1  C) (Tympanic)   Resp 12   SpO2 91%  Estimated body mass index is 38.61 kg/m  as calculated from the following:    Height as of 10/29/24: 1.727 m (5' 8\").    Weight as of 11/3/24: 115.2 kg (253 lb 14.4 oz). There is no height or weight on file to calculate BSA.  No Pain (0) Comment: Data Unavailable   No LMP for male patient.  Allergies reviewed: Yes  Medications reviewed: Yes    Medications: Medication refills not needed today.  Pharmacy name entered into [facility]:    [facility] #[id number] - [address]  [facility] [address]    Frailty Screening:   Is the patient here for a new oncology consult visit in cancer care? 2. No      Clinical concerns: New consult      Kerri Rush CMA              [facility] Hematology and Oncology Consult Note    Patient: Jayesh Ortiz  MRN: [id number]  Date of Service: Nov 4, 2024       Reason for Visit    I was consulted by Vito Franks MD for evaluation of esophageal mass and apparent liver metastases.      Encounter Diagnoses Assessment and Plan:    Problem List Items Addressed This Visit    None  Visit Diagnoses       Liver lesion        Relevant Orders    Adult GI  Referral - Consult Only    Pulmonary nodules        Abdominal lymphadenopathy        Unintentional weight loss        Relevant Orders    Adult GI "  Referral - Consult Only    Malaise and fatigue        Esophageal thickening        Relevant Orders    Adult GI  Referral - Consult Only    Social Work Referral Specific Sites Only - See Locations in Order                ______________________________________________________________________________    Staging History  Cancer Staging   No matching staging information was found for the patient.    ECOG Performance  3 - Capable of only limited self-care.  Confined to bed/chair > 50% of time.      History of Present Illness    Mr. Jayesh Ortiz is a 84 year old man who has generally been in good health until last year.  He does have a history of diabetes, coronary artery disease and aortic valve replacement.  Over the last year he has noted about 90 pound weight loss.  More recently he has noticed difficulty in swallowing and feeling weak all over.  Presently he can only swallow water or other liquids without a sensation of food getting stuck.  His appetite is reduced and no food tastes good.  He was recently discharged from the hospital where imaging showed a lower esophageal and apparent liver metastases.  He has also noted skin breakdown in the perirectal area.  He notes alternating constipation and diarrhea.    He lives with a significant other for the last 46 years.  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  He is a former smoker and does not use alcohol.    Review of systems.  Pertinent Findings are included in the History of Present Illness    Physical Exam    BP (!) 90/37 (BP Location: Right arm, Patient Position: Sitting, Cuff Size: Adult Large)   Pulse 66   Temp 98.7  F (37.1  C) (Tympanic)   Resp 12   SpO2 91%      GENERAL APPEARANCE: Chronically ill-appearing man in no apparent distress.  He is presently using a wheelchair.  HEAD: Atraumatic; normocephalic; without lesions.  EYES: Conjunctiva pale, corneas and eyelids normal; pupils equal, round, reactive to light; No  Icterus.  MOUTH/OROPHARYNX: Oral mucosa appears dry.  NECK: Supple with no nodes.  LUNGS:  Clear to auscultation and percussion with no extra sounds.  HEART: Regular rhythm and rate; S1 and S2 normal; no murmurs noted.  ABDOMEN: Soft; no masses or tenderness, no hepatosplenomegaly.  NEUROLOGIC: Alert and oriented.  No obvious focal findings.  EXTREMITIES: No cyanosis, or edema.  SKIN: No abnormal bruising or bleeding. No suspicious lesions noted on exposed skin.  PSYCHIATRIC: Mental status normal; no apparent psychiatric issues    Medications:    Current Outpatient Medications   Medication Sig Dispense Refill     blood glucose (NO BRAND SPECIFIED) lancets standard Use to test blood sugar 1 times daily. 200 each 3     blood glucose (NO BRAND SPECIFIED) test strip Use to test blood sugar 1 times daily 200 strip 3     blood glucose monitoring (NO BRAND SPECIFIED) meter device kit Use to test blood sugar 1 times daily or as directed. 1 kit 0     carvedilol (COREG) 6.25 MG tablet TAKE 0.5 TABLETS (3.125 MG) BY MOUTH 2 TIMES DAILY (WITH MEALS) 90 tablet 1     glipiZIDE (GLUCOTROL) 5 MG tablet Take 2.5 mg by mouth 2 times daily.       levothyroxine (SYNTHROID/LEVOTHROID) 175 MCG tablet TAKE 1 TABLET BY MOUTH EVERY DAY 90 tablet 2     metFORMIN (GLUCOPHAGE) 500 MG tablet Take 1 tablet (500 mg) by mouth 2 times daily (with meals).       thin (NO BRAND SPECIFIED) lancets Use with lanceting device to check glucose one time daily. To accompany: Blood Glucose Monitor Brands: per insurance. 200 each 3     aspirin (ASA) 81 MG EC tablet Take 1 tablet (81 mg) by mouth daily (Patient not taking: Reported on 11/4/2024)       ezetimibe (ZETIA) 10 MG tablet Take 1 tablet (10 mg) by mouth daily (Patient not taking: Reported on 11/4/2024) 90 tablet 3     hydrOXYzine HCl (ATARAX) 25 MG tablet Take 1 tablet (25 mg) by mouth nightly as needed for anxiety (sleep). (Patient not taking: Reported on 11/4/2024) 30 tablet 0     Magnesium Oxide 250  MG tablet TAKE 1 TABLET BY MOUTH ONCE DAILY (Patient not taking: Reported on 11/4/2024) 90 tablet 0     omeprazole (PRILOSEC) 20 MG DR capsule Take 1 capsule (20 mg) by mouth daily. (Patient not taking: Reported on 11/4/2024) 30 capsule 0     No current facility-administered medications for this visit.           Past History    Past Medical History:   Diagnosis Date     Accelerated hypertension 08/07/2023     Acute congestive heart failure, unspecified heart failure type (H) 08/07/2023     Diabetes mellitus, type 2 (H)      Fracture of right talus 03/09/2017     Hypertension      Prostate cancer (H)      S/p TAVR (transcatheter aortic valve replacement), bioprosthetic      Thyroid cancer (H)      Thyroid disease      Past Surgical History:   Procedure Laterality Date     CLOSED REDUCTION, PERCUTANEOUS PINNING LOWER EXTREMITY, COMBINED Right 3/9/2017    Procedure: COMBINED CLOSED REDUCTION, PERCUTANEOUS PINNING LOWER EXTREMITY;  Surgeon: Ankit Coy DPM;  Location: [facility] OR     CV CORONARY ANGIOGRAM N/A 2/9/2023    Procedure: Coronary Angiogram;  Surgeon: Nabeel Davies MD;  Location: [facility]     CV TRANSCATHETER AORTIC VALVE REPLACEMENT-FEMORAL APPROACH N/A 4/4/2023    Procedure: Transcatheter Aortic Valve Replacement-Femoral Approach;  Surgeon: Nabeel Davies MD;  Location: [facility] CV     OR TRANSCATHETER AORTIC VALVE REPLACEMENT, FEMORAL PERCUTANEOUS APPROACH (STANDBY) N/A 4/4/2023    Procedure: OR TRANSCATHETER AORTIC VALVE REPLACEMENT, FEMORAL PERCUTANEOUS APPROACH (STANDBY);  Surgeon: Jeramie De Jesus MD;  Location: [facility] CV     THYROIDECTOMY       Allergies   Allergen Reactions     Nka [No Known Allergies]      Family History   Problem Relation Age of Onset     Heart Failure Mother      Heart Failure Father      Social History     Socioeconomic History     Marital status: Single     Spouse name: None     Number of children: None     Years of education:  None     Highest education level: None   Tobacco Use     Smoking status: Former     Smokeless tobacco: Never   Vaping Use     Vaping status: Never Used   Substance and Sexual Activity     Alcohol use: Not Currently     Drug use: Never     Social Drivers of Health     Financial Resource Strain: Low Risk  (11/2/2024)    Financial Resource Strain      Within the past 12 months, have you or your family members you live with been unable to get utilities (heat, electricity) when it was really needed?: No   Food Insecurity: Low Risk  (11/2/2024)    Food Insecurity      Within the past 12 months, did you worry that your food would run out before you got money to buy more?: No      Within the past 12 months, did the food you bought just not last and you didn t have money to get more?: No   Transportation Needs: Low Risk  (11/2/2024)    Transportation Needs      Within the past 12 months, has lack of transportation kept you from medical appointments, getting your medicines, non-medical meetings or appointments, work, or from getting things that you need?: No   Interpersonal Safety: Low Risk  (11/2/2024)    Interpersonal Safety      Do you feel physically and emotionally safe where you currently live?: Yes      Within the past 12 months, have you been hit, slapped, kicked or otherwise physically hurt by someone?: No      Within the past 12 months, have you been humiliated or emotionally abused in other ways by your partner or ex-partner?: No   Housing Stability: Low Risk  (11/2/2024)    Housing Stability      Do you have housing? : Yes      Are you worried about losing your housing?: No   Recent Concern: Housing Stability - High Risk (11/2/2024)    Housing Stability      Do you have housing? : No      Are you worried about losing your housing?: No           Lab Results    Recent Results (from the past 240 hours)   Comprehensive metabolic panel    Collection Time: 10/29/24 12:07 PM   Result Value Ref Range    Sodium 135 135 -  145 mmol/L    Potassium 5.1 3.4 - 5.3 mmol/L    Carbon Dioxide (CO2) 24 22 - 29 mmol/L    Anion Gap 12 7 - 15 mmol/L    Urea Nitrogen 57.9 (H) 8.0 - 23.0 mg/dL    Creatinine 2.60 (H) 0.67 - 1.17 mg/dL    GFR Estimate 24 (L) >60 mL/min/1.73m2    Calcium 8.6 (L) 8.8 - 10.4 mg/dL    Chloride 99 98 - 107 mmol/L    Glucose 142 (H) 70 - 99 mg/dL    Alkaline Phosphatase 524 (H) 40 - 150 U/L     (H) 0 - 45 U/L     (H) 0 - 70 U/L    Protein Total 6.6 6.4 - 8.3 g/dL    Albumin 3.0 (L) 3.5 - 5.2 g/dL    Bilirubin Total 1.1 <=1.2 mg/dL   Lipase    Collection Time: 10/29/24 12:07 PM   Result Value Ref Range    Lipase 30 13 - 60 U/L   CBC with platelets and differential    Collection Time: 10/29/24 12:07 PM   Result Value Ref Range    WBC Count 10.4 4.0 - 11.0 10e3/uL    RBC Count 3.60 (L) 4.40 - 5.90 10e6/uL    Hemoglobin 11.1 (L) 13.3 - 17.7 g/dL    Hematocrit 33.7 (L) 40.0 - 53.0 %    MCV 94 78 - 100 fL    MCH 30.8 26.5 - 33.0 pg    MCHC 32.9 31.5 - 36.5 g/dL    RDW 13.2 10.0 - 15.0 %    Platelet Count 277 150 - 450 10e3/uL    % Neutrophils 81 %    % Lymphocytes 6 %    % Monocytes 10 %    % Eosinophils 1 %    % Basophils 1 %    % Immature Granulocytes 1 %    NRBCs per 100 WBC 0 <1 /100    Absolute Neutrophils 8.4 (H) 1.6 - 8.3 10e3/uL    Absolute Lymphocytes 0.6 (L) 0.8 - 5.3 10e3/uL    Absolute Monocytes 1.1 0.0 - 1.3 10e3/uL    Absolute Eosinophils 0.1 0.0 - 0.7 10e3/uL    Absolute Basophils 0.1 0.0 - 0.2 10e3/uL    Absolute Immature Granulocytes 0.1 <=0.4 10e3/uL    Absolute NRBCs 0.0 10e3/uL   NT pro BNP    Collection Time: 10/29/24 12:07 PM   Result Value Ref Range    N terminal Pro BNP Inpatient 2,783 (H) 0 - 1,800 pg/mL   Extra Blue Top Tube    Collection Time: 10/29/24 12:08 PM   Result Value Ref Range    Hold Specimen JIC    UA with Microscopic reflex to Culture    Collection Time: 10/29/24  3:18 PM    Specimen: Urine, Clean Catch   Result Value Ref Range    Color Urine Yellow Colorless, Straw, Light  Yellow, Yellow    Appearance Urine Slightly Cloudy (A) Clear    Glucose Urine Negative Negative mg/dL    Bilirubin Urine Negative Negative    Ketones Urine Negative Negative mg/dL    Specific Gravity Urine 1.014 1.003 - 1.035    Blood Urine Negative Negative    pH Urine 5.0 5.0 - 7.0    Protein Albumin Urine Negative Negative mg/dL    Urobilinogen Urine Normal Normal, 2.0 mg/dL    Nitrite Urine Negative Negative    Leukocyte Esterase Urine Moderate (A) Negative    Bacteria Urine Few (A) None Seen /HPF    Mucus Urine Present (A) None Seen /LPF    RBC Urine 1 <=2 /HPF    WBC Urine 9 (H) <=5 /HPF    Squamous Epithelials Urine <1 <=1 /HPF    Hyaline Casts Urine 1 <=2 /LPF   Urine Culture    Collection Time: 10/29/24  3:18 PM    Specimen: Urine, Clean Catch   Result Value Ref Range    Culture <10,000 CFU/mL Urogenital sushil    Basic metabolic panel    Collection Time: 10/30/24  6:06 AM   Result Value Ref Range    Sodium 136 135 - 145 mmol/L    Potassium 4.8 3.4 - 5.3 mmol/L    Chloride 101 98 - 107 mmol/L    Carbon Dioxide (CO2) 23 22 - 29 mmol/L    Anion Gap 12 7 - 15 mmol/L    Urea Nitrogen 47.4 (H) 8.0 - 23.0 mg/dL    Creatinine 2.01 (H) 0.67 - 1.17 mg/dL    GFR Estimate 32 (L) >60 mL/min/1.73m2    Calcium 8.2 (L) 8.8 - 10.4 mg/dL    Glucose 122 (H) 70 - 99 mg/dL   Hepatic panel    Collection Time: 10/30/24  6:06 AM   Result Value Ref Range    Protein Total 6.2 (L) 6.4 - 8.3 g/dL    Albumin 2.9 (L) 3.5 - 5.2 g/dL    Bilirubin Total 1.2 <=1.2 mg/dL    Alkaline Phosphatase 513 (H) 40 - 150 U/L     (H) 0 - 45 U/L     (H) 0 - 70 U/L    Bilirubin Direct 0.62 (H) 0.00 - 0.30 mg/dL   CBC with platelets and differential    Collection Time: 10/30/24  6:06 AM   Result Value Ref Range    WBC Count 9.3 4.0 - 11.0 10e3/uL    RBC Count 3.35 (L) 4.40 - 5.90 10e6/uL    Hemoglobin 10.4 (L) 13.3 - 17.7 g/dL    Hematocrit 31.5 (L) 40.0 - 53.0 %    MCV 94 78 - 100 fL    MCH 31.0 26.5 - 33.0 pg    MCHC 33.0 31.5 - 36.5 g/dL     RDW 13.0 10.0 - 15.0 %    Platelet Count 258 150 - 450 10e3/uL    % Neutrophils 79 %    % Lymphocytes 7 %    % Monocytes 10 %    % Eosinophils 2 %    % Basophils 1 %    % Immature Granulocytes 1 %    NRBCs per 100 WBC 0 <1 /100    Absolute Neutrophils 7.4 1.6 - 8.3 10e3/uL    Absolute Lymphocytes 0.6 (L) 0.8 - 5.3 10e3/uL    Absolute Monocytes 1.0 0.0 - 1.3 10e3/uL    Absolute Eosinophils 0.2 0.0 - 0.7 10e3/uL    Absolute Basophils 0.1 0.0 - 0.2 10e3/uL    Absolute Immature Granulocytes 0.1 <=0.4 10e3/uL    Absolute NRBCs 0.0 10e3/uL   Hemoglobin A1c    Collection Time: 10/30/24  6:06 AM   Result Value Ref Range    Estimated Average Glucose 126 (H) <117 mg/dL    Hemoglobin A1C 6.0 (H) <5.7 %   CBC with platelets    Collection Time: 10/31/24  5:15 AM   Result Value Ref Range    WBC Count 15.3 (H) 4.0 - 11.0 10e3/uL    RBC Count 3.49 (L) 4.40 - 5.90 10e6/uL    Hemoglobin 10.7 (L) 13.3 - 17.7 g/dL    Hematocrit 32.7 (L) 40.0 - 53.0 %    MCV 94 78 - 100 fL    MCH 30.7 26.5 - 33.0 pg    MCHC 32.7 31.5 - 36.5 g/dL    RDW 13.2 10.0 - 15.0 %    Platelet Count 264 150 - 450 10e3/uL   Comprehensive metabolic panel    Collection Time: 10/31/24  5:15 AM   Result Value Ref Range    Sodium 131 (L) 135 - 145 mmol/L    Potassium 5.7 (H) 3.4 - 5.3 mmol/L    Carbon Dioxide (CO2) 18 (L) 22 - 29 mmol/L    Anion Gap 14 7 - 15 mmol/L    Urea Nitrogen 48.1 (H) 8.0 - 23.0 mg/dL    Creatinine 2.11 (H) 0.67 - 1.17 mg/dL    GFR Estimate 30 (L) >60 mL/min/1.73m2    Calcium 8.1 (L) 8.8 - 10.4 mg/dL    Chloride 99 98 - 107 mmol/L    Glucose 233 (H) 70 - 99 mg/dL    Alkaline Phosphatase 610 (H) 40 - 150 U/L     (H) 0 - 45 U/L     (H) 0 - 70 U/L    Protein Total 6.1 (L) 6.4 - 8.3 g/dL    Albumin 2.8 (L) 3.5 - 5.2 g/dL    Bilirubin Total 1.6 (H) <=1.2 mg/dL   Vitamin B12    Collection Time: 10/31/24  5:15 AM   Result Value Ref Range    Vitamin B12 1,291 (H) 232 - 1,245 pg/mL   Lactate Dehydrogenase    Collection Time: 10/31/24   5:15 AM   Result Value Ref Range    Lactate Dehydrogenase >2,500 (H) 0 - 250 U/L   Reticulocyte count    Collection Time: 10/31/24  5:15 AM   Result Value Ref Range    % Reticulocyte 1.2 0.5 - 2.0 %    Absolute Reticulocyte 0.041 0.025 - 0.095 10e6/uL   Lactic Acid Whole Blood w/ 1x repeat in 2 hrs when >2    Collection Time: 10/31/24  6:48 AM   Result Value Ref Range    Lactic Acid, Initial 1.9 0.7 - 2.0 mmol/L   Ferritin    Collection Time: 10/31/24  6:48 AM   Result Value Ref Range    Ferritin 5,601 (H) 31 - 409 ng/mL   Iron and iron binding capacity    Collection Time: 10/31/24  6:48 AM   Result Value Ref Range    Iron 25 (L) 61 - 157 ug/dL    Iron Binding Capacity 153 (L) 240 - 430 ug/dL    Iron Sat Index 16 15 - 46 %   Blood Culture Arm, Left    Collection Time: 10/31/24  7:40 AM    Specimen: Arm, Left; Blood   Result Value Ref Range    Culture No growth after 3 days    Blood gas venous    Collection Time: 10/31/24  7:40 AM   Result Value Ref Range    pH Venous 7.40 7.32 - 7.43    pCO2 Venous 37 (L) 40 - 50 mm Hg    pO2 Venous 63 (H) 25 - 47 mm Hg    Bicarbonate Venous 23 21 - 28 mmol/L    Base Excess/Deficit Venous -1.4 -3.0 - 3.0 mmol/L    FIO2 28     Oxyhemoglobin Venous 90 (H) 70 - 75 %    O2 Sat, Venous 91.6 (H) 70.0 - 75.0 %   Cortisol    Collection Time: 10/31/24  7:40 AM   Result Value Ref Range    Cortisol 26.1   ug/dL   Potassium    Collection Time: 10/31/24  9:25 AM   Result Value Ref Range    Potassium 5.5 (H) 3.4 - 5.3 mmol/L   Bilirubin Direct and Total    Collection Time: 10/31/24  9:25 AM   Result Value Ref Range    Bilirubin Direct 1.07 (H) 0.00 - 0.30 mg/dL    Bilirubin Total 1.6 (H) <=1.2 mg/dL   Acute hepatitis panel    Collection Time: 10/31/24  9:25 AM   Result Value Ref Range    Hepatitis A Antibody IgM Nonreactive Nonreactive    Hepatitis B Core Antibody IgM Nonreactive Nonreactive    Hepatitis C Antibody Nonreactive Nonreactive    Hepatitis B Surface Antigen Nonreactive Nonreactive    Uric acid    Collection Time: 10/31/24  9:25 AM   Result Value Ref Range    Uric Acid 7.9 (H) 3.4 - 7.0 mg/dL   Glucose by meter    Collection Time: 10/31/24  9:34 AM   Result Value Ref Range    GLUCOSE BY METER POCT 250 (H) 70 - 99 mg/dL   Glucose by meter    Collection Time: 10/31/24 11:41 AM   Result Value Ref Range    GLUCOSE BY METER POCT 205 (H) 70 - 99 mg/dL   Potassium    Collection Time: 10/31/24  3:06 PM   Result Value Ref Range    Potassium 5.5 (H) 3.4 - 5.3 mmol/L   Glucose by meter    Collection Time: 10/31/24  4:27 PM   Result Value Ref Range    GLUCOSE BY METER POCT 230 (H) 70 - 99 mg/dL   Potassium    Collection Time: 10/31/24  6:02 PM   Result Value Ref Range    Potassium 5.6 (H) 3.4 - 5.3 mmol/L   Glucose by meter    Collection Time: 10/31/24  9:14 PM   Result Value Ref Range    GLUCOSE BY METER POCT 189 (H) 70 - 99 mg/dL   Lactic Acid Whole Blood w/ 1x repeat in 2 hrs when >2    Collection Time: 10/31/24 11:40 PM   Result Value Ref Range    Lactic Acid, Initial 3.0 (H) 0.7 - 2.0 mmol/L   CBC with platelets    Collection Time: 11/01/24  2:05 AM   Result Value Ref Range    WBC Count 14.7 (H) 4.0 - 11.0 10e3/uL    RBC Count 3.13 (L) 4.40 - 5.90 10e6/uL    Hemoglobin 9.7 (L) 13.3 - 17.7 g/dL    Hematocrit 29.2 (L) 40.0 - 53.0 %    MCV 93 78 - 100 fL    MCH 31.0 26.5 - 33.0 pg    MCHC 33.2 31.5 - 36.5 g/dL    RDW 13.2 10.0 - 15.0 %    Platelet Count 224 150 - 450 10e3/uL   Comprehensive metabolic panel    Collection Time: 11/01/24  2:05 AM   Result Value Ref Range    Sodium 130 (L) 135 - 145 mmol/L    Potassium 5.0 3.4 - 5.3 mmol/L    Carbon Dioxide (CO2) 21 (L) 22 - 29 mmol/L    Anion Gap 13 7 - 15 mmol/L    Urea Nitrogen 49.6 (H) 8.0 - 23.0 mg/dL    Creatinine 2.30 (H) 0.67 - 1.17 mg/dL    GFR Estimate 27 (L) >60 mL/min/1.73m2    Calcium 7.9 (L) 8.8 - 10.4 mg/dL    Chloride 96 (L) 98 - 107 mmol/L    Glucose 215 (H) 70 - 99 mg/dL    Alkaline Phosphatase 497 (H) 40 - 150 U/L     (H) 0 - 45 U/L      (H) 0 - 70 U/L    Protein Total 5.7 (L) 6.4 - 8.3 g/dL    Albumin 2.7 (L) 3.5 - 5.2 g/dL    Bilirubin Total 1.5 (H) <=1.2 mg/dL   INR    Collection Time: 11/01/24  2:05 AM   Result Value Ref Range    INR 1.40 (H) 0.85 - 1.15   Lactic acid whole blood    Collection Time: 11/01/24  2:05 AM   Result Value Ref Range    Lactic Acid 2.5 (H) 0.7 - 2.0 mmol/L   Glucose by meter    Collection Time: 11/01/24  2:18 AM   Result Value Ref Range    GLUCOSE BY METER POCT 219 (H) 70 - 99 mg/dL   Lactic Acid Whole Blood w/ 1x repeat in 2 hrs when >2    Collection Time: 11/01/24  3:56 AM   Result Value Ref Range    Lactic Acid, Initial 2.0 0.7 - 2.0 mmol/L   Extra Purple Top EDTA (LAB USE ONLY)    Collection Time: 11/01/24  3:56 AM   Result Value Ref Range    Hold Specimen     Glucose by meter    Collection Time: 11/01/24  7:38 AM   Result Value Ref Range    GLUCOSE BY METER POCT 176 (H) 70 - 99 mg/dL   Glucose by meter    Collection Time: 11/01/24 11:10 AM   Result Value Ref Range    GLUCOSE BY METER POCT 190 (H) 70 - 99 mg/dL   Haptoglobin    Collection Time: 11/01/24  1:05 PM   Result Value Ref Range    Haptoglobin 169 30 - 200 mg/dL   CBC with platelets and differential    Collection Time: 11/01/24  1:05 PM   Result Value Ref Range    WBC Count 14.4 (H) 4.0 - 11.0 10e3/uL    RBC Count 3.06 (L) 4.40 - 5.90 10e6/uL    Hemoglobin 9.6 (L) 13.3 - 17.7 g/dL    Hematocrit 28.3 (L) 40.0 - 53.0 %    MCV 93 78 - 100 fL    MCH 31.4 26.5 - 33.0 pg    MCHC 33.9 31.5 - 36.5 g/dL    RDW 13.3 10.0 - 15.0 %    Platelet Count 220 150 - 450 10e3/uL    % Neutrophils 87 %    % Lymphocytes 4 %    % Monocytes 8 %    % Eosinophils 0 %    % Basophils 0 %    % Immature Granulocytes 1 %    NRBCs per 100 WBC 0 <1 /100    Absolute Neutrophils 12.5 (H) 1.6 - 8.3 10e3/uL    Absolute Lymphocytes 0.5 (L) 0.8 - 5.3 10e3/uL    Absolute Monocytes 1.1 0.0 - 1.3 10e3/uL    Absolute Eosinophils 0.0 0.0 - 0.7 10e3/uL    Absolute Basophils 0.1 0.0 - 0.2  10e3/uL    Absolute Immature Granulocytes 0.1 <=0.4 10e3/uL    Absolute NRBCs 0.0 10e3/uL   Reticulocyte count    Collection Time: 11/01/24  1:05 PM   Result Value Ref Range    % Reticulocyte 1.2 0.5 - 2.0 %    Absolute Reticulocyte 0.036 0.025 - 0.095 10e6/uL   Extra Purple Top Tube    Collection Time: 11/01/24  1:05 PM   Result Value Ref Range    Hold Specimen JIC    Glucose by meter    Collection Time: 11/01/24  4:37 PM   Result Value Ref Range    GLUCOSE BY METER POCT 182 (H) 70 - 99 mg/dL   Fractional Excretion of Sodium    Collection Time: 11/01/24  9:44 PM   Result Value Ref Range    Creatinine Urine mg/dL 154.8 mg/dL    Sodium Urine mmol/L 20 mmol/L    %FENA 0.2 %   Glucose by meter    Collection Time: 11/01/24 10:29 PM   Result Value Ref Range    GLUCOSE BY METER POCT 229 (H) 70 - 99 mg/dL   Sodium    Collection Time: 11/02/24  1:17 AM   Result Value Ref Range    Sodium 129 (L) 135 - 145 mmol/L   Creatinine, serum    Collection Time: 11/02/24  1:17 AM   Result Value Ref Range    Creatinine 2.21 (H) 0.67 - 1.17 mg/dL   Lactic Acid Whole Blood w/ 1x repeat in 2 hrs when >2    Collection Time: 11/02/24  1:17 AM   Result Value Ref Range    Lactic Acid, Initial 2.3 (H) 0.7 - 2.0 mmol/L   Glucose by meter    Collection Time: 11/02/24  2:16 AM   Result Value Ref Range    GLUCOSE BY METER POCT 206 (H) 70 - 99 mg/dL   Comprehensive metabolic panel    Collection Time: 11/02/24  3:15 AM   Result Value Ref Range    Sodium 128 (L) 135 - 145 mmol/L    Potassium 4.3 3.4 - 5.3 mmol/L    Carbon Dioxide (CO2) 21 (L) 22 - 29 mmol/L    Anion Gap 12 7 - 15 mmol/L    Urea Nitrogen 55.4 (H) 8.0 - 23.0 mg/dL    Creatinine 2.26 (H) 0.67 - 1.17 mg/dL    GFR Estimate 28 (L) >60 mL/min/1.73m2    Calcium 7.7 (L) 8.8 - 10.4 mg/dL    Chloride 95 (L) 98 - 107 mmol/L    Glucose 189 (H) 70 - 99 mg/dL    Alkaline Phosphatase 447 (H) 40 - 150 U/L     (H) 0 - 45 U/L     (H) 0 - 70 U/L    Protein Total 5.4 (L) 6.4 - 8.3 g/dL     Albumin 2.5 (L) 3.5 - 5.2 g/dL    Bilirubin Total 1.9 (H) <=1.2 mg/dL   Magnesium    Collection Time: 11/02/24  3:15 AM   Result Value Ref Range    Magnesium 1.4 (L) 1.7 - 2.3 mg/dL   Phosphorus    Collection Time: 11/02/24  3:15 AM   Result Value Ref Range    Phosphorus 3.6 2.5 - 4.5 mg/dL   CBC with platelets    Collection Time: 11/02/24  3:15 AM   Result Value Ref Range    WBC Count 13.4 (H) 4.0 - 11.0 10e3/uL    RBC Count 2.97 (L) 4.40 - 5.90 10e6/uL    Hemoglobin 9.2 (L) 13.3 - 17.7 g/dL    Hematocrit 27.2 (L) 40.0 - 53.0 %    MCV 92 78 - 100 fL    MCH 31.0 26.5 - 33.0 pg    MCHC 33.8 31.5 - 36.5 g/dL    RDW 13.3 10.0 - 15.0 %    Platelet Count 229 150 - 450 10e3/uL   Lactic acid whole blood    Collection Time: 11/02/24  3:15 AM   Result Value Ref Range    Lactic Acid 2.3 (H) 0.7 - 2.0 mmol/L   Osmolality    Collection Time: 11/02/24  3:15 AM   Result Value Ref Range    Osmolality Blood 294 280 - 301 mmol/kg   Glucose by meter    Collection Time: 11/02/24  7:58 AM   Result Value Ref Range    GLUCOSE BY METER POCT 178 (H) 70 - 99 mg/dL   Sodium random urine    Collection Time: 11/02/24 11:07 AM   Result Value Ref Range    Sodium Urine mmol/L 20 mmol/L   Osmolality urine    Collection Time: 11/02/24 11:07 AM   Result Value Ref Range    Osmolality Urine 407 100 - 1,200 mmol/kg   Glucose by meter    Collection Time: 11/02/24 11:34 AM   Result Value Ref Range    GLUCOSE BY METER POCT 188 (H) 70 - 99 mg/dL   Lactic Acid Whole Blood w/ 1x repeat in 2 hrs when >2    Collection Time: 11/02/24 11:44 AM   Result Value Ref Range    Lactic Acid, Initial 2.6 (H) 0.7 - 2.0 mmol/L   Magnesium    Collection Time: 11/02/24  1:40 PM   Result Value Ref Range    Magnesium 1.8 1.7 - 2.3 mg/dL   Lactic acid whole blood    Collection Time: 11/02/24  1:40 PM   Result Value Ref Range    Lactic Acid 2.5 (H) 0.7 - 2.0 mmol/L   Glucose by meter    Collection Time: 11/02/24  5:02 PM   Result Value Ref Range    GLUCOSE BY METER POCT 176 (H)  70 - 99 mg/dL   Glucose by meter    Collection Time: 11/02/24  9:57 PM   Result Value Ref Range    GLUCOSE BY METER POCT 230 (H) 70 - 99 mg/dL   Glucose by meter    Collection Time: 11/03/24  1:23 AM   Result Value Ref Range    GLUCOSE BY METER POCT 220 (H) 70 - 99 mg/dL   Comprehensive metabolic panel    Collection Time: 11/03/24  5:57 AM   Result Value Ref Range    Sodium 131 (L) 135 - 145 mmol/L    Potassium 4.3 3.4 - 5.3 mmol/L    Carbon Dioxide (CO2) 22 22 - 29 mmol/L    Anion Gap 11 7 - 15 mmol/L    Urea Nitrogen 61.1 (H) 8.0 - 23.0 mg/dL    Creatinine 2.12 (H) 0.67 - 1.17 mg/dL    GFR Estimate 30 (L) >60 mL/min/1.73m2    Calcium 7.7 (L) 8.8 - 10.4 mg/dL    Chloride 98 98 - 107 mmol/L    Glucose 178 (H) 70 - 99 mg/dL    Alkaline Phosphatase 464 (H) 40 - 150 U/L     (H) 0 - 45 U/L     (H) 0 - 70 U/L    Protein Total 5.3 (L) 6.4 - 8.3 g/dL    Albumin 2.4 (L) 3.5 - 5.2 g/dL    Bilirubin Total 2.2 (H) <=1.2 mg/dL   CBC with platelets    Collection Time: 11/03/24  5:57 AM   Result Value Ref Range    WBC Count 12.3 (H) 4.0 - 11.0 10e3/uL    RBC Count 2.96 (L) 4.40 - 5.90 10e6/uL    Hemoglobin 9.2 (L) 13.3 - 17.7 g/dL    Hematocrit 27.2 (L) 40.0 - 53.0 %    MCV 92 78 - 100 fL    MCH 31.1 26.5 - 33.0 pg    MCHC 33.8 31.5 - 36.5 g/dL    RDW 13.6 10.0 - 15.0 %    Platelet Count 251 150 - 450 10e3/uL   Magnesium    Collection Time: 11/03/24  5:57 AM   Result Value Ref Range    Magnesium 1.8 1.7 - 2.3 mg/dL   Glucose by meter    Collection Time: 11/03/24  8:19 AM   Result Value Ref Range    GLUCOSE BY METER POCT 190 (H) 70 - 99 mg/dL   Glucose by meter    Collection Time: 11/03/24 12:28 PM   Result Value Ref Range    GLUCOSE BY METER POCT 210 (H) 70 - 99 mg/dL       Imaging Results    CT Chest/Abdomen/Pelvis w Contrast    Result Date: 10/31/2024  CT CHEST/ABDOMEN/PELVIS W CONTRAST 10/31/2024 1:21 PM CLINICAL HISTORY: Obstruction vs ileus concern for malignancy. TECHNIQUE: CT scan of the chest, abdomen, and  pelvis was performed following injection of IV contrast. Multiplanar reformats were obtained. Dose reduction techniques were used. CONTRAST: 117mL Isovue-370 COMPARISON: 10/31/2024, 10/21/2024, 3/2/2023. FINDINGS: LUNGS AND PLEURA: Multiple scattered bilateral noncalcified pulmonary nodules are present. Largest noncalcified pulmonary nodule is seen in the anterior left upper lobe measuring 8 x 7 mm (3-160). A couple benign densely calcified pulmonary granulomas are also noted. Mild bibasilar atelectasis. No airspace consolidation or significant pleural fluid. Large airways are patent. MEDIASTINUM/AXILLAE: Fluid is seen throughout the esophagus. There is eccentric somewhat masslike appearing distal esophageal wall thickening, greatest along the right lateral aspect. Heart size is mildly enlarged. No pericardial fluid. Prior aortic valve repair. Moderate to severe coronary artery atherosclerosis. Thoracic aorta is normal in course and caliber. Mild to moderate thoracic aortic atherosclerosis. No enlarged thoracic lymph nodes by CT size criteria. A couple prominent retroperitoneal lymph nodes are noted. For example, a distal right paraesophageal lymph node measures 10 mm (7-109). HEPATOBILIARY: There are innumerable low-density hepatic lesions throughout the liver parenchyma with multiple large confluent low density hepatic masses. When compared to the recent prior examination, there has been interval progression of liver disease versus better visualization of liver disease. There is a similar-appearing edematous gallbladder wall thickening and/or pericholecystic fluid present. Several small calcified gallstones are seen in the gallbladder lumen. No bile duct dilation. Small volume perihepatic free fluid is present. There is note of mild nodular contour of the liver, which may be secondary to widespread liver metastases. Superimposed cirrhosis is difficult to entirely exclude. PANCREAS: Normal. SPLEEN: Normal. ADRENAL  GLANDS: Normal. KIDNEYS/BLADDER: Normal. BOWEL: Colonic diverticulosis is present without evidence of diverticulitis. Large and small bowel loops are nonobstructed and noninflamed. No signs of acute appendicitis. PELVIC ORGANS: A few small metallic suspected fiducial markers are seen in the prostate gland. LYMPH NODES: There are numerous enlarged gastrohepatic, periportal, and retroperitoneal/para-aortic lymph nodes present. A few examples are as follows: -Periportal lymph node measuring 25 x 20 mm (7-161), previously 25 x 20 mm. -Cavoatrial lymph node measuring 33 x 20 mm (7-175), previously 34 x 18 mm. -Left periaortic lymph node measuring 34 x 21 mm (7-186), previously 30 x 21 mm. ADDITIONAL FINDINGS: At least moderate atherosclerosis of the abdominal aorta and iliac arteries. 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 As mention above, there is minimal perihepatic free fluid present. There is also note of mild fat stranding and edema in the mesentery as well as in the pelvis/presacral space. MUSCULOSKELETAL: Multilevel hypertrophic and degenerative changes of the spine. 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 No acute osseous abnormality. No aggressive appearing lytic or blastic osseous lesions. Mild body wall edema.     IMPRESSION: 1.  No evidence of bowel obstruction. 2.  Eccentric masslike wall thickening in the distal esophagus. Consider further evaluation with endoscopy to exclude primary malignancy. 3.  Innumerable confluent low density hepatic lesions, consistent with metastatic disease. 4.  Multiple enlarged and pathologic appearing abdominal lymph nodes, consistent with metastatic disease. 5.  Moderate edematous gallbladder wall thickening and/or pericholecystic fluid, nonspecific but likely reactive to underlying liver disease. Acute cholecystitis is difficult to entirely exclude, but thought  somewhat less likely. 6.  Several subcentimeter pulmonary nodules measuring up to 8 mm, possibly representing metastatic disease in light of additional findings. 7.  Minimal ascites. 8.  Additional nonacute findings as above. KIRSTY REINOSO MD   SYSTEM ID:  [id number]    Abdomen 2 - 3 View*    Result Date: 10/31/2024  XR ABDOMEN 2 VIEWS 10/31/2024 8:59 AM HISTORY: Increase distension, question obstruction. COMPARISON: 10/29/2024, 10/21/2024. FINDINGS: Prominent air-filled loops of small bowel are seen measuring up to just over 4 cm in diameter, which can be seen with bowel obstruction versus ileus. Consider further evaluation with contrast-enhanced CT of the abdomen and pelvis. No signs of pneumatosis or pneumoperitoneum. No pathologic calcifications or signs of organomegaly. Status post TAVR. Several tiny surgical clips are seen in the pelvis. Multilevel hypertrophic degenerative changes of the spine. No acute osseous abnormality. KIRSTY REINOSO MD   SYSTEM ID:  [id number]    US Abdomen Limited    Result Date: 10/29/2024  ULTRASOUND ABDOMEN LIMITED October 29, 2024 2:14 PM CLINICAL HISTORY: Cholestasis. TECHNIQUE: Limited abdominal ultrasound. COMPARISON: CT of the abdomen and pelvis 10/21/2024. FINDINGS: GALLBLADDER: Calcified gallstones are noted within the gallbladder. The gallbladder wall is diffusely thickened, possibly due to gallbladder contraction. There is a trace amount of pericholecystic fluid. Negative sonographic Morales's sign. BILE DUCTS: There is no biliary dilatation. The common duct measures 3 mm. Portions of the common duct could not be visualized due to overlying bowel gas. LIVER: Heterogeneous nodularity throughout the liver is suspicious for metastatic disease. No evidence for hepatic steatosis. RIGHT KIDNEY: Unremarkable. No hydronephrosis. PANCREAS: The visualized portions of the pancreas are unremarkable. No ascites. Probable mildly enlarged periportal lymph node adjacent to the pancreatic  head measures 2 x 2.5 x 1.6 cm.     IMPRESSION: 1.  Heterogeneous nodularity throughout the liver is suspicious for metastatic disease. 2.  There is cholelithiasis and a trace amount of pericholecystic fluid. Mild diffuse gallbladder wall thickening may be related to gallbladder contraction. 3.  Probable mildly enlarged periportal lymph node was better demonstrated on the prior CT scan. MARY BOLANOS MD   SYSTEM ID:  [id number]    Chest XR,  PA & LAT    Result Date: 10/29/2024  XR CHEST 2 VIEWS   10/29/2024 12:59 PM HISTORY: generalized weakness COMPARISON: Chest radiograph 8/7/2023.     IMPRESSION: Stable size of cardiac silhouette status post aortic valve replacement. No definite airspace consolidation, pleural effusion or pneumothorax. No acute bony abnormality. JANIE BRAUN MD   SYSTEM ID:  [id number]    CT Abdomen Pelvis w Contrast    Result Date: 10/21/2024  EXAM: CT ABDOMEN PELVIS W CONTRAST LOCATION: [facility] DATE: 10/21/2024 INDICATION: Diarrhea, decreased appetite and weight loss. COMPARISON: 1/3/2011. TECHNIQUE: CT scan of the abdomen and pelvis was performed following injection of IV contrast. Multiplanar reformats were obtained. Dose reduction techniques were used. CONTRAST: 120 mL Isovue 370. FINDINGS: LOWER CHEST: Few sub-6 mm pulmonary nodules are new from prior. For reference, there is a new 3 mm nodule in the subpleural right lower lobe, series 3 image 53. Mild cardiomegaly and postprocedural changes of transcatheter aortic valve replacement. Atherosclerotic calcification of coronary arteries and visualized thoracic aorta. New, mild circumferential wall thickening of the distal esophagus, series 3 image 43, which could be attributable to an underlying esophagitis or primary esophageal malignancy. Adjacent subcentimeter periesophageal lymph node, measuring up to 9 mm in short axis, series 3 image 33. HEPATOBILIARY: Liver surface nodularity, compatible cirrhosis.  Multiple low-attenuation hepatic lesions, highly suspicious for metastatic disease. For reference, a lesion within the anterior left hepatic lobe measures up to 3.0 cm, series 3 image 51. Cholelithiasis and diffuse gallbladder wall edema, likely secondary to underlying liver disease. No intrahepatic or intrahepatic biliary ductal dilatation. PANCREAS: Enhances normally. No peripancreatic inflammatory fat stranding. SPLEEN: Enhances normally. Normal size. ADRENAL GLANDS: Normal. KIDNEYS: Both kidneys enhance symmetrically, without hydronephrosis. No nephroureterolithiasis. Urinary bladder is unremarkable. PELVIC ORGANS: Mildly enlarged prostate gland, containing brachytherapy seeds. BOWEL: No evidence of acute gastrointestinal inflammation or obstruction. Colonic diverticulosis. Normal appendix. No convincing CT evidence of malignancy arising from the small or large intestines. No intraperitoneal free fluid or free air. LYMPH NODES: Multiple new and enlarged upper abdominal lymph nodes, suspicious for venessa metastatic disease. For reference, an enlarged gastrohepatic lymph node measures 19 mm in short axis, series 3 image 70. VASCULATURE: Focal ectasia of the infrarenal abdominal aorta, measuring up to 2.8 cm. Moderate to severe atheromatous disease. MUSCULOSKELETAL: No suspicious abnormality. Grade 2 anterolisthesis of L5 on S1, secondary to chronic bilateral pars defects. OTHER: No additionally significant abnormalities.     IMPRESSION: 1.  Circumferential wall thickening of the distal esophagus, which could be attributable to an underlying esophagitis or primary esophageal malignancy. Endoscopic correlation is recommended. 2.  Multiple hepatic lesions, highly suspicious for metastatic disease. 3.  Upper abdominal lymphadenopathy, suspicious for venessa metastatic disease. 4.  Few sub-6 mm pulmonary nodules, new from prior and also suspicious for metastatic disease. 5.  Cirrhosis. 6.  Cholelithiasis. 7.  Colonic  diverticulosis. Findings of suspected malignancy discussed verbally via telephone with CLEVELAND Salinas at 10:05 PM on 10/21/2024.        I spent 45 minutes on the patient's visit today.  This included preparation for the visit, face-to-face time with the patient and documentation following the visit.  It did not include teaching or procedure time.    Signed by: Peter E. Friedell, MD          Again, thank you for allowing me to participate in the care of your patient.        Sincerely,        Peter E. Friedell, MD

## 2024-11-05 ENCOUNTER — PATIENT OUTREACH (OUTPATIENT)
Dept: CARE COORDINATION | Facility: CLINIC | Age: 84
End: 2024-11-05
Payer: MEDICARE

## 2024-11-05 LAB — BACTERIA BLD CULT: NO GROWTH

## 2024-11-05 NOTE — PROGRESS NOTES
Social Work - Intervention  [facility]  Data/Intervention:    Patient Name: Jayesh Ortiz Goes By: Jayesh OVERTONB/Age: 1940 (84 year old)     Visit Type: telephone  Referral Source:  Dr Peter Friedell, Oncology  Reason for Referral:  Patient/caregiver support, resources for support    Collaborated With:    Dorita, pt's SO  There is Consent To Communicate with Dorita in pt's medical record     Psychosocial Information/Concerns:  Pt is 84 year old single male who resides in [place], [place].  Pt has esophageal thickening, liver lesion, and pulmonary nodules suspicious for cancer.  Pt's SO, Dorita (the couple have been together for 46 years), has her own home though is staying and caring for pt at his home currently.  Dorita indicated that caring for him is getting to be too much and she is interested is help/support at home.             Intervention/Education/Resources Provided:  SW talked very briefly with Dorita starting to introduce support services and someone came to pt's door.  Dorita needed to end phone call and SW provided her with SW phone number to return call to.     Assessment/Plan:  Unable to complete assessment due to brevity of call.  SW will await return call from pt's SO, Dorita, to provide assistance.     Provided patient/family with contact information and availability of SW.    Dangelo Brown, Casual , for Amina Limon,   [facility]  [phone]

## 2024-11-08 ENCOUNTER — TELEPHONE (OUTPATIENT)
Dept: FAMILY MEDICINE | Facility: CLINIC | Age: 84
End: 2024-11-08

## 2024-11-08 ENCOUNTER — APPOINTMENT (OUTPATIENT)
Dept: CT IMAGING | Facility: CLINIC | Age: 84
DRG: 436 | End: 2024-11-08
Attending: EMERGENCY MEDICINE
Payer: MEDICARE

## 2024-11-08 ENCOUNTER — HOSPITAL ENCOUNTER (INPATIENT)
Facility: CLINIC | Age: 84
LOS: 8 days | Discharge: HOSPICE | End: 2024-11-16
Attending: EMERGENCY MEDICINE | Admitting: STUDENT IN AN ORGANIZED HEALTH CARE EDUCATION/TRAINING PROGRAM
Payer: MEDICARE

## 2024-11-08 ENCOUNTER — TELEPHONE (OUTPATIENT)
Dept: FAMILY MEDICINE | Facility: CLINIC | Age: 84
End: 2024-11-08
Payer: MEDICARE

## 2024-11-08 ENCOUNTER — APPOINTMENT (OUTPATIENT)
Dept: ULTRASOUND IMAGING | Facility: CLINIC | Age: 84
DRG: 436 | End: 2024-11-08
Attending: EMERGENCY MEDICINE
Payer: MEDICARE

## 2024-11-08 DIAGNOSIS — Z79.84 LONG TERM (CURRENT) USE OF ORAL HYPOGLYCEMIC DRUGS: ICD-10-CM

## 2024-11-08 DIAGNOSIS — C18.9 MALIGNANT NEOPLASM OF COLON, UNSPECIFIED PART OF COLON (H): Primary | ICD-10-CM

## 2024-11-08 DIAGNOSIS — C78.7 SECONDARY MALIGNANT NEOPLASM OF LIVER (H): ICD-10-CM

## 2024-11-08 DIAGNOSIS — Z95.2 HEART VALVE REPLACED BY TRANSPLANT: ICD-10-CM

## 2024-11-08 DIAGNOSIS — R53.1 WEAKNESS: ICD-10-CM

## 2024-11-08 DIAGNOSIS — R53.1 GENERALIZED WEAKNESS: ICD-10-CM

## 2024-11-08 DIAGNOSIS — N17.9 ACUTE KIDNEY INJURY (H): ICD-10-CM

## 2024-11-08 DIAGNOSIS — Z95.1 STATUS POST CORONARY ARTERY BYPASS GRAFT: ICD-10-CM

## 2024-11-08 DIAGNOSIS — C79.89: ICD-10-CM

## 2024-11-08 DIAGNOSIS — E11.649 TYPE 2 DIABETES MELLITUS WITH HYPOGLYCEMIA WITHOUT COMA, WITHOUT LONG-TERM CURRENT USE OF INSULIN (H): Chronic | ICD-10-CM

## 2024-11-08 DIAGNOSIS — N17.9 ACUTE RENAL FAILURE, UNSPECIFIED ACUTE RENAL FAILURE TYPE (H): ICD-10-CM

## 2024-11-08 DIAGNOSIS — Z79.85 LONG-TERM CURRENT USE OF INJECTABLE NONINSULIN ANTIDIABETIC MEDICATION: ICD-10-CM

## 2024-11-08 LAB
ALBUMIN SERPL BCG-MCNC: 2.3 G/DL (ref 3.5–5.2)
ALP SERPL-CCNC: 775 U/L (ref 40–150)
ALT SERPL W P-5'-P-CCNC: 101 U/L (ref 0–70)
ANION GAP SERPL CALCULATED.3IONS-SCNC: 8 MMOL/L (ref 7–15)
AST SERPL W P-5'-P-CCNC: 382 U/L (ref 0–45)
BASOPHILS # BLD AUTO: 0.1 10E3/UL (ref 0–0.2)
BASOPHILS NFR BLD AUTO: 0 %
BILIRUB DIRECT SERPL-MCNC: 4.17 MG/DL (ref 0–0.3)
BILIRUB SERPL-MCNC: 4.8 MG/DL
BUN SERPL-MCNC: 75.7 MG/DL (ref 8–23)
CALCIUM SERPL-MCNC: 7.7 MG/DL (ref 8.8–10.4)
CHLORIDE SERPL-SCNC: 99 MMOL/L (ref 98–107)
CREAT SERPL-MCNC: 2 MG/DL (ref 0.67–1.17)
EGFRCR SERPLBLD CKD-EPI 2021: 33 ML/MIN/1.73M2
EOSINOPHIL # BLD AUTO: 0.2 10E3/UL (ref 0–0.7)
EOSINOPHIL NFR BLD AUTO: 1 %
ERYTHROCYTE [DISTWIDTH] IN BLOOD BY AUTOMATED COUNT: 15.1 % (ref 10–15)
GLUCOSE BLDC GLUCOMTR-MCNC: 105 MG/DL (ref 70–99)
GLUCOSE BLDC GLUCOMTR-MCNC: 115 MG/DL (ref 70–99)
GLUCOSE BLDC GLUCOMTR-MCNC: 119 MG/DL (ref 70–99)
GLUCOSE BLDC GLUCOMTR-MCNC: 143 MG/DL (ref 70–99)
GLUCOSE BLDC GLUCOMTR-MCNC: 27 MG/DL (ref 70–99)
GLUCOSE BLDC GLUCOMTR-MCNC: 61 MG/DL (ref 70–99)
GLUCOSE BLDC GLUCOMTR-MCNC: 67 MG/DL (ref 70–99)
GLUCOSE SERPL-MCNC: 35 MG/DL (ref 70–99)
HCO3 SERPL-SCNC: 26 MMOL/L (ref 22–29)
HCT VFR BLD AUTO: 30.5 % (ref 40–53)
HGB BLD-MCNC: 10.5 G/DL (ref 13.3–17.7)
HOLD SPECIMEN: NORMAL
IMM GRANULOCYTES # BLD: 0.3 10E3/UL
IMM GRANULOCYTES NFR BLD: 3 %
INR PPP: 1.28 (ref 0.85–1.15)
LIPASE SERPL-CCNC: 20 U/L (ref 13–60)
LYMPHOCYTES # BLD AUTO: 0.6 10E3/UL (ref 0.8–5.3)
LYMPHOCYTES NFR BLD AUTO: 5 %
MCH RBC QN AUTO: 30.6 PG (ref 26.5–33)
MCHC RBC AUTO-ENTMCNC: 34.4 G/DL (ref 31.5–36.5)
MCV RBC AUTO: 89 FL (ref 78–100)
MONOCYTES # BLD AUTO: 0.6 10E3/UL (ref 0–1.3)
MONOCYTES NFR BLD AUTO: 4 %
NEUTROPHILS # BLD AUTO: 10.9 10E3/UL (ref 1.6–8.3)
NEUTROPHILS NFR BLD AUTO: 87 %
NRBC # BLD AUTO: 0 10E3/UL
NRBC BLD AUTO-RTO: 0 /100
PLATELET # BLD AUTO: 306 10E3/UL (ref 150–450)
POTASSIUM SERPL-SCNC: 5.1 MMOL/L (ref 3.4–5.3)
PROT SERPL-MCNC: 5.6 G/DL (ref 6.4–8.3)
RBC # BLD AUTO: 3.43 10E6/UL (ref 4.4–5.9)
SODIUM SERPL-SCNC: 133 MMOL/L (ref 135–145)
WBC # BLD AUTO: 12.6 10E3/UL (ref 4–11)

## 2024-11-08 PROCEDURE — 71250 CT THORAX DX C-: CPT | Mod: MA

## 2024-11-08 PROCEDURE — 82248 BILIRUBIN DIRECT: CPT | Performed by: EMERGENCY MEDICINE

## 2024-11-08 PROCEDURE — 51798 US URINE CAPACITY MEASURE: CPT | Performed by: EMERGENCY MEDICINE

## 2024-11-08 PROCEDURE — 83690 ASSAY OF LIPASE: CPT | Performed by: EMERGENCY MEDICINE

## 2024-11-08 PROCEDURE — 250N000011 HC RX IP 250 OP 636: Performed by: EMERGENCY MEDICINE

## 2024-11-08 PROCEDURE — 258N000003 HC RX IP 258 OP 636: Performed by: EMERGENCY MEDICINE

## 2024-11-08 PROCEDURE — 85025 COMPLETE CBC W/AUTO DIFF WBC: CPT | Performed by: EMERGENCY MEDICINE

## 2024-11-08 PROCEDURE — 258N000001 HC RX 258: Performed by: EMERGENCY MEDICINE

## 2024-11-08 PROCEDURE — 87040 BLOOD CULTURE FOR BACTERIA: CPT | Performed by: EMERGENCY MEDICINE

## 2024-11-08 PROCEDURE — 120N000001 HC R&B MED SURG/OB

## 2024-11-08 PROCEDURE — 85610 PROTHROMBIN TIME: CPT | Performed by: EMERGENCY MEDICINE

## 2024-11-08 PROCEDURE — 76705 ECHO EXAM OF ABDOMEN: CPT

## 2024-11-08 PROCEDURE — 85048 AUTOMATED LEUKOCYTE COUNT: CPT | Performed by: EMERGENCY MEDICINE

## 2024-11-08 PROCEDURE — 84155 ASSAY OF PROTEIN SERUM: CPT | Performed by: EMERGENCY MEDICINE

## 2024-11-08 PROCEDURE — 82962 GLUCOSE BLOOD TEST: CPT

## 2024-11-08 PROCEDURE — 36415 COLL VENOUS BLD VENIPUNCTURE: CPT | Performed by: EMERGENCY MEDICINE

## 2024-11-08 PROCEDURE — 80053 COMPREHEN METABOLIC PANEL: CPT | Performed by: EMERGENCY MEDICINE

## 2024-11-08 PROCEDURE — 82565 ASSAY OF CREATININE: CPT | Performed by: EMERGENCY MEDICINE

## 2024-11-08 PROCEDURE — 99291 CRITICAL CARE FIRST HOUR: CPT | Mod: 25 | Performed by: EMERGENCY MEDICINE

## 2024-11-08 PROCEDURE — 82040 ASSAY OF SERUM ALBUMIN: CPT | Performed by: EMERGENCY MEDICINE

## 2024-11-08 PROCEDURE — 99291 CRITICAL CARE FIRST HOUR: CPT | Performed by: EMERGENCY MEDICINE

## 2024-11-08 RX ORDER — DEXTROSE MONOHYDRATE 25 G/50ML
25-50 INJECTION, SOLUTION INTRAVENOUS
Status: DISCONTINUED | OUTPATIENT
Start: 2024-11-08 | End: 2024-11-16 | Stop reason: HOSPADM

## 2024-11-08 RX ORDER — DEXTROSE MONOHYDRATE AND SODIUM CHLORIDE 5; .9 G/100ML; G/100ML
INJECTION, SOLUTION INTRAVENOUS CONTINUOUS
Status: DISCONTINUED | OUTPATIENT
Start: 2024-11-08 | End: 2024-11-10

## 2024-11-08 RX ORDER — CALCIUM CARBONATE 500 MG/1
1000 TABLET, CHEWABLE ORAL 4 TIMES DAILY PRN
Status: DISCONTINUED | OUTPATIENT
Start: 2024-11-08 | End: 2024-11-09

## 2024-11-08 RX ORDER — NICOTINE POLACRILEX 4 MG
15-30 LOZENGE BUCCAL
Status: DISCONTINUED | OUTPATIENT
Start: 2024-11-08 | End: 2024-11-16 | Stop reason: HOSPADM

## 2024-11-08 RX ORDER — PIPERACILLIN SODIUM, TAZOBACTAM SODIUM 2; .25 G/10ML; G/10ML
2.25 INJECTION, POWDER, LYOPHILIZED, FOR SOLUTION INTRAVENOUS ONCE
Status: COMPLETED | OUTPATIENT
Start: 2024-11-08 | End: 2024-11-08

## 2024-11-08 RX ADMIN — DEXTROSE MONOHYDRATE 50 ML: 25 INJECTION, SOLUTION INTRAVENOUS at 19:40

## 2024-11-08 RX ADMIN — PIPERACILLIN AND TAZOBACTAM 2.25 G: 2; .25 INJECTION, POWDER, FOR SOLUTION INTRAVENOUS at 19:46

## 2024-11-08 RX ADMIN — DEXTROSE AND SODIUM CHLORIDE: 5; 900 INJECTION, SOLUTION INTRAVENOUS at 18:45

## 2024-11-08 RX ADMIN — DEXTROSE MONOHYDRATE 50 ML: 25 INJECTION, SOLUTION INTRAVENOUS at 17:32

## 2024-11-08 ASSESSMENT — ACTIVITIES OF DAILY LIVING (ADL)
ADLS_ACUITY_SCORE: 0

## 2024-11-08 ASSESSMENT — COLUMBIA-SUICIDE SEVERITY RATING SCALE - C-SSRS
2. HAVE YOU ACTUALLY HAD ANY THOUGHTS OF KILLING YOURSELF IN THE PAST MONTH?: NO
6. HAVE YOU EVER DONE ANYTHING, STARTED TO DO ANYTHING, OR PREPARED TO DO ANYTHING TO END YOUR LIFE?: NO
1. IN THE PAST MONTH, HAVE YOU WISHED YOU WERE DEAD OR WISHED YOU COULD GO TO SLEEP AND NOT WAKE UP?: YES

## 2024-11-08 NOTE — ED NOTES
RN and ERT performed pericare on patient.  Pt has 3 sores on bottom.  1 coccyx pressure injury, right buttock pressure injury and 1 left buttock pressure injury.  Barrier cream applied.

## 2024-11-08 NOTE — TELEPHONE ENCOUNTER
Dorita- SO of Jayesh calling in to speak to nurse.  Dorita stated that Jayesh has Cancer.  Dorita then stated she will call back as she needed to go- Call was ended

## 2024-11-08 NOTE — ED TRIAGE NOTES
"Pt recently diagnosed with colon cancer, with mets to liver, abd, and esophagus per patient. Pt lives at home with girlfriend, and she is no longer able to care for patient. Pt has become too weak, pt states he is miserable and wishes someone would put \"a bullet in my head.\"       Pt is needing placement, and is unable to care for self at home.        Triage Assessment (Adult)       Row Name 11/08/24 1604 11/08/24 1600       Triage Assessment    Airway WDL WDL WDL       Respiratory WDL    Respiratory WDL rhythm/pattern WDL    Rhythm/Pattern, Respiratory shortness of breath;shallow;hiccoughs --       Skin Circulation/Temperature WDL    Skin Circulation/Temperature WDL all  yellowish WDL    Skin Temperature warm --       Cardiac WDL    Cardiac WDL WDL WDL       Peripheral/Neurovascular WDL    Peripheral Neurovascular WDL neurovascular assessment lower  bilateral LE edema WDL       Cognitive/Neuro/Behavioral WDL    Cognitive/Neuro/Behavioral WDL -- WDL                    "

## 2024-11-08 NOTE — ED PROVIDER NOTES
"  History     Chief Complaint   Patient presents with    Generalized Weakness     Pt recently diagnosed with colon cancer, with mets to liver, abd, and esophagus per patient.  Pt lives at home with girlfriend, and she is no longer able to care for patient.  Pt has become too weak, pt states he is miserable and wishes someone would put \"a bullet in my head.\"    Social Work Services     Pt is needing placement, and is unable to care for self at home.     HPI  Jayesh Ortiz is a 84 year old male with history Unintended weight loss, type 2 diabetes (glipizide/metformin), coronary artery disease status post CABG and aortic valve replacement, with increasing weakness and inability to care for self at home.  Patient had recent hospitalization here at [facility] from 10/29 through 11/3.  Was treated for MAYDA and imaging highly suspicious for metastatic disease.  CT scan with eccentric thickening of distal esophagus, pulmonary nodules, innumerable hepatic nodules and suspicious lymph nodes in abdomen.  He did have initial appointment with medical oncology 4 days ago and plan for GI referral which is yet to occur.  Says he is too weak to get out of bed and toilet.  Was having falls prior to worsening weakness.  Family does not think he is safe at home.  Patient denies chest pain or shortness of breath.  No abdominal pain.  Decreased appetite.  Has not been eating much recently.  No pain or burning with urination.  Bowels moving with 3 small bowel movements earlier today.  No reported fevers.    The patient's PMHx, Surgical Hx, Allergies, and Medications were all reviewed with the patient.    Allergies:  Allergies   Allergen Reactions    Nka [No Known Allergies]        Problem List:    Patient Active Problem List    Diagnosis Date Noted    Morbid obesity (H) 03/24/2017     Priority: High    Type 2 diabetes mellitus with hypoglycemia without coma, without long-term current use of insulin (H) 11/08/2024     Priority: Medium "    Anemia 10/30/2024     Priority: Medium    Generalized weakness 10/29/2024     Priority: Medium    Falls frequently 10/29/2024     Priority: Medium    Abnormal results of liver function studies 10/29/2024     Priority: Medium    Acute kidney injury (H) 10/29/2024     Priority: Medium    Abnormal liver scan 10/21/2024     Priority: Medium     CT 10/21/24 - Circumferential wall thickening of the distal esophagus, which could be attributable to an underlying esophagitis or primary esophageal malignancy. Endoscopic correlation is recommended. 2.  Multiple hepatic lesions, highly suspicious for metastatic disease. 3.  Upper abdominal lymphadenopathy, suspicious for venessa metastatic disease. 4.  Few sub-6 mm pulmonary nodules, new from prior and also suspicious for metastatic disease. 5.  Cirrhosis.      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  Manta closure right, angioseal closure left      Aortic stenosis, severe 04/04/2023     Priority: Medium    Coronary artery disease involving coronary bypass graft of native heart without angina pectoris      Priority: Medium     Angiogram 1/2023 pre-TAVR -  %. Mid LAD filled by collaterals from Prox RCA. Cx 15% vessel disease. RCA 10%        History of cardiomyopathy 04/29/2022     Priority: Medium     Echocardiogram 07/15/24 with LVEF 50-55% (improved from 45-50% on 03/04/24), biatrial enlargement. LVEF previously 45-50% by 3/4/2024 TTE in the setting of 36% PVCs, improved to LVEF 50-55% on 07/15/24 RUSSELL with partial PCV suppression (16% burden at that time). HF time course consistent with association with PVCs, though cannot excluded LBBB-dyssynchrony mediated cardiomyopathy.       Hypothyroidism 03/09/2017     Priority: Medium     History of prostate cancer 03/09/2017     Priority: Medium     S/p XRT in 2000s per patient       History of thyroid cancer 03/09/2017     Priority: Medium      S/p resection in approximately 1995 per patient      Hyperlipidemia 03/09/2017     Priority: Medium    Type 2 diabetes mellitus (H) 03/09/2017     Priority: Medium        Past Medical History:    Past Medical History:   Diagnosis Date    Accelerated hypertension 08/07/2023    Acute congestive heart failure, unspecified heart failure type (H) 08/07/2023    Diabetes mellitus, type 2 (H)     Fracture of right talus 03/09/2017    Hypertension     Prostate cancer (H)     S/p TAVR (transcatheter aortic valve replacement), bioprosthetic     Thyroid cancer (H)     Thyroid disease        Past Surgical History:    Past Surgical History:   Procedure Laterality Date    CLOSED REDUCTION, PERCUTANEOUS PINNING LOWER EXTREMITY, COMBINED Right 3/9/2017    Procedure: COMBINED CLOSED REDUCTION, PERCUTANEOUS PINNING LOWER EXTREMITY;  Surgeon: Ankit Coy DPM;  Location: [facility] OR    CV CORONARY ANGIOGRAM N/A 2/9/2023    Procedure: Coronary Angiogram;  Surgeon: Nabeel Davies MD;  Location: [facility]    CV TRANSCATHETER AORTIC VALVE REPLACEMENT-FEMORAL APPROACH N/A 4/4/2023    Procedure: Transcatheter Aortic Valve Replacement-Femoral Approach;  Surgeon: Nabeel Davies MD;  Location: [facility] LAB CV    OR TRANSCATHETER AORTIC VALVE REPLACEMENT, FEMORAL PERCUTANEOUS APPROACH (STANDBY) N/A 4/4/2023    Procedure: OR TRANSCATHETER AORTIC VALVE REPLACEMENT, FEMORAL PERCUTANEOUS APPROACH (STANDBY);  Surgeon: Jeramie De Jesus MD;  Location: [facility] LAB CV    THYROIDECTOMY         Family History:    Family History   Problem Relation Age of Onset    Heart Failure Mother     Heart Failure Father        Social History:  Marital Status:  Single [1]  Social History     Tobacco Use    Smoking status: Former    Smokeless tobacco: Never   Vaping Use  "   Vaping status: Never Used   Substance Use Topics    Alcohol use: Not Currently    Drug use: Never        Medications:    aspirin (ASA) 81 MG EC tablet  blood glucose (NO BRAND SPECIFIED) lancets standard  blood glucose (NO BRAND SPECIFIED) test strip  blood glucose monitoring (NO BRAND SPECIFIED) meter device kit  carvedilol (COREG) 6.25 MG tablet  ezetimibe (ZETIA) 10 MG tablet  glipiZIDE (GLUCOTROL) 5 MG tablet  hydrOXYzine HCl (ATARAX) 25 MG tablet  levothyroxine (SYNTHROID/LEVOTHROID) 175 MCG tablet  Magnesium Oxide 250 MG tablet  metFORMIN (GLUCOPHAGE) 500 MG tablet  omeprazole (PRILOSEC) 20 MG DR capsule  thin (NO BRAND SPECIFIED) lancets          Review of Systems  Pertinent positives and negatives mentioned in HPI    Physical Exam   BP: 111/60  Pulse: 71  Temp: 99.2  F (37.3  C)  Resp: 18  Height: 172.7 cm (5' 8\")  Weight: 114.8 kg (253 lb)  SpO2: 94 %    GEN: Awake and alert.  Appears ill.  Appears jaundiced.  HENT: Oral mucosa dry.  EYES: Scleral icterus present  NECK: Symmetric, freely mobile.   CV : Regular rate and rhythm.  PULM: Normal effort. No wheezes, rales, or rhonchi bilaterally.  ABD: Abdomen is soft and distended.  No tenderness to palpation.  NEURO: No slurring of speech.  Oriented to person place and situation.  Following commands in all extremities.  GCS 15.  EXT: Pitting edema of bilateral lower extremities.    INT: Warm. No diaphoresis.  Jaundice color.        ED Course        Procedures                 Critical Care time:  was 45 minutes for this patient excluding procedures.           Results for orders placed or performed during the hospital encounter of 11/08/24 (from the past 24 hours)   [place] Draw    Narrative    The following orders were created for panel order [place] Draw.  Procedure                               Abnormality         Status                     ---------                               -----------         ------                     Extra Blue Top Tube[[id number]]  "                             Final result               Extra Red Top Tube[[id number]]                               Final result               Extra Green Top (Lithium...[[id number]]                      Final result               Extra Purple Top Tube[[id number]]                            Final result                 Please view results for these tests on the individual orders.   Extra Blue Top Tube   Result Value Ref Range    Hold Specimen JIC    Extra Red Top Tube   Result Value Ref Range    Hold Specimen JIC    Extra Green Top (Lithium Heparin) Tube   Result Value Ref Range    Hold Specimen JIC    Extra Purple Top Tube   Result Value Ref Range    Hold Specimen JIC    Comprehensive metabolic panel   Result Value Ref Range    Sodium 133 (L) 135 - 145 mmol/L    Potassium 5.1 3.4 - 5.3 mmol/L    Carbon Dioxide (CO2) 26 22 - 29 mmol/L    Anion Gap 8 7 - 15 mmol/L    Urea Nitrogen 75.7 (H) 8.0 - 23.0 mg/dL    Creatinine 2.00 (H) 0.67 - 1.17 mg/dL    GFR Estimate 33 (L) >60 mL/min/1.73m2    Calcium 7.7 (L) 8.8 - 10.4 mg/dL    Chloride 99 98 - 107 mmol/L    Glucose 35 (LL) 70 - 99 mg/dL    Alkaline Phosphatase 775 (H) 40 - 150 U/L     (H) 0 - 45 U/L     (H) 0 - 70 U/L    Protein Total 5.6 (L) 6.4 - 8.3 g/dL    Albumin 2.3 (L) 3.5 - 5.2 g/dL    Bilirubin Total 4.8 (H) <=1.2 mg/dL   Lipase   Result Value Ref Range    Lipase 20 13 - 60 U/L   CBC with platelets differential    Narrative    The following orders were created for panel order CBC with platelets differential.  Procedure                               Abnormality         Status                     ---------                               -----------         ------                     CBC with platelets and d...[[id number]]  Abnormal            Final result                 Please view results for these tests on the individual orders.   CBC with platelets and differential   Result Value Ref Range    WBC Count 12.6 (H) 4.0 - 11.0 10e3/uL    RBC Count 3.43  (L) 4.40 - 5.90 10e6/uL    Hemoglobin 10.5 (L) 13.3 - 17.7 g/dL    Hematocrit 30.5 (L) 40.0 - 53.0 %    MCV 89 78 - 100 fL    MCH 30.6 26.5 - 33.0 pg    MCHC 34.4 31.5 - 36.5 g/dL    RDW 15.1 (H) 10.0 - 15.0 %    Platelet Count 306 150 - 450 10e3/uL    % Neutrophils 87 %    % Lymphocytes 5 %    % Monocytes 4 %    % Eosinophils 1 %    % Basophils 0 %    % Immature Granulocytes 3 %    NRBCs per 100 WBC 0 <1 /100    Absolute Neutrophils 10.9 (H) 1.6 - 8.3 10e3/uL    Absolute Lymphocytes 0.6 (L) 0.8 - 5.3 10e3/uL    Absolute Monocytes 0.6 0.0 - 1.3 10e3/uL    Absolute Eosinophils 0.2 0.0 - 0.7 10e3/uL    Absolute Basophils 0.1 0.0 - 0.2 10e3/uL    Absolute Immature Granulocytes 0.3 <=0.4 10e3/uL    Absolute NRBCs 0.0 10e3/uL   Bilirubin direct   Result Value Ref Range    Bilirubin Direct 4.17 (H) 0.00 - 0.30 mg/dL   INR   Result Value Ref Range    INR 1.28 (H) 0.85 - 1.15   Glucose by meter   Result Value Ref Range    GLUCOSE BY METER POCT 27 (LL) 70 - 99 mg/dL   Glucose by meter   Result Value Ref Range    GLUCOSE BY METER POCT 105 (H) 70 - 99 mg/dL   Glucose by meter   Result Value Ref Range    GLUCOSE BY METER POCT 67 (L) 70 - 99 mg/dL   US Abdomen Limited    Narrative    EXAM: US ABDOMEN LIMITED  LOCATION: [facility]  DATE: 11/8/2024    INDICATION: likely metastatic disease, new hypoglycemia and rising AST ALT and Tbili. please eval for cholecystitis biliary obstruction  COMPARISON: Ultrasound 10/29/2024. CT 10/31/2024  TECHNIQUE: Limited abdominal ultrasound.    FINDINGS:    GALLBLADDER: No significant change in appearance with moderate diffuse wall thickening. Gallstones are present. There is no tenderness reported over the gallbladder during exam.    BILE DUCTS: No intrahepatic ductal dilatation evident. The common duct is obscured.    LIVER: Heterogeneity with numerous diffuse hypoechoic nodules consistent with diffuse metastatic disease, unchanged .    RIGHT KIDNEY: No  hydronephrosis.    PANCREAS: Obscured by bowel gas.    Modest ascites..      Impression    IMPRESSION:  1.  Diffuse metastatic disease throughout liver.  2.  Stable appearance of gallbladder with gallstones and wall thickening but no reported tenderness.  3.  No intrahepatic bile duct dilatation.  4.  Ascites.       Glucose by meter   Result Value Ref Range    GLUCOSE BY METER POCT 61 (L) 70 - 99 mg/dL   CT Chest Abdomen Pelvis w/o Contrast    Narrative    EXAM: CT CHEST ABDOMEN PELVIS W/O CONTRAST  LOCATION: [facility]  DATE: 11/8/2024    INDICATION: likely metastatic disease now with increasing weakness and hypoglycemia. please eval for acute infectious process  COMPARISON: Ultrasound from today. CT 10/31/2024  TECHNIQUE: CT scan of the chest, abdomen, and pelvis was performed without IV contrast. Multiplanar reformats were obtained. Dose reduction techniques were used.   CONTRAST: None.    FINDINGS:   LUNGS AND PLEURA: Modest motion artifact. Numerous small bilateral pulmonary nodules are unchanged, largest measures 8 mm anterior left upper lobe. There are tiny new bilateral pleural effusions and increased dependent atelectasis at both lung bases.    MEDIASTINUM/AXILLAE: Fluid fills the esophagus up to the thoracic inlet. Wall thickening present inferior most esophagus unchanged. No mediastinal lymphadenopathy. Previous TAVR.    CORONARY ARTERY CALCIFICATION: Moderate.    HEPATOBILIARY: Diffuse metastatic disease similar to previous exam, better seen on the prior enhanced study. Small gallstones and modest gallbladder wall thickening unchanged. No bile duct dilatation visible.    PANCREAS: Normal.    SPLEEN: Normal.    ADRENAL GLANDS: Normal.    KIDNEYS/BLADDER: Normal.    BOWEL: No obstruction or inflammatory change. Mild ascites slightly increased.    LYMPH NODES: Gastrohepatic ligament, cydney hepatis and periaortic adenopathy unchanged.    VASCULATURE: Moderate diffuse  atherosclerotic calcifications.    PELVIC ORGANS: Fiducial markers within prostate.    MUSCULOSKELETAL: No suspicious bony lesions.      Impression    IMPRESSION:  1.  Minimal change. Slight increase in mild ascites and tiny new pleural effusions are present.  2.  Diffuse hepatic metastases not significantly changed. Small pulmonary nodules also unchanged.  3.  Thick-walled gallbladder with gallstones unchanged.  4.  Thick-walled distal esophagus with fluid filled upper and midesophagus.  5.  Dontae hepatis, gastrohepatic ligament and periaortic adenopathy.     Glucose by meter   Result Value Ref Range    GLUCOSE BY METER POCT 143 (H) 70 - 99 mg/dL   Glucose by meter   Result Value Ref Range    GLUCOSE BY METER POCT 119 (H) 70 - 99 mg/dL       Medications   glucose gel 15-30 g ( Oral See Alternative 11/8/24 1940)     Or   dextrose 50 % injection 25-50 mL (50 mLs Intravenous $Given 11/8/24 1940)     Or   glucagon injection 1 mg ( Subcutaneous See Alternative 11/8/24 1940)   dextrose 5% and 0.9% NaCl infusion ( Intravenous $New Bag 11/8/24 1845)   piperacillin-tazobactam (ZOSYN) 2.25 g vial to attach to  ml bag (2.25 g Intravenous $New Bag 11/8/24 1946)       Assessments & Plan (with Medical Decision Making)   84 year old male with increasing weakness unable to care for self at home in setting of recent hospitalization (discharge 11/24) at which time was found to have concerns for metastatic disease on CT imaging.  No obvious primary but eccentric thickening of esophagus, innumerable hepatic lesions, pulmonary nodules, and enlarged abdominal lymph nodes.    Patient appears jaundiced and fatigued. GCS 15 and no signs of encephalopathy. No fever or reported fever.  Patient too weak and not able to care for self at home.  Tentative plan to admit here at [facility] and may need placement.  Patient does have workup concerning for metastatic disease but no formal diagnosis of cancer.    CBC with leukocytosis of 12.6.  Chart  review shows that he has had Elevated white count through his recent hospitalization but negative blood cultures and thought not to be an infectious process.  Hemoglobin 10.5 which is near his baseline.  Lipase within normal limits at 28.  Sodium 133, creatinine 2.0 with BUN at 75.7.  This is slightly improved from his recent hospitalization but still considerably above his baseline.  Patient has not been eating or drinking much I suspect is a little dehydrated.  Also has increasing alk phos likely related to his metastatic disease.  Transaminases elevated 382/101 (AST/ALT respectively) again likely related to his liver disease.  Total bili of 4.8 with direct bili of 4.17.  This would be suggestive of an obstructive process potentially. Discussed case with Dr Joshua santillan/ hepatology who felt presentation. Pt not encephalopathic, INR 1.28 likely related to underlying hepatic mets. Hypoglycemia unlikely 2/2 acute liver failure.     Ultrasound of gallbladder similar to previous which shows likely diffuse metastatic disease throughout the liver and stable appearance of gallbladder with stones present and wall thickening but no sonographic Morales.  Unable to visualize common bile duct but no intrahepatic bile duct dilatation.  CT scan of chest abdomen pelvis without contrast due to worsening renal function pending.    Glucose found to be low (35) D50 given and D5LR infusion. Glucose initially improved and then was 61 on subsequent repeat. Additional D50 given. Patient is dry and is on glipizide which could be causing his hypoglycemia. Considering infection as well (CT). No obvious source of infection. Developing pressure ulceration on coccyx but unlikely to be source of infection. Exam inconsistent with Guerrero's.  Empirically given dose of IV Zosyn, blood culture pending.     Patient will require admission due to unsafe disposition and inability to care at home. Possible placement.     Case discussed with Amina Shore  with the hospitalist service who accepted admission.     I have reviewed the nursing notes.         New Prescriptions    No medications on file       Final diagnoses:   Type 2 diabetes mellitus with hypoglycemia without coma, without long-term current use of insulin (H)   Acute kidney injury (H)   Generalized weakness     Keo Terry MD        11/8/2024   [facility] EMERGENCY DEPT    Disclaimer: This note consists of words and symbols derived from keyboarding and dictation using voice recognition software.  As a result, there may be errors that have gone undetected.  Please consider this when interpreting information found in this note.               Keo Terry MD  11/08/24 [id number]

## 2024-11-08 NOTE — TELEPHONE ENCOUNTER
"Patient's wife calling saying that patient was recently diagnosed with cancer. Has become very weak and she is unable to help him by herself especially with getting him to the bathroom she stated. She has been in contact with some social workers who she stated have \"not been very helpful\". Looks like most recently she was talking with someone on phone to get assistance but had to hang up with them. Provider number for her to call back  She did request a call from Dr. Live. I did say I would pass along a message.      Aleshia Colin RN    "

## 2024-11-08 NOTE — TELEPHONE ENCOUNTER
Dorita and Darrell called requesting to speak with Dr. Live. They expressed concern about the patient, stating that his condition is worsening, and he's showing signs of degradation and slight yellowing. They are seeking Dr. Live's assistance. Please call them from the home number [phone].

## 2024-11-08 NOTE — TELEPHONE ENCOUNTER
Please note.     I have called 3 times without success. I left a messages.    Please reach out. It sounds like he would benefit from a TCU stay. However, it can be a challenge to be admitted there, especially when he needs complex care.    If his condition is deteriorating and she cannot care for him then next best step is to go the ED where he likely would get admitted. They can stabilize Jayesh and then assist with sending him to transitional care where he can be supported.    Also, I will reach out this weekend but want them to know next steps if his condition worsens.    Geoffrey Live MD

## 2024-11-09 LAB
ALBUMIN SERPL BCG-MCNC: 2.1 G/DL (ref 3.5–5.2)
ALBUMIN UR-MCNC: NEGATIVE MG/DL
ALP SERPL-CCNC: 695 U/L (ref 40–150)
ALT SERPL W P-5'-P-CCNC: 92 U/L (ref 0–70)
ANION GAP SERPL CALCULATED.3IONS-SCNC: 9 MMOL/L (ref 7–15)
APPEARANCE UR: ABNORMAL
AST SERPL W P-5'-P-CCNC: 319 U/L (ref 0–45)
BACTERIA #/AREA URNS HPF: ABNORMAL /HPF
BILIRUB SERPL-MCNC: 4.3 MG/DL
BILIRUB UR QL STRIP: NEGATIVE
BUN SERPL-MCNC: 72.1 MG/DL (ref 8–23)
CALCIUM SERPL-MCNC: 7.2 MG/DL (ref 8.8–10.4)
CHLORIDE SERPL-SCNC: 99 MMOL/L (ref 98–107)
COLOR UR AUTO: ABNORMAL
CREAT SERPL-MCNC: 1.98 MG/DL (ref 0.67–1.17)
EGFRCR SERPLBLD CKD-EPI 2021: 33 ML/MIN/1.73M2
ERYTHROCYTE [DISTWIDTH] IN BLOOD BY AUTOMATED COUNT: 15.2 % (ref 10–15)
GLUCOSE BLDC GLUCOMTR-MCNC: 66 MG/DL (ref 70–99)
GLUCOSE BLDC GLUCOMTR-MCNC: 82 MG/DL (ref 70–99)
GLUCOSE SERPL-MCNC: 77 MG/DL (ref 70–99)
GLUCOSE UR STRIP-MCNC: NEGATIVE MG/DL
HCO3 SERPL-SCNC: 23 MMOL/L (ref 22–29)
HCT VFR BLD AUTO: 26.8 % (ref 40–53)
HGB BLD-MCNC: 9.1 G/DL (ref 13.3–17.7)
HGB UR QL STRIP: NEGATIVE
HOLD SPECIMEN: NORMAL
KETONES UR STRIP-MCNC: NEGATIVE MG/DL
LACTATE SERPL-SCNC: 2.4 MMOL/L (ref 0.7–2)
LACTATE SERPL-SCNC: 2.5 MMOL/L (ref 0.7–2)
LACTATE SERPL-SCNC: 2.7 MMOL/L (ref 0.7–2)
LACTATE SERPL-SCNC: 2.8 MMOL/L (ref 0.7–2)
LACTATE SERPL-SCNC: 2.9 MMOL/L (ref 0.7–2)
LACTATE SERPL-SCNC: 3.2 MMOL/L (ref 0.7–2)
LACTATE SERPL-SCNC: 3.3 MMOL/L (ref 0.7–2)
LEUKOCYTE ESTERASE UR QL STRIP: ABNORMAL
MCH RBC QN AUTO: 30.8 PG (ref 26.5–33)
MCHC RBC AUTO-ENTMCNC: 34 G/DL (ref 31.5–36.5)
MCV RBC AUTO: 91 FL (ref 78–100)
MRSA DNA SPEC QL NAA+PROBE: NEGATIVE
MUCOUS THREADS #/AREA URNS LPF: PRESENT /LPF
NITRATE UR QL: NEGATIVE
PH UR STRIP: 5 [PH] (ref 5–7)
PLATELET # BLD AUTO: 284 10E3/UL (ref 150–450)
POTASSIUM SERPL-SCNC: 4.5 MMOL/L (ref 3.4–5.3)
PROT SERPL-MCNC: 5.2 G/DL (ref 6.4–8.3)
RBC # BLD AUTO: 2.95 10E6/UL (ref 4.4–5.9)
RBC URINE: 2 /HPF
SA TARGET DNA: NEGATIVE
SODIUM SERPL-SCNC: 131 MMOL/L (ref 135–145)
SP GR UR STRIP: 1.01 (ref 1–1.03)
SQUAMOUS EPITHELIAL: 1 /HPF
T4 FREE SERPL-MCNC: 0.76 NG/DL (ref 0.9–1.7)
TSH SERPL DL<=0.005 MIU/L-ACNC: 11.54 UIU/ML (ref 0.3–4.2)
UROBILINOGEN UR STRIP-MCNC: 4 MG/DL
WBC # BLD AUTO: 10.3 10E3/UL (ref 4–11)
WBC URINE: 17 /HPF

## 2024-11-09 PROCEDURE — 87641 MR-STAPH DNA AMP PROBE: CPT | Performed by: STUDENT IN AN ORGANIZED HEALTH CARE EDUCATION/TRAINING PROGRAM

## 2024-11-09 PROCEDURE — 85014 HEMATOCRIT: CPT | Performed by: HOSPITALIST

## 2024-11-09 PROCEDURE — 250N000011 HC RX IP 250 OP 636: Performed by: HOSPITALIST

## 2024-11-09 PROCEDURE — 83605 ASSAY OF LACTIC ACID: CPT | Performed by: STUDENT IN AN ORGANIZED HEALTH CARE EDUCATION/TRAINING PROGRAM

## 2024-11-09 PROCEDURE — 258N000003 HC RX IP 258 OP 636: Performed by: EMERGENCY MEDICINE

## 2024-11-09 PROCEDURE — 258N000003 HC RX IP 258 OP 636: Performed by: HOSPITALIST

## 2024-11-09 PROCEDURE — 120N000001 HC R&B MED SURG/OB

## 2024-11-09 PROCEDURE — 250N000011 HC RX IP 250 OP 636

## 2024-11-09 PROCEDURE — 36415 COLL VENOUS BLD VENIPUNCTURE: CPT | Performed by: FAMILY MEDICINE

## 2024-11-09 PROCEDURE — 84443 ASSAY THYROID STIM HORMONE: CPT | Performed by: STUDENT IN AN ORGANIZED HEALTH CARE EDUCATION/TRAINING PROGRAM

## 2024-11-09 PROCEDURE — 84155 ASSAY OF PROTEIN SERUM: CPT | Performed by: HOSPITALIST

## 2024-11-09 PROCEDURE — 81001 URINALYSIS AUTO W/SCOPE: CPT | Performed by: EMERGENCY MEDICINE

## 2024-11-09 PROCEDURE — 83605 ASSAY OF LACTIC ACID: CPT | Performed by: FAMILY MEDICINE

## 2024-11-09 PROCEDURE — 99222 1ST HOSP IP/OBS MODERATE 55: CPT | Performed by: STUDENT IN AN ORGANIZED HEALTH CARE EDUCATION/TRAINING PROGRAM

## 2024-11-09 PROCEDURE — 87086 URINE CULTURE/COLONY COUNT: CPT | Performed by: EMERGENCY MEDICINE

## 2024-11-09 PROCEDURE — 250N000013 HC RX MED GY IP 250 OP 250 PS 637: Performed by: HOSPITALIST

## 2024-11-09 PROCEDURE — 84439 ASSAY OF FREE THYROXINE: CPT | Performed by: STUDENT IN AN ORGANIZED HEALTH CARE EDUCATION/TRAINING PROGRAM

## 2024-11-09 PROCEDURE — 258N000003 HC RX IP 258 OP 636: Performed by: FAMILY MEDICINE

## 2024-11-09 PROCEDURE — 36415 COLL VENOUS BLD VENIPUNCTURE: CPT | Performed by: STUDENT IN AN ORGANIZED HEALTH CARE EDUCATION/TRAINING PROGRAM

## 2024-11-09 PROCEDURE — 250N000011 HC RX IP 250 OP 636: Performed by: STUDENT IN AN ORGANIZED HEALTH CARE EDUCATION/TRAINING PROGRAM

## 2024-11-09 PROCEDURE — 87640 STAPH A DNA AMP PROBE: CPT | Performed by: STUDENT IN AN ORGANIZED HEALTH CARE EDUCATION/TRAINING PROGRAM

## 2024-11-09 PROCEDURE — 84520 ASSAY OF UREA NITROGEN: CPT | Performed by: HOSPITALIST

## 2024-11-09 PROCEDURE — 82040 ASSAY OF SERUM ALBUMIN: CPT | Performed by: HOSPITALIST

## 2024-11-09 RX ORDER — ONDANSETRON 4 MG/1
4 TABLET, ORALLY DISINTEGRATING ORAL EVERY 6 HOURS PRN
Status: DISCONTINUED | OUTPATIENT
Start: 2024-11-09 | End: 2024-11-09

## 2024-11-09 RX ORDER — PROCHLORPERAZINE MALEATE 5 MG/1
5 TABLET ORAL EVERY 6 HOURS PRN
Status: DISCONTINUED | OUTPATIENT
Start: 2024-11-09 | End: 2024-11-16 | Stop reason: HOSPADM

## 2024-11-09 RX ORDER — PANTOPRAZOLE SODIUM 40 MG/1
40 TABLET, DELAYED RELEASE ORAL DAILY
Status: DISCONTINUED | OUTPATIENT
Start: 2024-11-09 | End: 2024-11-16 | Stop reason: HOSPADM

## 2024-11-09 RX ORDER — ONDANSETRON 2 MG/ML
4 INJECTION INTRAMUSCULAR; INTRAVENOUS EVERY 6 HOURS PRN
Status: DISCONTINUED | OUTPATIENT
Start: 2024-11-09 | End: 2024-11-16 | Stop reason: HOSPADM

## 2024-11-09 RX ORDER — AMOXICILLIN 250 MG
1 CAPSULE ORAL 2 TIMES DAILY PRN
Status: DISCONTINUED | OUTPATIENT
Start: 2024-11-09 | End: 2024-11-16 | Stop reason: HOSPADM

## 2024-11-09 RX ORDER — ONDANSETRON 4 MG/1
4 TABLET, ORALLY DISINTEGRATING ORAL EVERY 6 HOURS PRN
Status: DISCONTINUED | OUTPATIENT
Start: 2024-11-09 | End: 2024-11-16 | Stop reason: HOSPADM

## 2024-11-09 RX ORDER — TEMAZEPAM 7.5 MG/1
7.5 CAPSULE ORAL
Status: DISPENSED | OUTPATIENT
Start: 2024-11-09 | End: 2024-11-10

## 2024-11-09 RX ORDER — ONDANSETRON 2 MG/ML
4 INJECTION INTRAMUSCULAR; INTRAVENOUS EVERY 6 HOURS PRN
Status: DISCONTINUED | OUTPATIENT
Start: 2024-11-09 | End: 2024-11-09

## 2024-11-09 RX ORDER — CALCIUM CARBONATE 500 MG/1
1000 TABLET, CHEWABLE ORAL 4 TIMES DAILY PRN
Status: DISCONTINUED | OUTPATIENT
Start: 2024-11-09 | End: 2024-11-16 | Stop reason: HOSPADM

## 2024-11-09 RX ORDER — CHLORPROMAZINE HCI 25 MG/ML
25 INJECTION INTRAMUSCULAR ONCE
Status: COMPLETED | OUTPATIENT
Start: 2024-11-09 | End: 2024-11-09

## 2024-11-09 RX ORDER — LIDOCAINE 40 MG/G
CREAM TOPICAL
Status: DISCONTINUED | OUTPATIENT
Start: 2024-11-09 | End: 2024-11-16 | Stop reason: HOSPADM

## 2024-11-09 RX ORDER — PIPERACILLIN SODIUM, TAZOBACTAM SODIUM 2; .25 G/10ML; G/10ML
2.25 INJECTION, POWDER, LYOPHILIZED, FOR SOLUTION INTRAVENOUS EVERY 6 HOURS
Status: DISCONTINUED | OUTPATIENT
Start: 2024-11-09 | End: 2024-11-11

## 2024-11-09 RX ORDER — AMOXICILLIN 250 MG
2 CAPSULE ORAL 2 TIMES DAILY PRN
Status: DISCONTINUED | OUTPATIENT
Start: 2024-11-09 | End: 2024-11-16 | Stop reason: HOSPADM

## 2024-11-09 RX ORDER — PROCHLORPERAZINE 25 MG
12.5 SUPPOSITORY, RECTAL RECTAL EVERY 12 HOURS PRN
Status: DISCONTINUED | OUTPATIENT
Start: 2024-11-09 | End: 2024-11-16 | Stop reason: HOSPADM

## 2024-11-09 RX ADMIN — PANTOPRAZOLE SODIUM 40 MG: 40 TABLET, DELAYED RELEASE ORAL at 09:25

## 2024-11-09 RX ADMIN — SODIUM CHLORIDE 1000 ML: 9 INJECTION, SOLUTION INTRAVENOUS at 01:18

## 2024-11-09 RX ADMIN — TEMAZEPAM 7.5 MG: 7.5 CAPSULE ORAL at 21:45

## 2024-11-09 RX ADMIN — DEXTROSE AND SODIUM CHLORIDE: 5; 900 INJECTION, SOLUTION INTRAVENOUS at 21:45

## 2024-11-09 RX ADMIN — CHLORPROMAZINE HYDROCHLORIDE 25 MG: 25 INJECTION INTRAMUSCULAR at 03:57

## 2024-11-09 RX ADMIN — ONDANSETRON 4 MG: 4 TABLET, ORALLY DISINTEGRATING ORAL at 00:42

## 2024-11-09 RX ADMIN — PIPERACILLIN AND TAZOBACTAM 2.25 G: 2; .25 INJECTION, POWDER, FOR SOLUTION INTRAVENOUS at 20:18

## 2024-11-09 RX ADMIN — PIPERACILLIN AND TAZOBACTAM 2.25 G: 2; .25 INJECTION, POWDER, FOR SOLUTION INTRAVENOUS at 13:44

## 2024-11-09 RX ADMIN — SODIUM CHLORIDE 1000 ML: 9 INJECTION, SOLUTION INTRAVENOUS at 18:09

## 2024-11-09 RX ADMIN — DEXTROSE AND SODIUM CHLORIDE: 5; 900 INJECTION, SOLUTION INTRAVENOUS at 03:12

## 2024-11-09 RX ADMIN — TEMAZEPAM 7.5 MG: 7.5 CAPSULE ORAL at 03:57

## 2024-11-09 RX ADMIN — CALCIUM CARBONATE (ANTACID) CHEW TAB 500 MG 1000 MG: 500 CHEW TAB at 20:23

## 2024-11-09 RX ADMIN — PIPERACILLIN AND TAZOBACTAM 2.25 G: 2; .25 INJECTION, POWDER, FOR SOLUTION INTRAVENOUS at 09:21

## 2024-11-09 RX ADMIN — DEXTROSE AND SODIUM CHLORIDE: 5; 900 INJECTION, SOLUTION INTRAVENOUS at 12:10

## 2024-11-09 RX ADMIN — LEVOTHYROXINE SODIUM 175 MCG: 0.15 TABLET ORAL at 09:25

## 2024-11-09 RX ADMIN — PROCHLORPERAZINE EDISYLATE 5 MG: 5 INJECTION INTRAMUSCULAR; INTRAVENOUS at 01:54

## 2024-11-09 ASSESSMENT — ACTIVITIES OF DAILY LIVING (ADL)
ADLS_ACUITY_SCORE: 0
ADLS_ACUITY_SCORE: 26.25
ADLS_ACUITY_SCORE: 0
DEPENDENT_IADLS:: INDEPENDENT
ADLS_ACUITY_SCORE: 0

## 2024-11-09 NOTE — PLAN OF CARE
Problem: Adult Inpatient Plan of Care  Goal: Plan of Care Review  Description: The Plan of Care Review/Shift note should be completed every shift.  The Outcome Evaluation is a brief statement about your assessment that the patient is improving, declining, or no change.  This information will be displayed automatically on your shift  note.  11/9/2024 1733 by Ginger Santillan, RN  Flowsheets (Taken 11/9/2024 1733)  Plan of Care Reviewed With: patient  Overall Patient Progress: no change  11/9/2024 1732 by Ginger Santillan RN  Outcome: Not Progressing  Flowsheets (Taken 11/9/2024 1732)  Overall Patient Progress: no change   Goal Outcome Evaluation:      Plan of Care Reviewed With: patient    Overall Patient Progress: no changeOverall Patient Progress: no change    Pt A/O but sleepy. BP 86/40 at 1800. Provider notified, orders obtained.  Ax2 walker and gb. Commode utilized for weakness Family at bedside, helpful with cares. Mepilex on sacrum and bridge of nose. Appetite still poor. Declines pain.

## 2024-11-09 NOTE — MEDICATION SCRIBE - ADMISSION MEDICATION HISTORY
Medication Scribe Admission Medication History    Admission medication history is complete. The information provided in this note is only as accurate as the sources available at the time of the update.    Information Source(s): Family member via in-person    Pertinent Information: Patient's spouse brought in medication list-Glipizide has been prescribed as 5 mg BID but patient has been taking 2.5 mg BID and tolerating well. Not testing blood sugars as often as he should    Changes made to PTA medication list:  Added: None  Deleted: None  Changed: None    Allergies reviewed with patient and updates made in EHR: yes    Medication History Completed By: Jaqueline Lubin 11/8/2024 8:44 PM    PTA Med List   Medication Sig Last Dose/Taking    aspirin (ASA) 81 MG EC tablet Take 1 tablet (81 mg) by mouth daily 11/7/2024 Morning    blood glucose (NO BRAND SPECIFIED) lancets standard Use to test blood sugar 1 times daily. Past Week    blood glucose (NO BRAND SPECIFIED) test strip Use to test blood sugar 1 times daily Past Week    blood glucose monitoring (NO BRAND SPECIFIED) meter device kit Use to test blood sugar 1 times daily or as directed. Past Week    carvedilol (COREG) 6.25 MG tablet TAKE 0.5 TABLETS (3.125 MG) BY MOUTH 2 TIMES DAILY (WITH MEALS) 11/7/2024 Evening    ezetimibe (ZETIA) 10 MG tablet Take 1 tablet (10 mg) by mouth daily 11/7/2024 Morning    glipiZIDE (GLUCOTROL) 5 MG tablet Take 2.5 mg by mouth 2 times daily. 11/7/2024 Evening    hydrOXYzine HCl (ATARAX) 25 MG tablet TAKE 1 TABLET (25 MG) BY MOUTH NIGHTLY AS NEEDED FOR ANXIETY (SLEEP). Past Week    levothyroxine (SYNTHROID/LEVOTHROID) 175 MCG tablet TAKE 1 TABLET BY MOUTH EVERY DAY (Patient taking differently: Take 175 mcg by mouth daily.) 11/7/2024 Morning    Magnesium Oxide 250 MG tablet TAKE 1 TABLET BY MOUTH ONCE DAILY 11/7/2024 Morning    metFORMIN (GLUCOPHAGE) 500 MG tablet Take 1 tablet (500 mg) by mouth 2 times daily (with meals). 11/7/2024 Evening     omeprazole (PRILOSEC) 20 MG DR capsule Take 1 capsule (20 mg) by mouth daily. 11/8/2024 Morning    thin (NO BRAND SPECIFIED) lancets Use with lanceting device to check glucose one time daily. To accompany: Blood Glucose Monitor Brands: per insurance. Past Week

## 2024-11-09 NOTE — PROGRESS NOTES
Writer updated significant other (Dorita) via telephone.Writer answered all questions. Significant other thanked the writer and stated that they are going to call the pt on their personal phone to speak with them.

## 2024-11-09 NOTE — PROGRESS NOTES
Skin affirmation note    Admitting nurse completed full skin assessment, Darci score and Darci interventions. This writer agrees with the initial skin assessment findings.

## 2024-11-09 NOTE — H&P
"Admit Date: 11/8/2024     History and Physical: Hospitalist Service    Chief Complaints:  Generalized weakness with poor p.o. intake    HPI:    84 years old male with a past medical history of hypertension, diabetes, coronary artery disease status post CABG, recent hospitalization for unintentional weight loss with noted subsequent metastasis from an unknown primary was brought back to the emergency room for progressive weakness and poor self-care not able to take care of self.  Reports being too weak to get out of bed and perform his activities of daily living.  Denies any chest pain shortness of breath palpitation dizziness.  Denies any abdominal pain nausea vomiting.  No bladder issues.  Had bowel movement on the day of presentation.  Subsequent workup in the ED showed a BUN of 75.7 with a creatinine of 2.0 and a blood sugar of 35.  White count was 12.6 with a hemoglobin of 10.5.  AST/ALT was 101/382 which appears to be similar to the previous admission.  Patient was initiated on D5 NS and admitted for further evaluation    Review of symptoms:  12 point review of system is negative unless otherwise stated in history of present illness    Clinically Significant Risk Factors Present on Admission         # Hyponatremia: Lowest Na = 131 mmol/L in last 2 days, will monitor as appropriate       # Hypoalbuminemia: Lowest albumin = 2.1 g/dL at 11/9/2024  4:37 AM, will monitor as appropriate  # Coagulation Defect: INR = 1.28 (Ref range: 0.85 - 1.15) and/or PTT = N/A, will monitor for bleeding  # Drug Induced Platelet Defect: home medication list includes an antiplatelet medication        # Anemia: based on hgb <11       # Obesity: Estimated body mass index is 38.47 kg/m  as calculated from the following:    Height as of this encounter: 1.727 m (5' 8\").    Weight as of this encounter: 114.8 kg (253 lb).                 Past Medical History:   Diagnosis Date    Accelerated hypertension 08/07/2023    Acute congestive heart " failure, unspecified heart failure type (H) 08/07/2023    Diabetes mellitus, type 2 (H)     Fracture of right talus 03/09/2017    Hypertension     Prostate cancer (H)     S/p TAVR (transcatheter aortic valve replacement), bioprosthetic     Thyroid cancer (H)     Thyroid disease        Active Problems:    Generalized weakness    Acute kidney injury (H)    Type 2 diabetes mellitus with hypoglycemia without coma, without long-term current use of insulin (H)        Current Facility-Administered Medications:     calcium carbonate (TUMS) chewable tablet 1,000 mg, 1,000 mg, Oral, 4x Daily PRN, Aurelio Burkett MD    dextrose 5% and 0.9% NaCl infusion, , Intravenous, Continuous, Keo Terry MD, Last Rate: 125 mL/hr at 11/09/24 0312, New Bag at 11/09/24 0312    glucose gel 15-30 g, 15-30 g, Oral, Q15 Min PRN **OR** dextrose 50 % injection 25-50 mL, 25-50 mL, Intravenous, Q15 Min PRN, 50 mL at 11/08/24 1940 **OR** glucagon injection 1 mg, 1 mg, Subcutaneous, Q15 Min PRN, Keo Terry MD    levothyroxine (SYNTHROID/LEVOTHROID) tablet 175 mcg, 175 mcg, Oral, Daily, Aurelio Burkett MD    lidocaine (LMX4) kit, , Topical, Q1H PRN, Aurelio Burkett MD    lidocaine 1 % 0.1-1 mL, 0.1-1 mL, Other, Q1H PRN, Aurelio Burkett MD    omeprazole (PriLOSEC) CR capsule 20 mg, 20 mg, Oral, Daily, Aurelio Burkett MD    ondansetron (ZOFRAN ODT) ODT tab 4 mg, 4 mg, Oral, Q6H PRN **OR** ondansetron (ZOFRAN) injection 4 mg, 4 mg, Intravenous, Q6H PRN, Aurelio Burkett MD    prochlorperazine (COMPAZINE) injection 5 mg, 5 mg, Intravenous, Q6H PRN, 5 mg at 11/09/24 0154 **OR** prochlorperazine (COMPAZINE) tablet 5 mg, 5 mg, Oral, Q6H PRN **OR** prochlorperazine (COMPAZINE) suppository 12.5 mg, 12.5 mg, Rectal, Q12H PRN, Huber Shore PA-C    senna-docusate (SENOKOT-S/PERICOLACE) 8.6-50 MG per tablet 1 tablet, 1 tablet, Oral, BID PRN **OR** senna-docusate (SENOKOT-S/PERICOLACE) 8.6-50 MG per  tablet 2 tablet, 2 tablet, Oral, BID PRN, Aurelio Burkett MD    sodium chloride (PF) 0.9% PF flush 3 mL, 3 mL, Intracatheter, Q8H, Aurelio Burkett MD    sodium chloride (PF) 0.9% PF flush 3 mL, 3 mL, Intracatheter, q1 min prn, Aurelio Burkett MD    temazepam (RESTORIL) capsule 7.5 mg, 7.5 mg, Oral, Once May Repeat x1, Aurelio Burkett MD, 7.5 mg at 11/09/24 0357    Past Surgical History:   Procedure Laterality Date    CLOSED REDUCTION, PERCUTANEOUS PINNING LOWER EXTREMITY, COMBINED Right 3/9/2017    Procedure: COMBINED CLOSED REDUCTION, PERCUTANEOUS PINNING LOWER EXTREMITY;  Surgeon: Ankit Coy DPM;  Location: [facility] OR    CV CORONARY ANGIOGRAM N/A 2/9/2023    Procedure: Coronary Angiogram;  Surgeon: Nabeel Davies MD;  Location: [facility] CV    CV TRANSCATHETER AORTIC VALVE REPLACEMENT-FEMORAL APPROACH N/A 4/4/2023    Procedure: Transcatheter Aortic Valve Replacement-Femoral Approach;  Surgeon: Nabeel Davies MD;  Location: [facility] LAB CV    OR TRANSCATHETER AORTIC VALVE REPLACEMENT, FEMORAL PERCUTANEOUS APPROACH (STANDBY) N/A 4/4/2023    Procedure: OR TRANSCATHETER AORTIC VALVE REPLACEMENT, FEMORAL PERCUTANEOUS APPROACH (STANDBY);  Surgeon: Jeramie De Jesus MD;  Location: [facility] CATH LAB CV    THYROIDECTOMY         Allergies   Allergen Reactions    Nka [No Known Allergies]        Family History   Problem Relation Age of Onset    Heart Failure Mother     Heart Failure Father        Social History     Socioeconomic History    Marital status: Single   Tobacco Use    Smoking status: Former    Smokeless tobacco: Never   Vaping Use    Vaping status: Never Used   Substance and Sexual Activity    Alcohol use: Not Currently    Drug use: Never     Social Drivers of Health     Financial Resource Strain: Low Risk  (11/9/2024)    Financial Resource Strain     Within the past 12 months, have you or your family members you live with been unable to get utilities  "(heat, electricity) when it was really needed?: No   Food Insecurity: Low Risk  (2024)    Food Insecurity     Within the past 12 months, did you worry that your food would run out before you got money to buy more?: No     Within the past 12 months, did the food you bought just not last and you didn t have money to get more?: No   Transportation Needs: Low Risk  (2024)    Transportation Needs     Within the past 12 months, has lack of transportation kept you from medical appointments, getting your medicines, non-medical meetings or appointments, work, or from getting things that you need?: No   Interpersonal Safety: Low Risk  (2024)    Interpersonal Safety     Do you feel physically and emotionally safe where you currently live?: Yes     Within the past 12 months, have you been hit, slapped, kicked or otherwise physically hurt by someone?: No     Within the past 12 months, have you been humiliated or emotionally abused in other ways by your partner or ex-partner?: No   Housing Stability: Low Risk  (2024)    Housing Stability     Do you have housing? : Yes     Are you worried about losing your housing?: No   Recent Concern: Housing Stability - High Risk (2024)    Housing Stability     Do you have housing? : No     Are you worried about losing your housing?: No       Physical Exam:  Vitals:    24 2315 24 2336 24 0010 24 0513   BP: 119/50 103/48 103/46 95/42   BP Location: Left arm Left arm Left arm Left arm   Pulse: 73 80 79 76   Resp:    Temp:  98.2  F (36.8  C) 97.9  F (36.6  C) 99.3  F (37.4  C)   TempSrc:  Oral Oral Axillary   SpO2: 95% 95% 95% 92%   Weight:       Height:          BP 95/42 (BP Location: Left arm)   Pulse 76   Temp 99.3  F (37.4  C) (Axillary)   Resp 26   Ht 1.727 m (5' 8\")   Wt 114.8 kg (253 lb)   SpO2 92%   BMI 38.47 kg/m    Systolic (24hrs), Av , Min:91 , Max:119   Diastolic (24hrs), Av, Min:42, Max:74      Intake/Output " "Summary (Last 24 hours) at 11/9/2024 0734  Last data filed at 11/9/2024 0642  Gross per 24 hour   Intake 1493.75 ml   Output 250 ml   Net 1243.75 ml       General:   fatigued  Head:  atraumatic, normocephalic, face symmetrical  Eye:  conjunctivae clear , anicteric  Ear:  normal external ears, grossly normal hearing  Neck:  supple, no JVD  Respiratory:  no respiratory distress, no labored respirations, no shortness of breath,  Cardiovascular:normal rate, regular rhythm, PMI normal, S1 - normal, S2 - normal splitting  Neurological Status:  alert, awake, oriented to person, oriented to place, oriented to time  Skin:  normal turgor, warm  PSYCH - flat affect       I&O: I/O last 3 completed shifts:  In: 1493.75 [I.V.:1493.75]  Out: 250 [Urine:250]    Lab Results:   CBC:   Lab Results   Component Value Date    WBC 10.3 11/09/2024    WBC 7.6 03/10/2017    RBC 2.95 11/09/2024    RBC 3.87 03/10/2017     BMP:   Lab Results   Component Value Date    POTASSIUM 4.5 11/09/2024    POTASSIUM 4.9 05/18/2022    POTASSIUM 4.1 04/07/2017    CHLORIDE 99 11/09/2024    CHLORIDE 101 05/18/2022    CHLORIDE 104 04/07/2017    BUN 72.1 11/09/2024    BUN 23 05/18/2022    BUN 29 04/07/2017     CMP:  Lab Results   Component Value Date    POTASSIUM 4.5 11/09/2024    POTASSIUM 4.9 05/18/2022    POTASSIUM 4.1 04/07/2017    CHLORIDE 99 11/09/2024    CHLORIDE 101 05/18/2022    CHLORIDE 104 04/07/2017    ANIONGAP 9 11/09/2024    ANIONGAP 8 05/18/2022    ANIONGAP 7 04/07/2017    BUN 72.1 11/09/2024    BUN 23 05/18/2022    BUN 29 04/07/2017    ALBUMIN 2.1 11/09/2024    ALBUMIN 4.0 05/18/2022    ALBUMIN 3.7 03/09/2017     11/09/2024    AST 15 03/09/2017     Coagulation:   Lab Results   Component Value Date    INR 1.28 11/08/2024     Cardiac markers: No results found for: \"TROPONINI\"  ABGs: No results found for: \"PHARTERIAL\"  Mg: No components found for: \"MAGNESIUM\"  BNP:   Lab Results   Component Value Date     04/11/2022     Thyroid:   Lab " Results   Component Value Date    TSH 7.44 10/21/2024    TSH 11.19 04/11/2022    TSH 15.63 04/12/2017               Assessment/Plan:    84 years old male with a past medical history of hypertension, diabetes, coronary artery disease status post CABG, recent hospitalization for unintentional weight loss with noted subsequent metastasis from an unknown primary was brought back to the emergency room for progressive weakness and poor self-care not able to take care of self.  Reports being too weak to get out of bed and perform his activities of daily living.  Denies any chest pain shortness of breath palpitation dizziness.  Denies any abdominal pain nausea vomiting.  No bladder issues.  Had bowel movement on the day of presentation.  Subsequent workup in the ED showed a BUN of 75.7 with a creatinine of 2.0 and a blood sugar of 35.  White count was 12.6 with a hemoglobin of 10.5.  AST/ALT was 101/382 which appears to be similar to the previous admission.  Patient was initiated on D5 NS and admitted for further evaluation    1 generalized fatigue with failure to thrive in a patient with recently diagnosed metastatic cancer with mets to liver lung/esophagus.  Oral intake has been poor and noted to have significant hypoglycemia.  For now supportive care and consult physical Occupational Therapy.  CT chest abdomen and pelvis shows diffuse hepatic metastasis and distal esophageal wall thickening unchanged from recent imaging studies.  Potentially reviewed the need for palliative care versus ongoing follow-up by oncology in the outpatient setting.    2 hypertension but now hypotensive.  Hold home meds and continue with IV fluids    3 diabetes mellitus type 2 but now hypoglycemic due to poor p.o. intake.  Hold the home meds and continue the D5 while watching the blood sugars every 6 hours    For hypothyroidism resume the home levothyroxine    5 GERD resume the home PPI    6 DVT prophylaxis will be with SCDs    7 acute kidney  injury on chronic kidney disease likely secondary to volume depletion/dehydration.  IV fluids as above and trend renal function closely    8 anemia appears to be chronic l with no active bleeding.  Monitor for now    9.  Congestive heart failure with preserved ejection fraction.  History of severe aortic stenosis status post TAVR.  Hold home meds due to low blood pressures continue to trend for now      Patient will be admitted under observation status      Our patient will be admitted under the hospitalist services and further management to be based on patient's clinical progress.    Goals of care reviewed with the patient and he has requested DNR status.  The conversation was witnessed by his nurse.  Apparently significant other wanted full CODE STATUS earlier in the night but at this time patient is requesting DNR.  CODE STATUS has been updated to DNR.  He will sign the POLST form during the day shift    As the provider for the telehealth service, I attest that I introduced myself to the patient and provided my credentials. Based on review of the patient s chart and/or a discussion with members of the patient s treatment team, I determined that telemedicine via a real-time, two-way, interactive audio and video platform is an appropriate means of providing this service. The patient and I mutually agreed that this visit is appropriate for telemedicine as well.    The patient was located at [facility].  . The encounter was approximately 10 minutes. The nurse was present for the duration of the encounter. Provider location :[facility] Tele-medicine site     Chart reviewed,labs and available imaging reviewed,consultant notes reviewed. Previous available medical records have been reviewed  Care plan discussed with care team    I have seen and examined the patient today. Documentation for this visit was completed using a template. Everything documented was personally performed today with the necessary  additions, deletions and changes made as appropriate with the diagnoses being listed in no particular order of clinical relevance.    Trupti Blackwell MD MPh  Securely message with the [employer] Web Console (learn more here)  Text page via [employer] Paging/Directory

## 2024-11-09 NOTE — PLAN OF CARE
"  [place] [facility] ADMISSION NOTE    Patient admitted to room 2303 at approximately 2300 via cart from emergency room. Patient was accompanied by transport tech.     Verbal SBAR report received from Arin CALERO prior to patient arrival.     Patient trasferred to bed via air case. Patient alert and oriented X 3. The patient is not having any pain.  . Admission vital signs: Blood pressure 103/48, pulse 80, temperature 98.2  F (36.8  C), temperature source Oral, resp. rate 24, height 1.727 m (5' 8\"), weight 114.8 kg (253 lb), SpO2 95%. Patient was oriented to plan of care, call light, bed controls, tv, telephone, bathroom, and visiting hours.     Risk Assessment    The following safety risks were identified during admission: fall and skin. Yellow risk band applied: YES.     Skin Initial Assessment    This writer admitted this patient and completed a full skin assessment and Darci score in the Adult PCS flowsheet.   Photo documentation of skin problem and/or wound competed via [employer] application (located under Media):  No    Appropriate interventions initiated as needed.     Secondary skin check completed by Shana RN.         Education    Patient has a [place] to Observation order: No  Observation education completed and documented: N/A    Problem: Adult Inpatient Plan of Care  Goal: Optimal Comfort and Wellbeing  Outcome: Progressing  Updated Huber GUTHRIE regarding patient having nausea with hiccups. Received orders for antiemetics. Patient given zofran subling.    Gifty Benitez RN                          "

## 2024-11-09 NOTE — CONSULTS
Care Management Initial Consult    General Information  Assessment completed with:  Significant other, chart review   Type of CM/SW Visit: Initial Assessment    Primary Care Provider verified and updated as needed: Yes   Readmission within the last 30 days: unable to assess      Reason for Consult: discharge planning  Advance Care Planning:          Communication Assessment  Patient's communication style: spoken language (English or Bilingual)    Hearing Difficulty or Deaf: no   Wear Glasses or Blind: yes    Cognitive  Cognitive/Neuro/Behavioral: WDL                      Living Environment:   People in home: alone     Current living Arrangements: house      Able to return to prior arrangements: other (see comments) (Per note, unable to care for self at home and will need placement)     Family/Social Support:  Care provided by: self, spouse/significant other  Provides care for: no one, unable/limited ability to care for self  Marital Status: Single  Support system: Significant Other       Dorita  Description of Support System: Supportive, Involved    Support Assessment: Adequate family and caregiver support, Adequate social supports    Current Resources:   Patient receiving home care services: No     Community Resources: None  Equipment currently used at home: cane, straight  Supplies currently used at home: None    Employment/Financial:  Employment Status: retired        Financial Concerns: none      Does the patient's insurance plan have a 3 day qualifying hospital stay waiver?  Yes     Which insurance plan 3 day waiver is available? [employer]    Will the waiver be used for post-acute placement? Yes    Lifestyle & Psychosocial Needs:  Social Drivers of Health     Food Insecurity: Low Risk  (11/9/2024)    Food Insecurity     Within the past 12 months, did you worry that your food would run out before you got money to buy more?: No     Within the past 12 months, did the food you bought just not last and you didn t have  money to get more?: No   Depression: Not at risk (3/18/2024)    PHQ-2     PHQ-2 Score: 0   Housing Stability: Low Risk  (11/9/2024)    Housing Stability     Do you have housing? : Yes     Are you worried about losing your housing?: No   Recent Concern: Housing Stability - High Risk (11/2/2024)    Housing Stability     Do you have housing? : No     Are you worried about losing your housing?: No   Tobacco Use: Medium Risk (11/4/2024)    Patient History     Smoking Tobacco Use: Former     Smokeless Tobacco Use: Never     Passive Exposure: Not on file   Financial Resource Strain: Low Risk  (11/9/2024)    Financial Resource Strain     Within the past 12 months, have you or your family members you live with been unable to get utilities (heat, electricity) when it was really needed?: No   Alcohol Use: Not on file   Transportation Needs: Low Risk  (11/9/2024)    Transportation Needs     Within the past 12 months, has lack of transportation kept you from medical appointments, getting your medicines, non-medical meetings or appointments, work, or from getting things that you need?: No   Physical Activity: Not on file   Interpersonal Safety: Low Risk  (11/9/2024)    Interpersonal Safety     Do you feel physically and emotionally safe where you currently live?: Yes     Within the past 12 months, have you been hit, slapped, kicked or otherwise physically hurt by someone?: No     Within the past 12 months, have you been humiliated or emotionally abused in other ways by your partner or ex-partner?: No   Stress: Not on file   Social Connections: Not on file   Health Literacy: Not on file     Functional Status:  Prior to admission patient needed assistance:   Dependent ADLs:: Ambulation-cane  Dependent IADLs:: Independent     Mental Health Status:  Mental Health Status: No Current Concerns       Chemical Dependency Status:  Chemical Dependency Status: No Current Concerns           Values/Beliefs:  Spiritual, Cultural Beliefs,  Restorationist Practices, Values that affect care: no             Discussed  Partnership in Safe Discharge Planning  document with patient/family: Yes      Additional Information:  Care Management received referral for initial assessment due to elevated risk score. CM team is familiar with patient due to previous hospitalization at [facility] from 10/29/24-11/3/24. CM attempted to call patient's significant other Dorita and she asked for CM to call back later or tomorrow as they were up most of the night in the ED.     Per previous consult:  CM met bedside with patient and significant other Dorita. Patient sound asleep, per Dorita pt has had a long day and wishes to not be disturbed. Per Dorita her and pt are not  but have been together for 46 years. Per Dorita she does not live with patient, he lives alone in a multilevel home, 4 steps to get in, most living is on the main level, pt needs to go up 22 steps to get to the bathtub. At baseline pt is independent with IADLS and ADLS, does have a cane he uses for ambulation. Patient still drives cars and machinery per Dorita. Dorita feeling overwhelmed stated pt will be seeing an oncologist on Monday 11/4 for potential new found cancer.      CM discussed with Dorita her goals for discharge for patient, per notes pt was SBA yesterday, OT cleared pt to go home. Per bedside RN patient not doing as well today. CM discussed patient at this time can make his own decisions about discharge, so far per notes has declined any needs. Dorita understanding that pt needs to be accepting of services. CM discussed several community resource options with her if patient is willing. CM discussed homecare, and this is intermittent care and meant to be short term, CM also discussed private pay PCA services that pt could have a homemaker come in and assist with household tasks. Dorita stated pt is very private and may not want people in the home. CM discussed pt would need to be willing to allow people in his home for  help. Dorita stated she may have to assist patient more and they have a family friend Job who they may need to rely on for help.     Per nursing and notes, patient requires assist of 2 with walker for mobility. Patient came into ED with weakness, notes state patient is not able to return home. Anticipating patient will require TCU post hospitalization, pending PT/OT consults. Patient will utilize [facility] for placement. Referrals sent to the following:    Destination    Service Provider Request Status Services Address Phone Fax Patient Preferred   [facility] [place] - REFERRAL ONLY (SNF) Pending - Request Sent -- [address] [facility] [id number] [phone] --   [facility] () Pending - Request Sent -- [address] [facility] [address] [phone] --   [facility] () Pending - Request Sent -- [address] [place] [id number] [phone] --   [facility] () Pending - Request Sent -- [address] [id number] [phone] --        Plan: Likely TCU - refs pending  Transport: PARI WilksW  Inpatient Care Coordinator   [facility] [phone]  [facility] [phone]

## 2024-11-09 NOTE — PLAN OF CARE
"Goal Outcome Evaluation:  Problem: Adult Inpatient Plan of Care  Goal: Optimal Comfort and Wellbeing  11/9/2024 0340 by Gifty Benitez, RN  Outcome: Progressing  11/9/2024 0043 by Gifty Benitez, RN  Outcome: Progressing  Patient spoke with Dr. Reynolds on monitor. Patient verbalizes \"No\" breathing tube and \"No\" chest compressions.   "

## 2024-11-09 NOTE — CARE PLAN
11/09/24 1110   Fall Event   Patient Assessed By nurse;provider   Name of Provider Notified JEFFERSON Benitez   Family/Designated Caregiver Notified of Fall Yes   Fall Prevention Plan Updated Yes   Name of Family/Designated Caregiver Notified Dorita
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
## 2024-11-09 NOTE — PLAN OF CARE
"  Problem: Adult Inpatient Plan of Care  Goal: Plan of Care Review  Description: The Plan of Care Review/Shift note should be completed every shift.  The Outcome Evaluation is a brief statement about your assessment that the patient is improving, declining, or no change.  This information will be displayed automatically on your shift  note.  Flowsheets (Taken 11/9/2024 1406)  Plan of Care Reviewed With: patient  Goal: Absence of Hospital-Acquired Illness or Injury  Intervention: Identify and Manage Fall Risk  Recent Flowsheet Documentation  Taken 11/9/2024 1004 by Gayatri Major RN  Safety Promotion/Fall Prevention:   activity supervised   lighting adjusted   mobility aid in reach   nonskid shoes/slippers when out of bed   clutter free environment maintained  Intervention: Prevent and Manage VTE (Venous Thromboembolism) Risk  Recent Flowsheet Documentation  Taken 11/9/2024 1004 by Gayatri Major RN  VTE Prevention/Management: foot pump device off  Intervention: Prevent Infection  Recent Flowsheet Documentation  Taken 11/9/2024 1004 by Gayatri Major RN  Infection Prevention:   rest/sleep promoted   hand hygiene promoted     Patient is and A2 with gaitbelt and walker d/t weakness. Patient utilizes bedside commode. Patient is A&O. The patient had attempted to self transfer and had fallen to their knees. NST was walking past the patients room and noted that the patient was kneeling on the floor, leaning over the bed. When asked what the patient was attempting to do, the patient stated \" I was sitting up on the side of my bed trying to stand. When I stood up I slipped on my socks (Patient was wearing grippy socks) and fell to their knees.\" The writer noted that there was a blanket on the floor that the patient was kneeling on. No latent injuries noted thus far. Patient denied hitting their head. Spouse was updated via telephone. Will continue with current plan of care.               "

## 2024-11-09 NOTE — ED NOTES
"[facility]   Admission Handoff    The patient is Jayesh Ortiz, 84 year old who arrived in the ED by AMBULANCE from home with a complaint of Generalized Weakness (Pt recently diagnosed with colon cancer, with mets to liver, abd, and esophagus per patient.  Pt lives at home with girlfriend, and she is no longer able to care for patient.  Pt has become too weak, pt states he is miserable and wishes someone would put \"a bullet in my head.\") and Social Work Services (Pt is needing placement, and is unable to care for self at home.)  . The patient's current symptoms are new and during this time the symptoms have remained the same. In the ED, patient was diagnosed with   Final diagnoses:   Type 2 diabetes mellitus with hypoglycemia without coma, without long-term current use of insulin (H)   Acute kidney injury (H)   Generalized weakness         Needed?: No    Allergies:    Allergies   Allergen Reactions    Nka [No Known Allergies]        Past Medical Hx:   Past Medical History:   Diagnosis Date    Accelerated hypertension 08/07/2023    Acute congestive heart failure, unspecified heart failure type (H) 08/07/2023    Diabetes mellitus, type 2 (H)     Fracture of right talus 03/09/2017    Hypertension     Prostate cancer (H)     S/p TAVR (transcatheter aortic valve replacement), bioprosthetic     Thyroid cancer (H)     Thyroid disease        Initial vitals were: BP: 111/60  Pulse: 71  Temp: 99.2  F (37.3  C)  Resp: 18  Height: 172.7 cm (5' 8\")  Weight: 114.8 kg (253 lb)  SpO2: 94 %   Recent vital Signs: /58   Pulse 69   Temp 99.2  F (37.3  C) (Oral)   Resp 18   Ht 1.727 m (5' 8\")   Wt 114.8 kg (253 lb)   SpO2 95%   BMI 38.47 kg/m      Elimination Status: Continent: needed to be cleaning up at time of arrival, Due to Void, needs UA, pt stated \"I don't have anything to pee\" and \"no, I don't want a cath\". Bladder scan 411.      Activity Level: 2 assist    Fall Status: Reason " for falls risk:  Mobility and Reason for falls risk: Elimination  nonskid shoes/slippers when out of bed, arm band in place, patient and family education, assistive device/personal items within reach, and activity supervised    Baseline Mental status: WDL  Current Mental Status changes: at basesline    Infection present or suspected this encounter: yes other    Sepsis suspected: No    Isolation type: N/A    Bariatric equipment needed?: No    In the ED these meds were given:   Medications   glucose gel 15-30 g ( Oral See Alternative 11/8/24 1940)     Or   dextrose 50 % injection 25-50 mL (50 mLs Intravenous $Given 11/8/24 1940)     Or   glucagon injection 1 mg ( Subcutaneous See Alternative 11/8/24 1940)   dextrose 5% and 0.9% NaCl infusion ( Intravenous $New Bag 11/8/24 1845)   piperacillin-tazobactam (ZOSYN) 2.25 g vial to attach to  ml bag (2.25 g Intravenous $New Bag 11/8/24 1946)       Drips running?  Yes    Home pump  No    Current LDAs: Peripheral IV: Site R FA; Gauge 18  D5W/0.9 NaCl     Results:   Labs/Imaging  Ordered and Resulted from Time of ED Arrival Up to the Time of Departure from the ED  Results for orders placed or performed during the hospital encounter of 11/08/24 (from the past 24 hours)   [place] Draw    Narrative    The following orders were created for panel order [place] Draw.  Procedure                               Abnormality         Status                     ---------                               -----------         ------                     Extra Blue Top Tube[[id number]]                              Final result               Extra Red Top Tube[[id number]]                               Final result               Extra Green Top (Lithium...[[id number]]                      Final result               Extra Purple Top Tube[[id number]]                            Final result                 Please view results for these tests on the individual orders.   Extra Blue Top Tube   Result  Value Ref Range    Hold Specimen JIC    Extra Red Top Tube   Result Value Ref Range    Hold Specimen JIC    Extra Green Top (Lithium Heparin) Tube   Result Value Ref Range    Hold Specimen JIC    Extra Purple Top Tube   Result Value Ref Range    Hold Specimen JIC    Comprehensive metabolic panel   Result Value Ref Range    Sodium 133 (L) 135 - 145 mmol/L    Potassium 5.1 3.4 - 5.3 mmol/L    Carbon Dioxide (CO2) 26 22 - 29 mmol/L    Anion Gap 8 7 - 15 mmol/L    Urea Nitrogen 75.7 (H) 8.0 - 23.0 mg/dL    Creatinine 2.00 (H) 0.67 - 1.17 mg/dL    GFR Estimate 33 (L) >60 mL/min/1.73m2    Calcium 7.7 (L) 8.8 - 10.4 mg/dL    Chloride 99 98 - 107 mmol/L    Glucose 35 (LL) 70 - 99 mg/dL    Alkaline Phosphatase 775 (H) 40 - 150 U/L     (H) 0 - 45 U/L     (H) 0 - 70 U/L    Protein Total 5.6 (L) 6.4 - 8.3 g/dL    Albumin 2.3 (L) 3.5 - 5.2 g/dL    Bilirubin Total 4.8 (H) <=1.2 mg/dL   Lipase   Result Value Ref Range    Lipase 20 13 - 60 U/L   CBC with platelets differential    Narrative    The following orders were created for panel order CBC with platelets differential.  Procedure                               Abnormality         Status                     ---------                               -----------         ------                     CBC with platelets and d...[[id number]]  Abnormal            Final result                 Please view results for these tests on the individual orders.   CBC with platelets and differential   Result Value Ref Range    WBC Count 12.6 (H) 4.0 - 11.0 10e3/uL    RBC Count 3.43 (L) 4.40 - 5.90 10e6/uL    Hemoglobin 10.5 (L) 13.3 - 17.7 g/dL    Hematocrit 30.5 (L) 40.0 - 53.0 %    MCV 89 78 - 100 fL    MCH 30.6 26.5 - 33.0 pg    MCHC 34.4 31.5 - 36.5 g/dL    RDW 15.1 (H) 10.0 - 15.0 %    Platelet Count 306 150 - 450 10e3/uL    % Neutrophils 87 %    % Lymphocytes 5 %    % Monocytes 4 %    % Eosinophils 1 %    % Basophils 0 %    % Immature Granulocytes 3 %    NRBCs per 100 WBC 0 <1  /100    Absolute Neutrophils 10.9 (H) 1.6 - 8.3 10e3/uL    Absolute Lymphocytes 0.6 (L) 0.8 - 5.3 10e3/uL    Absolute Monocytes 0.6 0.0 - 1.3 10e3/uL    Absolute Eosinophils 0.2 0.0 - 0.7 10e3/uL    Absolute Basophils 0.1 0.0 - 0.2 10e3/uL    Absolute Immature Granulocytes 0.3 <=0.4 10e3/uL    Absolute NRBCs 0.0 10e3/uL   Bilirubin direct   Result Value Ref Range    Bilirubin Direct 4.17 (H) 0.00 - 0.30 mg/dL   INR   Result Value Ref Range    INR 1.28 (H) 0.85 - 1.15   Glucose by meter   Result Value Ref Range    GLUCOSE BY METER POCT 27 (LL) 70 - 99 mg/dL   Glucose by meter   Result Value Ref Range    GLUCOSE BY METER POCT 105 (H) 70 - 99 mg/dL   Glucose by meter   Result Value Ref Range    GLUCOSE BY METER POCT 67 (L) 70 - 99 mg/dL   US Abdomen Limited    Narrative    EXAM: US ABDOMEN LIMITED  LOCATION: [facility]  DATE: 11/8/2024    INDICATION: likely metastatic disease, new hypoglycemia and rising AST ALT and Tbili. please eval for cholecystitis biliary obstruction  COMPARISON: Ultrasound 10/29/2024. CT 10/31/2024  TECHNIQUE: Limited abdominal ultrasound.    FINDINGS:    GALLBLADDER: No significant change in appearance with moderate diffuse wall thickening. Gallstones are present. There is no tenderness reported over the gallbladder during exam.    BILE DUCTS: No intrahepatic ductal dilatation evident. The common duct is obscured.    LIVER: Heterogeneity with numerous diffuse hypoechoic nodules consistent with diffuse metastatic disease, unchanged .    RIGHT KIDNEY: No hydronephrosis.    PANCREAS: Obscured by bowel gas.    Modest ascites..      Impression    IMPRESSION:  1.  Diffuse metastatic disease throughout liver.  2.  Stable appearance of gallbladder with gallstones and wall thickening but no reported tenderness.  3.  No intrahepatic bile duct dilatation.  4.  Ascites.       Glucose by meter   Result Value Ref Range    GLUCOSE BY METER POCT 61 (L) 70 - 99 mg/dL   CT Chest  Abdomen Pelvis w/o Contrast    Narrative    EXAM: CT CHEST ABDOMEN PELVIS W/O CONTRAST  LOCATION: [facility]  DATE: 11/8/2024    INDICATION: likely metastatic disease now with increasing weakness and hypoglycemia. please eval for acute infectious process  COMPARISON: Ultrasound from today. CT 10/31/2024  TECHNIQUE: CT scan of the chest, abdomen, and pelvis was performed without IV contrast. Multiplanar reformats were obtained. Dose reduction techniques were used.   CONTRAST: None.    FINDINGS:   LUNGS AND PLEURA: Modest motion artifact. Numerous small bilateral pulmonary nodules are unchanged, largest measures 8 mm anterior left upper lobe. There are tiny new bilateral pleural effusions and increased dependent atelectasis at both lung bases.    MEDIASTINUM/AXILLAE: Fluid fills the esophagus up to the thoracic inlet. Wall thickening present inferior most esophagus unchanged. No mediastinal lymphadenopathy. Previous TAVR.    CORONARY ARTERY CALCIFICATION: Moderate.    HEPATOBILIARY: Diffuse metastatic disease similar to previous exam, better seen on the prior enhanced study. Small gallstones and modest gallbladder wall thickening unchanged. No bile duct dilatation visible.    PANCREAS: Normal.    SPLEEN: Normal.    ADRENAL GLANDS: Normal.    KIDNEYS/BLADDER: Normal.    BOWEL: No obstruction or inflammatory change. Mild ascites slightly increased.    LYMPH NODES: Gastrohepatic ligament, cydney hepatis and periaortic adenopathy unchanged.    VASCULATURE: Moderate diffuse atherosclerotic calcifications.    PELVIC ORGANS: Fiducial markers within prostate.    MUSCULOSKELETAL: No suspicious bony lesions.      Impression    IMPRESSION:  1.  Minimal change. Slight increase in mild ascites and tiny new pleural effusions are present.  2.  Diffuse hepatic metastases not significantly changed. Small pulmonary nodules also unchanged.  3.  Thick-walled gallbladder with gallstones unchanged.  4.   Thick-walled distal esophagus with fluid filled upper and midesophagus.  5.  Dontae hepatis, gastrohepatic ligament and periaortic adenopathy.     Glucose by meter   Result Value Ref Range    GLUCOSE BY METER POCT 143 (H) 70 - 99 mg/dL   Glucose by meter   Result Value Ref Range    GLUCOSE BY METER POCT 119 (H) 70 - 99 mg/dL       For the majority of the shift this patient's behavior was Green     Cardiac Rhythm: Normal Sinus  Pt needs tele? No  Skin/wound Issues:  3 sores on bottom, 1 coccyx pressure injury, right buttock pressure injury and 1 left buttock pressure injury.     Code Status: Full Code    Pain control: good    Nausea control: good    Abnormal labs/tests/findings requiring intervention: See lab tab    Patient tested for COVID 19 prior to admission: NO     OBS brochure/video discussed/provided to patient/family: Yes     Family present during ED course? Yes     Family Comments/Social Situation comments: N/A    Tasks needing completion:  Needs UA. Pt not able to give sample, strongly did not want straight cath at this time. Bladder scan: 411 at this time     Arin CALERO

## 2024-11-09 NOTE — PLAN OF CARE
Goal Outcome Evaluation:      Plan of Care Reviewed With: patient, significant other          Outcome Evaluation: TCU

## 2024-11-10 LAB
ALBUMIN SERPL BCG-MCNC: 2.1 G/DL (ref 3.5–5.2)
ALP SERPL-CCNC: 642 U/L (ref 40–150)
ALT SERPL W P-5'-P-CCNC: 92 U/L (ref 0–70)
ANION GAP SERPL CALCULATED.3IONS-SCNC: 8 MMOL/L (ref 7–15)
AST SERPL W P-5'-P-CCNC: 332 U/L (ref 0–45)
BACTERIA UR CULT: NO GROWTH
BILIRUB DIRECT SERPL-MCNC: 4.35 MG/DL (ref 0–0.3)
BILIRUB SERPL-MCNC: 4.9 MG/DL
BUN SERPL-MCNC: 63.9 MG/DL (ref 8–23)
CALCIUM SERPL-MCNC: 7.1 MG/DL (ref 8.8–10.4)
CHLORIDE SERPL-SCNC: 106 MMOL/L (ref 98–107)
CREAT SERPL-MCNC: 2.04 MG/DL (ref 0.67–1.17)
EGFRCR SERPLBLD CKD-EPI 2021: 32 ML/MIN/1.73M2
ERYTHROCYTE [DISTWIDTH] IN BLOOD BY AUTOMATED COUNT: 16 % (ref 10–15)
GLUCOSE BLDC GLUCOMTR-MCNC: 105 MG/DL (ref 70–99)
GLUCOSE BLDC GLUCOMTR-MCNC: 142 MG/DL (ref 70–99)
GLUCOSE SERPL-MCNC: 70 MG/DL (ref 70–99)
HCO3 SERPL-SCNC: 21 MMOL/L (ref 22–29)
HCT VFR BLD AUTO: 27.2 % (ref 40–53)
HGB BLD-MCNC: 9.1 G/DL (ref 13.3–17.7)
LACTATE SERPL-SCNC: 2.5 MMOL/L (ref 0.7–2)
MCH RBC QN AUTO: 30.8 PG (ref 26.5–33)
MCHC RBC AUTO-ENTMCNC: 33.5 G/DL (ref 31.5–36.5)
MCV RBC AUTO: 92 FL (ref 78–100)
PLATELET # BLD AUTO: 241 10E3/UL (ref 150–450)
POTASSIUM SERPL-SCNC: 4.4 MMOL/L (ref 3.4–5.3)
PROT SERPL-MCNC: 5.1 G/DL (ref 6.4–8.3)
RBC # BLD AUTO: 2.95 10E6/UL (ref 4.4–5.9)
SODIUM SERPL-SCNC: 135 MMOL/L (ref 135–145)
WBC # BLD AUTO: 9.6 10E3/UL (ref 4–11)

## 2024-11-10 PROCEDURE — 250N000013 HC RX MED GY IP 250 OP 250 PS 637: Performed by: HOSPITALIST

## 2024-11-10 PROCEDURE — 250N000013 HC RX MED GY IP 250 OP 250 PS 637: Performed by: INTERNAL MEDICINE

## 2024-11-10 PROCEDURE — 85018 HEMOGLOBIN: CPT | Performed by: STUDENT IN AN ORGANIZED HEALTH CARE EDUCATION/TRAINING PROGRAM

## 2024-11-10 PROCEDURE — 83605 ASSAY OF LACTIC ACID: CPT | Performed by: STUDENT IN AN ORGANIZED HEALTH CARE EDUCATION/TRAINING PROGRAM

## 2024-11-10 PROCEDURE — 258N000003 HC RX IP 258 OP 636: Performed by: EMERGENCY MEDICINE

## 2024-11-10 PROCEDURE — 82248 BILIRUBIN DIRECT: CPT | Performed by: STUDENT IN AN ORGANIZED HEALTH CARE EDUCATION/TRAINING PROGRAM

## 2024-11-10 PROCEDURE — 80053 COMPREHEN METABOLIC PANEL: CPT | Performed by: STUDENT IN AN ORGANIZED HEALTH CARE EDUCATION/TRAINING PROGRAM

## 2024-11-10 PROCEDURE — 99232 SBSQ HOSP IP/OBS MODERATE 35: CPT | Performed by: STUDENT IN AN ORGANIZED HEALTH CARE EDUCATION/TRAINING PROGRAM

## 2024-11-10 PROCEDURE — 82040 ASSAY OF SERUM ALBUMIN: CPT | Performed by: STUDENT IN AN ORGANIZED HEALTH CARE EDUCATION/TRAINING PROGRAM

## 2024-11-10 PROCEDURE — 85041 AUTOMATED RBC COUNT: CPT | Performed by: STUDENT IN AN ORGANIZED HEALTH CARE EDUCATION/TRAINING PROGRAM

## 2024-11-10 PROCEDURE — 250N000011 HC RX IP 250 OP 636: Performed by: STUDENT IN AN ORGANIZED HEALTH CARE EDUCATION/TRAINING PROGRAM

## 2024-11-10 PROCEDURE — 36415 COLL VENOUS BLD VENIPUNCTURE: CPT | Performed by: STUDENT IN AN ORGANIZED HEALTH CARE EDUCATION/TRAINING PROGRAM

## 2024-11-10 PROCEDURE — 82247 BILIRUBIN TOTAL: CPT | Performed by: STUDENT IN AN ORGANIZED HEALTH CARE EDUCATION/TRAINING PROGRAM

## 2024-11-10 PROCEDURE — 250N000013 HC RX MED GY IP 250 OP 250 PS 637: Performed by: STUDENT IN AN ORGANIZED HEALTH CARE EDUCATION/TRAINING PROGRAM

## 2024-11-10 PROCEDURE — 120N000001 HC R&B MED SURG/OB

## 2024-11-10 RX ORDER — ACETAMINOPHEN 325 MG/1
325-650 TABLET ORAL EVERY 6 HOURS PRN
Status: DISCONTINUED | OUTPATIENT
Start: 2024-11-10 | End: 2024-11-16 | Stop reason: HOSPADM

## 2024-11-10 RX ORDER — NALOXONE HYDROCHLORIDE 0.4 MG/ML
0.4 INJECTION, SOLUTION INTRAMUSCULAR; INTRAVENOUS; SUBCUTANEOUS
Status: DISCONTINUED | OUTPATIENT
Start: 2024-11-10 | End: 2024-11-16 | Stop reason: HOSPADM

## 2024-11-10 RX ORDER — OXYCODONE HYDROCHLORIDE 5 MG/1
5 TABLET ORAL EVERY 4 HOURS PRN
Status: DISCONTINUED | OUTPATIENT
Start: 2024-11-10 | End: 2024-11-14

## 2024-11-10 RX ORDER — NALOXONE HYDROCHLORIDE 0.4 MG/ML
0.2 INJECTION, SOLUTION INTRAMUSCULAR; INTRAVENOUS; SUBCUTANEOUS
Status: DISCONTINUED | OUTPATIENT
Start: 2024-11-10 | End: 2024-11-16 | Stop reason: HOSPADM

## 2024-11-10 RX ADMIN — ACETAMINOPHEN 650 MG: 325 TABLET ORAL at 06:56

## 2024-11-10 RX ADMIN — PIPERACILLIN AND TAZOBACTAM 2.25 G: 2; .25 INJECTION, POWDER, FOR SOLUTION INTRAVENOUS at 13:50

## 2024-11-10 RX ADMIN — DEXTROSE AND SODIUM CHLORIDE: 5; 900 INJECTION, SOLUTION INTRAVENOUS at 05:39

## 2024-11-10 RX ADMIN — OXYCODONE HYDROCHLORIDE 2.5 MG: 5 TABLET ORAL at 20:02

## 2024-11-10 RX ADMIN — CALCIUM CARBONATE (ANTACID) CHEW TAB 500 MG 1000 MG: 500 CHEW TAB at 18:26

## 2024-11-10 RX ADMIN — PANTOPRAZOLE SODIUM 40 MG: 40 TABLET, DELAYED RELEASE ORAL at 07:57

## 2024-11-10 RX ADMIN — PIPERACILLIN AND TAZOBACTAM 2.25 G: 2; .25 INJECTION, POWDER, FOR SOLUTION INTRAVENOUS at 02:35

## 2024-11-10 RX ADMIN — OXYCODONE HYDROCHLORIDE 2.5 MG: 5 TABLET ORAL at 14:20

## 2024-11-10 RX ADMIN — PIPERACILLIN AND TAZOBACTAM 2.25 G: 2; .25 INJECTION, POWDER, FOR SOLUTION INTRAVENOUS at 07:57

## 2024-11-10 RX ADMIN — PIPERACILLIN AND TAZOBACTAM 2.25 G: 2; .25 INJECTION, POWDER, FOR SOLUTION INTRAVENOUS at 21:04

## 2024-11-10 RX ADMIN — LEVOTHYROXINE SODIUM 175 MCG: 0.15 TABLET ORAL at 05:40

## 2024-11-10 NOTE — PLAN OF CARE
Problem: Adult Inpatient Plan of Care  Goal: Plan of Care Review  Description: The Plan of Care Review/Shift note should be completed every shift.  The Outcome Evaluation is a brief statement about your assessment that the patient is improving, declining, or no change.  This information will be displayed automatically on your shift  note.  Flowsheets (Taken 11/10/2024 1443)  Plan of Care Reviewed With:   patient   significant other  Goal: Absence of Hospital-Acquired Illness or Injury  Intervention: Identify and Manage Fall Risk  Recent Flowsheet Documentation  Taken 11/10/2024 0920 by Gayatri Major RN  Safety Promotion/Fall Prevention:   activity supervised   lighting adjusted   mobility aid in reach   nonskid shoes/slippers when out of bed   clutter free environment maintained  Intervention: Prevent and Manage VTE (Venous Thromboembolism) Risk  Recent Flowsheet Documentation  Taken 11/10/2024 0920 by Gayatri Major RN  VTE Prevention/Management: SCDs on (sequential compression devices)  Intervention: Prevent Infection  Recent Flowsheet Documentation  Taken 11/10/2024 0920 by Gayatri Major RN  Infection Prevention:   rest/sleep promoted   hand hygiene promoted     Pt is A&O. A2 with walker and gaitbelt for ambulation. The pt had complaints of a 6/10 pain level to sacrum/ coccyx. PRN oxycodone administered at 14:20. Pt is currently sitting up in recliner and declined lying in bed to help relive pressure and pain on the buttocks. The pt's significant other has been at the bedside for most of the afternoon. Will continue with current plan of care.

## 2024-11-10 NOTE — PROGRESS NOTES
I was called by the nurse for concern of low blood pressure at 86/40.  Lactate checked it was elevated at 3.3.  Patient was given 1 L bolus with improvement of blood pressure.  Continue current antibiotics Zosyn.  Check lactate in 3 hours.  Continue to monitor closely.    Kindra Ceballos MD.

## 2024-11-10 NOTE — PROGRESS NOTES
[facility]    Medicine Progress Note - Hospitalist Service    Date of Admission:  11/8/2024    Assessment & Plan     Jayesh Ortiz is a 85 yo male with past medical history of HTN, T2DM, CAD s/p CABG, PVC's, severe aortic stenosis with LBBB following TAVR 04/2023, HFrEF, Hypothyroidism, BPH, and recent admission with weakness 10/29-11/3 that was thought to be secondary to metastatic cancer of unknown primary that presented to the ED with worsening weakness and inability to care for self at home. Admitted 11/8/2024 with weakness, hypoglycemia, and possible infection of unclear source.     # Generalized weakness   # Frequent falls   Presented to this admission with progression of weakness as patient was unable to get out of bed on own and perform activities of daily living. Suspect that progression of suspected malignancy, hypoglycemia, and possible infection are contributing. Patient did have a fall while in the hospital but did not hit head an imaging was not pursued.   - PT and OT consulted     # T2DM   # Hypoglycemia   Blood glucose of 35 on presentation. A1C 6.0 on 10/30/2024. Glipizide and metformine had been decreased during pruior admission but not stopped. Suspect with malignancy and poor intake these medications are no longer needed and may have contributed to hypoglycemia.   - Holding PTA metformin and glipizide  - Correction insulin pending course   - Stopped D5 NS   - POC BG QID AC HS     # Leukocytosis, resolved   # Fever, resolved   # Lactic acidosis  12.6 on presentation resolved on recheck in am, also developed an isolated fever 11/9. Interestingly noted to have had fever during previous admission that was attributed to malignancy. Lactate remains elevated but patients known malignancy presents a possible non-infectious . UA with 17 WBC small leucocyte esterase and no nitrates. CT chest abdomen pelvis similar to prior and RUQ ultrasound similar to prior and  "unrevealing of acute process. Plan for give cultures additional time with possible trial of antibiotic discontinuation as may be driven by malignancy.   - Acetaminophen PRN for pain and fever   - Blood culture from 11/8 NGTD  - Continued Pip-tazo, pending course and further culture data   - MRSA nares negative     # Coccyx pressure injury stage one present on admission  Patient has skin injury over coccyx that is causing him a fair amount of pain and is his number one complaint most days.   - Started oxycodone 2.5-5 mg PRN while in the hospital   - Wound cares per nursing      # Suicidal statements   Patient has repeatedly stated to \"Just shoot me in the head\" patient states that he will not try to kill himself while in the hospital and voices frustration about current pain and his progressive weakness. Patient seems low risk to to attempt suicide at this time but will continue to discuss with patient. Stopped making statements as symptoms improved and denies having suicidal plan or intent.      # Probable metastatic cancer   # Hx of prostate cancer 2000s  # Hx of Thyroid cancer 1990s   # Liver lesions   # Pulmonary nodules   # Esophageal thickening   # Unintentional weight loss   Incidentally found to have probable metastatic disease on CT 10/21/2024. Had had 90 pounds of weight loss leading up to that admission. Was seen in oncology clinic 11/4 with GI referral placed for liver lesions.   - Consider palliative care consultation     # ALT AST elevation,   Alk phos 775, , and  on presentation up from previous admission and now down trending. Suspect related to malignancy and liver metastasis. Gallbladder appears stable on ultrasound.   - LFT with am labs     # CKD   Creatinine of 2.0 on presentation improved from previous admission. With Cr appearing ot have been 2 or more for the last two months.   - BMP with am labs     # Swallowing difficulty   Noted in previous admission. Has only been able to " swallow liquids without things feeling like they are getting stuck. Has not been able to see GI or get EGD outpatient. Will need to consider endoscopy and further evaluation pending course.  - SLP consulted     # Normocytic anemia   Hgb of 10.5 on presentation similar to previous admission. Had been suspected to be due to chronic disease. No signs of bleeding. Smear obtained 11/1 consistent with iron deficiency and patient would likely benefit from replacement.  - Possible IV iron inpatient vs outpatient pending course      # Suspected DRAKE  Noted to have had nocturnal hypoxemia during previous admission. Has not been able to complete sleep study outpatient.     # Hyponatremia, resolved   Na of 133 on presentation higher than previous admission. Did drop to 131 with fluids but with patient still appearing dry continued with resolution.   - BMP with am labs      # Heart failure with recovered ejection fraction   # Hx of severe aortic stenosis s/p TAVR 04/2023   # HTN   TTE 07/15/2024 with EF 50-55% improved from 45-50% 03/2024.   PTA HF meds.  - Holding PTA Carvedilol   - Bumetanide and losartan stopped after previous admission     # LBBB following TAVR 04/2023   # Hx of PVCs  PVC bourdon of 16% noted on mobile cardiac monitoring 3/2024. Has been on sotalol in the past but held with renal function decline last admission.   - Holding PTA carvedilol pending stability with possible infection   - Sotalol stopped during prior admission  (Creatine clearane was 32 and sotalol is contraindicated for Cr clearnace < 40)    # HLD  - Atorvastatin previously stopped with liver injury, consider restart pending course   - Holding PTA Ezetimide pending course     # Hypothyroidism   TSH elevated and T4 low but in setting of critical illness. Will continue PTA dosing but increase may need to be considered outpatient.   - Continued PTA levothyroxine 175 mcg every day     # GERD  - Continued PTA PO PPI with formulary sub         Diet:  "Combination Diet Regular Diet    DVT Prophylaxis: Pneumatic Compression Devices  Welch Catheter: Not present  Lines: None     Cardiac Monitoring: None  Code Status: No CPR- Do NOT Intubate      Clinically Significant Risk Factors         # Hyponatremia: Lowest Na = 131 mmol/L in last 2 days, will monitor as appropriate       # Hypoalbuminemia: Lowest albumin = 2.1 g/dL at 11/10/2024  6:21 AM, will monitor as appropriate    # Coagulation Defect: INR = 1.28 (Ref range: 0.85 - 1.15) and/or PTT = N/A, will monitor for bleeding               # Obesity: Estimated body mass index is 38.47 kg/m  as calculated from the following:    Height as of this encounter: 1.727 m (5' 8\").    Weight as of this encounter: 114.8 kg (253 lb)., PRESENT ON ADMISSION     # Financial/Environmental Concerns: none         Disposition Plan     Medically Ready for Discharge: Anticipated in 2-4 Days     Sami Benitez MD  Hospitalist Service  [facility]  Securely message with [employer] (more info)  Text page via [employer] Paging/Directory   ______________________________________________________________________    Interval History     Noted to have an elevated lactate and fever overnight. Reported feeling significantly better today than when he can in. States that he feels a bit stronger today and was able to eat his breakfast this morning. Had subjective fevers this morning but resolved this afternoon. Denies any nausea or vomiting. Continues to have pain over his coccyx that is his leading concern this morning, states that re-postioning has been ineffective. Denies having any suicidal thoughts or plan today.     Physical Exam   Vital Signs: Temp: 97.9  F (36.6  C) Temp src: Oral BP: 101/44 Pulse: 80   Resp: 18 SpO2: 93 % O2 Device: None (Room air)    Weight: 253 lbs 0 oz    General Appearance: Awake and alert, sitting up in a chair, eating breakfast, in no acute distress    Respiratory: Breathing easily on room air "   Cardiovascular: Regular rate and rhythm, extremities warm and well perfused   GI: Abdomen soft, non-tender, and non-distended    Skin: No rashes or lesions   Other: Appropriate mood and affect     Medical Decision Making       45 MINUTES SPENT BY ME on the date of service doing chart review, history, exam, documentation & further activities per the note.      Data     I have personally reviewed the following data over the past 24 hrs:    9.6  \   9.1 (L)   / 241     135 106 63.9 (H) /  70   4.4 21 (L) 2.04 (H) \     ALT: 92 (H) AST: 332 (H) AP: 642 (H) TBILI: 4.9 (H)   ALB: 2.1 (L) TOT PROTEIN: 5.1 (L) LIPASE: N/A     TSH: N/A T4: N/A A1C: N/A     Procal: N/A CRP: N/A Lactic Acid: 2.5 (H)         Imaging results reviewed over the past 24 hrs:   No results found for this or any previous visit (from the past 24 hours).

## 2024-11-10 NOTE — PLAN OF CARE
"  Problem: Adult Inpatient Plan of Care  Goal: Plan of Care Review  Description: The Plan of Care Review/Shift note should be completed every shift.  The Outcome Evaluation is a brief statement about your assessment that the patient is improving, declining, or no change.  This information will be displayed automatically on your shift  note.  11/10/2024 0822 by Eva Fleming RN  Outcome: Not Progressing  Flowsheets (Taken 11/10/2024 0659)  Overall Patient Progress: no change  11/10/2024 0820 by Eva Fleming RN  Outcome: Not Progressing  Flowsheets  Taken 11/10/2024 0659  Overall Patient Progress: no change  Taken 11/10/2024 0505  Outcome Evaluation: Patient A & O x 3 but lethargic. Slow to answer some questions but answers are appropriate. Denies pain when asked. Did not want to get out of bed this shift so he was checked, changed and repositioned. He did sleep soundly most of the night. Aroused easily to voice. IV of D5NS running at 125/hr.  Skin continues to be yellow. Temp of 101 during morning VS. Order for Tylenol received by Dr. Hong. Given at 0655. Took meds this am crushed with pudding. Mepilex on sacrum and bridge of nose.  Plan of Care Reviewed With: patient  Overall Patient Progress: no change  Goal: Patient-Specific Goal (Individualized)  Description: You can add care plan individualizations to a care plan. Examples of Individualization might be:  \"Parent requests to be called daily at 9am for status\", \"I have a hard time hearing out of my right ear\", or \"Do not touch me to wake me up as it startles  me\".  11/10/2024 0822 by Eva Fleming RN  Outcome: Not Progressing  11/10/2024 0820 by Eva Fleming RN  Outcome: Not Progressing  Goal: Optimal Comfort and Wellbeing  11/10/2024 0822 by Eva Fleming RN  Outcome: Not Progressing  11/10/2024 0820 by Eva Fleming RN  Outcome: Not Progressing  Goal: Readiness for Transition of Care  11/10/2024 0822 by Eva Fleming, " RN  Outcome: Not Progressing  11/10/2024 0820 by Eva Fleming RN  Outcome: Not Progressing     Problem: Oral Intake Inadequate  Goal: Improved Oral Intake  11/10/2024 0822 by Eva Fleming RN  Outcome: Not Progressing  11/10/2024 0820 by Eva Fleming RN  Outcome: Not Progressing         Goal Outcome Evaluation:    Patient A & O x 3 but lethargic. Slow to answer some questions but answers are appropriate. Denies pain when asked. Did not want to get out of bed this shift so he was checked, changed and repositioned. He did sleep soundly most of the night. Aroused easily to voice. IV of D5NS running at 125/hr.  Skin continues to be yellow. Temp of 101 during morning VS. Order for Tylenol received by Dr. Hong. Given at 0655. Took meds this am crushed with pudding. Mepilex on sacrum and bridge of nose.

## 2024-11-10 NOTE — PROGRESS NOTES
Patient with am vitals showing a temp of 101. Just spoke with Dr. Zuniga who will order Tylenol. Also informed him of patient's am lactic draw result of 2.5.

## 2024-11-11 ENCOUNTER — APPOINTMENT (OUTPATIENT)
Dept: PHYSICAL THERAPY | Facility: CLINIC | Age: 84
DRG: 436 | End: 2024-11-11
Payer: MEDICARE

## 2024-11-11 ENCOUNTER — APPOINTMENT (OUTPATIENT)
Dept: OCCUPATIONAL THERAPY | Facility: CLINIC | Age: 84
DRG: 436 | End: 2024-11-11
Payer: MEDICARE

## 2024-11-11 ENCOUNTER — APPOINTMENT (OUTPATIENT)
Dept: SPEECH THERAPY | Facility: CLINIC | Age: 84
DRG: 436 | End: 2024-11-11
Payer: MEDICARE

## 2024-11-11 LAB
ANION GAP SERPL CALCULATED.3IONS-SCNC: 11 MMOL/L (ref 7–15)
BUN SERPL-MCNC: 64.5 MG/DL (ref 8–23)
CALCIUM SERPL-MCNC: 7.4 MG/DL (ref 8.8–10.4)
CHLORIDE SERPL-SCNC: 101 MMOL/L (ref 98–107)
CREAT SERPL-MCNC: 2.31 MG/DL (ref 0.67–1.17)
EGFRCR SERPLBLD CKD-EPI 2021: 27 ML/MIN/1.73M2
ERYTHROCYTE [DISTWIDTH] IN BLOOD BY AUTOMATED COUNT: 16.5 % (ref 10–15)
GLUCOSE BLDC GLUCOMTR-MCNC: 76 MG/DL (ref 70–99)
GLUCOSE BLDC GLUCOMTR-MCNC: 80 MG/DL (ref 70–99)
GLUCOSE BLDC GLUCOMTR-MCNC: 84 MG/DL (ref 70–99)
GLUCOSE BLDC GLUCOMTR-MCNC: 90 MG/DL (ref 70–99)
GLUCOSE SERPL-MCNC: 81 MG/DL (ref 70–99)
HCO3 SERPL-SCNC: 20 MMOL/L (ref 22–29)
HCT VFR BLD AUTO: 28.4 % (ref 40–53)
HGB BLD-MCNC: 9.4 G/DL (ref 13.3–17.7)
LACTATE SERPL-SCNC: 2.7 MMOL/L (ref 0.7–2)
LACTATE SERPL-SCNC: 3.3 MMOL/L (ref 0.7–2)
MCH RBC QN AUTO: 30.7 PG (ref 26.5–33)
MCHC RBC AUTO-ENTMCNC: 33.1 G/DL (ref 31.5–36.5)
MCV RBC AUTO: 93 FL (ref 78–100)
PLATELET # BLD AUTO: 222 10E3/UL (ref 150–450)
POTASSIUM SERPL-SCNC: 4.7 MMOL/L (ref 3.4–5.3)
RBC # BLD AUTO: 3.06 10E6/UL (ref 4.4–5.9)
SODIUM SERPL-SCNC: 132 MMOL/L (ref 135–145)
WBC # BLD AUTO: 12.1 10E3/UL (ref 4–11)

## 2024-11-11 PROCEDURE — 120N000001 HC R&B MED SURG/OB

## 2024-11-11 PROCEDURE — 97161 PT EVAL LOW COMPLEX 20 MIN: CPT | Mod: GP

## 2024-11-11 PROCEDURE — 250N000013 HC RX MED GY IP 250 OP 250 PS 637: Performed by: HOSPITALIST

## 2024-11-11 PROCEDURE — 92610 EVALUATE SWALLOWING FUNCTION: CPT | Mod: GN | Performed by: SPEECH-LANGUAGE PATHOLOGIST

## 2024-11-11 PROCEDURE — 97535 SELF CARE MNGMENT TRAINING: CPT | Mod: GO

## 2024-11-11 PROCEDURE — 85027 COMPLETE CBC AUTOMATED: CPT | Performed by: STUDENT IN AN ORGANIZED HEALTH CARE EDUCATION/TRAINING PROGRAM

## 2024-11-11 PROCEDURE — 80048 BASIC METABOLIC PNL TOTAL CA: CPT | Performed by: STUDENT IN AN ORGANIZED HEALTH CARE EDUCATION/TRAINING PROGRAM

## 2024-11-11 PROCEDURE — 250N000011 HC RX IP 250 OP 636: Performed by: STUDENT IN AN ORGANIZED HEALTH CARE EDUCATION/TRAINING PROGRAM

## 2024-11-11 PROCEDURE — 99232 SBSQ HOSP IP/OBS MODERATE 35: CPT | Performed by: INTERNAL MEDICINE

## 2024-11-11 PROCEDURE — 97165 OT EVAL LOW COMPLEX 30 MIN: CPT | Mod: GO

## 2024-11-11 PROCEDURE — 83605 ASSAY OF LACTIC ACID: CPT | Performed by: STUDENT IN AN ORGANIZED HEALTH CARE EDUCATION/TRAINING PROGRAM

## 2024-11-11 PROCEDURE — 36415 COLL VENOUS BLD VENIPUNCTURE: CPT | Performed by: STUDENT IN AN ORGANIZED HEALTH CARE EDUCATION/TRAINING PROGRAM

## 2024-11-11 PROCEDURE — 250N000013 HC RX MED GY IP 250 OP 250 PS 637: Performed by: INTERNAL MEDICINE

## 2024-11-11 PROCEDURE — 97116 GAIT TRAINING THERAPY: CPT | Mod: GP

## 2024-11-11 RX ORDER — GABAPENTIN 100 MG/1
100 CAPSULE ORAL 3 TIMES DAILY
Status: DISCONTINUED | OUTPATIENT
Start: 2024-11-11 | End: 2024-11-12

## 2024-11-11 RX ORDER — AMOXICILLIN AND CLAVULANATE POTASSIUM 500; 125 MG/1; MG/1
1 TABLET, FILM COATED ORAL EVERY 12 HOURS SCHEDULED
Status: DISCONTINUED | OUTPATIENT
Start: 2024-11-11 | End: 2024-11-16 | Stop reason: HOSPADM

## 2024-11-11 RX ADMIN — GABAPENTIN 100 MG: 100 CAPSULE ORAL at 10:02

## 2024-11-11 RX ADMIN — PIPERACILLIN AND TAZOBACTAM 2.25 G: 2; .25 INJECTION, POWDER, FOR SOLUTION INTRAVENOUS at 02:04

## 2024-11-11 RX ADMIN — GABAPENTIN 100 MG: 100 CAPSULE ORAL at 21:15

## 2024-11-11 RX ADMIN — LEVOTHYROXINE SODIUM 175 MCG: 0.15 TABLET ORAL at 05:41

## 2024-11-11 RX ADMIN — PIPERACILLIN AND TAZOBACTAM 2.25 G: 2; .25 INJECTION, POWDER, FOR SOLUTION INTRAVENOUS at 07:55

## 2024-11-11 RX ADMIN — GABAPENTIN 100 MG: 100 CAPSULE ORAL at 13:40

## 2024-11-11 RX ADMIN — PANTOPRAZOLE SODIUM 40 MG: 40 TABLET, DELAYED RELEASE ORAL at 07:55

## 2024-11-11 RX ADMIN — AMOXICILLIN AND CLAVULANATE POTASSIUM 1 TABLET: 500; 125 TABLET, FILM COATED ORAL at 21:15

## 2024-11-11 NOTE — PLAN OF CARE
Problem: Adult Inpatient Plan of Care  Goal: Absence of Hospital-Acquired Illness or Injury  Intervention: Identify and Manage Fall Risk  Recent Flowsheet Documentation  Taken 11/10/2024 1639 by Ruby Rolle RN  Safety Promotion/Fall Prevention:   activity supervised   lighting adjusted   nonskid shoes/slippers when out of bed   clutter free environment maintained  Intervention: Prevent Skin Injury  Recent Flowsheet Documentation  Taken 11/10/2024 1639 by Ruby Rolle RN  Body Position: weight shifting  Intervention: Prevent and Manage VTE (Venous Thromboembolism) Risk  Recent Flowsheet Documentation  Taken 11/10/2024 1828 by Ruby Rolle RN  VTE Prevention/Management: SCDs off (sequential compression devices)  Taken 11/10/2024 1639 by Ruby Rolle RN  VTE Prevention/Management: SCDs on (sequential compression devices)  Intervention: Prevent Infection  Recent Flowsheet Documentation  Taken 11/10/2024 1639 by Ruby Rolle RN  Infection Prevention:   rest/sleep promoted   single patient room provided  Goal: Optimal Comfort and Wellbeing  Intervention: Monitor Pain and Promote Comfort  Recent Flowsheet Documentation  Taken 11/10/2024 1958 by Ruby Rolle RN  Pain Management Interventions: medication (see MAR)  Taken 11/10/2024 1636 by Ruby Rolle RN  Pain Management Interventions:   declines   distraction     Goal Outcome Evaluation:    Alert & oriented x4. Up with assist of 2 with walker and gait belt. PRN oxycodone given for buttocks pain x1 this shift with improvement. Patient was in bed at beginning of evening shift. He later reported that he was short of breath (oxygen sats were 93% on room air), and he needed to sit up in the chair. Patient was assisted up to chair. For the remainder of the shift, he refused to move back to bed. Writer educated patient that moving to the bed and repositioning will help relieve pressure on his buttocks wound to prevent further skin breakdown. He also  refused assessment of perineum and buttocks.     Ruby Rolle RN on 11/10/2024 at 10:04 PM

## 2024-11-11 NOTE — TELEPHONE ENCOUNTER
Patient presented to [facility] and is currently admitted. From my review patient is awaiting placement in a SNF.    Richie Oneal RN     [facility]

## 2024-11-11 NOTE — PROGRESS NOTES
11/11/24 0901   Appointment Info   Signing Clinician's Name / Credentials (PT) Elham Ponce, PT   Living Environment   People in Home alone   Current Living Arrangements house   Home Accessibility stairs to enter home;stairs within home   Number of Stairs, Main Entrance 4   Stair Railings, Main Entrance railings safe and in good condition   Number of Stairs, Within Home, Primary ten   Stair Railings, Within Home, Primary railings safe and in good condition   Living Environment Comments significant other stays with him on weekends only, has flight of stairs up to bathroom   Self-Care   Equipment Currently Used at Home cane, straight;walker, rolling   Fall history within last six months yes   Number of times patient has fallen within last six months 3   Activity/Exercise/Self-Care Comment indep with mobility with 2WW, ADL's.   General Information   Onset of Illness/Injury or Date of Surgery 11/08/24   Referring Physician Sami Benitez MD   Patient/Family Therapy Goals Statement (PT) none stated, pt agreeable to PT   Pertinent History of Current Problem (include personal factors and/or comorbidities that impact the POC) Jayesh Ortiz is a 83 yo male with past medical history of HTN, T2DM, CAD s/p CABG, PVC's, severe aortic stenosis with LBBB following TAVR 04/2023, HFrEF, Hypothyroidism, BPH, and recent admission with weakness 10/29-11/3 that was thought to be secondary to metastatic cancer of unknown primary that presented to the ED with worsening weakness and inability to care for self at home. Admitted 11/8/2024 with weakness, hypoglycemia, and possible infection of unclear source.   Existing Precautions/Restrictions fall   Pain Assessment   Patient Currently in Pain No   Integumentary/Edema   Integumentary/Edema Comments pitting edema in bilateral LE, pt reports being normally swollen, worse currently   Range of Motion (ROM)   Range of Motion ROM is WFL   Strength (Manual Muscle Testing)   Strength  Comments general LE weakness from reduced mobility   Bed Mobility   Comment, (Bed Mobility) supine>sit with SBA, use of bedrail and HOB elevated   Transfers   Comment, (Transfers) sit>stand from EOB with 2WW and CGA   Gait/Stairs (Locomotion)   Comment, (Gait/Stairs) amb few steps bed>chair with 2WW and CGA   Balance   Balance Comments rec use of walker for mobility   Clinical Impression   Criteria for Skilled Therapeutic Intervention Yes, treatment indicated   PT Diagnosis (PT) impaired functional mobility   Influenced by the following impairments LE weakness, reduced activity tolerance   Functional limitations due to impairments impaired transfers, gait, stairs   Clinical Presentation (PT Evaluation Complexity) evolving   Clinical Presentation Rationale clinical reasoning   Clinical Decision Making (Complexity) low complexity   Planned Therapy Interventions (PT) balance training;gait training;home exercise program;patient/family education;stair training;strengthening;transfer training;progressive activity/exercise;risk factor education;home program guidelines   Risk & Benefits of therapy have been explained evaluation/treatment results reviewed;care plan/treatment goals reviewed;risks/benefits reviewed;current/potential barriers reviewed;participants voiced agreement with care plan;participants included;patient   PT Total Evaluation Time   PT Eval, Low Complexity Minutes (81240) 10   Physical Therapy Goals   PT Frequency 5x/week   PT Predicted Duration/Target Date for Goal Attainment 11/18/24   PT Goals Transfers;Gait;Stairs   PT: Transfers Supervision/stand-by assist;Bed to/from chair;Assistive device   PT: Gait Supervision/stand-by assist;Standard walker;100 feet   PT: Stairs Supervision/stand-by assist;4 stairs;Rail on both sides   Interventions   Interventions Quick Adds Gait Training   Gait Training   Gait Training Minutes (00987) 10   Symptoms Noted During/After Treatment (Gait Training) fatigue   Treatment  Detail/Skilled Intervention additional sit>stand from recliner with SBA and 2WW, cues for hand placement. amb in room x30 feet with 2WW and CGA/SBA, fair balance and strength with this distance, fatigued but likely due to reduced mobility since admission. remains in chair, chair alarm on and call light in reach. updated board in room to reflect Ax1 for mobility and to walk to bathroom with nursing staff.   Distance in Feet 30   [place] Level (Gait Training) contact guard   Physical Assistance Level (Gait Training) verbal cues;supervision   Assistive Device (Gait Training) standard walker   Gait Analysis Deviations decreased armand;decreased stride length;decreased weight-shifting ability   Impairments (Gait Analysis/Training) strength decreased   PT Discharge Planning   PT Plan Mon 1/5; progress gait with 2WW, LE strengthening/endurance   PT Discharge Recommendation (DC Rec) Transitional Care Facility   PT Rationale for DC Rec rec TCU today as pt requires Ax1 for short distance mobility, fatigues quickly; if pt declines rec home with home care PT to increase indep and ease in own environment   PT Brief overview of current status supine>sit with SBA, sit>stand with CGA, amb 30 feet with 2WW and CGA   PT Equipment Needed at Discharge   (has all equipment)   Physical Therapy Time and Intention   Timed Code Treatment Minutes 10   Total Session Time (sum of timed and untimed services) 20

## 2024-11-11 NOTE — PROGRESS NOTES
Impression:  Pt had no overt s/sx aspiration or dysphagia with what he was willing to swallow during assessment, which was all liquids.  Pt states solid foods don't appear to pass down and he can 'taste it for days' and also has a lot of hiccups.  Oropharyngeal WFL on limited assessment.  Pt has poor appetite.  Possible esophageal dysphagia.         Bedside Swallow Evaluation  11/11/24    Appointment Info   Signing Clinician's Name / Credentials (SLP) Idalmis Barnes MA, [facility], [place]/SLP   Rehab Comments (SLP) Difficulty swallowing affecting ability to take po   General Information   Onset of Illness/Injury or Date of Surgery 11/11/24   Referring Physician Sami Benitez MD   Patient/Family Therapy Goal Statement (SLP) To not feel so 'erpy' with hiccups when eating.   Pertinent History of Current Problem Jayesh Ortiz is a 83 yo male with past medical history of HTN, T2DM, CAD s/p CABG, PVC's, severe aortic stenosis with LBBB following TAVR 04/2023, HFrEF, Hypothyroidism, BPH, and recent admission with weakness 10/29-11/3 that was thought to be secondary to metastatic cancer of unknown primary that presented to the ED with worsening weakness and inability to care for self at home. Admitted 11/8/2024 with weakness, hypoglycemia, and possible infection of unclear source.   General Observations Pt seen while up in recliner.  Pt reports difficulty swallowing solid foods and states 'I can taste what I ate for days'.   Pain Assessment   Patient Currently in Pain No   Type of Evaluation   Type of Evaluation Swallow Evaluation   Oral Motor   Oral Musculature generally intact   Structural Abnormalities none present   Mucosal Quality adequate   Facial Symmetry (Oral Motor)   Facial Symmetry (Oral Motor) WNL   Lip Function (Oral Motor)   Lip Range of Motion (Oral Motor) WNL   Tongue Function (Oral Motor)   Tongue ROM (Oral Motor) WNL   Vocal Quality/Secretion Management (Oral Motor)   Vocal Quality (Oral Motor) WNL   General  Swallowing Observations   Past History of Dysphagia Yes   Comment, General Swallowing Observations Limited assessment as SLP attempted x2 and pt not wanting to eat solids.   Respiratory Support room air   Current Diet/Method of Nutritional Intake (General Swallowing Observations, NIS) regular diet   Swallowing Evaluation Clinical swallow evaluation   Clinical Swallow Evaluation   Feeding Assistance no assistance needed   Clinical Swallow Evaluation Textures Trialed thin liquids;pureed;solid foods   Clinical Swallow Eval: Thin Liquid Texture Trial   Mode of Presentation, Thin Liquids straw;self-fed   Volume of Liquid or Food Presented single and consecutive sips   Oral Phase of Swallow WFL   Pharyngeal Phase of Swallow intact   Diagnostic Statement WNL   Clinical Swallow Evaluation: Puree Solid Texture Trial   Diagnostic Statement Pt denies any difficulty with applesauce and purees.  Did not want to take today.   Clinical Swallow Evaluation: Solid Food Texture Trial   Diagnostic Statement Did not asses- pt report it is difficult.   Esophageal Phase of Swallow   Patient reports or presents with symptoms of esophageal dysphagia Yes   Esophageal comments Pt's reported symptoms appear more esophageal.   Clinical Impression   Criteria for Skilled Therapeutic Interventions Met (SLP Eval) Evaluation only   SLP Diagnosis possible esophageal dysphagia   Clinical Impression Comments Pt had no overt s/sx aspiration or dysphagia with what he was willing to swallow during assessment, which was all liquids.  Pt states solid foods don't appear to pass down and he can 'taste it for days' and also has a lot of hiccups.  MD aware.   SLP Total Evaluation Time   Eval: oral/pharyngeal swallow function, clinical swallow Minutes (12559) 20   SLP Time and Intention   Total Session Time (sum of timed and untimed services) 20

## 2024-11-11 NOTE — PROGRESS NOTES
Palliative Care:    Family meeting planned on 11/12/2024 at noon.  Full consultation at that time.      SHANTA Bob CNP

## 2024-11-11 NOTE — PLAN OF CARE
Problem: Adult Inpatient Plan of Care  Goal: Plan of Care Review  Description: The Plan of Care Review/Shift note should be completed every shift.  The Outcome Evaluation is a brief statement about your assessment that the patient is improving, declining, or no change.  This information will be displayed automatically on your shift  note.  Flowsheets (Taken 11/11/2024 1400)  Plan of Care Reviewed With:   patient   significant other  Goal: Absence of Hospital-Acquired Illness or Injury  Intervention: Identify and Manage Fall Risk  Recent Flowsheet Documentation  Taken 11/11/2024 0755 by Gayatri Major RN  Safety Promotion/Fall Prevention:   activity supervised   lighting adjusted   mobility aid in reach   nonskid shoes/slippers when out of bed   clutter free environment maintained  Intervention: Prevent and Manage VTE (Venous Thromboembolism) Risk  Recent Flowsheet Documentation  Taken 11/11/2024 0755 by Gayatri Major RN  VTE Prevention/Management: SCDs on (sequential compression devices)  Intervention: Prevent Infection  Recent Flowsheet Documentation  Taken 11/11/2024 0755 by Gayatri Major RN  Infection Prevention:   rest/sleep promoted   hand hygiene promoted     Pt is A&O. Ambulates via A1-A2 with a gaitbelt and walker. Pt ate a few bites of breakfast, and declined lunch. Significant other has been at the bedside for most of the afternoon. Palliative care consult placed. TCU placement is pending.

## 2024-11-11 NOTE — PLAN OF CARE
Problem: Adult Inpatient Plan of Care  Goal: Plan of Care Review  Description: The Plan of Care Review/Shift note should be completed every shift.  The Outcome Evaluation is a brief statement about your assessment that the patient is improving, declining, or no change.  This information will be displayed automatically on your shift  note.  Outcome: Progressing  Flowsheets (Taken 11/11/2024 0514)  Plan of Care Reviewed With: patient  Overall Patient Progress: no change   Goal Outcome Evaluation:      Plan of Care Reviewed With: patient    Overall Patient Progress: no change     Pt has slept in recliner chair tonight until about 0500 when he returned to bed. Assisted up to commode once w/ 2 assist, belt & walker; voided a good amount of ainsley urine.

## 2024-11-11 NOTE — PROGRESS NOTES
"   11/11/24 0900   Appointment Info   Signing Clinician's Name / Credentials (OT) Mi Jimenez, OTR/L   Living Environment   People in Home alone  (significant other stays with patient on the weekends)   Current Living Arrangements house   Home Accessibility stairs to enter home   Transportation Anticipated family or friend will provide   Self-Care   Equipment Currently Used at Home cane, straight;walker, rolling   Fall history within last six months yes   Number of times patient has fallen within last six months 3   Activity/Exercise/Self-Care Comment at baseline: independent with ADLs and related mobility using FWW.   General Information   Onset of Illness/Injury or Date of Surgery 11/08/24   Referring Physician Sami Benitez MD   Patient/Family Therapy Goal Statement (OT) to return home.   Additional Occupational Profile Info/Pertinent History of Current Problem Jayesh Ortiz is a 83 yo male with past medical history of HTN, T2DM, CAD s/p CABG, PVC's, severe aortic stenosis with LBBB following TAVR 04/2023, HFrEF, Hypothyroidism, BPH, and recent admission with weakness 10/29-11/3 that was thought to be secondary to metastatic cancer of unknown primary that presented to the ED with worsening weakness and inability to care for self at home. Admitted 11/8/2024 with weakness, hypoglycemia, and possible infection of unclear source.   Existing Precautions/Restrictions fall   Cognitive Status Examination   Orientation Status orientation to person, place and time   Pain Assessment   Patient Currently in Pain No   Strength Comprehensive (MMT)   Comment, General Manual Muscle Testing (MMT) Assessment feels weaker than baseline \"well they won't let me move here\".   Bed Mobility   Comment (Bed Mobility) SBA   Transfers   Transfer Comments CGA and FWW   Balance   Balance Comments Ambulates to/from doorway of room with SBA-CGA and FWW.   Activities of Daily Living   BADL Assessment/Intervention lower body " dressing;upper body dressing;toileting   Upper Body Dressing Assessment/Training   [place] Level (Upper Body Dressing) set up   Clinical Impression   Criteria for Skilled Therapeutic Interventions Met (OT) Yes, treatment indicated   OT Diagnosis decreased independence with ADLs and related mobility   OT Problem List-Impairments impacting ADL problems related to;activity tolerance impaired;strength   Assessment of Occupational Performance 3-5 Performance Deficits   Identified Performance Deficits decreased activity tolerance for ADLs such as dressing, showering, functional mobility   Planned Therapy Interventions (OT) ADL retraining;strengthening;progressive activity/exercise   Clinical Decision Making Complexity (OT) problem focused assessment/low complexity   Risk & Benefits of therapy have been explained patient;participants included;participants voiced agreement with care plan;current/potential barriers reviewed;risks/benefits reviewed;care plan/treatment goals reviewed;evaluation/treatment results reviewed   OT Total Evaluation Time   OT Eval, Low Complexity Minutes (10280) 10   OT Goals   Therapy Frequency (OT) 6 times/week   OT Predicted Duration/Target Date for Goal Attainment 11/18/24   OT Goals Lower Body Dressing;Hygiene/Grooming;Toilet Transfer/Toileting;OT Goal 1   OT: Hygiene/Grooming supervision/stand-by assist;while standing   OT: Lower Body Dressing Supervision/stand-by assist   OT: Toilet Transfer/Toileting Supervision/stand-by assist   OT: Goal 1 Pt will complete B UE HEP with SBA to increase strength/activity tolerance for ADLs and related mobility   Self-Care/Home Management   Self-Care/Home Mgmt/ADL, Compensatory, Meal Prep Minutes (01662) 10   Symptoms Noted During/After Treatment (Meal Preparation/Planning Training) fatigue   Treatment Detail/Skilled Intervention following evaluation and activity. Educated pt on recommended activity; encouragement of walking to the bathroom with nursing  staff versus using commode.   OT Discharge Planning   OT Plan POC monday: 1/6; toilet transfer, standing g/h, UE exercises   OT Discharge Recommendation (DC Rec) Transitional Care Facility   OT Rationale for DC Rec Would benefit from TCU to increase strength/activity tolerance for ADLs and related mobility. Pt may be able to progress to home pending progress with therapy   OT Brief overview of current status CGA and FWW   Total Session Time   Timed Code Treatment Minutes 10   Total Session Time (sum of timed and untimed services) 20

## 2024-11-11 NOTE — PROGRESS NOTES
[facility]    Hospitalist Progress Note    Assessment & Plan   Jayesh Ortiz is a 85 yo male with past medical history of HTN, T2DM, CAD s/p CABG, PVC's, severe aortic stenosis with LBBB following TAVR 04/2023, HFrEF, Hypothyroidism, BPH, and recent admission with weakness 10/29-11/3 that was thought to be secondary to metastatic cancer of unknown primary that presented to the ED with worsening weakness and inability to care for self at home. Admitted 11/8/2024 with weakness, hypoglycemia, and possible infection of unclear source.      Generalized weakness   Frequent falls   Presented to this admission with progression of weakness as patient was unable to get out of bed on own and perform activities of daily living. Suspect that progression of suspected malignancy, hypoglycemia, and possible infection are contributing. Patient did have a fall while in the hospital but did not hit head an imaging was not pursued.    - PT and OT consulted, recommending TCU     Diabetes mellitus type 2  Hypoglycemia   Blood glucose of 35 on presentation. A1C 6.0 on 10/30/2024. Glipizide and metformine had been decreased during pruior admission but not stopped. Suspect with malignancy and poor intake these medications are no longer needed and may have contributed to hypoglycemia.    - Holding PTA metformin and glipizide   - Correction insulin pending course    - Stopped D5 NS    - POC BG QID AC HS      Leukocytosis, resolved   Fever, resolved   Lactic acidosis  12.6 on presentation resolved on recheck in am, also developed an isolated fever 11/9. Interestingly noted to have had fever during previous admission that was attributed to malignancy. Lactate remains elevated but patients known malignancy presents a possible non-infectious . UA with 17 WBC small leucocyte esterase and no nitrates. CT chest abdomen pelvis similar to prior and RUQ ultrasound similar to prior and unrevealing of acute process.  "Plan for give cultures additional time with possible trial of antibiotic discontinuation as may be driven by malignancy.    - Acetaminophen PRN for pain and fever    - Blood culture from 11/8 NGTD   - Change pip-tazo to Augmentin   - MRSA nares negative      Coccyx pressure injury stage one present on admission  Patient has skin injury over coccyx that is causing him a fair amount of pain and is his number one complaint most days.    - Started oxycodone 2.5-5 mg PRN while in the hospital    - Wound cares per nursing       Suicidal statements   Patient has repeatedly stated to \"Just shoot me in the head\" patient states that he will not try to kill himself while in the hospital and voices frustration about current pain and his progressive weakness. Patient seems low risk to to attempt suicide at this time but will continue to discuss with patient. Stopped making statements as symptoms improved and denies having suicidal plan or intent.       Probable metastatic cancer   Hx of prostate cancer 2000s  Hx of Thyroid cancer 1990s   Liver lesions, probable metastases  Pulmonary nodules   Esophageal thickening   Unintentional weight loss   Incidentally found to have probable metastatic disease on CT 10/21/2024. Had had 90 pounds of weight loss leading up to that admission. Was seen in oncology clinic 11/4 with GI referral placed for liver lesions.    - Palliative care consultation to discuss goals of care     Elevated liver function tests  Alk phos 775, , and  on presentation up from previous admission and now down trending. Suspect related to malignancy and liver metastasis. Gallbladder appears stable on ultrasound.    - Repeat LFTs tomorrow morning     CKD   Creatinine of 2.0 on presentation improved from previous admission. With Cr appearing ot have been 2 or more for the last two months.    - BMP with am labs      Swallowing difficulty   Hiccups  Noted in previous admission. Has only been able to swallow " liquids without things feeling like they are getting stuck. Has not been able to see GI or get EGD outpatient. Will need to consider endoscopy and further evaluation pending course.   - SLP consulted, no obvious oropharyngeal dysphagia   - Start gabapentin for hiccups     Normocytic anemia   Hgb of 10.5 on presentation similar to previous admission. Had been suspected to be due to chronic disease. No signs of bleeding. Smear obtained 11/1 consistent with iron deficiency and patient would likely benefit from replacement.   - Possible IV iron inpatient vs outpatient pending course       Suspected DRAKE  Noted to have had nocturnal hypoxemia during previous admission. Has not been able to complete sleep study outpatient.      Hyponatremia, resolved   Na of 133 on presentation higher than previous admission. Did drop to 131 with fluids but with patient still appearing dry continued with resolution.    - BMP with am labs       Heart failure with recovered ejection fraction   Hx of severe aortic stenosis s/p TAVR 04/2023   HTN   TTE 07/15/2024 with EF 50-55% improved from 45-50% 03/2024.   PTA HF meds.   - Holding PTA Carvedilol    - Bumetanide and losartan stopped after previous admission      LBBB following TAVR 04/2023   Hx of PVCs  PVC [place] of 16% noted on mobile cardiac monitoring 3/2024. Has been on sotalol in the past but held with renal function decline last admission.    - Holding PTA carvedilol pending stability with possible infection    - Sotalol stopped during prior admission  (Creatine clearane was 32 and sotalol is contraindicated for Cr clearnace < 40)     HLD   - Atorvastatin previously stopped with liver injury, consider restart pending course    - Holding PTA Ezetimide pending course      Hypothyroidism   TSH elevated and T4 low but in setting of critical illness. Will continue PTA dosing but increase may need to be considered outpatient.    - Continued PTA levothyroxine 175 mcg every day      GERD   -  "Continued PTA PO PPI with formulary sub      Diet: Combination Diet Regular Diet    DVT Prophylaxis: Pneumatic Compression Devices  Welch Catheter: Not present  Lines: None     Cardiac Monitoring: None  Code Status: No CPR - Do NOT Intubate      Clinically Significant Risk Factors      # Hyponatremia: Lowest Na = 132 mmol/L in last 2 days, will monitor as appropriate       # Hypoalbuminemia: Lowest albumin = 2.1 g/dL at 11/10/2024  6:21 AM, will monitor as appropriate                # Obesity: Estimated body mass index is 38.47 kg/m  as calculated from the following:    Height as of this encounter: 1.727 m (5' 8\").    Weight as of this encounter: 114.8 kg (253 lb)., PRESENT ON ADMISSION     # Financial/Environmental Concerns: none       Date of Service (when I saw the patient): 11/11/2024    Disposition: Expected discharge pending goals of care discussion.    45 MINUTES SPENT BY ME on the date of service doing chart review, history, exam, documentation & further activities per the note.    Tico Gallegos MD    Interval History   Patient complains of shortness of breath on lying in bed, improved with sitting at the edge of the bed.  He complains of frequent hiccups and a feeling that he is going to vomit, although he has not recently vomited.  Appetite is poor.    -Data reviewed today: I reviewed all new labs and imaging results over the last 24 hours. I personally reviewed no images or EKG's today.    Physical Exam   Temp: 97.8  F (36.6  C) Temp src: Oral BP: 94/44 Pulse: 73   Resp: 18 SpO2: 97 % O2 Device: None (Room air)    Vitals:    11/08/24 1559   Weight: 114.8 kg (253 lb)     Vital Signs with Ranges  Temp:  [97.5  F (36.4  C)-99.8  F (37.7  C)] 97.8  F (36.6  C)  Pulse:  [73-85] 73  Resp:  [16-20] 18  BP: ()/(44-50) 94/44  SpO2:  [93 %-97 %] 97 %  I/O last 3 completed shifts:  In: -   Out: 500 [Urine:500]    Gen: Obese jaundiced male, alert and oriented x 3, no acute distressed  HEENT: Atraumatic, " normocephalic; sclera non-injected, icterric; oral mucosa moist, no lesion, no exudate  Lungs: Clear to ausculation, no wheezes, no rhonchi, no rales  Heart: Regular rate, regular rhythm, no gallops, no rubs, no murmurs  GI: Bowel sound normal, no hepatosplenomegaly, no masses, non-tender, non-distended, no guarding, no rebound tenderness  Lymph: No lymphadenopathy, 3+ BLE and 2+ BUE edema  Skin: No rashes, no chronic venous stasis     Medications   Current Facility-Administered Medications   Medication Dose Route Frequency Provider Last Rate Last Admin     Current Facility-Administered Medications   Medication Dose Route Frequency Provider Last Rate Last Admin    gabapentin (NEURONTIN) capsule 100 mg  100 mg Oral TID Tico Gallegos MD   100 mg at 11/11/24 1002    levothyroxine (SYNTHROID/LEVOTHROID) tablet 175 mcg  175 mcg Oral Daily Aurelio Burkett MD   175 mcg at 11/11/24 0541    pantoprazole (PROTONIX) EC tablet 40 mg  40 mg Oral Daily Aurelio Burkett MD   40 mg at 11/11/24 0755    piperacillin-tazobactam (ZOSYN) 2.25 g vial to attach to  ml bag  2.25 g Intravenous Q6H Sami Benitez  mL/hr at 11/11/24 0204 2.25 g at 11/11/24 0755    sodium chloride (PF) 0.9% PF flush 3 mL  3 mL Intracatheter Q8H Aurelio Burkett MD   3 mL at 11/11/24 1002       Data   Recent Labs   Lab 11/11/24  1156 11/11/24  0738 11/11/24  0614 11/10/24  1626 11/10/24  0621 11/09/24  0744 11/09/24  0437 11/09/24  0227 11/08/24  1730 11/08/24  1649   WBC  --   --  12.1*  --  9.6  --   --  10.3  --  12.6*   HGB  --   --  9.4*  --  9.1*  --   --  9.1*  --  10.5*   MCV  --   --  93  --  92  --   --  91  --  89   PLT  --   --  222  --  241  --   --  284  --  306   INR  --   --   --   --   --   --   --   --   --  1.28*   NA  --   --  132*  --  135  --  131*  --   --  133*   POTASSIUM  --   --  4.7  --  4.4  --  4.5  --   --  5.1   CHLORIDE  --   --  101  --  106  --  99  --   --  99   CO2  --   --  20*   --  21*  --  23  --   --  26   BUN  --   --  64.5*  --  63.9*  --  72.1*  --   --  75.7*   CR  --   --  2.31*  --  2.04*  --  1.98*  --   --  2.00*   ANIONGAP  --   --  11  --  8  --  9  --   --  8   MATT  --   --  7.4*  --  7.1*  --  7.2*  --   --  7.7*   GLC 90 80 81   < > 70   < > 77  --    < > 35*   ALBUMIN  --   --   --   --  2.1*  --  2.1*  --   --  2.3*   PROTTOTAL  --   --   --   --  5.1*  --  5.2*  --   --  5.6*   BILITOTAL  --   --   --   --  4.9*  --  4.3*  --   --  4.8*   ALKPHOS  --   --   --   --  642*  --  695*  --   --  775*   ALT  --   --   --   --  92*  --  92*  --   --  101*   AST  --   --   --   --  332*  --  319*  --   --  382*   LIPASE  --   --   --   --   --   --   --   --   --  20    < > = values in this interval not displayed.       No results found for this or any previous visit (from the past 24 hours).

## 2024-11-12 ENCOUNTER — APPOINTMENT (OUTPATIENT)
Dept: MRI IMAGING | Facility: CLINIC | Age: 84
DRG: 436 | End: 2024-11-12
Attending: INTERNAL MEDICINE
Payer: MEDICARE

## 2024-11-12 LAB
ALBUMIN SERPL BCG-MCNC: 2.2 G/DL (ref 3.5–5.2)
ALP SERPL-CCNC: 584 U/L (ref 40–150)
ALT SERPL W P-5'-P-CCNC: 119 U/L (ref 0–70)
ANION GAP SERPL CALCULATED.3IONS-SCNC: 13 MMOL/L (ref 7–15)
AST SERPL W P-5'-P-CCNC: 498 U/L (ref 0–45)
BILIRUB DIRECT SERPL-MCNC: 6.14 MG/DL (ref 0–0.3)
BILIRUB SERPL-MCNC: 6.9 MG/DL
BUN SERPL-MCNC: 67.8 MG/DL (ref 8–23)
CALCIUM SERPL-MCNC: 7.4 MG/DL (ref 8.8–10.4)
CHLORIDE SERPL-SCNC: 99 MMOL/L (ref 98–107)
CK SERPL-CCNC: 172 U/L (ref 39–308)
CREAT SERPL-MCNC: 2.59 MG/DL (ref 0.67–1.17)
EGFRCR SERPLBLD CKD-EPI 2021: 24 ML/MIN/1.73M2
ERYTHROCYTE [DISTWIDTH] IN BLOOD BY AUTOMATED COUNT: 17 % (ref 10–15)
GLUCOSE BLDC GLUCOMTR-MCNC: 73 MG/DL (ref 70–99)
GLUCOSE BLDC GLUCOMTR-MCNC: 77 MG/DL (ref 70–99)
GLUCOSE SERPL-MCNC: 76 MG/DL (ref 70–99)
HCO3 SERPL-SCNC: 18 MMOL/L (ref 22–29)
HCT VFR BLD AUTO: 26.5 % (ref 40–53)
HGB BLD-MCNC: 8.9 G/DL (ref 13.3–17.7)
MCH RBC QN AUTO: 30.4 PG (ref 26.5–33)
MCHC RBC AUTO-ENTMCNC: 33.6 G/DL (ref 31.5–36.5)
MCV RBC AUTO: 90 FL (ref 78–100)
PLATELET # BLD AUTO: 241 10E3/UL (ref 150–450)
POTASSIUM SERPL-SCNC: 5.1 MMOL/L (ref 3.4–5.3)
PROT SERPL-MCNC: 5.1 G/DL (ref 6.4–8.3)
RBC # BLD AUTO: 2.93 10E6/UL (ref 4.4–5.9)
SODIUM SERPL-SCNC: 130 MMOL/L (ref 135–145)
WBC # BLD AUTO: 13.8 10E3/UL (ref 4–11)

## 2024-11-12 PROCEDURE — A9585 GADOBUTROL INJECTION: HCPCS | Performed by: INTERNAL MEDICINE

## 2024-11-12 PROCEDURE — 258N000003 HC RX IP 258 OP 636: Performed by: INTERNAL MEDICINE

## 2024-11-12 PROCEDURE — 250N000013 HC RX MED GY IP 250 OP 250 PS 637: Performed by: HOSPITALIST

## 2024-11-12 PROCEDURE — G1010 CDSM STANSON: HCPCS

## 2024-11-12 PROCEDURE — 82565 ASSAY OF CREATININE: CPT | Performed by: INTERNAL MEDICINE

## 2024-11-12 PROCEDURE — 80053 COMPREHEN METABOLIC PANEL: CPT | Performed by: INTERNAL MEDICINE

## 2024-11-12 PROCEDURE — 255N000002 HC RX 255 OP 636: Performed by: INTERNAL MEDICINE

## 2024-11-12 PROCEDURE — 36415 COLL VENOUS BLD VENIPUNCTURE: CPT | Performed by: INTERNAL MEDICINE

## 2024-11-12 PROCEDURE — 82550 ASSAY OF CK (CPK): CPT | Performed by: INTERNAL MEDICINE

## 2024-11-12 PROCEDURE — 82248 BILIRUBIN DIRECT: CPT | Performed by: INTERNAL MEDICINE

## 2024-11-12 PROCEDURE — 82247 BILIRUBIN TOTAL: CPT | Performed by: INTERNAL MEDICINE

## 2024-11-12 PROCEDURE — 120N000001 HC R&B MED SURG/OB

## 2024-11-12 PROCEDURE — 99233 SBSQ HOSP IP/OBS HIGH 50: CPT | Performed by: INTERNAL MEDICINE

## 2024-11-12 PROCEDURE — 85014 HEMATOCRIT: CPT | Performed by: INTERNAL MEDICINE

## 2024-11-12 PROCEDURE — 99222 1ST HOSP IP/OBS MODERATE 55: CPT | Performed by: NURSE PRACTITIONER

## 2024-11-12 PROCEDURE — 80048 BASIC METABOLIC PNL TOTAL CA: CPT | Performed by: INTERNAL MEDICINE

## 2024-11-12 PROCEDURE — 74183 MRI ABD W/O CNTR FLWD CNTR: CPT | Mod: MG

## 2024-11-12 PROCEDURE — 250N000013 HC RX MED GY IP 250 OP 250 PS 637: Performed by: INTERNAL MEDICINE

## 2024-11-12 PROCEDURE — G0463 HOSPITAL OUTPT CLINIC VISIT: HCPCS

## 2024-11-12 RX ORDER — BACLOFEN 10 MG/1
5 TABLET ORAL 3 TIMES DAILY
Status: DISCONTINUED | OUTPATIENT
Start: 2024-11-12 | End: 2024-11-16 | Stop reason: HOSPADM

## 2024-11-12 RX ORDER — GADOBUTROL 604.72 MG/ML
11.5 INJECTION INTRAVENOUS ONCE
Status: COMPLETED | OUTPATIENT
Start: 2024-11-12 | End: 2024-11-12

## 2024-11-12 RX ADMIN — AMOXICILLIN AND CLAVULANATE POTASSIUM 1 TABLET: 500; 125 TABLET, FILM COATED ORAL at 20:16

## 2024-11-12 RX ADMIN — Medication 5 MG: at 20:16

## 2024-11-12 RX ADMIN — GADOBUTROL 11.5 ML: 604.72 INJECTION INTRAVENOUS at 14:36

## 2024-11-12 RX ADMIN — PANTOPRAZOLE SODIUM 40 MG: 40 TABLET, DELAYED RELEASE ORAL at 07:33

## 2024-11-12 RX ADMIN — Medication 5 MG: at 13:42

## 2024-11-12 RX ADMIN — AMOXICILLIN AND CLAVULANATE POTASSIUM 1 TABLET: 500; 125 TABLET, FILM COATED ORAL at 07:33

## 2024-11-12 RX ADMIN — GABAPENTIN 100 MG: 100 CAPSULE ORAL at 07:33

## 2024-11-12 RX ADMIN — SODIUM CHLORIDE 50 ML: 9 INJECTION, SOLUTION INTRAVENOUS at 14:36

## 2024-11-12 RX ADMIN — LEVOTHYROXINE SODIUM 175 MCG: 0.15 TABLET ORAL at 06:35

## 2024-11-12 NOTE — PROGRESS NOTES
Patient complaining of discomfort on his buttox   from a sore he has. Chart reviewed for orders and care plan.    Sara Finnegan RN on 11/11/2024 at 10:17 PM

## 2024-11-12 NOTE — DISCHARGE INSTRUCTIONS
Buttock wound: BID and as needed to keep covered, Clean overall area with Sujatha Cleanse and protect then use wound cleanser or saline to clean open areas. Do not try to scrub all old triad paste off, just what comes away easily. Apply a thick coating of triad paste to cover followed by pull up brief.      HOB: Maintain at or below 30 degrees, unless contraindicated  Repositioning in bed: Every 1-2 hours , Left/right positioning; avoid supine, and Raise foot of bed prior to raising head of bed, to reduce patient sliding down (shear)  Heels: Keep elevated off mattress  Chair positioning: Pressure reduction chair cushion  when up in chair; avoid slouching and reclining  Mattress: needs group II pressure reduction mattress, (low air loss mattress for example)

## 2024-11-12 NOTE — CONSULTS
[facility]  WO Nurse Inpatient Assessment     Consulted for: Coccyx pressure injury    Summary: Several shallow stage III pressure injuries or coccyx and surrounding area present on admission. Area too close to anus for adhesive bandage, was tried but is bunching up and making pt more uncomfortable. Need to focus on pressure relief and use triad paste, pressure relief will be somewhat challenging as due to back injury pt needs HOB elevation, need to keep as low as able and prop side to side to keep pressure off area. Will need pressure reduction mattress at TCU, care management notified for discharge planning.    Patient History (according to provider note(s):      Jayesh rOtiz is a 85 yo male with past medical history of HTN, T2DM, CAD s/p CABG, PVC's, severe aortic stenosis with LBBB following TAVR 04/2023, HFrEF, Hypothyroidism, BPH, and recent admission with weakness 10/29-11/3 that was thought to be secondary to metastatic cancer of unknown primary that presented to the ED with worsening weakness and inability to care for self at home. Admitted 11/8/2024 with weakness, hypoglycemia, and possible infection of unclear source.     Assessment:      Areas visualized during today's visit: Focused:    Wound location: buttock    Last photo: 11/12/24  Wound due to: Pressure Injury, stage III with some areas of stage II  Wound history/plan of care: Present on admission, coming on over last several weeks per pt, has been using preparation H on area with some relief  Wound base: 75-90 % Slough, 10-25 % Pink     Palpation of the wound bed: normal      Drainage: moderate     Description of drainage: serosanguinous     Measurements (length x width x depth, in cm): several areas noted, largest of which is about 1x0.8cmx0.1cm      Tunneling: no     Undermining: no  Periwound skin:  dry distally      Color: pink      Temperature: normal   Odor: none  Pain:  area has been very sore, at time of visit  "no pain, states was better  Pain interventions prior to dressing change: slow and gentle cares   Treatment goal: Heal , Protection, and Remove necrotic tissue  STATUS: initial assessment  Supplies ordered: gathered and supplies stored on unit      Treatment Plan:     Buttock wound(s):  BID and as needed to keep covered Clean overall area with Sujatha Cleanse and protect then wound cleanser on open areas. Do not try to scrub all old triad paste off, just what comes away easily. Apply a thick coating of triad paste to cover followed by pull up brief.     Pressure Injury Prevention (PIP) Plan:  If patient is declining pressure injury prevention interventions: Explore reason why and address patient's concerns, Educate on pressure injury risk and prevention intervention(s), If patient is still declining, document \"informed refusal\" , and Ensure Care team is aware ( provider, charge nurse, etc)  HOB: Maintain at or below 30 degrees, unless contraindicated  Repositioning in bed: Every 1-2 hours , Left/right positioning; avoid supine, and Raise foot of bed prior to raising head of bed, to reduce patient sliding down (shear)  Heels: Keep elevated off mattress  Protective Dressing: None  Positioning Equipment:Fluidized positioner (#[id number] or #[id number]) to help maintain side lying position.  If not able to do with with pillows  Chair positioning: Chair cushion (#[id number])          Orders: Written    RECOMMEND PRIMARY TEAM ORDER: None, at this time  Education provided: importance of repositioning, plan of care, wound progress, and Off-loading pressure  Discussed plan of care with: Patient, Family, and Nurse  WOC nurse follow-up plan: weekly  Notify WOC if wound(s) deteriorate.  Nursing to notify the Provider(s) and re-consult the WOC Nurse if new skin concern.    DATA:     Current support surface: Standard  Standard gel mattress (Isoflex)  Containment of urine/stool: Continent of bladder and Continent of bowel  BMI: Body " mass index is 38.47 kg/m .   Active diet order: Orders Placed This Encounter      Combination Diet Regular Diet     Output: No intake/output data recorded.     Labs:   Recent Labs   Lab 11/12/24  0614 11/09/24  0227 11/08/24  1649   ALBUMIN 2.2*   < > 2.3*   HGB 8.9*   < > 10.5*   INR  --   --  1.28*   WBC 13.8*   < > 12.6*    < > = values in this interval not displayed.     Pressure injury risk assessment:   Sensory Perception: 3-->slightly limited  Moisture: 4-->rarely moist  Activity: 3-->walks occasionally  Mobility: 3-->slightly limited  Nutrition: 2-->probably inadequate  Friction and Shear: 1-->problem  Darci Score: 16    Juana Marte RN, CWOCN  Pager no longer is use, please contact through [employer] group: [facility] Nurse   Dept. Office Number: [phone]

## 2024-11-12 NOTE — PLAN OF CARE
Problem: Adult Inpatient Plan of Care  Goal: Plan of Care Review  Outcome: Progressing  Flowsheets (Taken 11/12/2024 0114)  Outcome Evaluation: Pt is A&O and able to make his needs known. Pt denies pain. Continues to be jaundiced. Dinner BG 84, bedtime BG 76, orange juice offered. Pt had poor appetite at supper. Pt up in chair most of shift. New mepilex placed to buttocks for pressure injuries on sacrum. WOC consult requested. Pt took scheduled medications this evening. Will continue to monitor per plan of care.  Overall Patient Progress: no change     Problem: Oral Intake Inadequate  Goal: Improved Oral Intake  Outcome: Not Progressing     Goal Outcome Evaluation:    Overall Patient Progress: no change

## 2024-11-12 NOTE — PROGRESS NOTES
[facility]    Hospitalist Progress Note    Assessment & Plan   Jayesh Ortiz is a 85 yo male with past medical history of HTN, T2DM, CAD s/p CABG, PVC's, severe aortic stenosis with LBBB following TAVR 04/2023, HFrEF, Hypothyroidism, BPH, and recent admission with weakness 10/29-11/3 that was thought to be secondary to metastatic cancer of unknown primary that presented to the ED with worsening weakness and inability to care for self at home. Admitted 11/8/2024 with weakness, hypoglycemia, and possible infection of unclear source.      Generalized weakness   Frequent falls   Presented to this admission with progression of weakness as patient was unable to get out of bed on own and perform activities of daily living. Suspect that progression of suspected malignancy, hypoglycemia, and possible infection are contributing. Patient did have a fall while in the hospital but did not hit head an imaging was not pursued.    - PT and OT consulted, recommending TCU     Diabetes mellitus type 2  Hypoglycemia   Blood glucose of 35 on presentation. A1C 6.0 on 10/30/2024. Glipizide and metformine had been decreased during pruior admission but not stopped. Suspect with malignancy and poor intake these medications are no longer needed and may have contributed to hypoglycemia.    - Holding PTA metformin and glipizide   - Correction insulin pending course    - Stopped D5 NS    - Glucoses been within normal limits, will discontinue glucose checks     Leukocytosis, resolved   Fever, resolved   Lactic acidosis  12.6 on presentation resolved on recheck in am, also developed an isolated fever 11/9. Interestingly noted to have had fever during previous admission that was attributed to malignancy. Lactate remains elevated but patients known malignancy presents a possible non-infectious . UA with 17 WBC small leucocyte esterase and no nitrates. CT chest abdomen pelvis similar to prior and RUQ ultrasound  "similar to prior and unrevealing of acute process. Plan for give cultures additional time with possible trial of antibiotic discontinuation as may be driven by malignancy.    - Acetaminophen PRN for pain and fever    - Blood culture from 11/8 NGTD   - Change pip-tazo to Augmentin   - MRSA nares negative      Coccyx pressure injury stage one present on admission  Patient has skin injury over coccyx that is causing him a fair amount of pain and is his number one complaint most days.    - Started oxycodone 2.5-5 mg PRN while in the hospital    - Wound cares per nursing       Suicidal statements   Patient has repeatedly stated to \"Just shoot me in the head\" patient states that he will not try to kill himself while in the hospital and voices frustration about current pain and his progressive weakness. Patient seems low risk to to attempt suicide at this time but will continue to discuss with patient. Stopped making statements as symptoms improved and denies having suicidal plan or intent.       Probable metastatic cancer   Hx of prostate cancer 2000s  Hx of Thyroid cancer 1990s   Liver lesions, probable metastases  Pulmonary nodules   Esophageal thickening   Unintentional weight loss   Increasing bilirubin  Incidentally found to have probable metastatic disease on CT 10/21/2024. Had had 90 pounds of weight loss leading up to that admission. Was seen in oncology clinic 11/4 with GI referral placed for liver lesions.    - Palliative care consultation to discuss goals of care, patient confirms DNR/DNI   - Discussed with gastro[facility]ology at the [facility].  They recommend MRCP to evaluate bile ducts.   - Gastroenterology also recommended biopsy to determine type of cancer and possibility of treatment     Elevated liver function tests  Alk phos 775, , and  on presentation up from previous admission and now down trending.  Total bili 0.6 on 10/21, 1.5 on 11/1.  Suspect related to malignancy and liver " metastasis. Gallbladder appears stable on ultrasound.    - Bilirubin increasing since admission:   Total bili on admission 4.8 ? 4.3 ? 4.9 ? 6.9  Direct bili on admission 4.17 ? 4.35 ? 6.14   - Trend LFTs daily     CKD   Acute kidney injury  Creatinine of 2.0 on presentation improved from previous admission. With Cr appearing ot have been 2 or more for the last two months.    - Creatinine 2.31 on 11/11 ? 2.59   - BMP with am labs      Swallowing difficulty   Hiccups  Noted in previous admission. Has only been able to swallow liquids without things feeling like they are getting stuck. Has not been able to see GI or get EGD outpatient. Will need to consider endoscopy and further evaluation pending course.   - SLP consulted, no obvious oropharyngeal dysphagia   - Start baclofen 5 mg 3 times daily for hiccups     Normocytic anemia   Hgb of 10.5 on presentation similar to previous admission. Had been suspected to be due to chronic disease. No signs of bleeding. Smear obtained 11/1 consistent with iron deficiency and patient would likely benefit from replacement.   - Possible IV iron inpatient vs outpatient pending course       Suspected DRAKE  Noted to have had nocturnal hypoxemia during previous admission. Has not been able to complete sleep study outpatient.      Hyponatremia, resolved   Na of 133 on presentation higher than previous admission. Did drop to 131 with fluids but with patient still appearing dry continued with resolution.    - BMP with am labs       Heart failure with recovered ejection fraction   Hx of severe aortic stenosis s/p TAVR 04/2023   HTN   TTE 07/15/2024 with EF 50-55% improved from 45-50% 03/2024.   PTA HF meds.   - Holding PTA Carvedilol    - Bumetanide and losartan stopped after previous admission      LBBB following TAVR 04/2023   Hx of PVCs  PVC bourdon of 16% noted on mobile cardiac monitoring 3/2024. Has been on sotalol in the past but held with renal function decline last admission.    -  "Holding PTA carvedilol pending stability with possible infection    - Sotalol stopped during prior admission  (Creatine clearane was 32 and sotalol is contraindicated for Cr clearnace < 40)     HLD   - Atorvastatin previously stopped with liver injury, consider restart pending course    - Holding PTA Ezetimide pending course      Hypothyroidism   TSH elevated and T4 low but in setting of critical illness. Will continue PTA dosing but increase may need to be considered outpatient.    - Continued PTA levothyroxine 175 mcg every day      GERD   - Continued PTA PO PPI with formulary sub      Diet: Combination Diet Regular Diet    DVT Prophylaxis: Pneumatic Compression Devices  Welch Catheter: Not present  Lines: None     Cardiac Monitoring: None  Code Status: No CPR - Do NOT Intubate      Interval History    Clinically Significant Risk Factors      # Hyponatremia: Lowest Na = 130 mmol/L in last 2 days, will monitor as appropriate       # Hypoalbuminemia: Lowest albumin = 2.1 g/dL at 11/10/2024  6:21 AM, will monitor as appropriate    # Acute Kidney Injury, unspecified: based on a >150% or 0.3 mg/dL increase in last creatinine compared to past 90 day average, will monitor renal function             # Obesity: Estimated body mass index is 38.47 kg/m  as calculated from the following:    Height as of this encounter: 1.727 m (5' 8\").    Weight as of this encounter: 114.8 kg (253 lb)., PRESENT ON ADMISSION       # Financial/Environmental Concerns: none       Date of Service (when I saw the patient): 11/12/2024    Disposition: Expected discharge pending goals of care discussion.    55 MINUTES SPENT BY ME on the date of service doing chart review, history, exam, documentation & further activities per the note.    Tico Gallegos MD    Interval History   Patient is awake during my visit.  Ate small breakfast.  Denies new pain.    -Data reviewed today: I reviewed all new labs and imaging results over the last 24 hours. I " personally reviewed no images or EKG's today.    Physical Exam   Temp: 97.5  F (36.4  C) Temp src: Oral BP: 91/48 Pulse: 79   Resp: 18 SpO2: 92 % O2 Device: None (Room air)    Vitals:    11/08/24 1559   Weight: 114.8 kg (253 lb)     Vital Signs with Ranges  Temp:  [97.5  F (36.4  C)-99  F (37.2  C)] 97.5  F (36.4  C)  Pulse:  [78-84] 79  Resp:  [18-20] 18  BP: ()/(43-50) 91/48  SpO2:  [91 %-95 %] 92 %  No intake/output data recorded.    Gen: Obese jaundiced male, alert and oriented x 3, no acute distressed  HEENT: Atraumatic, normocephalic; sclera non-injected, icterric; oral mucosa moist, no lesion, no exudate  Lungs: Clear to ausculation, no wheezes, no rhonchi, no rales  Heart: Regular rate, regular rhythm, no gallops, no rubs, no murmurs  GI: Bowel sound normal, no hepatosplenomegaly, no masses, non-tender, non-distended, no guarding, no rebound tenderness  Lymph: No lymphadenopathy, 3+ BLE and 2+ BUE edema  Skin: No rashes, no chronic venous stasis     Medications   Current Facility-Administered Medications   Medication Dose Route Frequency Provider Last Rate Last Admin     Current Facility-Administered Medications   Medication Dose Route Frequency Provider Last Rate Last Admin    amoxicillin-clavulanate (AUGMENTIN) 500-125 MG per tablet 1 tablet  1 tablet Oral Q12H [facility] (08/20) Tico Gallegos MD   1 tablet at 11/12/24 0733    gabapentin (NEURONTIN) capsule 100 mg  100 mg Oral TID Tico Gallegos MD   100 mg at 11/12/24 0733    levothyroxine (SYNTHROID/LEVOTHROID) tablet 175 mcg  175 mcg Oral Daily Aurelio Burkett MD   175 mcg at 11/12/24 0635    pantoprazole (PROTONIX) EC tablet 40 mg  40 mg Oral Daily Aurelio Burkett MD   40 mg at 11/12/24 0733    sodium chloride (PF) 0.9% PF flush 3 mL  3 mL Intracatheter Q8H Aurelio Burkett MD   3 mL at 11/11/24 2116       Data   Recent Labs   Lab 11/12/24  0738 11/12/24  0614 11/12/24  0207 11/11/24  0738 11/11/24  0614 11/10/24  1625  11/10/24  0621 11/08/24  1730 11/08/24  1649   WBC  --  13.8*  --   --  12.1*  --  9.6   < > 12.6*   HGB  --  8.9*  --   --  9.4*  --  9.1*   < > 10.5*   MCV  --  90  --   --  93  --  92   < > 89   PLT  --  241  --   --  222  --  241   < > 306   INR  --   --   --   --   --   --   --   --  1.28*   NA  --  130*  --   --  132*  --  135   < > 133*   POTASSIUM  --  5.1  --   --  4.7  --  4.4   < > 5.1   CHLORIDE  --  99  --   --  101  --  106   < > 99   CO2  --  18*  --   --  20*  --  21*   < > 26   BUN  --  67.8*  --   --  64.5*  --  63.9*   < > 75.7*   CR  --  2.59*  --   --  2.31*  --  2.04*   < > 2.00*   ANIONGAP  --  13  --   --  11  --  8   < > 8   MATT  --  7.4*  --   --  7.4*  --  7.1*   < > 7.7*   GLC 77 76 73   < > 81   < > 70   < > 35*   ALBUMIN  --  2.2*  --   --   --   --  2.1*   < > 2.3*   PROTTOTAL  --  5.1*  --   --   --   --  5.1*   < > 5.6*   BILITOTAL  --  6.9*  --   --   --   --  4.9*   < > 4.8*   ALKPHOS  --  584*  --   --   --   --  642*   < > 775*   ALT  --  119*  --   --   --   --  92*   < > 101*   AST  --  498*  --   --   --   --  332*   < > 382*   LIPASE  --   --   --   --   --   --   --   --  20    < > = values in this interval not displayed.       No results found for this or any previous visit (from the past 24 hours).

## 2024-11-12 NOTE — CONSULTS
"Palliative Care Consultation      Patient ID/Chief Complaint: Jayesh Ortiz 84 year old male being seen for palliative care consultation at the request of hospitalist secondary to goals of care.   The patient's primary care provider is:  Geoffrey Live       Impression & Recommendations:  85 yo male with HTN, T2DM, CAD s/p CABG, PVC's, severe aortic stenosis with LBBB following TAVR 04/2023, HFrEF, Hypothyroidism, BPH, and recent admission with weakness 10/29-11/3 that was thought to be secondary to metastatic cancer of unknown primary. He presented to the ED with failure to thrive, dehydration, and concern for infection on 11/8/2024.     He has frequent falls.  PT OT working with him, recommending TCU.    Patient has repeatedly made suicidal statements like \"just shoot me in the head\" during moments of high emotion.  He then backs away from the statements and states he has no intention of committing suicide.    Bilirubin continues to increase, MRCP planned.    He needs a liver biopsy per previous oncology evaluation to determine possible treatment.    He eats poorly, likely related to metastatic cancer but also reports swallowing difficulty with anything other than liquids or very soft foods.  Speech therapy evaluated him and did not find any obvious signs of oropharyngeal dysphagia.  Esophagram planned.       Symptoms/recommendations/discussion:  Advance care planning:  Significant other Dorita showed me a healthcare directive that was notarized.  She is named as his healthcare agent.  She will forward to my email so I can have it evaluated by Corey Velasquez.   The patient retains decisional capacity on exam  Meeting completed outside of the patient's room with his significant other Dorita and family acquaintance/[place] ABDIAS Juan. Dorita made a number of concerning statements including that she will encourage him to fight regardless of whether he wants to or not, repeatedly said that she does not want " "full information shared with him about concern for his condition, and insinuated that she would change plan of care, if previously established by him, not focused on intensive measures, should he lose decisional capacity. She seemed to be agreeable to open communication with him and care planning after I gently confronted her with concerns about her statements.  Second meeting completed at the patient's bedside.  Outcomes:  Patient wants to seek MRCP and biopsy if possible, but he also states \"does not want to beat a dead horse.\"  He would like to seek cancer treatment if possible, assuming there is the possibility of improvement in his condition.  CODE STATUS confirmed as DNR/DNI with patient        Thank you for the opportunity to participate in the care of this patient and family. Our team: will continue to follow.   SHANTA Bob CNP  Securely message with [employer])  Text page via [email or web address] Paging/Directory     History:  History gathered today from: patient, family/loved ones, medical chart, medical team members, unit team members, health care directive/s    ROS:  10 point ROS is negative unless exceptions noted below    PE: BP 91/48 (BP Location: Right arm)   Pulse 79   Temp 97.5  F (36.4  C) (Oral)   Resp 18   Ht 1.727 m (5' 8\")   Wt 114.8 kg (253 lb)   SpO2 92%   BMI 38.47 kg/m     Wt Readings from Last 3 Encounters:   11/08/24 114.8 kg (253 lb)   11/03/24 115.2 kg (253 lb 14.4 oz)   10/21/24 111.1 kg (245 lb)     Gen alert, chronically ill-appearing, keeps eyes closed during interaction  Head NCAT.  Eyes anicteric without injection  Face symmetric, eyes conjugate  Heart regular and rhythmic, bilateral lower extremity edema  Lungs unlabored, no cough, speaking full sentences  Skin no rashes or lesions evident on face/neck  Neuro Face symmetric, eyes conjugate; speech fluent but very soft at times  Neuropsych exam normal including affect, sensorium, gross memory, thought processes, and fund of " knowledge.         Data reviewed:  I reviewed electrolytes, BUN/creatinine, liver profile, hemoglobin and hematocrit, platelet count, and most recent imaging  Outpatient oncology note        74 minutes spent on the date of the encounter doing chart review, history and exam, patient education & counseling, documentation and other activities as noted above.        Thank you for involving us in the patient's care.   SHANTA Bob, [facility]  [facility] Palliative Care Service

## 2024-11-12 NOTE — PROGRESS NOTES
"SPIRITUAL HEALTH SERVICES  [facility] - Med Surg    Referral Source: Physician referral    - Referral was for spiritual support for Jayesh's S.O., Dorita.  They have been together for 46 years.  When I stopped by Jayesh's room Dorita met me in the hallway and said that she has been reaching out to multiple spiritual resources asking for prayer.  She said \"I want a miracle.\"    - Dorita spoke a bit more but was more focused on making connections by text so our visit was short.  I reintroduced self and role assuring her that spiritual resources were available on request.    Plan: SH will remain available for any ongoing support needs during LOS.    Tico Abreu M.A., [facility]  Staff   [facility]  Office: [phone]    "

## 2024-11-12 NOTE — PLAN OF CARE
Problem: Adult Inpatient Plan of Care  Goal: Absence of Hospital-Acquired Illness or Injury  Intervention: Identify and Manage Fall Risk  Recent Flowsheet Documentation  Taken 11/12/2024 0800 by Zev Walters RN  Safety Promotion/Fall Prevention:   activity supervised   assistive device/personal items within reach   clutter free environment maintained   lighting adjusted   mobility aid in reach   nonskid shoes/slippers when out of bed   room door open   room near nurse's station   safety round/check completed   Goal Outcome Evaluation:       Pt was able to transfer to bathroom with assist of to bathroom and back and into w/c for transport. Sat up on edge of bed several times to relieve back discomfort. Declined to sit up in chair. Remains jaundice in appearance with abd distention and general edema in all extremities. Blood sugars have stable and discontinued.

## 2024-11-13 LAB
ALBUMIN SERPL BCG-MCNC: 2.1 G/DL (ref 3.5–5.2)
ALP SERPL-CCNC: 550 U/L (ref 40–150)
ALT SERPL W P-5'-P-CCNC: 134 U/L (ref 0–70)
ANION GAP SERPL CALCULATED.3IONS-SCNC: 11 MMOL/L (ref 7–15)
AST SERPL W P-5'-P-CCNC: 570 U/L (ref 0–45)
BACTERIA BLD CULT: NO GROWTH
BILIRUB DIRECT SERPL-MCNC: 7.09 MG/DL (ref 0–0.3)
BILIRUB SERPL-MCNC: 8.1 MG/DL
BUN SERPL-MCNC: 70.3 MG/DL (ref 8–23)
CALCIUM SERPL-MCNC: 7.4 MG/DL (ref 8.8–10.4)
CHLORIDE SERPL-SCNC: 99 MMOL/L (ref 98–107)
CREAT SERPL-MCNC: 2.7 MG/DL (ref 0.67–1.17)
EGFRCR SERPLBLD CKD-EPI 2021: 23 ML/MIN/1.73M2
ERYTHROCYTE [DISTWIDTH] IN BLOOD BY AUTOMATED COUNT: 17.7 % (ref 10–15)
GLUCOSE SERPL-MCNC: 51 MG/DL (ref 70–99)
HCO3 SERPL-SCNC: 19 MMOL/L (ref 22–29)
HCT VFR BLD AUTO: 27.8 % (ref 40–53)
HGB BLD-MCNC: 9.3 G/DL (ref 13.3–17.7)
MCH RBC QN AUTO: 30 PG (ref 26.5–33)
MCHC RBC AUTO-ENTMCNC: 33.5 G/DL (ref 31.5–36.5)
MCV RBC AUTO: 90 FL (ref 78–100)
PLATELET # BLD AUTO: 254 10E3/UL (ref 150–450)
POTASSIUM SERPL-SCNC: 5.2 MMOL/L (ref 3.4–5.3)
PROT SERPL-MCNC: 5.1 G/DL (ref 6.4–8.3)
RBC # BLD AUTO: 3.1 10E6/UL (ref 4.4–5.9)
SODIUM SERPL-SCNC: 129 MMOL/L (ref 135–145)
WBC # BLD AUTO: 15.3 10E3/UL (ref 4–11)

## 2024-11-13 PROCEDURE — 120N000001 HC R&B MED SURG/OB

## 2024-11-13 PROCEDURE — 85018 HEMOGLOBIN: CPT | Performed by: INTERNAL MEDICINE

## 2024-11-13 PROCEDURE — 84155 ASSAY OF PROTEIN SERUM: CPT | Performed by: INTERNAL MEDICINE

## 2024-11-13 PROCEDURE — 250N000013 HC RX MED GY IP 250 OP 250 PS 637: Performed by: INTERNAL MEDICINE

## 2024-11-13 PROCEDURE — 250N000013 HC RX MED GY IP 250 OP 250 PS 637: Performed by: STUDENT IN AN ORGANIZED HEALTH CARE EDUCATION/TRAINING PROGRAM

## 2024-11-13 PROCEDURE — 99232 SBSQ HOSP IP/OBS MODERATE 35: CPT | Performed by: NURSE PRACTITIONER

## 2024-11-13 PROCEDURE — 80048 BASIC METABOLIC PNL TOTAL CA: CPT | Performed by: INTERNAL MEDICINE

## 2024-11-13 PROCEDURE — 99232 SBSQ HOSP IP/OBS MODERATE 35: CPT | Performed by: INTERNAL MEDICINE

## 2024-11-13 PROCEDURE — 82247 BILIRUBIN TOTAL: CPT | Performed by: INTERNAL MEDICINE

## 2024-11-13 PROCEDURE — 80053 COMPREHEN METABOLIC PANEL: CPT | Performed by: INTERNAL MEDICINE

## 2024-11-13 PROCEDURE — 36415 COLL VENOUS BLD VENIPUNCTURE: CPT | Performed by: INTERNAL MEDICINE

## 2024-11-13 PROCEDURE — 85048 AUTOMATED LEUKOCYTE COUNT: CPT | Performed by: INTERNAL MEDICINE

## 2024-11-13 PROCEDURE — 250N000013 HC RX MED GY IP 250 OP 250 PS 637: Performed by: HOSPITALIST

## 2024-11-13 RX ADMIN — Medication 5 MG: at 19:54

## 2024-11-13 RX ADMIN — PANTOPRAZOLE SODIUM 40 MG: 40 TABLET, DELAYED RELEASE ORAL at 09:06

## 2024-11-13 RX ADMIN — LEVOTHYROXINE SODIUM 175 MCG: 0.15 TABLET ORAL at 05:55

## 2024-11-13 RX ADMIN — AMOXICILLIN AND CLAVULANATE POTASSIUM 1 TABLET: 500; 125 TABLET, FILM COATED ORAL at 19:54

## 2024-11-13 RX ADMIN — Medication 5 MG: at 09:06

## 2024-11-13 RX ADMIN — AMOXICILLIN AND CLAVULANATE POTASSIUM 1 TABLET: 500; 125 TABLET, FILM COATED ORAL at 09:06

## 2024-11-13 RX ADMIN — OXYCODONE HYDROCHLORIDE 2.5 MG: 5 TABLET ORAL at 13:55

## 2024-11-13 RX ADMIN — Medication 5 MG: at 13:40

## 2024-11-13 NOTE — PLAN OF CARE
Problem: Adult Inpatient Plan of Care  Goal: Absence of Hospital-Acquired Illness or Injury  Outcome: Not Progressing  Intervention: Prevent Skin Injury  Recent Flowsheet Documentation  Taken 11/13/2024 0957 by Anh Escobar RN  Body Position: weight shifting  Intervention: Prevent Infection  Recent Flowsheet Documentation  Taken 11/13/2024 0957 by Anh Escobar, RN  Infection Prevention: rest/sleep promoted     Problem: Adult Inpatient Plan of Care  Goal: Optimal Comfort and Wellbeing  Outcome: Not Progressing     Pt sleeping much of shift.  Declines repositioning or out of bed this morning.  Appetite poor, had some pears for breakfast, sips of water and juice.  Up to chair with A2 this afternoon, cushion in place, pillows in use.  Pt reports pain when sitting, oxycodone given with relief.  SO Dorita here today.

## 2024-11-13 NOTE — PROGRESS NOTES
[facility]    Hospitalist Progress Note    Assessment & Plan   Jayesh Ortiz is a 85 yo male with past medical history of HTN, T2DM, CAD s/p CABG, PVC's, severe aortic stenosis with LBBB following TAVR 04/2023, HFrEF, Hypothyroidism, BPH, and recent admission with weakness 10/29-11/3 that was thought to be secondary to metastatic cancer of unknown primary that presented to the ED with worsening weakness and inability to care for self at home. Admitted 11/8/2024 with weakness, hypoglycemia, and possible infection of unclear source.      Generalized weakness   Frequent falls   Presented to this admission with progression of weakness as patient was unable to get out of bed on own and perform activities of daily living. Suspect that progression of suspected malignancy, hypoglycemia, and possible infection are contributing. Patient did have a fall while in the hospital but did not hit head an imaging was not pursued.    - PT and OT consulted, recommending LTC given decision to pursue comfort cares     Diabetes mellitus type 2  Hypoglycemia   Blood glucose of 35 on presentation. A1C 6.0 on 10/30/2024. Glipizide and metformine had been decreased during pruior admission but not stopped. Suspect with malignancy and poor intake these medications are no longer needed and may have contributed to hypoglycemia.    - Holding PTA metformin and glipizide   - Correction insulin pending course    - Stopped D5 NS    - Glucoses been within normal limits, will discontinue glucose checks     Leukocytosis, resolved   Fever, resolved   Lactic acidosis  12.6 on presentation resolved on recheck in am, also developed an isolated fever 11/9. Interestingly noted to have had fever during previous admission that was attributed to malignancy. Lactate remains elevated but patients known malignancy presents a possible non-infectious . UA with 17 WBC small leucocyte esterase and no nitrates. CT chest abdomen pelvis  "similar to prior and RUQ ultrasound similar to prior and unrevealing of acute process. Plan for give cultures additional time with possible trial of antibiotic discontinuation as may be driven by malignancy.    - Acetaminophen PRN for pain and fever    - Blood culture from 11/8 NGTD   - Change pip-tazo to Augmentin   - MRSA nares negative      Coccyx pressure injury stage one present on admission  Patient has skin injury over coccyx that is causing him a fair amount of pain and is his number one complaint most days.    - Started oxycodone 2.5-5 mg PRN while in the hospital    - Wound cares per nursing       Suicidal statements   Patient has repeatedly stated to \"Just shoot me in the head\" patient states that he will not try to kill himself while in the hospital and voices frustration about current pain and his progressive weakness. Patient seems low risk to to attempt suicide at this time but will continue to discuss with patient. Stopped making statements as symptoms improved and denies having suicidal plan or intent.       Probable metastatic cancer   Hx of prostate cancer 2000s  Hx of Thyroid cancer 1990s   Liver lesions, probable metastases  Pulmonary nodules   Esophageal thickening   Unintentional weight loss   Increasing bilirubin  Incidentally found to have probable metastatic disease on CT 10/21/2024. Had had 90 pounds of weight loss leading up to that admission. Was seen in oncology clinic 11/4 with GI referral placed for liver lesions.    - Palliative care consultation to discuss goals of care, patient confirms DNR/DNI   - Discussed with gastroenterology at the [facility].     -MRCP demonstrates innumerable hepatic metastases replacing the majority of the hepatic parenchyma.  Few minimally dilated peripheral bile ducts, there is no evidence of central biliary obstruction or stricture amenable to stenting.  Cholelithiasis without definite evidence of acute cholecystitis.  Unchanged upper abdominal " lymphadenopathy.  Persistent small bilateral pleural effusions and small volume abdominal pelvic free fluid.   - Discussed with oncology, biopsy very unlikely to reveal malignancy amenable to chemotherapy   - Poor prognosis discussed with patient, girlfriend, and her friend.  I discussed the patient's very poor prognosis with a life expectancy of less than 1 to 2 weeks.  I discussed our recommendation for hospice at a long-term care facility.  Patient will meet with care coordination to discuss LTC options.     Elevated liver function tests  Alk phos 775, , and  on presentation up from previous admission and now down trending.  Total bili 0.6 on 10/21, 1.5 on 11/1.  Suspect related to malignancy and liver metastasis. Gallbladder appears stable on ultrasound.    - Bilirubin increasing since admission:   Total bili on admission 4.8 ? 4.3 ? 4.9 ? 6.9 ? 8.1  Direct bili on admission 4.17 ? 4.35 ? 6.14 ? 7.09   - Trend LFTs daily     CKD   Acute kidney injury  Creatinine of 2.0 on presentation improved from previous admission. With Cr appearing ot have been 2 or more for the last two months.    - Creatinine 2.31 on 11/11 ? 2.59   - BMP with am labs      Swallowing difficulty   Hiccups  Noted in previous admission. Has only been able to swallow liquids without things feeling like they are getting stuck. Has not been able to see GI or get EGD outpatient. Will need to consider endoscopy and further evaluation pending course.   - SLP consulted, no obvious oropharyngeal dysphagia   - Start baclofen 5 mg 3 times daily for hiccups     Normocytic anemia   Hgb of 10.5 on presentation similar to previous admission. Had been suspected to be due to chronic disease. No signs of bleeding. Smear obtained 11/1 consistent with iron deficiency and patient would likely benefit from replacement.   - Possible IV iron inpatient vs outpatient pending course       Suspected DRAKE  Noted to have had nocturnal hypoxemia during  previous admission. Has not been able to complete sleep study outpatient.      Hyponatremia, resolved   Na of 133 on presentation higher than previous admission. Did drop to 131 with fluids but with patient still appearing dry continued with resolution.    - BMP with am labs       Heart failure with recovered ejection fraction   Hx of severe aortic stenosis s/p TAVR 04/2023   HTN   TTE 07/15/2024 with EF 50-55% improved from 45-50% 03/2024.   PTA HF meds.   - Holding PTA Carvedilol    - Bumetanide and losartan stopped after previous admission      LBBB following TAVR 04/2023   Hx of PVCs  PVC bourdon of 16% noted on mobile cardiac monitoring 3/2024. Has been on sotalol in the past but held with renal function decline last admission.    - Holding PTA carvedilol pending stability with possible infection    - Sotalol stopped during prior admission  (Creatine clearane was 32 and sotalol is contraindicated for Cr clearnace < 40)     HLD   - Atorvastatin previously stopped with liver injury, consider restart pending course    - Holding PTA Ezetimide pending course      Hypothyroidism   TSH elevated and T4 low but in setting of critical illness. Will continue PTA dosing but increase may need to be considered outpatient.    - Continued PTA levothyroxine 175 mcg every day      GERD   - Continued PTA PO PPI with formulary sub      Diet: Combination Diet Regular Diet    DVT Prophylaxis: Pneumatic Compression Devices  Welch Catheter: Not present  Lines: None     Cardiac Monitoring: None  Code Status: No CPR - Do NOT Intubate      Clinically Significant Risk Factors      # Hyponatremia: Lowest Na = 129 mmol/L in last 2 days, will monitor as appropriate       # Hypoalbuminemia: Lowest albumin = 2.1 g/dL at 11/13/2024  5:22 AM, will monitor as appropriate    # Acute Kidney Injury, unspecified: based on a >150% or 0.3 mg/dL increase in last creatinine compared to past 90 day average, will monitor renal function             #  "Obesity: Estimated body mass index is 38.47 kg/m  as calculated from the following:    Height as of this encounter: 1.727 m (5' 8\").    Weight as of this encounter: 114.8 kg (253 lb).        # Financial/Environmental Concerns: none       Date of Service (when I saw the patient): 11/13/2024    Disposition: Expected discharge pending goals of care discussion.    50 MINUTES SPENT BY ME on the date of service doing chart review, history, exam, documentation & further activities per the note.    Tico Gallegos MD    Interval History   Patient via in bed with his eyes closed.  He is able to answer the majority of my questions accurately.  He states that he is agreeable to hospice given his poor prognosis.  He denies pain or discomfort.    -Data reviewed today: I reviewed all new labs and imaging results over the last 24 hours. I personally reviewed no images or EKG's today.    Physical Exam   Temp: 98.3  F (36.8  C) Temp src: Oral BP: 106/53 Pulse: 83   Resp: 18 SpO2: 92 % O2 Device: None (Room air)    Vitals:    11/08/24 1559   Weight: 114.8 kg (253 lb)     Vital Signs with Ranges  Temp:  [97.3  F (36.3  C)-98.3  F (36.8  C)] 98.3  F (36.8  C)  Pulse:  [68-83] 83  Resp:  [18-20] 18  BP: ()/(49-53) 106/53  SpO2:  [92 %-94 %] 92 %  No intake/output data recorded.    Gen: Obese jaundiced male, alert and oriented x 2-3, no acute distressed  HEENT: Atraumatic, normocephalic; sclera non-injected, icterric; oral mucosa moist, no lesion, no exudate  Lungs: Clear to ausculation, no wheezes, no rhonchi, no rales  Heart: Regular rate, regular rhythm, no gallops, no rubs, no murmurs  GI: Bowel sound normal, no hepatosplenomegaly, no masses, non-tender, non-distended, no guarding, no rebound tenderness  Lymph: No lymphadenopathy, 3+ BLE and 2+ BUE edema  Skin: No rashes, no chronic venous stasis     Medications   Current Facility-Administered Medications   Medication Dose Route Frequency Provider Last Rate Last Admin "     Current Facility-Administered Medications   Medication Dose Route Frequency Provider Last Rate Last Admin    amoxicillin-clavulanate (AUGMENTIN) 500-125 MG per tablet 1 tablet  1 tablet Oral Q12H [facility] (08/20) Tico Gallegos MD   1 tablet at 11/13/24 0906    baclofen (LIORESAL) half-tab 5 mg  5 mg Oral TID Tico Gallegos MD   5 mg at 11/13/24 1340    levothyroxine (SYNTHROID/LEVOTHROID) tablet 175 mcg  175 mcg Oral Daily Aurelio Burkett MD   175 mcg at 11/13/24 0555    pantoprazole (PROTONIX) EC tablet 40 mg  40 mg Oral Daily Aurelio Burkett MD   40 mg at 11/13/24 0906    sodium chloride (PF) 0.9% PF flush 3 mL  3 mL Intracatheter Q8H Aureilo Burkett MD   3 mL at 11/12/24 1905       Data   Recent Labs   Lab 11/13/24  0522 11/12/24  0738 11/12/24  0614 11/11/24  0738 11/11/24  0614 11/08/24  1730 11/08/24  1649   WBC 15.3*  --  13.8*  --  12.1*   < > 12.6*   HGB 9.3*  --  8.9*  --  9.4*   < > 10.5*   MCV 90  --  90  --  93   < > 89     --  241  --  222   < > 306   INR  --   --   --   --   --   --  1.28*   *  --  130*  --  132*   < > 133*   POTASSIUM 5.2  --  5.1  --  4.7   < > 5.1   CHLORIDE 99  --  99  --  101   < > 99   CO2 19*  --  18*  --  20*   < > 26   BUN 70.3*  --  67.8*  --  64.5*   < > 75.7*   CR 2.70*  --  2.59*  --  2.31*   < > 2.00*   ANIONGAP 11  --  13  --  11   < > 8   MATT 7.4*  --  7.4*  --  7.4*   < > 7.7*   GLC 51* 77 76   < > 81   < > 35*   ALBUMIN 2.1*  --  2.2*  --   --    < > 2.3*   PROTTOTAL 5.1*  --  5.1*  --   --    < > 5.6*   BILITOTAL 8.1*  --  6.9*  --   --    < > 4.8*   ALKPHOS 550*  --  584*  --   --    < > 775*   *  --  119*  --   --    < > 101*   *  --  498*  --   --    < > 382*   LIPASE  --   --   --   --   --   --  20    < > = values in this interval not displayed.       Recent Results (from the past 24 hours)   MR Abdomen MRCP w/o & w Contrast    Narrative    MR ABDOMEN MRCP W/O & W CONTRAST 11/12/2024 3:21  PM    INDICATION: Metastatic cancer with liver mets, now with rising  bilirubin, evaluation of bile ducts for stenting  COMPARISON: CT chest, abdomen and pelvis 11/8/2024    TECHNIQUE: Routine MR liver/pancreas protocol including axial and  coronal MRCP sequences. 2D and 3D reconstruction performed by MR  technologist including MIP reconstruction and slab cholangiograms. If  performed with contrast, additional dynamic T1 post IV contrast  images.   CONTRAST: 11.5 cc Gadavist    FINDINGS:     MRCP: Cholelithiasis in a predominantly decompressed gallbladder.  There is mild wall edema without pericholecystic fluid. Extrahepatic  common duct is within normal limits without evidence of  choledocholithiasis. There are a few minimally dilated peripheral bile  ducts, although no central obstruction or stricture amenable to  stenting is visualized.    LIVER: Innumerable hepatic metastases occupying majority of the  hepatic parenchyma. Hepatic and portal veins are patent.    PANCREAS: Normal parenchymal signal and enhancement. No ductal  dilation or surrounding inflammation.    ADDITIONAL FINDINGS: Small bilateral pleural effusions. Spleen and  adrenal glands are unremarkable. Extensive retroperitoneal and  periportal lymphadenopathy, as seen on recent CT. Small volume  abdominopelvic free fluid without drainable ascites. No evidence of  bowel obstruction. No hydronephrosis. No acute bony abnormality.  Moderate body wall edema.      Impression    IMPRESSION:  1.  Innumerable hepatic metastases replacing majority of the hepatic  parenchyma.  2.  There are a few minimally dilated peripheral bile ducts, although  there is no evidence of central biliary obstruction or stricture  amenable to stenting.  3.  Cholelithiasis without definite evidence of acute cholecystitis.  4.  Unchanged upper abdominal lymphadenopathy.  5.  Persistent small bilateral pleural effusions and small volume  abdominopelvic free fluid.    JANIE BRAUN,  MD         SYSTEM ID:  [id number]

## 2024-11-13 NOTE — PLAN OF CARE
Problem: Adult Inpatient Plan of Care  Goal: Optimal Comfort and Wellbeing  11/13/2024 0511 by Eve Brown RN  Outcome: Progressing     Problem: Adult Inpatient Plan of Care  Goal: Absence of Hospital-Acquired Illness or Injury  11/13/2024 0511 by Eve Brown RN  Outcome: Progressing   Goal Outcome Evaluation:    Alert and oriented. Keeps eyes closed most of the time. Ambulated to bathroom with stand by assist with a walker. Incontinent of urine in bed requiring bed change and gown change. Patient refusing reposition. Buttocks red and painful when wiped/cleaned. After bathroom, patient agreed to lay on side so positioned with pillows, but only lasted about 10 minutes before stating it was painful until he was laid back on his back. Skin yellow. Gross edema in arms and legs. Had PCDs on for first half of shift, but was binding and leaving marks on legs so removed them. Using call light. Discharge planning in progress.

## 2024-11-13 NOTE — PROGRESS NOTES
Care Management Follow Up    Length of Stay (days): 5    Expected Discharge Date: 11/14/2024     Concerns to be Addressed: discharge planning     Patient plan of care discussed at interdisciplinary rounds: Yes    Anticipated Discharge Disposition: Transitional Care  Anticipated Discharge Services: None  Anticipated Discharge DME: None    Patient/family educated on Medicare website which has current facility and service quality ratings: yes  Education Provided on the Discharge Plan: Yes  Patient/Family in Agreement with the Plan: unable to assess    Referrals Placed by CM/SW:    Private pay costs discussed: Not applicable    Discussed  Partnership in Safe Discharge Planning  document with patient/family: Yes     Handoff Completed: Yes, [facility] PCP: Internal handoff referral completed    Additional Information:    Per discussion with MD- patient is agreeable to hospice services.    Met with patient and SO- Dorita at bedside for discharge planning. Patients eyes remained closed for most of the conversation, however he was responding appropriately to conversation. Dorita was very tearful and distracted on her phone making doctor appointments for herself.     Discussed hospice at home vs LTC. They are planning to discharge to LTC, 1st choice is [facility]. Dorita states that she has been in contact with [facility] and was told they could accept him.  CM placed referral to Ju in admissions at [facility].     Patient is a [place], however they are not sure if he is service connected. Email sent to GEOVANNA Thomas with [facility] inquiring about potential VA benefits.     Per Doriat, if patient does not have VA benefits he is able to private pay.    PLAN: LTC w/ hospice    AMANDA SELLERS RN      Patient calling back in regard to message below. States that she has additional information.    States that before her knee popped into place she had taken a muscle relaxer that was given to her by a family member. Patient thinks that is what helped her knee go back into place.    Patient is requesting further evaluation of her knee. She does not think that the xray was enough or taken at the correct spot or angle. Patient is requesting MRI of her knee.    Patient is also requesting more referral for her knee. She states that the ER told her she would need a neurologist to evaluate her knee pain.     Patient is also requesting referral to a pain clinic.    Patient was scheduled for follow up telephone encounter on Friday, but she wanted it cancelled. She does not feel that she needs an another appointment this week for the same thing that she was seen in clinic for yesterday.    Patient states that this morning she has pain and tenderness at the back of her right leg above the knee.  Clarissa Roman RN

## 2024-11-13 NOTE — PROGRESS NOTES
"Palliative Care Progress Note    Impression & Recommendations:  83 yo male with HTN, T2DM, CAD s/p CABG, PVC's, severe aortic stenosis with LBBB following TAVR 04/2023, HFrEF, Hypothyroidism, BPH, and recent admission with weakness 10/29-11/3 that was thought to be secondary to metastatic cancer of unknown primary. He presented to the ED with failure to thrive, dehydration, and concern for infection on 11/8/2024.      He has frequent falls.  PT OT working with him, recommending TCU.     Patient has repeatedly made suicidal statements like \"just shoot me in the head\" during moments of high emotion.  He then backs away from the statements and states he has no intention of committing suicide.     Bilirubin continues to increase, MRCP planned.     He needs a liver biopsy per previous oncology evaluation to determine possible treatment.     He eats poorly, likely related to metastatic cancer but also reports swallowing difficulty with anything other than liquids or very soft foods.  Speech therapy evaluated him and did not find any obvious signs of oropharyngeal dysphagia.  Esophagram planned.        Symptoms/recommendations/discussion:    11/12:  Advance care planning:  Significant other Dorita showed me a healthcare directive that was notarized.  She is named as his healthcare agent.  She will forward to my email so I can have it evaluated by [employer] Jamari Velasquez.   The patient retains decisional capacity on exam  Meeting completed outside of the patient's room with his significant other Dorita and family acquaintance/Ron Juan. Dorita made a number of concerning statements including that she will encourage him to fight regardless of whether he wants to or not, repeatedly said that she does not want full information shared with him about concern for his condition, and insinuated that she would change plan of care, if previously established by him, not focused on intensive measures, should he lose decisional " "capacity. She seemed to be agreeable to open communication with him and care planning after I gently confronted her with concerns about her statements.  Second meeting completed at the patient's bedside.  Outcomes:  Patient wants to seek MRCP and biopsy if possible, but he also states \"does not want to beat a dead horse.\"  He would like to seek cancer treatment if possible, assuming there is the possibility of improvement in his condition.  CODE STATUS confirmed as DNR/DNI with patient     11/13:  Case reviewed with hospitalist as MRI shows near total replacement of liver parenchyma with metastatic disease without biliary dilatation.  Hospitalist plans to call gastroenterologist to see if any intervention might help with persistently increasing bilirubin. I'm reaching out to Dr Friedell for his thoughts on the situation.   I did talk with Jayesh at his bedside about MRI findings and plan. He understands that there may not be any good options and responds \"if there's nothing to do, then there is nothing to do.\" Dorita wasn't present for this meeting.         Thank you for the opportunity to participate in the care of this patient and family. Our team: will continue to follow.   SHANTA Bob CNP  Securely message with [employer] [place]more info)  Text page via [employer] Paging/Directory      History:  History gathered today from: patient, family/loved ones, medical chart, medical team members, unit team members, health care directive/s     ROS:  10 point ROS is negative unless exceptions noted below     PE: BP 91/48 (BP Location: Right arm)   Pulse 79   Temp 97.5  F (36.4  C) (Oral)   Resp 18   Ht 1.727 m (5' 8\")   Wt 114.8 kg (253 lb)   SpO2 92%   BMI 38.47 kg/m         Wt Readings from Last 3 Encounters:   11/08/24 114.8 kg (253 lb)   11/03/24 115.2 kg (253 lb 14.4 oz)   10/21/24 111.1 kg (245 lb)      Gen alert, chronically ill-appearing, keeps eyes closed during interaction  Head NCAT.  Eyes anicteric without " injection  Face symmetric, eyes conjugate  Heart regular and rhythmic, bilateral lower extremity edema  Lungs unlabored, no cough, speaking full sentences  Skin no rashes or lesions evident on face/neck  Neuro Face symmetric, eyes conjugate; speech fluent but very soft at times  Neuropsych exam normal including affect, sensorium, gross memory, thought processes, and fund of knowledge.            Data reviewed:  I reviewed electrolytes, BUN/creatinine, liver profile, hemoglobin and hematocrit, platelet count, and most recent imaging  Outpatient oncology note           35 minutes spent on the date of the encounter doing chart review, history and exam, patient education & counseling, documentation and other activities as noted above.           Thank you for involving us in the patient's care.   SHANTA Bob, [facility]

## 2024-11-14 PROCEDURE — 99232 SBSQ HOSP IP/OBS MODERATE 35: CPT | Performed by: INTERNAL MEDICINE

## 2024-11-14 PROCEDURE — 120N000001 HC R&B MED SURG/OB

## 2024-11-14 PROCEDURE — 250N000013 HC RX MED GY IP 250 OP 250 PS 637: Performed by: INTERNAL MEDICINE

## 2024-11-14 RX ORDER — MORPHINE SULFATE 20 MG/ML
10 SOLUTION ORAL
Status: DISCONTINUED | OUTPATIENT
Start: 2024-11-14 | End: 2024-11-16 | Stop reason: HOSPADM

## 2024-11-14 RX ORDER — LORAZEPAM 2 MG/ML
1 CONCENTRATE ORAL EVERY 4 HOURS PRN
Status: DISCONTINUED | OUTPATIENT
Start: 2024-11-14 | End: 2024-11-16 | Stop reason: HOSPADM

## 2024-11-14 RX ORDER — MORPHINE SULFATE 20 MG/ML
20 SOLUTION ORAL
Status: DISCONTINUED | OUTPATIENT
Start: 2024-11-14 | End: 2024-11-16 | Stop reason: HOSPADM

## 2024-11-14 RX ADMIN — LORAZEPAM 1 MG: 2 SOLUTION, CONCENTRATE ORAL at 22:02

## 2024-11-14 RX ADMIN — LORAZEPAM 1 MG: 2 SOLUTION, CONCENTRATE ORAL at 16:19

## 2024-11-14 NOTE — PROGRESS NOTES
Occupational Therapy Discharge Summary    Reason for therapy discharge:    Change in medical status.    Progress towards therapy goal(s). See goals on Care Plan in [facility] electronic health record for goal details.  Goals not met.  Barriers to achieving goals:   change in medical status.    Therapy recommendation(s):    No further therapy is recommended.

## 2024-11-14 NOTE — PLAN OF CARE
Physical Therapy Discharge Summary    Reason for therapy discharge:    No further expectations of functional progress.    Progress towards therapy goal(s). See goals on Care Plan in Epic electronic health record for goal details.  Goals not met.  Barriers to achieving goals:   limited tolerance for therapy.    Therapy recommendation(s):    No further therapy is recommended. Pt transitioning to hospice at LTC.

## 2024-11-14 NOTE — PLAN OF CARE
"Assumed care of patient [id number]. Somnolent. Awakens to voice and touch. Very groggy, but answers basic questions. Refuses to be turned for brief change. Patient states, \"Wait\" and holds up his hand. When asked to turn onto his side to get cleaned up, he states no. Was able to weakly clear throat for secretions. Keeps eyes closed during interactions. Gross edema throughout body. Denies pain.                         "

## 2024-11-14 NOTE — PLAN OF CARE
"  Problem: Adult Inpatient Plan of Care  Goal: Patient-Specific Goal (Individualized)  Description: You can add care plan individualizations to a care plan. Examples of Individualization might be:  \"Parent requests to be called daily at 9am for status\", \"I have a hard time hearing out of my right ear\", or \"Do not touch me to wake me up as it startles  me\".  11/13/2024 2217 by Shyla Bardales, RN  Outcome: Not Progressing  11/13/2024 2212 by Shyla Bardales, RN  Outcome: Not Progressing  Goal: Absence of Hospital-Acquired Illness or Injury  11/13/2024 2217 by Shyla Bardales, RN  Outcome: Not Progressing  11/13/2024 2212 by Shyla Bardales, RN  Outcome: Not Progressing  Intervention: Identify and Manage Fall Risk  Recent Flowsheet Documentation  Taken 11/13/2024 1600 by Shyla Bardales, RN  Safety Promotion/Fall Prevention:   assistive device/personal items within reach   clutter free environment maintained   lighting adjusted   nonskid shoes/slippers when out of bed   room door open  Intervention: Prevent and Manage VTE (Venous Thromboembolism) Risk  Recent Flowsheet Documentation  Taken 11/13/2024 1600 by Shyla Bardales, RN  VTE Prevention/Management: SCDs off (sequential compression devices)  Intervention: Prevent Infection  Recent Flowsheet Documentation  Taken 11/13/2024 1600 by Shyla Bardales RN  Infection Prevention: rest/sleep promoted  Goal: Optimal Comfort and Wellbeing  11/13/2024 2217 by Shyla Bardales, RN  Outcome: Not Progressing  11/13/2024 2212 by Shyla Bardales, RN  Outcome: Not Progressing  Goal: Readiness for Transition of Care  11/13/2024 2217 by Shyla Bardales, RN  Outcome: Not Progressing  11/13/2024 2212 by Shyla Bardales, RN  Outcome: Not Progressing     Problem: Oral Intake Inadequate  Goal: Improved Oral Intake  11/13/2024 2217 by Shyla Bardales, RN  Outcome: Not Progressing  11/13/2024 2212 by Shyla Bardales, RN  Outcome: Not Progressing   Goal Outcome " Evaluation:       Alert and oriented. Slept in between cares most of shift. Did not eat dinner. Refused repositioning. Allowed us to change soiled brief, wound cares were completed at that time. Edematous lower and upper extremities. Skin is jaundiced. Denied pain

## 2024-11-14 NOTE — PLAN OF CARE
Patient significant other Dorita at patient bedside. Patient restless, oral concentrate lorazepam given and within a half an hour notable decreased restlessness. Patient turned, heels floating on pillows, arms on pillow. Right arm weeping serous drainage, abd pad on right arm, secured with conform.

## 2024-11-14 NOTE — PROGRESS NOTES
[facility]    Hospitalist Progress Note    Assessment & Plan   Jayesh Ortiz is a 83 yo male with past medical history of HTN, T2DM, CAD s/p CABG, PVC's, severe aortic stenosis with LBBB following TAVR 04/2023, HFrEF, Hypothyroidism, BPH, and recent admission with weakness 10/29-11/3 that was thought to be secondary to metastatic cancer of unknown primary that presented to the ED with worsening weakness and inability to care for self at home. Admitted 11/8/2024 with weakness, hypoglycemia, and possible infection of unclear source.      Generalized weakness   Frequent falls   Presented to this admission with progression of weakness as patient was unable to get out of bed on own and perform activities of daily living. Suspect that progression of suspected malignancy, hypoglycemia, and possible infection are contributing. Patient did have a fall while in the hospital but did not hit head an imaging was not pursued.    - PT and OT consulted recommending LTC    - Family is elected for long-term care with hospice, awaiting placement     Diabetes mellitus type 2  Hypoglycemia   Blood glucose of 35 on presentation. A1C 6.0 on 10/30/2024. Glipizide and metformine had been decreased during pruior admission but not stopped. Suspect with malignancy and poor intake these medications are no longer needed and may have contributed to hypoglycemia.    - Holding PTA metformin and glipizide   - Correction insulin pending course    - Stopped D5 NS    - Glucoses been within normal limits, will discontinue glucose checks   - Discontinue metformin and glipizide at discharge     Leukocytosis, resolved   Fever, resolved   Lactic acidosis  12.6 on presentation resolved on recheck in am, also developed an isolated fever 11/9. Interestingly noted to have had fever during previous admission that was attributed to malignancy. Lactate remains elevated but patients known malignancy presents a possible non-infectious  ". UA with 17 WBC small leucocyte esterase and no nitrates. CT chest abdomen pelvis similar to prior and RUQ ultrasound similar to prior and unrevealing of acute process. Plan for give cultures additional time with possible trial of antibiotic discontinuation as may be driven by malignancy.    - Acetaminophen PRN for pain and fever    - Blood culture from 11/8 NGTD   - Change pip-tazo to Augmentin   - MRSA nares negative      Coccyx pressure injury stage one present on admission  Patient has skin injury over coccyx that is causing him a fair amount of pain and is his number one complaint most days.    - Started oxycodone 2.5-5 mg PRN while in the hospital    - Wound cares per nursing       Suicidal statements   Patient has repeatedly stated to \"Just shoot me in the head\" patient states that he will not try to kill himself while in the hospital and voices frustration about current pain and his progressive weakness. Patient seems low risk to to attempt suicide at this time but will continue to discuss with patient. Stopped making statements as symptoms improved and denies having suicidal plan or intent.       Probable metastatic cancer   Hx of prostate cancer 2000s  Hx of Thyroid cancer 1990s   Liver lesions, probable metastases  Pulmonary nodules   Esophageal thickening   Unintentional weight loss   Increasing bilirubin  Incidentally found to have probable metastatic disease on CT 10/21/2024. Had had 90 pounds of weight loss leading up to that admission. Was seen in oncology clinic 11/4 with GI referral placed for liver lesions.    - Palliative care consultation to discuss goals of care, patient confirms DNR/DNI   - Discussed with gastroenterology at the [facility].     -MRCP demonstrates innumerable hepatic metastases replacing the majority of the hepatic parenchyma.  Few minimally dilated peripheral bile ducts, there is no evidence of central biliary obstruction or stricture amenable to stenting.  " Cholelithiasis without definite evidence of acute cholecystitis.  Unchanged upper abdominal lymphadenopathy.  Persistent small bilateral pleural effusions and small volume abdominal pelvic free fluid.   - Discussed with oncology, biopsy very unlikely to reveal malignancy amenable to chemotherapy   - Poor prognosis discussed with patient, girlfriend, and her friend.  I discussed the patient's very poor prognosis with a life expectancy of less than 1 to 2 weeks.  I discussed our recommendation for hospice at a long-term care facility.  Patient will meet with care coordination to discuss LTC options.   - Awaiting LTC placement with hospice     Elevated liver function tests  Alk phos 775, , and  on presentation up from previous admission and now down trending.  Total bili 0.6 on 10/21, 1.5 on 11/1.  Suspect related to malignancy and liver metastasis. Gallbladder appears stable on ultrasound.    - Bilirubin increasing since admission:   Total bili on admission 4.8 ? 4.3 ? 4.9 ? 6.9 ? 8.1  Direct bili on admission 4.17 ? 4.35 ? 6.14 ? 7.09   - Discontinue labs due to comfort cares     CKD   Acute kidney injury  Creatinine of 2.0 on presentation improved from previous admission. With Cr appearing ot have been 2 or more for the last two months.    - Creatinine 2.31 on 11/11 ? 2.59   - Discontinue labs due to comfort cares     Swallowing difficulty   Hiccups  Noted in previous admission. Has only been able to swallow liquids without things feeling like they are getting stuck. Has not been able to see GI or get EGD outpatient. Will need to consider endoscopy and further evaluation pending course.   - SLP consulted, no obvious oropharyngeal dysphagia   - Start baclofen 5 mg 3 times daily for hiccups     Normocytic anemia   Hgb of 10.5 on presentation similar to previous admission. Had been suspected to be due to chronic disease. No signs of bleeding. Smear obtained 11/1 consistent with iron deficiency and patient  would likely benefit from replacement.   - Discontinue labs due to comfort cares     Suspected DRAKE  Noted to have had nocturnal hypoxemia during previous admission. Has not been able to complete sleep study outpatient.      Hyponatremia, resolved   Na of 133 on presentation higher than previous admission. Did drop to 131 with fluids but with patient still appearing dry continued with resolution.    - Discontinue labs due to comfort cares     Heart failure with recovered ejection fraction   Hx of severe aortic stenosis s/p TAVR 04/2023   HTN   TTE 07/15/2024 with EF 50-55% improved from 45-50% 03/2024.   PTA HF meds.   - Discontinue PTA Carvedilol    - Bumetanide and losartan stopped after previous admission      LBBB following TAVR 04/2023   Hx of PVCs  PVC bourdon of 16% noted on mobile cardiac monitoring 3/2024. Has been on sotalol in the past but held with renal function decline last admission.    - Holding PTA carvedilol pending stability with possible infection    - Sotalol stopped during prior admission  (Creatine clearane was 32 and sotalol is contraindicated for Cr clearnace < 40)     HLD   - Atorvastatin previously stopped with liver injury,    - Holding PTA Ezetimide pending course    - Discontinue atorvastatin and ezetimibe due to comfort cares     Hypothyroidism   TSH elevated and T4 low but in setting of critical illness. Will continue PTA dosing but increase may need to be considered outpatient.    - Continued PTA levothyroxine 175 mcg every day      GERD   - Continued PTA PO PPI with formulary sub      Diet: Combination Diet Regular Diet    DVT Prophylaxis: Comfort cares  Welch Catheter: Not present  Lines: None     Cardiac Monitoring: None  Code Status: No CPR - Do NOT Intubate      Clinically Significant Risk Factors      # Hyponatremia: Lowest Na = 129 mmol/L in last 2 days, will monitor as appropriate       # Hypoalbuminemia: Lowest albumin = 2.1 g/dL at 11/13/2024  5:22 AM, will monitor as  "appropriate    # Acute Kidney Injury, unspecified: based on a >150% or 0.3 mg/dL increase in last creatinine compared to past 90 day average, will monitor renal function             # Obesity: Estimated body mass index is 38.47 kg/m  as calculated from the following:    Height as of this encounter: 1.727 m (5' 8\").    Weight as of this encounter: 114.8 kg (253 lb).        # Financial/Environmental Concerns: none       Date of Service (when I saw the patient): 11/14/2024    Disposition: Expected discharge pending LTC placement with hospice  Medically Ready for Discharge: Ready Now     35 MINUTES SPENT BY ME on the date of service doing chart review, history, exam, documentation & further activities per the note.    Tico Gallegos MD    Interval History   Patient is confused and sleepy.  Denies pain.  Discussed prognosis with girlfriend    -Data reviewed today: I reviewed all new labs and imaging results over the last 24 hours. I personally reviewed no images or EKG's today.    Physical Exam   Temp: 97.7  F (36.5  C) Temp src: Oral BP: 107/48 Pulse: 69   Resp: 17 SpO2: 93 % O2 Device: None (Room air)    Vitals:    11/08/24 1559   Weight: 114.8 kg (253 lb)     Vital Signs with Ranges  Temp:  [97.7  F (36.5  C)-99  F (37.2  C)] 97.7  F (36.5  C)  Pulse:  [69-79] 69  Resp:  [12-18] 17  BP: ()/(44-48) 107/48  SpO2:  [90 %-93 %] 93 %  I/O last 3 completed shifts:  In: 180 [P.O.:180]  Out: 250 [Urine:250]    Gen: Obese jaundiced male, somnolent, oriented x 1, no acute distressed  HEENT: Atraumatic, normocephalic; sclera non-injected, icterric; oral mucosa moist, no lesion, no exudate  Lungs: Clear to ausculation, no wheezes, no rhonchi, no rales  Heart: Regular rate, regular rhythm, no gallops, no rubs, no murmurs  GI: Bowel sound normal, no hepatosplenomegaly, no masses, non-tender, non-distended, no guarding, no rebound tenderness  Lymph: No lymphadenopathy, 3+ BLE and 2+ BUE edema  Skin: No rashes, no chronic " venous stasis     Medications   Current Facility-Administered Medications   Medication Dose Route Frequency Provider Last Rate Last Admin     Current Facility-Administered Medications   Medication Dose Route Frequency Provider Last Rate Last Admin    amoxicillin-clavulanate (AUGMENTIN) 500-125 MG per tablet 1 tablet  1 tablet Oral Q12H [facility] (08/20) Tico Gallegos MD   1 tablet at 11/13/24 1954    baclofen (LIORESAL) half-tab 5 mg  5 mg Oral TID Tico Gallegos MD   5 mg at 11/13/24 1954    levothyroxine (SYNTHROID/LEVOTHROID) tablet 175 mcg  175 mcg Oral Daily Aurelio Burkett MD   175 mcg at 11/13/24 0555    pantoprazole (PROTONIX) EC tablet 40 mg  40 mg Oral Daily Aurelio Burkett MD   40 mg at 11/13/24 0906    sodium chloride (PF) 0.9% PF flush 3 mL  3 mL Intracatheter Q8H Aurelio Burkett MD   3 mL at 11/13/24 1909       Data   Recent Labs   Lab 11/13/24  0522 11/12/24  0738 11/12/24  0614 11/11/24  0738 11/11/24  0614 11/08/24  1730 11/08/24  1649   WBC 15.3*  --  13.8*  --  12.1*   < > 12.6*   HGB 9.3*  --  8.9*  --  9.4*   < > 10.5*   MCV 90  --  90  --  93   < > 89     --  241  --  222   < > 306   INR  --   --   --   --   --   --  1.28*   *  --  130*  --  132*   < > 133*   POTASSIUM 5.2  --  5.1  --  4.7   < > 5.1   CHLORIDE 99  --  99  --  101   < > 99   CO2 19*  --  18*  --  20*   < > 26   BUN 70.3*  --  67.8*  --  64.5*   < > 75.7*   CR 2.70*  --  2.59*  --  2.31*   < > 2.00*   ANIONGAP 11  --  13  --  11   < > 8   MATT 7.4*  --  7.4*  --  7.4*   < > 7.7*   GLC 51* 77 76   < > 81   < > 35*   ALBUMIN 2.1*  --  2.2*  --   --    < > 2.3*   PROTTOTAL 5.1*  --  5.1*  --   --    < > 5.6*   BILITOTAL 8.1*  --  6.9*  --   --    < > 4.8*   ALKPHOS 550*  --  584*  --   --    < > 775*   *  --  119*  --   --    < > 101*   *  --  498*  --   --    < > 382*   LIPASE  --   --   --   --   --   --  20    < > = values in this interval not displayed.       No results  found for this or any previous visit (from the past 24 hours).

## 2024-11-15 PROCEDURE — 99232 SBSQ HOSP IP/OBS MODERATE 35: CPT | Performed by: INTERNAL MEDICINE

## 2024-11-15 PROCEDURE — 250N000009 HC RX 250: Performed by: INTERNAL MEDICINE

## 2024-11-15 PROCEDURE — 120N000001 HC R&B MED SURG/OB

## 2024-11-15 RX ORDER — ACETAMINOPHEN 650 MG/1
650 SUPPOSITORY RECTAL EVERY 4 HOURS PRN
Qty: 4 SUPPOSITORY | Refills: 0 | Status: SHIPPED | OUTPATIENT
Start: 2024-11-15

## 2024-11-15 RX ORDER — ATROPINE SULFATE 10 MG/ML
1 SOLUTION/ DROPS OPHTHALMIC EVERY 4 HOURS PRN
Qty: 5 ML | Refills: 0 | Status: SHIPPED | OUTPATIENT
Start: 2024-11-15

## 2024-11-15 RX ORDER — ATROPINE SULFATE 10 MG/ML
2 SOLUTION/ DROPS OPHTHALMIC
Status: DISCONTINUED | OUTPATIENT
Start: 2024-11-15 | End: 2024-11-16 | Stop reason: HOSPADM

## 2024-11-15 RX ORDER — LORAZEPAM 2 MG/ML
0.5 CONCENTRATE ORAL EVERY 4 HOURS PRN
Qty: 30 ML | Refills: 0 | Status: SHIPPED | OUTPATIENT
Start: 2024-11-15

## 2024-11-15 RX ORDER — MORPHINE SULFATE 100 MG/5ML
5 SOLUTION ORAL
Qty: 30 ML | Refills: 0 | Status: SHIPPED | OUTPATIENT
Start: 2024-11-15

## 2024-11-15 RX ADMIN — ATROPINE SULFATE 2 DROP: 10 SOLUTION/ DROPS OPHTHALMIC at 13:38

## 2024-11-15 NOTE — PLAN OF CARE
Problem: Adult Inpatient Plan of Care  Goal: Absence of Hospital-Acquired Illness or Injury  Intervention: Prevent Skin Injury  Recent Flowsheet Documentation  Taken 11/15/2024 1419 by Kim Jenkins RN  Body Position:   turned   right   Goal Outcome Evaluation:       Patient is unable to verbally communicate, only using grunting. Patient did not open eyes until visitors arrived. Was able to turn patient and place pillows underneath for weight distribution. No output, discomfort or anxiety noted. Atropine drops given for secretions. Mepliex placed on coccyx due to open area bleeding. Triad past placed in groin and on coccyx. Right arm weeping, dressing changed.

## 2024-11-15 NOTE — PLAN OF CARE
Somnolent. Awakens to voice and touch. Significant other, Dorita, at bedside. Patient received ativan x 1 for anxiety. He was reaching into the air and was able to answer that he was anxious, but did not have pain. Intermittent cough and clearing throat. Occasional gurgling in throat. Patient allowed staff to turn and change once, but otherwise has stayed on his back. Incontinent of small amount urine. Plan to discharge to a TCU on hospice.

## 2024-11-15 NOTE — PROGRESS NOTES
Care Management Follow Up    Length of Stay (days): 7    Expected Discharge Date: 11/15/2024     Concerns to be Addressed: discharge planning     Patient plan of care discussed at interdisciplinary rounds: Yes    Anticipated Discharge Disposition: LTC  Anticipated Discharge Services: Hospice  Anticipated Discharge DME: None    Patient/family educated on Medicare website which has current facility and service quality ratings: yes  Education Provided on the Discharge Plan: Yes  Patient/Family in Agreement with the Plan: unable to assess    Private pay costs discussed: transportation costs    Discussed  Partnership in Safe Discharge Planning  document with patient/family: No     Handoff Completed: Yes, [facility] PCP: Internal handoff referral completed    Additional Information:    Per IDT rounds, MD team states that patient is medically stable for discharge today.    Met with significant other, Dorita at bedside for discharge planning. Dorita would like patient to discharge home with hospice services. Dorita will be home 24/7 and they have additional support from family friends.     Dorita would like a referral sent to [facility] (Phone: [phone] Fax: [phone]).  [facility] can sign patient on to hospice services at 1130 tomorrow.     Per Katiana with [facility], she will reach out to Dorita to discuss DME, which will be delivered to the home tonight.     2 days of comfort meds will need to be sent to the pharmacy.     PLAN: Home with [facility] hospice  Transport: [facility] between [phone]    AMANDA SELLERS RN

## 2024-11-15 NOTE — PROGRESS NOTES
[facility]    Hospitalist Progress Note    Assessment & Plan   Jayesh Ortiz is a 85 yo male with past medical history of HTN, T2DM, CAD s/p CABG, PVC's, severe aortic stenosis with LBBB following TAVR 04/2023, HFrEF, Hypothyroidism, BPH, and recent admission with weakness 10/29-11/3 that was thought to be secondary to metastatic cancer of unknown primary that presented to the ED with worsening weakness and inability to care for self at home. Admitted 11/8/2024 with weakness, hypoglycemia, and possible infection of unclear source.      Generalized weakness   Frequent falls   Presented to this admission with progression of weakness as patient was unable to get out of bed on own and perform activities of daily living. Suspect that progression of suspected malignancy, hypoglycemia, and possible infection are contributing. Patient did have a fall while in the hospital but did not hit head an imaging was not pursued.    - PT and OT consulted recommending LTC    - Family is elected for long-term care with hospice, awaiting placement     Diabetes mellitus type 2  Hypoglycemia   Blood glucose of 35 on presentation. A1C 6.0 on 10/30/2024. Glipizide and metformine had been decreased during pruior admission but not stopped. Suspect with malignancy and poor intake these medications are no longer needed and may have contributed to hypoglycemia.    - Holding PTA metformin and glipizide   - Correction insulin discontinued   - Stopped D5 NS    - Glucoses been within normal limits, will discontinue glucose checks   - Discontinue metformin and glipizide due to comfort cares     Leukocytosis, resolved   Fever, resolved   Lactic acidosis  12.6 on presentation resolved on recheck in am, also developed an isolated fever 11/9. Interestingly noted to have had fever during previous admission that was attributed to malignancy. Lactate remains elevated but patients known malignancy presents a possible  "non-infectious . UA with 17 WBC small leucocyte esterase and no nitrates. CT chest abdomen pelvis similar to prior and RUQ ultrasound similar to prior and unrevealing of acute process. Plan for give cultures additional time with possible trial of antibiotic discontinuation as may be driven by malignancy.    - Acetaminophen PRN for pain and fever    - Blood culture from 11/8 NGTD   - Change pip-tazo to Augmentin   - MRSA nares negative      Coccyx pressure injury stage one present on admission  Patient has skin injury over coccyx that is causing him a fair amount of pain and is his number one complaint most days.    - Started oxycodone 2.5-5 mg PRN while in the hospital    - Wound cares per nursing       Suicidal statements   Patient has repeatedly stated to \"Just shoot me in the head\" patient states that he will not try to kill himself while in the hospital and voices frustration about current pain and his progressive weakness. Patient seems low risk to to attempt suicide at this time but will continue to discuss with patient. Stopped making statements as symptoms improved and denies having suicidal plan or intent.       Probable metastatic cancer   Hx of prostate cancer 2000s  Hx of Thyroid cancer 1990s   Liver lesions, probable metastases  Pulmonary nodules   Esophageal thickening   Unintentional weight loss   Increasing bilirubin  Incidentally found to have probable metastatic disease on CT 10/21/2024. Had had 90 pounds of weight loss leading up to that admission. Was seen in oncology clinic 11/4 with GI referral placed for liver lesions.    - Palliative care consultation to discuss goals of care, patient confirms DNR/DNI   - Discussed with gastroenterology at the [facility].     -MRCP demonstrates innumerable hepatic metastases replacing the majority of the hepatic parenchyma.  Few minimally dilated peripheral bile ducts, there is no evidence of central biliary obstruction or stricture amenable " to stenting.  Cholelithiasis without definite evidence of acute cholecystitis.  Unchanged upper abdominal lymphadenopathy.  Persistent small bilateral pleural effusions and small volume abdominal pelvic free fluid.   - Discussed with oncology, biopsy very unlikely to reveal malignancy amenable to chemotherapy   - Poor prognosis discussed with patient, girlfriend, and her friend.  I discussed the patient's very poor prognosis with a life expectancy of less than 1 to 2 weeks.  I discussed our recommendation for hospice at a long-term care facility.  Patient will meet with care coordination to discuss LTC options.  Symptom management with sublingual atropine, Roxanol, and Ativan solution   - Awaiting LTC placement with hospice     Elevated liver function tests  Alk phos 775, , and  on presentation up from previous admission and now down trending.  Total bili 0.6 on 10/21, 1.5 on 11/1.  Suspect related to malignancy and liver metastasis. Gallbladder appears stable on ultrasound.    - Bilirubin increasing since admission:   Total bili on admission 4.8 ? 4.3 ? 4.9 ? 6.9 ? 8.1  Direct bili on admission 4.17 ? 4.35 ? 6.14 ? 7.09   - Discontinue labs due to comfort cares     CKD   Acute kidney injury  Creatinine of 2.0 on presentation improved from previous admission. With Cr appearing ot have been 2 or more for the last two months.    - Creatinine 2.31 on 11/11 ? 2.59   - Discontinue labs due to comfort cares     Swallowing difficulty   Hiccups  Noted in previous admission. Has only been able to swallow liquids without things feeling like they are getting stuck. Has not been able to see GI or get EGD outpatient. Will need to consider endoscopy and further evaluation pending course.   - SLP consulted, no obvious oropharyngeal dysphagia   - Start baclofen 5 mg 3 times daily for hiccups     Normocytic anemia   Hgb of 10.5 on presentation similar to previous admission. Had been suspected to be due to chronic  disease. No signs of bleeding. Smear obtained 11/1 consistent with iron deficiency and patient would likely benefit from replacement.   - Discontinue labs due to comfort cares     Suspected DRAKE  Noted to have had nocturnal hypoxemia during previous admission. Has not been able to complete sleep study outpatient.      Hyponatremia, resolved   Na of 133 on presentation higher than previous admission. Did drop to 131 with fluids but with patient still appearing dry continued with resolution.    - Discontinue labs due to comfort cares     Heart failure with recovered ejection fraction   Hx of severe aortic stenosis s/p TAVR 04/2023   HTN   TTE 07/15/2024 with EF 50-55% improved from 45-50% 03/2024.   PTA HF meds.   - Discontinue PTA Carvedilol    - Bumetanide and losartan stopped after previous admission      LBBB following TAVR 04/2023   Hx of PVCs  PVC bourdon of 16% noted on mobile cardiac monitoring 3/2024. Has been on sotalol in the past but held with renal function decline last admission.    - Holding PTA carvedilol pending stability with possible infection    - Sotalol stopped during prior admission  (Creatine clearane was 32 and sotalol is contraindicated for Cr clearnace < 40)     HLD   - Atorvastatin previously stopped with liver injury,    - Holding PTA Ezetimide pending course    - Discontinue atorvastatin and ezetimibe due to comfort cares     Hypothyroidism   TSH elevated and T4 low but in setting of critical illness. Will continue PTA dosing but increase may need to be considered outpatient.    - Continued PTA levothyroxine 175 mcg every day      GERD   - Continued PTA PO PPI with formulary sub      Diet: Combination Diet Regular Diet    DVT Prophylaxis: Comfort cares  Welch Catheter: Not present  Lines: None     Cardiac Monitoring: None  Code Status: No CPR - Do NOT Intubate      Clinically Significant Risk Factors            # Hypoalbuminemia: Lowest albumin = 2.1 g/dL at 11/13/2024  5:22 AM, will  "monitor as appropriate                # Obesity: Estimated body mass index is 38.47 kg/m  as calculated from the following:    Height as of this encounter: 1.727 m (5' 8\").    Weight as of this encounter: 114.8 kg (253 lb).        # Financial/Environmental Concerns: none       Date of Service (when I saw the patient): 11/15/2024    Disposition: Expected discharge pending LTC placement with hospice  Medically Ready for Discharge: Ready Now     25 MINUTES SPENT BY ME on the date of service doing chart review, history, exam, documentation & further activities per the note.    Tico Gallegos MD    Interval History   Patient is somnolent during my visit.  Nurses report restlessness controlled with Ativan.    -Data reviewed today: I reviewed all new labs and imaging results over the last 24 hours. I personally reviewed no images or EKG's today.    Physical Exam                      Vitals:    11/08/24 1559   Weight: 114.8 kg (253 lb)     Vital Signs with Ranges     No intake/output data recorded.    Gen: Obese jaundiced male, somnolent, no acute distressed  HEENT: Atraumatic, normocephalic; sclera non-injected, icterric; oral mucosa moist, no lesion, no exudate  Lungs: Clear to ausculation, no wheezes, no rhonchi, no rales  Heart: Regular rate, regular rhythm, no gallops, no rubs, no murmurs  GI: Bowel sound normal, no hepatosplenomegaly, no masses, non-tender, non-distended, no guarding, no rebound tenderness  Lymph: No lymphadenopathy, 3+ BLE and 2+ BUE edema  Skin: No rashes, no chronic venous stasis     Medications   Current Facility-Administered Medications   Medication Dose Route Frequency Provider Last Rate Last Admin     Current Facility-Administered Medications   Medication Dose Route Frequency Provider Last Rate Last Admin    amoxicillin-clavulanate (AUGMENTIN) 500-125 MG per tablet 1 tablet  1 tablet Oral Q12H [facility] (08/20) Tico Gallegos MD   1 tablet at 11/13/24 1954    baclofen (LIORESAL) half-tab 5 " mg  5 mg Oral TID Tico Gallegos MD   5 mg at 11/13/24 1954    levothyroxine (SYNTHROID/LEVOTHROID) tablet 175 mcg  175 mcg Oral Daily Aurelio Burkett MD   175 mcg at 11/13/24 0555    pantoprazole (PROTONIX) EC tablet 40 mg  40 mg Oral Daily Aurelio Burkett MD   40 mg at 11/13/24 0906    sodium chloride (PF) 0.9% PF flush 3 mL  3 mL Intracatheter Q8H Aurelio Burkett MD   3 mL at 11/13/24 1909       Data   Recent Labs   Lab 11/13/24  0522 11/12/24  0738 11/12/24  0614 11/11/24  0738 11/11/24  0614 11/08/24  1730 11/08/24  1649   WBC 15.3*  --  13.8*  --  12.1*   < > 12.6*   HGB 9.3*  --  8.9*  --  9.4*   < > 10.5*   MCV 90  --  90  --  93   < > 89     --  241  --  222   < > 306   INR  --   --   --   --   --   --  1.28*   *  --  130*  --  132*   < > 133*   POTASSIUM 5.2  --  5.1  --  4.7   < > 5.1   CHLORIDE 99  --  99  --  101   < > 99   CO2 19*  --  18*  --  20*   < > 26   BUN 70.3*  --  67.8*  --  64.5*   < > 75.7*   CR 2.70*  --  2.59*  --  2.31*   < > 2.00*   ANIONGAP 11  --  13  --  11   < > 8   MATT 7.4*  --  7.4*  --  7.4*   < > 7.7*   GLC 51* 77 76   < > 81   < > 35*   ALBUMIN 2.1*  --  2.2*  --   --    < > 2.3*   PROTTOTAL 5.1*  --  5.1*  --   --    < > 5.6*   BILITOTAL 8.1*  --  6.9*  --   --    < > 4.8*   ALKPHOS 550*  --  584*  --   --    < > 775*   *  --  119*  --   --    < > 101*   *  --  498*  --   --    < > 382*   LIPASE  --   --   --   --   --   --  20    < > = values in this interval not displayed.       No results found for this or any previous visit (from the past 24 hours).

## 2024-11-16 VITALS
TEMPERATURE: 98.9 F | HEIGHT: 68 IN | SYSTOLIC BLOOD PRESSURE: 117 MMHG | HEART RATE: 69 BPM | OXYGEN SATURATION: 95 % | WEIGHT: 253 LBS | BODY MASS INDEX: 38.34 KG/M2 | RESPIRATION RATE: 17 BRPM | DIASTOLIC BLOOD PRESSURE: 63 MMHG

## 2024-11-16 PROCEDURE — 250N000013 HC RX MED GY IP 250 OP 250 PS 637: Performed by: INTERNAL MEDICINE

## 2024-11-16 PROCEDURE — 250N000009 HC RX 250: Performed by: INTERNAL MEDICINE

## 2024-11-16 RX ADMIN — ATROPINE SULFATE 2 DROP: 10 SOLUTION/ DROPS OPHTHALMIC at 05:36

## 2024-11-16 RX ADMIN — MORPHINE SULFATE 10 MG: 20 SOLUTION ORAL at 09:46

## 2024-11-16 RX ADMIN — ATROPINE SULFATE 2 DROP: 10 SOLUTION/ DROPS OPHTHALMIC at 09:01

## 2024-11-16 ASSESSMENT — ACTIVITIES OF DAILY LIVING (ADL)
ADLS_ACUITY_SCORE: 0

## 2024-11-16 NOTE — PLAN OF CARE
Patient is somnolent; appears to be resting comfortably, some discomfort w/repositioning. Turn/repo q2h. Oral cares/suction performed often. Very little urine output. Family present.

## 2024-11-16 NOTE — PROGRESS NOTES
Patient has remained somnolent.  Appears to be resting comfortably between cares.  Repositioning and weight shifting as needed.  No urine output.  Oral cares performed and prn atropine drops given.      Time cared for patient 0330 to 0700

## 2024-11-16 NOTE — PLAN OF CARE
[facility] [facility] DISCHARGE NOTE    Patient discharged to home at 11:23 AM via cart. Accompanied by other:[facility] and staff. Discharge instructions reviewed with spouse, opportunity offered to ask questions. Prescriptions sent to patients preferred pharmacy. All belongings sent with patient.    Kim Jenkins, RNGoal Outcome Evaluation:

## 2024-11-26 ENCOUNTER — PATIENT OUTREACH (OUTPATIENT)
Dept: ONCOLOGY | Facility: CLINIC | Age: 84
End: 2024-11-26
Payer: MEDICARE

## 2024-11-30 RX ORDER — PNV NO.95/FERROUS FUM/FOLIC AC 28MG-0.8MG
250 TABLET ORAL DAILY
Qty: 30 TABLET | Refills: 2 | OUTPATIENT
Start: 2024-11-30

## (undated) DEVICE — GOWN LG DISP 9515

## (undated) DEVICE — SYSTEM DELIVERY MECHANISM COMMANDER 29MM

## (undated) DEVICE — INTRO MICRO MINI STICK 4FR X 10CM STIFF H965457511

## (undated) DEVICE — Device

## (undated) DEVICE — BNDG ELASTIC 6" DBL LENGTH UNSTERILE 6611-16

## (undated) DEVICE — BLADE SAW OSCILLATING STRYK MED 9.0X25X0.38MM 2296-003-111

## (undated) DEVICE — ELECTRODE ADULT PACING MULTI P-211-M1

## (undated) DEVICE — DRAPE CV INCISE CVARTS 89466

## (undated) DEVICE — SLEEVE TR BAND RADIAL COMPRESSION DEVICE 29CM XX-RF06L

## (undated) DEVICE — CATH DIAG SOFT-VU BRAIDED RIM 5FRX65CM H787107333015

## (undated) DEVICE — INTRODUCER SHEATH FAST-CATH 8FRX12CM 406112

## (undated) DEVICE — DRILL BIT ARTHREX LPS CAN 2.0MM AR-8933-20C

## (undated) DEVICE — DECANTER VIAL 2006S

## (undated) DEVICE — DRSG ABDOMINAL 07 1/2X8" 7197D

## (undated) DEVICE — NDL 22GA 1.5"

## (undated) DEVICE — WIRE GUIDE 0.035"X260CM AMPTLAZ XSTIFF CVD THSCF-35-260-3-A

## (undated) DEVICE — SYR ANGIOGRAPHY MULTIUSE KIT ACIST 014612

## (undated) DEVICE — GLOVE PROTEXIS POWDER FREE 8.0 ORTHOPEDIC 2D73ET80

## (undated) DEVICE — CATH ANGIO JUDKINS R4 6FRX100CM INFINITI 534621T

## (undated) DEVICE — DRAPE STERI TOWEL SM 1000

## (undated) DEVICE — CUSTOM PACK CORONARY SAN5BCRHEA

## (undated) DEVICE — SYR LOCKING ATRION QL38

## (undated) DEVICE — CATH ANGIO INFINITI VESTAN STAINLESS STEEL 155 D 534554S

## (undated) DEVICE — CAST PADDING 4" SYNTHETIC UNSTERILE 9034

## (undated) DEVICE — SU ETHILON 3-0 FS-1 18" 669H

## (undated) DEVICE — CUSTOM PACK PERIPH VASCULAR SCV5BPVHEA

## (undated) DEVICE — CATH DIAG IMPULSE 6FR PIG 155 SINGLE

## (undated) DEVICE — KIT HAND CONTROL ACIST 014644 AR-P54

## (undated) DEVICE — INTRO SHEATH 6FRX10CM PINNACLE RSS602

## (undated) DEVICE — GUIDEWIRE LUNDERQUIST 0.035X260MM CVD TSCMG-35-260-7-LES

## (undated) DEVICE — SPLINT FIBERGLASS 4X30" PRE-CUT RESIN 76430

## (undated) DEVICE — GUIDEWIRE NITINOL .018 80CM N180802

## (undated) DEVICE — PACK EXTREMITY LATEX FREE SOP32HFFCS

## (undated) DEVICE — MANIFOLD KIT ANGIO AUTOMATED 014613

## (undated) DEVICE — PREP CHLORAPREP 26ML TINTED ORANGE  260815

## (undated) DEVICE — 6 FR IMPULSE ANGIO DIAG CATH AL1  5 PACK

## (undated) DEVICE — DRAPE EXTREMITY W/ARMBOARD 29405

## (undated) DEVICE — GLOVE PROTEXIS W/NEU-THERA 8.0  2D73TE80

## (undated) DEVICE — PREP CHLORAPREP 26ML TINTED HI-LITE ORANGE 930815

## (undated) DEVICE — TRANSDUCER W/MONITORING TRAY 42632-05

## (undated) DEVICE — INTRO TERUMO 6FRX25CM W/MARKER RSB603

## (undated) DEVICE — CATH DIAGNOSTIC RADIAL 5FR TIG 4.0

## (undated) DEVICE — PITCHER STERILE 1000ML  SSK9004A

## (undated) DEVICE — GUIDEWIRE VASC SAFARI2 0.035X275CM H74939406XS1

## (undated) DEVICE — DRSG ADAPTIC 3X8" 6113

## (undated) DEVICE — SU VICRYL 2-0 SH 27" UND J417H

## (undated) DEVICE — SUCTION CANISTER MEDIVAC LINER 3000ML W/LID 65651-530

## (undated) DEVICE — GLOVE BIOGEL PI SZ 6.5 40865

## (undated) DEVICE — ELECTRODE DEFIB CADENCE 22550R

## (undated) DEVICE — GUIDEWIRE TROCAR TIP 1.35MM AR-8737-01

## (undated) DEVICE — EXCHANGE WIRE .035 260 STAR/JFC/035/260/ M001491681

## (undated) DEVICE — PLATE GROUNDING ADULT W/CORD 9165L

## (undated) DEVICE — DRSG GAUZE 4X4" TRAY

## (undated) DEVICE — DRAPE SHEET REV FOLD 3/4 9349

## (undated) DEVICE — GLOVE BIOGEL PI SZ 7.0 40870

## (undated) DEVICE — SYSTEM PANNUS RETENTION 4 PAD 2 STRAP CZ-PRS-04

## (undated) DEVICE — CAST PADDING 4" STERILE 9044S

## (undated) DEVICE — GLOVE PROTEXIS POWDER FREE 7.5 ORTHOPEDIC 2D73ET75

## (undated) RX ORDER — FENTANYL CITRATE 50 UG/ML
INJECTION, SOLUTION INTRAMUSCULAR; INTRAVENOUS
Status: DISPENSED
Start: 2017-03-09

## (undated) RX ORDER — FENTANYL CITRATE 50 UG/ML
INJECTION, SOLUTION INTRAMUSCULAR; INTRAVENOUS
Status: DISPENSED
Start: 2023-02-09

## (undated) RX ORDER — LIDOCAINE HCL/EPINEPHRINE/PF 2%-1:200K
VIAL (ML) INJECTION
Status: DISPENSED
Start: 2017-03-09

## (undated) RX ORDER — PROPOFOL 10 MG/ML
INJECTION, EMULSION INTRAVENOUS
Status: DISPENSED
Start: 2017-03-09

## (undated) RX ORDER — GLYCOPYRROLATE 0.2 MG/ML
INJECTION, SOLUTION INTRAMUSCULAR; INTRAVENOUS
Status: DISPENSED
Start: 2017-03-09

## (undated) RX ORDER — HEPARIN SODIUM 1000 [USP'U]/ML
INJECTION, SOLUTION INTRAVENOUS; SUBCUTANEOUS
Status: DISPENSED
Start: 2023-02-09

## (undated) RX ORDER — DEXAMETHASONE SODIUM PHOSPHATE 4 MG/ML
INJECTION, SOLUTION INTRA-ARTICULAR; INTRALESIONAL; INTRAMUSCULAR; INTRAVENOUS; SOFT TISSUE
Status: DISPENSED
Start: 2017-03-09

## (undated) RX ORDER — ASPIRIN 325 MG
TABLET ORAL
Status: DISPENSED
Start: 2023-04-04

## (undated) RX ORDER — KETAMINE HYDROCHLORIDE 10 MG/ML
INJECTION, SOLUTION INTRAMUSCULAR; INTRAVENOUS
Status: DISPENSED
Start: 2017-03-09

## (undated) RX ORDER — CEFAZOLIN SODIUM 1 G/3ML
INJECTION, POWDER, FOR SOLUTION INTRAMUSCULAR; INTRAVENOUS
Status: DISPENSED
Start: 2023-04-04

## (undated) RX ORDER — PROTAMINE SULFATE 10 MG/ML
INJECTION, SOLUTION INTRAVENOUS
Status: DISPENSED
Start: 2023-04-04

## (undated) RX ORDER — LIDOCAINE HYDROCHLORIDE 10 MG/ML
INJECTION, SOLUTION EPIDURAL; INFILTRATION; INTRACAUDAL; PERINEURAL
Status: DISPENSED
Start: 2023-02-09

## (undated) RX ORDER — FENTANYL CITRATE 50 UG/ML
INJECTION, SOLUTION INTRAMUSCULAR; INTRAVENOUS
Status: DISPENSED
Start: 2023-04-04

## (undated) RX ORDER — DIAZEPAM 5 MG
TABLET ORAL
Status: DISPENSED
Start: 2023-04-04

## (undated) RX ORDER — ROPIVACAINE HYDROCHLORIDE 7.5 MG/ML
INJECTION, SOLUTION EPIDURAL; PERINEURAL
Status: DISPENSED
Start: 2017-03-09

## (undated) RX ORDER — LIDOCAINE HYDROCHLORIDE 10 MG/ML
INJECTION, SOLUTION EPIDURAL; INFILTRATION; INTRACAUDAL; PERINEURAL
Status: DISPENSED
Start: 2017-03-09